# Patient Record
Sex: FEMALE | Race: BLACK OR AFRICAN AMERICAN | Employment: OTHER | URBAN - METROPOLITAN AREA
[De-identification: names, ages, dates, MRNs, and addresses within clinical notes are randomized per-mention and may not be internally consistent; named-entity substitution may affect disease eponyms.]

---

## 2017-02-02 ENCOUNTER — OFFICE VISIT (OUTPATIENT)
Dept: INTERNAL MEDICINE CLINIC | Age: 67
End: 2017-02-02

## 2017-02-02 VITALS
RESPIRATION RATE: 20 BRPM | HEART RATE: 77 BPM | BODY MASS INDEX: 34.66 KG/M2 | OXYGEN SATURATION: 99 % | WEIGHT: 208 LBS | SYSTOLIC BLOOD PRESSURE: 142 MMHG | DIASTOLIC BLOOD PRESSURE: 70 MMHG | HEIGHT: 65 IN | TEMPERATURE: 98.1 F

## 2017-02-02 DIAGNOSIS — Z12.39 SCREENING FOR BREAST CANCER: ICD-10-CM

## 2017-02-02 DIAGNOSIS — I10 ESSENTIAL HYPERTENSION: Primary | ICD-10-CM

## 2017-02-02 DIAGNOSIS — I10 ESSENTIAL HYPERTENSION WITH GOAL BLOOD PRESSURE LESS THAN 140/90: ICD-10-CM

## 2017-02-02 DIAGNOSIS — Z12.11 SCREENING FOR COLON CANCER: ICD-10-CM

## 2017-02-02 DIAGNOSIS — Z00.00 MEDICARE ANNUAL WELLNESS VISIT, SUBSEQUENT: ICD-10-CM

## 2017-02-02 LAB
CHOLEST SERPL-MCNC: 239 MG/DL
HDLC SERPL-MCNC: 57 MG/DL
LDL CHOLESTEROL POC: 168
NON-HDL GOAL (POC): 183
TCHOL/HDL RATIO (POC): 4.2
TRIGL SERPL-MCNC: 74 MG/DL

## 2017-02-02 RX ORDER — LOSARTAN POTASSIUM 50 MG/1
50 TABLET ORAL DAILY
Qty: 30 TAB | Refills: 12 | Status: SHIPPED | OUTPATIENT
Start: 2017-02-02 | End: 2018-04-19 | Stop reason: SDUPTHER

## 2017-02-02 NOTE — PATIENT INSTRUCTIONS
[employer] - [employer]pping Cart Software Activation    Thank you for requesting access to PharmAbcine. Please follow the instructions below to securely access and download your online medical record. PharmAbcine allows you to send messages to your doctor, view your test results, renew your prescriptions, schedule appointments, and more. How Do I Sign Up? 1. In your internet browser, go to [email or web address]  2. Click on the First Time User? Click Here link in the Sign In box. You will be redirect to the New Member Sign Up page. 3. Enter your PharmAbcine Access Code exactly as it appears below. You will not need to use this code after youve completed the sign-up process. If you do not sign up before the expiration date, you must request a new code. PharmAbcine Access Code: Activation code not generated  Current PharmAbcine Status: Patient Declined (This is the date your PharmAbcine access code will )    4. Enter the last four digits of your Social Security Number (xxxx) and Date of Birth (mm/dd/yyyy) as indicated and click Submit. You will be taken to the next sign-up page. 5. Create a PharmAbcine ID. This will be your PharmAbcine login ID and cannot be changed, so think of one that is secure and easy to remember. 6. Create a PharmAbcine password. You can change your password at any time. 7. Enter your Password Reset Question and Answer. This can be used at a later time if you forget your password. 8. Enter your e-mail address. You will receive e-mail notification when new information is available in [address] 9. Click Sign Up. You can now view and download portions of your medical record. 10. Click the Download Summary menu link to download a portable copy of your medical information. Additional Information    If you have questions, please visit the Frequently Asked Questions section of the PharmAbcine website at [email or web address] Remember, PharmAbcine is NOT to be used for urgent needs. For medical emergencies, dial 911.

## 2017-02-02 NOTE — MR AVS SNAPSHOT
Visit Information Date & Time Provider Department Dept. Phone Encounter #  
 2/2/2017 10:00 AM aKtie Irvin MD [facility] 7 - 7[address] [facility] [phone] [id number] Follow-up Instructions Return in about 3 months (around 5/2/2017), or if symptoms worsen or fail to improve. Upcoming Health Maintenance Date Due  
 GLAUCOMA SCREENING Q2Y 2/18/2015 Pneumococcal 65+ Low/Medium Risk (1 of 2 - PCV13) 2/18/2015 BREAST CANCER SCRN MAMMOGRAM 10/1/2016 FOBT Q 1 YEAR AGE 50-75 12/1/2016 DTaP/Tdap/Td series (1 - Tdap) 4/20/2017* MEDICARE YEARLY EXAM 2/3/2018 *Topic was postponed. The date shown is not the original due date. Allergies as of 2/2/2017  Review Complete On: 2/2/2017 By: Katie Irvin MD  
  
 Severity Noted Reaction Type Reactions Codeine  10/27/2011    Itching, Swelling Current Immunizations  Never Reviewed No immunizations on file. Not reviewed this visit You Were Diagnosed With   
  
 Codes Comments Essential hypertension    -  Primary ICD-10-CM: I10 
ICD-9-CM: 401.9 Screening for colon cancer     ICD-10-CM: Z12.11 ICD-9-CM: V76.51 Screening for breast cancer     ICD-10-CM: Z12.39 
ICD-9-CM: V76.10 Essential hypertension with goal blood pressure less than 140/90     ICD-10-CM: I10 
ICD-9-CM: 401.9 Vitals BP Pulse Temp Resp Height(growth percentile) Weight(growth percentile) 142/70 77 98.1 °F (36.7 °C) (Oral) 20 5' 5\" (1.651 m) 208 lb (94.3 kg) SpO2 BMI OB Status Smoking Status 99% 34.61 kg/m2 Hysterectomy Never Smoker BMI and BSA Data Body Mass Index Body Surface Area  
 34.61 kg/m 2 2.08 m 2 Preferred Pharmacy Pharmacy Name Phone [facility] [address] [facility] [phone] Your Updated Medication List  
  
   
This list is accurate as of: 2/2/17 11:22 AM.  Always use your most recent med list.  
  
  
  
  
 acetaminophen 650 mg CR tablet Commonly known as:  TYLENOL ARTHRITIS Take 1 Tab by mouth every six (6) hours as needed for Pain. amLODIPine 5 mg tablet Commonly known as:  Erazo Del Take 1 Tab by mouth daily. atorvastatin 80 mg tablet Commonly known as:  LIPITOR Take 1 Tab by mouth daily. azithromycin 250 mg tablet Commonly known as:  Evelene Smiling 2 tabs today and then 1 tab daily for 4 days  
  
 cetirizine 5 mg tablet Commonly known as:  ZYRTEC Take 1 Tab by mouth daily. fluticasone 50 mcg/actuation nasal spray Commonly known as:  Domnick Means 2 Sprays by Both Nostrils route daily. hydroCHLOROthiazide 12.5 mg tablet Commonly known as:  HYDRODIURIL Take 1 Tab by mouth daily. ipratropium 0.06 % nasal spray Commonly known as:  ATROVENT  
2 Sprays by Both Nostrils route four (4) times daily. losartan 50 mg tablet Commonly known as:  COZAAR Take 1 Tab by mouth daily. predniSONE 10 mg tablet Commonly known as:  DELTASONE  
6 tabs today and reduce by 1 tab daily Prescriptions Sent to Pharmacy Refills  
 losartan (COZAAR) 50 mg tablet 12 Sig: Take 1 Tab by mouth daily. Class: Normal  
 Pharmacy: [address] [place] [address] 36, [address]10 [facility] Ph #: [phone] Route: Oral  
  
We Performed the Following AMB POC LIPID PROFILE [19280 CPT(R)] CBC W/O DIFF [21252 CPT(R)] METABOLIC PANEL, COMPREHENSIVE [53842 CPT(R)] OCCULT BLOOD, IMMUNOASSAY (FIT) [id number] CPT(R)] Follow-up Instructions Return in about 3 months (around 5/2/2017), or if symptoms worsen or fail to improve. To-Do List   
 02/02/2017 Imaging:  SIGRID MAMMO BI SCREENING INCL CAD Patient Instructions [employer] Activation Thank you for requesting access to Adjudica. Please follow the instructions below to securely access and download your online medical record.  Adjudica allows you to send messages to your doctor, view your test results, renew your prescriptions, schedule appointments, and more. How Do I Sign Up? 1. In your internet browser, go to [email or web address] 
2. Click on the First Time User? Click Here link in the Sign In box. You will be redirect to the New Member Sign Up page. 3. Enter your StreamOcean Access Code exactly as it appears below. You will not need to use this code after youve completed the sign-up process. If you do not sign up before the expiration date, you must request a new code. Dwehot Access Code: Activation code not generated Current StreamOcean Status: Patient Declined (This is the date your Dwehot access code will ) 4. Enter the last four digits of your Social Security Number (xxxx) and Date of Birth (mm/dd/yyyy) as indicated and click Submit. You will be taken to the next sign-up page. 5. Create a StreamOcean ID. This will be your StreamOcean login ID and cannot be changed, so think of one that is secure and easy to remember. 6. Create a StreamOcean password. You can change your password at any time. 7. Enter your Password Reset Question and Answer. This can be used at a later time if you forget your password. 8. Enter your e-mail address. You will receive e-mail notification when new information is available in [address] 9. Click Sign Up. You can now view and download portions of your medical record. 10. Click the Download Summary menu link to download a portable copy of your medical information. Additional Information If you have questions, please visit the Frequently Asked Questions section of the StreamOcean website at [email or web address] Remember, StreamOcean is NOT to be used for urgent needs. For medical emergencies, dial 911. Please provide this summary of care documentation to your next provider. Your primary care clinician is listed as Emmanuelle Barnes.  If you have any questions after today's visit, please call [phone].

## 2017-02-03 LAB
ALBUMIN SERPL-MCNC: 4.4 G/DL (ref 3.6–4.8)
ALBUMIN/GLOB SERPL: 1.5 {RATIO} (ref 1.1–2.5)
ALP SERPL-CCNC: 108 IU/L (ref 39–117)
ALT SERPL-CCNC: 14 IU/L (ref 0–32)
AST SERPL-CCNC: 24 IU/L (ref 0–40)
BILIRUB SERPL-MCNC: 0.3 MG/DL (ref 0–1.2)
BUN SERPL-MCNC: 14 MG/DL (ref 8–27)
BUN/CREAT SERPL: 13 (ref 11–26)
CALCIUM SERPL-MCNC: 9.2 MG/DL (ref 8.7–10.3)
CHLORIDE SERPL-SCNC: 100 MMOL/L (ref 96–106)
CO2 SERPL-SCNC: 27 MMOL/L (ref 18–29)
CREAT SERPL-MCNC: 1.06 MG/DL (ref 0.57–1)
ERYTHROCYTE [DISTWIDTH] IN BLOOD BY AUTOMATED COUNT: 16 % (ref 12.3–15.4)
GLOBULIN SER CALC-MCNC: 3 G/DL (ref 1.5–4.5)
GLUCOSE SERPL-MCNC: 96 MG/DL (ref 65–99)
HCT VFR BLD AUTO: 42.1 % (ref 34–46.6)
HGB BLD-MCNC: 14.4 G/DL (ref 11.1–15.9)
INTERPRETATION: NORMAL
MCH RBC QN AUTO: 28.5 PG (ref 26.6–33)
MCHC RBC AUTO-ENTMCNC: 34.2 G/DL (ref 31.5–35.7)
MCV RBC AUTO: 83 FL (ref 79–97)
PLATELET # BLD AUTO: 252 X10E3/UL (ref 150–379)
POTASSIUM SERPL-SCNC: 3.8 MMOL/L (ref 3.5–5.2)
PROT SERPL-MCNC: 7.4 G/DL (ref 6–8.5)
RBC # BLD AUTO: 5.06 X10E6/UL (ref 3.77–5.28)
SODIUM SERPL-SCNC: 143 MMOL/L (ref 134–144)
WBC # BLD AUTO: 6.5 X10E3/UL (ref 3.4–10.8)

## 2017-07-06 DIAGNOSIS — I10 ESSENTIAL HYPERTENSION WITH GOAL BLOOD PRESSURE LESS THAN 140/90: ICD-10-CM

## 2017-07-06 DIAGNOSIS — E78.00 HYPERCHOLESTEREMIA: ICD-10-CM

## 2017-07-06 RX ORDER — ATORVASTATIN CALCIUM 80 MG/1
TABLET, FILM COATED ORAL
Qty: 90 TAB | Refills: 3 | Status: SHIPPED | OUTPATIENT
Start: 2017-07-06 | End: 2018-07-24 | Stop reason: SDUPTHER

## 2017-07-06 RX ORDER — AMLODIPINE BESYLATE 5 MG/1
TABLET ORAL
Qty: 90 TAB | Refills: 4 | Status: SHIPPED | OUTPATIENT
Start: 2017-07-06 | End: 2018-04-19 | Stop reason: SDUPTHER

## 2017-07-06 RX ORDER — HYDROCHLOROTHIAZIDE 12.5 MG/1
TABLET ORAL
Qty: 90 TAB | Refills: 4 | Status: SHIPPED | OUTPATIENT
Start: 2017-07-06 | End: 2018-04-19 | Stop reason: SDUPTHER

## 2018-04-19 ENCOUNTER — OFFICE VISIT (OUTPATIENT)
Dept: INTERNAL MEDICINE CLINIC | Age: 68
End: 2018-04-19

## 2018-04-19 VITALS
TEMPERATURE: 98.2 F | WEIGHT: 202 LBS | SYSTOLIC BLOOD PRESSURE: 120 MMHG | DIASTOLIC BLOOD PRESSURE: 70 MMHG | HEART RATE: 72 BPM | OXYGEN SATURATION: 98 % | HEIGHT: 65 IN | BODY MASS INDEX: 33.66 KG/M2 | RESPIRATION RATE: 16 BRPM

## 2018-04-19 DIAGNOSIS — Z12.39 BREAST CANCER SCREENING: ICD-10-CM

## 2018-04-19 DIAGNOSIS — E78.00 HYPERCHOLESTEREMIA: ICD-10-CM

## 2018-04-19 DIAGNOSIS — I47.1 SVT (SUPRAVENTRICULAR TACHYCARDIA) (HCC): Primary | ICD-10-CM

## 2018-04-19 DIAGNOSIS — E87.6 HYPOKALEMIA: ICD-10-CM

## 2018-04-19 DIAGNOSIS — Z12.11 SCREENING FOR COLON CANCER: ICD-10-CM

## 2018-04-19 DIAGNOSIS — I10 ESSENTIAL HYPERTENSION WITH GOAL BLOOD PRESSURE LESS THAN 140/90: ICD-10-CM

## 2018-04-19 RX ORDER — METOPROLOL TARTRATE 25 MG/1
25 TABLET, FILM COATED ORAL DAILY
Qty: 90 TAB | Refills: 4 | Status: SHIPPED | OUTPATIENT
Start: 2018-04-19 | End: 2018-05-25 | Stop reason: SDUPTHER

## 2018-04-19 RX ORDER — POTASSIUM CHLORIDE 1500 MG/1
TABLET, EXTENDED RELEASE ORAL
COMMUNITY
Start: 2018-04-17 | End: 2018-04-19 | Stop reason: SDUPTHER

## 2018-04-19 RX ORDER — POTASSIUM CHLORIDE 1500 MG/1
20 TABLET, EXTENDED RELEASE ORAL 2 TIMES DAILY
Qty: 180 TAB | Refills: 4 | Status: SHIPPED | OUTPATIENT
Start: 2018-04-19 | End: 2018-05-25 | Stop reason: SDUPTHER

## 2018-04-19 RX ORDER — LOSARTAN POTASSIUM 50 MG/1
50 TABLET ORAL DAILY
Qty: 90 TAB | Refills: 4 | Status: SHIPPED | OUTPATIENT
Start: 2018-04-19 | End: 2018-05-25 | Stop reason: SDUPTHER

## 2018-04-19 RX ORDER — AMLODIPINE BESYLATE 5 MG/1
TABLET ORAL
Qty: 90 TAB | Refills: 4 | Status: SHIPPED | OUTPATIENT
Start: 2018-04-19 | End: 2018-08-20 | Stop reason: SDUPTHER

## 2018-04-19 RX ORDER — METOPROLOL TARTRATE 25 MG/1
TABLET, FILM COATED ORAL
COMMUNITY
Start: 2018-04-17 | End: 2018-04-19 | Stop reason: SDUPTHER

## 2018-04-19 RX ORDER — HYDROCHLOROTHIAZIDE 12.5 MG/1
TABLET ORAL
Qty: 90 TAB | Refills: 4 | Status: SHIPPED | OUTPATIENT
Start: 2018-04-19 | End: 2018-05-25 | Stop reason: SDUPTHER

## 2018-04-19 NOTE — MR AVS SNAPSHOT
[address] [place] [address],[address] [place] [id number] 
[phone] Patient: Olya Epperson MRN: [id number] IVT:8/[id number] Visit Information Date & Time Provider Department Dept. Phone Encounter #  
 4/19/2018 10:00 AM Sheryl Altamirano MD [address] [phone] [id number] Follow-up Instructions Return in about 4 weeks (around 5/17/2018), or if symptoms worsen or fail to improve. Upcoming Health Maintenance Date Due DTaP/Tdap/Td series (1 - Tdap) 2/18/1971 GLAUCOMA SCREENING Q2Y 2/18/2015 Pneumococcal 65+ Low/Medium Risk (1 of 2 - PCV13) 2/18/2015 BREAST CANCER SCRN MAMMOGRAM 10/1/2016 FOBT Q 1 YEAR AGE 50-75 12/1/2016 Influenza Age 5 to Adult 8/1/2017 MEDICARE YEARLY EXAM 4/20/2019 Allergies as of 4/19/2018  Review Complete On: 4/19/2018 By: Sheryl Altamirano MD  
  
 Severity Noted Reaction Type Reactions Codeine  10/27/2011    Itching, Swelling Current Immunizations  Never Reviewed No immunizations on file. Not reviewed this visit You Were Diagnosed With   
  
 Codes Comments [facility]    -  Primary ICD-10-CM: I47.1 ICD-9-CM: 427.89 Essential hypertension with goal blood pressure less than 140/90     ICD-10-CM: I10 
ICD-9-CM: 401.9 Hypercholesteremia     ICD-10-CM: E78.00 ICD-9-CM: 272.0 Hypokalemia     ICD-10-CM: E87.6 ICD-9-CM: 276.8 Screening for colon cancer     ICD-10-CM: Z12.11 ICD-9-CM: V76.51 Breast cancer screening     ICD-10-CM: Z12.31 
ICD-9-CM: V76.10 Vitals BP Pulse Temp Resp Height(growth percentile) Weight(growth percentile) 120/70 (BP 1 Location: Right arm, BP Patient Position: Sitting) 72 98.2 °F (36.8 °C) (Oral) 16 5' 5\" (1.651 m) 202 lb (91.6 kg) SpO2 BMI OB Status Smoking Status 98% 33.61 kg/m2 Hysterectomy Never Smoker Vitals History BMI and BSA Data Body Mass Index Body Surface Area  
 33.61 kg/m 2 2.05 m 2 Preferred Pharmacy Pharmacy Name Phone [address] [facility] 36, [address] [phone] Your Updated Medication List  
  
   
This list is accurate as of 4/19/18 11:35 AM.  Always use your most recent med list. amLODIPine 5 mg tablet Commonly known as:  Kel Rings TAKE 1 TABLET EVERY DAY  
  
 atorvastatin 80 mg tablet Commonly known as:  LIPITOR  
TAKE 1 TABLET EVERY DAY  
  
 fluticasone 50 mcg/actuation nasal spray Commonly known as:  Marcine Infield 2 Sprays by Both Nostrils route daily. hydroCHLOROthiazide 12.5 mg tablet Commonly known as:  HYDRODIURIL  
TAKE 1 TABLET EVERY DAY  
  
 ipratropium 42 mcg (0.06 %) nasal spray Commonly known as:  ATROVENT  
2 Sprays by Both Nostrils route four (4) times daily. KLOR-CON M20 20 mEq tablet Generic drug:  potassium chloride Take 1 Tab by mouth two (2) times a day. losartan 50 mg tablet Commonly known as:  COZAAR Take 1 Tab by mouth daily. metoprolol tartrate 25 mg tablet Commonly known as:  LOPRESSOR Take 1 Tab by mouth daily. Prescriptions Sent to Pharmacy Refills  
 metoprolol tartrate (LOPRESSOR) 25 mg tablet 4 Sig: Take 1 Tab by mouth daily. Class: Normal  
 Pharmacy: [address] [facility] 36, [address] Ph #: [phone] Route: Oral  
 KLOR-CON M20 20 mEq tablet 4 Sig: Take 1 Tab by mouth two (2) times a day. Class: Normal  
 Pharmacy: [address] [facility] 36, [address] Ph #: [phone] Route: Oral  
 amLODIPine (NORVASC) 5 mg tablet 4 Sig: TAKE 1 TABLET EVERY DAY Class: Normal  
 Pharmacy: [address] [facility] Ph #: [phone]  
 hydroCHLOROthiazide (HYDRODIURIL) 12.5 mg tablet 4 Sig: TAKE 1 TABLET EVERY DAY  Class: Normal  
 Pharmacy: [address] [place] [address] Ph #: [phone]  
 losartan (COZAAR) 50 mg tablet 4 Sig: Take 1 Tab by mouth daily. Class: Normal  
 Pharmacy: [address] [place], [place] [address] 36, [address] [place] Ph #: [phone] Route: Oral  
  
We Performed the Following METABOLIC PANEL, BASIC [67251 CPT(R)] OCCULT BLOOD, IMMUNOASSAY (FIT) [id number] CPT(R)] Follow-up Instructions Return in about 4 weeks (around 2018), or if symptoms worsen or fail to improve. To-Do List   
 2018 Imaging:  SIGRID MAMMO BI SCREENING INCL CAD Patient Instructions MyChart Activation Thank you for requesting access to Parents Journey. Please follow the instructions below to securely access and download your online medical record. Parents Journey allows you to send messages to your doctor, view your test results, renew your prescriptions, schedule appointments, and more. How Do I Sign Up? 1. In your internet browser, go to [email or web address] 
2. Click on the First Time User? Click Here link in the Sign In box. You will be redirect to the New Member Sign Up page. 3. Enter your Parents Journey Access Code exactly as it appears below. You will not need to use this code after youve completed the sign-up process. If you do not sign up before the expiration date, you must request a new code. Parents Journey Access Code: [email or web address] Expires: 2018 11:32 AM (This is the date your Parents Journey access code will ) 4. Enter the last four digits of your Social Security Number (xxxx) and Date of Birth (mm/dd/yyyy) as indicated and click Submit. You will be taken to the next sign-up page. 5. Create a Parents Journey ID. This will be your Parents Journey login ID and cannot be changed, so think of one that is secure and easy to remember. 6. Create a Parents Journey password. You can change your password at any time. 7. Enter your Password Reset Question and Answer.  This can be used at a later time if you forget your password. 8. Enter your e-mail address. You will receive e-mail notification when new information is available in [address] 9. Click Sign Up. You can now view and download portions of your medical record. 10. Click the Download Summary menu link to download a portable copy of your medical information. Additional Information If you have questions, please visit the Frequently Asked Questions section of the MegaHoot website at [email or web address] Remember, MegaHoot is NOT to be used for urgent needs. For medical emergencies, dial 911. Introducing [facility]! Ramandeep Mueller introduces MegaHoot patient portal. Now you can access parts of your medical record, email your doctor's office, and request medication refills online. 1. In your internet browser, go to [email or web address] 2. Click on the First Time User? Click Here link in the Sign In box. You will see the New Member Sign Up page. 3. Enter your MegaHoot Access Code exactly as it appears below. You will not need to use this code after youve completed the sign-up process. If you do not sign up before the expiration date, you must request a new code. · MegaHoot Access Code: [email or web address] Expires: 7/18/2018 11:32 AM 
 
4. Enter the last four digits of your Social Security Number (xxxx) and Date of Birth (mm/dd/yyyy) as indicated and click Submit. You will be taken to the next sign-up page. 5. Create a Correlort ID. This will be your MegaHoot login ID and cannot be changed, so think of one that is secure and easy to remember. 6. Create a MegaHoot password. You can change your password at any time. 7. Enter your Password Reset Question and Answer. This can be used at a later time if you forget your password. 8. Enter your e-mail address. You will receive e-mail notification when new information is available in [address] 9. Click Sign Up. You can now view and download portions of your medical record. 10. Click the Download Summary menu link to download a portable copy of your medical information. If you have questions, please visit the Frequently Asked Questions section of the SixthEye website. Remember, SixthEye is NOT to be used for urgent needs. For medical emergencies, dial 911. Now available from your iPhone and Android! Please provide this summary of care documentation to your next provider. Your primary care clinician is listed as Worthy Nash. If you have any questions after today's visit, please call [phone].

## 2018-04-19 NOTE — PROGRESS NOTES
1. Have you been to the ER, urgent care clinic since your last visit? Hospitalized since your last visit? YES. 4/17/18 ADALBERTO    2. Have you seen or consulted any other health care providers outside of the [address] [place] [address] since your last visit? Include any pap smears or colon screening.  YES, 4/17/17, ADALBERTO

## 2018-04-19 NOTE — PATIENT INSTRUCTIONS
OuterBay Technologies Activation    Thank you for requesting access to OuterBay Technologies. Please follow the instructions below to securely access and download your online medical record. OuterBay Technologies allows you to send messages to your doctor, view your test results, renew your prescriptions, schedule appointments, and more. How Do I Sign Up? 1. In your internet browser, go to [email or web address]  2. Click on the First Time User? Click Here link in the Sign In box. You will be redirect to the New Member Sign Up page. 3. Enter your OuterBay Technologies Access Code exactly as it appears below. You will not need to use this code after youve completed the sign-up process. If you do not sign up before the expiration date, you must request a new code. OuterBay Technologies Access Code: [email or web address]  Expires: 2018 11:32 AM (This is the date your OuterBay Technologies access code will )    4. Enter the last four digits of your Social Security Number (xxxx) and Date of Birth (mm/dd/yyyy) as indicated and click Submit. You will be taken to the next sign-up page. 5. Create a OuterBay Technologies ID. This will be your OuterBay Technologies login ID and cannot be changed, so think of one that is secure and easy to remember. 6. Create a OuterBay Technologies password. You can change your password at any time. 7. Enter your Password Reset Question and Answer. This can be used at a later time if you forget your password. 8. Enter your e-mail address. You will receive e-mail notification when new information is available in [address] 9. Click Sign Up. You can now view and download portions of your medical record. 10. Click the Download Summary menu link to download a portable copy of your medical information. Additional Information    If you have questions, please visit the Frequently Asked Questions section of the OuterBay Technologies website at [email or web address] Remember, OuterBay Technologies is NOT to be used for urgent needs. For medical emergencies, dial 911.

## 2018-04-19 NOTE — PROGRESS NOTES
Angelic Camargo is a 76 y.o. female and presents with Hospital Follow Up and Irregular Heart Beat  . Subjective:  Arrhythmia  Patient presents for evaluation of palpitations. Onset was 2 days ago, with improving course since that time. Patient also complains of palpitations. The patient denies abdominal pain, abnormal labs sob, chest pain, cough, dizziness, leg swelling and shortness of breath. The patient has a new history of SVT. The patient denies a past history of A fib, A flutter, AICD, CABG, CAD and CHF   She was seen at [facility] er treated and released. She was noted to have a low potassium and treated. Hypertension Review:  The patient has essential hypertension  Diet and Lifestyle: generally follows a  low sodium diet, exercises sporadically  Home BP Monitoring: is not measured at home. Pertinent ROS: taking medications as instructed, no medication side effects noted, no TIA's, no chest pain on exertion, no dyspnea on exertion, no swelling of ankles. Dyslipidemia Review:  Patient presents for evaluation of lipids. Compliance with treatment thus far has been excellent. A repeat fasting lipid profile was done. The patient does not use medications that may worsen dyslipidemias (corticosteroids, progestins, anabolic steroids, diuretics, beta-blockers, amiodarone, cyclosporine, olanzapine). The patient exercises some      Angelic Camargo is a 76 y.o. female and presents for annual Medicare Wellness Visit. Problem List: Reviewed with patient and discussed risk factors.     Patient Active Problem List   Diagnosis Code    Hypertension I10    Screening cholesterol level Z13.220    Screening for thyroid disorder Z13.29       Current medical providers:  Patient Care Team:  Rosa Campuzano MD as PCP - General (Internal Medicine)  Norma Davis LPN as Nurse Navigator    PSH: Reviewed with patient  Past Surgical History:   Procedure Laterality Date    HX GYN      hystterectomy        SH: Reviewed with patient  Social History   Substance Use Topics    Smoking status: Never Smoker    Smokeless tobacco: Never Used    Alcohol use Yes      Comment: occasional       FH: Reviewed with patient  History reviewed. No pertinent family history. Medications/Allergies: Reviewed with patient  Current Outpatient Prescriptions on File Prior to Visit   Medication Sig Dispense Refill    atorvastatin (LIPITOR) 80 mg tablet TAKE 1 TABLET EVERY DAY 90 Tab 3    fluticasone (FLONASE) 50 mcg/actuation nasal spray 2 Sprays by Both Nostrils route daily. 1 Bottle 12    ipratropium (ATROVENT) 0.06 % nasal spray 2 Sprays by Both Nostrils route four (4) times daily. 15 mL 3     No current facility-administered medications on file prior to visit. Allergies   Allergen Reactions    Codeine Itching and Swelling       Objective:  Visit Vitals    /70 (BP 1 Location: Right arm, BP Patient Position: Sitting)    Pulse 72    Temp 98.2 °F (36.8 °C) (Oral)    Resp 16    Ht 5' 5\" (1.651 m)    Wt 202 lb (91.6 kg)    SpO2 98%    BMI 33.61 kg/m2    Body mass index is 33.61 kg/(m^2). Assessment of cognitive impairment: Alert and oriented x 3    Depression Screen:   PHQ over the last two weeks 4/19/2018   PHQ Not Done -   Little interest or pleasure in doing things Not at all   Feeling down, depressed or hopeless Not at all   Total Score PHQ 2 0     Depression Review:  Patient is seen for screen of depression,denies anhedonia, weight gain, insomnia, hypersomnia, psychomotor agitation, psychomotor retardation, fatigue, feelings of worthlessness/guilt, difficulty concentrating, hopelessness, impaired memory and recurrent thoughts of death Treatment includes no medication   She denies recurrent thoughts of death and suicidal thoughts without plan. Fall Risk Assessment:    Fall Risk Assessment, last 12 mths 4/19/2018   Able to walk? -   Fall in past 12 months? Yes   Fall with injury?  No   Number of falls in past 12 months 1   Fall Risk Score 1       Functional Ability:   Does the patient exhibit a steady gait? yes   How long did it take the patient to get up and walk from a sitting position? seconds   Is the patient self reliant?  (ie can do own laundry, meals, household chores)  yes     Does the patient handle his/her own medications? yes     Does the patient handle his/her own money? yes     Is the patients home safe (ie good lighting, handrails on stairs and bath, etc.)? yes     Did you notice or did patient express any hearing difficulties? no     Did you notice or did patient express any vision difficulties?   no     Were distance and reading eye charts used? no       Advance Care Planning:   Patient was offered the opportunity to discuss advance care planning:  yes     Does patient have an Advance Directive:  no   If no, did you provide information on Caring Connections? yes       Plan:      Orders Placed This Encounter    SIGRID MAMMO BI SCREENING INCL CAD    METABOLIC PANEL, BASIC    OCCULT BLOOD, IMMUNOASSAY (FIT)    DISCONTD: metoprolol tartrate (LOPRESSOR) 25 mg tablet    DISCONTD: KLOR-CON M20 20 mEq tablet    metoprolol tartrate (LOPRESSOR) 25 mg tablet    KLOR-CON M20 20 mEq tablet    amLODIPine (NORVASC) 5 mg tablet    hydroCHLOROthiazide (HYDRODIURIL) 12.5 mg tablet    losartan (COZAAR) 50 mg tablet       Health Maintenance   Topic Date Due    DTaP/Tdap/Td series (1 - Tdap) 02/18/1971    GLAUCOMA SCREENING Q2Y  02/18/2015    Pneumococcal 65+ Low/Medium Risk (1 of 2 - PCV13) 02/18/2015    BREAST CANCER SCRN MAMMOGRAM  10/01/2016    FOBT Q 1 YEAR AGE 50-75  12/01/2016    Influenza Age 9 to Adult  08/01/2017    MEDICARE YEARLY EXAM  04/20/2019    Hepatitis C Screening  Completed    Bone Densitometry (Dexa) Screening  Addressed    ZOSTER VACCINE AGE 60>  Addressed       *Patient verbalized understanding and agreement with the plan.   A copy of the After Visit Summary with personalized health plan was given to the patient today. Review of Systems  Constitutional: negative for fevers, chills, anorexia and weight loss  Eyes:   negative for visual disturbance and irritation  ENT:   negative for tinnitus,sore throat,nasal congestion,ear pains. hoarseness  Respiratory:  negative for cough, hemoptysis, dyspnea,wheezing  CV:   negative for chest pain, palpitations, lower extremity edema  GI:   negative for nausea, vomiting, diarrhea, abdominal pain,melena  Endo:               negative for polyuria,polydipsia,polyphagia,heat intolerance  Genitourinary: negative for frequency, dysuria and hematuria  Integument:  negative for rash and pruritus  Hematologic:  negative for easy bruising and gum/nose bleeding  Musculoskel: negative for myalgias, arthralgias, back pain, muscle weakness, joint pain  Neurological:  negative for headaches, dizziness, vertigo, memory problems and gait   Behavl/Psych: negative for feelings of anxiety, depression, mood changes    Past Medical History:   Diagnosis Date    History of mammogram 2010    normal    Hypertension     Pap smear for cervical cancer screening 2011    normal     Past Surgical History:   Procedure Laterality Date    HX GYN      hystterectomy     Social History     Social History    Marital status:      Spouse name: N/A    Number of children: N/A    Years of education: N/A     Social History Main Topics    Smoking status: Never Smoker    Smokeless tobacco: Never Used    Alcohol use Yes      Comment: occasional    Drug use: No    Sexual activity: Not Asked     Other Topics Concern    None     Social History Narrative     History reviewed. No pertinent family history. Current Outpatient Prescriptions   Medication Sig Dispense Refill    metoprolol tartrate (LOPRESSOR) 25 mg tablet Take 1 Tab by mouth daily. 90 Tab 4    KLOR-CON M20 20 mEq tablet Take 1 Tab by mouth two (2) times a day.  180 Tab 4    amLODIPine (NORVASC) 5 mg tablet TAKE 1 TABLET EVERY DAY 90 Tab 4    hydroCHLOROthiazide (HYDRODIURIL) 12.5 mg tablet TAKE 1 TABLET EVERY DAY 90 Tab 4    losartan (COZAAR) 50 mg tablet Take 1 Tab by mouth daily. 90 Tab 4    atorvastatin (LIPITOR) 80 mg tablet TAKE 1 TABLET EVERY DAY 90 Tab 3    fluticasone (FLONASE) 50 mcg/actuation nasal spray 2 Sprays by Both Nostrils route daily. 1 Bottle 12    ipratropium (ATROVENT) 0.06 % nasal spray 2 Sprays by Both Nostrils route four (4) times daily. 15 mL 3     Allergies   Allergen Reactions    Codeine Itching and Swelling       Objective:  Visit Vitals    /70 (BP 1 Location: Right arm, BP Patient Position: Sitting)    Pulse 72    Temp 98.2 °F (36.8 °C) (Oral)    Resp 16    Ht 5' 5\" (1.651 m)    Wt 202 lb (91.6 kg)    SpO2 98%    BMI 33.61 kg/m2     Physical Exam:   General appearance - alert, well appearing, and in no distress  Mental status - alert, oriented to person, place, and time  EYE-SUSSY, EOMI, corneas normal, no foreign bodies  ENT-ENT exam normal, no neck nodes or sinus tenderness  Nose - normal and patent, no erythema, discharge or polyps  Mouth - mucous membranes moist, pharynx normal without lesions  Neck - supple, no significant adenopathy   Chest - clear to auscultation, no wheezes, rales or rhonchi, symmetric air entry   Heart - normal rate, regular rhythm, normal S1, S2, no murmurs, rubs, clicks or gallops   Abdomen - soft, nontender, nondistended, no masses or organomegaly  Lymph- no adenopathy palpable  Ext-peripheral pulses normal, no pedal edema, no clubbing or cyanosis  Skin-Warm and dry.  no hyperpigmentation, vitiligo, or suspicious lesions  Neuro -alert, oriented, normal speech, no focal findings or movement disorder noted  Neck-normal C-spine, no tenderness, full ROM without pain  Feet-no nail deformities or callus formation with good pulses noted      Results for orders placed or performed in visit on 47/80/84   METABOLIC PANEL, COMPREHENSIVE   Result Value Ref Range    Glucose 96 65 - 99 mg/dL    BUN 14 8 - 27 mg/dL    Creatinine 1.06 (H) 0.57 - 1.00 mg/dL    GFR est non-AA 55 (L) >59 mL/min/1.73    GFR est AA 63 >59 mL/min/1.73    BUN/Creatinine ratio 13 11 - 26    Sodium 143 134 - 144 mmol/L    Potassium 3.8 3.5 - 5.2 mmol/L    Chloride 100 96 - 106 mmol/L    CO2 27 18 - 29 mmol/L    Calcium 9.2 8.7 - 10.3 mg/dL    Protein, total 7.4 6.0 - 8.5 g/dL    Albumin 4.4 3.6 - 4.8 g/dL    GLOBULIN, TOTAL 3.0 1.5 - 4.5 g/dL    A-G Ratio 1.5 1.1 - 2.5    Bilirubin, total 0.3 0.0 - 1.2 mg/dL    Alk. phosphatase 108 39 - 117 IU/L    AST (SGOT) 24 0 - 40 IU/L    ALT (SGPT) 14 0 - 32 IU/L   CBC W/O DIFF   Result Value Ref Range    WBC 6.5 3.4 - 10.8 x10E3/uL    RBC 5.06 3.77 - 5.28 x10E6/uL    HGB 14.4 11.1 - 15.9 g/dL    HCT 42.1 34.0 - 46.6 %    MCV 83 79 - 97 fL    MCH 28.5 26.6 - 33.0 pg    MCHC 34.2 31.5 - 35.7 g/dL    RDW 16.0 (H) 12.3 - 15.4 %    PLATELET 545 909 - 193 x10E3/uL   CKD REPORT   Result Value Ref Range    Interpretation Note    AMB POC LIPID PROFILE   Result Value Ref Range    Cholesterol (POC) 239     Triglycerides (POC) 74     HDL Cholesterol (POC) 57     LDL Cholesterol (POC) 168     Non-HDL Goal (POC) 183     TChol/HDL Ratio (POC) 4.2        Assessment/Plan:    ICD-10-CM ICD-9-CM    1. SVT (supraventricular tachycardia) (HCC) I47.1 427.89 metoprolol tartrate (LOPRESSOR) 25 mg tablet   2. Essential hypertension with goal blood pressure less than 140/90 I10 401.9 metoprolol tartrate (LOPRESSOR) 25 mg tablet      amLODIPine (NORVASC) 5 mg tablet      hydroCHLOROthiazide (HYDRODIURIL) 12.5 mg tablet      losartan (COZAAR) 50 mg tablet      METABOLIC PANEL, BASIC   3. Hypercholesteremia E78.00 272.0    4. Hypokalemia E87.6 276.8 KLOR-CON M20 20 mEq tablet   5. Screening for colon cancer Z12.11 V76.51 OCCULT BLOOD, IMMUNOASSAY (FIT)   6.  Breast cancer screening Z12.31 V76.10 [facility] MAMMO BI SCREENING INCL CAD     Orders Placed This Encounter    [facility] MAMMO BI SCREENING INCL CAD     Standing Status:   Future     Standing Expiration Date:   10/19/2018     Order Specific Question:   Reason for Exam     Answer:   breast cancer screening    METABOLIC PANEL, BASIC    OCCULT BLOOD, IMMUNOASSAY (FIT)    DISCONTD: metoprolol tartrate (LOPRESSOR) 25 mg tablet    DISCONTD: KLOR-CON M20 20 mEq tablet    metoprolol tartrate (LOPRESSOR) 25 mg tablet     Sig: Take 1 Tab by mouth daily. Dispense:  90 Tab     Refill:  4    KLOR-CON M20 20 mEq tablet     Sig: Take 1 Tab by mouth two (2) times a day. Dispense:  180 Tab     Refill:  4    amLODIPine (NORVASC) 5 mg tablet     Sig: TAKE 1 TABLET EVERY DAY     Dispense:  90 Tab     Refill:  4    hydroCHLOROthiazide (HYDRODIURIL) 12.5 mg tablet     Sig: TAKE 1 TABLET EVERY DAY     Dispense:  90 Tab     Refill:  4    losartan (COZAAR) 50 mg tablet     Sig: Take 1 Tab by mouth daily. Dispense:  90 Tab     Refill:  4     lose weight, follow low fat diet, follow low salt diet  Patient Instructions   Sosedi Activation    Thank you for requesting access to Sosedi. Please follow the instructions below to securely access and download your online medical record. Sosedi allows you to send messages to your doctor, view your test results, renew your prescriptions, schedule appointments, and more. How Do I Sign Up? 1. In your internet browser, go to [email or web address]  2. Click on the First Time User? Click Here link in the Sign In box. You will be redirect to the New Member Sign Up page. 3. Enter your Sosedi Access Code exactly as it appears below. You will not need to use this code after youve completed the sign-up process. If you do not sign up before the expiration date, you must request a new code. Sosedi Access Code: [email or web address]  Expires: 2018 11:32 AM (This is the date your Sosedi access code will )    4.  Enter the last four digits of your Social Security Number (xxxx) and Date of Birth (mm/dd/yyyy) as indicated and click Submit. You will be taken to the next sign-up page. 5. Create a Insight Ecosystemst ID. This will be your Grid2020 login ID and cannot be changed, so think of one that is secure and easy to remember. 6. Create a Grid2020 password. You can change your password at any time. 7. Enter your Password Reset Question and Answer. This can be used at a later time if you forget your password. 8. Enter your e-mail address. You will receive e-mail notification when new information is available in [address] 9. Click Sign Up. You can now view and download portions of your medical record. 10. Click the Download Summary menu link to download a portable copy of your medical information. Additional Information    If you have questions, please visit the Frequently Asked Questions section of the Grid2020 website at [email or web address] Remember, Grid2020 is NOT to be used for urgent needs. For medical emergencies, dial 911. Follow-up Disposition:  Return in about 4 weeks (around 5/17/2018), or if symptoms worsen or fail to improve. I have reviewed with the patient details of the assessment and plan and all questions were answered. Relevent patient education was performed    An After Visit Summary was printed and given to the patient.

## 2018-04-19 NOTE — LETTER
NOTIFICATION RETURN TO WORK / SCHOOL 
 
4/19/2018 11:35 AM 
 
Ms. Korey Maher [address] [facility] [address] To Whom It May Concern: 
 
Korey Maher is currently under the care of [address] [place] [address]. She will return to work/school on: 4/19/2018 If there are questions or concerns please have the patient contact our office. Sincerely, Kimberly Goode MD

## 2018-04-20 LAB
BUN SERPL-MCNC: 17 MG/DL (ref 8–27)
BUN/CREAT SERPL: 16 (ref 12–28)
CALCIUM SERPL-MCNC: 9.4 MG/DL (ref 8.7–10.3)
CHLORIDE SERPL-SCNC: 103 MMOL/L (ref 96–106)
CO2 SERPL-SCNC: 24 MMOL/L (ref 18–29)
CREAT SERPL-MCNC: 1.09 MG/DL (ref 0.57–1)
GFR SERPLBLD CREATININE-BSD FMLA CKD-EPI: 52 ML/MIN/1.73
GFR SERPLBLD CREATININE-BSD FMLA CKD-EPI: 60 ML/MIN/1.73
GLUCOSE SERPL-MCNC: 101 MG/DL (ref 65–99)
INTERPRETATION: NORMAL
POTASSIUM SERPL-SCNC: 4.5 MMOL/L (ref 3.5–5.2)
SODIUM SERPL-SCNC: 142 MMOL/L (ref 134–144)

## 2018-05-17 ENCOUNTER — OFFICE VISIT (OUTPATIENT)
Dept: INTERNAL MEDICINE CLINIC | Age: 68
End: 2018-05-17

## 2018-05-17 VITALS
HEART RATE: 73 BPM | OXYGEN SATURATION: 97 % | TEMPERATURE: 98.3 F | DIASTOLIC BLOOD PRESSURE: 64 MMHG | BODY MASS INDEX: 33.32 KG/M2 | HEIGHT: 65 IN | WEIGHT: 200 LBS | SYSTOLIC BLOOD PRESSURE: 100 MMHG | RESPIRATION RATE: 16 BRPM

## 2018-05-17 DIAGNOSIS — I47.1 SVT (SUPRAVENTRICULAR TACHYCARDIA) (HCC): Primary | ICD-10-CM

## 2018-05-17 DIAGNOSIS — E87.6 HYPOKALEMIA: ICD-10-CM

## 2018-05-17 DIAGNOSIS — I10 ESSENTIAL HYPERTENSION WITH GOAL BLOOD PRESSURE LESS THAN 140/90: ICD-10-CM

## 2018-05-17 DIAGNOSIS — E78.00 HYPERCHOLESTEREMIA: ICD-10-CM

## 2018-05-17 NOTE — PATIENT INSTRUCTIONS
Kadoink Activation    Thank you for requesting access to Kadoink. Please follow the instructions below to securely access and download your online medical record. Kadoink allows you to send messages to your doctor, view your test results, renew your prescriptions, schedule appointments, and more. How Do I Sign Up? 1. In your internet browser, go to [email or web address]  2. Click on the First Time User? Click Here link in the Sign In box. You will be redirect to the New Member Sign Up page. 3. Enter your Kadoink Access Code exactly as it appears below. You will not need to use this code after youve completed the sign-up process. If you do not sign up before the expiration date, you must request a new code. Kadoink Access Code: [email or web address]  Expires: 2018 11:32 AM (This is the date your Kadoink access code will )    4. Enter the last four digits of your Social Security Number (xxxx) and Date of Birth (mm/dd/yyyy) as indicated and click Submit. You will be taken to the next sign-up page. 5. Create a Kadoink ID. This will be your Kadoink login ID and cannot be changed, so think of one that is secure and easy to remember. 6. Create a Kadoink password. You can change your password at any time. 7. Enter your Password Reset Question and Answer. This can be used at a later time if you forget your password. 8. Enter your e-mail address. You will receive e-mail notification when new information is available in [address] 9. Click Sign Up. You can now view and download portions of your medical record. 10. Click the Download Summary menu link to download a portable copy of your medical information. Additional Information    If you have questions, please visit the Frequently Asked Questions section of the Kadoink website at [email or web address] Remember, Kadoink is NOT to be used for urgent needs. For medical emergencies, dial 911.

## 2018-05-17 NOTE — MR AVS SNAPSHOT
[address] [place] [address],[address] [place] [id number] 
[phone] Patient: Kareem Postal MRN: [id number] Visit Information Date & Time Provider Department Dept. Phone Encounter #  
 5/17/2018  9:30 AM MD Sage Chapa [address] [place] [address] [phone] [id number] Follow-up Instructions Return in about 3 months (around 8/17/2018), or if symptoms worsen or fail to improve. Upcoming Health Maintenance Date Due DTaP/Tdap/Td series (1 - Tdap) 2/18/1971 GLAUCOMA SCREENING Q2Y 2/18/2015 Pneumococcal 65+ Low/Medium Risk (1 of 2 - PCV13) 2/18/2015 BREAST CANCER SCRN MAMMOGRAM 10/1/2016 FOBT Q 1 YEAR AGE 50-75 12/1/2016 Influenza Age 5 to Adult 8/1/2018 MEDICARE YEARLY EXAM 4/20/2019 Allergies as of 5/17/2018  Review Complete On: 5/17/2018 By: Katia Hirsch LPN Severity Noted Reaction Type Reactions Codeine  10/27/2011    Itching, Swelling Current Immunizations  Never Reviewed No immunizations on file. Not reviewed this visit You Were Diagnosed With   
  
 Codes Comments SVT (supraventricular tachycardia) [facility]    -  Primary ICD-10-CM: I47.1 ICD-9-CM: 427.89 Essential hypertension with goal blood pressure less than 140/90     ICD-10-CM: I10 
ICD-9-CM: 401.9 Hypercholesteremia     ICD-10-CM: E78.00 ICD-9-CM: 272.0 Hypokalemia     ICD-10-CM: E87.6 ICD-9-CM: 276.8 Vitals BP Pulse Temp Resp Height(growth percentile) Weight(growth percentile) 100/64 73 98.3 °F (36.8 °C) (Oral) 16 5' 5\" (1.651 m) 200 lb (90.7 kg) SpO2 BMI OB Status Smoking Status 97% 33.28 kg/m2 Hysterectomy Never Smoker BMI and BSA Data Body Mass Index Body Surface Area  
 33.28 kg/m 2 2.04 m 2 Preferred Pharmacy Pharmacy Name Phone [facility]  [place] [id number] [address] [phone] Your Updated Medication List  
  
   
This list is accurate as of 5/17/18 10:42 AM.  Always use your most recent med list. amLODIPine 5 mg tablet Commonly known as:  Harlon Doyne TAKE 1 TABLET EVERY DAY  
  
 atorvastatin 80 mg tablet Commonly known as:  LIPITOR  
TAKE 1 TABLET EVERY DAY  
  
 fluticasone 50 mcg/actuation nasal spray Commonly known as:  Isabella Rollins 2 Sprays by Both Nostrils route daily. hydroCHLOROthiazide 12.5 mg tablet Commonly known as:  HYDRODIURIL  
TAKE 1 TABLET EVERY DAY  
  
 ipratropium 42 mcg (0.06 %) nasal spray Commonly known as:  ATROVENT  
2 Sprays by Both Nostrils route four (4) times daily. KLOR-CON M20 20 mEq tablet Generic drug:  potassium chloride Take 1 Tab by mouth two (2) times a day. losartan 50 mg tablet Commonly known as:  COZAAR Take 1 Tab by mouth daily. metoprolol tartrate 25 mg tablet Commonly known as:  LOPRESSOR Take 1 Tab by mouth daily. Follow-up Instructions Return in about 3 months (around 8/17/2018), or if symptoms worsen or fail to improve. Patient Instructions InfoBionic Activation Thank you for requesting access to InfoBionic. Please follow the instructions below to securely access and download your online medical record. InfoBionic allows you to send messages to your doctor, view your test results, renew your prescriptions, schedule appointments, and more. How Do I Sign Up? 1. In your internet browser, go to [email or web address] 
2. Click on the First Time User? Click Here link in the Sign In box. You will be redirect to the New Member Sign Up page. 3. Enter your InfoBionic Access Code exactly as it appears below. You will not need to use this code after youve completed the sign-up process. If you do not sign up before the expiration date, you must request a new code.  
 
InfoBionic Access Code: [email or web address] 
 Expires: 2018 11:32 AM (This is the date your WeStudy.In access code will ) 4. Enter the last four digits of your Social Security Number (xxxx) and Date of Birth (mm/dd/yyyy) as indicated and click Submit. You will be taken to the next sign-up page. 5. Create a Promoltat ID. This will be your WeStudy.In login ID and cannot be changed, so think of one that is secure and easy to remember. 6. Create a WeStudy.In password. You can change your password at any time. 7. Enter your Password Reset Question and Answer. This can be used at a later time if you forget your password. 8. Enter your e-mail address. You will receive e-mail notification when new information is available in [address] 9. Click Sign Up. You can now view and download portions of your medical record. 10. Click the Download Summary menu link to download a portable copy of your medical information. Additional Information If you have questions, please visit the Frequently Asked Questions section of the WeStudy.In website at [email or web address] Remember, WeStudy.In is NOT to be used for urgent needs. For medical emergencies, dial 911. Introducing [facility]! [facility] introduces WeStudy.In patient portal. Now you can access parts of your medical record, email your doctor's office, and request medication refills online. 1. In your internet browser, go to [email or web address] 2. Click on the First Time User? Click Here link in the Sign In box. You will see the New Member Sign Up page. 3. Enter your WeStudy.In Access Code exactly as it appears below. You will not need to use this code after youve completed the sign-up process. If you do not sign up before the expiration date, you must request a new code. · WeStudy.In Access Code: [email or web address] Expires: 2018 11:32 AM 
 
4.  Enter the last four digits of your Social Security Number (xxxx) and Date of Birth (mm/dd/yyyy) as indicated and click Submit. You will be taken to the next sign-up page. 5. Create a Chatty ID. This will be your Chatty login ID and cannot be changed, so think of one that is secure and easy to remember. 6. Create a Chatty password. You can change your password at any time. 7. Enter your Password Reset Question and Answer. This can be used at a later time if you forget your password. 8. Enter your e-mail address. You will receive e-mail notification when new information is available in [address] 9. Click Sign Up. You can now view and download portions of your medical record. 10. Click the Download Summary menu link to download a portable copy of your medical information. If you have questions, please visit the Frequently Asked Questions section of the Chatty website. Remember, Chatty is NOT to be used for urgent needs. For medical emergencies, dial 911. Now available from your iPhone and Android! Please provide this summary of care documentation to your next provider. Your primary care clinician is listed as Antelmo Sterling. If you have any questions after today's visit, please call [phone].

## 2018-05-17 NOTE — PROGRESS NOTES
Danielle Núñez is a 76 y.o. female and presents with Irregular Heart Beat; Hypertension; and Cholesterol Problem  . Subjective:  Arrhythmia  Patient presents for evaluation of palpitations. Onset was month ago, with improving course since that time. Patient also complains of no palpitations. The patient denies abdominal pain, abnormal labs sob, chest pain, cough, dizziness, leg swelling and shortness of breath. The patient has a new history of SVT. The patient denies a past history of A fib, A flutter, AICD, CABG, CAD and CHF   She was seen at [facility] er treated and released. She was noted to have a low potassium and treated. She has been on a beta blocker with some relief    Hypertension Review:  The patient has essential hypertension  Diet and Lifestyle: generally follows a  low sodium diet, exercises sporadically  Home BP Monitoring: is not measured at home. Pertinent ROS: taking medications as instructed, no medication side effects noted, no TIA's, no chest pain on exertion, no dyspnea on exertion, no swelling of ankles. Dyslipidemia Review:  Patient presents for evaluation of lipids. Compliance with treatment thus far has been excellent. A repeat fasting lipid profile was done. The patient does not use medications that may worsen dyslipidemias (corticosteroids, progestins, anabolic steroids, diuretics, beta-blockers, amiodarone, cyclosporine, olanzapine). The patient exercises some            Review of Systems  Constitutional: negative for fevers, chills, anorexia and weight loss  Eyes:   negative for visual disturbance and irritation  ENT:   negative for tinnitus,sore throat,nasal congestion,ear pains. hoarseness  Respiratory:  negative for cough, hemoptysis, dyspnea,wheezing  CV:   negative for chest pain, palpitations, lower extremity edema  GI:   negative for nausea, vomiting, diarrhea, abdominal pain,melena  Endo:               negative for polyuria,polydipsia,polyphagia,heat intolerance  Genitourinary: negative for frequency, dysuria and hematuria  Integument:  negative for rash and pruritus  Hematologic:  negative for easy bruising and gum/nose bleeding  Musculoskel: negative for myalgias, arthralgias, back pain, muscle weakness, joint pain  Neurological:  negative for headaches, dizziness, vertigo, memory problems and gait   Behavl/Psych: negative for feelings of anxiety, depression, mood changes    Past Medical History:   Diagnosis Date    History of mammogram 2010    normal    Hypertension     Pap smear for cervical cancer screening 2011    normal     Past Surgical History:   Procedure Laterality Date    HX GYN      hystterectomy     Social History     Social History    Marital status:      Spouse name: N/A    Number of children: N/A    Years of education: N/A     Social History Main Topics    Smoking status: Never Smoker    Smokeless tobacco: Never Used    Alcohol use Yes      Comment: occasional    Drug use: No    Sexual activity: Not Asked     Other Topics Concern    None     Social History Narrative     No family history on file. Current Outpatient Prescriptions   Medication Sig Dispense Refill    metoprolol tartrate (LOPRESSOR) 25 mg tablet Take 1 Tab by mouth daily. 90 Tab 4    KLOR-CON M20 20 mEq tablet Take 1 Tab by mouth two (2) times a day. 180 Tab 4    amLODIPine (NORVASC) 5 mg tablet TAKE 1 TABLET EVERY DAY 90 Tab 4    hydroCHLOROthiazide (HYDRODIURIL) 12.5 mg tablet TAKE 1 TABLET EVERY DAY 90 Tab 4    losartan (COZAAR) 50 mg tablet Take 1 Tab by mouth daily. 90 Tab 4    atorvastatin (LIPITOR) 80 mg tablet TAKE 1 TABLET EVERY DAY 90 Tab 3    fluticasone (FLONASE) 50 mcg/actuation nasal spray 2 Sprays by Both Nostrils route daily. 1 Bottle 12    ipratropium (ATROVENT) 0.06 % nasal spray 2 Sprays by Both Nostrils route four (4) times daily.  15 mL 3     Allergies   Allergen Reactions    Codeine Itching and Swelling       Objective:  Visit Vitals    /64    Pulse 73    Temp 98.3 °F (36.8 °C) (Oral)    Resp 16    Ht 5' 5\" (1.651 m)    Wt 200 lb (90.7 kg)    SpO2 97%    BMI 33.28 kg/m2     Physical Exam:   General appearance - alert, well appearing, and in no distress  Mental status - alert, oriented to person, place, and time  EYE-SUSSY, EOMI, corneas normal, no foreign bodies  ENT-ENT exam normal, no neck nodes or sinus tenderness  Nose - normal and patent, no erythema, discharge or polyps  Mouth - mucous membranes moist, pharynx normal without lesions  Neck - supple, no significant adenopathy   Chest - clear to auscultation, no wheezes, rales or rhonchi, symmetric air entry   Heart - normal rate, regular rhythm, normal S1, S2, no murmurs, rubs, clicks or gallops   Abdomen - soft, nontender, nondistended, no masses or organomegaly  Lymph- no adenopathy palpable  Ext-peripheral pulses normal, no pedal edema, no clubbing or cyanosis  Skin-Warm and dry. no hyperpigmentation, vitiligo, or suspicious lesions  Neuro -alert, oriented, normal speech, no focal findings or movement disorder noted  Neck-normal C-spine, no tenderness, full ROM without pain  Feet-no nail deformities or callus formation with good pulses noted      Results for orders placed or performed in visit on 21/27/81   METABOLIC PANEL, BASIC   Result Value Ref Range    Glucose 101 (H) 65 - 99 mg/dL    BUN 17 8 - 27 mg/dL    Creatinine 1.09 (H) 0.57 - 1.00 mg/dL    GFR est non-AA 52 (L) >59 mL/min/1.73    GFR est AA 60 >59 mL/min/1.73    BUN/Creatinine ratio 16 12 - 28    Sodium 142 134 - 144 mmol/L    Potassium 4.5 3.5 - 5.2 mmol/L    Chloride 103 96 - 106 mmol/L    CO2 24 18 - 29 mmol/L    Calcium 9.4 8.7 - 10.3 mg/dL   CKD REPORT   Result Value Ref Range    Interpretation Note        Assessment/Plan:    ICD-10-CM ICD-9-CM    1. SVT (supraventricular tachycardia) (HCC) I47.1 427.89    2. Essential hypertension with goal blood pressure less than 140/90 I10 401.9    3. Hypercholesteremia E78.00 272.0    4. Hypokalemia E87.6 276.8      No orders of the defined types were placed in this encounter. lose weight, follow low fat diet, follow low salt diet  Patient Instructions   MyChart Activation    Thank you for requesting access to [employer]. Please follow the instructions below to securely access and download your online medical record. [employer] allows you to send messages to your doctor, view your test results, renew your prescriptions, schedule appointments, and more. How Do I Sign Up? 1. In your internet browser, go to [email or web address]  2. Click on the First Time User? Click Here link in the Sign In box. You will be redirect to the New Member Sign Up page. 3. Enter your [employer] Access Code exactly as it appears below. You will not need to use this code after youve completed the sign-up process. If you do not sign up before the expiration date, you must request a new code. [employer] Access Code: [email or web address]  Expires: 2018 11:32 AM (This is the date your [employer] access code will )    4. Enter the last four digits of your Social Security Number (xxxx) and Date of Birth (mm/dd/yyyy) as indicated and click Submit. You will be taken to the next sign-up page. 5. Create a Prom[employer] ID. This will be your [employer] login ID and cannot be changed, so think of one that is secure and easy to remember. 6. Create a [employer] password. You can change your password at any time. 7. Enter your Password Reset Question and Answer. This can be used at a later time if you forget your password. 8. Enter your e-mail address. You will receive e-mail notification when new information is available in [address] 9. Click Sign Up. You can now view and download portions of your medical record. 10. Click the Download Summary menu link to download a portable copy of your medical information.     Additional Information    If you have questions, please visit the Frequently Asked Questions section of the MyChart website at [email or web address] Remember, [employer] is NOT to be used for urgent needs. For medical emergencies, dial 911. Follow-up Disposition:  Return in about 3 months (around 8/17/2018), or if symptoms worsen or fail to improve. I have reviewed with the patient details of the assessment and plan and all questions were answered. Relevent patient education was performed    An After Visit Summary was printed and given to the patient.

## 2018-05-18 LAB — HEMOCCULT STL QL IA: POSITIVE

## 2018-05-25 DIAGNOSIS — I47.1 SVT (SUPRAVENTRICULAR TACHYCARDIA) (HCC): ICD-10-CM

## 2018-05-25 DIAGNOSIS — I10 ESSENTIAL HYPERTENSION WITH GOAL BLOOD PRESSURE LESS THAN 140/90: ICD-10-CM

## 2018-05-25 DIAGNOSIS — E87.6 HYPOKALEMIA: ICD-10-CM

## 2018-05-25 RX ORDER — HYDROCHLOROTHIAZIDE 12.5 MG/1
TABLET ORAL
Qty: 90 TAB | Refills: 4 | Status: SHIPPED | OUTPATIENT
Start: 2018-05-25 | End: 2019-04-18 | Stop reason: SDUPTHER

## 2018-05-25 RX ORDER — LOSARTAN POTASSIUM 50 MG/1
50 TABLET ORAL DAILY
Qty: 90 TAB | Refills: 4 | Status: SHIPPED | OUTPATIENT
Start: 2018-05-25 | End: 2019-04-18 | Stop reason: SDUPTHER

## 2018-05-25 RX ORDER — POTASSIUM CHLORIDE 1500 MG/1
20 TABLET, EXTENDED RELEASE ORAL 2 TIMES DAILY
Qty: 180 TAB | Refills: 4 | Status: SHIPPED | OUTPATIENT
Start: 2018-05-25 | End: 2019-04-18 | Stop reason: SDUPTHER

## 2018-05-25 RX ORDER — METOPROLOL TARTRATE 25 MG/1
25 TABLET, FILM COATED ORAL DAILY
Qty: 90 TAB | Refills: 4 | Status: SHIPPED | OUTPATIENT
Start: 2018-05-25 | End: 2018-08-01 | Stop reason: SDUPTHER

## 2018-07-24 DIAGNOSIS — E78.00 HYPERCHOLESTEREMIA: ICD-10-CM

## 2018-07-24 RX ORDER — ATORVASTATIN CALCIUM 80 MG/1
TABLET, FILM COATED ORAL
Qty: 90 TAB | Refills: 3 | Status: ON HOLD | COMMUNITY
Start: 2018-07-24 | End: 2020-09-05

## 2018-08-01 DIAGNOSIS — I47.1 SVT (SUPRAVENTRICULAR TACHYCARDIA) (HCC): ICD-10-CM

## 2018-08-01 DIAGNOSIS — I10 ESSENTIAL HYPERTENSION WITH GOAL BLOOD PRESSURE LESS THAN 140/90: ICD-10-CM

## 2018-08-02 RX ORDER — METOPROLOL TARTRATE 25 MG/1
25 TABLET, FILM COATED ORAL DAILY
Qty: 90 TAB | Refills: 4 | Status: SHIPPED | OUTPATIENT
Start: 2018-08-02 | End: 2019-08-06 | Stop reason: SDUPTHER

## 2018-08-15 ENCOUNTER — DOCUMENTATION ONLY (OUTPATIENT)
Dept: INTERNAL MEDICINE CLINIC | Age: 68
End: 2018-08-15

## 2018-08-16 DIAGNOSIS — I47.1 SVT (SUPRAVENTRICULAR TACHYCARDIA) (HCC): ICD-10-CM

## 2018-08-16 DIAGNOSIS — I10 ESSENTIAL HYPERTENSION WITH GOAL BLOOD PRESSURE LESS THAN 140/90: ICD-10-CM

## 2018-08-16 RX ORDER — METOPROLOL TARTRATE 25 MG/1
25 TABLET, FILM COATED ORAL 2 TIMES DAILY
Qty: 180 TAB | Refills: 3 | Status: SHIPPED | OUTPATIENT
Start: 2018-08-16 | End: 2019-08-12 | Stop reason: SDUPTHER

## 2018-08-20 DIAGNOSIS — I10 ESSENTIAL HYPERTENSION WITH GOAL BLOOD PRESSURE LESS THAN 140/90: ICD-10-CM

## 2018-08-20 RX ORDER — AMLODIPINE BESYLATE 5 MG/1
TABLET ORAL
Qty: 90 TAB | Refills: 4 | Status: SHIPPED | OUTPATIENT
Start: 2018-08-20 | End: 2019-04-18

## 2019-04-18 ENCOUNTER — OFFICE VISIT (OUTPATIENT)
Dept: INTERNAL MEDICINE CLINIC | Age: 69
End: 2019-04-18

## 2019-04-18 VITALS
SYSTOLIC BLOOD PRESSURE: 120 MMHG | TEMPERATURE: 97.9 F | RESPIRATION RATE: 20 BRPM | HEIGHT: 65 IN | HEART RATE: 70 BPM | WEIGHT: 206 LBS | BODY MASS INDEX: 34.32 KG/M2 | OXYGEN SATURATION: 100 % | DIASTOLIC BLOOD PRESSURE: 70 MMHG

## 2019-04-18 DIAGNOSIS — E87.6 HYPOKALEMIA: ICD-10-CM

## 2019-04-18 DIAGNOSIS — B35.1 ONYCHOMYCOSIS DUE TO DERMATOPHYTE: Primary | ICD-10-CM

## 2019-04-18 DIAGNOSIS — E78.00 HYPERCHOLESTEREMIA: ICD-10-CM

## 2019-04-18 DIAGNOSIS — I10 ESSENTIAL HYPERTENSION WITH GOAL BLOOD PRESSURE LESS THAN 140/90: ICD-10-CM

## 2019-04-18 LAB
CHOLEST SERPL-MCNC: 229 MG/DL
HDLC SERPL-MCNC: 56 MG/DL
LDL CHOLESTEROL POC: 162 MG/DL
NON-HDL GOAL (POC): 173
TCHOL/HDL RATIO (POC): 4.1
TRIGL SERPL-MCNC: 55 MG/DL

## 2019-04-18 RX ORDER — POTASSIUM CHLORIDE 1500 MG/1
20 TABLET, EXTENDED RELEASE ORAL 2 TIMES DAILY
Qty: 180 TAB | Refills: 4 | Status: SHIPPED | OUTPATIENT
Start: 2019-04-18 | End: 2020-09-11

## 2019-04-18 RX ORDER — HYDROCHLOROTHIAZIDE 12.5 MG/1
TABLET ORAL
Qty: 90 TAB | Refills: 4 | Status: SHIPPED | OUTPATIENT
Start: 2019-04-18 | End: 2020-06-18

## 2019-04-18 RX ORDER — AMLODIPINE BESYLATE 2.5 MG/1
2.5 TABLET ORAL DAILY
Qty: 30 TAB | Refills: 0
Start: 2019-04-18 | End: 2019-07-11 | Stop reason: SDUPTHER

## 2019-04-18 RX ORDER — LOSARTAN POTASSIUM 50 MG/1
50 TABLET ORAL DAILY
Qty: 90 TAB | Refills: 4 | Status: ON HOLD | OUTPATIENT
Start: 2019-04-18 | End: 2020-09-05

## 2019-04-18 NOTE — PROGRESS NOTES
Rena Wang is a 71 y.o. female and presents with Ankle swelling and Nail Problem Nitesh Torres Subjective: 
 
Arrhythmia Patient presents for evaluation of palpitations. Onset was month ago, with improving course since that time. Patient also complains of no palpitations. The patient denies abdominal pain, abnormal labs sob, chest pain, cough, dizziness, leg swelling and shortness of breath. The patient has a new history of SVT. The patient denies a past history of A fib, A flutter, AICD, CABG, CAD and CHF She was seen at [facility] er treated and released. She was noted to have a low potassium and treated. She has been on a beta blocker with some relief Hypertension Review: 
The patient has essential hypertension Diet and Lifestyle: generally follows a  low sodium diet, exercises sporadically Home BP Monitoring: is not measured at home. Pertinent ROS: taking medications as instructed, no medication side effects noted, no TIA's, no chest pain on exertion, no dyspnea on exertion, no swelling of ankles. Dyslipidemia Review: 
Patient presents for evaluation of lipids. Compliance with treatment thus far has been excellent. A repeat fasting lipid profile was done. The patient does not use medications that may worsen dyslipidemias (corticosteroids, progestins, anabolic steroids, diuretics, beta-blockers, amiodarone, cyclosporine, olanzapine). The patient exercises some Review of Systems Constitutional: negative for fevers, chills, anorexia and weight loss Eyes:   negative for visual disturbance and irritation ENT:   negative for tinnitus,sore throat,nasal congestion,ear pains. hoarseness Respiratory:  negative for cough, hemoptysis, dyspnea,wheezing CV:   negative for chest pain, palpitations, lower extremity edema GI:   negative for nausea, vomiting, diarrhea, abdominal pain,melena Endo:               negative for polyuria,polydipsia,polyphagia,heat intolerance Genitourinary: negative for frequency, dysuria and hematuria Integument:  negative for rash and pruritus Hematologic:  negative for easy bruising and gum/nose bleeding Musculoskel: negative for myalgias, arthralgias, back pain, muscle weakness, joint pain Neurological:  negative for headaches, dizziness, vertigo, memory problems and gait Behavl/Psych: negative for feelings of anxiety, depression, mood changes Past Medical History:  
Diagnosis Date  History of mammogram 2010  
 normal  
 Hypertension  Pap smear for cervical cancer screening 2011  
 normal  
 
Past Surgical History:  
Procedure Laterality Date  HX GYN    
 hystterectomy Social History Socioeconomic History  Marital status:  Spouse name: Not on file  Number of children: Not on file  Years of education: Not on file  Highest education level: Not on file Tobacco Use  Smoking status: Never Smoker  Smokeless tobacco: Never Used Substance and Sexual Activity  Alcohol use: Yes Comment: occasional  
 Drug use: No  
 
History reviewed. No pertinent family history. Current Outpatient Medications Medication Sig Dispense Refill  amLODIPine (NORVASC) 5 mg tablet TAKE 1 TABLET EVERY DAY 90 Tab 4  
 metoprolol tartrate (LOPRESSOR) 25 mg tablet Take 1 Tab by mouth two (2) times a day. 180 Tab 3  
 metoprolol tartrate (LOPRESSOR) 25 mg tablet Take 1 Tab by mouth daily. Take 1 tab twice a day. 90 Tab 4  
 atorvastatin (LIPITOR) 80 mg tablet TAKE 1 TABLET EVERY DAY 90 Tab 3  
 KLOR-CON M20 20 mEq tablet Take 1 Tab by mouth two (2) times a day. 180 Tab 4  
 losartan (COZAAR) 50 mg tablet Take 1 Tab by mouth daily. 90 Tab 4  
 hydroCHLOROthiazide (HYDRODIURIL) 12.5 mg tablet TAKE 1 TABLET EVERY DAY 90 Tab 4  
 fluticasone (FLONASE) 50 mcg/actuation nasal spray 2 Sprays by Both Nostrils route daily. 1 Bottle 12  ipratropium (ATROVENT) 0.06 % nasal spray 2 Sprays by Both Nostrils route four (4) times daily. 15 mL 3 Allergies Allergen Reactions  Codeine Itching and Swelling Objective: 
Visit Vitals /70 (BP 1 Location: Right arm, BP Patient Position: Sitting) Pulse 70 Temp 97.9 °F (36.6 °C) (Oral) Resp 20 Ht 5' 5\" (1.651 m) Wt 206 lb (93.4 kg) SpO2 100% BMI 34.28 kg/m² Physical Exam:  
General appearance - alert, well appearing, and in no distress Mental status - alert, oriented to person, place, and time EYE-SUSSY, EOMI, corneas normal, no foreign bodies ENT-ENT exam normal, no neck nodes or sinus tenderness Nose - normal and patent, no erythema, discharge or polyps Mouth - mucous membranes moist, pharynx normal without lesions Neck - supple, no significant adenopathy Chest - clear to auscultation, no wheezes, rales or rhonchi, symmetric air entry Heart - normal rate, regular rhythm, normal S1, S2, no murmurs, rubs, clicks or gallops Abdomen - soft, nontender, nondistended, no masses or organomegaly Lymph- no adenopathy palpable Ext-peripheral pulses normal, no pedal edema, no clubbing or cyanosis Skin-Warm and dry. no hyperpigmentation, vitiligo, or suspicious lesions Neuro -alert, oriented, normal speech, no focal findings or movement disorder noted Neck-normal C-spine, no tenderness, full ROM without pain Feet-no nail deformities or callus formation with good pulses noted Results for orders placed or performed in visit on 04/19/18 METABOLIC PANEL, BASIC Result Value Ref Range Glucose 101 (H) 65 - 99 mg/dL BUN 17 8 - 27 mg/dL Creatinine 1.09 (H) 0.57 - 1.00 mg/dL GFR est non-AA 52 (L) >59 mL/min/1.73 GFR est AA 60 >59 mL/min/1.73  
 BUN/Creatinine ratio 16 12 - 28 Sodium 142 134 - 144 mmol/L Potassium 4.5 3.5 - 5.2 mmol/L Chloride 103 96 - 106 mmol/L  
 CO2 24 18 - 29 mmol/L Calcium 9.4 8.7 - 10.3 mg/dL OCCULT BLOOD, IMMUNOASSAY (FIT) Result Value Ref Range Occult blood fecal, by IA Positive (A) Negative CKD REPORT Result Value Ref Range Interpretation Note Assessment/Plan: ICD-10-CM ICD-9-CM 1. Hypokalemia E87.6 276.8 KLOR-CON M20 20 mEq tablet 2. Essential hypertension with goal blood pressure less than 140/90 I10 401.9 losartan (COZAAR) 50 mg tablet  
   hydroCHLOROthiazide (HYDRODIURIL) 12.5 mg tablet No orders of the defined types were placed in this encounter. lose weight, follow low fat diet, follow low salt diet There are no Patient Instructions on file for this visit. I have reviewed with the patient details of the assessment and plan and all questions were answered. Relevent patient education was performed An After Visit Summary was printed and given to the patient.

## 2019-04-18 NOTE — PROGRESS NOTES
1. Have you been to the ER, urgent care clinic since your last visit? Hospitalized since your last visit?no 2. Have you seen or consulted any other health care providers outside of the [address] [place] [address] since your last visit? Include any pap smears or colon screening. No 
 
3 most recent PHQ Screens 4/18/2019 PHQ Not Done - Little interest or pleasure in doing things Not at all Feeling down, depressed, irritable, or hopeless Not at all Total Score PHQ 2 0 Chief Complaint Patient presents with  Ankle swelling  Nail Problem

## 2019-04-18 NOTE — ACP (ADVANCE CARE PLANNING)
Advanced care planning was discussed and additional information was provided.    ====Sahil Miller Invitation====    Patient was invited to [facility] on this date and given the information folder for review. Recommended appointment with Sahil Miller facilitator for ACP conversation regarding advance directives. [] Yes  [x] No  Referral sent to [facility] Choices team member or Coordinator for follow-up    [] Yes  [x] No  Patient scheduled an appointment.        Site of Referral:[facility]

## 2019-05-03 LAB
ALBUMIN SERPL-MCNC: 4.3 G/DL (ref 3.6–4.8)
ALBUMIN/GLOB SERPL: 1.5 {RATIO} (ref 1.2–2.2)
ALP SERPL-CCNC: 85 IU/L (ref 39–117)
ALT SERPL-CCNC: 12 IU/L (ref 0–32)
AST SERPL-CCNC: 15 IU/L (ref 0–40)
BILIRUB SERPL-MCNC: 0.4 MG/DL (ref 0–1.2)
BUN SERPL-MCNC: 16 MG/DL (ref 8–27)
BUN/CREAT SERPL: 14 (ref 12–28)
CALCIUM SERPL-MCNC: 9.5 MG/DL (ref 8.7–10.3)
CHLORIDE SERPL-SCNC: 104 MMOL/L (ref 96–106)
CO2 SERPL-SCNC: 22 MMOL/L (ref 20–29)
CREAT SERPL-MCNC: 1.17 MG/DL (ref 0.57–1)
ERYTHROCYTE [DISTWIDTH] IN BLOOD BY AUTOMATED COUNT: 15.5 % (ref 12.3–15.4)
GLOBULIN SER CALC-MCNC: 2.8 G/DL (ref 1.5–4.5)
GLUCOSE SERPL-MCNC: 102 MG/DL (ref 65–99)
HCT VFR BLD AUTO: 42.9 % (ref 34–46.6)
HGB BLD-MCNC: 14.2 G/DL (ref 11.1–15.9)
INTERPRETATION: NORMAL
MCH RBC QN AUTO: 28.7 PG (ref 26.6–33)
MCHC RBC AUTO-ENTMCNC: 33.1 G/DL (ref 31.5–35.7)
MCV RBC AUTO: 87 FL (ref 79–97)
PLATELET # BLD AUTO: 257 X10E3/UL (ref 150–379)
POTASSIUM SERPL-SCNC: 4.3 MMOL/L (ref 3.5–5.2)
PROT SERPL-MCNC: 7.1 G/DL (ref 6–8.5)
RBC # BLD AUTO: 4.94 X10E6/UL (ref 3.77–5.28)
SODIUM SERPL-SCNC: 141 MMOL/L (ref 134–144)
WBC # BLD AUTO: 5.8 X10E3/UL (ref 3.4–10.8)

## 2019-07-11 ENCOUNTER — OFFICE VISIT (OUTPATIENT)
Dept: INTERNAL MEDICINE CLINIC | Age: 69
End: 2019-07-11

## 2019-07-11 VITALS
RESPIRATION RATE: 20 BRPM | OXYGEN SATURATION: 100 % | WEIGHT: 200 LBS | BODY MASS INDEX: 33.32 KG/M2 | DIASTOLIC BLOOD PRESSURE: 70 MMHG | SYSTOLIC BLOOD PRESSURE: 110 MMHG | HEIGHT: 65 IN | HEART RATE: 70 BPM | TEMPERATURE: 98.6 F

## 2019-07-11 DIAGNOSIS — I10 ESSENTIAL HYPERTENSION WITH GOAL BLOOD PRESSURE LESS THAN 140/90: ICD-10-CM

## 2019-07-11 DIAGNOSIS — E78.00 HYPERCHOLESTEREMIA: Primary | ICD-10-CM

## 2019-07-11 DIAGNOSIS — H25.012 CORTICAL AGE-RELATED CATARACT OF LEFT EYE: ICD-10-CM

## 2019-07-11 RX ORDER — AMLODIPINE BESYLATE 2.5 MG/1
2.5 TABLET ORAL DAILY
Qty: 90 TAB | Refills: 3 | Status: SHIPPED | OUTPATIENT
Start: 2019-07-11 | End: 2020-06-18

## 2019-07-11 NOTE — PATIENT INSTRUCTIONS
[employer] Activation Thank you for requesting access to [employer]. Please follow the instructions below to securely access and download your online medical record. [employer] allows you to send messages to your doctor, view your test results, renew your prescriptions, schedule appointments, and more. How Do I Sign Up? 1. In your internet browser, go to [email or web address] 
2. Click on the First Time User? Click Here link in the Sign In box. You will be redirect to the New Member Sign Up page. 3. Enter your [employer] Access Code exactly as it appears below. You will not need to use this code after youve completed the sign-up process. If you do not sign up before the expiration date, you must request a new code. [employer] Access Code: [email or web address] Expires: 2019 11:53 AM (This is the date your [employer] access code will ) 4. Enter the last four digits of your Social Security Number (xxxx) and Date of Birth (mm/dd/yyyy) as indicated and click Submit. You will be taken to the next sign-up page. 5. Create a [employer] ID. This will be your [employer] login ID and cannot be changed, so think of one that is secure and easy to remember. 6. Create a [employer] password. You can change your password at any time. 7. Enter your Password Reset Question and Answer. This can be used at a later time if you forget your password. 8. Enter your e-mail address. You will receive e-mail notification when new information is available in [address] 9. Click Sign Up. You can now view and download portions of your medical record. 10. Click the Download Summary menu link to download a portable copy of your medical information. Additional Information If you have questions, please visit the Frequently Asked Questions section of the [employer] website at [email or web address] Remember, [employer] is NOT to be used for urgent needs. For medical emergencies, dial 911.

## 2019-07-11 NOTE — PROGRESS NOTES
Sara Osorio is a 71 y.o. female and presents with Referral Request and Hypertension  . Subjective:  She has a history of cataracts and needs an evaluation    Hypertension Review:  The patient has essential hypertension  Diet and Lifestyle: generally follows a  low sodium diet, exercises sporadically  Home BP Monitoring: is not measured at home. Pertinent ROS: taking medications as instructed, no medication side effects noted, no TIA's, no chest pain on exertion, no dyspnea on exertion, no swelling of ankles. Dyslipidemia Review:  Patient presents for evaluation of lipids. Compliance with treatment thus far has been excellent. A repeat fasting lipid profile was done. The patient does not use medications that may worsen dyslipidemias (corticosteroids, progestins, anabolic steroids, diuretics, beta-blockers, amiodarone, cyclosporine, olanzapine). The patient exercises some        Review of Systems  Constitutional: negative for fevers, chills, anorexia and weight loss  Eyes:   negative for visual disturbance and irritation  ENT:   negative for tinnitus,sore throat,nasal congestion,ear pains. hoarseness  Respiratory:  negative for cough, hemoptysis, dyspnea,wheezing  CV:   negative for chest pain, palpitations, lower extremity edema  GI:   negative for nausea, vomiting, diarrhea, abdominal pain,melena  Endo:               negative for polyuria,polydipsia,polyphagia,heat intolerance  Genitourinary: negative for frequency, dysuria and hematuria  Integument:  negative for rash and pruritus  Hematologic:  negative for easy bruising and gum/nose bleeding  Musculoskel: negative for myalgias, arthralgias, back pain, muscle weakness, joint pain  Neurological:  negative for headaches, dizziness, vertigo, memory problems and gait   Behavl/Psych: negative for feelings of anxiety, depression, mood changes    Past Medical History:   Diagnosis Date    History of mammogram 2010    normal    Hypertension     Pap smear for cervical cancer screening 2011    normal     Past Surgical History:   Procedure Laterality Date    HX GYN      hystterectomy     Social History     Socioeconomic History    Marital status:      Spouse name: Not on file    Number of children: Not on file    Years of education: Not on file    Highest education level: Not on file   Tobacco Use    Smoking status: Never Smoker    Smokeless tobacco: Never Used   Substance and Sexual Activity    Alcohol use: Yes     Comment: occasional    Drug use: No     No family history on file. Current Outpatient Medications   Medication Sig Dispense Refill    amLODIPine (NORVASC) 2.5 mg tablet Take 1 Tab by mouth daily. 90 Tab 3    KLOR-CON M20 20 mEq tablet Take 1 Tab by mouth two (2) times a day. 180 Tab 4    losartan (COZAAR) 50 mg tablet Take 1 Tab by mouth daily. 90 Tab 4    hydroCHLOROthiazide (HYDRODIURIL) 12.5 mg tablet TAKE 1 TABLET EVERY DAY 90 Tab 4    metoprolol tartrate (LOPRESSOR) 25 mg tablet Take 1 Tab by mouth two (2) times a day. 180 Tab 3    metoprolol tartrate (LOPRESSOR) 25 mg tablet Take 1 Tab by mouth daily. Take 1 tab twice a day. 90 Tab 4    atorvastatin (LIPITOR) 80 mg tablet TAKE 1 TABLET EVERY DAY 90 Tab 3    fluticasone (FLONASE) 50 mcg/actuation nasal spray 2 Sprays by Both Nostrils route daily. 1 Bottle 12    ipratropium (ATROVENT) 0.06 % nasal spray 2 Sprays by Both Nostrils route four (4) times daily.  15 mL 3     Allergies   Allergen Reactions    Codeine Itching and Swelling       Objective:  Visit Vitals  /70 (BP 1 Location: Right arm, BP Patient Position: Sitting)   Pulse 70   Temp 98.6 °F (37 °C) (Oral)   Resp 20   Ht 5' 5\" (1.651 m)   Wt 200 lb (90.7 kg)   SpO2 100%   BMI 33.28 kg/m²     Physical Exam:   General appearance - alert, well appearing, and in no distress  Mental status - alert, oriented to person, place, and time  EYE-SUSSY, EOMI, corneas normal, no foreign bodies  ENT-ENT exam normal, no neck nodes or sinus tenderness  Nose - normal and patent, no erythema, discharge or polyps  Mouth - mucous membranes moist, pharynx normal without lesions  Neck - supple, no significant adenopathy   Chest - clear to auscultation, no wheezes, rales or rhonchi, symmetric air entry   Heart - normal rate, regular rhythm, normal S1, S2, no murmurs, rubs, clicks or gallops   Abdomen - soft, nontender, nondistended, no masses or organomegaly  Lymph- no adenopathy palpable  Ext-peripheral pulses normal, no pedal edema, no clubbing or cyanosis  Skin-Warm and dry. no hyperpigmentation, vitiligo, or suspicious lesions  Neuro -alert, oriented, normal speech, no focal findings or movement disorder noted  Neck-normal C-spine, no tenderness, full ROM without pain  Feet-no nail deformities or callus formation with good pulses noted      Results for orders placed or performed in visit on 04/18/19   CBC W/O DIFF   Result Value Ref Range    WBC 5.8 3.4 - 10.8 x10E3/uL    RBC 4.94 3.77 - 5.28 x10E6/uL    HGB 14.2 11.1 - 15.9 g/dL    HCT 42.9 34.0 - 46.6 %    MCV 87 79 - 97 fL    MCH 28.7 26.6 - 33.0 pg    MCHC 33.1 31.5 - 35.7 g/dL    RDW 15.5 (H) 12.3 - 15.4 %    PLATELET 723 757 - 023 U33M3/NY   METABOLIC PANEL, COMPREHENSIVE   Result Value Ref Range    Glucose 102 (H) 65 - 99 mg/dL    BUN 16 8 - 27 mg/dL    Creatinine 1.17 (H) 0.57 - 1.00 mg/dL    GFR est non-AA 48 (L) >59 mL/min/1.73    GFR est AA 55 (L) >59 mL/min/1.73    BUN/Creatinine ratio 14 12 - 28    Sodium 141 134 - 144 mmol/L    Potassium 4.3 3.5 - 5.2 mmol/L    Chloride 104 96 - 106 mmol/L    CO2 22 20 - 29 mmol/L    Calcium 9.5 8.7 - 10.3 mg/dL    Protein, total 7.1 6.0 - 8.5 g/dL    Albumin 4.3 3.6 - 4.8 g/dL    GLOBULIN, TOTAL 2.8 1.5 - 4.5 g/dL    A-G Ratio 1.5 1.2 - 2.2    Bilirubin, total 0.4 0.0 - 1.2 mg/dL    Alk.  phosphatase 85 39 - 117 IU/L    AST (SGOT) 15 0 - 40 IU/L    ALT (SGPT) 12 0 - 32 IU/L   CKD REPORT   Result Value Ref Range    Interpretation Note    AMB POC LIPID PROFILE   Result Value Ref Range    Cholesterol (POC) 229     Triglycerides (POC) 55     HDL Cholesterol (POC) 56     LDL Cholesterol (POC) 162 MG/DL    Non-HDL Goal (POC) 173     TChol/HDL Ratio (POC) 4.1        Assessment/Plan:    ICD-10-CM ICD-9-CM    1. Hypercholesteremia E78.00 272.0    2. Essential hypertension with goal blood pressure less than 140/90 I10 401.9 amLODIPine (NORVASC) 2.5 mg tablet   3. 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 Dispense:  90 Tab     Refill:  3     lose weight, follow low fat diet, follow low salt diet  Patient Instructions   [employer] Activation    Thank you for requesting access to [employer]. Please follow the instructions below to securely access and download your online medical record. [employer] allows you to send messages to your doctor, view your test results, renew your prescriptions, schedule appointments, and more. How Do I Sign Up? 1. In your internet browser, go to [email or web address]  2. Click on the First Time User? Click Here link in the Sign In box. You will be redirect to the New Member Sign Up page. 3. Enter your [employer] Access Code exactly as it appears below. You will not need to use this code after youve completed the sign-up process. If you do not sign up before the expiration date, you must request a new code. [employer] Access Code: [email or web address]  Expires: 2019 11:53 AM (This is the date your [employer] access code will )    4.  Enter the last four digits of your Social Security Number (xxxx) and Date of Birth (mm/dd/yyyy) as indicated and click Submit. You will be taken to the next sign-up page. 5. Create a Kamidat ID. This will be your "[employer] login ID and cannot be changed, so think of one that is secure and easy to remember. 6. Create a "[employer] password. You can change your password at any time. 7. Enter your Password Reset Question and Answer. This can be used at a later time if you forget your password. 8. Enter your e-mail address. You will receive e-mail notification when new information is available in [address]. 9. Click Sign Up. You can now view and download portions of your medical record. 10. Click the Download Summary menu link to download a portable copy of your medical information. Additional Information    If you have questions, please visit the Frequently Asked Questions section of the "[employer] website at [email or web address] Remember, "[employer] is NOT to be used for urgent needs. For medical emergencies, dial 911. Follow-up and Dispositions    · Return in about 3 months (around 10/11/2019). I have reviewed with the patient details of the assessment and plan and all questions were answered. Relevent patient education was performed    An After Visit Summary was printed and given to the patient.

## 2019-07-11 NOTE — PROGRESS NOTES
Chief Complaint   Patient presents with    Referral Request    Hypertension     3 most recent PHQ Screens 7/11/2019   PHQ Not Done -   Little interest or pleasure in doing things Not at all   Feeling down, depressed, irritable, or hopeless Not at all   Total Score PHQ 2 0     1. Have you been to the ER, urgent care clinic since your last visit? Hospitalized since your last visit?no    2. Have you seen or consulted any other health care providers outside of the [address] [place] [address] since your last visit? Include any pap smears or colon screening.  No

## 2019-08-06 DIAGNOSIS — I47.1 SVT (SUPRAVENTRICULAR TACHYCARDIA) (HCC): ICD-10-CM

## 2019-08-06 DIAGNOSIS — I10 ESSENTIAL HYPERTENSION WITH GOAL BLOOD PRESSURE LESS THAN 140/90: ICD-10-CM

## 2019-08-06 RX ORDER — METOPROLOL TARTRATE 25 MG/1
TABLET, FILM COATED ORAL
Qty: 180 TAB | Refills: 3 | Status: ON HOLD | OUTPATIENT
Start: 2019-08-06 | End: 2020-09-05

## 2019-08-06 RX ORDER — METOPROLOL TARTRATE 25 MG/1
25 TABLET, FILM COATED ORAL DAILY
Qty: 90 TAB | Refills: 4 | Status: SHIPPED | OUTPATIENT
Start: 2019-08-06 | End: 2019-08-06 | Stop reason: SDUPTHER

## 2019-08-13 RX ORDER — METOPROLOL TARTRATE 25 MG/1
25 TABLET, FILM COATED ORAL 2 TIMES DAILY
Qty: 180 TAB | Refills: 3 | Status: SHIPPED | OUTPATIENT
Start: 2019-08-13 | End: 2020-07-13

## 2019-11-19 ENCOUNTER — TELEPHONE (OUTPATIENT)
Dept: INTERNAL MEDICINE CLINIC | Age: 69
End: 2019-11-19

## 2019-11-19 NOTE — TELEPHONE ENCOUNTER
This writer spoke to patient, she will come by and  repeat stool kit, patient states no' blood in stool, no abdominal pain.'' she will come in soon\".

## 2020-06-17 DIAGNOSIS — I10 ESSENTIAL HYPERTENSION WITH GOAL BLOOD PRESSURE LESS THAN 140/90: ICD-10-CM

## 2020-06-18 RX ORDER — HYDROCHLOROTHIAZIDE 12.5 MG/1
TABLET ORAL
Qty: 90 TAB | Refills: 4 | Status: SHIPPED | OUTPATIENT
Start: 2020-06-18 | End: 2020-12-02

## 2020-06-18 RX ORDER — AMLODIPINE BESYLATE 2.5 MG/1
TABLET ORAL
Qty: 90 TAB | Refills: 3 | Status: SHIPPED | OUTPATIENT
Start: 2020-06-18 | End: 2020-09-11

## 2020-07-13 RX ORDER — METOPROLOL TARTRATE 25 MG/1
TABLET, FILM COATED ORAL
Qty: 180 TAB | Refills: 3 | Status: SHIPPED | OUTPATIENT
Start: 2020-07-13 | End: 2020-12-02

## 2020-09-04 ENCOUNTER — HOSPITAL ENCOUNTER (OUTPATIENT)
Age: 70
Setting detail: OBSERVATION
Discharge: NURSING, REHAB OR LONG-TERM CARE | End: 2020-09-11
Attending: EMERGENCY MEDICINE | Admitting: HOSPITALIST
Payer: MEDICARE

## 2020-09-04 DIAGNOSIS — R77.8 ELEVATED TROPONIN: Primary | ICD-10-CM

## 2020-09-04 DIAGNOSIS — R53.83 FATIGUE, UNSPECIFIED TYPE: ICD-10-CM
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
## 2020-09-04 LAB
ALBUMIN SERPL-MCNC: 3.8 G/DL (ref 3.5–5)
ALBUMIN/GLOB SERPL: 0.9 {RATIO} (ref 1.1–2.2)
ALP SERPL-CCNC: 80 U/L (ref 45–117)
ALT SERPL-CCNC: 21 U/L (ref 12–78)
ANION GAP SERPL CALC-SCNC: 8 MMOL/L (ref 5–15)
AST SERPL-CCNC: 50 U/L (ref 15–37)
BASOPHILS # BLD: 0 K/UL (ref 0–0.1)
BASOPHILS NFR BLD: 1 % (ref 0–1)
BILIRUB SERPL-MCNC: 0.6 MG/DL (ref 0.2–1)
BUN SERPL-MCNC: 23 MG/DL (ref 6–20)
BUN/CREAT SERPL: 15 (ref 12–20)
CALCIUM SERPL-MCNC: 10 MG/DL (ref 8.5–10.1)
CHLORIDE SERPL-SCNC: 104 MMOL/L (ref 97–108)
CO2 SERPL-SCNC: 26 MMOL/L (ref 21–32)
COMMENT, HOLDF: NORMAL
CREAT SERPL-MCNC: 1.56 MG/DL (ref 0.55–1.02)
DIFFERENTIAL METHOD BLD: ABNORMAL
EOSINOPHIL # BLD: 0 K/UL (ref 0–0.4)
EOSINOPHIL NFR BLD: 0 % (ref 0–7)
ERYTHROCYTE [DISTWIDTH] IN BLOOD BY AUTOMATED COUNT: 14.4 % (ref 11.5–14.5)
GLOBULIN SER CALC-MCNC: 4.4 G/DL (ref 2–4)
GLUCOSE SERPL-MCNC: 101 MG/DL (ref 65–100)
HCT VFR BLD AUTO: 42.9 % (ref 35–47)
HGB BLD-MCNC: 13.9 G/DL (ref 11.5–16)
IMM GRANULOCYTES # BLD AUTO: 0 K/UL (ref 0–0.04)
IMM GRANULOCYTES NFR BLD AUTO: 0 % (ref 0–0.5)
LYMPHOCYTES # BLD: 1.1 K/UL (ref 0.8–3.5)
LYMPHOCYTES NFR BLD: 15 % (ref 12–49)
MCH RBC QN AUTO: 27.9 PG (ref 26–34)
MCHC RBC AUTO-ENTMCNC: 32.4 G/DL (ref 30–36.5)
MCV RBC AUTO: 86.1 FL (ref 80–99)
MONOCYTES # BLD: 0.4 K/UL (ref 0–1)
MONOCYTES NFR BLD: 6 % (ref 5–13)
NEUTS SEG # BLD: 5.7 K/UL (ref 1.8–8)
NEUTS SEG NFR BLD: 78 % (ref 32–75)
NRBC # BLD: 0 K/UL (ref 0–0.01)
NRBC BLD-RTO: 0 PER 100 WBC
PLATELET # BLD AUTO: 216 K/UL (ref 150–400)
PMV BLD AUTO: 10.4 FL (ref 8.9–12.9)
POTASSIUM SERPL-SCNC: 4.1 MMOL/L (ref 3.5–5.1)
PROT SERPL-MCNC: 8.2 G/DL (ref 6.4–8.2)
RBC # BLD AUTO: 4.98 M/UL (ref 3.8–5.2)
SAMPLES BEING HELD,HOLD: NORMAL
SODIUM SERPL-SCNC: 138 MMOL/L (ref 136–145)
TROPONIN I SERPL-MCNC: 0.18 NG/ML
WBC # BLD AUTO: 7.3 K/UL (ref 3.6–11)

## 2020-09-04 PROCEDURE — 85025 COMPLETE CBC W/AUTO DIFF WBC: CPT

## 2020-09-04 PROCEDURE — 84484 ASSAY OF TROPONIN QUANT: CPT

## 2020-09-04 PROCEDURE — 80053 COMPREHEN METABOLIC PANEL: CPT

## 2020-09-04 PROCEDURE — 99283 EMERGENCY DEPT VISIT LOW MDM: CPT

## 2020-09-04 PROCEDURE — 36415 COLL VENOUS BLD VENIPUNCTURE: CPT

## 2020-09-04 PROCEDURE — 93005 ELECTROCARDIOGRAM TRACING: CPT

## 2020-09-05 ENCOUNTER — APPOINTMENT (OUTPATIENT)
Dept: CT IMAGING | Age: 70
End: 2020-09-05
Attending: FAMILY MEDICINE
Payer: MEDICARE

## 2020-09-05 PROBLEM — R77.8 ELEVATED TROPONIN: Status: ACTIVE | Noted: 2020-09-05
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
## 2020-09-05 PROBLEM — R53.83 FATIGUE: Status: ACTIVE | Noted: 2020-09-05

## 2020-09-05 LAB
ALBUMIN SERPL-MCNC: 3.5 G/DL (ref 3.5–5)
ALBUMIN/GLOB SERPL: 0.9 {RATIO} (ref 1.1–2.2)
ALP SERPL-CCNC: 78 U/L (ref 45–117)
ALT SERPL-CCNC: 20 U/L (ref 12–78)
ANION GAP SERPL CALC-SCNC: 11 MMOL/L (ref 5–15)
APPEARANCE UR: ABNORMAL
APTT IMM NP PPP: NORMAL SEC
APTT PPP: 28.5 SEC (ref 22.1–32)
AST SERPL-CCNC: 45 U/L (ref 15–37)
ATRIAL RATE: 81 BPM
BACTERIA URNS QL MICRO: NEGATIVE /HPF
BASOPHILS # BLD: 0 K/UL (ref 0–0.1)
BASOPHILS NFR BLD: 1 % (ref 0–1)
BILIRUB SERPL-MCNC: 0.6 MG/DL (ref 0.2–1)
BILIRUB UR QL: NEGATIVE
BUN SERPL-MCNC: 21 MG/DL (ref 6–20)
BUN/CREAT SERPL: 17 (ref 12–20)
CALCIUM SERPL-MCNC: 9.5 MG/DL (ref 8.5–10.1)
CALCULATED P AXIS, ECG09: 46 DEGREES
CALCULATED R AXIS, ECG10: -34 DEGREES
CALCULATED T AXIS, ECG11: 4 DEGREES
CHLORIDE SERPL-SCNC: 103 MMOL/L (ref 97–108)
CO2 SERPL-SCNC: 25 MMOL/L (ref 21–32)
COLOR UR: ABNORMAL
COMMENT, HOLDF: NORMAL
CREAT SERPL-MCNC: 1.24 MG/DL (ref 0.55–1.02)
DIAGNOSIS, 93000: NORMAL
DIFFERENTIAL METHOD BLD: NORMAL
EOSINOPHIL # BLD: 0.1 K/UL (ref 0–0.4)
EOSINOPHIL NFR BLD: 2 % (ref 0–7)
EPITH CASTS URNS QL MICRO: ABNORMAL /LPF
ERYTHROCYTE [DISTWIDTH] IN BLOOD BY AUTOMATED COUNT: 13.9 % (ref 11.5–14.5)
ERYTHROCYTE [SEDIMENTATION RATE] IN BLOOD: 3 MM/HR (ref 0–30)
EST. AVERAGE GLUCOSE BLD GHB EST-MCNC: 114 MG/DL
GLOBULIN SER CALC-MCNC: 3.9 G/DL (ref 2–4)
GLUCOSE SERPL-MCNC: 90 MG/DL (ref 65–100)
GLUCOSE UR STRIP.AUTO-MCNC: NEGATIVE MG/DL
HBA1C MFR BLD: 5.6 % (ref 4–5.6)
HCT VFR BLD AUTO: 39.8 % (ref 35–47)
HGB BLD-MCNC: 13 G/DL (ref 11.5–16)
HGB UR QL STRIP: ABNORMAL
HYALINE CASTS URNS QL MICRO: ABNORMAL /LPF (ref 0–5)
IMM GRANULOCYTES # BLD AUTO: 0 K/UL (ref 0–0.04)
IMM GRANULOCYTES NFR BLD AUTO: 0 % (ref 0–0.5)
INR PPP: 1.1 (ref 0.9–1.1)
KETONES UR QL STRIP.AUTO: 15 MG/DL
LACTATE SERPL-SCNC: 1.2 MMOL/L (ref 0.4–2)
LEUKOCYTE ESTERASE UR QL STRIP.AUTO: ABNORMAL
LYMPHOCYTES # BLD: 1.6 K/UL (ref 0.8–3.5)
LYMPHOCYTES NFR BLD: 28 % (ref 12–49)
MAGNESIUM SERPL-MCNC: 2.3 MG/DL (ref 1.6–2.4)
MCH RBC QN AUTO: 28 PG (ref 26–34)
MCHC RBC AUTO-ENTMCNC: 32.7 G/DL (ref 30–36.5)
MCV RBC AUTO: 85.6 FL (ref 80–99)
MONOCYTES # BLD: 0.4 K/UL (ref 0–1)
MONOCYTES NFR BLD: 7 % (ref 5–13)
NEUTS SEG # BLD: 3.6 K/UL (ref 1.8–8)
NEUTS SEG NFR BLD: 62 % (ref 32–75)
NITRITE UR QL STRIP.AUTO: NEGATIVE
NRBC # BLD: 0 K/UL (ref 0–0.01)
NRBC BLD-RTO: 0 PER 100 WBC
OTHER,OTHU: ABNORMAL
P-R INTERVAL, ECG05: 152 MS
PH UR STRIP: 5 [PH] (ref 5–8)
PLATELET # BLD AUTO: 186 K/UL (ref 150–400)
PMV BLD AUTO: 10 FL (ref 8.9–12.9)
POTASSIUM SERPL-SCNC: 3.2 MMOL/L (ref 3.5–5.1)
PROT SERPL-MCNC: 7.4 G/DL (ref 6.4–8.2)
PROT UR STRIP-MCNC: 30 MG/DL
PROTHROMBIN TIME: 11.2 SEC (ref 9–11.1)
PTT MIXING STUDY,CMS2: NORMAL
Q-T INTERVAL, ECG07: 426 MS
QRS DURATION, ECG06: 144 MS
QTC CALCULATION (BEZET), ECG08: 494 MS
RBC # BLD AUTO: 4.65 M/UL (ref 3.8–5.2)
RBC #/AREA URNS HPF: ABNORMAL /HPF (ref 0–5)
RBC CASTS URNS QL MICRO: ABNORMAL /LPF
SAMPLES BEING HELD,HOLD: NORMAL
SODIUM SERPL-SCNC: 139 MMOL/L (ref 136–145)
SP GR UR REFRACTOMETRY: 1.03 (ref 1–1.03)
TSH SERPL DL<=0.05 MIU/L-ACNC: 0.74 UIU/ML (ref 0.36–3.74)
UROBILINOGEN UR QL STRIP.AUTO: 1 EU/DL (ref 0.2–1)
VENTRICULAR RATE, ECG03: 81 BPM
WBC # BLD AUTO: 5.8 K/UL (ref 3.6–11)
WBC URNS QL MICRO: ABNORMAL /HPF (ref 0–4)

## 2020-09-05 PROCEDURE — 96372 THER/PROPH/DIAG INJ SC/IM: CPT

## 2020-09-05 PROCEDURE — 80053 COMPREHEN METABOLIC PANEL: CPT

## 2020-09-05 PROCEDURE — 74011250636 HC RX REV CODE- 250/636: Performed by: FAMILY MEDICINE

## 2020-09-05 PROCEDURE — 65270000008 HC RM PRIVATE PEDIATRIC

## 2020-09-05 PROCEDURE — 74011250637 HC RX REV CODE- 250/637: Performed by: EMERGENCY MEDICINE

## 2020-09-05 PROCEDURE — 74011250637 HC RX REV CODE- 250/637: Performed by: INTERNAL MEDICINE

## 2020-09-05 PROCEDURE — 85652 RBC SED RATE AUTOMATED: CPT

## 2020-09-05 PROCEDURE — 85610 PROTHROMBIN TIME: CPT

## 2020-09-05 PROCEDURE — 70450 CT HEAD/BRAIN W/O DYE: CPT

## 2020-09-05 PROCEDURE — 84443 ASSAY THYROID STIM HORMONE: CPT

## 2020-09-05 PROCEDURE — 83605 ASSAY OF LACTIC ACID: CPT

## 2020-09-05 PROCEDURE — 83735 ASSAY OF MAGNESIUM: CPT

## 2020-09-05 PROCEDURE — 81001 URINALYSIS AUTO W/SCOPE: CPT

## 2020-09-05 PROCEDURE — 36415 COLL VENOUS BLD VENIPUNCTURE: CPT

## 2020-09-05 PROCEDURE — 85730 THROMBOPLASTIN TIME PARTIAL: CPT

## 2020-09-05 PROCEDURE — 85025 COMPLETE CBC W/AUTO DIFF WBC: CPT

## 2020-09-05 PROCEDURE — 74011250637 HC RX REV CODE- 250/637: Performed by: SPECIALIST

## 2020-09-05 PROCEDURE — 83036 HEMOGLOBIN GLYCOSYLATED A1C: CPT

## 2020-09-05 RX ORDER — AMLODIPINE BESYLATE 5 MG/1
2.5 TABLET ORAL DAILY
Status: DISCONTINUED | OUTPATIENT
Start: 2020-09-05 | End: 2020-09-05

## 2020-09-05 RX ORDER — GUAIFENESIN 100 MG/5ML
324 LIQUID (ML) ORAL
Status: COMPLETED | OUTPATIENT
Start: 2020-09-05 | End: 2020-09-05

## 2020-09-05 RX ORDER — METOPROLOL TARTRATE 25 MG/1
25 TABLET, FILM COATED ORAL 2 TIMES DAILY
Status: DISCONTINUED | OUTPATIENT
Start: 2020-09-05 | End: 2020-09-11 | Stop reason: HOSPADM

## 2020-09-05 RX ORDER — POTASSIUM CHLORIDE 750 MG/1
10 TABLET, FILM COATED, EXTENDED RELEASE ORAL DAILY
Status: DISCONTINUED | OUTPATIENT
Start: 2020-09-05 | End: 2020-09-11 | Stop reason: HOSPADM

## 2020-09-05 RX ORDER — SODIUM CHLORIDE 0.9 % (FLUSH) 0.9 %
5-40 SYRINGE (ML) INJECTION AS NEEDED
Status: DISCONTINUED | OUTPATIENT
Start: 2020-09-05 | End: 2020-09-11 | Stop reason: HOSPADM

## 2020-09-05 RX ORDER — HEPARIN SODIUM 5000 [USP'U]/ML
5000 INJECTION, SOLUTION INTRAVENOUS; SUBCUTANEOUS EVERY 8 HOURS
Status: DISCONTINUED | OUTPATIENT
Start: 2020-09-05 | End: 2020-09-11 | Stop reason: HOSPADM

## 2020-09-05 RX ORDER — ACETAMINOPHEN 650 MG/1
650 SUPPOSITORY RECTAL
Status: DISCONTINUED | OUTPATIENT
Start: 2020-09-05 | End: 2020-09-11 | Stop reason: HOSPADM

## 2020-09-05 RX ORDER — POLYETHYLENE GLYCOL 3350 17 G/17G
17 POWDER, FOR SOLUTION ORAL DAILY PRN
Status: DISCONTINUED | OUTPATIENT
Start: 2020-09-05 | End: 2020-09-11 | Stop reason: HOSPADM

## 2020-09-05 RX ORDER — ACETAMINOPHEN 325 MG/1
650 TABLET ORAL
Status: DISCONTINUED | OUTPATIENT
Start: 2020-09-05 | End: 2020-09-11 | Stop reason: HOSPADM

## 2020-09-05 RX ORDER — FLUTICASONE PROPIONATE 50 MCG
2 SPRAY, SUSPENSION (ML) NASAL DAILY
Status: DISCONTINUED | OUTPATIENT
Start: 2020-09-05 | End: 2020-09-05

## 2020-09-05 RX ORDER — LOSARTAN POTASSIUM 50 MG/1
50 TABLET ORAL DAILY
Status: DISCONTINUED | OUTPATIENT
Start: 2020-09-05 | End: 2020-09-05

## 2020-09-05 RX ORDER — SODIUM CHLORIDE 0.9 % (FLUSH) 0.9 %
5-40 SYRINGE (ML) INJECTION EVERY 8 HOURS
Status: DISCONTINUED | OUTPATIENT
Start: 2020-09-05 | End: 2020-09-11 | Stop reason: HOSPADM

## 2020-09-05 RX ORDER — ATORVASTATIN CALCIUM 40 MG/1
80 TABLET, FILM COATED ORAL
Status: DISCONTINUED | OUTPATIENT
Start: 2020-09-05 | End: 2020-09-11 | Stop reason: HOSPADM

## 2020-09-05 RX ADMIN — HEPARIN SODIUM 5000 UNITS: 5000 INJECTION INTRAVENOUS; SUBCUTANEOUS at 07:07

## 2020-09-05 RX ADMIN — Medication 10 ML: at 13:21

## 2020-09-05 RX ADMIN — HEPARIN SODIUM 5000 UNITS: 5000 INJECTION INTRAVENOUS; SUBCUTANEOUS at 13:24

## 2020-09-05 RX ADMIN — HEPARIN SODIUM 5000 UNITS: 5000 INJECTION INTRAVENOUS; SUBCUTANEOUS at 22:10

## 2020-09-05 RX ADMIN — ASPIRIN 81 MG CHEWABLE TABLET 324 MG: 81 TABLET CHEWABLE at 01:52

## 2020-09-05 RX ADMIN — METOPROLOL TARTRATE 25 MG: 25 TABLET, FILM COATED ORAL at 17:47

## 2020-09-05 RX ADMIN — POTASSIUM CHLORIDE 10 MEQ: 750 TABLET, FILM COATED, EXTENDED RELEASE ORAL at 18:07

## 2020-09-05 RX ADMIN — Medication 10 ML: at 22:11

## 2020-09-05 RX ADMIN — METOPROLOL TARTRATE 25 MG: 25 TABLET, FILM COATED ORAL at 09:54

## 2020-09-05 RX ADMIN — Medication 10 ML: at 09:55

## 2020-09-05 NOTE — PROGRESS NOTES
1600- Bedside shift change report given to Kenyatta Mcclain RN by Antonio Montoya RN. Report included the following information SBAR, Kardex, ED Summary, Intake/Output, MAR, Recent Results and Cardiac Rhythm NSR.

## 2020-09-05 NOTE — PROGRESS NOTES
Physical Therapy  Order received, chart reviewed, evaluation held. Dr Yoni Mcnair intending to order pelvis xray to r/o acute fracture. No order noted in chart as yet. Of note: pt also awaiting cardiology consult for trending labs. Will follow up as appropriate.     Thank you,  Ana Hart, PT, DPT

## 2020-09-05 NOTE — ED TRIAGE NOTES
Arrives from home, reporting GLF around 1930. Patient reports slipping off the floor, while trying to get out of bed. Denies head trauma, blurry vision, unilateral weakness. Pt reports generalized weakness x2 weeks, and progressively gotten worse.

## 2020-09-05 NOTE — PROGRESS NOTES
0630: TRANSFER - IN REPORT:    Verbal report received from ANDRAE Muse(name) on Mariah Johnson  being received from ED(unit) for routine progression of care      Report consisted of patients Situation, Background, Assessment and   Recommendations(SBAR). Information from the following report(s) SBAR, ED Summary, MAR, Recent Results, Cardiac Rhythm sinus rhythm with frequent PVCs and Alarm Parameters  was reviewed with the receiving nurse. Opportunity for questions and clarification was provided. Assessment completed upon patients arrival to unit and care assumed. Primary Nurse Edie Johnson and ANDRAE Denise performed a dual skin assessment on this patient No impairment noted. Feet were dry and flaky, toe nails hard, curled, and about an inch long. Left middle toenail painlessly fell off during sock removal. Pt states that this issue started with her new blood pressure medications. Verbal shift change report given to Tamera Garland RN (oncoming nurse) by Jordan Day RN (offgoing nurse). Report included the following information SBAR, ED Summary, Recent Results, Cardiac Rhythm sinus rhythm with frequent PVCs and Alarm Parameters .

## 2020-09-05 NOTE — ROUTINE PROCESS
TRANSFER - OUT REPORT: 
 
Verbal report given to Arielle RN(name) on Cy Prieto  being transferred to (unit) for routine progression of care Report consisted of patients Situation, Background, Assessment and  
Recommendations(SBAR). Information from the following report(s) SBAR, ED Summary, Intake/Output, MAR, Recent Results and Cardiac Rhythm NSR was reviewed with the receiving nurse. Lines:  
Peripheral IV 09/04/20 Left;Upper Forearm (Active) Site Assessment Clean, dry, & intact 09/04/20 2255 Phlebitis Assessment 0 09/04/20 2255 Infiltration Assessment 0 09/04/20 2255 Dressing Status Clean, dry, & intact 09/04/20 2255 Dressing Type Tape;Transparent 09/04/20 2255 Hub Color/Line Status Pink;Patent; Flushed 09/04/20 2255 Action Taken Blood drawn 09/04/20 2255 Opportunity for questions and clarification was provided. Patient transported with: 
 Monitor Visit Vitals /66 (BP 1 Location: Right arm, BP Patient Position: At rest;Sitting) Pulse 82 Temp 98 °F (36.7 °C) Resp 16 SpO2 99%

## 2020-09-05 NOTE — ED PROVIDER NOTES
Date of Service:  9/4/2020    Patient:  Krystal Almendarez    Chief Complaint:  Fall       HPI:  Krystal Almendarez is a 79 y.o.  female who presents for evaluation of a fall and feeling fatigued. She states that she slipped out of bed trying to get into it. She did not hit her head. She slipped and fell on her back side. No head back or neck pain. The fall is not of any concern, more so that she has been fatigued and tired and has not strength. No CP/SOB/ABD pain, no n/v/d, no recent illness. She feels as though she is \"not up to speed\" and feels that it takes a long time do do basic tasks because she easily tires. This has been going on for about a year but acutely worse in the last two weeks. No other acute complaints or issues. Past Medical History:   Diagnosis Date    History of mammogram 2010    normal    Hypertension     Pap smear for cervical cancer screening 2011    normal       Past Surgical History:   Procedure Laterality Date    HX GYN      hystterectomy         History reviewed. No pertinent family history.     Social History     Socioeconomic History    Marital status:      Spouse name: Not on file    Number of children: Not on file    Years of education: Not on file    Highest education level: Not on file   Occupational History    Not on file   Social Needs    Financial resource strain: Not on file    Food insecurity     Worry: Not on file     Inability: Not on file    Transportation needs     Medical: Not on file     Non-medical: Not on file   Tobacco Use    Smoking status: Never Smoker    Smokeless tobacco: Never Used   Substance and Sexual Activity    Alcohol use: Yes     Comment: occasional    Drug use: No    Sexual activity: Not on file   Lifestyle    Physical activity     Days per week: Not on file     Minutes per session: Not on file    Stress: Not on file   Relationships    Social connections     Talks on phone: Not on file     Gets together: Not on file Attends Christian service: Not on file     Active member of club or organization: Not on file     Attends meetings of clubs or organizations: Not on file     Relationship status: Not on file    Intimate partner violence     Fear of current or ex partner: Not on file     Emotionally abused: Not on file     Physically abused: Not on file     Forced sexual activity: Not on file   Other Topics Concern    Not on file   Social History Narrative    Not on file         ALLERGIES: Codeine    Review of Systems   Constitutional: Positive for fatigue. Negative for fever. HENT: Negative for hearing loss. Eyes: Negative for visual disturbance. Respiratory: Negative for shortness of breath. Cardiovascular: Negative for chest pain. Gastrointestinal: Negative for abdominal pain. Genitourinary: Negative for flank pain. Musculoskeletal: Negative for back pain. Skin: Negative for rash. Neurological: Negative for dizziness and light-headedness. Psychiatric/Behavioral: Negative for confusion. Vitals:    09/04/20 2129   BP: 135/83   Pulse: 86   Resp: 16   Temp: 98 °F (36.7 °C)   SpO2: 99%            Physical Exam  Constitutional:       General: She is not in acute distress. Appearance: Normal appearance. She is well-developed. HENT:      Head: Normocephalic and atraumatic. Eyes:      General: No scleral icterus. Conjunctiva/sclera: Conjunctivae normal.   Neck:      Musculoskeletal: Neck supple. Vascular: No JVD. Trachea: No tracheal deviation. Cardiovascular:      Rate and Rhythm: Normal rate and regular rhythm. Pulmonary:      Effort: Pulmonary effort is normal. No respiratory distress. Breath sounds: Normal breath sounds. Abdominal:      General: Bowel sounds are normal.      Palpations: Abdomen is soft. Tenderness: There is no abdominal tenderness. There is no right CVA tenderness or left CVA tenderness. Musculoskeletal: Normal range of motion.          General: No tenderness or deformity. Right lower leg: No edema. Left lower leg: No edema. Skin:     General: Skin is warm and dry. Capillary Refill: Capillary refill takes less than 2 seconds. Findings: No rash. Neurological:      Mental Status: She is alert and oriented to person, place, and time. Cranial Nerves: No cranial nerve deficit. Motor: No weakness. Psychiatric:         Mood and Affect: Mood normal.         Behavior: Behavior normal.         Thought Content: Thought content normal.          MDM     VITAL SIGNS:  Patient Vitals for the past 4 hrs:   Temp Pulse Resp BP SpO2   09/04/20 2129 98 °F (36.7 °C) 86 16 135/83 99 %         LABS:  Recent Results (from the past 6 hour(s))   EKG, 12 LEAD, INITIAL    Collection Time: 09/04/20  9:57 PM   Result Value Ref Range    Ventricular Rate 81 BPM    Atrial Rate 81 BPM    P-R Interval 152 ms    QRS Duration 144 ms    Q-T Interval 426 ms    QTC Calculation (Bezet) 494 ms    Calculated P Axis 46 degrees    Calculated R Axis -34 degrees    Calculated T Axis 4 degrees    Diagnosis       Sinus rhythm with frequent premature ventricular complexes  Left axis deviation  Right bundle branch block  No previous ECGs available     SAMPLES BEING HELD    Collection Time: 09/04/20 10:48 PM   Result Value Ref Range    SAMPLES BEING HELD 1red,1blu     COMMENT        Add-on orders for these samples will be processed based on acceptable specimen integrity and analyte stability, which may vary by analyte.    CBC WITH AUTOMATED DIFF    Collection Time: 09/04/20 10:48 PM   Result Value Ref Range    WBC 7.3 3.6 - 11.0 K/uL    RBC 4.98 3.80 - 5.20 M/uL    HGB 13.9 11.5 - 16.0 g/dL    HCT 42.9 35.0 - 47.0 %    MCV 86.1 80.0 - 99.0 FL    MCH 27.9 26.0 - 34.0 PG    MCHC 32.4 30.0 - 36.5 g/dL    RDW 14.4 11.5 - 14.5 %    PLATELET 457 209 - 515 K/uL    MPV 10.4 8.9 - 12.9 FL    NRBC 0.0 0  WBC    ABSOLUTE NRBC 0.00 0.00 - 0.01 K/uL    NEUTROPHILS 78 (H) 32 - 75 % LYMPHOCYTES 15 12 - 49 %    MONOCYTES 6 5 - 13 %    EOSINOPHILS 0 0 - 7 %    BASOPHILS 1 0 - 1 %    IMMATURE GRANULOCYTES 0 0.0 - 0.5 %    ABS. NEUTROPHILS 5.7 1.8 - 8.0 K/UL    ABS. LYMPHOCYTES 1.1 0.8 - 3.5 K/UL    ABS. MONOCYTES 0.4 0.0 - 1.0 K/UL    ABS. EOSINOPHILS 0.0 0.0 - 0.4 K/UL    ABS. BASOPHILS 0.0 0.0 - 0.1 K/UL    ABS. IMM. GRANS. 0.0 0.00 - 0.04 K/UL    DF AUTOMATED     METABOLIC PANEL, COMPREHENSIVE    Collection Time: 09/04/20 10:48 PM   Result Value Ref Range    Sodium 138 136 - 145 mmol/L    Potassium 4.1 3.5 - 5.1 mmol/L    Chloride 104 97 - 108 mmol/L    CO2 26 21 - 32 mmol/L    Anion gap 8 5 - 15 mmol/L    Glucose 101 (H) 65 - 100 mg/dL    BUN 23 (H) 6 - 20 MG/DL    Creatinine 1.56 (H) 0.55 - 1.02 MG/DL    BUN/Creatinine ratio 15 12 - 20      GFR est AA 40 (L) >60 ml/min/1.73m2    GFR est non-AA 33 (L) >60 ml/min/1.73m2    Calcium 10.0 8.5 - 10.1 MG/DL    Bilirubin, total 0.6 0.2 - 1.0 MG/DL    ALT (SGPT) 21 12 - 78 U/L    AST (SGOT) 50 (H) 15 - 37 U/L    Alk. phosphatase 80 45 - 117 U/L    Protein, total 8.2 6.4 - 8.2 g/dL    Albumin 3.8 3.5 - 5.0 g/dL    Globulin 4.4 (H) 2.0 - 4.0 g/dL    A-G Ratio 0.9 (L) 1.1 - 2.2     TROPONIN I    Collection Time: 09/04/20 10:48 PM   Result Value Ref Range    Troponin-I, Qt. 0.18 (H) <0.05 ng/mL        IMAGING:  CT HEAD WO CONT   Final Result   IMPRESSION:    Nonspecific white matter disease. No acute intracranial hemorrhage                  Medications During Visit:  Medications - No data to display      DECISION MAKING:  Gisele Mclaughlin is a 79 y.o. female who comes in as above. Labs and imaging as above. This time patient is found to have elevated troponin. Is provided with aspirin. Is unclear if this is trending up or down but at this time I will keep the patient in the hospital given her fatigue as I believe this is most likely a cause. IMPRESSION:  1. Elevated troponin    2.  Fatigue, unspecified type DISPOSITION:  Admitted        Procedures      Perfect Serve Consult for Admission  12:59 AM    ED Room Number: R36/R36  Patient Name and age:  Anthony Males 79 y.o.  female  Working Diagnosis:   1. Elevated troponin    2. Fatigue, unspecified type        COVID-19 Suspicion:  no  Sepsis present:  no  Reassessment needed: no  Code Status:  Full Code  Readmission: no  Isolation Requirements:  no  Recommended Level of Care:  telemetry  Department:[facility] ED - [phone]  Other:  Fatigue x 2 weeks. Fall tonight (no trauma). Trop 0.18, no CP.

## 2020-09-05 NOTE — H&P
History & Physical    Date of admission: 9/4/2020    Patient name: Scarlett Montalvo  MRN: [id number]  YOB: 1950  Age: 79 y.o. Primary care provider:  Lila Martinez MD     Source of Information: patient, ED and electronic medical records                                 Chief complaint:  Fatigue and weakness    History of present illness  Scarlett Montalvo is a 79 y.o. female with past medical history of hypertension presented to the ED from home s/p ground level fall with chief complaint of generalized fatigue and weakness. Symptom onset reportedly began ~ 2 weeks ago, has been progressively worsening without specific aggravating or alleviating factors. Tonight, while trying to mobilize out of bed, she reportedly slipped on the floor. There was no reports head/ neck trauma or loss of consciousness. There were no reports of new onset slurred speech, facial droop, syncope, loss of consciousness, headache, neck pain, visual disturbance, numbness, paresthesias, focal weakness, chest pain, shortness of breath, palpitations, abdominal pain, nausea, vomiting, diarrhea, melena, dysuria, hematuria, incontinence, calf pain, increased leg swelling/ edema, fever, chills, rash, or known exposure to sick persons or COVID 19 . On arrival in the ED, workup included labs showing elevated troponin = 0.18. BUN = 23. Creatinine = 1.56 (compared to 1.17 on 5/2/2019). AST = 50.   12 lead EKG showed sinus tachycardia, frequent PVCs, LAD, RBBB at 81 bpm.  Patient was given Aspirin 324 mg po x 1 dose. Patient is now seen for admission to the hospitalist service for continued evaluation and treatments.        Past Medical History:   Diagnosis Date    History of mammogram 2010    normal    Hypertension     Pap smear for cervical cancer screening 2011    normal      Past Surgical History:   Procedure Laterality Date    HX GYN hystterectomy     Prior to Admission medications    Medication Sig Start Date End Date Taking? Authorizing Provider   metoprolol tartrate (LOPRESSOR) 25 mg tablet TAKE 1 TABLET TWICE DAILY 7/13/20   Iqra Johnson MD   hydroCHLOROthiazide (HYDRODIURIL) 12.5 mg tablet TAKE 1 TABLET EVERY DAY 6/18/20   Iqra Pulliam., MD   amLODIPine (NORVASC) 2.5 mg tablet TAKE 1 TABLET EVERY DAY 6/18/20   Iqra Johnson MD   metoprolol tartrate (LOPRESSOR) 25 mg tablet TAKE 1 TABLET TWICE DAILY 8/6/19   Iqra Pulliam., MD   KLOR-CON M20 20 mEq tablet Take 1 Tab by mouth two (2) times a day. 4/18/19   Iqra Pulliam., MD   losartan (COZAAR) 50 mg tablet Take 1 Tab by mouth daily. 4/18/19   Iqra Pulliam., MD   atorvastatin (LIPITOR) 80 mg tablet TAKE 1 TABLET EVERY DAY 7/24/18   Iqra Johnson MD   fluticasone [facility]) 50 mcg/actuation nasal spray 2 Sprays by Both Nostrils route daily. 9/13/16   Iqra Johnson MD   ipratropium (ATROVENT) 0.06 % nasal spray 2 Sprays by Both Nostrils route four (4) times daily. 9/13/16   Iqra Johnson MD     Allergies   Allergen Reactions    Codeine Itching and Swelling      FAMILY HISTORY:  Unknown regarding heart disease or strokes     Social history  Patient resides  x  Independently                Ambulates  x  Independently                 Alcohol history   x  none           Smoking history  x  None             Social History     Tobacco Use   Smoking Status Never Smoker   Smokeless Tobacco Never Used       Code status  X  Full code            Code status discussed with the patient/caregivers. Review of systems  Pertinent positives as noted in HPI.   All other systems were reviewed and were negative    Physical Examination   Visit Vitals  /83 (BP 1 Location: Right arm, BP Patient Position: At rest;Sitting)   Pulse 86   Temp 98 °F (36.7 °C)   Resp 16   SpO2 99%          O2 Device: Room air    General:  Obese female in no acute respiratory distress   Head:  Normocephalic, without obvious abnormality, atraumatic   Eyes:  Conjunctivae/corneas clear. PERRL, EOMs intact   E/N/M/T: Nares normal. Septum midline. No nasal drainage or sinus tenderness  Lips, mucosa, and tongue normal   Clear oropharynx   Neck: Normal appearance and movements, symmetrical, trachea midline  No palpable adenopathy  No thyroid enlargement, tenderness or nodules  No carotid bruit   No JVD   Lungs:   Symmetrical chest expansion and respiratory effort  Clear to auscultation bilaterally   Chest wall:  No tenderness or deformity   Heart:  Regular rate and rhythm   Sounds normal; no murmur, click, rub or gallop   Abdomen:   Soft, no tenderness  No rebound, guarding, or rigidity  Non-distended  Bowel sounds normal  No masses or hepatosplenomegaly  No hernias present   Back: No CVA tenderness   Extremities: Extremities normal, atraumatic  No cyanosis   Trace non-pitting edema  No DVT signs   Pulses 2+ radial/ 1+ DP pulses bilateral   Skin: No rashes or ulcers  Warm/ dry   Musculo-      skeletal: Gait not tested     Neuro: GCS 15. Moves all extremities x 4. No slurred speech. No facial droop. Sensation grossly intact. No pronator drift   Psych: Initially patient was sleeping heavily and she was difficult to arouse, requiring shoulder rub. On awakening, patient is oriented x 3 but she appears drowsy. Flat affect.            Data Review      24 Hour Results:  Recent Results (from the past 24 hour(s))   EKG, 12 LEAD, INITIAL    Collection Time: 09/04/20  9:57 PM   Result Value Ref Range    Ventricular Rate 81 BPM    Atrial Rate 81 BPM    P-R Interval 152 ms    QRS Duration 144 ms    Q-T Interval 426 ms    QTC Calculation (Bezet) 494 ms    Calculated P Axis 46 degrees    Calculated R Axis -34 degrees    Calculated T Axis 4 degrees    Diagnosis       Sinus rhythm with frequent premature ventricular complexes  Left axis deviation  Right bundle branch block  No previous ECGs available     SAMPLES BEING HELD    Collection Time: 09/04/20 10:48 PM   Result Value Ref Range    SAMPLES BEING HELD 1red,1blu     COMMENT        Add-on orders for these samples will be processed based on acceptable specimen integrity and analyte stability, which may vary by analyte. CBC WITH AUTOMATED DIFF    Collection Time: 09/04/20 10:48 PM   Result Value Ref Range    WBC 7.3 3.6 - 11.0 K/uL    RBC 4.98 3.80 - 5.20 M/uL    HGB 13.9 11.5 - 16.0 g/dL    HCT 42.9 35.0 - 47.0 %    MCV 86.1 80.0 - 99.0 FL    MCH 27.9 26.0 - 34.0 PG    MCHC 32.4 30.0 - 36.5 g/dL    RDW 14.4 11.5 - 14.5 %    PLATELET 605 833 - 539 K/uL    MPV 10.4 8.9 - 12.9 FL    NRBC 0.0 0  WBC    ABSOLUTE NRBC 0.00 0.00 - 0.01 K/uL    NEUTROPHILS 78 (H) 32 - 75 %    LYMPHOCYTES 15 12 - 49 %    MONOCYTES 6 5 - 13 %    EOSINOPHILS 0 0 - 7 %    BASOPHILS 1 0 - 1 %    IMMATURE GRANULOCYTES 0 0.0 - 0.5 %    ABS. NEUTROPHILS 5.7 1.8 - 8.0 K/UL    ABS. LYMPHOCYTES 1.1 0.8 - 3.5 K/UL    ABS. MONOCYTES 0.4 0.0 - 1.0 K/UL    ABS. EOSINOPHILS 0.0 0.0 - 0.4 K/UL    ABS. BASOPHILS 0.0 0.0 - 0.1 K/UL    ABS. IMM. GRANS. 0.0 0.00 - 0.04 K/UL    DF AUTOMATED     METABOLIC PANEL, COMPREHENSIVE    Collection Time: 09/04/20 10:48 PM   Result Value Ref Range    Sodium 138 136 - 145 mmol/L    Potassium 4.1 3.5 - 5.1 mmol/L    Chloride 104 97 - 108 mmol/L    CO2 26 21 - 32 mmol/L    Anion gap 8 5 - 15 mmol/L    Glucose 101 (H) 65 - 100 mg/dL    BUN 23 (H) 6 - 20 MG/DL    Creatinine 1.56 (H) 0.55 - 1.02 MG/DL    BUN/Creatinine ratio 15 12 - 20      GFR est AA 40 (L) >60 ml/min/1.73m2    GFR est non-AA 33 (L) >60 ml/min/1.73m2    Calcium 10.0 8.5 - 10.1 MG/DL    Bilirubin, total 0.6 0.2 - 1.0 MG/DL    ALT (SGPT) 21 12 - 78 U/L    AST (SGOT) 50 (H) 15 - 37 U/L    Alk.  phosphatase 80 45 - 117 U/L    Protein, total 8.2 6.4 - 8.2 g/dL    Albumin 3.8 3.5 - 5.0 g/dL    Globulin 4.4 (H) 2.0 - 4.0 g/dL    A-G Ratio 0.9 (L) 1.1 - 2.2     TROPONIN I    Collection Time: 09/04/20 10:48 PM   Result Value Ref Range    Troponin-I, Qt. 0.18 (H) <0.05 ng/mL   URINALYSIS W/ RFLX MICROSCOPIC    Collection Time: 09/05/20 12:43 AM   Result Value Ref Range    Color DARK YELLOW      Appearance CLOUDY (A) CLEAR      Specific gravity 1.029 1.003 - 1.030      pH (UA) 5.0 5.0 - 8.0      Protein 30 (A) NEG mg/dL    Glucose Negative NEG mg/dL    Ketone 15 (A) NEG mg/dL    Bilirubin Negative NEG      Blood TRACE (A) NEG      Urobilinogen 1.0 0.2 - 1.0 EU/dL    Nitrites Negative NEG      Leukocyte Esterase MODERATE (A) NEG      WBC 5-10 0 - 4 /hpf    RBC 0-5 0 - 5 /hpf    Epithelial cells FEW FEW /lpf    Bacteria Negative NEG /hpf    Hyaline cast 2-5 0 - 5 /lpf    RBC cast 0-2 (A) NEG /LPF    Other: Renal Epithelial cells Present     SAMPLES BEING HELD    Collection Time: 09/05/20 12:43 AM   Result Value Ref Range    SAMPLES BEING HELD UC HOLD     COMMENT        Add-on orders for these samples will be processed based on acceptable specimen integrity and analyte stability, which may vary by analyte. Recent Labs     09/04/20  2248   WBC 7.3   HGB 13.9   HCT 42.9        Recent Labs     09/04/20  2248      K 4.1      CO2 26   *   BUN 23*   CREA 1.56*   CA 10.0   ALB 3.8   TBILI 0.6   ALT 21       Imaging  CT HEAD WO CONT     INDICATION: weakness/ s/p fall     COMPARISON: None.     CONTRAST: None.     TECHNIQUE: Unenhanced CT of the head was performed using 5 mm images. Brain and  bone windows were generated. Coronal and sagittal reformats. CT dose reduction  was achieved through use of a standardized protocol tailored for this  examination and automatic exposure control for dose modulation.       FINDINGS:  The ventricles and sulci are normal in size, shape and configuration. . There is  mild periventricular white matter hypodensity. There is no intracranial  hemorrhage, extra-axial collection, or mass effect. The basilar cisterns are  open.  No CT evidence of acute infarct.     The bone windows demonstrate no abnormalities. The visualized portions of the  paranasal sinuses and mastoid air cells are clear.     IMPRESSION:   Nonspecific white matter disease. No acute intracranial hemorrhage            Assessment and Plan   1. Elevated troponin       - Admit to telemetry       - repeat serial troponin levels       - given Aspirin 324 mg        - consult cardiologist this a.m.    2.  Fall from ground level       - place on fall precautions and neurovascular checks       - Patient is a definite risk for further falls. Will consult PT for further evaluation. - order pelvis xray to rule out acute fracture    3. Generalized weakness       - plan as noted above    4. Dehydration       - order 0.9% NS IV fluids for hydration. Repeat renal panel in a.m.       - place on strict Is and Os and daily weights to monitor fluid status    5. LFT elevation       - repeat CMP in a.m. 6.  Hypertension       - resume Metoprolol and Amlodipine home dose       - monitor BP closely    7.   VTE prophylaxis        - Heparin 5000 units sq q 8 hours       Signed by: Joaquin Galloway MD    September 5, 2020 at 2:39 AM

## 2020-09-05 NOTE — PROGRESS NOTES
Progress Note          Pt Name  Luca Disla   Date of Birth 1950   Medical Record Number  [id number]      Age  79 y.o. PCP Gavin Castillo MD   Admit date:  9/4/2020    Room Number  008/58  @ [facility]   Date of Service  9/5/2020     Admission Diagnoses:  Elevated troponin      History of present illness (copied from H&P)  \" Luca Disla is a 79 y.o. female with past medical history of hypertension presented to the ED from home s/p ground level fall with chief complaint of generalized fatigue and weakness. Symptom onset reportedly began ~ 2 weeks ago, has been progressively worsening without specific aggravating or alleviating factors. Tonight, while trying to mobilize out of bed, she reportedly slipped on the floor. There was no reports head/ neck trauma or loss of consciousness. There were no reports of new onset slurred speech, facial droop, syncope, loss of consciousness, headache, neck pain, visual disturbance, numbness, paresthesias, focal weakness, chest pain, shortness of breath, palpitations, abdominal pain, nausea, vomiting, diarrhea, melena, dysuria, hematuria, incontinence, calf pain, increased leg swelling/ edema, fever, chills, rash, or known exposure to sick persons or COVID 19 . On arrival in the ED, workup included labs showing elevated troponin = 0.18. BUN = 23. Creatinine = 1.56 (compared to 1.17 on 5/2/2019). AST = 50.   12 lead EKG showed sinus tachycardia, frequent PVCs, LAD, RBBB at 81 bpm.  Patient was given Aspirin 324 mg po x 1 dose.   Patient is now seen for admission to the hospitalist service for continued evaluation and treatments \"         Assessment and plan:     Elevated troponin -without any reported chest pain,   RBBB -?duration   Hx of Afib with no reported workup in the past - pt reports that she was diagnosed with that several years ago and treated with Metoprolol   · Evaluate for ACS   · Serial troponin  · Echo complete   · Cardiologist evaluation   · Continue Beta blocker + ASA + Heparin prophylaxis + PRN NTG     HTN -pt complaints of feeling woozy after her BP meds   · Will try to stagger meds   · Continue ARB+       Body mass index is 32.28 kg/m². -             CODE STATUS   Full   Functional Status  Pt lives by herself in [place]   She is a retired medical assistant and independent with ADLs     Surrogate decision maker:       Prophylaxis   Lovenox   Discharge Plan:   PT, OT, RN , pending PT OT evaluation      Misc   Benefit:  Payor: Jeb Gitelman / Plan: [address] [place] [address] HMO / Product Type: [employer] /    Isolation :  There are currently no Active Isolations   ADT status:  INPATIENT      Query   None noted today    Prognosis   Fair    Social issues  Date  Comment                        Subjective Data     \"I feel tired \"  Review of Systems - History obtained from the patient  Respiratory ROS: no cough, shortness of breath, or wheezing  Cardiovascular ROS: no chest pain or dyspnea on exertion    Objective Data       Comments  Very pleasant elderly lady lying in bed in no distress.       Patient Vitals for the past 24 hrs:   BP   09/05/20 0711 145/78   09/05/20 0638 144/73   09/05/20 0500 142/66   09/05/20 0300 128/75   09/04/20 2129 135/83      Patient Vitals for the past 24 hrs:   Pulse   09/05/20 0711 71   09/05/20 0638 75   09/05/20 0500 82   09/05/20 0300 82   09/04/20 2129 86      Patient Vitals for the past 24 hrs:   Resp   09/05/20 0711 14   09/05/20 0638 13   09/05/20 0500 16   09/05/20 0300 16   09/04/20 2129 16      Patient Vitals for the past 24 hrs:   Temp   09/05/20 0711 97.5 °F (36.4 °C)   09/05/20 0638 97.7 °F (36.5 °C)   09/05/20 0500 98 °F (36.7 °C)   09/05/20 0300 98.2 °F (36.8 °C)   09/04/20 2129 98 °F (36.7 °C)        SpO2 Readings from Last 6 Encounters:   09/05/20 98%   07/11/19 100%   04/18/19 100%   05/17/18 97%   04/19/18 98%   02/02/17 99%          O2 Device: Room air Body mass index is 32.28 kg/m². -  Wt Readings from Last 10 Encounters:   07/11/19 90.7 kg (200 lb)   04/18/19 93.4 kg (206 lb)   05/17/18 90.7 kg (200 lb)   04/19/18 91.6 kg (202 lb)   02/02/17 94.3 kg (208 lb)   09/13/16 94.8 kg (209 lb)   08/16/16 95.3 kg (210 lb)   06/06/16 94.3 kg (208 lb)   03/23/16 96.6 kg (213 lb)   12/01/15 95.7 kg (211 lb)        Physical Exam:             General:  Alert, cooperative,   well noursished,   well developed,   appears stated age    Ears/Eyes:  Hearing intact  Sclera anicteric.    Pupils equal   Mouth/Throat:  Mucous membranes normal pink and moist  Oral pharynx clear    Neck:     Lungs:  Trachea midline  Chest excursion symmetrical   Auscultation B/L Symmetrical with   Vesicular breath sounds     No Crepitations noted   Percussion note resonant on mid Clavicular line; no sign of pneumothorax        CVS:  Regular rate and rhythm   Systolic  murmur,   No click, rub or gallop  S1 normal   S2 normal   Pedal pulses  b/l symmetrical   Abdomen:    Soft, non-tender  Bowel sounds normal     Extremities:    No cyanosis, jaundice  No edema noted   No sign of DVT/cord like lesion on palpation  No sign of acute trauma    Skin:    Skin color, texture, turgor normal. no acute rash or lesions    Lymph nodes:     Musculoskeletal Muscle bulk B/L symmetrical   Neuro Cranial nerves are intact,   motor movement b/l symmetrical,   Sensory evaluation b/l symmetrical    Psych:  Alert and oriented,   normal mood & affect          Medications reviewed     Current Facility-Administered Medications   Medication Dose Route Frequency    sodium chloride (NS) flush 5-40 mL  5-40 mL IntraVENous Q8H    sodium chloride (NS) flush 5-40 mL  5-40 mL IntraVENous PRN    acetaminophen (TYLENOL) tablet 650 mg  650 mg Oral Q6H PRN    Or    acetaminophen (TYLENOL) suppository 650 mg  650 mg Rectal Q6H PRN    polyethylene glycol (MIRALAX) packet 17 g  17 g Oral DAILY PRN    heparin (porcine) injection 5,000 Units  5,000 Units SubCUTAneous Q8H    atorvastatin (LIPITOR) tablet 80 mg  80 mg Oral QHS    fluticasone propionate (FLONASE) 50 mcg/actuation nasal spray 2 Spray  2 Spray Both Nostrils DAILY    losartan (COZAAR) tablet 50 mg  50 mg Oral DAILY    metoprolol tartrate (LOPRESSOR) tablet 25 mg  25 mg Oral BID       Relevant other informations: Other medical conditions listed in [facility] problem list section; all of these and other pertinent data were taken into consideration when treatment plan is developed and customized to this patient's unique overall circumstances and needs. We have reviewed available old medical records within the constraints of this admission process. Data Review:   Recent Days:  All Micro Results     None          Recent Labs     09/05/20  0656 09/04/20  2248   WBC 5.8 7.3   HGB 13.0 13.9   HCT 39.8 42.9    216     Recent Labs     09/05/20  0656 09/04/20  2248    138   K 3.2* 4.1    104   CO2 25 26   GLU 90 101*   BUN 21* 23*   CREA 1.24* 1.56*   CA 9.5 10.0   MG 2.3  --    ALB 3.5 3.8   TBILI 0.6 0.6   ALT 20 21   INR 1.1  --       Lab Results   Component Value Date/Time    TSH 1.180 10/27/2011 03:16 PM            Care Plan discussed with:Patient/Family and Nurse   Other medical conditions are listed in the active hospital problem list section; these and other pertinent data were taken into consideration when the treatment plan was developed and customized to this patient's unique overall circumstances and needs. High complexity decision making was performed for this patient who is at high risk for decompensation with multiple organ involvement. Today total floor/unit time was 35 minutes while caring for this patient and greater than 50% of that time was spent with patient (and/or family) coordinating patients clinical issues; this includes time spent during multidisciplinary rounds.         Ryland Metzger MD MPH FACP    9/5/2020

## 2020-09-05 NOTE — PROGRESS NOTES
Bedside shift change report given to Sheri RN by Tulio Mistry RN. Report included the following information SBAR, Kardex, ED Summary, Intake/Output, MAR, Recent Results and Cardiac Rhythm NSR. Problem: Unstable angina/NSTEMI: Day 2  Goal: Respiratory  Outcome: Progressing Towards Goal  Note: Oxygen saturations within defined normal limits on room air. Will continue to monitor. Problem: Falls - Risk of  Goal: *Absence of Falls  Description: Document Sherri Sagastume Fall Risk and appropriate interventions in the flowsheet.   Outcome: Progressing Towards Goal  Note: Fall Risk Interventions:  Mobility Interventions: Communicate number of staff needed for ambulation/transfer, Patient to call before getting OOB, PT Consult for mobility concerns, PT Consult for assist device competence, Utilize walker, cane, or other assistive device         Medication Interventions: Evaluate medications/consider consulting pharmacy, Patient to call before getting OOB, Teach patient to arise slowly    Elimination Interventions: Call light in reach, Patient to call for help with toileting needs, Toileting schedule/hourly rounds    History of Falls Interventions: Door open when patient unattended, Evaluate medications/consider consulting pharmacy, Room close to nurse's station

## 2020-09-05 NOTE — PROGRESS NOTES
Admitted last night with minimal troponin elevation and syncope, just notified of consult. Will order echo and see in a.m.

## 2020-09-06 ENCOUNTER — APPOINTMENT (OUTPATIENT)
Dept: GENERAL RADIOLOGY | Age: 70
End: 2020-09-06
Attending: HOSPITALIST
Payer: MEDICARE

## 2020-09-06 PROBLEM — E87.6 HYPOKALEMIA: Status: ACTIVE | Noted: 2020-09-06

## 2020-09-06 LAB
ANION GAP SERPL CALC-SCNC: 5 MMOL/L (ref 5–15)
ATRIAL RATE: 70 BPM
BUN SERPL-MCNC: 22 MG/DL (ref 6–20)
BUN/CREAT SERPL: 18 (ref 12–20)
CALCIUM SERPL-MCNC: 8.7 MG/DL (ref 8.5–10.1)
CALCULATED P AXIS, ECG09: 47 DEGREES
CALCULATED R AXIS, ECG10: -41 DEGREES
CALCULATED T AXIS, ECG11: 1 DEGREES
CHLORIDE SERPL-SCNC: 108 MMOL/L (ref 97–108)
CO2 SERPL-SCNC: 28 MMOL/L (ref 21–32)
CREAT SERPL-MCNC: 1.21 MG/DL (ref 0.55–1.02)
DIAGNOSIS, 93000: NORMAL
ERYTHROCYTE [DISTWIDTH] IN BLOOD BY AUTOMATED COUNT: 14.2 % (ref 11.5–14.5)
GLUCOSE SERPL-MCNC: 94 MG/DL (ref 65–100)
HCT VFR BLD AUTO: 34.5 % (ref 35–47)
HGB BLD-MCNC: 11.1 G/DL (ref 11.5–16)
MCH RBC QN AUTO: 27.7 PG (ref 26–34)
MCHC RBC AUTO-ENTMCNC: 32.2 G/DL (ref 30–36.5)
MCV RBC AUTO: 86 FL (ref 80–99)
NRBC # BLD: 0 K/UL (ref 0–0.01)
NRBC BLD-RTO: 0 PER 100 WBC
P-R INTERVAL, ECG05: 178 MS
PLATELET # BLD AUTO: 173 K/UL (ref 150–400)
PMV BLD AUTO: 9.8 FL (ref 8.9–12.9)
POTASSIUM SERPL-SCNC: 3.3 MMOL/L (ref 3.5–5.1)
Q-T INTERVAL, ECG07: 424 MS
QRS DURATION, ECG06: 148 MS
QTC CALCULATION (BEZET), ECG08: 457 MS
RBC # BLD AUTO: 4.01 M/UL (ref 3.8–5.2)
SODIUM SERPL-SCNC: 141 MMOL/L (ref 136–145)
TROPONIN I SERPL-MCNC: 0.17 NG/ML
VENTRICULAR RATE, ECG03: 70 BPM
WBC # BLD AUTO: 4.5 K/UL (ref 3.6–11)

## 2020-09-06 PROCEDURE — 99222 1ST HOSP IP/OBS MODERATE 55: CPT | Performed by: SPECIALIST

## 2020-09-06 PROCEDURE — 85027 COMPLETE CBC AUTOMATED: CPT

## 2020-09-06 PROCEDURE — 80048 BASIC METABOLIC PNL TOTAL CA: CPT

## 2020-09-06 PROCEDURE — 99218 HC RM OBSERVATION: CPT

## 2020-09-06 PROCEDURE — 74011250637 HC RX REV CODE- 250/637: Performed by: FAMILY MEDICINE

## 2020-09-06 PROCEDURE — 36415 COLL VENOUS BLD VENIPUNCTURE: CPT

## 2020-09-06 PROCEDURE — 74011250636 HC RX REV CODE- 250/636: Performed by: FAMILY MEDICINE

## 2020-09-06 PROCEDURE — 93005 ELECTROCARDIOGRAM TRACING: CPT

## 2020-09-06 PROCEDURE — 96372 THER/PROPH/DIAG INJ SC/IM: CPT

## 2020-09-06 PROCEDURE — 84484 ASSAY OF TROPONIN QUANT: CPT

## 2020-09-06 PROCEDURE — 74011250637 HC RX REV CODE- 250/637: Performed by: SPECIALIST

## 2020-09-06 PROCEDURE — 71045 X-RAY EXAM CHEST 1 VIEW: CPT

## 2020-09-06 RX ADMIN — HEPARIN SODIUM 5000 UNITS: 5000 INJECTION INTRAVENOUS; SUBCUTANEOUS at 14:08

## 2020-09-06 RX ADMIN — HEPARIN SODIUM 5000 UNITS: 5000 INJECTION INTRAVENOUS; SUBCUTANEOUS at 06:50

## 2020-09-06 RX ADMIN — POTASSIUM CHLORIDE 10 MEQ: 750 TABLET, FILM COATED, EXTENDED RELEASE ORAL at 09:41

## 2020-09-06 RX ADMIN — Medication 10 ML: at 06:50

## 2020-09-06 RX ADMIN — ATORVASTATIN CALCIUM 80 MG: 20 TABLET, FILM COATED ORAL at 22:14

## 2020-09-06 RX ADMIN — Medication 10 ML: at 22:15

## 2020-09-06 RX ADMIN — HEPARIN SODIUM 5000 UNITS: 5000 INJECTION INTRAVENOUS; SUBCUTANEOUS at 22:14

## 2020-09-06 RX ADMIN — Medication 10 ML: at 14:08

## 2020-09-06 NOTE — PROGRESS NOTES
2012: Per Pt's granddaughter Pt lives alone in 2 story apt temporary, waiting to get into high rise - would appreciate case management/help from . Family lives nearby but not in same building. 2210: Pt states she has not taken Lipitor in at least a year.

## 2020-09-06 NOTE — PROGRESS NOTES
Bedside shift change report given to Celsa ABEBE (oncoming nurse) by Denise Newman RN (offgoing nurse). Report included the following information SBAR, Kardex, ED Summary, Intake/Output, MAR, Recent Results, Med Rec Status and Cardiac Rhythm NSR. Patient Vitals for the past 12 hrs:   Temp Pulse Resp BP SpO2   09/06/20 0445 98.3 °F (36.8 °C) 64 14 129/63 97 %   09/06/20 0019 98.3 °F (36.8 °C) 70 14 104/59 98 %   09/05/20 1903 98 °F (36.7 °C) 67 14 109/46 100 %     Unable to weigh Pt at this time - bed not working properly and Pt unable to stand. Will discuss with dayshift RN. Problem: Falls - Risk of  Goal: *Absence of Falls  Description: Document Rosie Logan Fall Risk and appropriate interventions in the flowsheet.   Outcome: Progressing Towards Goal  Note: Fall Risk Interventions:  Mobility Interventions: Communicate number of staff needed for ambulation/transfer, Patient to call before getting OOB, Utilize walker, cane, or other assistive device, Utilize gait belt for transfers/ambulation         Medication Interventions: Patient to call before getting OOB, Teach patient to arise slowly    Elimination Interventions: Call light in reach, Patient to call for help with toileting needs, Stay With Me (per policy), Toileting schedule/hourly rounds    History of Falls Interventions: Bed/chair exit alarm, Door open when patient unattended, Consult care management for discharge planning, Investigate reason for fall, Room close to nurse's station, Utilize gait belt for transfer/ambulation         Problem: Unstable angina/NSTEMI: Day 2  Goal: Activity/Safety  Outcome: Progressing Towards Goal  Goal: Diagnostic Test/Procedures  Outcome: Progressing Towards Goal  Goal: Nutrition/Diet  Outcome: Progressing Towards Goal  Goal: Medications  Outcome: Progressing Towards Goal  Goal: Psychosocial  Outcome: Progressing Towards Goal  Goal: *Hemodynamically stable  Outcome: Progressing Towards Goal  Goal: *Optimal pain control at patient's stated goal  Outcome: Progressing Towards Goal     Problem: Pressure Injury - Risk of  Goal: *Prevention of pressure injury  Description: Document Bin Scale and appropriate interventions in the flowsheet.   Outcome: Progressing Towards Goal  Note: Pressure Injury Interventions:  Sensory Interventions: Assess changes in LOC, Assess need for specialty bed    Moisture Interventions: Absorbent underpads, Assess need for specialty bed, Check for incontinence Q2 hours and as needed, Minimize layers    Activity Interventions: Assess need for specialty bed, Increase time out of bed, Pressure redistribution bed/mattress(bed type)    Mobility Interventions: Assess need for specialty bed, Float heels, Pressure redistribution bed/mattress (bed type)    Nutrition Interventions: Document food/fluid/supplement intake yes

## 2020-09-06 NOTE — ACP (ADVANCE CARE PLANNING)
visit for Advance Medical Directive (AMD) consult. Pt was in bed,  introduced AMD pt shared she was interested and she would need help. Pt did not want  to leave paperwork at this time. Pt mentioned she has tests tomorrow.  let her know we would attempt to visit. Please contact [phone] [facility] for further support.  follow up as needed.      [address] Sunil Jay, MACE   [phone])

## 2020-09-06 NOTE — PROGRESS NOTES
Spiritual Care Assessment/Progress Note  [facility]      NAME: Froy Mccallum      MRN: [id number]  AGE: 79 y.o.  SEX: female  Synagogue Affiliation: Congregation   Language: English     9/6/2020     Total Time (in minutes): 23     Spiritual Assessment begun in [address] through conversation with:         [x]Patient        [] Family    [] Friend(s)        Reason for Consult: Advance medical directive consult     Spiritual beliefs: (Please include comment if needed)     [] Identifies with a elbert tradition:         [] Supported by a elbert community:            [] Claims no spiritual orientation:           [] Seeking spiritual identity:                [] Adheres to an individual form of spirituality:           [x] Not able to assess:                           Identified resources for coping:      [] Prayer                               [] Music                  [] Guided Imagery     [x] Family/friends                 [] Pet visits     [] Devotional reading                         [] Unknown     [] Other:                                             Interventions offered during this visit: (See comments for more details)    Patient Interventions: Advance medical directive consult, Affirmation of emotions/emotional suffering, Affirmation of elbert, Normalization of emotional/spiritual concerns           Plan of Care:     [x] Support spiritual and/or cultural needs    [x] Support AMD and/or advance care planning process      [] Support grieving process   [] Coordinate Rites and/or Rituals    [] Coordination with community clergy   [] No spiritual needs identified at this time   [] Detailed Plan of Care below (See Comments)  [] Make referral to Music Therapy  [] Make referral to Pet Therapy     [] Make referral to Addiction services  [] Make referral to [facility]  [] Make referral to Spiritual Care Partner  [] No future visits requested        [x] Follow up visits as needed     Comments:  visit for Advance Medical Directive (AMD) consult. Pt was in bed,  introduced AMD pt shared she was interested and she would need help. Pt did not want  to leave paperwork at this time. Pt mentioned she has tests tomorrow.  let her know we would attempt to visit. Please contact [phone] [facility] for further support.  follow up as needed.      [address] Sunil Jay, MACE   [phone]

## 2020-09-06 NOTE — PROGRESS NOTES
Uneventful shift. Bedside and Verbal shift change report given to [facility] (oncoming nurse) by Jing Hinson (offgoing nurse). Report included the following information SBAR, Kardex, Intake/Output, MAR, Accordion and Recent Results. Problem: Falls - Risk of  Goal: *Absence of Falls  Description: Document Bashir Host Fall Risk and appropriate interventions in the flowsheet.   Outcome: Progressing Towards Goal  Note: Fall Risk Interventions:  Mobility Interventions: Communicate number of staff needed for ambulation/transfer, OT consult for ADLs, Patient to call before getting OOB, PT Consult for mobility concerns, Strengthening exercises (ROM-active/passive), Utilize walker, cane, or other assistive device, Utilize gait belt for transfers/ambulation         Medication Interventions: Evaluate medications/consider consulting pharmacy, Patient to call before getting OOB, Teach patient to arise slowly, Utilize gait belt for transfers/ambulation    Elimination Interventions: Call light in reach, Patient to call for help with toileting needs, Stay With Me (per policy), Toilet paper/wipes in reach, Toileting schedule/hourly rounds    History of Falls Interventions: Consult care management for discharge planning, Door open when patient unattended, Evaluate medications/consider consulting pharmacy, Investigate reason for fall, Room close to nurse's station, Utilize gait belt for transfer/ambulation         Problem: Patient Education: Go to Patient Education Activity  Goal: Patient/Family Education  Outcome: Progressing Towards Goal     Problem: Unstable angina/NSTEMI: Day 2  Goal: Activity/Safety  Outcome: Progressing Towards Goal  Goal: Nutrition/Diet  Outcome: Progressing Towards Goal  Goal: Medications  Outcome: Progressing Towards Goal  Goal: Respiratory  Outcome: Progressing Towards Goal  Goal: Psychosocial  Outcome: Progressing Towards Goal  Goal: *Optimal pain control at patient's stated goal  Outcome: Progressing Towards Goal     Problem: Pressure Injury - Risk of  Goal: *Prevention of pressure injury  Description: Document Bin Scale and appropriate interventions in the flowsheet. Outcome: Progressing Towards Goal  Note: Pressure Injury Interventions:  Sensory Interventions: Assess changes in LOC, Assess need for specialty bed, Avoid rigorous massage over bony prominences, Check visual cues for pain, Discuss PT/OT consult with provider, Float heels, Keep linens dry and wrinkle-free, Maintain/enhance activity level, Minimize linen layers, Monitor skin under medical devices, Turn and reposition approx. every two hours (pillows and wedges if needed)    Moisture Interventions: Absorbent underpads, Apply protective barrier, creams and emollients, Assess need for specialty bed, Check for incontinence Q2 hours and as needed, Maintain skin hydration (lotion/cream), Minimize layers, Moisture barrier, Offer toileting Q_hr    Activity Interventions: Assess need for specialty bed, Increase time out of bed, PT/OT evaluation    Mobility Interventions: Assess need for specialty bed, Float heels, HOB 30 degrees or less, PT/OT evaluation, Turn and reposition approx.  every two hours(pillow and wedges)    Nutrition Interventions: Document food/fluid/supplement intake, Discuss nutritional consult with provider, Offer support with meals,snacks and hydration                     Problem: Patient Education: Go to Patient Education Activity  Goal: Patient/Family Education  Outcome: Progressing Towards Goal

## 2020-09-06 NOTE — PROGRESS NOTES
[facility]  Hospitalist Group                                                                                          Hospitalist Progress Note  Bienvenido Sigala MD  Answering service: [phone] from in house phone        Date of Service:  2020  NAME:  Anthony Lemus  :  1950  MRN:  [id number]      Admission Summary:   History of present illness (copied from H&P)  \" Yelitza Monroe a 79 y.o. female with past medical history of hypertension presented to the ED from home s/p ground level fall with chief complaint of generalized fatigue and weakness.  Symptom onset reportedly began ~ 2 weeks ago, has been progressively worsening without specific aggravating or alleviating factors.  Tonight, while trying to mobilize out of bed, she reportedly slipped on the floor.  There was no reports head/ neck trauma or loss of consciousness.  There were no reports of new onset slurred speech, facial droop, syncope, loss of consciousness, headache, neck pain, visual disturbance, numbness, paresthesias, focal weakness, chest pain, shortness of breath, palpitations, abdominal pain, nausea, vomiting, diarrhea, melena, dysuria, hematuria, incontinence, calf pain, increased leg swelling/ edema, fever, chills, rash, or known exposure to sick persons or COVID 19 .  On arrival in the ED, workup included labs showing elevated troponin = 0.18.  BUN = 23.  Creatinine = 1.56 (compared to 1.17 on 2019).  AST = 50.   12 lead EKG showed sinus tachycardia, frequent PVCs, LAD, RBBB at 81 bpm.  Patient was given Aspirin 324 mg po x 1 dose.  Patient is now seen for admission to the hospitalist service for continued evaluation and treatments     Interval history / Subjective:   Patient seen and examined. She denied any dizziness/lightheadedness today.  States that she had some generalized weakness/low energy for few days prior to coming to the hospital. Denied any fever,cough,abdominal pain,chest pain today. Denied any weight loss or loss of appetite. Assessment & Plan:     # Dizziness/generliazed weakness-improved  -? Related to lower BP while on antihypertensives at home. UA wnl  -CXR ordered.   -will get orthostatic vitals  -TSH nl    #Elevated troponin  -echo pending,cardiologist following      #History of SVT:  -she has no h/o atrial fib. Was on metoprolol prior to admission which is now discontinued to lower BP. #HTN:  -BP soft,hold antihypertensives      Pt/OT eval.        Code status: full  DVT prophylaxis:    Care Plan discussed with: patient,nurse  Anticipated Disposition:home  Anticipated Discharge:TBD     Hospital Problems  Date Reviewed: 9/6/2020          Codes Class Noted POA    Hypokalemia ICD-10-CM: E87.6  ICD-9-CM: 276.8  9/6/2020 Unknown        Fatigue ICD-10-CM: R53.83  ICD-9-CM: 780.79  9/5/2020 Unknown        Fall ICD-10-CM: Vashti Flory. Mala Baker  ICD-9-CM: E888.9  9/5/2020 Unknown        Elevated troponin ICD-10-CM: R79.89  ICD-9-CM: 790.6  9/5/2020 Unknown                Review of Systems:   As per HPI      Vital Signs:    Last 24hrs VS reviewed since prior progress note. Most recent are:  Visit Vitals  /59 (BP 1 Location: Right arm, BP Patient Position: At rest)   Pulse 69   Temp 98.1 °F (36.7 °C)   Resp 15   Ht 5' 6\" (1.676 m)   SpO2 98%   BMI 32.28 kg/m²         Intake/Output Summary (Last 24 hours) at 9/6/2020 1543  Last data filed at 9/6/2020 1212  Gross per 24 hour   Intake 680 ml   Output 650 ml   Net 30 ml        Physical Examination:             Constitutional:  No acute distress   ENT:  Oral mucosa moist, oropharynx benign,EOMI    Resp:  CTA bilaterally. No wheezing. No accessory muscle use   CV:  Regular rhythm, normal rate, S1,S2 wnl    GI:  Soft, non distended, non tender, normoactive bowel sounds    Musculoskeletal:  No LE edema    Neurologic:  Moves all extremities. AAOx3     Psych:  Not anxious nor agitated.        Data Review:    Review and/or order of clinical lab test  Review and/or order of tests in the radiology section of CPT  Review and/or order of tests in the medicine section of CPT      Labs:     Recent Labs     09/06/20  0510 09/05/20  0656   WBC 4.5 5.8   HGB 11.1* 13.0   HCT 34.5* 39.8    186     Recent Labs     09/06/20  0510 09/05/20  0656 09/04/20  2248    139 138   K 3.3* 3.2* 4.1    103 104   CO2 28 25 26   BUN 22* 21* 23*   CREA 1.21* 1.24* 1.56*   GLU 94 90 101*   CA 8.7 9.5 10.0   MG  --  2.3  --      Recent Labs     09/05/20  0656 09/04/20  2248   ALT 20 21   AP 78 80   TBILI 0.6 0.6   TP 7.4 8.2   ALB 3.5 3.8   GLOB 3.9 4.4*     Recent Labs     09/05/20  0656   INR 1.1   PTP 11.2*   APTT 28.5      No results for input(s): FE, TIBC, PSAT, FERR in the last 72 hours. No results found for: FOL, RBCF   No results for input(s): PH, PCO2, PO2 in the last 72 hours.   Recent Labs     09/06/20  0510 09/04/20  2248   TROIQ 0.17* 0.18*     Lab Results   Component Value Date/Time    Cholesterol, total 278 (H) 10/01/2014 01:04 PM    HDL Cholesterol 60 10/01/2014 01:04 PM    LDL, calculated 196 (H) 10/01/2014 01:04 PM    Triglyceride 112 10/01/2014 01:04 PM     No results found for: GLUCPOC  Lab Results   Component Value Date/Time    Color DARK YELLOW 09/05/2020 12:43 AM    Appearance CLOUDY (A) 09/05/2020 12:43 AM    Specific gravity 1.029 09/05/2020 12:43 AM    pH (UA) 5.0 09/05/2020 12:43 AM    Protein 30 (A) 09/05/2020 12:43 AM    Glucose Negative 09/05/2020 12:43 AM    Ketone 15 (A) 09/05/2020 12:43 AM    Bilirubin Negative 09/05/2020 12:43 AM    Urobilinogen 1.0 09/05/2020 12:43 AM    Nitrites Negative 09/05/2020 12:43 AM    Leukocyte Esterase MODERATE (A) 09/05/2020 12:43 AM    Epithelial cells FEW 09/05/2020 12:43 AM    Bacteria Negative 09/05/2020 12:43 AM    WBC 5-10 09/05/2020 12:43 AM    RBC 0-5 09/05/2020 12:43 AM         Medications Reviewed:     Current Facility-Administered Medications   Medication Dose Route Frequency    sodium chloride (NS) flush 5-40 mL  5-40 mL IntraVENous Q8H    sodium chloride (NS) flush 5-40 mL  5-40 mL IntraVENous PRN    acetaminophen (TYLENOL) tablet 650 mg  650 mg Oral Q6H PRN    Or    acetaminophen (TYLENOL) suppository 650 mg  650 mg Rectal Q6H PRN    polyethylene glycol (MIRALAX) packet 17 g  17 g Oral DAILY PRN    heparin (porcine) injection 5,000 Units  5,000 Units SubCUTAneous Q8H    atorvastatin (LIPITOR) tablet 80 mg  80 mg Oral QHS    metoprolol tartrate (LOPRESSOR) tablet 25 mg  25 mg Oral BID    potassium chloride SR (KLOR-CON 10) tablet 10 mEq  10 mEq Oral DAILY     ______________________________________________________________________  EXPECTED LENGTH OF STAY: - - -  ACTUAL LENGTH OF STAY:          1                 Dk Pace MD

## 2020-09-06 NOTE — CONSULTS
Consult Note      Assessment:    Patient Active Problem List   Diagnosis Code    Hypertension I10    Fatigue R53.83    Fall W19. XXXA    Elevated troponin R79.89    Hypokalemia E87.6       Recommendations:    Echocardiogram, ordered and pending. Her recent fatigue and collapse may have been due to low potassium and relatively low BP, now off amlodipine and HCTZ, receiving KCl, K still low. Her ventricular ectopy is resolving and she is starting to feel stronger. Troponin elevation is minimal and without variation, she has no chest pain, would only suggest stress test if echo is abnormal.  Jesse Garcia MD  [phone]  Stephen Amos is a 79 y.o. female who presented 9/4/2020 with complaints of dizziness and weakness, collapse from bed to floor. The symptoms began approximately 2 weeks ago. She has no history of cardiac disease including CAD, MI, CHF, atrial fib and arrhythmias/ectopy. Examination by the attending physician suggested the possibility of CAD. At present the patient has slightly improved since initial presentation. Therapy thus far has included medication for heart rate control and potassium replacement. Cardiology has been consulted to assist in the management of this patient.     Current Facility-Administered Medications   Medication Dose Route Frequency    sodium chloride (NS) flush 5-40 mL  5-40 mL IntraVENous Q8H    sodium chloride (NS) flush 5-40 mL  5-40 mL IntraVENous PRN    acetaminophen (TYLENOL) tablet 650 mg  650 mg Oral Q6H PRN    Or    acetaminophen (TYLENOL) suppository 650 mg  650 mg Rectal Q6H PRN    polyethylene glycol (MIRALAX) packet 17 g  17 g Oral DAILY PRN    heparin (porcine) injection 5,000 Units  5,000 Units SubCUTAneous Q8H    atorvastatin (LIPITOR) tablet 80 mg  80 mg Oral QHS    metoprolol tartrate (LOPRESSOR) tablet 25 mg  25 mg Oral BID    potassium chloride SR (KLOR-CON 10) tablet 10 mEq  10 mEq Oral DAILY     Past Medical History: Diagnosis Date    History of mammogram 2010    normal    Hypertension     Pap smear for cervical cancer screening 2011    normal     Patient Active Problem List   Diagnosis Code    Hypertension I10    Fatigue R53.83    Fall W19. Satira  Elevated troponin R79.89    Hypokalemia E87.6     Allergies   Allergen Reactions    Codeine Itching and Swelling     Social History     Tobacco Use    Smoking status: Never Smoker    Smokeless tobacco: Never Used   Substance Use Topics    Alcohol use: Yes     Comment: occasional    Drug use: No     History reviewed. No pertinent family history. Review of Symptoms:  A comprehensive review of systems was negative except for that written in the HPI. Objective:      Visit Vitals  /82 (BP 1 Location: Right arm, BP Patient Position: At rest)   Pulse 67   Temp 97.7 °F (36.5 °C)   Resp 13   Ht 5' 6\" (1.676 m)   SpO2 96%   BMI 32.28 kg/m²      Physical Exam    Visit Vitals  /82 (BP 1 Location: Right arm, BP Patient Position: At rest)   Pulse 67   Temp 97.7 °F (36.5 °C)   Resp 13   Ht 5' 6\" (1.676 m)   SpO2 96%   BMI 32.28 kg/m²     General Appearance:  Well developed, well nourished,alert and oriented x 3,  individual in no acute distress. Ears/Nose/Mouth/Throat:   Hearing grossly normal.         Neck: Supple. Chest:   Lungs clear to auscultation bilaterally. Cardiovascular:  Regular rate and rhythm, S1, S2 normal, no murmur. Abdomen:   Soft, non-tender, bowel sounds are active. Extremities: No edema bilaterally. Skin: Warm and dry.                Cardiographics    Telemetry: normal sinus rhythm  ECG: normal sinus rhythm  Echocardiogram: Not done    Labs:   Recent Results (from the past 24 hour(s))   CBC W/O DIFF    Collection Time: 09/06/20  5:10 AM   Result Value Ref Range    WBC 4.5 3.6 - 11.0 K/uL    RBC 4.01 3.80 - 5.20 M/uL    HGB 11.1 (L) 11.5 - 16.0 g/dL    HCT 34.5 (L) 35.0 - 47.0 %    MCV 86.0 80.0 - 99.0 FL    MCH 27.7 26.0 - 34.0 PG MCHC 32.2 30.0 - 36.5 g/dL    RDW 14.2 11.5 - 14.5 %    PLATELET 425 829 - 373 K/uL    MPV 9.8 8.9 - 12.9 FL    NRBC 0.0 0  WBC    ABSOLUTE NRBC 0.00 0.00 - 3.32 K/uL   METABOLIC PANEL, BASIC    Collection Time: 09/06/20  5:10 AM   Result Value Ref Range    Sodium 141 136 - 145 mmol/L    Potassium 3.3 (L) 3.5 - 5.1 mmol/L    Chloride 108 97 - 108 mmol/L    CO2 28 21 - 32 mmol/L    Anion gap 5 5 - 15 mmol/L    Glucose 94 65 - 100 mg/dL    BUN 22 (H) 6 - 20 MG/DL    Creatinine 1.21 (H) 0.55 - 1.02 MG/DL    BUN/Creatinine ratio 18 12 - 20      GFR est AA 53 (L) >60 ml/min/1.73m2    GFR est non-AA 44 (L) >60 ml/min/1.73m2    Calcium 8.7 8.5 - 10.1 MG/DL   TROPONIN I    Collection Time: 09/06/20  5:10 AM   Result Value Ref Range    Troponin-I, Qt. 0.17 (H) <0.05 ng/mL       Jan Reid MD

## 2020-09-06 NOTE — PROGRESS NOTES
Met with Patient in her room who is politely deferring PT evaluation at this time. She reports that her \"x-ray is tomorrow\", but she has also been up in her room moving around? Patient lives alone in a two level house. Apparently she slid from bed and has been experiencing weakness for the past two weeks. Cardiology saw her this morning. Will follow up for evaluation tomorrow as appropriate. Thanks.     Livia Zaldivar PT, DPT  Geriatric Clinical Specialist

## 2020-09-07 ENCOUNTER — APPOINTMENT (OUTPATIENT)
Dept: GENERAL RADIOLOGY | Age: 70
End: 2020-09-07
Attending: HOSPITALIST
Payer: MEDICARE

## 2020-09-07 ENCOUNTER — APPOINTMENT (OUTPATIENT)
Dept: NON INVASIVE DIAGNOSTICS | Age: 70
End: 2020-09-07
Attending: SPECIALIST
Payer: MEDICARE

## 2020-09-07 LAB
ANION GAP SERPL CALC-SCNC: 4 MMOL/L (ref 5–15)
BUN SERPL-MCNC: 12 MG/DL (ref 6–20)
BUN/CREAT SERPL: 12 (ref 12–20)
CALCIUM SERPL-MCNC: 9 MG/DL (ref 8.5–10.1)
CHLORIDE SERPL-SCNC: 109 MMOL/L (ref 97–108)
CO2 SERPL-SCNC: 29 MMOL/L (ref 21–32)
CREAT SERPL-MCNC: 1.01 MG/DL (ref 0.55–1.02)
ECHO AO ROOT DIAM: 2.89 CM
ECHO AV AREA PEAK VELOCITY: 1.57 CM2
ECHO AV PEAK GRADIENT: 10.68 MMHG
ECHO AV PEAK VELOCITY: 163.39 CM/S
ECHO EST RA PRESSURE: 3 MMHG
ECHO LA MAJOR AXIS: 3.11 CM
ECHO LV INTERNAL DIMENSION DIASTOLIC: 4.12 CM (ref 3.9–5.3)
ECHO LV INTERNAL DIMENSION SYSTOLIC: 2.47 CM
ECHO LV IVSD: 0.96 CM (ref 0.6–0.9)
ECHO LV MASS 2D: 141.7 G (ref 67–162)
ECHO LV POSTERIOR WALL DIASTOLIC: 1.13 CM (ref 0.6–0.9)
ECHO LVOT DIAM: 1.93 CM
ECHO LVOT PEAK GRADIENT: 3.04 MMHG
ECHO LVOT PEAK VELOCITY: 87.17 CM/S
ECHO MV A VELOCITY: 94.05 CM/S
ECHO MV AREA PHT: 3.36 CM2
ECHO MV E DECELERATION TIME (DT): 0.23 S
ECHO MV E VELOCITY: 74.34 CM/S
ECHO MV E/A RATIO: 0.79
ECHO MV PRESSURE HALF TIME (PHT): 0.07 S
ECHO PV PEAK INSTANTANEOUS GRADIENT SYSTOLIC: 4.68 MMHG
ECHO RIGHT VENTRICULAR SYSTOLIC PRESSURE (RVSP): 25.3 MMHG
ECHO RV INTERNAL DIMENSION: 1.84 CM
ECHO RV TAPSE: 2.28 CM (ref 1.5–2)
ECHO TV REGURGITANT MAX VELOCITY: 236.1 CM/S
ECHO TV REGURGITANT PEAK GRADIENT: 22.3 MMHG
GLUCOSE SERPL-MCNC: 92 MG/DL (ref 65–100)
POTASSIUM SERPL-SCNC: 3.8 MMOL/L (ref 3.5–5.1)
SODIUM SERPL-SCNC: 142 MMOL/L (ref 136–145)

## 2020-09-07 PROCEDURE — 74011250636 HC RX REV CODE- 250/636: Performed by: FAMILY MEDICINE

## 2020-09-07 PROCEDURE — 96372 THER/PROPH/DIAG INJ SC/IM: CPT

## 2020-09-07 PROCEDURE — 99226 PR SBSQ OBSERVATION CARE/DAY 35 MINUTES: CPT | Performed by: INTERNAL MEDICINE

## 2020-09-07 PROCEDURE — 73521 X-RAY EXAM HIPS BI 2 VIEWS: CPT

## 2020-09-07 PROCEDURE — 93306 TTE W/DOPPLER COMPLETE: CPT

## 2020-09-07 PROCEDURE — 74011250637 HC RX REV CODE- 250/637: Performed by: FAMILY MEDICINE

## 2020-09-07 PROCEDURE — 97535 SELF CARE MNGMENT TRAINING: CPT

## 2020-09-07 PROCEDURE — 99218 HC RM OBSERVATION: CPT

## 2020-09-07 PROCEDURE — 97165 OT EVAL LOW COMPLEX 30 MIN: CPT

## 2020-09-07 PROCEDURE — 80048 BASIC METABOLIC PNL TOTAL CA: CPT

## 2020-09-07 PROCEDURE — 74011250637 HC RX REV CODE- 250/637: Performed by: SPECIALIST

## 2020-09-07 PROCEDURE — 36415 COLL VENOUS BLD VENIPUNCTURE: CPT

## 2020-09-07 RX ADMIN — HEPARIN SODIUM 5000 UNITS: 5000 INJECTION INTRAVENOUS; SUBCUTANEOUS at 06:42

## 2020-09-07 RX ADMIN — POLYETHYLENE GLYCOL 3350 17 G: 17 POWDER, FOR SOLUTION ORAL at 21:17

## 2020-09-07 RX ADMIN — POTASSIUM CHLORIDE 10 MEQ: 750 TABLET, FILM COATED, EXTENDED RELEASE ORAL at 08:47

## 2020-09-07 RX ADMIN — HEPARIN SODIUM 5000 UNITS: 5000 INJECTION INTRAVENOUS; SUBCUTANEOUS at 21:16

## 2020-09-07 RX ADMIN — HEPARIN SODIUM 5000 UNITS: 5000 INJECTION INTRAVENOUS; SUBCUTANEOUS at 13:11

## 2020-09-07 RX ADMIN — Medication 10 ML: at 21:16

## 2020-09-07 RX ADMIN — Medication 10 ML: at 06:42

## 2020-09-07 RX ADMIN — Medication 10 ML: at 13:11

## 2020-09-07 NOTE — PROGRESS NOTES
visit for routine follow up and Advance Directive (AMD) consult. Patient reclining in bed, resting. Good eye contact, friendly. Says she is feeling a little better today. Provided spiritual presence and listening as she spoke of her present thoughts, feelings, and concerns. Spoek of her health and events leading to this hospitalization. Says things are going well so far. Is awaiting an echocardiogram and says she is a little nervous about that test, but is hopeful that she will be okay. Spoke of her elbert and described an active elbert and trust in God. Says she has already been praying today for her health and recovery and declined prayer during this visit. Patient requested Advance Directive (AMD) consult. Patient wished to complete AMD and a form was started but the patient does not have all of the contact information necessary to complete the form. Says her grandchildren are supposed to be visiting here later today and she would get the information from them at that time. Informed patient of availability of  and asked her to have her nurse contact the  should she wish to complete AMD during this hospitalization and she agreed. Will continue to follow up as needed and upon request as able. Visited by Rev. Deni Ramos MDiv, [facility]   paging service: [phone])

## 2020-09-07 NOTE — PROGRESS NOTES
Bedside and Verbal shift change report given to [facility] (oncoming nurse) by Jordan Mallory (offgoing nurse). Report included the following information SBAR, Kardex, Intake/Output, MAR, Recent Results, Cardiac Rhythm NSR and Alarm Parameters .

## 2020-09-07 NOTE — PROGRESS NOTES
Problem: Falls - Risk of  Goal: *Absence of Falls  Description: Document Gail Bradley Fall Risk and appropriate interventions in the flowsheet. Outcome: Progressing Towards Goal  Note: Fall Risk Interventions:  Mobility Interventions: Communicate number of staff needed for ambulation/transfer, Patient to call before getting OOB, PT Consult for mobility concerns         Medication Interventions: Evaluate medications/consider consulting pharmacy, Patient to call before getting OOB, Teach patient to arise slowly    Elimination Interventions: Call light in reach, Toileting schedule/hourly rounds, Patient to call for help with toileting needs    History of Falls Interventions: Door open when patient unattended, Bed/chair exit alarm         Problem: Patient Education: Go to Patient Education Activity  Goal: Patient/Family Education  Outcome: Progressing Towards Goal     Problem: Pressure Injury - Risk of  Goal: *Prevention of pressure injury  Description: Document Bin Scale and appropriate interventions in the flowsheet. Outcome: Progressing Towards Goal  Note: Pressure Injury Interventions:  Sensory Interventions: Avoid rigorous massage over bony prominences, Discuss PT/OT consult with provider, Maintain/enhance activity level, Keep linens dry and wrinkle-free, Monitor skin under medical devices, Turn and reposition approx. every two hours (pillows and wedges if needed), Pressure redistribution bed/mattress (bed type)    Moisture Interventions: Check for incontinence Q2 hours and as needed, Internal/External urinary devices, Limit adult briefs, Maintain skin hydration (lotion/cream)    Activity Interventions: Increase time out of bed, Pressure redistribution bed/mattress(bed type), PT/OT evaluation    Mobility Interventions: HOB 30 degrees or less, Pressure redistribution bed/mattress (bed type), PT/OT evaluation, Turn and reposition approx.  every two hours(pillow and wedges)    Nutrition Interventions: Document food/fluid/supplement intake, Discuss nutritional consult with provider    Friction and Shear Interventions: Lift sheet, Transferring/repositioning devices

## 2020-09-07 NOTE — PROGRESS NOTES
[facility]  Hospitalist Group                                                                                          Hospitalist Progress Note  Jamey Castañeda MD  Answering service: [phone] OR 36 from in house phone        Date of Service:  2020  NAME:  Jb Colindres  :  1950  MRN:  [id number]      Admission Summary:   History of present illness (copied from H&P)  \" Karina Elias a 79 y.o. female with past medical history of hypertension presented to the ED from home s/p ground level fall with chief complaint of generalized fatigue and weakness.  Symptom onset reportedly began ~ 2 weeks ago, has been progressively worsening without specific aggravating or alleviating factors.  Tonight, while trying to mobilize out of bed, she reportedly slipped on the floor.  There was no reports head/ neck trauma or loss of consciousness.  There were no reports of new onset slurred speech, facial droop, syncope, loss of consciousness, headache, neck pain, visual disturbance, numbness, paresthesias, focal weakness, chest pain, shortness of breath, palpitations, abdominal pain, nausea, vomiting, diarrhea, melena, dysuria, hematuria, incontinence, calf pain, increased leg swelling/ edema, fever, chills, rash, or known exposure to sick persons or COVID 19 .  On arrival in the ED, workup included labs showing elevated troponin = 0.18.  BUN = 23.  Creatinine = 1.56 (compared to 1.17 on 2019).  AST = 50.   12 lead EKG showed sinus tachycardia, frequent PVCs, LAD, RBBB at 81 bpm.  Patient was given Aspirin 324 mg po x 1 dose.  Patient is now seen for admission to the hospitalist service for continued evaluation and treatments     Interval history / Subjective:   Patient seen and examined. States that she is feeling better today,working with OT. Assessment & Plan:     # Dizziness/generliazed weakness-improved  -? Related to lower BP while on antihypertensives at home.  UA wnl  -CXR wnl,TSH nl  -BP stable, no orthostatic hypotension. #Elevated troponin  -echo-normal EF,cardiologist following-plan for stress test tomorrow      #History of SVT:  -she has no h/o atrial fib. -on metoprolol    #HTN:  -BP slightly higher side, on metoprolol now,adjust meds based on further BP      Pt/OT eval.        Code status: full  DVT prophylaxis:hepairn    Care Plan discussed with: patient,nurse  Anticipated Disposition:home  Anticipated Discharge:TBD     Hospital Problems  Date Reviewed: 9/6/2020          Codes Class Noted POA    Hypokalemia ICD-10-CM: E87.6  ICD-9-CM: 276.8  9/6/2020 Unknown        Fatigue ICD-10-CM: R53.83  ICD-9-CM: 780.79  9/5/2020 Unknown        Fall ICD-10-CM: Tristan Cline. Sharron Cranker  ICD-9-CM: E888.9  9/5/2020 Unknown        Elevated troponin ICD-10-CM: R79.89  ICD-9-CM: 790.6  9/5/2020 Unknown                Review of Systems:   As per HPI      Vital Signs:    Last 24hrs VS reviewed since prior progress note. Most recent are:  Visit Vitals  /77   Pulse 63   Temp 98.2 °F (36.8 °C)   Resp 13   Ht 5' 6\" (1.676 m)   SpO2 98%   BMI 32.28 kg/m²         Intake/Output Summary (Last 24 hours) at 9/7/2020 1836  Last data filed at 9/7/2020 0054  Gross per 24 hour   Intake    Output 400 ml   Net -400 ml        Physical Examination:             Constitutional:  No acute distress   ENT:  Oral mucosa moist, oropharynx benign,EOMI    Resp:  CTA bilaterally. No wheezing. No accessory muscle use   CV:  Regular rhythm, normal rate, S1,S2 wnl    GI:  Soft, non distended, non tender, normoactive bowel sounds    Musculoskeletal:  No LE edema    Neurologic:  Moves all extremities. AAOx3     Psych:  Not anxious nor agitated.        Data Review:    Review and/or order of clinical lab test  Review and/or order of tests in the radiology section of CPT  Review and/or order of tests in the medicine section of CPT      Labs:     Recent Labs     09/06/20  0510 09/05/20  0656   WBC 4.5 5.8   HGB 11.1* 13.0 HCT 34.5* 39.8    186     Recent Labs     09/07/20  0849 09/06/20  0510 09/05/20  0656    141 139   K 3.8 3.3* 3.2*   * 108 103   CO2 29 28 25   BUN 12 22* 21*   CREA 1.01 1.21* 1.24*   GLU 92 94 90   CA 9.0 8.7 9.5   MG  --   --  2.3     Recent Labs     09/05/20  0656 09/04/20  2248   ALT 20 21   AP 78 80   TBILI 0.6 0.6   TP 7.4 8.2   ALB 3.5 3.8   GLOB 3.9 4.4*     Recent Labs     09/05/20  0656   INR 1.1   PTP 11.2*   APTT 28.5      No results for input(s): FE, TIBC, PSAT, FERR in the last 72 hours. No results found for: FOL, RBCF   No results for input(s): PH, PCO2, PO2 in the last 72 hours.   Recent Labs     09/06/20  0510 09/04/20  2248   TROIQ 0.17* 0.18*     Lab Results   Component Value Date/Time    Cholesterol, total 278 (H) 10/01/2014 01:04 PM    HDL Cholesterol 60 10/01/2014 01:04 PM    LDL, calculated 196 (H) 10/01/2014 01:04 PM    Triglyceride 112 10/01/2014 01:04 PM     No results found for: GLUCPOC  Lab Results   Component Value Date/Time    Color DARK YELLOW 09/05/2020 12:43 AM    Appearance CLOUDY (A) 09/05/2020 12:43 AM    Specific gravity 1.029 09/05/2020 12:43 AM    pH (UA) 5.0 09/05/2020 12:43 AM    Protein 30 (A) 09/05/2020 12:43 AM    Glucose Negative 09/05/2020 12:43 AM    Ketone 15 (A) 09/05/2020 12:43 AM    Bilirubin Negative 09/05/2020 12:43 AM    Urobilinogen 1.0 09/05/2020 12:43 AM    Nitrites Negative 09/05/2020 12:43 AM    Leukocyte Esterase MODERATE (A) 09/05/2020 12:43 AM    Epithelial cells FEW 09/05/2020 12:43 AM    Bacteria Negative 09/05/2020 12:43 AM    WBC 5-10 09/05/2020 12:43 AM    RBC 0-5 09/05/2020 12:43 AM         Medications Reviewed:     Current Facility-Administered Medications   Medication Dose Route Frequency    sodium chloride (NS) flush 5-40 mL  5-40 mL IntraVENous Q8H    sodium chloride (NS) flush 5-40 mL  5-40 mL IntraVENous PRN    acetaminophen (TYLENOL) tablet 650 mg  650 mg Oral Q6H PRN    Or    acetaminophen (TYLENOL) suppository 650 mg  650 mg Rectal Q6H PRN    polyethylene glycol (MIRALAX) packet 17 g  17 g Oral DAILY PRN    heparin (porcine) injection 5,000 Units  5,000 Units SubCUTAneous Q8H    atorvastatin (LIPITOR) tablet 80 mg  80 mg Oral QHS    metoprolol tartrate (LOPRESSOR) tablet 25 mg  25 mg Oral BID    potassium chloride SR (KLOR-CON 10) tablet 10 mEq  10 mEq Oral DAILY     ______________________________________________________________________  EXPECTED LENGTH OF STAY: - - -  ACTUAL LENGTH OF STAY:          1                 Hannah Bueno MD

## 2020-09-07 NOTE — ACP (ADVANCE CARE PLANNING)
Patient requested Advance Directive (AMD) consult. Patient wished to complete AMD and a form was started but the patient does not have all of the contact information necessary to complete the form. Says her grandchildren are supposed to be visiting here later today and she would get the information from them at that time. Informed patient of availability of  and asked her to have her nurse contact the  should she wish to complete AMD during this hospitalization and she agreed. Will continue to follow up as needed and upon request as able. Visited by Rev. Hayley Khan MDiv, [facility], [facility]   paging service: [phone])

## 2020-09-07 NOTE — PROGRESS NOTES
Cardiology Progress Note      Assessment:    Patient Active Problem List   Diagnosis Code    Hypertension I10    Fatigue R53.83    Fall W19. XXXA    Elevated troponin R79.89    Hypokalemia E87.6       Recommendations:   EKG during current admission shows NSR RBBB, but nothing prior for comparison. Ventricular ectopy is resolving, starting to feel stronger; no trouble when up out of bed ambulating. Denies lightheadedness, chest pain, or palpitations. Troponin elevation was minimal, would suggest stress test over cardiac cath    Dre Smith NP    Addendum from EP/Cardiology attending:   I have seen, examined patient, and discussed with nurse practitioner, registered nurse, reviewed, updated note and agree with the assessment and plan    I have talked to her this am. She is feeling fine, no concerns  She is not short of breaths and did not have syncope per her  Vital signs are stable  Exam shows regular rhythm and no rub  Assessment and Plan:  Echo with normal LVEF   I recommend nuclear stress test tomorrow am after Labor Day    Zain Tinsley is a 79 y.o. female who presented 9/4/2020 with complaints of dizziness and weakness, collapse from bed to floor. The symptoms began approximately 2 weeks ago. She has no history of cardiac disease including CAD, MI, CHF, atrial fib and arrhythmias/ectopy. Examination by the attending physician suggested the possibility of CAD. At present, the patient has  improved since initial presentation. Therapy thus far has included medication for heart rate control and potassium replacement. Cardiology has been consulted to assist in the management of this patient.     Current Facility-Administered Medications   Medication Dose Route Frequency    sodium chloride (NS) flush 5-40 mL  5-40 mL IntraVENous Q8H    sodium chloride (NS) flush 5-40 mL  5-40 mL IntraVENous PRN    acetaminophen (TYLENOL) tablet 650 mg  650 mg Oral Q6H PRN    Or    acetaminophen (TYLENOL) suppository 650 mg  650 mg Rectal Q6H PRN    polyethylene glycol (MIRALAX) packet 17 g  17 g Oral DAILY PRN    heparin (porcine) injection 5,000 Units  5,000 Units SubCUTAneous Q8H    atorvastatin (LIPITOR) tablet 80 mg  80 mg Oral QHS    metoprolol tartrate (LOPRESSOR) tablet 25 mg  25 mg Oral BID    potassium chloride SR (KLOR-CON 10) tablet 10 mEq  10 mEq Oral DAILY     Past Medical History:   Diagnosis Date    History of mammogram 2010    normal    Hypertension     Pap smear for cervical cancer screening 2011    normal     Patient Active Problem List   Diagnosis Code    Hypertension I10    Fatigue R53.83    Fall W19. Jhonatan Hemp Elevated troponin R79.89    Hypokalemia E87.6     Allergies   Allergen Reactions    Codeine Itching and Swelling     Social History     Tobacco Use    Smoking status: Never Smoker    Smokeless tobacco: Never Used   Substance Use Topics    Alcohol use: Yes     Comment: occasional    Drug use: No     History reviewed. No pertinent family history. Review of Symptoms:  A comprehensive review of systems was negative except for that written in the HPI. Objective:      Physical Exam    Visit Vitals  /79 (BP 1 Location: Left arm, BP Patient Position: At rest)   Pulse 63   Temp 98 °F (36.7 °C)   Resp 14   Ht 5' 6\" (1.676 m)   SpO2 99%   BMI 32.28 kg/m²     Physical Exam:   Constitutional: Well-developed and well-nourished. No respiratory distress. Head: Normocephalic and atraumatic. Eyes: Pupils are equal, round  ENT: Hearing grossly normal  Neck: Supple. No JVD present. Cardiovascular: Normal rate, regular rhythm. Exam reveals no gallop and no friction rub. No murmur heard. Pulmonary/Chest: Effort normal and breath sounds normal. No wheezes. Abdominal: Soft, no tenderness. Musculoskeletal: Moves all extremities independently. Vasc/lymphatic: No edema. Neurological: Alert,oriented. Skin: Skin is warm and dry  Psychiatric: Normal mood and affect.  Behavior is normal. Judgment and thought content normal.        Cardiographics    Telemetry: normal sinus rhythm  ECG: NSR with left axis deviation, RBBB (QRSd 148 ms). Labs:   Recent Results (from the past 24 hour(s))   EKG, 12 LEAD, INITIAL    Collection Time: 09/06/20  1:39 PM   Result Value Ref Range    Ventricular Rate 70 BPM    Atrial Rate 70 BPM    P-R Interval 178 ms    QRS Duration 148 ms    Q-T Interval 424 ms    QTC Calculation (Bezet) 457 ms    Calculated P Axis 47 degrees    Calculated R Axis -41 degrees    Calculated T Axis 1 degrees    Diagnosis       Normal sinus rhythm  Left axis deviation  Right bundle branch block  Inferior infarct , age undetermined  Abnormal ECG  When compared with ECG of 04-SEP-2020 21:57,  premature ventricular complexes are no longer present  Inferior infarct is now present  Confirmed by Binh Young MD ([id number]) on 9/6/2020 4:64:03 PM     METABOLIC PANEL, BASIC    Collection Time: 09/07/20  8:49 AM   Result Value Ref Range    Sodium 142 136 - 145 mmol/L    Potassium 3.8 3.5 - 5.1 mmol/L    Chloride 109 (H) 97 - 108 mmol/L    CO2 29 21 - 32 mmol/L    Anion gap 4 (L) 5 - 15 mmol/L    Glucose 92 65 - 100 mg/dL    BUN 12 6 - 20 MG/DL    Creatinine 1.01 0.55 - 1.02 MG/DL    BUN/Creatinine ratio 12 12 - 20      GFR est AA >60 >60 ml/min/1.73m2    GFR est non-AA 54 (L) >60 ml/min/1.73m2    Calcium 9.0 8.5 - 10.1 MG/DL       ISIDORO Dave M.D.   Electrophysiology/Cardiology  [facility]  [address] , [facility] [address]  [address]  [phone]

## 2020-09-07 NOTE — PROGRESS NOTES
Problem: Self Care Deficits Care Plan (Adult)  Goal: *Acute Goals and Plan of Care (Insert Text)  Description:   FUNCTIONAL STATUS PRIOR TO ADMISSION: lives alone; I PTA, apt, no DME    HOME SUPPORT: None, atif    Occupational Therapy Goals  Initiated 9/7/2020  1. Patient will perform grooming in unsupported sitting with supervision/set-up within 7 day(s). 2.  Patient will perform upper body dressing with supervision/set-up within 7 day(s). 3.  Patient will perform lower body dressing with moderate assistance  within 7 day(s). 4.  Patient will perform toilet transfers with moderate assistance  within 7 day(s). 5.  Patient will perform all aspects of toileting with moderate assistance  within 7 day(s). 6.  Patient will participate in upper extremity therapeutic exercise/activities with supervision/set-up for 5 minutes within 7 day(s). 7.  Patient will utilize energy conservation, fall prevention, pacing, pain management techniques during functional activities with verbal, visual, and tactile cues within 7 day(s). 8.  Patient will participate in cog screen in 3 days to assist with discharge planning           Outcome: Progressing Towards Goal   OCCUPATIONAL THERAPY EVALUATION  Patient: Mariah Johnson (89 y.o. female)  Date: 9/7/2020  Primary Diagnosis: Elevated troponin [R79.89]  Fatigue [R53.83]  Fall [W19. XXXA]  Elevated troponin [R79.89]        Precautions:   Fall; decreased sitting balance, decreased sit to stand    ASSESSMENT  Based on the objective data described below, the patient presents with fall near her bed 9-4 with sensation of \"weakness. \" Unable to rise from floor. OT magno completed this date at MD request as she needs orthostatic BP checks for BP med management. Patient not c/o of any pelvic region pain with bed mobility, therefore magno proceeded with caution.  (note 9-5 note from [address] states plan for pelvic x-ray not yet ordered; nsg aware) See flow sheet for full numbers; BP did not drop; actually went up with each position/work level. Nsg aware. No pain reported during any mobility or ADL task. In desperate need of podiatrist; high risk for skin wounds from curling lengthy nails    Current Level of Function Impacting Discharge (ADLs/self-care): S-Min/mod A UE ADLs limited by decreased dynamic sitting balance with loss of balance back and too R; able to correct to midline with vc, then drifts immediately back and to R; difficult to say if true balance deficit vs fatigue/weakness. Bed mobility and supine to sit very difficult, mod/max A and unable to scoot to edge of bed, max A x 2 persons to get feet on floor. Once in standing, with bed raised technique, able to maintain standing with RW, min A x 2 for 2 minutes, poor ability to side step to R. Cognition needs further assessment as presentation is unusual, to which nsg concurs. Max A LE dressing    Functional Outcome Measure: The patient scored Total: 20/100 on the Barthel Index outcome measure which is indicative of 89% impaired ability to care for basic self needs/dependency on others; inferred 100% dependency on others for instrumental ADLs. Other factors to consider for discharge: lives alone     Patient will benefit from skilled therapy intervention to address the above noted impairments. PLAN :  Recommendations and Planned Interventions: self care training, functional mobility training, therapeutic exercise, balance training, therapeutic activities, cognitive retraining, endurance activities, neuromuscular re-education, patient education, home safety training, and family training/education    Frequency/Duration: Patient will be followed by occupational therapy 5 times a week to address goals.     Recommendation for discharge: (in order for the patient to meet his/her long term goals)  Therapy 3 hours per day 5-7 days per week    This discharge recommendation:  A follow-up discussion with the attending provider and/or case management is planned    IF patient discharges home will need the following DME: AE: long handled bathing, AE: long handled dressing, bedside commode, transfer bench, walker: rolling, and wheelchair       SUBJECTIVE:   Patient stated My nails got too tough; I couldn't cut them anymore.     OBJECTIVE DATA SUMMARY:   HISTORY:   Past Medical History:   Diagnosis Date    History of mammogram 2010    normal    Hypertension     Pap smear for cervical cancer screening 2011    normal     Past Surgical History:   Procedure Laterality Date    HX GYN      hystterectomy       Expanded or extensive additional review of patient history:     Home Situation  Home Environment: Apartment  # Steps to Enter: 3  Rails to Enter: Yes  Hand Rails : Right  One/Two Story Residence: Two story  Interior Rails: Right  Living Alone: Yes  Support Systems: Family member(s)(live nearby)  Patient Expects to be Discharged to[de-identified] Apartment  Current DME Used/Available at Home: None  Tub or Shower Type: Tub/Shower combination    Hand dominance: Right    EXAMINATION OF PERFORMANCE DEFICITS:  Cognitive/Behavioral Status:  Neurologic State: Alert  Orientation Level: Oriented X4;Appropriate for age  Cognition: Follows commands;Decreased attention/concentration(recommend MoCA next tx as she lives alone)  Perception: Appears intact  Perseveration: No perseveration noted  Safety/Judgement: Fall prevention; Awareness of environment    Skin: see nsg notes, dry, scaly, terrible toenails    Edema: + B LE    Hearing:   Auditory  Auditory Impairment: None    Vision/Perceptual:                           Acuity: (WFL)    Corrective Lenses: Reading glasses    Range of Motion:  B UE  AROM: Generally decreased, functional but poorly engaged this range during ADLs  PROM: Generally decreased, functional(L shoulder with \"Old arthritis pain when it rains\")                      Strength:  B UE  Strength: Generally decreased, functional(B UE) but   poorly engaged strength of UE and trunk and LE making ADLs and functional mobility very difficult                Coordination:  Coordination: Generally decreased, functional  Fine Motor Skills-Upper: Left Intact; Right Intact    Gross Motor Skills-Upper: Left Intact; Right Intact    Tone & Sensation:    Tone: Normal  Sensation: Impaired(all toenails with excessive hypertrophy)                      Balance:  Sitting: Impaired  Sitting - Static: Fair (occasional)(loss of balance back and to R)  Sitting - Dynamic: Fair (occasional)  Standing: Impaired; With support  Standing - Static: Constant support; Fair  Standing - Dynamic : Constant support;Poor(difficult trying to side step; no P! reported)    Functional Mobility and Transfers for ADLs:  Bed Mobility:  Rolling: Moderate assistance  Supine to Sit: Moderate assistance;Maximum assistance; Additional time; Adaptive equipment  Sit to Supine: Minimum assistance; Adaptive equipment; Additional time  Scooting: Maximum assistance;Assist x2; Additional time; Adaptive equipment    Transfers:  Sit to Stand: Adaptive equipment; Moderate assistance;Maximum assistance;Assist x2; Additional time(bed had to be raised)  Stand to Sit: Minimum assistance  Bed to Chair: Total assistance(not safe)  Toilet Transfer : Total assistance(not safe)    ADL Assessment:  Feeding: Modified independent    Oral Facial Hygiene/Grooming: Setup;Minimum assistance(limited by impaired sitting balance)    Bathing: Maximum assistance    Upper Body Dressing: Moderate assistance; Additional time(limited by dynamic sitting balance)    Lower Body Dressing: Moderate assistance;Maximum assistance; Additional time; Adaptive equipment(able to remove socks, not able to don, or to stand)    Toileting:  Total assistance                ADL Intervention and task modifications:     Patient instructed and indicated understanding the benefits of maintaining activity tolerance, functional mobility, and independence with self care tasks during acute stay  to ensure safe return home and to baseline. Encouraged patient to increase frequency and duration OOB to chair with lift team PRN, bed in chair position for all meals if not in chair, perform daily ADLs (as approved by RN/MD regarding grooming and bathing. Advised patient purpose and goals of therapy as she was quite anxious regarding what MD ordered therapy and what was point. Able to use call bell. Reports full I PTA, lived alone all of her adult life; Initiated training of fall prevention, energy conservation, home safety; BE FAST education NOT initiated despite decreased balance due to anxiety level of patient. Cognitive Retraining  Safety/Judgement: Fall prevention; Awareness of environment    Functional Measure:  Barthel Index:    Bathin  Bladder: 5  Bowels: 5  Groomin  Dressin  Feedin  Mobility: 0  Stairs: 0  Toilet Use: 0  Transfer (Bed to Chair and Back): 5  Total: 20/100        The Barthel ADL Index: Guidelines  1. The index should be used as a record of what a patient does, not as a record of what a patient could do. 2. The main aim is to establish degree of independence from any help, physical or verbal, however minor and for whatever reason. 3. The need for supervision renders the patient not independent. 4. A patient's performance should be established using the best available evidence. Asking the patient, friends/relatives and nurses are the usual sources, but direct observation and common sense are also important. However direct testing is not needed. 5. Usually the patient's performance over the preceding 24-48 hours is important, but occasionally longer periods will be relevant. 6. Middle categories imply that the patient supplies over 50 per cent of the effort. 7. Use of aids to be independent is allowed. Danna Chance., Barthel, D.W. ([id number]). Functional evaluation: the Barthel Index. [facility] (14)2.   NIKOLAY Krause, Sona Oquendo., Haresh Reid., Norman Montauge. (1999). Measuring the change indisability after inpatient rehabilitation; comparison of the responsiveness of the Barthel Index and Functional [place] Measure. Journal of Neurology, Neurosurgery, and Psychiatry, 66(4), 541-773. INDY Caballero, ALONZO Dunne, & Terence Montana M.A. (2004.) Assessment of post-stroke quality of life in cost-effectiveness studies: The usefulness of the Barthel Index and the EuroQoL-5D. Quality of Life Research, 15, 345-90         Occupational Therapy Evaluation Charge Determination   History Examination Decision-Making   LOW Complexity : Brief history review  MEDIUM Complexity : 3-5 performance deficits relating to physical, cognitive , or psychosocial skils that result in activity limitations and / or participation restrictions MEDIUM Complexity : Patient may present with comorbidities that affect occupational performnce. Miniml to moderate modification of tasks or assistance (eg, physical or verbal ) with assesment(s) is necessary to enable patient to complete evaluation       Based on the above components, the patient evaluation is determined to be of the following complexity level: LOW   Pain Rating:  No pain reported    Activity Tolerance:   Poor, SpO2 stable on RA, and requires frequent rest breaks  Please refer to the flowsheet for vital signs taken during this treatment. After treatment patient left in no apparent distress:    Supine in bed, Call bell within reach, and Side rails x 3    COMMUNICATION/EDUCATION:   The patients plan of care was discussed with: Physical therapist, Registered nurse, and Physician. Home safety education was provided and the patient/caregiver indicated understanding., Patient/family have participated as able in goal setting and plan of care. , and Patient/family agree to work toward stated goals and plan of care. This patients plan of care is appropriate for delegation to [facility] [place].     Thank you for this referral.  Nalani Kayser OTR/L  Time Calculation: 58 mins

## 2020-09-07 NOTE — PROGRESS NOTES
Chart checked,  And per physician note, plan of care includes the ordering of a pelvis xray to rule out acute fracture (pt s/p a fall). Unable to locate the order or results of a pelvic x ray. PT will follow and see for eval as appropriate.  Thank you, Marleny Cagle, PT

## 2020-09-08 ENCOUNTER — APPOINTMENT (OUTPATIENT)
Dept: NON INVASIVE DIAGNOSTICS | Age: 70
End: 2020-09-08
Attending: INTERNAL MEDICINE
Payer: MEDICARE

## 2020-09-08 ENCOUNTER — APPOINTMENT (OUTPATIENT)
Dept: NUCLEAR MEDICINE | Age: 70
End: 2020-09-08
Attending: INTERNAL MEDICINE
Payer: MEDICARE

## 2020-09-08 LAB
ALBUMIN SERPL-MCNC: 3.2 G/DL (ref 3.5–5)
ANION GAP SERPL CALC-SCNC: 7 MMOL/L (ref 5–15)
BASOPHILS # BLD: 0 K/UL (ref 0–0.1)
BASOPHILS NFR BLD: 1 % (ref 0–1)
BUN SERPL-MCNC: 9 MG/DL (ref 6–20)
BUN/CREAT SERPL: 10 (ref 12–20)
CALCIUM SERPL-MCNC: 9.1 MG/DL (ref 8.5–10.1)
CHLORIDE SERPL-SCNC: 106 MMOL/L (ref 97–108)
CO2 SERPL-SCNC: 28 MMOL/L (ref 21–32)
CREAT SERPL-MCNC: 0.9 MG/DL (ref 0.55–1.02)
DIFFERENTIAL METHOD BLD: NORMAL
EOSINOPHIL # BLD: 0.3 K/UL (ref 0–0.4)
EOSINOPHIL NFR BLD: 5 % (ref 0–7)
ERYTHROCYTE [DISTWIDTH] IN BLOOD BY AUTOMATED COUNT: 14.4 % (ref 11.5–14.5)
GLUCOSE SERPL-MCNC: 84 MG/DL (ref 65–100)
HCT VFR BLD AUTO: 41.1 % (ref 35–47)
HGB BLD-MCNC: 13.3 G/DL (ref 11.5–16)
IMM GRANULOCYTES # BLD AUTO: 0 K/UL (ref 0–0.04)
IMM GRANULOCYTES NFR BLD AUTO: 0 % (ref 0–0.5)
LYMPHOCYTES # BLD: 1.5 K/UL (ref 0.8–3.5)
LYMPHOCYTES NFR BLD: 32 % (ref 12–49)
MCH RBC QN AUTO: 27.8 PG (ref 26–34)
MCHC RBC AUTO-ENTMCNC: 32.4 G/DL (ref 30–36.5)
MCV RBC AUTO: 85.8 FL (ref 80–99)
MONOCYTES # BLD: 0.3 K/UL (ref 0–1)
MONOCYTES NFR BLD: 7 % (ref 5–13)
NEUTS SEG # BLD: 2.6 K/UL (ref 1.8–8)
NEUTS SEG NFR BLD: 55 % (ref 32–75)
NRBC # BLD: 0 K/UL (ref 0–0.01)
NRBC BLD-RTO: 0 PER 100 WBC
PHOSPHATE SERPL-MCNC: 2.8 MG/DL (ref 2.6–4.7)
PLATELET # BLD AUTO: 195 K/UL (ref 150–400)
PMV BLD AUTO: 10.2 FL (ref 8.9–12.9)
POTASSIUM SERPL-SCNC: 3.7 MMOL/L (ref 3.5–5.1)
RBC # BLD AUTO: 4.79 M/UL (ref 3.8–5.2)
SODIUM SERPL-SCNC: 141 MMOL/L (ref 136–145)
WBC # BLD AUTO: 4.7 K/UL (ref 3.6–11)

## 2020-09-08 PROCEDURE — 78452 HT MUSCLE IMAGE SPECT MULT: CPT

## 2020-09-08 PROCEDURE — 74011250637 HC RX REV CODE- 250/637: Performed by: HOSPITALIST

## 2020-09-08 PROCEDURE — 97535 SELF CARE MNGMENT TRAINING: CPT

## 2020-09-08 PROCEDURE — 74011250637 HC RX REV CODE- 250/637: Performed by: SPECIALIST

## 2020-09-08 PROCEDURE — 74011250637 HC RX REV CODE- 250/637: Performed by: INTERNAL MEDICINE

## 2020-09-08 PROCEDURE — 97530 THERAPEUTIC ACTIVITIES: CPT

## 2020-09-08 PROCEDURE — 36415 COLL VENOUS BLD VENIPUNCTURE: CPT

## 2020-09-08 PROCEDURE — A9500 TC99M SESTAMIBI: HCPCS

## 2020-09-08 PROCEDURE — 74011250636 HC RX REV CODE- 250/636: Performed by: FAMILY MEDICINE

## 2020-09-08 PROCEDURE — 99218 HC RM OBSERVATION: CPT

## 2020-09-08 PROCEDURE — 74011250636 HC RX REV CODE- 250/636: Performed by: SPECIALIST

## 2020-09-08 PROCEDURE — 97161 PT EVAL LOW COMPLEX 20 MIN: CPT

## 2020-09-08 PROCEDURE — 85025 COMPLETE CBC W/AUTO DIFF WBC: CPT

## 2020-09-08 PROCEDURE — 74011250637 HC RX REV CODE- 250/637: Performed by: FAMILY MEDICINE

## 2020-09-08 PROCEDURE — 96372 THER/PROPH/DIAG INJ SC/IM: CPT

## 2020-09-08 PROCEDURE — 80069 RENAL FUNCTION PANEL: CPT

## 2020-09-08 PROCEDURE — 99232 SBSQ HOSP IP/OBS MODERATE 35: CPT | Performed by: SPECIALIST

## 2020-09-08 RX ORDER — AMLODIPINE BESYLATE 5 MG/1
5 TABLET ORAL DAILY
Status: DISCONTINUED | OUTPATIENT
Start: 2020-09-08 | End: 2020-09-11 | Stop reason: HOSPADM

## 2020-09-08 RX ORDER — SODIUM CHLORIDE 0.9 % (FLUSH) 0.9 %
20 SYRINGE (ML) INJECTION
Status: COMPLETED | OUTPATIENT
Start: 2020-09-08 | End: 2020-09-08

## 2020-09-08 RX ADMIN — POLYETHYLENE GLYCOL 3350 17 G: 17 POWDER, FOR SOLUTION ORAL at 20:19

## 2020-09-08 RX ADMIN — HEPARIN SODIUM 5000 UNITS: 5000 INJECTION INTRAVENOUS; SUBCUTANEOUS at 22:13

## 2020-09-08 RX ADMIN — HEPARIN SODIUM 5000 UNITS: 5000 INJECTION INTRAVENOUS; SUBCUTANEOUS at 16:43

## 2020-09-08 RX ADMIN — Medication 10 ML: at 05:53

## 2020-09-08 RX ADMIN — METOPROLOL TARTRATE 25 MG: 25 TABLET, FILM COATED ORAL at 16:42

## 2020-09-08 RX ADMIN — Medication 10 ML: at 22:13

## 2020-09-08 RX ADMIN — Medication 10 ML: at 16:47

## 2020-09-08 RX ADMIN — AMLODIPINE BESYLATE 5 MG: 5 TABLET ORAL at 22:12

## 2020-09-08 RX ADMIN — Medication 20 ML: at 14:28

## 2020-09-08 RX ADMIN — REGADENOSON 0.4 MG: 0.08 INJECTION, SOLUTION INTRAVENOUS at 14:28

## 2020-09-08 RX ADMIN — HEPARIN SODIUM 5000 UNITS: 5000 INJECTION INTRAVENOUS; SUBCUTANEOUS at 05:53

## 2020-09-08 RX ADMIN — POTASSIUM CHLORIDE 10 MEQ: 750 TABLET, FILM COATED, EXTENDED RELEASE ORAL at 08:47

## 2020-09-08 NOTE — PROGRESS NOTES
[facility] Adult  Hospitalist Group                                                                                          Hospitalist Progress Note  Bobo Painting MD  Answering service: [phone] from in house phone        Date of Service:  2020  NAME:  Krystal Almendarez  :  1950  MRN:  [id number]      Admission Summary:   History of present illness (copied from H&P)  \" Verl Italian a 79 y.o. female with past medical history of hypertension presented to the ED from home s/p ground level fall with chief complaint of generalized fatigue and weakness.  Symptom onset reportedly began ~ 2 weeks ago, has been progressively worsening without specific aggravating or alleviating factors.  Tonight, while trying to mobilize out of bed, she reportedly slipped on the floor.  There was no reports head/ neck trauma or loss of consciousness.  There were no reports of new onset slurred speech, facial droop, syncope, loss of consciousness, headache, neck pain, visual disturbance, numbness, paresthesias, focal weakness, chest pain, shortness of breath, palpitations, abdominal pain, nausea, vomiting, diarrhea, melena, dysuria, hematuria, incontinence, calf pain, increased leg swelling/ edema, fever, chills, rash, or known exposure to sick persons or COVID 19 .  On arrival in the ED, workup included labs showing elevated troponin = 0.18.  BUN = 23.  Creatinine = 1.56 (compared to 1.17 on 2019).  AST = 50.   12 lead EKG showed sinus tachycardia, frequent PVCs, LAD, RBBB at 81 bpm.  Patient was given Aspirin 324 mg po x 1 dose.  Patient is now seen for admission to the hospitalist service for continued evaluation and treatments     Interval history / Subjective:   Patient seen and examined. Denied any chest pain,nausea//vomiting and SOB  States that she wants to go to rehab and build her strength back     Assessment & Plan:     # Dizziness/generliazed weakness-resolved  -?  Related to lower BP while on antihypertensives at home. UA wnl  -CXR wnl,TSH nl  -BP stable, no orthostatic hypotension. #Elevated troponin  -echo-normal EF,cardiologist following-  -Normal stress test      #History of SVT:  -she has no h/o atrial fib. -on metoprolol    #HTN:  -on metoprolol now,add low dose amlodipine. Morbid obesity:  Body mass index is 32.28 kg/m². Pt/OT eval -reviewed note,she needs rehab at discharge        Code status: full  DVT prophylaxis:hepairn    Care Plan discussed with: patient,nurse  Anticipated Disposition:home  Anticipated Discharge:TBD     Hospital Problems  Date Reviewed: 9/6/2020          Codes Class Noted POA    Hypokalemia ICD-10-CM: E87.6  ICD-9-CM: 276.8  9/6/2020 Unknown        Fatigue ICD-10-CM: R53.83  ICD-9-CM: 780.79  9/5/2020 Unknown        Fall ICD-10-CM: Marda Galarza. Sara Spire  ICD-9-CM: E888.9  9/5/2020 Unknown        Elevated troponin ICD-10-CM: R79.89  ICD-9-CM: 790.6  9/5/2020 Unknown                Review of Systems:   As per HPI      Vital Signs:    Last 24hrs VS reviewed since prior progress note. Most recent are:  Visit Vitals  /87   Pulse 69   Temp 97.9 °F (36.6 °C)   Resp 12   Ht 5' 6\" (1.676 m)   Wt 90.7 kg (200 lb)   SpO2 97%   BMI 32.28 kg/m²         Intake/Output Summary (Last 24 hours) at 9/8/2020 1954  Last data filed at 9/8/2020 1642  Gross per 24 hour   Intake 240 ml   Output 1350 ml   Net -1110 ml        Physical Examination:             Constitutional:  No acute distress   ENT:  Oral mucosa moist, oropharynx benign,EOMI    Resp:  CTA bilaterally. No wheezing. No accessory muscle use   CV:  Regular rhythm, normal rate, S1,S2 wnl    GI:  Soft, non distended, non tender, normoactive bowel sounds    Musculoskeletal:  No LE edema    Neurologic:  Moves all extremities. AAOx3     Psych:  Not anxious nor agitated.        Data Review:    Review and/or order of clinical lab test  Review and/or order of tests in the radiology section of CPT  Review and/or order of tests in the medicine section of [facility]      Labs:     Recent Labs     09/08/20  1743 09/06/20  0510   WBC 4.7 4.5   HGB 13.3 11.1*   HCT 41.1 34.5*    173     Recent Labs     09/08/20  1743 09/07/20  0849 09/06/20  0510    142 141   K 3.7 3.8 3.3*    109* 108   CO2 28 29 28   BUN 9 12 22*   CREA 0.90 1.01 1.21*   GLU 84 92 94   CA 9.1 9.0 8.7   PHOS 2.8  --   --      Recent Labs     09/08/20  1743   ALB 3.2*     No results for input(s): INR, PTP, APTT, INREXT, INREXT in the last 72 hours. No results for input(s): FE, TIBC, PSAT, FERR in the last 72 hours. No results found for: FOL, RBCF   No results for input(s): PH, PCO2, PO2 in the last 72 hours.   Recent Labs     09/06/20  0510   TROIQ 0.17*     Lab Results   Component Value Date/Time    Cholesterol, total 278 (H) 10/01/2014 01:04 PM    HDL Cholesterol 60 10/01/2014 01:04 PM    LDL, calculated 196 (H) 10/01/2014 01:04 PM    Triglyceride 112 10/01/2014 01:04 PM     No results found for: GLUCPOC  Lab Results   Component Value Date/Time    Color DARK YELLOW 09/05/2020 12:43 AM    Appearance CLOUDY (A) 09/05/2020 12:43 AM    Specific gravity 1.029 09/05/2020 12:43 AM    pH (UA) 5.0 09/05/2020 12:43 AM    Protein 30 (A) 09/05/2020 12:43 AM    Glucose Negative 09/05/2020 12:43 AM    Ketone 15 (A) 09/05/2020 12:43 AM    Bilirubin Negative 09/05/2020 12:43 AM    Urobilinogen 1.0 09/05/2020 12:43 AM    Nitrites Negative 09/05/2020 12:43 AM    Leukocyte Esterase MODERATE (A) 09/05/2020 12:43 AM    Epithelial cells FEW 09/05/2020 12:43 AM    Bacteria Negative 09/05/2020 12:43 AM    WBC 5-10 09/05/2020 12:43 AM    RBC 0-5 09/05/2020 12:43 AM         Medications Reviewed:     Current Facility-Administered Medications   Medication Dose Route Frequency    amLODIPine (NORVASC) tablet 5 mg  5 mg Oral DAILY    sodium chloride (NS) flush 5-40 mL  5-40 mL IntraVENous Q8H    sodium chloride (NS) flush 5-40 mL  5-40 mL IntraVENous PRN    acetaminophen (TYLENOL) tablet 650 mg  650 mg Oral Q6H PRN    Or    acetaminophen (TYLENOL) suppository 650 mg  650 mg Rectal Q6H PRN    polyethylene glycol (MIRALAX) packet 17 g  17 g Oral DAILY PRN    heparin (porcine) injection 5,000 Units  5,000 Units SubCUTAneous Q8H    atorvastatin (LIPITOR) tablet 80 mg  80 mg Oral QHS    metoprolol tartrate (LOPRESSOR) tablet 25 mg  25 mg Oral BID    potassium chloride SR (KLOR-CON 10) tablet 10 mEq  10 mEq Oral DAILY     ______________________________________________________________________  EXPECTED LENGTH OF STAY: - - -  ACTUAL LENGTH OF STAY:          1                 Laura Thompson MD

## 2020-09-08 NOTE — PROGRESS NOTES
ANNE-MARIE:  1. Disposition plan is to D/C to IPR  2. Transportation assistance by [facility]  3. Insurance Auth through [facility] [employer]    Reason for Admission:  \"I could not stand and sat on the floor for I don't know how long. \" Complaints of Dizziness and generalized weakness. RUR Score:  OBS status                PCP: First and Last name:  Eileen Bryant phone # [phone]   Are you a current patient: Yes/No: Yes   Approximate date of last visit: 6 months ago   Can you participate in a virtual visit if needed: No    Do you (patient/family) have any concerns for transition/discharge? Patient states unable to to go home due to not being safe at this time                 Plan for utilizing home health: No, Needs Rehab       Current Advanced Directive/Advance Care Plan:  Patient states she had a visit from the [place] and is working on completing an AMD            Transition of Care Plan:  Referral sent via [employer] to [facility]. CM spoke with patient and explained position and role. Patient lives alone in an apartment. She denies use of assistive devices and is independent with ADLs and IADLs. CM confirmed her insurance is [employer]. She has prescription coverage through [facility] [employer] and utilizes mail order for long term meds. She uses [facility] for short term medications. She is currently working to try and get into a senior living community prior to this event. Her grandchildren are her support, Sae Bill and Mal Conley. CM spoke with attending and attending is in agreement for IPR. CM offered freedom of choice of IPR. Patient selected [facility] and [facility]. Referrals sent via [employer]. CM awaiting response.      Care Management Interventions  PCP Verified by CM: Yes(Dr Eileen Bryant)  Palliative Care Criteria Met (RRAT>21 & CHF Dx)?: No  Mode of Transport at Discharge: BLS  Transition of Care Consult (CM Consult): Discharge Planning  Discharge Durable Medical Equipment: No  Physical Therapy Consult: Yes  Occupational Therapy Consult: Yes  Current Support Network: Own Home  Confirm Follow Up Transport: Family  The Plan for Transition of Care is Related to the Following Treatment Goals : Rehabilitation to return home   The Patient and/or Patient Representative was Provided with a Choice of Provider and Agrees with the Discharge Plan?: Yes  Name of the Patient Representative Who was Provided with a Choice of Provider and Agrees with the Discharge Plan: Gurjit Ocampo  Freedom of Choice List was Provided with Basic Dialogue that Supports the Patient's Individualized Plan of Care/Goals, Treatment Preferences and Shares the Quality Data Associated with the Providers?: Yes  Discharge Location  Discharge Placement: Rehab hospital/unit acute    JEFFREY SchulteN  [address]  Coordinator of Care Management  [phone]

## 2020-09-08 NOTE — PROGRESS NOTES
Problem: Falls - Risk of  Goal: *Absence of Falls  Description: Document Sebastian Faith Fall Risk and appropriate interventions in the flowsheet.   Outcome: Progressing Towards Goal  Note: Fall Risk Interventions:  Mobility Interventions: Patient to call before getting OOB, PT Consult for mobility concerns, Communicate number of staff needed for ambulation/transfer, Utilize walker, cane, or other assistive device         Medication Interventions: Patient to call before getting OOB, Teach patient to arise slowly, Bed/chair exit alarm    Elimination Interventions: Call light in reach, Patient to call for help with toileting needs, Toileting schedule/hourly rounds    History of Falls Interventions: Room close to nurse's station, Door open when patient unattended         Problem: Patient Education: Go to Patient Education Activity  Goal: Patient/Family Education  Outcome: Progressing Towards Goal     Problem: Unstable angina/NSTEMI: Day 2  Goal: Off Pathway (Use only if patient is Off Pathway)  Outcome: Progressing Towards Goal  Goal: Activity/Safety  Outcome: Progressing Towards Goal  Goal: Consults, if ordered  Outcome: Progressing Towards Goal  Goal: Diagnostic Test/Procedures  Outcome: Progressing Towards Goal  Goal: Nutrition/Diet  Outcome: Progressing Towards Goal  Goal: Discharge Planning  Outcome: Progressing Towards Goal  Goal: Medications  Outcome: Progressing Towards Goal  Goal: Respiratory  Outcome: Progressing Towards Goal  Goal: Treatments/Interventions/Procedures  Outcome: Progressing Towards Goal  Goal: Psychosocial  Outcome: Progressing Towards Goal  Goal: *Hemodynamically stable  Outcome: Progressing Towards Goal  Goal: *Optimal pain control at patient's stated goal  Outcome: Progressing Towards Goal  Goal: *Lungs clear or at baseline  Outcome: Progressing Towards Goal     Problem: Pressure Injury - Risk of  Goal: *Prevention of pressure injury  Description: Document Bin Scale and appropriate interventions in the flowsheet.   Outcome: Progressing Towards Goal  Note: Pressure Injury Interventions:  Sensory Interventions: Keep linens dry and wrinkle-free, Maintain/enhance activity level, Minimize linen layers, Pressure redistribution bed/mattress (bed type)    Moisture Interventions: Absorbent underpads, Minimize layers    Activity Interventions: Increase time out of bed, Pressure redistribution bed/mattress(bed type)    Mobility Interventions: Pressure redistribution bed/mattress (bed type), Float heels    Nutrition Interventions: Document food/fluid/supplement intake    Friction and Shear Interventions: Minimize layers, Lift sheet                Problem: Patient Education: Go to Patient Education Activity  Goal: Patient/Family Education  Outcome: Progressing Towards Goal     Problem: Patient Education: Go to Patient Education Activity  Goal: Patient/Family Education  Outcome: Progressing Towards Goal     Problem: Patient Education: Go to Patient Education Activity  Goal: Patient/Family Education  Outcome: Progressing Towards Goal

## 2020-09-08 NOTE — PROGRESS NOTES
Bedside shift change report given to Mo, RN (oncoming nurse) by Yesi Granados RN (offgoing nurse). Report included the following information SBAR, Kardex, ED Summary, Procedure Summary, Intake/Output, MAR, Accordion, Recent Results, Med Rec Status, Cardiac Rhythm NSR, Alarm Parameters , Pre Procedure Checklist, Procedure Verification and Quality Measures. Problem: Falls - Risk of  Goal: *Absence of Falls  Description: Document [facility] Fall Risk and appropriate interventions in the flowsheet. Outcome: Progressing Towards Goal  Note: Fall Risk Interventions:  Mobility Interventions: Communicate number of staff needed for ambulation/transfer, Assess mobility with egress test, Patient to call before getting OOB, OT consult for ADLs, PT Consult for mobility concerns    Medication Interventions: Teach patient to arise slowly, Patient to call before getting OOB, Evaluate medications/consider consulting pharmacy, Assess postural VS orthostatic hypotension    Elimination Interventions: Call light in reach, Elevated toilet seat, Patient to call for help with toileting needs, Stay With Me (per policy), Toileting schedule/hourly rounds    History of Falls Interventions: Room close to nurse's station, Investigate reason for fall    Problem: Unstable angina/NSTEMI: Day 2  Goal: Medications  Outcome: Progressing Towards Goal  Goal: Respiratory  Outcome: Progressing Towards Goal     Problem: Pressure Injury - Risk of  Goal: *Prevention of pressure injury  Description: Document Bin Scale and appropriate interventions in the flowsheet.   Outcome: Progressing Towards Goal  Note: Pressure Injury Interventions:  Sensory Interventions: Float heels, Discuss PT/OT consult with provider, Keep linens dry and wrinkle-free, Maintain/enhance activity level    Moisture Interventions: Moisture barrier, Assess need for specialty bed, Apply protective barrier, creams and emollients, Absorbent underpads    Activity Interventions: Pressure redistribution bed/mattress(bed type), PT/OT evaluation, Increase time out of bed    Mobility Interventions: PT/OT evaluation, Pressure redistribution bed/mattress (bed type), HOB 30 degrees or less    Nutrition Interventions: Document food/fluid/supplement intake    Friction and Shear Interventions: Lift sheet, Minimize layers

## 2020-09-08 NOTE — PROGRESS NOTES
Problem: Mobility Impaired (Adult and Pediatric)  Goal: *Acute Goals and Plan of Care (Insert Text)  Description: FUNCTIONAL STATUS PRIOR TO ADMISSION: Patient was independent and active without use of DME. When asked, she endorses 2 falls in the last 12 months. HOME SUPPORT PRIOR TO ADMISSION: The patient lived alone with family in town but did not require assist.    Physical Therapy Goals  Initiated 9/8/2020  1. Patient will move from supine to sit and sit to supine , scoot up and down, and roll side to side in bed with independence within 7 day(s). 2.  Patient will transfer from bed to chair and chair to bed with modified independence using the least restrictive device within 7 day(s). 3.  Patient will perform sit to stand with modified independence within 7 day(s). 4.  Patient will ambulate with modified independence for 200 feet with the least restrictive device within 7 day(s). 5.  Patient will ascend/descend 16 stairs with one handrail(s) with modified independence within 7 day(s). Outcome: Progressing Towards Goal   PHYSICAL THERAPY EVALUATION  Patient: Jb Colindres (54 y.o. female)  Date: 9/8/2020  Primary Diagnosis: Elevated troponin [R79.89]  Fatigue [R53.83]  Fall [W19. XXXA]  Elevated troponin [R79.89]        Precautions:   Fall    ASSESSMENT  Based on the objective data described below, the patient presents with generalized weakness, impaired unsupported and supported standing balance, and a decline in her functional mobility from independent and able to negotiate 19 stairs to access her home. She was admitted after a fall at home and work up was negative for any hip/pelvic fracture and has elevated troponin and is to undergo a stress test today. BP is elevated, see below (discussed with her nurse). She is a high fall risk at this time and would greatly benefit from short term rehab.     Current Level of Function Impacting Discharge (mobility/balance): overall min assist    Functional Outcome Measure: The patient scored 0 on the TUG outcome measure (unable to complete) which is indicative of high fall risk. .      For mobilization with nursing, recommend patient to complete as able in order to maintain strength, endurance and independence with nursing: OOB to chair 3x/day with assist X 1 using a walker and ambulating with using a walker, assist X 1..      Other factors to consider for discharge: lives alone, high fall risk, 19 stairs to access her home. Vitals:    09/08/20 0932 09/08/20 0940 09/08/20 0946 09/08/20 0950   BP: 175/85 183/87 (!) 176/92 166/82   BP 1 Location: Right arm Right arm Right arm Right arm   BP Patient Position: Supine; At rest Sitting Standing Sitting;Post activity   Pulse: 69 77 84 79   Resp: 11   11   Temp:       SpO2: on room air 99% 98% 100% 96%                       Patient will benefit from skilled therapy intervention to address the above noted impairments. PLAN :  Recommendations and Planned Interventions: bed mobility training, transfer training, gait training, therapeutic exercises, patient and family training/education, and therapeutic activities      Frequency/Duration: Patient will be followed by physical therapy:  5 times a week to address goals. Recommendation for discharge: (in order for the patient to meet his/her long term goals)  Therapy 3 hours per day 5-7 days per week, if this is not an option, then recommend SNF for rehab. If this is not an option, would require 24/7 hands on assist and HHPT in her home and the 19 stairs to access her home has to be adequately addressed. This discharge recommendation:  Has been made in collaboration with the attending provider and/or case management    IF patient discharges home will need the following DME: to be determined (TBD), has none. SUBJECTIVE:   Patient stated I'm afraid I'm going to fall again. It worried me that I could not get up off the floor.     OBJECTIVE DATA SUMMARY:   Consult received chart reviewed, pt cleared by nursing  HISTORY:    Past Medical History:   Diagnosis Date    History of mammogram 2010    normal    Hypertension     Pap smear for cervical cancer screening 2011    normal     Past Surgical History:   Procedure Laterality Date    HX GYN      hystterectomy       Personal factors and/or comorbidities impacting plan of care: lives alone, high fall risk, 19 stairs to access her home. Home Situation  Home Environment: Apartment  # Steps to Enter: 3  Rails to Enter: Yes  Hand Rails : Right  One/Two Story Residence: Two story  # of Interior Steps: 12  Interior Rails: Right  Living Alone: Yes  Support Systems: Family member(s)  Patient Expects to be Discharged to[de-identified] Apartment  Current DME Used/Available at Home: None  Tub or Shower Type: Tub/Shower combination    EXAMINATION/PRESENTATION/DECISION MAKING:   Critical Behavior:  Neurologic State: Alert, Eyes open spontaneously  Orientation Level: Oriented X4  Cognition: Follows commands, Appropriate safety awareness, Appropriate for age attention/concentration, Appropriate decision making  Safety/Judgement: Fall prevention, Awareness of environment  Hearing: Auditory  Auditory Impairment: None  Skin:  refer to MD and nursing notes  Edema: none noted  Range Of Motion:  AROM: Generally decreased, functional                       Strength:    Strength: Generally decreased, functional                    Tone & Sensation:                  Sensation: Intact pt reports today               Coordination:     Vision:      Functional Mobility:  Bed Mobility:     Supine to Sit: Minimum assistance        Transfers:  Sit to Stand: Minimum assistance  Stand to Sit: Minimum assistance                       Balance:   Sitting: Impaired  Sitting - Static: Good (unsupported)  Sitting - Dynamic: Fair (occasional)  Standing: Impaired; With support  Standing - Static: Constant support; Fair  Standing - Dynamic : Poor  Ambulation/Gait Training:  Distance (ft): 4 Feet (ft)  Assistive Device: Gait belt(pt holding onto arms of therapist for assist)  Ambulation - Level of Assistance: Minimal assistance        Gait Abnormalities: Decreased step clearance        Base of Support: Widened;Center of gravity altered     Speed/Kalie: Slow;Pace decreased (<100 feet/min)  Step Length: Left shortened;Right shortened                     Stairs: Therapeutic Exercises: Ankle pumps    Functional Measure:  Timed up and go:    Timed Get Up And Go Test: 0(unable to complete)       < than 10 seconds=Normal  Greater then 13.5 seconds (in elderly)=Increased fall risk   Jim Taylor [place] M. Predicting the probability for falls in community dwelling older adults using the Timed Up and Go Test. Phys Ther. 2000;80:[id number]. Physical Therapy Evaluation Charge Determination   History Examination Presentation Decision-Making   HIGH Complexity :3+ comorbidities / personal factors will impact the outcome/ POC  MEDIUM Complexity : 3 Standardized tests and measures addressing body structure, function, activity limitation and / or participation in recreation  LOW Complexity : Stable, uncomplicated  LOW Complexity : FOTO score of       Based on the above components, the patient evaluation is determined to be of the following complexity level: LOW     Pain Rating:  None rated    Activity Tolerance:   Good and see assessment above  Please refer to the flowsheet for vital signs taken during this treatment. After treatment patient left in no apparent distress:   Sitting in chair and Call bell within reach    COMMUNICATION/EDUCATION:   The patients plan of care was discussed with: Occupational therapy assistant and Registered nurse. Fall prevention education was provided and the patient/caregiver indicated understanding., Patient/family have participated as able in goal setting and plan of care. , and Patient/family agree to work toward stated goals and plan of care.     Thank you for this referral.  Elinore Clarity   Time Calculation: 38 mins

## 2020-09-08 NOTE — PROGRESS NOTES
Problem: Self Care Deficits Care Plan (Adult)  Goal: *Acute Goals and Plan of Care (Insert Text)  Description:   FUNCTIONAL STATUS PRIOR TO ADMISSION: lives alone; I PTA, apt, no DME    HOME SUPPORT: None, atif    Occupational Therapy Goals  Initiated 9/7/2020  1. Patient will perform grooming in unsupported sitting with supervision/set-up within 7 day(s). 2.  Patient will perform upper body dressing with supervision/set-up within 7 day(s). 3.  Patient will perform lower body dressing with moderate assistance  within 7 day(s). 4.  Patient will perform toilet transfers with moderate assistance  within 7 day(s). 5.  Patient will perform all aspects of toileting with moderate assistance  within 7 day(s). 6.  Patient will participate in upper extremity therapeutic exercise/activities with supervision/set-up for 5 minutes within 7 day(s). 7.  Patient will utilize energy conservation, fall prevention, pacing, pain management techniques during functional activities with verbal, visual, and tactile cues within 7 day(s). 8.  Patient will participate in cog screen in 3 days to assist with discharge planning           Outcome: Progressing Towards Goal   OCCUPATIONAL THERAPY TREATMENT  Patient: Froy Mccallum (61 y.o. female)  Date: 9/8/2020  Diagnosis: Elevated troponin [R79.89]  Fatigue [R53.83]  Fall [W19. XXXA]  Elevated troponin [R79.89]   <principal problem not specified>       Precautions: Fall  Chart, occupational therapy assessment, plan of care, and goals were reviewed. ASSESSMENT  Patient continues with skilled OT services and is progressing towards goals. Participation impacted by impaired reach to LEs, impaired strength of bilateral LEs and right UE by report, impaired standing balance and endurance. Patient instructed in technique for sit to/from stand and demonstrates follow through. Right shoulder limited in active external rotation with left shoulder active mobility greater then right. Patient with severely overgrown toenails, presenting as a risk for skin breakdown. States \"I'm so embarrassed, but I didn't know who to go to for it\" re toenails. Patient is able to tolerate 3 hours of therapy a day. Current Level of Function Impacting Discharge (ADLs): Set up for self feeding, grooming and UE self care, min to max assist for LE self care, toilet transfers with RW, gait belt and min assist of 1 to [facility]    Other factors to consider for discharge: Lives alone         PLAN :  Patient continues to benefit from skilled intervention to address the above impairments. Continue treatment per established plan of care. to address goals. Recommend with staff: Emerita Screws in chair for meals and self care with RW and assist of 1 for functional transfers, Set up for self feeding, grooming and UE self care, min to max assist for LE self care, toilet transfers with RW, gait belt and min assist of 1 to [facility]    Recommend next OT session: LE self care, toileting on [facility]    Recommendation for discharge: (in order for the patient to meet his/her long term goals)  Therapy 3 hours per day 5-7 days per week, if unable, rehab at [facility]. This discharge recommendation:  Has been made in collaboration with the attending provider and/or case management    IF patient discharges home will need the following DME: walker: rolling, BSC       SUBJECTIVE:   Patient stated I'm so embarrassed.  re toenails    OBJECTIVE DATA SUMMARY:   Cognitive/Behavioral Status:  Neurologic State: Alert  Orientation Level: Oriented X4  Cognition: Appropriate decision making; Appropriate for age attention/concentration; Follows commands  Perception: Appears intact  Perseveration: No perseveration noted  Safety/Judgement: Awareness of environment    Functional Mobility and Transfers for ADLs:  Bed Mobility:  Supine to Sit: Minimum assistance    Transfers:  Sit to Stand: Minimum assistance;Assist x1;Adaptive equipment; Additional time          Balance:  Sitting: Impaired  Sitting - Static: Good (unsupported)  Sitting - Dynamic: Good (unsupported)  Standing: Impaired; With support  Standing - Static: Fair;Constant support  Standing - Dynamic : Fair;Constant support    ADL Intervention:       Grooming  Grooming Assistance: Set-up  Position Performed: Seated in chair    Upper Body Bathing  Bathing Assistance: Set-up(in simulation)  Position Performed: Seated in chair    Lower Body Bathing  Perineal  : Maximum assistance(in simulation)  Position Performed: Standing  Adaptive Equipment: Walker  Lower Body : Minimum assistance(in simulation)  Position Performed: Seated in chair         Lower Body Dressing Assistance  Pants With Elastic Waist: Moderate assistance(in simulation)  Socks: Minimum assistance  Leg Crossed Method Used: No  Position Performed: Seated in chair;Standing  Cues: Verbal cues provided;Physical assistance  Adaptive Equipment Used: Walker         Cognitive Retraining  Safety/Judgement: Awareness of environment      Activity Tolerance:   Fair  Please refer to the flowsheet for vital signs taken during this treatment. After treatment patient left in no apparent distress:   Sitting in chair, Heels elevated for pressure relief, and Call bell within reach    COMMUNICATION/COLLABORATION:   The patients plan of care was discussed with: Physical therapist and Registered nurse.      RAFAEL Jones  Time Calculation: 29 mins

## 2020-09-08 NOTE — PROGRESS NOTES
Cardiology Progress Note            Admit Date: 2020  Admit Diagnosis: Elevated troponin [R79.89]  Fatigue [R53.83]  Fall [W19. XXXA]  Elevated troponin [R79.89]  Date: 2020     Time: 11:05 AM    HPI: Olman Cerrato is a 79 y.o. female with PMH of HTN, HLD who presented 2020 with complaints of dizziness and weakness, collapse from bed to floor. She denies any syncope or LOC. States she felt weak, slipped and fell. Now realizes she has been feeling weak for few weeks she believes. When she arrived her creatinine was elevated and  Troponin mildly elevated but flat. Echo with NL EF. Nuclear stress test pending. Assessment and Plan     1. Troponin elevation, mild, flat.   -No chest pain.   -Troponin 0.18-. 017   -EKG NSR with RBBB (no prior EKGS)   -Echocardiogram with NL EF, NWMA, mild AI   -Nuclear stress test today. 2. HTN:  bp elevated   -Continue home Lopressor.   -Hold home HCTZ. Amlodipine on hold. 3. Generalized Weakness   -Uncertain etiology    -Echo with NL EF   -Nuclear stress test today    4. Hx of HLD   -check lipids   -On Lipitor 80 mg QHS PTA per records. 5. DANIELLE: resolved   -Creatinine on admission 1.56, now 1.01  Patient seen on the day of progress note and examined  and agree with Advance Practice Provider (ARVIND, NP,PA)  assessment and plans. She remains asymptomatic cardiac wise  Not a true syncopal episode   patient reported weakness mostly  Nuclear stress test pending            Cardiac testin20   ECHO ADULT COMPLETE 2020    Narrative · LV: Estimated LVEF is 55 - 60%. Visually measured ejection fraction. Normal cavity size and systolic function (ejection fraction normal). Mild   concentric hypertrophy. Mild (grade 1) left ventricular diastolic   dysfunction. · AV: Probably trileaflet aortic valve. Mild aortic valve regurgitation is   present.         Signed by: Geraldine Keita China Min MD            Subjective:   Luca Disla denies chest pain,SOB. Dizziness. Reports she has been on amlodipine for years and BB for many months also.  Denies any PMH of CAD, CHF,   PMH  Past Medical History:   Diagnosis Date    History of mammogram 2010    normal    Hypertension     Pap smear for cervical cancer screening 2011    normal      Social Hx  Social History     Socioeconomic History    Marital status:      Spouse name: Not on file    Number of children: Not on file    Years of education: Not on file    Highest education level: Not on file   Occupational History    Not on file   Social Needs    Financial resource strain: Not on file    Food insecurity     Worry: Not on file     Inability: Not on file    Transportation needs     Medical: Not on file     Non-medical: Not on file   Tobacco Use    Smoking status: Never Smoker    Smokeless tobacco: Never Used   Substance and Sexual Activity    Alcohol use: Yes     Comment: occasional    Drug use: No    Sexual activity: Not on file   Lifestyle    Physical activity     Days per week: Not on file     Minutes per session: Not on file    Stress: Not on file   Relationships    Social connections     Talks on phone: Not on file     Gets together: Not on file     Attends Jainism service: Not on file     Active member of club or organization: Not on file     Attends meetings of clubs or organizations: Not on file     Relationship status: Not on file    Intimate partner violence     Fear of current or ex partner: Not on file     Emotionally abused: Not on file     Physically abused: Not on file     Forced sexual activity: Not on file   Other Topics Concern    Not on file   Social History Narrative    Not on file     Objective:      Physical Exam:                Visit Vitals  /82 (BP 1 Location: Right arm, BP Patient Position: Sitting;Post activity)   Pulse 79   Temp 98.1 °F (36.7 °C)   Resp 11   Ht 5' 6\" (1.676 m)   SpO2 96%   BMI 32.28 kg/m²          General Appearance:   Well developed, well nourished,alert and oriented x 3, and   individual in no acute distress. Sitting up in chair. Ears/Nose/Mouth/Throat:    Hearing grossly normal.         Neck:  Supple. Chest:    Lungs clear to auscultation bilaterally. Cardiovascular:   Regular rate and rhythm, S1, S2 normal, no murmur. Abdomen:    Soft, non-tender, bowel sounds are active. Extremities:  Trace ankle edema bilaterally. Skin:  Warm and dry. Telemetry: NSR.            Data Review:    Labs:    Recent Results (from the past 24 hour(s))   ECHO ADULT COMPLETE    Collection Time: 09/07/20 11:36 AM   Result Value Ref Range    IVSd 0.96 (A) 0.6 - 0.9 cm    LVIDd 4.12 3.9 - 5.3 cm    LVIDs 2.47 cm    LVOT d 1.93 cm    LVPWd 1.13 (A) 0.6 - 0.9 cm    LVOT Peak Gradient 3.04 mmHg    LVOT Peak Velocity 87.17 cm/s    RVIDd 1.84 cm    RVSP 25.30 mmHg    Left Atrium Major Axis 3.11 cm    Est. RA Pressure 3.00 mmHg    Aortic Valve Area by Continuity of Peak Velocity 1.57 cm2    AoV PG 10.68 mmHg    Aortic Valve Systolic Peak Velocity 108.74 cm/s    MV A Umberto 94.05 cm/s    Mitral Valve E Wave Deceleration Time 0.23 s    MV E Umberto 74.34 cm/s    Mitral Valve Pressure Half-time 0.07 s    MVA (PHT) 3.36 cm2    Pulmonic Valve Systolic Peak Instantaneous Gradient 4.68 mmHg    Tapse 2.28 (A) 1.5 - 2.0 cm    Triscuspid Valve Regurgitation Peak Gradient 22.30 mmHg    TR Max Velocity 236.10 cm/s    Ao Root D 2.89 cm    MV E/A 0.79     LV Mass .7 67 - 162 g          Radiology:        Current Facility-Administered Medications   Medication Dose Route Frequency    regadenoson (LEXISCAN) injection 0.4 mg  0.4 mg IntraVENous RAD ONCE    saline peripheral flush soln 20 mL  20 mL InterCATHeter RAD ONCE    sodium chloride (NS) flush 5-40 mL  5-40 mL IntraVENous Q8H    sodium chloride (NS) flush 5-40 mL  5-40 mL IntraVENous PRN    acetaminophen (TYLENOL) tablet 650 mg  650 mg Oral Q6H PRN Or    acetaminophen (TYLENOL) suppository 650 mg  650 mg Rectal Q6H PRN    polyethylene glycol (MIRALAX) packet 17 g  17 g Oral DAILY PRN    heparin (porcine) injection 5,000 Units  5,000 Units SubCUTAneous Q8H    atorvastatin (LIPITOR) tablet 80 mg  80 mg Oral QHS    metoprolol tartrate (LOPRESSOR) tablet 25 mg  25 mg Oral BID    potassium chloride SR (KLOR-CON 10) tablet 10 mEq  10 mEq Oral DAILY          Ellen Fall NP     [facility] [address] [place] [address], [address] [place] [address] [address]   Vern Marx   [phone]

## 2020-09-09 LAB
STRESS BASELINE HR: 58 BPM
STRESS ESTIMATED WORKLOAD: 1 METS
STRESS EXERCISE DUR MIN: NORMAL
STRESS PEAK DIAS BP: 90 MMHG
STRESS PEAK SYS BP: 206 MMHG
STRESS PERCENT HR ACHIEVED: 64 %
STRESS POST PEAK HR: 96 BPM
STRESS RATE PRESSURE PRODUCT: NORMAL BPM*MMHG
STRESS TARGET HR: 150 BPM

## 2020-09-09 PROCEDURE — 96372 THER/PROPH/DIAG INJ SC/IM: CPT

## 2020-09-09 PROCEDURE — 74011250637 HC RX REV CODE- 250/637: Performed by: INTERNAL MEDICINE

## 2020-09-09 PROCEDURE — 74011250636 HC RX REV CODE- 250/636: Performed by: FAMILY MEDICINE

## 2020-09-09 PROCEDURE — 87635 SARS-COV-2 COVID-19 AMP PRB: CPT

## 2020-09-09 PROCEDURE — 74011250637 HC RX REV CODE- 250/637: Performed by: FAMILY MEDICINE

## 2020-09-09 PROCEDURE — 74011250637 HC RX REV CODE- 250/637: Performed by: SPECIALIST

## 2020-09-09 PROCEDURE — 99218 HC RM OBSERVATION: CPT

## 2020-09-09 PROCEDURE — 99232 SBSQ HOSP IP/OBS MODERATE 35: CPT | Performed by: SPECIALIST

## 2020-09-09 PROCEDURE — 74011250637 HC RX REV CODE- 250/637: Performed by: HOSPITALIST

## 2020-09-09 PROCEDURE — 97535 SELF CARE MNGMENT TRAINING: CPT

## 2020-09-09 RX ADMIN — HEPARIN SODIUM 5000 UNITS: 5000 INJECTION INTRAVENOUS; SUBCUTANEOUS at 06:59

## 2020-09-09 RX ADMIN — Medication 10 ML: at 07:00

## 2020-09-09 RX ADMIN — HEPARIN SODIUM 5000 UNITS: 5000 INJECTION INTRAVENOUS; SUBCUTANEOUS at 16:26

## 2020-09-09 RX ADMIN — ACETAMINOPHEN 650 MG: 325 TABLET ORAL at 18:39

## 2020-09-09 RX ADMIN — Medication 10 ML: at 22:29

## 2020-09-09 RX ADMIN — POTASSIUM CHLORIDE 10 MEQ: 750 TABLET, FILM COATED, EXTENDED RELEASE ORAL at 08:53

## 2020-09-09 RX ADMIN — METOPROLOL TARTRATE 25 MG: 25 TABLET, FILM COATED ORAL at 18:40

## 2020-09-09 RX ADMIN — POLYETHYLENE GLYCOL 3350 17 G: 17 POWDER, FOR SOLUTION ORAL at 18:39

## 2020-09-09 RX ADMIN — AMLODIPINE BESYLATE 5 MG: 5 TABLET ORAL at 08:53

## 2020-09-09 NOTE — PROGRESS NOTES
[facility] Adult  Hospitalist Group                                                                                          Hospitalist Progress Note  Maria R Contreras MD  Answering service: [phone] from in house phone        Date of Service:  2020  NAME:  Luis Pierre  :  1950  MRN:  [id number]      Admission Summary:   History of present illness (copied from H&P)  \" [facility] a 79 y.o. female with past medical history of hypertension presented to the ED from home s/p ground level fall with chief complaint of generalized fatigue and weakness.  Symptom onset reportedly began ~ 2 weeks ago, has been progressively worsening without specific aggravating or alleviating factors.  Tonight, while trying to mobilize out of bed, she reportedly slipped on the floor.  There was no reports head/ neck trauma or loss of consciousness.  There were no reports of new onset slurred speech, facial droop, syncope, loss of consciousness, headache, neck pain, visual disturbance, numbness, paresthesias, focal weakness, chest pain, shortness of breath, palpitations, abdominal pain, nausea, vomiting, diarrhea, melena, dysuria, hematuria, incontinence, calf pain, increased leg swelling/ edema, fever, chills, rash, or known exposure to sick persons or COVID 19 .  On arrival in the ED, workup included labs showing elevated troponin = 0.18.  BUN = 23.  Creatinine = 1.56 (compared to 1.17 on 2019).  AST = 50.   12 lead EKG showed sinus tachycardia, frequent PVCs, LAD, RBBB at 81 bpm.  Patient was given Aspirin 324 mg po x 1 dose.  Patient is now seen for admission to the hospitalist service for continued evaluation and treatments     Interval history / Subjective:   Patient seen and examined. Ms Wolf Michaud is up by the bedside ,working with OT. She does not have any complaints. Specifically denied sob,chest pain.      Assessment & Plan:     # Dizziness/generliazed weakness -resolved  -could possibly be related to lower BP while on antihypertensives at home,dehydration  -UA wnl  -CXR wnl,TSH nl  -BP stable, no orthostatic hypotension. Dehydration,bump in creatinine likely due to low Bp  -dehydration and creatinine  improved,    #Elevated troponin  -echo-normal EF,cardiologist following-  -Normal stress test      #History of SVT:  -she has no h/o atrial fib. -on metoprolol    #HTN:  -on metoprolol now,add low dose amlodipine. Morbid obesity:  Body mass index is 32.28 kg/m². Dispo: pending insurance auth for IPR        Code status: full  DVT prophylaxis:hepairn    Care Plan discussed with: [facility] Problems  Date Reviewed: 9/6/2020          Codes Class Noted POA    Hypokalemia ICD-10-CM: E87.6  ICD-9-CM: 276.8  9/6/2020 Unknown        Fatigue ICD-10-CM: R53.83  ICD-9-CM: 780.79  9/5/2020 Unknown        Fall ICD-10-CM: [address] Berna Hammans  ICD-9-CM: E888.9  9/5/2020 Unknown        Elevated troponin ICD-10-CM: R79.89  ICD-9-CM: 790.6  9/5/2020 Unknown                Review of Systems:   As per HPI      Vital Signs:    Last 24hrs VS reviewed since prior progress note. Most recent are:  Visit Vitals  /80 (BP 1 Location: Right arm, BP Patient Position: At rest)   Pulse 61   Temp 97.7 °F (36.5 °C)   Resp 13   Ht 5' 6\" (1.676 m)   Wt 90.7 kg (200 lb)   SpO2 100%   BMI 32.28 kg/m²         Intake/Output Summary (Last 24 hours) at 9/9/2020 1001  Last data filed at 9/8/2020 1642  Gross per 24 hour   Intake    Output 600 ml   Net -600 ml        Physical Examination:             Constitutional:  No acute distress   ENT:  Oral mucosa moist, oropharynx benign,EOMI    Resp:  CTA bilaterally. No wheezing. No accessory muscle use   CV:  Regular rhythm, normal rate, S1,S2 wnl    GI:  Soft, non distended, non tender, normoactive bowel sounds    Musculoskeletal:  No LE edema    Neurologic:  Moves all extremities. AAOx3     Psych:  Not anxious nor agitated.        Data Review:    Review and/or order of clinical lab test  Review and/or order of tests in the radiology section of CPT  Review and/or order of tests in the medicine section of CPT      Labs:     Recent Labs     09/08/20  1743   WBC 4.7   HGB 13.3   HCT 41.1        Recent Labs     09/08/20  1743 09/07/20  0849    142   K 3.7 3.8    109*   CO2 28 29   BUN 9 12   CREA 0.90 1.01   GLU 84 92   CA 9.1 9.0   PHOS 2.8  --      Recent Labs     09/08/20  1743   ALB 3.2*     No results for input(s): INR, PTP, APTT, INREXT, INREXT in the last 72 hours. No results for input(s): FE, TIBC, PSAT, FERR in the last 72 hours. No results found for: FOL, RBCF   No results for input(s): PH, PCO2, PO2 in the last 72 hours. No results for input(s): CPK, CKNDX, TROIQ in the last 72 hours.     No lab exists for component: CPKMB  Lab Results   Component Value Date/Time    Cholesterol, total 278 (H) 10/01/2014 01:04 PM    HDL Cholesterol 60 10/01/2014 01:04 PM    LDL, calculated 196 (H) 10/01/2014 01:04 PM    Triglyceride 112 10/01/2014 01:04 PM     No results found for: GLUCPOC  Lab Results   Component Value Date/Time    Color DARK YELLOW 09/05/2020 12:43 AM    Appearance CLOUDY (A) 09/05/2020 12:43 AM    Specific gravity 1.029 09/05/2020 12:43 AM    pH (UA) 5.0 09/05/2020 12:43 AM    Protein 30 (A) 09/05/2020 12:43 AM    Glucose Negative 09/05/2020 12:43 AM    Ketone 15 (A) 09/05/2020 12:43 AM    Bilirubin Negative 09/05/2020 12:43 AM    Urobilinogen 1.0 09/05/2020 12:43 AM    Nitrites Negative 09/05/2020 12:43 AM    Leukocyte Esterase MODERATE (A) 09/05/2020 12:43 AM    Epithelial cells FEW 09/05/2020 12:43 AM    Bacteria Negative 09/05/2020 12:43 AM    WBC 5-10 09/05/2020 12:43 AM    RBC 0-5 09/05/2020 12:43 AM         Medications Reviewed:     Current Facility-Administered Medications   Medication Dose Route Frequency    amLODIPine (NORVASC) tablet 5 mg  5 mg Oral DAILY    sodium chloride (NS) flush 5-40 mL  5-40 mL IntraVENous Q8H    sodium chloride (NS) flush 5-40 mL  5-40 mL IntraVENous PRN    acetaminophen (TYLENOL) tablet 650 mg  650 mg Oral Q6H PRN    Or    acetaminophen (TYLENOL) suppository 650 mg  650 mg Rectal Q6H PRN    polyethylene glycol (MIRALAX) packet 17 g  17 g Oral DAILY PRN    heparin (porcine) injection 5,000 Units  5,000 Units SubCUTAneous Q8H    atorvastatin (LIPITOR) tablet 80 mg  80 mg Oral QHS    metoprolol tartrate (LOPRESSOR) tablet 25 mg  25 mg Oral BID    potassium chloride SR (KLOR-CON 10) tablet 10 mEq  10 mEq Oral DAILY     ______________________________________________________________________  EXPECTED LENGTH OF STAY: - - -  ACTUAL LENGTH OF STAY:          1                 Jennifer Tam MD

## 2020-09-09 NOTE — PROGRESS NOTES
Bedside shift change report given to Shawna Hinds (oncoming nurse) by Zainab Tidwell (offgoing nurse).  Report included the following information SBAR, Kardex, MAR and Cardiac Rhythm NSR

## 2020-09-09 NOTE — PROGRESS NOTES
1555 - Allscripts Alert: YES response from [address] re: Referral [id number] for patient in [facility] 6W [id number]: Yes, willing to accept patient Starting auth. We will need a current covid test.Thanks Izzy    1545 -  William Thomason'kaitlyn Patel for order for covid test. Update to 6W Nurse - [facility] RN. CM will continue to follow and assist with ANNE-MARIE needs as they arise. Available on [email or web address]. [address]  MSN, [facility], RN, [facility] - [phone].    4997 - Allscripts Alert: Referral Received response from [facility] and [address] [place] [address] re: Referral [id number] for patient in [address] [place] [address] 6W [id number]: Referral Received Looking at medicals, thanks! Izzy    1500 -   Orders entered to arrange for [facility]. Patients chart reviewed and history noted. CM spoke with patient to introduce role and offer freedom of choice. Demographic information verified as correct. Patient chose [address] - [phone] - Referral created via [facility] and spoke with - [facility]. Ms. Trevor Gunter will review Allscripts and work on insurance [facility]. Update to 6W [facility] RN. CM will continue to follow. [address] MSN, 1400 Kaya Thao, RN, [address] [place] [address] Avenue - [phone].

## 2020-09-09 NOTE — PROGRESS NOTES
ANNE-MARIE:  1. Disposition IPR  2. Insurance AUTH   3. Transportation provided by [facility]    CM received a call from Liaison at [facility], they are currently out of network for patient's insurance. CM continues to wait for decision from [facility]. 1030 CM received message from [facility] that patient has been denied for IPR due to not meeting criteria. CM notified Unit CM.      Rosalind Roberts, BSN RN [address] [place] [address]  Coordinator of Care Management  [phone]

## 2020-09-09 NOTE — PROGRESS NOTES
Problem: Self Care Deficits Care Plan (Adult)  Goal: *Acute Goals and Plan of Care (Insert Text)  Description:   FUNCTIONAL STATUS PRIOR TO ADMISSION: lives alone; I PTA, apt, no DME    HOME SUPPORT: None, neighbors    Occupational Therapy Goals  Initiated 9/7/2020  1. Patient will perform grooming in unsupported sitting with supervision/set-up within 7 day(s). 2.  Patient will perform upper body dressing with supervision/set-up within 7 day(s). 3.  Patient will perform lower body dressing with moderate assistance  within 7 day(s). 4.  Patient will perform toilet transfers with moderate assistance  within 7 day(s). 5.  Patient will perform all aspects of toileting with moderate assistance  within 7 day(s). 6.  Patient will participate in upper extremity therapeutic exercise/activities with supervision/set-up for 5 minutes within 7 day(s). 7.  Patient will utilize energy conservation, fall prevention, pacing, pain management techniques during functional activities with verbal, visual, and tactile cues within 7 day(s). 8.  Patient will participate in cog screen in 3 days to assist with discharge planning           Outcome: Progressing Towards Goal   OCCUPATIONAL THERAPY TREATMENT  Patient: Mariah Johnson (82 y.o. female)  Date: 9/9/2020  Diagnosis: Elevated troponin [R79.89]  Fatigue [R53.83]  Fall [W19. XXXA]  Elevated troponin [R79.89]   <principal problem not specified>       Precautions: Fall  Chart, occupational therapy assessment, plan of care, and goals were reviewed. ASSESSMENT  Patient continues with skilled OT services and is progressing towards goals. Participation impacted by impaired strength and endurance for functional activity, impaired static and dynamic standing balance, impaired standing tolerance, and slow mobility, requiring extra time for all activity. Patient is able to tolerate 3 hours of therapy and is highly motivated to return to her home.     Current Level of Function Impacting Discharge (ADLs): UE self care with set up, LE self care with min to max assist, toileting on BSC versus toilet with RW and min assist and RW for transfer, toileting hygiene and clothing management with CGA to max assist.    Other factors to consider for discharge: Lives alone, impaired balance and high fall risk         PLAN :  Patient continues to benefit from skilled intervention to address the above impairments. Continue treatment per established plan of care. to address goals. Recommend with staff: Ashleigh Vargas in chair for meals and self care; UE self care with set up, LE self care with min to max assist, toileting on BSC versus toilet with RW and min assist and RW for transfer, toileting hygiene and clothing management with CGA to max assist.      Recommend next OT session: toileting and grooming in bathroom    Recommendation for discharge: (in order for the patient to meet his/her long term goals)  Therapy 3 hours per day 5-7 days per week; if not possible, recommend SNF    This discharge recommendation:  Has been made in collaboration with the attending provider and/or case management    IF patient discharges home will need the following DME: walker: rolling       SUBJECTIVE:   Patient stated I'd like to get up.  referring to participation in self care and mobility    OBJECTIVE DATA SUMMARY:   Cognitive/Behavioral Status:  Neurologic State: Alert  Orientation Level: Oriented X4  Cognition: Appropriate decision making; Appropriate for age attention/concentration; Appropriate safety awareness; Follows commands  Perception: Appears intact  Perseveration: No perseveration noted  Safety/Judgement: Awareness of environment;Good awareness of safety precautions    Functional Mobility and Transfers for ADLs:  Bed Mobility:  Supine to Sit: Moderate assistance; Adaptive equipment; Additional time;Assist x1;Bed Modified    Transfers:  Sit to Stand: Minimum assistance;Assist x1;Adaptive equipment; Additional time  Functional Transfers  Toilet Transfer : Minimum assistance;Assist x1;Adaptive equipment; Additional time  Cues: Physical assistance;Verbal cues provided; Tactile cues provided  Adaptive Equipment: Bedside commode;Walker (comment)  Bed to Chair: Minimum assistance;Assist x1;Additional time; Adaptive equipment    Balance:  Sitting: Intact; Without support  Sitting - Static: Good (unsupported)  Sitting - Dynamic: Good (unsupported)  Standing: Impaired; With support  Standing - Static: Fair  Standing - Dynamic : Fair    ADL Intervention:  Feeding  Feeding Assistance: Independent    Grooming  Grooming Assistance: Set-up  Position Performed: Seated in chair    Upper Body Bathing  Bathing Assistance: Set-up  Position Performed: Seated in chair    Lower Body Bathing  Bathing Assistance: Minimum assistance  Perineal  : Contact guard assistance;Minimum assistance  Position Performed: Standing  Cues: Physical assistance pants up;Physical assistance pants down  Adaptive Equipment: Walker  Lower Body : Minimum assistance  Position Performed: Seated in chair  Cues: Physical assistance;Verbal cues provided         Lower Body Dressing Assistance  Protective Undergarmet: Maximum assistance  Socks: Minimum assistance  Leg Crossed Method Used: Yes(partial)  Position Performed: Seated in chair  Cues: Tactile cues provided;Physical assistance;Verbal cues provided  Adaptive Equipment Used: Walker    Toileting  Bladder Hygiene: Contact guard assistance  Clothing Management: Maximum assistance  Cues: Physical assistance for pants down;Physical assistance for pants up;Verbal cues provided  Adaptive Equipment: Walker    Cognitive Retraining  Organizing/Sequencing: Breaking task down  Attention to Task: Single task  Following Commands: Follows one step commands/directions  Safety/Judgement: Awareness of environment;Good awareness of safety precautions  Cues: Verbal cues provided; Tactile cues provided      Activity Tolerance:   Fair  Please refer to the flowsheet for vital signs taken during this treatment. After treatment patient left in no apparent distress:   Sitting in chair and Call bell within reach    COMMUNICATION/COLLABORATION:   The patients plan of care was discussed with: Registered nurse. RAFAEL Figueroa  Time Calculation: 74 mins  Charge for 60 min. Delay by 15 min with MD and other staff.

## 2020-09-09 NOTE — PROGRESS NOTES
Problem: Falls - Risk of  Goal: *Absence of Falls  Description: Document Kelsie Isaacs Fall Risk and appropriate interventions in the flowsheet.   Outcome: Progressing Towards Goal  Note: Fall Risk Interventions:  Mobility Interventions: Communicate number of staff needed for ambulation/transfer, Patient to call before getting OOB, Utilize walker, cane, or other assistive device         Medication Interventions: Evaluate medications/consider consulting pharmacy, Patient to call before getting OOB, Teach patient to arise slowly    Elimination Interventions: Patient to call for help with toileting needs, Call light in reach, Elevated toilet seat    History of Falls Interventions: Door open when patient unattended, Vital signs minimum Q4HRs X 24 hrs (comment for end date), Investigate reason for fall         Problem: Patient Education: Go to Patient Education Activity  Goal: Patient/Family Education  Outcome: Progressing Towards Goal     Problem: Unstable angina/NSTEMI: Day 2  Goal: Off Pathway (Use only if patient is Off Pathway)  Outcome: Progressing Towards Goal  Goal: Activity/Safety  Outcome: Progressing Towards Goal  Goal: Consults, if ordered  Outcome: Progressing Towards Goal  Goal: Diagnostic Test/Procedures  Outcome: Progressing Towards Goal  Goal: Nutrition/Diet  Outcome: Progressing Towards Goal  Goal: Discharge Planning  Outcome: Progressing Towards Goal  Goal: Medications  Outcome: Progressing Towards Goal  Goal: Respiratory  Outcome: Progressing Towards Goal  Goal: Treatments/Interventions/Procedures  Outcome: Progressing Towards Goal  Goal: Psychosocial  Outcome: Progressing Towards Goal  Goal: *Hemodynamically stable  Outcome: Progressing Towards Goal  Goal: *Optimal pain control at patient's stated goal  Outcome: Progressing Towards Goal  Goal: *Lungs clear or at baseline  Outcome: Progressing Towards Goal     Problem: Pressure Injury - Risk of  Goal: *Prevention of pressure injury  Description: Document Bin Scale and appropriate interventions in the flowsheet. Outcome: Progressing Towards Goal  Note: Pressure Injury Interventions:  Sensory Interventions: Avoid rigorous massage over bony prominences, Discuss PT/OT consult with provider, Keep linens dry and wrinkle-free, Maintain/enhance activity level, Turn and reposition approx.  every two hours (pillows and wedges if needed), Pressure redistribution bed/mattress (bed type)    Moisture Interventions: Internal/External urinary devices, Absorbent underpads    Activity Interventions: Increase time out of bed, Pressure redistribution bed/mattress(bed type)    Mobility Interventions: HOB 30 degrees or less, Pressure redistribution bed/mattress (bed type)    Nutrition Interventions: Document food/fluid/supplement intake    Friction and Shear Interventions: Lift sheet, HOB 30 degrees or less                Problem: Patient Education: Go to Patient Education Activity  Goal: Patient/Family Education  Outcome: Progressing Towards Goal     Problem: Patient Education: Go to Patient Education Activity  Goal: Patient/Family Education  Outcome: Progressing Towards Goal     Problem: Patient Education: Go to Patient Education Activity  Goal: Patient/Family Education  Outcome: Progressing Towards Goal

## 2020-09-09 NOTE — PROGRESS NOTES
Problem: Falls - Risk of  Goal: *Absence of Falls  Description: Document Markus Zachary Fall Risk and appropriate interventions in the flowsheet.   Outcome: Progressing Towards Goal  Note: Fall Risk Interventions:  Mobility Interventions: Assess mobility with egress test, Communicate number of staff needed for ambulation/transfer, OT consult for ADLs, Patient to call before getting OOB, PT Consult for mobility concerns, PT Consult for assist device competence, Strengthening exercises (ROM-active/passive), Utilize walker, cane, or other assistive device, Utilize gait belt for transfers/ambulation         Medication Interventions: Assess postural VS orthostatic hypotension, Evaluate medications/consider consulting pharmacy, Patient to call before getting OOB, Teach patient to arise slowly, Utilize gait belt for transfers/ambulation    Elimination Interventions: Call light in reach, Patient to call for help with toileting needs, Stay With Me (per policy), Toilet paper/wipes in reach, Toileting schedule/hourly rounds    History of Falls Interventions: Consult care management for discharge planning, Door open when patient unattended, Evaluate medications/consider consulting pharmacy, Investigate reason for fall, Room close to nurse's station, Utilize gait belt for transfer/ambulation, Assess for delayed presentation/identification of injury for 48 hrs (comment for end date), Vital signs minimum Q4HRs X 24 hrs (comment for end date)         Problem: Patient Education: Go to Patient Education Activity  Goal: Patient/Family Education  Outcome: Progressing Towards Goal     Problem: Unstable angina/NSTEMI: Day 2  Goal: Off Pathway (Use only if patient is Off Pathway)  Outcome: Progressing Towards Goal  Goal: Activity/Safety  Outcome: Progressing Towards Goal  Goal: Consults, if ordered  Outcome: Progressing Towards Goal  Goal: Diagnostic Test/Procedures  Outcome: Progressing Towards Goal  Goal: Nutrition/Diet  Outcome: Progressing Towards Goal  Goal: Discharge Planning  Outcome: Progressing Towards Goal  Goal: Medications  Outcome: Progressing Towards Goal  Goal: Respiratory  Outcome: Progressing Towards Goal  Goal: Treatments/Interventions/Procedures  Outcome: Progressing Towards Goal  Goal: Psychosocial  Outcome: Progressing Towards Goal  Goal: *Hemodynamically stable  Outcome: Progressing Towards Goal  Goal: *Optimal pain control at patient's stated goal  Outcome: Progressing Towards Goal  Goal: *Lungs clear or at baseline  Outcome: Progressing Towards Goal     Problem: Pressure Injury - Risk of  Goal: *Prevention of pressure injury  Description: Document Bin Scale and appropriate interventions in the flowsheet. Outcome: Progressing Towards Goal  Note: Pressure Injury Interventions:  Sensory Interventions: Assess changes in LOC, Assess need for specialty bed, Avoid rigorous massage over bony prominences, Float heels, Keep linens dry and wrinkle-free, Maintain/enhance activity level, Minimize linen layers, Monitor skin under medical devices, Pad between skin to skin, Pressure redistribution bed/mattress (bed type), Sit a 90-degree angle/use footstool if needed, Turn and reposition approx.  every two hours (pillows and wedges if needed), Check visual cues for pain, Discuss PT/OT consult with provider    Moisture Interventions: Absorbent underpads, Apply protective barrier, creams and emollients, Assess need for specialty bed, Check for incontinence Q2 hours and as needed, Internal/External urinary devices, Limit adult briefs, Maintain skin hydration (lotion/cream), Minimize layers, Moisture barrier    Activity Interventions: Assess need for specialty bed, Increase time out of bed, Pressure redistribution bed/mattress(bed type), PT/OT evaluation    Mobility Interventions: Pressure redistribution bed/mattress (bed type), PT/OT evaluation, Float heels    Nutrition Interventions: Document food/fluid/supplement intake    Friction and Shear Interventions: Apply protective barrier, creams and emollients, Feet elevated on foot rest, Foam dressings/transparent film/skin sealants, HOB 30 degrees or less, Lift sheet, Lift team/patient mobility team, Minimize layers, Sit at 90-degree angle                Problem: Patient Education: Go to Patient Education Activity  Goal: Patient/Family Education  Outcome: Progressing Towards Goal     Problem: Patient Education: Go to Patient Education Activity  Goal: Patient/Family Education  Outcome: Progressing Towards Goal     Problem: Patient Education: Go to Patient Education Activity  Goal: Patient/Family Education  Outcome: Progressing Towards Goal

## 2020-09-09 NOTE — PROGRESS NOTES
Problem: Falls - Risk of  Goal: *Absence of Falls  Description: Document Rosie Logan Fall Risk and appropriate interventions in the flowsheet. Outcome: Progressing Towards Goal  Note: Fall Risk Interventions:  Mobility Interventions: Assess mobility with egress test, Communicate number of staff needed for ambulation/transfer, OT consult for ADLs, Patient to call before getting OOB, PT Consult for mobility concerns, PT Consult for assist device competence, Strengthening exercises (ROM-active/passive), Utilize walker, cane, or other assistive device, Utilize gait belt for transfers/ambulation         Medication Interventions: Assess postural VS orthostatic hypotension, Evaluate medications/consider consulting pharmacy, Patient to call before getting OOB, Teach patient to arise slowly, Utilize gait belt for transfers/ambulation    Elimination Interventions: Call light in reach, Patient to call for help with toileting needs, Stay With Me (per policy), Toilet paper/wipes in reach, Toileting schedule/hourly rounds    History of Falls Interventions: Consult care management for discharge planning, Door open when patient unattended, Evaluate medications/consider consulting pharmacy, Investigate reason for fall, Room close to nurse's station, Utilize gait belt for transfer/ambulation, Assess for delayed presentation/identification of injury for 48 hrs (comment for end date), Vital signs minimum Q4HRs X 24 hrs (comment for end date)         Problem: Patient Education: Go to Patient Education Activity  Goal: Patient/Family Education  Outcome: Progressing Towards Goal     Problem: Pressure Injury - Risk of  Goal: *Prevention of pressure injury  Description: Document Bin Scale and appropriate interventions in the flowsheet.   Outcome: Progressing Towards Goal  Note: Pressure Injury Interventions:  Sensory Interventions: Assess changes in LOC, Assess need for specialty bed, Avoid rigorous massage over bony prominences, Float heels, Keep linens dry and wrinkle-free, Maintain/enhance activity level, Minimize linen layers, Monitor skin under medical devices, Pad between skin to skin, Pressure redistribution bed/mattress (bed type), Sit a 90-degree angle/use footstool if needed, Turn and reposition approx.  every two hours (pillows and wedges if needed), Check visual cues for pain, Discuss PT/OT consult with provider    Moisture Interventions: Absorbent underpads, Apply protective barrier, creams and emollients, Assess need for specialty bed, Check for incontinence Q2 hours and as needed, Internal/External urinary devices, Limit adult briefs, Maintain skin hydration (lotion/cream), Minimize layers, Moisture barrier    Activity Interventions: Assess need for specialty bed, Increase time out of bed, Pressure redistribution bed/mattress(bed type), PT/OT evaluation    Mobility Interventions: Pressure redistribution bed/mattress (bed type), PT/OT evaluation, Float heels    Nutrition Interventions: Document food/fluid/supplement intake    Friction and Shear Interventions: Apply protective barrier, creams and emollients, Feet elevated on foot rest, Foam dressings/transparent film/skin sealants, HOB 30 degrees or less, Lift sheet, Lift team/patient mobility team, Minimize layers, Sit at 90-degree angle                Problem: Patient Education: Go to Patient Education Activity  Goal: Patient/Family Education  Outcome: Progressing Towards Goal

## 2020-09-09 NOTE — PROGRESS NOTES
[address] [place] [address]               Division of Cardiology  [phone]                       Progress note              Shawn Denton M.D., F.A.C.C. NAME:  Cy Prieto   :   1950   MRN:   [id number]         ICD-10-CM ICD-9-CM    1. Elevated troponin  R79.89 790.6    2. Fatigue, unspecified type  R53.83 780.79    3. Fall, sequela  W19. XXXS 909.4      E929.3                  HPI  Very pleasant 79years old female with a past medical history markable for hypertension hyperlipidemia who presented with complains of dizziness and weakness. No true syncope reported. No chest pain. No shortness of breath. Assessment/Plan: 1. Elevated troponin: She has had a minimal and flat troponin elevation. In my opinion this did not constitute a non-STEMI. Her EKG showed no acute ischemic changes and a right bundle branch block. Echocardiogram revealed normal ejection fraction and mild AI. Her nuclear stress test failed to reveal any ischemia and again normal ejection fraction. No additional cardiac intervention at this time. I suspect symptoms not related to any acute ischemic event. 2.  Hypertension: On amlodipine and metoprolol. As per primary team.  Blood pressure seems to be reasonable at this time. 3.  Generalized weakness: Still unclear etiology. For likely rehab upon discharge. 4.  DANIELLE: This seems to have resolved and dehydration may have contributed some of her weakness. Considering limiting the use of hydrochlorothiazide. No additional cardiac interventions at this time. I will sign off but will remain available as needed. CARDIAC STUDIES      20   ECHO ADULT COMPLETE 2020    Narrative · LV: Estimated LVEF is 55 - 60%. Visually measured ejection fraction. Normal cavity size and systolic function (ejection fraction normal). Mild   concentric hypertrophy. Mild (grade 1) left ventricular diastolic   dysfunction. · AV: Probably trileaflet aortic valve. Mild aortic valve regurgitation is   present. Signed by: Lynn Clayton MD       09/04/20   NUCLEAR CARDIAC STRESS TEST 09/08/2020 9/8/2020    Narrative · Baseline ECG: Sinus bradycardia, right bundle branch block. Indication: Mild troponin elevation. A Lexiscan myocardial SPECT gated wall motion study was performed   utilizing 10.5 mCi of Tc MYOVIEW for rest and 31.2 mCi for stress. SPECT imaging at stress and rest demonstrates no definite evidence of   ischemia and/or infarction. Left ventricular ejection fraction equals 69%. Left ventricular wall   motion and thickening are within normal limits. Impression :  1. No definite evidence of ischemia and/or infarction. 2. LVEF equals 69%.  Left ventricular wall motion and thickening is normal.     Signed by: Mary Beltrán MD             EKG Results     Procedure 720 Value Units Date/Time    EKG, 12 LEAD, INITIAL [[id number]] Collected:  09/06/20 1339    Order Status:  Completed Updated:  09/06/20 1720     Ventricular Rate 70 BPM      Atrial Rate 70 BPM      P-R Interval 178 ms      QRS Duration 148 ms      Q-T Interval 424 ms      QTC Calculation (Bezet) 457 ms      Calculated P Axis 47 degrees      Calculated R Axis -41 degrees      Calculated T Axis 1 degrees      Diagnosis --     Normal sinus rhythm  Left axis deviation  Right bundle branch block  Inferior infarct , age undetermined  Abnormal ECG  When compared with ECG of 04-SEP-2020 21:57,  premature ventricular complexes are no longer present  Inferior infarct is now present  Confirmed by Dereje Alicea MD ([id number]) on 9/6/2020 5:20:41 PM      EKG 12 LEAD INITIAL [[id number]] Collected:  09/04/20 2157    Order Status:  Completed Updated:  09/05/20 1807     Ventricular Rate 81 BPM      Atrial Rate 81 BPM      P-R Interval 152 ms      QRS Duration 144 ms      Q-T Interval 426 ms      QTC Calculation (Bezet) 494 ms      Calculated P Axis 46 degrees      Calculated R Axis -34 degrees      Calculated T Axis 4 degrees      Diagnosis --     Sinus rhythm with frequent premature ventricular complexes  Left axis deviation  Right bundle branch block  No previous ECGs available  Confirmed by Aryan Rodriguez MD ([id number]) on 9/5/2020 6:07:24 PM              IMAGING      CT Results (most recent):  Results from [place] encounter on 09/04/20   CT HEAD WO CONT    Narrative EXAM: CT HEAD WO CONT    INDICATION: weakness/ s/p fall    COMPARISON: None. CONTRAST: None. TECHNIQUE: Unenhanced CT of the head was performed using 5 mm images. Brain and  bone windows were generated. Coronal and sagittal reformats. CT dose reduction  was achieved through use of a standardized protocol tailored for this  examination and automatic exposure control for dose modulation. FINDINGS:  The ventricles and sulci are normal in size, shape and configuration. . There is  mild periventricular white matter hypodensity. There is no intracranial  hemorrhage, extra-axial collection, or mass effect. The basilar cisterns are  open. No CT evidence of acute infarct. The bone windows demonstrate no abnormalities. The visualized portions of the  paranasal sinuses and mastoid air cells are clear. Impression IMPRESSION:   Nonspecific white matter disease. No acute intracranial hemorrhage           XR Results (most recent):  Results from Hospital Encounter encounter on 09/04/20   XR HIPS BI W OR WO AP PELV    Narrative EXAM: XR HIPS BI W OR WO AP PELV    INDICATION: fall. COMPARISON: None. FINDINGS: 3 views bilateral hips. AP view of the pelvis and frogleg lateral  views of both hips demonstrate no fracture, dislocation or other acute  abnormality. Degenerative changes are present in the lumbar spine. There is mild  bilateral hip osteoarthritis.       Impression IMPRESSION:   No acute abnormality        MRI Results (most recent):  No results found for this or any previous visit. Past Medical History:   Diagnosis Date    History of mammogram 2010    normal    Hypertension     Pap smear for cervical cancer screening 2011    normal           Past Surgical History:   Procedure Laterality Date    HX GYN      hystterectomy               Visit Vitals  /80 (BP 1 Location: Right arm, BP Patient Position: At rest)   Pulse 61   Temp 97.7 °F (36.5 °C)   Resp 13   Ht 5' 6\" (1.676 m)   Wt 200 lb (90.7 kg)   SpO2 100%   BMI 32.28 kg/m²         Wt Readings from Last 3 Encounters:   09/09/20 200 lb (90.7 kg)   07/11/19 200 lb (90.7 kg)   04/18/19 206 lb (93.4 kg)         Review of Systems:   Pertinent items are noted in the History of Present Illness. No intake/output data recorded. 09/07 1901 - 09/09 0700  In: 240 [P.O.:240]  Out: 1350 [Urine:1350]      Telemetry: normal sinus rhythm    Physical Exam:    Visit Vitals  /80 (BP 1 Location: Right arm, BP Patient Position: At rest)   Pulse 61   Temp 97.7 °F (36.5 °C)   Resp 13   Ht 5' 6\" (1.676 m)   Wt 200 lb (90.7 kg)   SpO2 100%   BMI 32.28 kg/m²     General Appearance:  Well developed, well nourished,alert and oriented x 3, and individual in no acute distress. Ears/Nose/Mouth/Throat:   Hearing grossly normal.         Neck: Supple. Chest:   Lungs clear to auscultation bilaterally. Cardiovascular:  Regular rate and rhythm, S1, S2 normal, no murmur. Abdomen:   Soft, non-tender, bowel sounds are active. Extremities: No edema bilaterally. Skin: Warm and dry.                Current Facility-Administered Medications   Medication Dose Route Frequency    amLODIPine (NORVASC) tablet 5 mg  5 mg Oral DAILY    sodium chloride (NS) flush 5-40 mL  5-40 mL IntraVENous Q8H    sodium chloride (NS) flush 5-40 mL  5-40 mL IntraVENous PRN    acetaminophen (TYLENOL) tablet 650 mg  650 mg Oral Q6H PRN    Or    acetaminophen (TYLENOL) suppository 650 mg  650 mg Rectal Q6H PRN    polyethylene glycol (MIRALAX) packet 17 g  17 g Oral DAILY PRN    heparin (porcine) injection 5,000 Units  5,000 Units SubCUTAneous Q8H    atorvastatin (LIPITOR) tablet 80 mg  80 mg Oral QHS    metoprolol tartrate (LOPRESSOR) tablet 25 mg  25 mg Oral BID    potassium chloride SR (KLOR-CON 10) tablet 10 mEq  10 mEq Oral DAILY         All Cardiac Markers in the last 24 hours:  No results found for: CPK, CK, CKMMB, CKMB, RCK3, CKMBT, CKMBPOC, CKNDX, CKND1, TRACEY, TROPT, TROIQ, SERENITY, TROPT, TNIPOC, BNP, BNPP, BNPNT     Lab Results   Component Value Date/Time    Creatinine 0.90 09/08/2020 05:43 PM          Lab Results   Component Value Date/Time    Troponin-I, Qt. 0.17 (H) 09/06/2020 05:10 AM         Lab Results   Component Value Date/Time    WBC 4.7 09/08/2020 05:43 PM    HGB 13.3 09/08/2020 05:43 PM    HCT 41.1 09/08/2020 05:43 PM    PLATELET 677 77/10/7033 05:43 PM    MCV 85.8 09/08/2020 05:43 PM         Lab Results   Component Value Date/Time    Sodium 141 09/08/2020 05:43 PM    Potassium 3.7 09/08/2020 05:43 PM    Chloride 106 09/08/2020 05:43 PM    CO2 28 09/08/2020 05:43 PM    Anion gap 7 09/08/2020 05:43 PM    Glucose 84 09/08/2020 05:43 PM    BUN 9 09/08/2020 05:43 PM    Creatinine 0.90 09/08/2020 05:43 PM    BUN/Creatinine ratio 10 (L) 09/08/2020 05:43 PM    GFR est AA >60 09/08/2020 05:43 PM    GFR est non-AA >60 09/08/2020 05:43 PM    Calcium 9.1 09/08/2020 05:43 PM         No results found for: BNP, BNPP, BNPPPOC, XBNPT, BNPNT      Lab Results   Component Value Date/Time    Cholesterol, total 278 (H) 10/01/2014 01:04 PM    Cholesterol (POC) 229 04/18/2019 04:14 PM    HDL Cholesterol 60 10/01/2014 01:04 PM    HDL Cholesterol (POC) 56 04/18/2019 04:14 PM    LDL Cholesterol (POC) 162 04/18/2019 04:14 PM    LDL, calculated 196 (H) 10/01/2014 01:04 PM    VLDL, calculated 22 10/01/2014 01:04 PM    Triglyceride 112 10/01/2014 01:04 PM    Triglycerides (POC) 55 04/18/2019 04:14 PM         Lab Results Component Value Date/Time    ALT (SGPT) 20 09/05/2020 06:56 AM    Alk.  phosphatase 78 09/05/2020 06:56 AM    Bilirubin, total 0.6 09/05/2020 06:56 AM

## 2020-09-10 LAB
COVID-19, XGCOVT: NOT DETECTED
HEALTH STATUS, XMCV2T: NORMAL
SOURCE, COVRS: NORMAL
SPECIMEN SOURCE, FCOV2M: NORMAL
SPECIMEN TYPE, XMCV1T: NORMAL

## 2020-09-10 PROCEDURE — 74011250637 HC RX REV CODE- 250/637: Performed by: HOSPITALIST

## 2020-09-10 PROCEDURE — 99218 HC RM OBSERVATION: CPT

## 2020-09-10 PROCEDURE — 74011250636 HC RX REV CODE- 250/636: Performed by: FAMILY MEDICINE

## 2020-09-10 PROCEDURE — 74011250637 HC RX REV CODE- 250/637: Performed by: SPECIALIST

## 2020-09-10 PROCEDURE — 97116 GAIT TRAINING THERAPY: CPT

## 2020-09-10 PROCEDURE — 96372 THER/PROPH/DIAG INJ SC/IM: CPT

## 2020-09-10 PROCEDURE — 74011250637 HC RX REV CODE- 250/637: Performed by: INTERNAL MEDICINE

## 2020-09-10 PROCEDURE — 97530 THERAPEUTIC ACTIVITIES: CPT

## 2020-09-10 PROCEDURE — 74011250637 HC RX REV CODE- 250/637: Performed by: FAMILY MEDICINE

## 2020-09-10 RX ADMIN — Medication 10 ML: at 23:39

## 2020-09-10 RX ADMIN — POTASSIUM CHLORIDE 10 MEQ: 750 TABLET, FILM COATED, EXTENDED RELEASE ORAL at 10:03

## 2020-09-10 RX ADMIN — HEPARIN SODIUM 5000 UNITS: 5000 INJECTION INTRAVENOUS; SUBCUTANEOUS at 23:39

## 2020-09-10 RX ADMIN — AMLODIPINE BESYLATE 5 MG: 5 TABLET ORAL at 10:03

## 2020-09-10 RX ADMIN — ACETAMINOPHEN 650 MG: 325 TABLET ORAL at 03:44

## 2020-09-10 RX ADMIN — HEPARIN SODIUM 5000 UNITS: 5000 INJECTION INTRAVENOUS; SUBCUTANEOUS at 10:03

## 2020-09-10 RX ADMIN — ACETAMINOPHEN 650 MG: 325 TABLET ORAL at 22:19

## 2020-09-10 RX ADMIN — HEPARIN SODIUM 5000 UNITS: 5000 INJECTION INTRAVENOUS; SUBCUTANEOUS at 17:04

## 2020-09-10 RX ADMIN — ACETAMINOPHEN 650 MG: 325 TABLET ORAL at 13:54

## 2020-09-10 RX ADMIN — HEPARIN SODIUM 5000 UNITS: 5000 INJECTION INTRAVENOUS; SUBCUTANEOUS at 00:01

## 2020-09-10 RX ADMIN — METOPROLOL TARTRATE 25 MG: 25 TABLET, FILM COATED ORAL at 17:05

## 2020-09-10 RX ADMIN — Medication 10 ML: at 13:54

## 2020-09-10 RX ADMIN — Medication 10 ML: at 07:15

## 2020-09-10 RX ADMIN — METOPROLOL TARTRATE 25 MG: 25 TABLET, FILM COATED ORAL at 10:03

## 2020-09-10 NOTE — PROGRESS NOTES
1250 - Transition of Care  (ANNE-MARIE) Plan:        RUR:  11%     GLOS   X.X     LOS: 1 days    Dx: Fatigue                   Notes:     -- [facility]. Yes, willing to accept patient Starting auth. -- Covid test pending. Disposition:   Skilled Nursing - [address]  Transport:      Family     CM will continue to follow and assist with ANNE-MARIE needs as they arise. Available on [email or web address]. [employer]  MSN, [address] [facility],   RN, [address] - [phone].

## 2020-09-10 NOTE — PROGRESS NOTES
.. TRANSFER - IN REPORT:    Verbal report received from Hanna Morrison, [facility] (name) on Jose David Eng  being received from 6W (unit) for routine progression of care      Report consisted of patients Situation, Background, Assessment and   Recommendations(SBAR). Information from the following report(s) SBAR, Kardex and MAR was reviewed with the receiving nurse. Opportunity for questions and clarification was provided. Assessment completed upon patients arrival to unit and care assumed.      Patient will come in wheelchair

## 2020-09-10 NOTE — PROGRESS NOTES
Problem: Mobility Impaired (Adult and Pediatric)  Goal: *Acute Goals and Plan of Care (Insert Text)  Description: FUNCTIONAL STATUS PRIOR TO ADMISSION: Patient was independent and active without use of DME. When asked, she endorses 2 falls in the last 12 months. HOME SUPPORT PRIOR TO ADMISSION: The patient lived alone with family in town but did not require assist.    Physical Therapy Goals  Initiated 9/8/2020  1. Patient will move from supine to sit and sit to supine , scoot up and down, and roll side to side in bed with independence within 7 day(s). 2.  Patient will transfer from bed to chair and chair to bed with modified independence using the least restrictive device within 7 day(s). 3.  Patient will perform sit to stand with modified independence within 7 day(s). 4.  Patient will ambulate with modified independence for 200 feet with the least restrictive device within 7 day(s). 5.  Patient will ascend/descend 16 stairs with one handrail(s) with modified independence within 7 day(s). Outcome: Progressing Towards Goal   PHYSICAL THERAPY TREATMENT  Patient: Krystal Almendarez (1 y.o. female)  Date: 9/10/2020  Diagnosis: Elevated troponin [R79.89]  Fatigue [R53.83]  Fall [W19. XXXA]  Elevated troponin [R79.89]   <principal problem not specified>       Precautions: Fall  Chart, physical therapy assessment, plan of care and goals were reviewed. ASSESSMENT  Patient continues with skilled PT services and is progressing towards goals. Patient received up in chair and most agreeable to therapy. Continues to be below baseline with impaired standing balance, gait and overall decreased strength. Cues for increasing step length and clearance, worked on standing balance and sit to stand. Feel she would need assistance for safe mobility if returns home at this time and will benefit from rehab.      Current Level of Function Impacting Discharge (mobility/balance): min assist with bed, transfers; impaired gait    Other factors to consider for discharge:          PLAN :  Patient continues to benefit from skilled intervention to address the above impairments. Continue treatment per established plan of care. to address goals. Recommendation for discharge: (in order for the patient to meet his/her long term goals)  Therapy up to 5 days/week in SNF setting    This discharge recommendation:  Has been made in collaboration with the attending provider and/or case management    IF patient discharges home will need the following DME: rolling walker       SUBJECTIVE:   Patient stated Von Hirschfeld do feel better with the walker.     OBJECTIVE DATA SUMMARY:   Critical Behavior:  Neurologic State: Alert, Eyes open spontaneously  Orientation Level: Oriented X4  Cognition: Appropriate decision making, Appropriate for age attention/concentration, Appropriate safety awareness, Follows commands  Safety/Judgement: Awareness of environment, Good awareness of safety precautions  Functional Mobility Training:  Bed Mobility:                    Transfers:  Sit to Stand: Minimum assistance  Stand to Sit: Contact guard assistance                             Balance:  Sitting: Intact  Standing: Impaired; With support  Standing - Static: Fair  Standing - Dynamic : Fair  Ambulation/Gait Training:  Distance (ft): 40 Feet (ft)  Assistive Device: Gait belt;Walker, rolling  Ambulation - Level of Assistance: Contact guard assistance        Gait Abnormalities: Decreased step clearance;Shuffling gait        Base of Support: Center of gravity altered        Step Length: Right shortened;Left shortened      Therapeutic Exercises:   Instructed on seated exercises for LE and UE    After treatment patient left in no apparent distress:   Sitting in chair and Call bell within reach    COMMUNICATION/COLLABORATION:   The patients plan of care was discussed with: Registered nurse.      Bess Quiroga, PT   Time Calculation: 37 mins

## 2020-09-10 NOTE — PROGRESS NOTES
[facility] Adult  Hospitalist Group                                                                                          Hospitalist Progress Note  Terrence Beltrán NP  Answering service: [phone] OR [phone] from in house phone        Date of Service:  9/10/2020  NAME:  Terrie Villalobos  :  1950  MRN:  [id number]      Admission Summary:   History of present illness (copied from H&P)  \" Yolanda Chance is a 79 y.o. female with past medical history of hypertension presented to the ED from home s/p ground level fall with chief complaint of generalized fatigue and weakness.  Symptom onset reportedly began ~ 2 weeks ago, has been progressively worsening without specific aggravating or alleviating factors.  Tonight, while trying to mobilize out of bed, she reportedly slipped on the floor.  There was no reports head/ neck trauma or loss of consciousness.  There were no reports of new onset slurred speech, facial droop, syncope, loss of consciousness, headache, neck pain, visual disturbance, numbness, paresthesias, focal weakness, chest pain, shortness of breath, palpitations, abdominal pain, nausea, vomiting, diarrhea, melena, dysuria, hematuria, incontinence, calf pain, increased leg swelling/ edema, fever, chills, rash, or known exposure to sick persons or COVID 19 .  On arrival in the ED, workup included labs showing elevated troponin = 0.18.  BUN = 23.  Creatinine = 1.56 (compared to 1.17 on 2019).  AST = 50.   12 lead EKG showed sinus tachycardia, frequent PVCs, LAD, RBBB at 81 bpm.  Patient was given Aspirin 324 mg po x 1 dose.  Patient is now seen for admission to the hospitalist service for continued evaluation and treatments     Interval history / Subjective:     Seen and examined patient sitting in bedside chair. States that she is feeling good today. Denies having any complaints. No over night events noted. No acute distress noted.    Denies any dizziness, syncope, shortness of breath, chest pain or tightness, nausea, vomiting, or diarrhea. Assessment & Plan:     Dizziness/generliazed weakness -resolved  -could possibly be related to lower BP while on antihypertensives at home,dehydration  -UA wnl  -CXR wnl,TSH nl  -BP stable, no orthostatic hypotension.     Dehydration,bump in creatinine likely due to low Bp  -dehydration and creatinine  improved,     Elevated troponin  -echo-normal EF  -Normal stress test  - Cardiology signed off         History of SVT:  -she has no h/o atrial fib. -on metoprolol     HTN:  -Stable   - Continue Metoprolol and Amlodipine   - Monitor      Morbid obesity:  Body mass index is 32.28 kg/m². - Weight loss management to be followed up outpatient        Dispo: pending insurance auth for IPR    Code status: Full   DVT prophylaxis: Heparin     Care Plan discussed with: Patient/Family, Nurse and   Anticipated Disposition: [facility]/Rehab  Anticipated Discharge:TBD, awaiting covid results and insurance auth. Hospital Problems  Date Reviewed: 9/6/2020          Codes Class Noted POA    Hypokalemia ICD-10-CM: E87.6  ICD-9-CM: 276.8  9/6/2020 Unknown        Fatigue ICD-10-CM: R53.83  ICD-9-CM: 780.79  9/5/2020 Unknown        Fall ICD-10-CM: W19. Geovani Player  ICD-9-CM: E888.9  9/5/2020 Unknown        Elevated troponin ICD-10-CM: R79.89  ICD-9-CM: 790.6  9/5/2020 Unknown                Review of Systems:   A comprehensive review of systems was negative except for that written in the HPI. Vital Signs:    Last 24hrs VS reviewed since prior progress note.  Most recent are:  Visit Vitals  /64 (BP 1 Location: Right arm, BP Patient Position: Sitting)   Pulse 62   Temp 98 °F (36.7 °C)   Resp 14   Ht 5' 6\" (1.676 m)   Wt 90.7 kg (200 lb)   SpO2 98%   BMI 32.28 kg/m²         Intake/Output Summary (Last 24 hours) at 9/10/2020 1430  Last data filed at 9/10/2020 0800  Gross per 24 hour   Intake    Output 800 ml   Net -800 ml        Physical Examination: Constitutional:  No acute distress, cooperative, pleasant, answers questions appropriately    ENT:  Oral mucosa moist, oropharynx benign. Resp:  CTA bilaterally on room air. Denies cough. No wheezing/rhonchi/rales. No accessory muscle use   CV:  Regular rhythm, normal rate, no murmurs, gallops, rubs    GI:  Soft, non distended, non tender. normoactive bowel sounds, no hepatosplenomegaly     Musculoskeletal:  No edema, warm, 2+ pulses throughout    Neurologic:  Moves all extremities. AAOx3, CN II-XII reviewed, follows commands. Psych:  Good insight, Not anxious nor agitated. Skin:  Good turgor, no rashes or ulcers       Data Review:    Review and/or order of clinical lab test  Review and/or order of tests in the radiology section of CPT  Review and/or order of tests in the medicine section of CPT      Labs:     Recent Labs     09/08/20  1743   WBC 4.7   HGB 13.3   HCT 41.1        Recent Labs     09/08/20  1743      K 3.7      CO2 28   BUN 9   CREA 0.90   GLU 84   CA 9.1   PHOS 2.8     Recent Labs     09/08/20  1743   ALB 3.2*     No results for input(s): INR, PTP, APTT, INREXT in the last 72 hours. No results for input(s): FE, TIBC, PSAT, FERR in the last 72 hours. No results found for: FOL, RBCF   No results for input(s): PH, PCO2, PO2 in the last 72 hours. No results for input(s): CPK, CKNDX, TROIQ in the last 72 hours.     No lab exists for component: CPKMB  Lab Results   Component Value Date/Time    Cholesterol, total 278 (H) 10/01/2014 01:04 PM    HDL Cholesterol 60 10/01/2014 01:04 PM    LDL, calculated 196 (H) 10/01/2014 01:04 PM    Triglyceride 112 10/01/2014 01:04 PM     No results found for: GLUCPOC  Lab Results   Component Value Date/Time    Color DARK YELLOW 09/05/2020 12:43 AM    Appearance CLOUDY (A) 09/05/2020 12:43 AM    Specific gravity 1.029 09/05/2020 12:43 AM    pH (UA) 5.0 09/05/2020 12:43 AM    Protein 30 (A) 09/05/2020 12:43 AM    Glucose Negative 09/05/2020 12:43 AM    Ketone 15 (A) 09/05/2020 12:43 AM    Bilirubin Negative 09/05/2020 12:43 AM    Urobilinogen 1.0 09/05/2020 12:43 AM    Nitrites Negative 09/05/2020 12:43 AM    Leukocyte Esterase MODERATE (A) 09/05/2020 12:43 AM    Epithelial cells FEW 09/05/2020 12:43 AM    Bacteria Negative 09/05/2020 12:43 AM    WBC 5-10 09/05/2020 12:43 AM    RBC 0-5 09/05/2020 12:43 AM         Medications Reviewed:     Current Facility-Administered Medications   Medication Dose Route Frequency    amLODIPine (NORVASC) tablet 5 mg  5 mg Oral DAILY    sodium chloride (NS) flush 5-40 mL  5-40 mL IntraVENous Q8H    sodium chloride (NS) flush 5-40 mL  5-40 mL IntraVENous PRN    acetaminophen (TYLENOL) tablet 650 mg  650 mg Oral Q6H PRN    Or    acetaminophen (TYLENOL) suppository 650 mg  650 mg Rectal Q6H PRN    polyethylene glycol (MIRALAX) packet 17 g  17 g Oral DAILY PRN    heparin (porcine) injection 5,000 Units  5,000 Units SubCUTAneous Q8H    atorvastatin (LIPITOR) tablet 80 mg  80 mg Oral QHS    metoprolol tartrate (LOPRESSOR) tablet 25 mg  25 mg Oral BID    potassium chloride SR (KLOR-CON 10) tablet 10 mEq  10 mEq Oral DAILY     ______________________________________________________________________  EXPECTED LENGTH OF STAY: - - -  ACTUAL LENGTH OF STAY:          1                 Gila Fuel, NP

## 2020-09-10 NOTE — PROGRESS NOTES
Bedside and Verbal shift change report given to Shawna Hinds (oncoming nurse) by Zainab Tidwell (offgoing nurse).  Report included the following information SBAR, Kardex, MAR and Cardiac Rhythm NSR.

## 2020-09-10 NOTE — PROGRESS NOTES
Problem: Falls - Risk of  Goal: *Absence of Falls  Description: Document Shauna Dear Fall Risk and appropriate interventions in the flowsheet.   Outcome: Progressing Towards Goal  Note: Fall Risk Interventions:  Mobility Interventions: Bed/chair exit alarm, Communicate number of staff needed for ambulation/transfer, OT consult for ADLs, Patient to call before getting OOB, PT Consult for mobility concerns, PT Consult for assist device competence, Utilize gait belt for transfers/ambulation         Medication Interventions: Evaluate medications/consider consulting pharmacy, Patient to call before getting OOB, Teach patient to arise slowly    Elimination Interventions: Call light in reach, Patient to call for help with toileting needs, Stay With Me (per policy), Toilet paper/wipes in reach, Toileting schedule/hourly rounds    History of Falls Interventions: Consult care management for discharge planning, Door open when patient unattended, Evaluate medications/consider consulting pharmacy, Investigate reason for fall, Room close to nurse's station

## 2020-09-10 NOTE — PROGRESS NOTES
TRANSFER - OUT REPORT:    Verbal report given to ANDRAE Patricia(name) on Krunal Springer  being transferred to [facility](unit) for routine progression of care       Report consisted of patients Situation, Background, Assessment and   Recommendations(SBAR). Information from the following report(s) SBAR, Kardex, Intake/Output, MAR and Recent Results was reviewed with the receiving nurse. Lines:   Peripheral IV 09/04/20 Left;Upper Forearm (Active)   Site Assessment Clean, dry, & intact 09/10/20 1200   Phlebitis Assessment 0 09/10/20 1200   Infiltration Assessment 0 09/10/20 1200   Dressing Status Clean, dry, & intact 09/10/20 1200   Dressing Type Transparent;Tape 09/10/20 1200   Hub Color/Line Status Pink;Capped 09/10/20 1200   Action Taken Open ports on tubing capped 09/10/20 1200   Alcohol Cap Used Yes 09/10/20 1200        Opportunity for questions and clarification was provided.       Patient transported with:   Registered Nurse

## 2020-09-11 VITALS
RESPIRATION RATE: 18 BRPM | HEIGHT: 66 IN | BODY MASS INDEX: 29.23 KG/M2 | SYSTOLIC BLOOD PRESSURE: 161 MMHG | TEMPERATURE: 97.5 F | WEIGHT: 181.88 LBS | OXYGEN SATURATION: 100 % | HEART RATE: 62 BPM | DIASTOLIC BLOOD PRESSURE: 80 MMHG

## 2020-09-11 PROCEDURE — 74011250637 HC RX REV CODE- 250/637: Performed by: FAMILY MEDICINE

## 2020-09-11 PROCEDURE — 74011250637 HC RX REV CODE- 250/637: Performed by: HOSPITALIST

## 2020-09-11 PROCEDURE — 74011250637 HC RX REV CODE- 250/637: Performed by: SPECIALIST

## 2020-09-11 PROCEDURE — 74011250637 HC RX REV CODE- 250/637: Performed by: INTERNAL MEDICINE

## 2020-09-11 PROCEDURE — 99218 HC RM OBSERVATION: CPT

## 2020-09-11 RX ORDER — POTASSIUM CHLORIDE 750 MG/1
10 TABLET, FILM COATED, EXTENDED RELEASE ORAL DAILY
Qty: 14 TAB | Refills: 0 | Status: SHIPPED
Start: 2020-09-11 | End: 2020-10-26 | Stop reason: SDUPTHER

## 2020-09-11 RX ORDER — AMLODIPINE BESYLATE 5 MG/1
5 TABLET ORAL DAILY
Qty: 14 TAB | Refills: 0 | Status: SHIPPED
Start: 2020-09-11 | End: 2020-09-28 | Stop reason: ALTCHOICE

## 2020-09-11 RX ORDER — ATORVASTATIN CALCIUM 80 MG/1
80 TABLET, FILM COATED ORAL
Qty: 15 TAB | Refills: 0 | Status: SHIPPED
Start: 2020-09-11 | End: 2020-09-28 | Stop reason: ALTCHOICE

## 2020-09-11 RX ADMIN — AMLODIPINE BESYLATE 5 MG: 5 TABLET ORAL at 09:14

## 2020-09-11 RX ADMIN — METOPROLOL TARTRATE 25 MG: 25 TABLET, FILM COATED ORAL at 09:14

## 2020-09-11 RX ADMIN — POTASSIUM CHLORIDE 10 MEQ: 750 TABLET, FILM COATED, EXTENDED RELEASE ORAL at 09:14

## 2020-09-11 RX ADMIN — ACETAMINOPHEN 650 MG: 325 TABLET ORAL at 05:03

## 2020-09-11 NOTE — DISCHARGE INSTRUCTIONS
Discharge SNF/Rehab Instructions/LTAC       PATIENT ID: Myron Toddkeeper  MRN: [id number]   YOB: 1950    DATE OF ADMISSION: 9/4/2020 10:35 PM    DATE OF DISCHARGE: 9/11/2020    PRIMARY CARE PROVIDER: Lissa Quinones MD       ATTENDING PHYSICIAN: Denilson Simeon*  DISCHARGING PROVIDER: Aneta Elizondo NP     To contact this individual call [phone] and ask the  to page. If unavailable ask to be transferred the Adult Hospitalist Department. CONSULTATIONS: IP CONSULT TO CARDIOLOGY    PROCEDURES/SURGERIES: * No surgery found *    [address] COURSE:     History of present illness (copied from H&P)  \" Yolanda Chance is a 70 y.o. female with past medical history of hypertension presented to the ED from home s/p ground level fall with chief complaint of generalized fatigue and weakness.  Symptom onset reportedly began ~ 2 weeks ago, has been progressively worsening without specific aggravating or alleviating factors.  Tonight, while trying to mobilize out of bed, she reportedly slipped on the floor.  There was no reports head/ neck trauma or loss of consciousness.  There were no reports of new onset slurred speech, facial droop, syncope, loss of consciousness, headache, neck pain, visual disturbance, numbness, paresthesias, focal weakness, chest pain, shortness of breath, palpitations, abdominal pain, nausea, vomiting, diarrhea, melena, dysuria, hematuria, incontinence, calf pain, increased leg swelling/ edema, fever, chills, rash, or known exposure to sick persons or COVID 19 .  On arrival in the ED, workup included labs showing elevated troponin = 0.18.  BUN = 23.  Creatinine = 1.56 (compared to 1.17 on 5/2/2019).   AST = 50.   12 lead EKG showed sinus tachycardia, frequent PVCs, LAD, RBBB at 81 bpm.  Patient was given Aspirin 324 mg po x 1 dose.  Patient is now seen for admission to the hospitalist service for continued evaluation and treatments     DISCHARGE DIAGNOSES / PLAN:      Dizziness/generliazed weakness -resolved  -could possibly be related to lower BP while on antihypertensives at home,dehydration  -UA wnl  -CXR wnl,TSH nl  -BP stable, no orthostatic hypotension.     Dehydration,bump in creatinine likely due to low Bp  -dehydration and creatinine  improved,     Elevated troponin  -echo-normal EF  -Normal stress test  - Cardiology signed off         History of SVT:  -she has no h/o atrial fib. -on metoprolol     HTN:  -Stable   - Continue Metoprolol and Amlodipine   - Monitor      Morbid obesity:  Body mass index is 32.28 kg/m². - Weight loss management to be followed up outpatient          PENDING TEST RESULTS:   At the time of discharge the following test results are still pending: None   FOLLOW UP APPOINTMENTS:    Follow-up Information     Follow up With Specialties Details Why Mell Robins MD Internal Medicine Schedule an appointment as soon as possible for a visit  Critical access hospital  [phone]             ADDITIONAL CARE RECOMMENDATIONS:   Take medications as prescribed. Follow up with your primary care. DIET: Cardiac Diet      TUBE FEEDING INSTRUCTIONS: None     OXYGEN / BiPAP SETTINGS: None     ACTIVITY: PT/OT Eval and Treat    WOUND CARE: None     EQUIPMENT needed: Per PT/OT evaluation       DISCHARGE MEDICATIONS:   See Medication Reconciliation Form      NOTIFY YOUR PHYSICIAN FOR ANY OF THE FOLLOWING:   Fever over 101 degrees for 24 hours. Chest pain, shortness of breath, fever, chills, nausea, vomiting, diarrhea, change in mentation, falling, weakness, bleeding. Severe pain or pain not relieved by medications. Or, any other signs or symptoms that you may have questions about.     DISPOSITION:    Home With:   OT  PT  HH  RN      X SNF/Inpatient Rehab/LTAC    Independent/assisted living    Hospice    Other:       PATIENT CONDITION AT DISCHARGE:     Functional status Poor    X Deconditioned     Independent      Cognition   X  Lucid     Forgetful     Dementia      Catheters/lines (plus indication)    Luo     PICC     PEG    X None      Code status    X Full code     DNR      PHYSICAL EXAMINATION AT DISCHARGE:   Refer to Progress Note***      CHRONIC MEDICAL DIAGNOSES:  Problem List as of 9/11/2020 Date Reviewed: 9/6/2020          Codes Class Noted - Resolved    Hypokalemia ICD-10-CM: E87.6  ICD-9-CM: 276.8  9/6/2020 - Present        Fatigue ICD-10-CM: R53.83  ICD-9-CM: 780.79  9/5/2020 - Present        Fall ICD-10-CM: W19. Saulo Rast  ICD-9-CM: E888.9  9/5/2020 - Present        Elevated troponin ICD-10-CM: R79.89  ICD-9-CM: 790.6  9/5/2020 - Present        Hypertension ICD-10-CM: I10  ICD-9-CM: 401.9  10/27/2011 - Present        RESOLVED: Screening cholesterol level ICD-10-CM: Z13.220  ICD-9-CM: V77.91  10/27/2011 - 10/1/2019        RESOLVED: Screening for thyroid disorder ICD-10-CM: Z13.29  ICD-9-CM: V77.0  10/27/2011 - 10/1/2019                  Signed:   Pascual Reid NP  9/11/2020  8:40 AM

## 2020-09-11 NOTE — PROGRESS NOTES
Problem: Falls - Risk of  Goal: *Absence of Falls  Description: Document Norm Goetz Fall Risk and appropriate interventions in the flowsheet. Outcome: Progressing Towards Goal  Note: Fall Risk Interventions:  Mobility Interventions: Patient to call before getting OOB         Medication Interventions: Evaluate medications/consider consulting pharmacy    Elimination Interventions: Call light in reach    History of Falls Interventions: Door open when patient unattended         Problem: Patient Education: Go to Patient Education Activity  Goal: Patient/Family Education  Outcome: Progressing Towards Goal     Problem: Unstable angina/NSTEMI: Day 2  Goal: Off Pathway (Use only if patient is Off Pathway)  Outcome: Progressing Towards Goal  Goal: Activity/Safety  Outcome: Progressing Towards Goal  Goal: Consults, if ordered  Outcome: Progressing Towards Goal  Goal: Diagnostic Test/Procedures  Outcome: Progressing Towards Goal  Goal: Nutrition/Diet  Outcome: Progressing Towards Goal  Goal: Discharge Planning  Outcome: Progressing Towards Goal  Goal: Medications  Outcome: Progressing Towards Goal  Goal: Respiratory  Outcome: Progressing Towards Goal  Goal: Treatments/Interventions/Procedures  Outcome: Progressing Towards Goal  Goal: Psychosocial  Outcome: Progressing Towards Goal  Goal: *Hemodynamically stable  Outcome: Progressing Towards Goal  Goal: *Optimal pain control at patient's stated goal  Outcome: Progressing Towards Goal  Goal: *Lungs clear or at baseline  Outcome: Progressing Towards Goal     Problem: Pressure Injury - Risk of  Goal: *Prevention of pressure injury  Description: Document Bin Scale and appropriate interventions in the flowsheet.   Outcome: Progressing Towards Goal  Note: Pressure Injury Interventions:  Sensory Interventions: Discuss PT/OT consult with provider, Float heels    Moisture Interventions: Maintain skin hydration (lotion/cream)    Activity Interventions: Increase time out of bed, Pressure redistribution bed/mattress(bed type)    Mobility Interventions: HOB 30 degrees or less, Float heels    Nutrition Interventions: Document food/fluid/supplement intake    Friction and Shear Interventions: HOB 30 degrees or less, Lift sheet                Problem: Patient Education: Go to Patient Education Activity  Goal: Patient/Family Education  Outcome: Progressing Towards Goal     Problem: Patient Education: Go to Patient Education Activity  Goal: Patient/Family Education  Outcome: Progressing Towards Goal     Problem: Patient Education: Go to Patient Education Activity  Goal: Patient/Family Education  Outcome: Progressing Towards Goal

## 2020-09-11 NOTE — PROGRESS NOTES
0740 - Transition of Care  (ANNE-MARIE) Plan:        RUR:  11 %     GLOS   X.X     LOS: 6 days    Dx: Fatigue                    Notes:   -- [employer] Alert: YES response from [facility] and [address] re: Referral [id number] for patient in [facility] [id number]: Yes, willing to accept patient Got the auth! Want to send her tomorrow if you have the covid test?  Thanks Izzy    -- [facility] transport setup for 1010. Please be ready by 10:00a. [facility] form on chart. RN-RN report - [phone]    Disposition:   [address], [address] - [phone]   Transport: [facility]     CM will continue to follow and assist with ANNE-MARIE needs as they arise. Available on [employer]. [employer]  MSN, [facility],   RN, [address] [place] [address] - [phone]. Fax Cover Sheet    DATE: September 11, 2020   TO: Axel Plan  [phone]: [phone]   FROM: Denver Crigler MSN, [address] [facility], RN, [address] [place] [address]   PICU Clinical   [address] [place] [address]  [place]  [address].   PHONE: (N) [phone]   (q) [phone]     Re:  Jelly Ritter - Covid Results     MESSAGE:    Please let me know if you need anything else!! Thank you SO much! ! [place] :)

## 2020-09-11 NOTE — PROGRESS NOTES
TRANSFER - OUT REPORT:    Verbal report given to [facility] LPN(name) on Veronika Rouse  being transferred to [facility] for routine progression of care       Report consisted of patients Situation, Background, Assessment and   Recommendations(SBAR). Information from the following report(s) SBAR, MAR, Recent Results and Med Rec Status was reviewed with the receiving nurse. Lines:       Opportunity for questions and clarification was provided.       Patient transported with:   Zannel

## 2020-09-11 NOTE — DISCHARGE SUMMARY
Discharge Summary       PATIENT ID: Renea Castro  MRN: [id number]   YOB: 1950    DATE OF ADMISSION: 9/4/2020 10:35 PM    DATE OF DISCHARGE: 09/11/2020  PRIMARY CARE PROVIDER: Star Olmstead MD     ATTENDING PHYSICIAN: Oswaldo Hernandez MD  DISCHARGING PROVIDER: Sabina Kehr, NP    To contact this individual call [phone] and ask the  to page. If unavailable ask to be transferred the Adult Hospitalist Department. CONSULTATIONS: IP CONSULT TO CARDIOLOGY    PROCEDURES/SURGERIES: * No surgery found *    [address] COURSE:   History of present illness (copied from H&P)  \" Yolanda Chance is a 70 y.o. female with past medical history of hypertension presented to the ED from home s/p ground level fall with chief complaint of generalized fatigue and weakness.  Symptom onset reportedly began ~ 2 weeks ago, has been progressively worsening without specific aggravating or alleviating factors.  Tonight, while trying to mobilize out of bed, she reportedly slipped on the floor.  There was no reports head/ neck trauma or loss of consciousness.  There were no reports of new onset slurred speech, facial droop, syncope, loss of consciousness, headache, neck pain, visual disturbance, numbness, paresthesias, focal weakness, chest pain, shortness of breath, palpitations, abdominal pain, nausea, vomiting, diarrhea, melena, dysuria, hematuria, incontinence, calf pain, increased leg swelling/ edema, fever, chills, rash, or known exposure to sick persons or COVID 19 .  On arrival in the ED, workup included labs showing elevated troponin = 0.18.  BUN = 23.  Creatinine = 1.56 (compared to 1.17 on 5/2/2019).   AST = 50.   12 lead EKG showed sinus tachycardia, frequent PVCs, LAD, RBBB at 81 bpm.  Patient was given Aspirin 324 mg po x 1 dose.  Patient is now seen for admission to the hospitalist service for continued evaluation and treatments        Pelon Pappas / PLAN:      Dizziness/generliazed weakness -resolved  -could possibly be related to lower BP while on antihypertensives at home,dehydration  -UA wnl  -CXR wnl,TSH nl  -BP stable, no orthostatic hypotension.     Dehydration,bump in creatinine likely due to low Bp  -dehydration and creatinine  improved,     Elevated troponin  -echo-normal EF  -Normal stress test  - Cardiology signed off         History of SVT:  -she has no h/o atrial fib. -on metoprolol     HTN:  -Stable   - Continue Metoprolol and Amlodipine   - Monitor      Morbid obesity:  Body mass index is 32.28 kg/m². - Weight loss management to be followed up outpatient           ADDITIONAL CARE RECOMMENDATIONS:   Take medications as prescribed. Follow up with your primary care. PENDING TEST RESULTS:   At the time of discharge the following test results are still pending: None     FOLLOW UP APPOINTMENTS:    Follow-up Information     Follow up With Specialties Details Why Contact Dino Perez MD Internal Medicine Schedule an appointment as soon as possible for a visit  [place]  [phone]               DIET: Cardiac Diet      ACTIVITY: PT/OT Eval and Treat    WOUND CARE: None     EQUIPMENT needed: Per PT/OT evaluation       DISCHARGE MEDICATIONS:  Current Discharge Medication List      START taking these medications    Details   potassium chloride SR (KLOR-CON 10) 10 mEq tablet Take 1 Tab by mouth daily. Qty: 14 Tab, Refills: 0         CONTINUE these medications which have CHANGED    Details   amLODIPine (NORVASC) 5 mg tablet Take 1 Tab by mouth daily. Qty: 14 Tab, Refills: 0      atorvastatin (LIPITOR) 80 mg tablet Take 1 Tab by mouth nightly.   Qty: 15 Tab, Refills: 0         CONTINUE these medications which have NOT CHANGED    Details   aspirin-acetaminophen-caffeine (EXCEDRIN ES) 250-250-65 mg per tablet Take 1 Tab by mouth every eight (8) hours as needed for Headache.      metoprolol tartrate (LOPRESSOR) 25 mg tablet TAKE 1 TABLET TWICE DAILY  Qty: 180 Tab, Refills: 3      hydroCHLOROthiazide (HYDRODIURIL) 12.5 mg tablet TAKE 1 TABLET EVERY DAY  Qty: 90 Tab, Refills: 4    Associated Diagnoses: Essential hypertension with goal blood pressure less than 140/90         STOP taking these medications       KLOR-CON M20 20 mEq tablet Comments:   Reason for Stopping:                 NOTIFY YOUR PHYSICIAN FOR ANY OF THE FOLLOWING:   Fever over 101 degrees for 24 hours. Chest pain, shortness of breath, fever, chills, nausea, vomiting, diarrhea, change in mentation, falling, weakness, bleeding. Severe pain or pain not relieved by medications. Or, any other signs or symptoms that you may have questions about. DISPOSITION:    Home With:   OT  PT  HH  RN      X Long term SNF/Inpatient Rehab    Independent/assisted living    Hospice    Other:       PATIENT CONDITION AT DISCHARGE:     Functional status    Poor    X Deconditioned     Independent      Cognition    X Lucid     Forgetful     Dementia      Catheters/lines (plus indication)    Luo     PICC     PEG    X None      Code status   X  Full code     DNR      PHYSICAL EXAMINATION AT DISCHARGE:  General:          Alert, cooperative, no distress, appears stated age. HEENT:           Atraumatic, anicteric sclerae, pink conjunctivae                          No oral ulcers, mucosa moist, throat clear, dentition fair  Neck:               Supple, symmetrical  Lungs:             Clear to auscultation bilaterally. No Wheezing or Rhonchi. No rales. Chest wall:      No tenderness  No Accessory muscle use. Heart:              Regular  rhythm,  No  murmur   No edema  Abdomen:        Soft, non-tender. Not distended. Bowel sounds normal  Extremities:     No cyanosis. No clubbing,                            Skin turgor normal, Capillary refill normal  Skin:                Not pale. Not Jaundiced  No rashes   Psych:             Not anxious or agitated.   Neurologic: Alert, moves all extremities, answers questions appropriately and responds to commands       CHRONIC MEDICAL DIAGNOSES:  Problem List as of 9/11/2020 Date Reviewed: 9/6/2020          Codes Class Noted - Resolved    Hypokalemia ICD-10-CM: E87.6  ICD-9-CM: 276.8  9/6/2020 - Present        Fatigue ICD-10-CM: R53.83  ICD-9-CM: 780.79  9/5/2020 - Present        Fall ICD-10-CM: W19. Toma Laity  ICD-9-CM: E888.9  9/5/2020 - Present        Elevated troponin ICD-10-CM: R79.89  ICD-9-CM: 790.6  9/5/2020 - Present        Hypertension ICD-10-CM: I10  ICD-9-CM: 401.9  10/27/2011 - Present        RESOLVED: Screening cholesterol level ICD-10-CM: Z13.220  ICD-9-CM: V77.91  10/27/2011 - 10/1/2019        RESOLVED: Screening for thyroid disorder ICD-10-CM: Z13.29  ICD-9-CM: V77.0  10/27/2011 - 10/1/2019              Greater than 45 minutes were spent with the patient on counseling and coordination of care    Signed:   Mitali Vergara NP  9/11/2020  8:43 AM

## 2020-09-14 ENCOUNTER — PATIENT OUTREACH (OUTPATIENT)
Dept: CASE MANAGEMENT | Age: 70
End: 2020-09-14

## 2020-09-14 NOTE — PROGRESS NOTES
Transition of care outreach postponed for 14 days due to patient's discharge to SNF. Per EMR patient discharged to [facility] and [address] [place] [address]. Will continue to monitor patient progress.

## 2020-09-28 ENCOUNTER — OFFICE VISIT (OUTPATIENT)
Dept: INTERNAL MEDICINE CLINIC | Age: 70
End: 2020-09-28
Payer: MEDICARE

## 2020-09-28 ENCOUNTER — PATIENT OUTREACH (OUTPATIENT)
Dept: CASE MANAGEMENT | Age: 70
End: 2020-09-28

## 2020-09-28 VITALS
BODY MASS INDEX: 29.36 KG/M2 | OXYGEN SATURATION: 98 % | HEART RATE: 81 BPM | HEIGHT: 66 IN | SYSTOLIC BLOOD PRESSURE: 96 MMHG | TEMPERATURE: 99.3 F | RESPIRATION RATE: 16 BRPM | DIASTOLIC BLOOD PRESSURE: 72 MMHG

## 2020-09-28 DIAGNOSIS — I10 ESSENTIAL HYPERTENSION WITH GOAL BLOOD PRESSURE LESS THAN 140/90: ICD-10-CM

## 2020-09-28 DIAGNOSIS — E78.00 HYPERCHOLESTEREMIA: ICD-10-CM

## 2020-09-28 DIAGNOSIS — E87.6 HYPOKALEMIA: Primary | ICD-10-CM

## 2020-09-28 PROCEDURE — 1090F PRES/ABSN URINE INCON ASSESS: CPT | Performed by: INTERNAL MEDICINE

## 2020-09-28 PROCEDURE — G8752 SYS BP LESS 140: HCPCS | Performed by: INTERNAL MEDICINE

## 2020-09-28 PROCEDURE — G8510 SCR DEP NEG, NO PLAN REQD: HCPCS | Performed by: INTERNAL MEDICINE

## 2020-09-28 PROCEDURE — G8419 CALC BMI OUT NRM PARAM NOF/U: HCPCS | Performed by: INTERNAL MEDICINE

## 2020-09-28 PROCEDURE — G8400 PT W/DXA NO RESULTS DOC: HCPCS | Performed by: INTERNAL MEDICINE

## 2020-09-28 PROCEDURE — G8754 DIAS BP LESS 90: HCPCS | Performed by: INTERNAL MEDICINE

## 2020-09-28 PROCEDURE — 3017F COLORECTAL CA SCREEN DOC REV: CPT | Performed by: INTERNAL MEDICINE

## 2020-09-28 PROCEDURE — G8427 DOCREV CUR MEDS BY ELIG CLIN: HCPCS | Performed by: INTERNAL MEDICINE

## 2020-09-28 PROCEDURE — 99214 OFFICE O/P EST MOD 30 MIN: CPT | Performed by: INTERNAL MEDICINE

## 2020-09-28 PROCEDURE — 1100F PTFALLS ASSESS-DOCD GE2>/YR: CPT | Performed by: INTERNAL MEDICINE

## 2020-09-28 PROCEDURE — G8536 NO DOC ELDER MAL SCRN: HCPCS | Performed by: INTERNAL MEDICINE

## 2020-09-28 PROCEDURE — 3288F FALL RISK ASSESSMENT DOCD: CPT | Performed by: INTERNAL MEDICINE

## 2020-09-28 RX ORDER — DICLOFENAC SODIUM 75 MG/1
75 TABLET, DELAYED RELEASE ORAL 2 TIMES DAILY
Qty: 60 TAB | Refills: 2 | Status: SHIPPED | OUTPATIENT
Start: 2020-09-28 | End: 2020-12-02

## 2020-09-28 NOTE — PROGRESS NOTES
Kailyn Valle is a 79 y.o. female and presents with Hospital Follow Up; Fatigue; and Leg Pain (right)  . Subjective:    She presents for a hospital follow up evaluation  She had a fall and was found to have dehydration  She has weakness in both legs as well as swelling    Hypertension Review:  The patient has hypertension . Diet and Lifestyle: generally follows a low sodium diet, exercises sporadically  Home BP Monitoring: is not measured at home. Pertinent ROS: taking medications as instructed, no medication side effects noted, no TIA's, no chest pain on exertion, no dyspnea on exertion, no swelling of ankles. Dyslipidemia Review:  Patient presents for evaluation of lipids. Compliance with treatment thus far has been excellent. A repeat fasting lipid profile was done. The patient does not use medications that may worsen dyslipidemias . The patient exercises sporadically. Review of Systems  Constitutional: negative for fevers, chills, anorexia and weight loss  Eyes:   negative for visual disturbance and irritation  ENT:   negative for tinnitus,sore throat,nasal congestion,ear pains. hoarseness  Respiratory:  negative for cough, hemoptysis, dyspnea,wheezing  CV:   negative for chest pain, palpitations, lower extremity edema  GI:   negative for nausea, vomiting, diarrhea, abdominal pain,melena  Endo:               negative for polyuria,polydipsia,polyphagia,heat intolerance  Genitourinary: negative for frequency, dysuria and hematuria  Integument:  negative for rash and pruritus  Hematologic:  negative for easy bruising and gum/nose bleeding  Musculoskel: negative for myalgias, arthralgias, back pain, muscle weakness, joint pain  Neurological:  negative for headaches, dizziness, vertigo, memory problems and gait   Behavl/Psych: negative for feelings of anxiety, depression, mood changes    Past Medical History:   Diagnosis Date    History of mammogram 2010    normal    Hypertension     Pap smear for cervical cancer screening 2011    normal     Past Surgical History:   Procedure Laterality Date    HX GYN      hystterectomy     Social History     Socioeconomic History    Marital status:      Spouse name: Not on file    Number of children: Not on file    Years of education: Not on file    Highest education level: Not on file   Tobacco Use    Smoking status: Never Smoker    Smokeless tobacco: Never Used   Substance and Sexual Activity    Alcohol use: Yes     Comment: occasional    Drug use: No     History reviewed. No pertinent family history. Current Outpatient Medications   Medication Sig Dispense Refill    potassium chloride SR (KLOR-CON 10) 10 mEq tablet Take 1 Tab by mouth daily. 14 Tab 0    aspirin-acetaminophen-caffeine (EXCEDRIN ES) 250-250-65 mg per tablet Take 1 Tab by mouth every eight (8) hours as needed for Headache.  metoprolol tartrate (LOPRESSOR) 25 mg tablet TAKE 1 TABLET TWICE DAILY 180 Tab 3    hydroCHLOROthiazide (HYDRODIURIL) 12.5 mg tablet TAKE 1 TABLET EVERY DAY (Patient taking differently: daily as needed.  TAKE 1 TABLET EVERY DAY) 90 Tab 4     Allergies   Allergen Reactions    Codeine Itching and Swelling       Objective:  Visit Vitals  BP 96/72   Pulse 81   Temp 99.3 °F (37.4 °C) (Oral)   Resp 16   Ht 5' 6\" (1.676 m)   SpO2 98%   BMI 29.36 kg/m²     Physical Exam:   General appearance - alert, well appearing, and in no distress  Mental status - alert, oriented to person, place, and time  EYE-SUSSY, EOMI, corneas normal, no foreign bodies  ENT-ENT exam normal, no neck nodes or sinus tenderness  Nose - normal and patent, no erythema, discharge or polyps  Mouth - mucous membranes moist, pharynx normal without lesions  Neck - supple, no significant adenopathy   Chest - clear to auscultation, no wheezes, rales or rhonchi, symmetric air entry   Heart - normal rate, regular rhythm, normal S1, S2, no murmurs, rubs, clicks or gallops   Abdomen - soft, nontender, nondistended, no masses or organomegaly  Lymph- no adenopathy palpable  Ext-peripheral pulses normal, 4+bilateal edema, no clubbing or cyanosis  Skin-Warm and dry. no hyperpigmentation, vitiligo, or suspicious lesions  Neuro -alert, oriented, normal speech, no focal findings or movement disorder noted  Neck-normal C-spine, no tenderness, full ROM without pain  Feet-no nail deformities or callus formation with good pulses noted      Results for orders placed or performed during the hospital encounter of 09/04/20   SAMPLES BEING HELD   Result Value Ref Range    SAMPLES BEING HELD 1red,1blu     COMMENT        Add-on orders for these samples will be processed based on acceptable specimen integrity and analyte stability, which may vary by analyte. CBC WITH AUTOMATED DIFF   Result Value Ref Range    WBC 7.3 3.6 - 11.0 K/uL    RBC 4.98 3.80 - 5.20 M/uL    HGB 13.9 11.5 - 16.0 g/dL    HCT 42.9 35.0 - 47.0 %    MCV 86.1 80.0 - 99.0 FL    MCH 27.9 26.0 - 34.0 PG    MCHC 32.4 30.0 - 36.5 g/dL    RDW 14.4 11.5 - 14.5 %    PLATELET 244 004 - 296 K/uL    MPV 10.4 8.9 - 12.9 FL    NRBC 0.0 0  WBC    ABSOLUTE NRBC 0.00 0.00 - 0.01 K/uL    NEUTROPHILS 78 (H) 32 - 75 %    LYMPHOCYTES 15 12 - 49 %    MONOCYTES 6 5 - 13 %    EOSINOPHILS 0 0 - 7 %    BASOPHILS 1 0 - 1 %    IMMATURE GRANULOCYTES 0 0.0 - 0.5 %    ABS. NEUTROPHILS 5.7 1.8 - 8.0 K/UL    ABS. LYMPHOCYTES 1.1 0.8 - 3.5 K/UL    ABS. MONOCYTES 0.4 0.0 - 1.0 K/UL    ABS. EOSINOPHILS 0.0 0.0 - 0.4 K/UL    ABS. BASOPHILS 0.0 0.0 - 0.1 K/UL    ABS. IMM.  GRANS. 0.0 0.00 - 0.04 K/UL    DF AUTOMATED     METABOLIC PANEL, COMPREHENSIVE   Result Value Ref Range    Sodium 138 136 - 145 mmol/L    Potassium 4.1 3.5 - 5.1 mmol/L    Chloride 104 97 - 108 mmol/L    CO2 26 21 - 32 mmol/L    Anion gap 8 5 - 15 mmol/L    Glucose 101 (H) 65 - 100 mg/dL    BUN 23 (H) 6 - 20 MG/DL    Creatinine 1.56 (H) 0.55 - 1.02 MG/DL    BUN/Creatinine ratio 15 12 - 20      GFR est AA 40 (L) >60 ml/min/1.73m2    GFR est non-AA 33 (L) >60 ml/min/1.73m2    Calcium 10.0 8.5 - 10.1 MG/DL    Bilirubin, total 0.6 0.2 - 1.0 MG/DL    ALT (SGPT) 21 12 - 78 U/L    AST (SGOT) 50 (H) 15 - 37 U/L    Alk. phosphatase 80 45 - 117 U/L    Protein, total 8.2 6.4 - 8.2 g/dL    Albumin 3.8 3.5 - 5.0 g/dL    Globulin 4.4 (H) 2.0 - 4.0 g/dL    A-G Ratio 0.9 (L) 1.1 - 2.2     TROPONIN I   Result Value Ref Range    Troponin-I, Qt. 0.18 (H) <0.05 ng/mL   URINALYSIS W/ RFLX MICROSCOPIC   Result Value Ref Range    Color DARK YELLOW      Appearance CLOUDY (A) CLEAR      Specific gravity 1.029 1.003 - 1.030      pH (UA) 5.0 5.0 - 8.0      Protein 30 (A) NEG mg/dL    Glucose Negative NEG mg/dL    Ketone 15 (A) NEG mg/dL    Bilirubin Negative NEG      Blood TRACE (A) NEG      Urobilinogen 1.0 0.2 - 1.0 EU/dL    Nitrites Negative NEG      Leukocyte Esterase MODERATE (A) NEG      WBC 5-10 0 - 4 /hpf    RBC 0-5 0 - 5 /hpf    Epithelial cells FEW FEW /lpf    Bacteria Negative NEG /hpf    Hyaline cast 2-5 0 - 5 /lpf    RBC cast 0-2 (A) NEG /LPF    Other: Renal Epithelial cells Present     SAMPLES BEING HELD   Result Value Ref Range    SAMPLES BEING HELD UC HOLD     COMMENT        Add-on orders for these samples will be processed based on acceptable specimen integrity and analyte stability, which may vary by analyte. METABOLIC PANEL, COMPREHENSIVE   Result Value Ref Range    Sodium 139 136 - 145 mmol/L    Potassium 3.2 (L) 3.5 - 5.1 mmol/L    Chloride 103 97 - 108 mmol/L    CO2 25 21 - 32 mmol/L    Anion gap 11 5 - 15 mmol/L    Glucose 90 65 - 100 mg/dL    BUN 21 (H) 6 - 20 MG/DL    Creatinine 1.24 (H) 0.55 - 1.02 MG/DL    BUN/Creatinine ratio 17 12 - 20      GFR est AA 52 (L) >60 ml/min/1.73m2    GFR est non-AA 43 (L) >60 ml/min/1.73m2    Calcium 9.5 8.5 - 10.1 MG/DL    Bilirubin, total 0.6 0.2 - 1.0 MG/DL    ALT (SGPT) 20 12 - 78 U/L    AST (SGOT) 45 (H) 15 - 37 U/L    Alk.  phosphatase 78 45 - 117 U/L    Protein, total 7.4 6.4 - 8.2 g/dL Albumin 3.5 3.5 - 5.0 g/dL    Globulin 3.9 2.0 - 4.0 g/dL    A-G Ratio 0.9 (L) 1.1 - 2.2     LACTIC ACID   Result Value Ref Range    Lactic acid 1.2 0.4 - 2.0 MMOL/L   MAGNESIUM   Result Value Ref Range    Magnesium 2.3 1.6 - 2.4 mg/dL   CBC WITH AUTOMATED DIFF   Result Value Ref Range    WBC 5.8 3.6 - 11.0 K/uL    RBC 4.65 3.80 - 5.20 M/uL    HGB 13.0 11.5 - 16.0 g/dL    HCT 39.8 35.0 - 47.0 %    MCV 85.6 80.0 - 99.0 FL    MCH 28.0 26.0 - 34.0 PG    MCHC 32.7 30.0 - 36.5 g/dL    RDW 13.9 11.5 - 14.5 %    PLATELET 608 933 - 922 K/uL    MPV 10.0 8.9 - 12.9 FL    NRBC 0.0 0  WBC    ABSOLUTE NRBC 0.00 0.00 - 0.01 K/uL    NEUTROPHILS 62 32 - 75 %    LYMPHOCYTES 28 12 - 49 %    MONOCYTES 7 5 - 13 %    EOSINOPHILS 2 0 - 7 %    BASOPHILS 1 0 - 1 %    IMMATURE GRANULOCYTES 0 0.0 - 0.5 %    ABS. NEUTROPHILS 3.6 1.8 - 8.0 K/UL    ABS. LYMPHOCYTES 1.6 0.8 - 3.5 K/UL    ABS. MONOCYTES 0.4 0.0 - 1.0 K/UL    ABS. EOSINOPHILS 0.1 0.0 - 0.4 K/UL    ABS. BASOPHILS 0.0 0.0 - 0.1 K/UL    ABS. IMM. GRANS. 0.0 0.00 - 0.04 K/UL    DF AUTOMATED     PROTHROMBIN TIME + INR   Result Value Ref Range    INR 1.1 0.9 - 1.1      Prothrombin time 11.2 (H) 9.0 - 11.1 sec   MIXING STUDY, APTT   Result Value Ref Range    aPTT mixing study          aPTT 28.5 22.1 - 32.0 sec    aPTT 1:1 mix patient  sec     Mixing study not performed as it is uninterpretable when PTT is normal or <= 5 seconds prolonged.    HEMOGLOBIN A1C WITH EAG   Result Value Ref Range    Hemoglobin A1c 5.6 4.0 - 5.6 %    Est. average glucose 114 mg/dL   SED RATE (ESR)   Result Value Ref Range    Sed rate, automated 3 0 - 30 mm/hr   TSH 3RD GENERATION   Result Value Ref Range    TSH 0.74 0.36 - 3.74 uIU/mL   CBC W/O DIFF   Result Value Ref Range    WBC 4.5 3.6 - 11.0 K/uL    RBC 4.01 3.80 - 5.20 M/uL    HGB 11.1 (L) 11.5 - 16.0 g/dL    HCT 34.5 (L) 35.0 - 47.0 %    MCV 86.0 80.0 - 99.0 FL    MCH 27.7 26.0 - 34.0 PG    MCHC 32.2 30.0 - 36.5 g/dL    RDW 14.2 11.5 - 14.5 % PLATELET 981 667 - 933 K/uL    MPV 9.8 8.9 - 12.9 FL    NRBC 0.0 0  WBC    ABSOLUTE NRBC 0.00 0.00 - 8.89 K/uL   METABOLIC PANEL, BASIC   Result Value Ref Range    Sodium 141 136 - 145 mmol/L    Potassium 3.3 (L) 3.5 - 5.1 mmol/L    Chloride 108 97 - 108 mmol/L    CO2 28 21 - 32 mmol/L    Anion gap 5 5 - 15 mmol/L    Glucose 94 65 - 100 mg/dL    BUN 22 (H) 6 - 20 MG/DL    Creatinine 1.21 (H) 0.55 - 1.02 MG/DL    BUN/Creatinine ratio 18 12 - 20      GFR est AA 53 (L) >60 ml/min/1.73m2    GFR est non-AA 44 (L) >60 ml/min/1.73m2    Calcium 8.7 8.5 - 10.1 MG/DL   TROPONIN I   Result Value Ref Range    Troponin-I, Qt. 0.17 (H) <9.39 ng/mL   METABOLIC PANEL, BASIC   Result Value Ref Range    Sodium 142 136 - 145 mmol/L    Potassium 3.8 3.5 - 5.1 mmol/L    Chloride 109 (H) 97 - 108 mmol/L    CO2 29 21 - 32 mmol/L    Anion gap 4 (L) 5 - 15 mmol/L    Glucose 92 65 - 100 mg/dL    BUN 12 6 - 20 MG/DL    Creatinine 1.01 0.55 - 1.02 MG/DL    BUN/Creatinine ratio 12 12 - 20      GFR est AA >60 >60 ml/min/1.73m2    GFR est non-AA 54 (L) >60 ml/min/1.73m2    Calcium 9.0 8.5 - 10.1 MG/DL   CBC WITH AUTOMATED DIFF   Result Value Ref Range    WBC 4.7 3.6 - 11.0 K/uL    RBC 4.79 3.80 - 5.20 M/uL    HGB 13.3 11.5 - 16.0 g/dL    HCT 41.1 35.0 - 47.0 %    MCV 85.8 80.0 - 99.0 FL    MCH 27.8 26.0 - 34.0 PG    MCHC 32.4 30.0 - 36.5 g/dL    RDW 14.4 11.5 - 14.5 %    PLATELET 189 813 - 236 K/uL    MPV 10.2 8.9 - 12.9 FL    NRBC 0.0 0  WBC    ABSOLUTE NRBC 0.00 0.00 - 0.01 K/uL    NEUTROPHILS 55 32 - 75 %    LYMPHOCYTES 32 12 - 49 %    MONOCYTES 7 5 - 13 %    EOSINOPHILS 5 0 - 7 %    BASOPHILS 1 0 - 1 %    IMMATURE GRANULOCYTES 0 0.0 - 0.5 %    ABS. NEUTROPHILS 2.6 1.8 - 8.0 K/UL    ABS. LYMPHOCYTES 1.5 0.8 - 3.5 K/UL    ABS. MONOCYTES 0.3 0.0 - 1.0 K/UL    ABS. EOSINOPHILS 0.3 0.0 - 0.4 K/UL    ABS. BASOPHILS 0.0 0.0 - 0.1 K/UL    ABS. IMM.  GRANS. 0.0 0.00 - 0.04 K/UL    DF AUTOMATED     RENAL FUNCTION PANEL   Result Value Ref Range    Sodium 141 136 - 145 mmol/L    Potassium 3.7 3.5 - 5.1 mmol/L    Chloride 106 97 - 108 mmol/L    CO2 28 21 - 32 mmol/L    Anion gap 7 5 - 15 mmol/L    Glucose 84 65 - 100 mg/dL    BUN 9 6 - 20 MG/DL    Creatinine 0.90 0.55 - 1.02 MG/DL    BUN/Creatinine ratio 10 (L) 12 - 20      GFR est AA >60 >60 ml/min/1.73m2    GFR est non-AA >60 >60 ml/min/1.73m2    Calcium 9.1 8.5 - 10.1 MG/DL    Phosphorus 2.8 2.6 - 4.7 MG/DL    Albumin 3.2 (L) 3.5 - 5.0 g/dL   SARS-COV-2   Result Value Ref Range    Specimen source Nasopharyngeal      Specimen source Nasopharyngeal      Specimen type NP Swab      Health status Symptomatic Testing      COVID-19 Not Detected Not Detected     EKG, 12 LEAD, INITIAL   Result Value Ref Range    Ventricular Rate 81 BPM    Atrial Rate 81 BPM    P-R Interval 152 ms    QRS Duration 144 ms    Q-T Interval 426 ms    QTC Calculation (Bezet) 494 ms    Calculated P Axis 46 degrees    Calculated R Axis -34 degrees    Calculated T Axis 4 degrees    Diagnosis       Sinus rhythm with frequent premature ventricular complexes  Left axis deviation  Right bundle branch block  No previous ECGs available  Confirmed by Aryan Rodriguez MD ([id number]) on 9/5/2020 6:07:24 PM     EKG, 12 LEAD, INITIAL   Result Value Ref Range    Ventricular Rate 70 BPM    Atrial Rate 70 BPM    P-R Interval 178 ms    QRS Duration 148 ms    Q-T Interval 424 ms    QTC Calculation (Bezet) 457 ms    Calculated P Axis 47 degrees    Calculated R Axis -41 degrees    Calculated T Axis 1 degrees    Diagnosis       Normal sinus rhythm  Left axis deviation  Right bundle branch block  Inferior infarct , age undetermined  Abnormal ECG  When compared with ECG of 04-SEP-2020 21:57,  premature ventricular complexes are no longer present  Inferior infarct is now present  Confirmed by Aryan Rodriguez MD [id number]) on 9/6/2020 5:20:41 PM     NUCLEAR CARDIAC STRESS TEST   Result Value Ref Range    Target  bpm    Exercise duration time 00:03:00     Stress Base Systolic  mmHg    Stress Base Diastolic BP 90 mmHg    Post peak HR 96 BPM    Baseline HR 58 BPM    Estimated workload 1.0 METS    Percent HR 64 %    Stress Rate Pressure Product 19,776 BPM*mmHg   ECHO ADULT COMPLETE   Result Value Ref Range    IVSd 0.96 (A) 0.6 - 0.9 cm    LVIDd 4.12 3.9 - 5.3 cm    LVIDs 2.47 cm    LVOT d 1.93 cm    LVPWd 1.13 (A) 0.6 - 0.9 cm    LVOT Peak Gradient 3.04 mmHg    LVOT Peak Velocity 87.17 cm/s    RVIDd 1.84 cm    RVSP 25.30 mmHg    Left Atrium Major Axis 3.11 cm    Est. RA Pressure 3.00 mmHg    Aortic Valve Area by Continuity of Peak Velocity 1.57 cm2    AoV PG 10.68 mmHg    Aortic Valve Systolic Peak Velocity 239.15 cm/s    MV A Umberto 94.05 cm/s    Mitral Valve E Wave Deceleration Time 0.23 s    MV E Umberto 74.34 cm/s    Mitral Valve Pressure Half-time 0.07 s    MVA (PHT) 3.36 cm2    Pulmonic Valve Systolic Peak Instantaneous Gradient 4.68 mmHg    Tapse 2.28 (A) 1.5 - 2.0 cm    Triscuspid Valve Regurgitation Peak Gradient 22.30 mmHg    TR Max Velocity 236.10 cm/s    Ao Root D 2.89 cm    MV E/A 0.79     LV Mass .7 67 - 162 g       Assessment/Plan:    ICD-10-CM ICD-9-CM    1. Hypokalemia  E87.6 276.8    2. Essential hypertension with goal blood pressure less than 140/90  I10 401.9    3. Hypercholesteremia  E78.00 272.0      No orders of the defined types were placed in this encounter. lose weight, increase physical activity, have labs drawn prior to ROV, call if any problems,Take 81mg aspirin daily  Patient Instructions   [employer] Activation    Thank you for requesting access to [employer]. Please follow the instructions below to securely access and download your online medical record. [employer] allows you to send messages to your doctor, view your test results, renew your prescriptions, schedule appointments, and more. How Do I Sign Up? 1. In your internet browser, go to [email or web address]  2. Click on the First Time User? Click Here link in the Sign In box.  You will be redirect to the New Member Sign Up page. 3. Enter your [employer] Access Code exactly as it appears below. You will not need to use this code after youve completed the sign-up process. If you do not sign up before the expiration date, you must request a new code. [employer] Access Code: [email or web address]  Expires: 2020 11:57 AM (This is the date your [employer] access code will )    4. Enter the last four digits of your Social Security Number (xxxx) and Date of Birth (mm/dd/yyyy) as indicated and click Submit. You will be taken to the next sign-up page. 5. Create a [employer] ID. This will be your [employer] login ID and cannot be changed, so think of one that is secure and easy to remember. 6. Create a [employer] password. You can change your password at any time. 7. Enter your Password Reset Question and Answer. This can be used at a later time if you forget your password. 8. Enter your e-mail address. You will receive e-mail notification when new information is available in [address] 9. Click Sign Up. You can now view and download portions of your medical record. 10. Click the Download Summary menu link to download a portable copy of your medical information. Additional Information    If you have questions, please visit the Frequently Asked Questions section of the [employer] website at [email or web address] Remember, [employer] is NOT to be used for urgent needs. For medical emergencies, dial 911. I have reviewed with the patient details of the assessment and plan and all questions were answered. Relevent patient education was performed. The most recent lab findings were reviewed with the patient. An After Visit Summary was printed and given to the patient.

## 2020-09-28 NOTE — PROGRESS NOTES
1. Have you been to the ER, urgent care clinic since your last visit? Hospitalized since your last visit?no    2. Have you seen or consulted any other health care providers outside of the [address] [place] [address] since your last visit? Include any pap smears or colon screening.  No    3 most recent PHQ Screens 7/11/2019   PHQ Not Done -   Little interest or pleasure in doing things Not at all   Feeling down, depressed, irritable, or hopeless Not at all   Total Score PHQ 2 0

## 2020-09-28 NOTE — PROGRESS NOTES
Outgoing call placed to [facility] and [address] [place] [address] regarding update of patient's status. Spoke with staff member introduced self and reason for the call. Informed by staff member patient was discharged from the facility 2020. Outgoing calls placed to patient and E.C. Sybil Cooleyyoshi no answer message left with this writer's contact information. 2020  Received call from Brianrgey Yasiryoshi patient's E.C., an alternative contact number given [phone]    [address] Discharge Follow-Up      Date/Time:  2020 1:04 PM    Patient was admitted to [facility] on 2020 and discharged to [facility] and [address] [place] [address] on 2020. The patients discharge diagnosis was Elevated Troponin. Patient was discharge on 2020 from [facility] . The discharge summary from the post acute facilty was not available at the time of outreach. Patient was contacted within 2 business days of discharge from the post acute facility. LPN Care Coordinator contacted the patient by telephone to perform post hospital discharge follow up. Verified name and  with patient as identifiers. Provided introduction to self, and explanation of the LPN Care Coordinator role. Patient received post acute facility discharge instructions. LPN Care Coordinator reviewed general  discharge instructions and red flags with patient who verbalized understanding. Parent given an opportunity to ask questions and does not have any further questions or concerns at this time. The patient agrees to contact the PCP office for questions related to their healthcare. LPN Care Coordinator provided contact information for future reference.     Advance Care Planning:   Does patient have an Advance Directive:  not on file     [address] orders at discharge: [address]: patient did not remember name  Date of initial visit: TBD    Durable Medical Equipment ordered at discharge: none  [address]: N/A  Durable Medical Equipment received: N/A    Medication(s):   The post acute facility medication discharge list was not available for this call. Medication review was not  performed with patient, who verbalizes understanding of administration of home medications. Patient reports she attended hospital follow up appointment with Dr. Shayne Victor (PCP) 9/28/2020 medications were reviewed during office visit There were no barriers to obtaining medications identified at this time.     [facility] follow up appointment(s):   Future Appointments   Date Time Provider Susy Rand   10/5/2020  1:45 PM Gavin Castillo MD [facility] AMB      Non-[facility] follow up appointment(s): n/a  [facility]:  information provided as a resource

## 2020-09-28 NOTE — PATIENT INSTRUCTIONS
[employer] Activation Thank you for requesting access to [employer]. Please follow the instructions below to securely access and download your online medical record. [employer] allows you to send messages to your doctor, view your test results, renew your prescriptions, schedule appointments, and more. How Do I Sign Up? 1. In your internet browser, go to [email or web address] [email or web address] 
2. Click on the First Time User? Click Here link in the Sign In box. You will be redirect to the New Member Sign Up page. 3. Enter your [employer] Access Code exactly as it appears below. You will not need to use this code after youve completed the sign-up process. If you do not sign up before the expiration date, you must request a new code. [employer] Access Code: [email or web address] Expires: 2020 11:57 AM (This is the date your [employer] access code will ) 4. Enter the last four digits of your Social Security Number (xxxx) and Date of Birth (mm/dd/yyyy) as indicated and click Submit. You will be taken to the next sign-up page. 5. Create a [employer] ID. This will be your [employer] login ID and cannot be changed, so think of one that is secure and easy to remember. 6. Create a [employer] password. You can change your password at any time. 7. Enter your Password Reset Question and Answer. This can be used at a later time if you forget your password. 8. Enter your e-mail address. You will receive e-mail notification when new information is available in [address] 9. Click Sign Up. You can now view and download portions of your medical record. 10. Click the Download Summary menu link to download a portable copy of your medical information. Additional Information If you have questions, please visit the Frequently Asked Questions section of the [employer] website at [email or web address] Remember, [employer]Care is NOT to be used for urgent needs. For medical emergencies, dial 911.

## 2020-10-05 ENCOUNTER — OFFICE VISIT (OUTPATIENT)
Dept: INTERNAL MEDICINE CLINIC | Age: 70
End: 2020-10-05
Payer: MEDICARE

## 2020-10-05 VITALS
HEIGHT: 66 IN | WEIGHT: 185 LBS | BODY MASS INDEX: 29.73 KG/M2 | SYSTOLIC BLOOD PRESSURE: 130 MMHG | TEMPERATURE: 98.8 F | OXYGEN SATURATION: 96 % | HEART RATE: 87 BPM | RESPIRATION RATE: 16 BRPM | DIASTOLIC BLOOD PRESSURE: 88 MMHG

## 2020-10-05 DIAGNOSIS — M17.11 PRIMARY OSTEOARTHRITIS OF RIGHT KNEE: ICD-10-CM

## 2020-10-05 DIAGNOSIS — M16.0 PRIMARY OSTEOARTHRITIS OF BOTH HIPS: Primary | ICD-10-CM

## 2020-10-05 DIAGNOSIS — E78.00 HYPERCHOLESTEREMIA: ICD-10-CM

## 2020-10-05 DIAGNOSIS — I10 ESSENTIAL HYPERTENSION WITH GOAL BLOOD PRESSURE LESS THAN 140/90: ICD-10-CM

## 2020-10-05 PROCEDURE — G8752 SYS BP LESS 140: HCPCS | Performed by: INTERNAL MEDICINE

## 2020-10-05 PROCEDURE — G8432 DEP SCR NOT DOC, RNG: HCPCS | Performed by: INTERNAL MEDICINE

## 2020-10-05 PROCEDURE — G8536 NO DOC ELDER MAL SCRN: HCPCS | Performed by: INTERNAL MEDICINE

## 2020-10-05 PROCEDURE — 1101F PT FALLS ASSESS-DOCD LE1/YR: CPT | Performed by: INTERNAL MEDICINE

## 2020-10-05 PROCEDURE — 3017F COLORECTAL CA SCREEN DOC REV: CPT | Performed by: INTERNAL MEDICINE

## 2020-10-05 PROCEDURE — 1090F PRES/ABSN URINE INCON ASSESS: CPT | Performed by: INTERNAL MEDICINE

## 2020-10-05 PROCEDURE — G8427 DOCREV CUR MEDS BY ELIG CLIN: HCPCS | Performed by: INTERNAL MEDICINE

## 2020-10-05 PROCEDURE — G8754 DIAS BP LESS 90: HCPCS | Performed by: INTERNAL MEDICINE

## 2020-10-05 PROCEDURE — 99214 OFFICE O/P EST MOD 30 MIN: CPT | Performed by: INTERNAL MEDICINE

## 2020-10-05 PROCEDURE — G8419 CALC BMI OUT NRM PARAM NOF/U: HCPCS | Performed by: INTERNAL MEDICINE

## 2020-10-05 PROCEDURE — G8400 PT W/DXA NO RESULTS DOC: HCPCS | Performed by: INTERNAL MEDICINE

## 2020-10-05 NOTE — PROGRESS NOTES
1. Have you been to the ER, urgent care clinic since your last visit? Hospitalized since your last visit?no    2. Have you seen or consulted any other health care providers outside of the [address] [place] [address] since your last visit? Include any pap smears or colon screening. No    3 most recent PHQ Screens 9/28/2020   PHQ Not Done Patient Decline   Little interest or pleasure in doing things Not at all   Feeling down, depressed, irritable, or hopeless Not at all   Total Score PHQ 2 0     Chief Complaint   Patient presents with    Labs     hypokalelemia    Results    Leg Pain     right     Per Dr. Gretchen Martínez,  verbal order given for needed amb poc labs.

## 2020-10-05 NOTE — PATIENT INSTRUCTIONS
[facility] Activation Thank you for requesting access to [facility]. Please follow the instructions below to securely access and download your online medical record. [facility] allows you to send messages to your doctor, view your test results, renew your prescriptions, schedule appointments, and more. How Do I Sign Up? 1. In your internet browser, go to [email or web address] 
2. Click on the First Time User? Click Here link in the Sign In box. You will be redirect to the New Member Sign Up page. 3. Enter your [facility] Access Code exactly as it appears below. You will not need to use this code after youve completed the sign-up process. If you do not sign up before the expiration date, you must request a new code. [facility] Access Code: [email or web address] Expires: 2020  1:52 PM (This is the date your [facility] access code will ) 4. Enter the last four digits of your Social Security Number (xxxx) and Date of Birth (mm/dd/yyyy) as indicated and click Submit. You will be taken to the next sign-up page. 5. Create a [facility] ID. This will be your [facility] login ID and cannot be changed, so think of one that is secure and easy to remember. 6. Create a [facility] password. You can change your password at any time. 7. Enter your Password Reset Question and Answer. This can be used at a later time if you forget your password. 8. Enter your e-mail address. You will receive e-mail notification when new information is available in [address] 9. Click Sign Up. You can now view and download portions of your medical record. 10. Click the Download Summary menu link to download a portable copy of your medical information. Additional Information If you have questions, please visit the Frequently Asked Questions section of the [facility] website at [email or web address] Remember, [facility] is NOT to be used for urgent needs. For medical emergencies, dial 911.

## 2020-10-05 NOTE — PROGRESS NOTES
Stpehanie Al is a 79 y.o. female and presents with Labs (hypokalelemia); Results; and Leg Pain (right)  . Subjective:    She still has weakness in both legs as well.she states the swelling has improved. Hypertension Review:  The patient has hypertension . Diet and Lifestyle: generally follows a low sodium diet, exercises sporadically  Home BP Monitoring: is not measured at home. Pertinent ROS: taking medications as instructed, no medication side effects noted, no TIA's, no chest pain on exertion, no dyspnea on exertion, no swelling of ankles. Dyslipidemia Review:  Patient presents for evaluation of lipids. Compliance with treatment thus far has been excellent. A repeat fasting lipid profile was done. The patient does not use medications that may worsen dyslipidemias . The patient exercises sporadically. Review of Systems  Constitutional: negative for fevers, chills, anorexia and weight loss  Eyes:   negative for visual disturbance and irritation  ENT:   negative for tinnitus,sore throat,nasal congestion,ear pains. hoarseness  Respiratory:  negative for cough, hemoptysis, dyspnea,wheezing  CV:   negative for chest pain, palpitations, lower extremity edema  GI:   negative for nausea, vomiting, diarrhea, abdominal pain,melena  Endo:               negative for polyuria,polydipsia,polyphagia,heat intolerance  Genitourinary: negative for frequency, dysuria and hematuria  Integument:  negative for rash and pruritus  Hematologic:  negative for easy bruising and gum/nose bleeding  Musculoskel: myalgias, arthralgia, joint pain  Neurological:  negative for headaches, dizziness, vertigo, memory problems and gait   Behavl/Psych: negative for feelings of anxiety, depression, mood changes    Past Medical History:   Diagnosis Date    History of mammogram 2010    normal    Hypertension     Pap smear for cervical cancer screening 2011    normal     Past Surgical History:   Procedure Laterality Date    HX GYN      hystterectomy     Social History     Socioeconomic History    Marital status:      Spouse name: Not on file    Number of children: Not on file    Years of education: Not on file    Highest education level: Not on file   Tobacco Use    Smoking status: Never Smoker    Smokeless tobacco: Never Used   Substance and Sexual Activity    Alcohol use: Yes     Comment: occasional    Drug use: No     History reviewed. No pertinent family history. Current Outpatient Medications   Medication Sig Dispense Refill    diclofenac EC (VOLTAREN) 75 mg EC tablet Take 1 Tab by mouth two (2) times a day. 60 Tab 2    potassium chloride SR (KLOR-CON 10) 10 mEq tablet Take 1 Tab by mouth daily. 14 Tab 0    aspirin-acetaminophen-caffeine (EXCEDRIN ES) 250-250-65 mg per tablet Take 1 Tab by mouth every eight (8) hours as needed for Headache.  metoprolol tartrate (LOPRESSOR) 25 mg tablet TAKE 1 TABLET TWICE DAILY 180 Tab 3    hydroCHLOROthiazide (HYDRODIURIL) 12.5 mg tablet TAKE 1 TABLET EVERY DAY (Patient taking differently: daily as needed.  TAKE 1 TABLET EVERY DAY) 90 Tab 4     Allergies   Allergen Reactions    Codeine Itching and Swelling       Objective:  Visit Vitals  /88   Pulse 87   Temp 98.8 °F (37.1 °C) (Oral)   Resp 16   Ht 5' 6\" (1.676 m)   Wt 185 lb (83.9 kg)   SpO2 96%   BMI 29.86 kg/m²     Physical Exam:   General appearance - alert, well appearing, and in no distress  Mental status - alert, oriented to person, place, and time  EYE-SUSSY, EOMI, corneas normal, no foreign bodies  ENT-ENT exam normal, no neck nodes or sinus tenderness  Nose - normal and patent, no erythema, discharge or polyps  Mouth - mucous membranes moist, pharynx normal without lesions  Neck - supple, no significant adenopathy   Chest - clear to auscultation, no wheezes, rales or rhonchi, symmetric air entry   Heart - normal rate, regular rhythm, normal S1, S2, no murmurs, rubs, clicks or gallops   Abdomen - soft, nontender, nondistended, no masses or organomegaly  Lymph- no adenopathy palpable  Ext-peripheral pulses normal, 2+bilateal edema, no clubbing or cyanosis  Skin-Warm and dry. no hyperpigmentation, vitiligo, or suspicious lesions  Neuro -alert, oriented, normal speech, no focal findings or movement disorder noted  Neck-normal C-spine, no tenderness, full ROM without pain  Feet-no nail deformities or callus formation with good pulses noted      Results for orders placed or performed during the hospital encounter of 09/04/20   SAMPLES BEING HELD   Result Value Ref Range    SAMPLES BEING HELD 1red,1blu     COMMENT        Add-on orders for these samples will be processed based on acceptable specimen integrity and analyte stability, which may vary by analyte. CBC WITH AUTOMATED DIFF   Result Value Ref Range    WBC 7.3 3.6 - 11.0 K/uL    RBC 4.98 3.80 - 5.20 M/uL    HGB 13.9 11.5 - 16.0 g/dL    HCT 42.9 35.0 - 47.0 %    MCV 86.1 80.0 - 99.0 FL    MCH 27.9 26.0 - 34.0 PG    MCHC 32.4 30.0 - 36.5 g/dL    RDW 14.4 11.5 - 14.5 %    PLATELET 317 367 - 775 K/uL    MPV 10.4 8.9 - 12.9 FL    NRBC 0.0 0  WBC    ABSOLUTE NRBC 0.00 0.00 - 0.01 K/uL    NEUTROPHILS 78 (H) 32 - 75 %    LYMPHOCYTES 15 12 - 49 %    MONOCYTES 6 5 - 13 %    EOSINOPHILS 0 0 - 7 %    BASOPHILS 1 0 - 1 %    IMMATURE GRANULOCYTES 0 0.0 - 0.5 %    ABS. NEUTROPHILS 5.7 1.8 - 8.0 K/UL    ABS. LYMPHOCYTES 1.1 0.8 - 3.5 K/UL    ABS. MONOCYTES 0.4 0.0 - 1.0 K/UL    ABS. EOSINOPHILS 0.0 0.0 - 0.4 K/UL    ABS. BASOPHILS 0.0 0.0 - 0.1 K/UL    ABS. IMM.  GRANS. 0.0 0.00 - 0.04 K/UL    DF AUTOMATED     METABOLIC PANEL, COMPREHENSIVE   Result Value Ref Range    Sodium 138 136 - 145 mmol/L    Potassium 4.1 3.5 - 5.1 mmol/L    Chloride 104 97 - 108 mmol/L    CO2 26 21 - 32 mmol/L    Anion gap 8 5 - 15 mmol/L    Glucose 101 (H) 65 - 100 mg/dL    BUN 23 (H) 6 - 20 MG/DL    Creatinine 1.56 (H) 0.55 - 1.02 MG/DL    BUN/Creatinine ratio 15 12 - 20      GFR est AA 40 (L) >60 ml/min/1.73m2    GFR est non-AA 33 (L) >60 ml/min/1.73m2    Calcium 10.0 8.5 - 10.1 MG/DL    Bilirubin, total 0.6 0.2 - 1.0 MG/DL    ALT (SGPT) 21 12 - 78 U/L    AST (SGOT) 50 (H) 15 - 37 U/L    Alk. phosphatase 80 45 - 117 U/L    Protein, total 8.2 6.4 - 8.2 g/dL    Albumin 3.8 3.5 - 5.0 g/dL    Globulin 4.4 (H) 2.0 - 4.0 g/dL    A-G Ratio 0.9 (L) 1.1 - 2.2     TROPONIN I   Result Value Ref Range    Troponin-I, Qt. 0.18 (H) <0.05 ng/mL   URINALYSIS W/ RFLX MICROSCOPIC   Result Value Ref Range    Color DARK YELLOW      Appearance CLOUDY (A) CLEAR      Specific gravity 1.029 1.003 - 1.030      pH (UA) 5.0 5.0 - 8.0      Protein 30 (A) NEG mg/dL    Glucose Negative NEG mg/dL    Ketone 15 (A) NEG mg/dL    Bilirubin Negative NEG      Blood TRACE (A) NEG      Urobilinogen 1.0 0.2 - 1.0 EU/dL    Nitrites Negative NEG      Leukocyte Esterase MODERATE (A) NEG      WBC 5-10 0 - 4 /hpf    RBC 0-5 0 - 5 /hpf    Epithelial cells FEW FEW /lpf    Bacteria Negative NEG /hpf    Hyaline cast 2-5 0 - 5 /lpf    RBC cast 0-2 (A) NEG /LPF    Other: Renal Epithelial cells Present     SAMPLES BEING HELD   Result Value Ref Range    SAMPLES BEING HELD UC HOLD     COMMENT        Add-on orders for these samples will be processed based on acceptable specimen integrity and analyte stability, which may vary by analyte. METABOLIC PANEL, COMPREHENSIVE   Result Value Ref Range    Sodium 139 136 - 145 mmol/L    Potassium 3.2 (L) 3.5 - 5.1 mmol/L    Chloride 103 97 - 108 mmol/L    CO2 25 21 - 32 mmol/L    Anion gap 11 5 - 15 mmol/L    Glucose 90 65 - 100 mg/dL    BUN 21 (H) 6 - 20 MG/DL    Creatinine 1.24 (H) 0.55 - 1.02 MG/DL    BUN/Creatinine ratio 17 12 - 20      GFR est AA 52 (L) >60 ml/min/1.73m2    GFR est non-AA 43 (L) >60 ml/min/1.73m2    Calcium 9.5 8.5 - 10.1 MG/DL    Bilirubin, total 0.6 0.2 - 1.0 MG/DL    ALT (SGPT) 20 12 - 78 U/L    AST (SGOT) 45 (H) 15 - 37 U/L    Alk.  phosphatase 78 45 - 117 U/L    Protein, total 7.4 6.4 - 8.2 g/dL    Albumin 3.5 3.5 - 5.0 g/dL    Globulin 3.9 2.0 - 4.0 g/dL    A-G Ratio 0.9 (L) 1.1 - 2.2     LACTIC ACID   Result Value Ref Range    Lactic acid 1.2 0.4 - 2.0 MMOL/L   MAGNESIUM   Result Value Ref Range    Magnesium 2.3 1.6 - 2.4 mg/dL   CBC WITH AUTOMATED DIFF   Result Value Ref Range    WBC 5.8 3.6 - 11.0 K/uL    RBC 4.65 3.80 - 5.20 M/uL    HGB 13.0 11.5 - 16.0 g/dL    HCT 39.8 35.0 - 47.0 %    MCV 85.6 80.0 - 99.0 FL    MCH 28.0 26.0 - 34.0 PG    MCHC 32.7 30.0 - 36.5 g/dL    RDW 13.9 11.5 - 14.5 %    PLATELET 191 457 - 890 K/uL    MPV 10.0 8.9 - 12.9 FL    NRBC 0.0 0  WBC    ABSOLUTE NRBC 0.00 0.00 - 0.01 K/uL    NEUTROPHILS 62 32 - 75 %    LYMPHOCYTES 28 12 - 49 %    MONOCYTES 7 5 - 13 %    EOSINOPHILS 2 0 - 7 %    BASOPHILS 1 0 - 1 %    IMMATURE GRANULOCYTES 0 0.0 - 0.5 %    ABS. NEUTROPHILS 3.6 1.8 - 8.0 K/UL    ABS. LYMPHOCYTES 1.6 0.8 - 3.5 K/UL    ABS. MONOCYTES 0.4 0.0 - 1.0 K/UL    ABS. EOSINOPHILS 0.1 0.0 - 0.4 K/UL    ABS. BASOPHILS 0.0 0.0 - 0.1 K/UL    ABS. IMM. GRANS. 0.0 0.00 - 0.04 K/UL    DF AUTOMATED     PROTHROMBIN TIME + INR   Result Value Ref Range    INR 1.1 0.9 - 1.1      Prothrombin time 11.2 (H) 9.0 - 11.1 sec   MIXING STUDY, APTT   Result Value Ref Range    aPTT mixing study          aPTT 28.5 22.1 - 32.0 sec    aPTT 1:1 mix patient  sec     Mixing study not performed as it is uninterpretable when PTT is normal or <= 5 seconds prolonged.    HEMOGLOBIN A1C WITH EAG   Result Value Ref Range    Hemoglobin A1c 5.6 4.0 - 5.6 %    Est. average glucose 114 mg/dL   SED RATE (ESR)   Result Value Ref Range    Sed rate, automated 3 0 - 30 mm/hr   TSH 3RD GENERATION   Result Value Ref Range    TSH 0.74 0.36 - 3.74 uIU/mL   CBC W/O DIFF   Result Value Ref Range    WBC 4.5 3.6 - 11.0 K/uL    RBC 4.01 3.80 - 5.20 M/uL    HGB 11.1 (L) 11.5 - 16.0 g/dL    HCT 34.5 (L) 35.0 - 47.0 %    MCV 86.0 80.0 - 99.0 FL    MCH 27.7 26.0 - 34.0 PG    MCHC 32.2 30.0 - 36.5 g/dL    RDW 14.2 11.5 - 14.5 % PLATELET 979 854 - 382 K/uL    MPV 9.8 8.9 - 12.9 FL    NRBC 0.0 0  WBC    ABSOLUTE NRBC 0.00 0.00 - 7.50 K/uL   METABOLIC PANEL, BASIC   Result Value Ref Range    Sodium 141 136 - 145 mmol/L    Potassium 3.3 (L) 3.5 - 5.1 mmol/L    Chloride 108 97 - 108 mmol/L    CO2 28 21 - 32 mmol/L    Anion gap 5 5 - 15 mmol/L    Glucose 94 65 - 100 mg/dL    BUN 22 (H) 6 - 20 MG/DL    Creatinine 1.21 (H) 0.55 - 1.02 MG/DL    BUN/Creatinine ratio 18 12 - 20      GFR est AA 53 (L) >60 ml/min/1.73m2    GFR est non-AA 44 (L) >60 ml/min/1.73m2    Calcium 8.7 8.5 - 10.1 MG/DL   TROPONIN I   Result Value Ref Range    Troponin-I, Qt. 0.17 (H) <2.64 ng/mL   METABOLIC PANEL, BASIC   Result Value Ref Range    Sodium 142 136 - 145 mmol/L    Potassium 3.8 3.5 - 5.1 mmol/L    Chloride 109 (H) 97 - 108 mmol/L    CO2 29 21 - 32 mmol/L    Anion gap 4 (L) 5 - 15 mmol/L    Glucose 92 65 - 100 mg/dL    BUN 12 6 - 20 MG/DL    Creatinine 1.01 0.55 - 1.02 MG/DL    BUN/Creatinine ratio 12 12 - 20      GFR est AA >60 >60 ml/min/1.73m2    GFR est non-AA 54 (L) >60 ml/min/1.73m2    Calcium 9.0 8.5 - 10.1 MG/DL   CBC WITH AUTOMATED DIFF   Result Value Ref Range    WBC 4.7 3.6 - 11.0 K/uL    RBC 4.79 3.80 - 5.20 M/uL    HGB 13.3 11.5 - 16.0 g/dL    HCT 41.1 35.0 - 47.0 %    MCV 85.8 80.0 - 99.0 FL    MCH 27.8 26.0 - 34.0 PG    MCHC 32.4 30.0 - 36.5 g/dL    RDW 14.4 11.5 - 14.5 %    PLATELET 024 103 - 919 K/uL    MPV 10.2 8.9 - 12.9 FL    NRBC 0.0 0  WBC    ABSOLUTE NRBC 0.00 0.00 - 0.01 K/uL    NEUTROPHILS 55 32 - 75 %    LYMPHOCYTES 32 12 - 49 %    MONOCYTES 7 5 - 13 %    EOSINOPHILS 5 0 - 7 %    BASOPHILS 1 0 - 1 %    IMMATURE GRANULOCYTES 0 0.0 - 0.5 %    ABS. NEUTROPHILS 2.6 1.8 - 8.0 K/UL    ABS. LYMPHOCYTES 1.5 0.8 - 3.5 K/UL    ABS. MONOCYTES 0.3 0.0 - 1.0 K/UL    ABS. EOSINOPHILS 0.3 0.0 - 0.4 K/UL    ABS. BASOPHILS 0.0 0.0 - 0.1 K/UL    ABS. IMM.  GRANS. 0.0 0.00 - 0.04 K/UL    DF AUTOMATED     RENAL FUNCTION PANEL   Result Value Ref Range    Sodium 141 136 - 145 mmol/L    Potassium 3.7 3.5 - 5.1 mmol/L    Chloride 106 97 - 108 mmol/L    CO2 28 21 - 32 mmol/L    Anion gap 7 5 - 15 mmol/L    Glucose 84 65 - 100 mg/dL    BUN 9 6 - 20 MG/DL    Creatinine 0.90 0.55 - 1.02 MG/DL    BUN/Creatinine ratio 10 (L) 12 - 20      GFR est AA >60 >60 ml/min/1.73m2    GFR est non-AA >60 >60 ml/min/1.73m2    Calcium 9.1 8.5 - 10.1 MG/DL    Phosphorus 2.8 2.6 - 4.7 MG/DL    Albumin 3.2 (L) 3.5 - 5.0 g/dL   SARS-COV-2   Result Value Ref Range    Specimen source Nasopharyngeal      Specimen source Nasopharyngeal      Specimen type NP Swab      Health status Symptomatic Testing      COVID-19 Not Detected Not Detected     EKG, 12 LEAD, INITIAL   Result Value Ref Range    Ventricular Rate 81 BPM    Atrial Rate 81 BPM    P-R Interval 152 ms    QRS Duration 144 ms    Q-T Interval 426 ms    QTC Calculation (Bezet) 494 ms    Calculated P Axis 46 degrees    Calculated R Axis -34 degrees    Calculated T Axis 4 degrees    Diagnosis       Sinus rhythm with frequent premature ventricular complexes  Left axis deviation  Right bundle branch block  No previous ECGs available  Confirmed by Alexis Wu MD [id number]) on 9/5/2020 6:07:24 PM     EKG, 12 LEAD, INITIAL   Result Value Ref Range    Ventricular Rate 70 BPM    Atrial Rate 70 BPM    P-R Interval 178 ms    QRS Duration 148 ms    Q-T Interval 424 ms    QTC Calculation (Bezet) 457 ms    Calculated P Axis 47 degrees    Calculated R Axis -41 degrees    Calculated T Axis 1 degrees    Diagnosis       Normal sinus rhythm  Left axis deviation  Right bundle branch block  Inferior infarct , age undetermined  Abnormal ECG  When compared with ECG of 04-SEP-2020 21:57,  premature ventricular complexes are no longer present  Inferior infarct is now present  Confirmed by Alexis Wu MD [id number]) on 9/6/2020 5:20:41 PM     NUCLEAR CARDIAC STRESS TEST   Result Value Ref Range    Target  bpm    Exercise duration time 00:03:00 Stress Base Systolic  mmHg    Stress Base Diastolic BP 90 mmHg    Post peak HR 96 BPM    Baseline HR 58 BPM    Estimated workload 1.0 METS    Percent HR 64 %    Stress Rate Pressure Product 19,776 BPM*mmHg   ECHO ADULT COMPLETE   Result Value Ref Range    IVSd 0.96 (A) 0.6 - 0.9 cm    LVIDd 4.12 3.9 - 5.3 cm    LVIDs 2.47 cm    LVOT d 1.93 cm    LVPWd 1.13 (A) 0.6 - 0.9 cm    LVOT Peak Gradient 3.04 mmHg    LVOT Peak Velocity 87.17 cm/s    RVIDd 1.84 cm    RVSP 25.30 mmHg    Left Atrium Major Axis 3.11 cm    Est. RA Pressure 3.00 mmHg    Aortic Valve Area by Continuity of Peak Velocity 1.57 cm2    AoV PG 10.68 mmHg    Aortic Valve Systolic Peak Velocity 202.69 cm/s    MV A Umberto 94.05 cm/s    Mitral Valve E Wave Deceleration Time 0.23 s    MV E Umberto 74.34 cm/s    Mitral Valve Pressure Half-time 0.07 s    MVA (PHT) 3.36 cm2    Pulmonic Valve Systolic Peak Instantaneous Gradient 4.68 mmHg    Tapse 2.28 (A) 1.5 - 2.0 cm    Triscuspid Valve Regurgitation Peak Gradient 22.30 mmHg    TR Max Velocity 236.10 cm/s    Ao Root D 2.89 cm    MV E/A 0.79     LV Mass .7 67 - 162 g       Assessment/Plan:    ICD-10-CM ICD-9-CM    1. Primary osteoarthritis of both hips  M16.0 715.15    2. Essential hypertension with goal blood pressure less than 140/90  I10 401.9    3. Hypercholesteremia  E78.00 272.0    4. Primary osteoarthritis of right knee  M17.11 715.16      No orders of the defined types were placed in this encounter. lose weight, increase physical activity, have labs drawn prior to ROV, call if any problems,Take 81mg aspirin daily  There are no Patient Instructions on file for this visit. I have reviewed with the patient details of the assessment and plan and all questions were answered. Relevent patient education was performed. The most recent lab findings were reviewed with the patient. An After Visit Summary was printed and given to the patient.

## 2020-10-13 ENCOUNTER — PATIENT OUTREACH (OUTPATIENT)
Dept: CASE MANAGEMENT | Age: 70
End: 2020-10-13

## 2020-10-13 NOTE — PROGRESS NOTES
Patient has graduated from the Transitions of Care Coordination  program on 10/13/2020. Patient/family has the ability to self-manage at this time Care management goals have been completed. Patient was not referred to the [facility] team for further management. Patient has Care Transition Nurse's contact information for any further questions, concerns, or needs. Patients upcoming visits:  No future appointments.

## 2020-10-21 ENCOUNTER — TELEPHONE (OUTPATIENT)
Dept: INTERNAL MEDICINE CLINIC | Age: 70
End: 2020-10-21

## 2020-10-27 ENCOUNTER — TELEPHONE (OUTPATIENT)
Dept: INTERNAL MEDICINE CLINIC | Age: 70
End: 2020-10-27

## 2020-11-24 ENCOUNTER — APPOINTMENT (OUTPATIENT)
Dept: CT IMAGING | Age: 70
DRG: 982 | End: 2020-11-24
Attending: EMERGENCY MEDICINE
Payer: MEDICARE

## 2020-11-24 ENCOUNTER — APPOINTMENT (OUTPATIENT)
Dept: GENERAL RADIOLOGY | Age: 70
DRG: 982 | End: 2020-11-24
Attending: EMERGENCY MEDICINE
Payer: MEDICARE

## 2020-11-24 ENCOUNTER — HOSPITAL ENCOUNTER (INPATIENT)
Age: 70
LOS: 8 days | Discharge: NURSING, REHAB OR LONG-TERM CARE | DRG: 982 | End: 2020-12-02
Attending: EMERGENCY MEDICINE | Admitting: INTERNAL MEDICINE
Payer: MEDICARE

## 2020-11-24 DIAGNOSIS — R74.8 CARDIAC ENZYMES ELEVATED: ICD-10-CM

## 2020-11-24 DIAGNOSIS — E87.6 HYPOKALEMIA: ICD-10-CM

## 2020-11-24 DIAGNOSIS — R77.8 ELEVATED TROPONIN: ICD-10-CM

## 2020-11-24 DIAGNOSIS — R53.1 WEAKNESS: Primary | ICD-10-CM

## 2020-11-24 DIAGNOSIS — R29.6 MULTIPLE FALLS: ICD-10-CM

## 2020-11-24 DIAGNOSIS — I47.1 ATRIAL TACHYCARDIA (HCC): ICD-10-CM

## 2020-11-24 DIAGNOSIS — I47.1 SVT (SUPRAVENTRICULAR TACHYCARDIA) (HCC): ICD-10-CM

## 2020-11-24 DIAGNOSIS — I48.0 PAROXYSMAL ATRIAL FIBRILLATION (HCC): ICD-10-CM

## 2020-11-24 DIAGNOSIS — I10 ESSENTIAL HYPERTENSION: ICD-10-CM

## 2020-11-24 PROBLEM — E86.9 VOLUME DEPLETION: Status: ACTIVE | Noted: 2020-11-24

## 2020-11-24 PROBLEM — M62.82 RHABDOMYOLYSIS: Status: ACTIVE | Noted: 2020-11-24

## 2020-11-24 PROBLEM — N17.9 AKI (ACUTE KIDNEY INJURY) (HCC): Status: ACTIVE | Noted: 2020-11-24

## 2020-11-24 LAB
ALBUMIN SERPL-MCNC: 3.5 G/DL (ref 3.5–5)
ALBUMIN/GLOB SERPL: 0.9 {RATIO} (ref 1.1–2.2)
ALP SERPL-CCNC: 86 U/L (ref 45–117)
ALT SERPL-CCNC: 17 U/L (ref 12–78)
ANION GAP SERPL CALC-SCNC: 8 MMOL/L (ref 5–15)
APPEARANCE UR: CLEAR
AST SERPL-CCNC: 26 U/L (ref 15–37)
BACTERIA URNS QL MICRO: ABNORMAL /HPF
BASOPHILS # BLD: 0 K/UL (ref 0–0.1)
BASOPHILS NFR BLD: 0 % (ref 0–1)
BILIRUB SERPL-MCNC: 0.6 MG/DL (ref 0.2–1)
BILIRUB UR QL: NEGATIVE
BNP SERPL-MCNC: 257 PG/ML
BUN SERPL-MCNC: 11 MG/DL (ref 6–20)
BUN/CREAT SERPL: 8 (ref 12–20)
CALCIUM SERPL-MCNC: 8.8 MG/DL (ref 8.5–10.1)
CHLORIDE SERPL-SCNC: 103 MMOL/L (ref 97–108)
CK MB CFR SERPL CALC: 1.7 % (ref 0–2.5)
CK MB CFR SERPL CALC: 1.9 % (ref 0–2.5)
CK MB SERPL-MCNC: 14.1 NG/ML (ref 5–25)
CK MB SERPL-MCNC: 9.6 NG/ML (ref 5–25)
CK SERPL-CCNC: 495 U/L (ref 26–192)
CK SERPL-CCNC: 833 U/L (ref 26–192)
CO2 SERPL-SCNC: 26 MMOL/L (ref 21–32)
COLOR UR: ABNORMAL
CREAT SERPL-MCNC: 1.3 MG/DL (ref 0.55–1.02)
DIFFERENTIAL METHOD BLD: ABNORMAL
EOSINOPHIL # BLD: 0 K/UL (ref 0–0.4)
EOSINOPHIL NFR BLD: 0 % (ref 0–7)
EPITH CASTS URNS QL MICRO: ABNORMAL /LPF
ERYTHROCYTE [DISTWIDTH] IN BLOOD BY AUTOMATED COUNT: 15.3 % (ref 11.5–14.5)
GLOBULIN SER CALC-MCNC: 4.1 G/DL (ref 2–4)
GLUCOSE SERPL-MCNC: 119 MG/DL (ref 65–100)
GLUCOSE UR STRIP.AUTO-MCNC: NEGATIVE MG/DL
HCT VFR BLD AUTO: 39.2 % (ref 35–47)
HGB BLD-MCNC: 12.7 G/DL (ref 11.5–16)
HGB UR QL STRIP: ABNORMAL
IMM GRANULOCYTES # BLD AUTO: 0 K/UL (ref 0–0.04)
IMM GRANULOCYTES NFR BLD AUTO: 0 % (ref 0–0.5)
KETONES UR QL STRIP.AUTO: ABNORMAL MG/DL
LEUKOCYTE ESTERASE UR QL STRIP.AUTO: NEGATIVE
LYMPHOCYTES # BLD: 0.8 K/UL (ref 0.8–3.5)
LYMPHOCYTES NFR BLD: 11 % (ref 12–49)
MCH RBC QN AUTO: 27.7 PG (ref 26–34)
MCHC RBC AUTO-ENTMCNC: 32.4 G/DL (ref 30–36.5)
MCV RBC AUTO: 85.6 FL (ref 80–99)
MONOCYTES # BLD: 0.5 K/UL (ref 0–1)
MONOCYTES NFR BLD: 7 % (ref 5–13)
NEUTS SEG # BLD: 5.8 K/UL (ref 1.8–8)
NEUTS SEG NFR BLD: 82 % (ref 32–75)
NITRITE UR QL STRIP.AUTO: NEGATIVE
NRBC # BLD: 0 K/UL (ref 0–0.01)
NRBC BLD-RTO: 0 PER 100 WBC
PH UR STRIP: 5 [PH] (ref 5–8)
PLATELET # BLD AUTO: 235 K/UL (ref 150–400)
PMV BLD AUTO: 10.1 FL (ref 8.9–12.9)
POTASSIUM SERPL-SCNC: 3.1 MMOL/L (ref 3.5–5.1)
PROT SERPL-MCNC: 7.6 G/DL (ref 6.4–8.2)
PROT UR STRIP-MCNC: ABNORMAL MG/DL
RBC # BLD AUTO: 4.58 M/UL (ref 3.8–5.2)
RBC #/AREA URNS HPF: ABNORMAL /HPF (ref 0–5)
RBC MORPH BLD: ABNORMAL
SODIUM SERPL-SCNC: 137 MMOL/L (ref 136–145)
SP GR UR REFRACTOMETRY: 1.02 (ref 1–1.03)
TROPONIN I SERPL-MCNC: 0.13 NG/ML
TROPONIN I SERPL-MCNC: 0.14 NG/ML
UA: UC IF INDICATED,UAUC: ABNORMAL
UROBILINOGEN UR QL STRIP.AUTO: 0.2 EU/DL (ref 0.2–1)
WBC # BLD AUTO: 7.1 K/UL (ref 3.6–11)
WBC URNS QL MICRO: ABNORMAL /HPF (ref 0–4)

## 2020-11-24 PROCEDURE — 84484 ASSAY OF TROPONIN QUANT: CPT

## 2020-11-24 PROCEDURE — 93005 ELECTROCARDIOGRAM TRACING: CPT

## 2020-11-24 PROCEDURE — 73562 X-RAY EXAM OF KNEE 3: CPT

## 2020-11-24 PROCEDURE — 97162 PT EVAL MOD COMPLEX 30 MIN: CPT

## 2020-11-24 PROCEDURE — 74011250637 HC RX REV CODE- 250/637: Performed by: EMERGENCY MEDICINE

## 2020-11-24 PROCEDURE — 77030040393 HC DRSG OPTIFOAM GENT MDII -B

## 2020-11-24 PROCEDURE — 70450 CT HEAD/BRAIN W/O DYE: CPT

## 2020-11-24 PROCEDURE — 85025 COMPLETE CBC W/AUTO DIFF WBC: CPT

## 2020-11-24 PROCEDURE — 82550 ASSAY OF CK (CPK): CPT

## 2020-11-24 PROCEDURE — 83880 ASSAY OF NATRIURETIC PEPTIDE: CPT

## 2020-11-24 PROCEDURE — 71045 X-RAY EXAM CHEST 1 VIEW: CPT

## 2020-11-24 PROCEDURE — 97530 THERAPEUTIC ACTIVITIES: CPT | Performed by: OCCUPATIONAL THERAPIST

## 2020-11-24 PROCEDURE — 36415 COLL VENOUS BLD VENIPUNCTURE: CPT

## 2020-11-24 PROCEDURE — 65660000000 HC RM CCU STEPDOWN

## 2020-11-24 PROCEDURE — 80053 COMPREHEN METABOLIC PANEL: CPT

## 2020-11-24 PROCEDURE — 73560 X-RAY EXAM OF KNEE 1 OR 2: CPT

## 2020-11-24 PROCEDURE — 97530 THERAPEUTIC ACTIVITIES: CPT

## 2020-11-24 PROCEDURE — 99284 EMERGENCY DEPT VISIT MOD MDM: CPT

## 2020-11-24 PROCEDURE — 99218 HC RM OBSERVATION: CPT

## 2020-11-24 PROCEDURE — 81001 URINALYSIS AUTO W/SCOPE: CPT

## 2020-11-24 PROCEDURE — 97166 OT EVAL MOD COMPLEX 45 MIN: CPT | Performed by: OCCUPATIONAL THERAPIST

## 2020-11-24 PROCEDURE — 72125 CT NECK SPINE W/O DYE: CPT

## 2020-11-24 PROCEDURE — 74011250636 HC RX REV CODE- 250/636: Performed by: INTERNAL MEDICINE

## 2020-11-24 PROCEDURE — 74011250637 HC RX REV CODE- 250/637: Performed by: INTERNAL MEDICINE

## 2020-11-24 RX ORDER — ONDANSETRON 2 MG/ML
4 INJECTION INTRAMUSCULAR; INTRAVENOUS
Status: DISCONTINUED | OUTPATIENT
Start: 2020-11-24 | End: 2020-11-24 | Stop reason: SDUPTHER

## 2020-11-24 RX ORDER — ENOXAPARIN SODIUM 100 MG/ML
40 INJECTION SUBCUTANEOUS DAILY
Status: DISCONTINUED | OUTPATIENT
Start: 2020-11-24 | End: 2020-11-30

## 2020-11-24 RX ORDER — ACETAMINOPHEN 650 MG/1
650 SUPPOSITORY RECTAL
Status: DISCONTINUED | OUTPATIENT
Start: 2020-11-24 | End: 2020-12-02 | Stop reason: HOSPADM

## 2020-11-24 RX ORDER — POTASSIUM CHLORIDE AND SODIUM CHLORIDE 450; 150 MG/100ML; MG/100ML
INJECTION, SOLUTION INTRAVENOUS CONTINUOUS
Status: DISCONTINUED | OUTPATIENT
Start: 2020-11-24 | End: 2020-11-25

## 2020-11-24 RX ORDER — POTASSIUM CHLORIDE 750 MG/1
40 TABLET, FILM COATED, EXTENDED RELEASE ORAL
Status: COMPLETED | OUTPATIENT
Start: 2020-11-24 | End: 2020-11-24

## 2020-11-24 RX ORDER — ONDANSETRON 2 MG/ML
4 INJECTION INTRAMUSCULAR; INTRAVENOUS
Status: DISCONTINUED | OUTPATIENT
Start: 2020-11-24 | End: 2020-12-02 | Stop reason: HOSPADM

## 2020-11-24 RX ORDER — METOPROLOL TARTRATE 25 MG/1
12.5 TABLET, FILM COATED ORAL 2 TIMES DAILY
Status: DISCONTINUED | OUTPATIENT
Start: 2020-11-24 | End: 2020-11-26

## 2020-11-24 RX ORDER — ACETAMINOPHEN 325 MG/1
650 TABLET ORAL
Status: DISCONTINUED | OUTPATIENT
Start: 2020-11-24 | End: 2020-11-24 | Stop reason: SDUPTHER

## 2020-11-24 RX ORDER — AMOXICILLIN 250 MG
1 CAPSULE ORAL 2 TIMES DAILY
Status: DISCONTINUED | OUTPATIENT
Start: 2020-11-24 | End: 2020-12-02 | Stop reason: HOSPADM

## 2020-11-24 RX ORDER — PROMETHAZINE HYDROCHLORIDE 25 MG/1
12.5 TABLET ORAL
Status: DISCONTINUED | OUTPATIENT
Start: 2020-11-24 | End: 2020-12-02 | Stop reason: HOSPADM

## 2020-11-24 RX ORDER — POLYETHYLENE GLYCOL 3350 17 G/17G
17 POWDER, FOR SOLUTION ORAL DAILY PRN
Status: DISCONTINUED | OUTPATIENT
Start: 2020-11-24 | End: 2020-12-02 | Stop reason: HOSPADM

## 2020-11-24 RX ORDER — ACETAMINOPHEN 325 MG/1
650 TABLET ORAL
Status: DISCONTINUED | OUTPATIENT
Start: 2020-11-24 | End: 2020-12-02 | Stop reason: HOSPADM

## 2020-11-24 RX ADMIN — ACETAMINOPHEN 650 MG: 325 TABLET ORAL at 17:19

## 2020-11-24 RX ADMIN — ENOXAPARIN SODIUM 40 MG: 40 INJECTION SUBCUTANEOUS at 10:33

## 2020-11-24 RX ADMIN — POTASSIUM CHLORIDE 40 MEQ: 750 TABLET, FILM COATED, EXTENDED RELEASE ORAL at 08:09

## 2020-11-24 RX ADMIN — METOPROLOL TARTRATE 12.5 MG: 25 TABLET, FILM COATED ORAL at 17:19

## 2020-11-24 RX ADMIN — METOPROLOL TARTRATE 12.5 MG: 25 TABLET, FILM COATED ORAL at 10:34

## 2020-11-24 RX ADMIN — POTASSIUM CHLORIDE AND SODIUM CHLORIDE: 450; 150 INJECTION, SOLUTION INTRAVENOUS at 10:25

## 2020-11-24 RX ADMIN — DOCUSATE SODIUM 50MG AND SENNOSIDES 8.6MG 1 TABLET: 8.6; 5 TABLET, FILM COATED ORAL at 21:54

## 2020-11-24 NOTE — Clinical Note
TRANSFER - OUT REPORT:     Verbal report given to: Percy Leon. Report consisted of patient's Situation, Background, Assessment and   Recommendations(SBAR). Opportunity for questions and clarification was provided. Patient transported with a Registered Nurse, Monitor and Oxygen. Oxygen used for patient = nasal cannula, @ 2 - 6 Liters.     Patient transported to: PACU.   transported with CRNA and EP Lab staff

## 2020-11-24 NOTE — Clinical Note
Sheath #4: Sheath: inserted. Sheath inserted/placed in the left femoral  vein. Hemostasis achieved.  11fr

## 2020-11-24 NOTE — Clinical Note
Transseptal Cath Performed check box under hemodynamic and ICE and Fluoro, Mobile 98cm via a guiding sheath.
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
## 2020-11-24 NOTE — Clinical Note
Sheath #1: Sheath: inserted. Sheath inserted/placed in the right femoral  vein. Hemostasis achieved.  8fr
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
## 2020-11-24 NOTE — PROGRESS NOTES
Problem: Self Care Deficits Care Plan (Adult)  Goal: *Acute Goals and Plan of Care (Insert Text)  Description: FUNCTIONAL STATUS PRIOR TO ADMISSION: Patient was in [facility] in September, then went to SNF for rehab. At discharge, she went to a duplex with 2 roommates (that she did not know). Patient has frequent falls. She indicates that her grandson lives nearby. HOME SUPPORT: The patient lived with roommates but did not require assist. Her grandson lives in the area. Occupational Therapy Goals  Initiated 11/24/2020  1. Patient will perform grooming standing at the sink with minimal assistance within 7 day(s). 2.  Patient will perform upper body dressing seated EOB with contact guard assist within 7 day(s). 3.  Patient will perform lower body dressing with moderate assistance  within 7 day(s). 4.  Patient will perform toilet transfers with minimal assistance within 7 day(s). 5.  Patient will perform all aspects of toileting with minimal assistance within 7 day(s). 6.  Patient will participate in upper extremity therapeutic exercise/activities with Min A and cues for 10 minutes within 7 day(s). Outcome: Progressing Towards Goal    OCCUPATIONAL THERAPY EVALUATION  Patient: Myrtle Leon (63 y.o. female)  Date: 11/24/2020  Primary Diagnosis: Weakness generalized [R53.1]  Rhabdomyolysis [M62.82]  DANIELLE (acute kidney injury) ([facility], [place] [address] [N17.9]  Volume depletion [E86.9]        Precautions:  Fall(Simultaneous filing. User may not have seen previous data.)    ASSESSMENT  Based on the objective data described below, the patient presents with deficits in self-care, primarily due to decreased activity tolerance, pain, general weakness, impaired functional mobility, decreased balance, and decreased safety. She is functioning below her baseline level and requiring a fair amount of assistance for all tasks. Patient will benefit from skilled OT treatment to maximize independence and safety in ADL.  Recommend returning to rehab. Current Level of Function Impacting Discharge (ADLs/self-care): Max A to S    Functional Outcome Measure: The patient scored 35/100 on the Barthel Index which is indicative of significant functional deficits. Patient will benefit from skilled therapy intervention to address the above noted impairments. PLAN :  Recommendations and Planned Interventions: self care training, functional mobility training, therapeutic exercise, balance training, therapeutic activities, endurance activities, neuromuscular re-education, patient education, home safety training, and family training/education    Frequency/Duration: Patient will be followed by occupational therapy 4 times a week to address goals. Recommendation for discharge: (in order for the patient to meet his/her long term goals)  Therapy up to 5 days/week in SNF setting    This discharge recommendation:  A follow-up discussion with the attending provider and/or case management is planned    IF patient discharges home will need the following DME: Defer to rehab       SUBJECTIVE:   Patient stated Can you see if I scraped my shoulder? It's really sore.     OBJECTIVE DATA SUMMARY:   HISTORY:   Past Medical History:   Diagnosis Date    History of mammogram 2010    normal    Hypertension     Pap smear for cervical cancer screening 2011    normal     Past Surgical History:   Procedure Laterality Date    HX GYN      hystterectomy       Expanded or extensive additional review of patient history:     Home Situation  Home Environment: Private residence(duplex)  # Steps to Enter: 3  Rails to Enter: Yes  One/Two Story Residence: Two story  Living Alone: No  Support Systems: Friends \ neighbors, Family member(s)  Current DME Used/Available at Home: Cane, straight    Hand dominance: Right    EXAMINATION OF PERFORMANCE DEFICITS:  Cognitive/Behavioral Status:  Neurologic State: Alert  Orientation Level: Oriented X4  Cognition: Follows commands  Perception: Appears intact  Perseveration: No perseveration noted       Range of Motion:  AROM: Generally decreased, functional  PROM: Generally decreased, functional                      Strength:  Strength: Generally decreased, functional                Coordination:  Coordination: Within functional limits            Tone & Sensation:  Tone: Normal  Sensation: Intact                      Balance:  Sitting: Intact  Standing: Impaired  Standing - Static: Fair;Constant support  Standing - Dynamic : Fair;Constant support    Functional Mobility and Transfers for ADLs:  Bed Mobility:  Rolling: Moderate assistance  Supine to Sit: Moderate assistance  Sit to Supine: Moderate assistance  Scooting: Moderate assistance    Transfers:  Sit to Stand: Moderate assistance  Stand to Sit: Moderate assistance  Toilet Transfer : Maximum assistance(inferred; recommend BSC)  Assistive Device : Walker, rolling    ADL Assessment:  Feeding: Modified independent    Oral Facial Hygiene/Grooming: Supervision(seated)    Bathing: Maximum assistance    Upper Body Dressing: Moderate assistance(due to B shoulder limitations)    Lower Body Dressing: Maximum assistance    Toileting: Maximum assistance                Functional Measure:  Barthel Index:    Bathin  Bladder: 5  Bowels: 10  Groomin  Dressin  Feeding: 10  Mobility: 0  Stairs: 0  Toilet Use: 0  Transfer (Bed to Chair and Back): 10  Total: 35/100        The Barthel ADL Index: Guidelines  1. The index should be used as a record of what a patient does, not as a record of what a patient could do. 2. The main aim is to establish degree of independence from any help, physical or verbal, however minor and for whatever reason. 3. The need for supervision renders the patient not independent. 4. A patient's performance should be established using the best available evidence.  Asking the patient, friends/relatives and nurses are the usual sources, but direct observation and common sense are also important. However direct testing is not needed. 5. Usually the patient's performance over the preceding 24-48 hours is important, but occasionally longer periods will be relevant. 6. Middle categories imply that the patient supplies over 50 per cent of the effort. 7. Use of aids to be independent is allowed. Brianna Baker., Barthel, D.W. (0016). Functional evaluation: the Barthel Index. [facility] (14)2. Eliud Baker giovana NIKOLAY Salinas, Sabino Maier., Elizabeth Ramos., [place], [address] (1999). Measuring the change indisability after inpatient rehabilitation; comparison of the responsiveness of the Barthel Index and Functional [place] Measure. Journal of Neurology, Neurosurgery, and Psychiatry, 66(4), 549-583. Akila Egan, DYLON.J.LAVONNE, ALONZO Dunne, & Shannan Allen M.A. (2004.) Assessment of post-stroke quality of life in cost-effectiveness studies: The usefulness of the Barthel Index and the EuroQoL-5D. Quality of Life Research, 15, 302-99        Occupational Therapy Evaluation Charge Determination   History Examination Decision-Making   MEDIUM Complexity : Expanded review of history including physical, cognitive and psychosocial  history  HIGH Complexity : 5 or more performance deficits relating to physical, cognitive , or psychosocial skils that result in activity limitations and / or participation restrictions MEDIUM Complexity : Patient may present with comorbidities that affect occupational performnce.  Miniml to moderate modification of tasks or assistance (eg, physical or verbal ) with assesment(s) is necessary to enable patient to complete evaluation       Based on the above components, the patient evaluation is determined to be of the following complexity level: MEDIUM  Pain Rating:  L knee and B shoulders    Activity Tolerance:   Fair    After treatment patient left in no apparent distress:    Supine in bed, Call bell within reach, and Side rails x 3    COMMUNICATION/EDUCATION:   The patients plan of care was discussed with: Physical therapist and Registered nurse. Patient/family agree to work toward stated goals and plan of care. This patients plan of care is appropriate for delegation to [facility].     Thank you for this referral.  Serenity Elias, OTR/L  Time Calculation: 26 mins

## 2020-11-24 NOTE — ED NOTES
Bedside shift change report given to [address] [place] [address]  (oncoming nurse) by Carlee Peabody RN  (offgoing nurse). Report included the following information SBAR, Kardex and ED Summary.

## 2020-11-24 NOTE — Clinical Note
TRANSFER - OUT REPORT:     Verbal report given to: Hattie Carbone RN. Report consisted of patient's Situation, Background, Assessment and   Recommendations(SBAR). Opportunity for questions and clarification was provided. Patient transported to: [facility].

## 2020-11-24 NOTE — ED NOTES
Pt states she fell last night around midnight to 1 am. She states she has been having intermittent weakness since September with falls. Pt has skin tear to left knee and walks with a cane. Placed pt on purewick to obtain urine sample and preformed bedside shift change report with [facility].

## 2020-11-24 NOTE — Clinical Note
Sheath #2: Sheath: inserted. Sheath inserted/placed in the right femoral  vein. Hemostasis achieved.  8fr

## 2020-11-24 NOTE — ED PROVIDER NOTES
EMERGENCY DEPARTMENT HISTORY AND PHYSICAL EXAM      Date: 11/24/2020  Patient Name: Mohan Trent    History of Presenting Illness     Chief Complaint   Patient presents with   [address] [facility] [address] Fall     pt reports that she fell getting out of the bed this morning trying to use the restroom - was on the ground for 2 hours prior to EMS arrival; increasing number of falls over the last several days    Fatigue     pt reports generalized weakness that has been increasing over the last several days    Dizziness     x several days; hx of A fib       History Provided By: Patient    HPI: Mohan Trent, 79 y.o. female with PMHx significant for hypertension, atrial fibrillation, hypokalemia presents to the ED with chief complaint of dizziness and weakness along with palpitations and a ground-level fall this evening. Patient was admitted in September for weakness and ground-level falls. She was discharged to a rehab facility where she stayed for several weeks until she was discharged to her home. For the past few days, she reports increasing weakness and dizziness and she states she feels similar to when she was admitted back in September at this time. She has had several falls over the last few days. She lives at home alone, and although she does have some help during the day, she is completely alone at nighttime. Tonight she tried to go to the bathroom and due to her weakness fell to the floor landing on her knees. She did not hit her head. She denies any nausea, vomiting, diarrhea, abdominal pain, cough, fever, decreased appetite, black or bloody stools, dysuria, hematuria. She does report urinary frequency. She states that she was on the ground for about 2 hours before calling EMS. She was trying to wait till her help came in the morning, but decided she could not wait any longer. PCP: Cinthia Teague., MD    No current facility-administered medications on file prior to encounter.       Current Outpatient Medications on File Prior to Encounter   Medication Sig Dispense Refill    potassium chloride SR (KLOR-CON 10) 10 mEq tablet Take 1 Tab by mouth daily. 30 Tab 12    diclofenac EC (VOLTAREN) 75 mg EC tablet Take 1 Tab by mouth two (2) times a day. 60 Tab 2    aspirin-acetaminophen-caffeine (EXCEDRIN ES) 250-250-65 mg per tablet Take 1 Tab by mouth every eight (8) hours as needed for Headache.  metoprolol tartrate (LOPRESSOR) 25 mg tablet TAKE 1 TABLET TWICE DAILY 180 Tab 3    hydroCHLOROthiazide (HYDRODIURIL) 12.5 mg tablet TAKE 1 TABLET EVERY DAY (Patient taking differently: daily as needed. TAKE 1 TABLET EVERY DAY) 90 Tab 4       Past History     Past Medical History:  Past Medical History:   Diagnosis Date    History of mammogram 2010    normal    Hypertension     Pap smear for cervical cancer screening 2011    normal       Past Surgical History:  Past Surgical History:   Procedure Laterality Date    HX GYN      hystterectomy       Family History:  No family history on file. Social History:  Social History     Tobacco Use    Smoking status: Never Smoker    Smokeless tobacco: Never Used   Substance Use Topics    Alcohol use: Yes     Comment: occasional    Drug use: No       Allergies: Allergies   Allergen Reactions    Codeine Itching and Swelling         Review of Systems   Review of Systems   Constitutional: Positive for fatigue. Negative for chills and fever. HENT: Negative for congestion, ear pain, rhinorrhea and sore throat. Eyes: Negative. Respiratory: Negative for cough, chest tightness, shortness of breath and wheezing. Cardiovascular: Positive for palpitations and leg swelling. Negative for chest pain. Gastrointestinal: Negative for abdominal pain, diarrhea, nausea and vomiting. Genitourinary: Positive for frequency. Negative for decreased urine volume, dysuria and hematuria. Musculoskeletal: Negative for back pain and myalgias. Skin: Negative for rash and wound. Neurological: Positive for dizziness. Negative for syncope, numbness and headaches. Psychiatric/Behavioral: Negative for confusion. The patient is not nervous/anxious. All other systems reviewed and are negative.         Physical Exam    General appearance - well nourished, well appearing, and in no distress  Eyes - pupils equal and reactive, extraocular eye movements intact  ENT - mucous membranes moist, pharynx normal without lesions  Neck - supple, no significant adenopathy; non-tender to palpation  Chest - clear to auscultation, no wheezes, rales or rhonchi; non-tender to palpation  Heart - normal rate and regular rhythm, S1 and S2 normal, no murmurs noted  Abdomen - soft, nontender, nondistended, no masses or organomegaly  Musculoskeletal - no joint tenderness, deformity or swelling; normal ROM  Extremities - peripheral pulses normal, 3 + bilat lower extremity edema  Skin - normal coloration and turgor, no rashes  Neurological - alert, oriented x3, normal speech, no focal findings or movement disorder noted    Diagnostic Study Results     Labs -     Recent Results (from the past 12 hour(s))   EKG, 12 LEAD, INITIAL    Collection Time: 11/24/20  4:01 AM   Result Value Ref Range    Ventricular Rate 99 BPM    Atrial Rate 99 BPM    P-R Interval 188 ms    QRS Duration 118 ms    Q-T Interval 366 ms    QTC Calculation (Bezet) 469 ms    Calculated P Axis 91 degrees    Calculated R Axis -58 degrees    Calculated T Axis -17 degrees    Diagnosis       Sinus rhythm with occasional premature ventricular complexes  Left axis deviation  Incomplete right bundle branch block  Inferior infarct (cited on or before 06-SEP-2020)  Anterolateral infarct , age undetermined  When compared with ECG of 06-SEP-2020 13:39,  premature ventricular complexes are now present  Incomplete right bundle branch block has replaced Right bundle branch block  Anterior infarct is now present  Anterolateral infarct is now present     CBC WITH AUTOMATED DIFF    Collection Time: 11/24/20  4:04 AM   Result Value Ref Range    WBC 7.1 3.6 - 11.0 K/uL    RBC 4.58 3.80 - 5.20 M/uL    HGB 12.7 11.5 - 16.0 g/dL    HCT 39.2 35.0 - 47.0 %    MCV 85.6 80.0 - 99.0 FL    MCH 27.7 26.0 - 34.0 PG    MCHC 32.4 30.0 - 36.5 g/dL    RDW 15.3 (H) 11.5 - 14.5 %    PLATELET 549 897 - 209 K/uL    MPV 10.1 8.9 - 12.9 FL    NRBC 0.0 0  WBC    ABSOLUTE NRBC 0.00 0.00 - 0.01 K/uL    NEUTROPHILS 82 (H) 32 - 75 %    LYMPHOCYTES 11 (L) 12 - 49 %    MONOCYTES 7 5 - 13 %    EOSINOPHILS 0 0 - 7 %    BASOPHILS 0 0 - 1 %    IMMATURE GRANULOCYTES 0 0.0 - 0.5 %    ABS. NEUTROPHILS 5.8 1.8 - 8.0 K/UL    ABS. LYMPHOCYTES 0.8 0.8 - 3.5 K/UL    ABS. MONOCYTES 0.5 0.0 - 1.0 K/UL    ABS. EOSINOPHILS 0.0 0.0 - 0.4 K/UL    ABS. BASOPHILS 0.0 0.0 - 0.1 K/UL    ABS. IMM. GRANS. 0.0 0.00 - 0.04 K/UL    DF AUTOMATED      RBC COMMENTS NORMOCYTIC, NORMOCHROMIC     METABOLIC PANEL, COMPREHENSIVE    Collection Time: 11/24/20  4:04 AM   Result Value Ref Range    Sodium 137 136 - 145 mmol/L    Potassium 3.1 (L) 3.5 - 5.1 mmol/L    Chloride 103 97 - 108 mmol/L    CO2 26 21 - 32 mmol/L    Anion gap 8 5 - 15 mmol/L    Glucose 119 (H) 65 - 100 mg/dL    BUN 11 6 - 20 MG/DL    Creatinine 1.30 (H) 0.55 - 1.02 MG/DL    BUN/Creatinine ratio 8 (L) 12 - 20      GFR est AA 49 (L) >60 ml/min/1.73m2    GFR est non-AA 40 (L) >60 ml/min/1.73m2    Calcium 8.8 8.5 - 10.1 MG/DL    Bilirubin, total 0.6 0.2 - 1.0 MG/DL    ALT (SGPT) 17 12 - 78 U/L    AST (SGOT) 26 15 - 37 U/L    Alk.  phosphatase 86 45 - 117 U/L    Protein, total 7.6 6.4 - 8.2 g/dL    Albumin 3.5 3.5 - 5.0 g/dL    Globulin 4.1 (H) 2.0 - 4.0 g/dL    A-G Ratio 0.9 (L) 1.1 - 2.2     CK W/ CKMB & INDEX    Collection Time: 11/24/20  4:04 AM   Result Value Ref Range    CK - MB 9.6 (H) <3.6 NG/ML    CK-MB Index 1.9 0.0 - 2.5       (H) 26 - 192 U/L   TROPONIN I    Collection Time: 11/24/20  4:04 AM   Result Value Ref Range    Troponin-I, Qt. 0.14 (H) <0.05 ng/mL   NT-PRO BNP    Collection Time: 11/24/20  4:04 AM   Result Value Ref Range    NT pro- (H) <125 PG/ML       Radiologic Studies -   XR CHEST PORT   Final Result   IMPRESSION: No acute cardiopulmonary process      CT HEAD WO CONT   Final Result   IMPRESSION:    No acute intracranial abnormality. Moderate chronic small vessel ischemic   disease. CT SPINE CERV WO CONT   Final Result   IMPRESSION:   No evidence of fracture or subluxation. XR KNEE RT 3 V    (Results Pending)   XR KNEE LT 3 V    (Results Pending)     CT Results  (Last 48 hours)               11/24/20 0517  CT HEAD WO CONT Final result    Impression:  IMPRESSION:    No acute intracranial abnormality. Moderate chronic small vessel ischemic   disease. Narrative:  EXAM: CT HEAD WO CONT       INDICATION: fall       COMPARISON: 9/5/2020. CONTRAST: None. TECHNIQUE: Unenhanced CT of the head was performed using 5 mm images. Brain and   bone windows were generated. Coronal and sagittal reformats. CT dose reduction   was achieved through use of a standardized protocol tailored for this   examination and automatic exposure control for dose modulation. FINDINGS:   The ventricles and sulci are normal in size, shape and configuration. . Moderate   subcortical and deep white matter hypodensities. . There is no intracranial   hemorrhage, extra-axial collection, or mass effect. The basilar cisterns are   open. No CT evidence of acute infarct. The bone windows demonstrate no abnormalities. The visualized portions of the   paranasal sinuses and mastoid air cells are clear. 11/24/20 0517  CT SPINE CERV WO CONT Final result    Impression:  IMPRESSION:   No evidence of fracture or subluxation. Narrative:  EXAM:  CT CERVICAL SPINE WITHOUT CONTRAST       INDICATION: fall. COMPARISON: None. CONTRAST:  None.        TECHNIQUE: Multislice helical CT of the cervical spine was performed without   intravenous contrast administration. Sagittal and coronal reformats were   generated. CT dose reduction was achieved through use of a standardized   protocol tailored for this examination and automatic exposure control for dose   modulation. FINDINGS:       The alignment is within normal limits. There is no fracture or compression   deformity. The odontoid process is intact. The craniocervical junction is within   normal limits. 3 mm pulmonary nodule right upper lobe series 304, image 3-4. Enlarged right   thyroid lobe. C2-C3: There is no spinal canal or neural foraminal stenosis. C3-C4: There is no spinal canal or neural foraminal stenosis. C4-C5: There is no spinal canal or neural foraminal stenosis. C5-C6: There is no spinal canal or neural foraminal stenosis. C6-C7: There is no spinal canal or neural foraminal stenosis. C7-T1: There is no spinal canal or neural foraminal stenosis. CXR Results  (Last 48 hours)               11/24/20 0637  XR CHEST PORT Final result    Impression:  IMPRESSION: No acute cardiopulmonary process       Narrative:  EXAM: XR CHEST PORT       INDICATION: recurrent falls, dizzy, weak       COMPARISON: 9/6/2020       FINDINGS: A portable AP radiograph of the chest was obtained at 628 hours. The   patient is on a cardiac monitor. The lungs are clear. The cardiac and   mediastinal contours and pulmonary vascularity are normal.  The bones and soft   tissues are grossly within normal limits. Medical Decision Making   I am the first provider for this patient. I reviewed the vital signs, available nursing notes, past medical history, past surgical history, family history and social history. Vital Signs-Reviewed the patient's vital signs.   Patient Vitals for the past 12 hrs:   Temp Pulse Resp BP SpO2   11/24/20 0354 98.4 °F (36.9 °C) (!) 104 16 (!) 143/85 100 %       EKG at 4:02 AM on November 24, 2020 interpreted by me: Sinus rhythm, 100 bpm, occasional PVCs, left axis deviation, incomplete right bundle branch block, normal NM, QRS intervals, prolonged QTc interval, nonspecific ST changes    Records Reviewed: Nursing Notes and Old Medical Records    Provider Notes (Medical Decision Making):   Differential diagnosis: Arrhythmia, electrolyte abnormality, acute coronary syndrome, UTI, generalized weakness  We will check CBC, CMP, CPK, troponin, UA, chest x-ray, knee films, and head CT. ED Course:   Initial assessment performed. The patients presenting problems have been discussed, and they are in agreement with the care plan formulated and outlined with them. I have encouraged them to ask questions as they arise throughout their visit. Progress Notes:  ED Course as of Nov 24 0727 Tue Nov 24, 2020 2134 Patient with worsening weakness and multiple recent falls. Potassium is 3.1. Troponin is mildly elevated, but this is chronic. Patient states that she feels as bad as she did when she was originally admitted in September. She lives alone and although has some help during the day, she is not safe to be at home by herself at night given multiple recent falls. Will admit to hospitalist.    [AO]      ED Course User Index  [AO] Tammy Carey MD       Disposition:  Admit to hospitalist        Diagnosis     Clinical Impression:     1. Weakness  2. Multiple falls  3. Hypokalemia  4.   Cardiac enzymes elevated

## 2020-11-24 NOTE — Clinical Note
Sheath #3: Sheath: inserted. Sheath inserted/placed in the left femoral  vein. Hemostasis achieved.  7fr

## 2020-11-24 NOTE — ED NOTES
Report given to ANDRAE Nelson. They were informed of patient chief complaint, current status, orders completed (to include IV access/medications/radiology testing), outstanding orders that still need to be completed, and the treatment plan. Ensured no questions or concerns regarding the patient prior to departure.

## 2020-11-25 LAB
25(OH)D3 SERPL-MCNC: 28.7 NG/ML (ref 30–100)
ALBUMIN SERPL-MCNC: 2.8 G/DL (ref 3.5–5)
ALBUMIN/GLOB SERPL: 0.9 {RATIO} (ref 1.1–2.2)
ALP SERPL-CCNC: 67 U/L (ref 45–117)
ALT SERPL-CCNC: 20 U/L (ref 12–78)
ANION GAP SERPL CALC-SCNC: 7 MMOL/L (ref 5–15)
AST SERPL-CCNC: 46 U/L (ref 15–37)
ATRIAL RATE: 100 BPM
BILIRUB SERPL-MCNC: 0.5 MG/DL (ref 0.2–1)
BUN SERPL-MCNC: 13 MG/DL (ref 6–20)
BUN/CREAT SERPL: 13 (ref 12–20)
CALCIUM SERPL-MCNC: 8.7 MG/DL (ref 8.5–10.1)
CALCULATED P AXIS, ECG09: 46 DEGREES
CALCULATED R AXIS, ECG10: -54 DEGREES
CALCULATED T AXIS, ECG11: -6 DEGREES
CHLORIDE SERPL-SCNC: 108 MMOL/L (ref 97–108)
CK SERPL-CCNC: 1151 U/L (ref 26–192)
CO2 SERPL-SCNC: 25 MMOL/L (ref 21–32)
CREAT SERPL-MCNC: 0.99 MG/DL (ref 0.55–1.02)
DIAGNOSIS, 93000: NORMAL
GLOBULIN SER CALC-MCNC: 3.2 G/DL (ref 2–4)
GLUCOSE SERPL-MCNC: 78 MG/DL (ref 65–100)
MAGNESIUM SERPL-MCNC: 1.9 MG/DL (ref 1.6–2.4)
P-R INTERVAL, ECG05: 162 MS
POTASSIUM SERPL-SCNC: 3.5 MMOL/L (ref 3.5–5.1)
PROT SERPL-MCNC: 6 G/DL (ref 6.4–8.2)
Q-T INTERVAL, ECG07: 382 MS
QRS DURATION, ECG06: 110 MS
QTC CALCULATION (BEZET), ECG08: 492 MS
SODIUM SERPL-SCNC: 140 MMOL/L (ref 136–145)
TROPONIN I SERPL-MCNC: 0.13 NG/ML
VENTRICULAR RATE, ECG03: 100 BPM
VIT B12 SERPL-MCNC: 154 PG/ML (ref 193–986)

## 2020-11-25 PROCEDURE — 82306 VITAMIN D 25 HYDROXY: CPT

## 2020-11-25 PROCEDURE — 74011250636 HC RX REV CODE- 250/636: Performed by: INTERNAL MEDICINE

## 2020-11-25 PROCEDURE — 82550 ASSAY OF CK (CPK): CPT

## 2020-11-25 PROCEDURE — 83735 ASSAY OF MAGNESIUM: CPT

## 2020-11-25 PROCEDURE — 84484 ASSAY OF TROPONIN QUANT: CPT

## 2020-11-25 PROCEDURE — 36415 COLL VENOUS BLD VENIPUNCTURE: CPT

## 2020-11-25 PROCEDURE — 80053 COMPREHEN METABOLIC PANEL: CPT

## 2020-11-25 PROCEDURE — 2709999900 HC NON-CHARGEABLE SUPPLY

## 2020-11-25 PROCEDURE — 65660000000 HC RM CCU STEPDOWN

## 2020-11-25 PROCEDURE — 74011250637 HC RX REV CODE- 250/637: Performed by: INTERNAL MEDICINE

## 2020-11-25 PROCEDURE — 82607 VITAMIN B-12: CPT

## 2020-11-25 RX ORDER — FUROSEMIDE 20 MG/1
20 TABLET ORAL ONCE
Status: COMPLETED | OUTPATIENT
Start: 2020-11-25 | End: 2020-11-25

## 2020-11-25 RX ORDER — SODIUM CHLORIDE 9 MG/ML
75 INJECTION, SOLUTION INTRAVENOUS CONTINUOUS
Status: DISCONTINUED | OUTPATIENT
Start: 2020-11-25 | End: 2020-11-28

## 2020-11-25 RX ORDER — POTASSIUM CHLORIDE 750 MG/1
20 TABLET, FILM COATED, EXTENDED RELEASE ORAL
Status: COMPLETED | OUTPATIENT
Start: 2020-11-25 | End: 2020-11-25

## 2020-11-25 RX ADMIN — METOPROLOL TARTRATE 12.5 MG: 25 TABLET, FILM COATED ORAL at 08:17

## 2020-11-25 RX ADMIN — METOPROLOL TARTRATE 12.5 MG: 25 TABLET, FILM COATED ORAL at 17:21

## 2020-11-25 RX ADMIN — POTASSIUM CHLORIDE 20 MEQ: 750 TABLET, FILM COATED, EXTENDED RELEASE ORAL at 11:23

## 2020-11-25 RX ADMIN — DOCUSATE SODIUM 50MG AND SENNOSIDES 8.6MG 1 TABLET: 8.6; 5 TABLET, FILM COATED ORAL at 08:16

## 2020-11-25 RX ADMIN — ENOXAPARIN SODIUM 40 MG: 40 INJECTION SUBCUTANEOUS at 08:16

## 2020-11-25 RX ADMIN — DOCUSATE SODIUM 50MG AND SENNOSIDES 8.6MG 1 TABLET: 8.6; 5 TABLET, FILM COATED ORAL at 21:50

## 2020-11-25 RX ADMIN — SODIUM CHLORIDE 100 ML/HR: 900 INJECTION, SOLUTION INTRAVENOUS at 21:55

## 2020-11-25 RX ADMIN — ACETAMINOPHEN 650 MG: 325 TABLET ORAL at 08:16

## 2020-11-25 RX ADMIN — SODIUM CHLORIDE 100 ML/HR: 900 INJECTION, SOLUTION INTRAVENOUS at 11:24

## 2020-11-25 RX ADMIN — FUROSEMIDE 20 MG: 20 TABLET ORAL at 11:23

## 2020-11-25 RX ADMIN — POTASSIUM CHLORIDE AND SODIUM CHLORIDE: 450; 150 INJECTION, SOLUTION INTRAVENOUS at 03:05

## 2020-11-25 NOTE — PROGRESS NOTES
Hospitalist Progress Note    NAME: Remberto Jewlel   :  1950   MRN:  [id number]     Interim Hospital Summary: 79 y.o. female whom presented on 2020 with      Assessment / Plan:    Weakness, s/p falls  Right shoulder contusion   Rhabdomyolysis   DANIELLE from Volume depletion  Hypokalemia   -CT Head and C spine NEG  -xrays knees NEG  -follow CK, trending up so continue IVFs. Troponin bump due to rhabdo  -continue cardiac monitoring but doubt fall is related to underlying arrhythmia   -denies feeling depressed and recent TSH wnl  -check VIt D and B12 level - test was ordered yesterday but was not drawn or sent  -PT OT eval and treat. Patient wants to go home with [facility] PT and not return to SNF     HTN  -recent Echo EF 63% with diastolic dysfunction and mild AI. No history of HF  -continue half dose of her BB     -hold HCTZ for now. Consider ACE / HCTZ or lasix combination if BP trends up. She has some leg edema so having a diuretic on board is helpful, just need to monitor her K closely    25.0 - 29.9 Overweight / Body mass index is 28.35 kg/m². Code status: Full  Prophylaxis: Lovenox  Recommended Disposition:  PT, OT, RN       Subjective:     Chief Complaint / Reason for Physician Visit  Follow up of weakness, rhabdomyolysis, DANIELLE, HTN  Chart reviewed in detail. Discussed with RN events overnight. Review of Systems:  Symptom Y/N Comments  Symptom Y/N Comments   Fever/Chills    Chest Pain     Poor Appetite    Edema     Cough    Abdominal Pain     Sputum    Joint Pain     SOB/LINN    Pruritis/Rash     Nausea/vomit    Tolerating PT/OT     Diarrhea    Tolerating Diet     Constipation    Other       Could NOT obtain due to:      PO intake: No data found. Objective:     VITALS:   Last 24hrs VS reviewed since prior progress note.  Most recent are:  Patient Vitals for the past 24 hrs:   Temp Pulse Resp BP SpO2   20 0735 98 °F (36.7 °C) 75 16 (!) 144/67 99 %   11/25/20 0356 98.7 °F (37.1 °C) 72 16 (!) 140/69 99 %   11/24/20 2330 98.2 °F (36.8 °C) 70 16 129/70 98 %   11/24/20 1955 98.4 °F (36.9 °C) 76 16 (!) 99/55 100 %   11/24/20 1505 98.7 °F (37.1 °C) 73 16 (!) 144/85 100 %   11/24/20 1358 98.3 °F (36.8 °C) 72 16 (!) 145/67 100 %   11/24/20 1336  63 14  99 %   11/24/20 1326  (!) 59 13  100 %   11/24/20 1321  61 13  98 %   11/24/20 1316  63 14  99 %   11/24/20 1306  63 14  99 %   11/24/20 1256  69 15  100 %   11/24/20 1246  62 13  100 %   11/24/20 1236  66 13  99 %   11/24/20 1226  63 13  99 %   11/24/20 1216  64 15  100 %   11/24/20 1206  68 13  99 %   11/24/20 1156  82 17  99 %   11/24/20 1146  82 15  100 %   11/24/20 1136  85 19  100 %   11/24/20 1126  85 16  98 %   11/24/20 1116  82 16  99 %   11/24/20 1106  83 16  100 %   11/24/20 1056  84 17  100 %   11/24/20 1046  77 13  100 %   11/24/20 1036  79 14  99 %   11/24/20 1032  69 13  100 %   11/24/20 1028     98 %   11/24/20 1027     99 %   11/24/20 1026     98 %   11/24/20 1025     100 %   11/24/20 1024     100 %   11/24/20 1023     98 %   11/24/20 1022     100 %   11/24/20 1021     98 %   11/24/20 1020     100 %   11/24/20 1019     99 %   11/24/20 1018     99 %   11/24/20 1017     99 %   11/24/20 1016     99 %   11/24/20 1015     99 %   11/24/20 1014     99 %   11/24/20 1013     98 %   11/24/20 1012     100 %   11/24/20 1011     98 %   11/24/20 1010     99 %   11/24/20 1009     100 %   11/24/20 1008     98 %   11/24/20 1007     99 %   11/24/20 1006     99 %   11/24/20 1005     99 %   11/24/20 1004     98 %   11/24/20 1003     99 %   11/24/20 1002     100 %   11/24/20 1001     98 %   11/24/20 1000     99 %   11/24/20 0959     99 %   11/24/20 0958     100 %   11/24/20 0957     100 %   11/24/20 0956     100 %   11/24/20 0955     100 %   11/24/20 0954     99 %   11/24/20 0953     100 %   11/24/20 0952     100 %   11/24/20 0951     100 %   11/24/20 0950     98 %   11/24/20 0949     100 %   11/24/20 0948     100 %   11/24/20 0947     99 %   11/24/20 0946     100 %   11/24/20 0945     100 %   11/24/20 0944     100 %   11/24/20 0943     99 %   11/24/20 0942     100 %   11/24/20 0941     99 %   11/24/20 0940     100 %   11/24/20 0939     100 %   11/24/20 0938     100 %   11/24/20 0937     99 %   11/24/20 0936     99 %   11/24/20 0935     99 %   11/24/20 0934     100 %   11/24/20 0933     99 %   11/24/20 0932     100 %   11/24/20 0931     99 %   11/24/20 0930     100 %   11/24/20 0929     100 %   11/24/20 0928     100 %   11/24/20 0927     100 %   11/24/20 0926     99 %   11/24/20 0925     99 %   11/24/20 0924     100 %   11/24/20 0923     99 %   11/24/20 0922     100 %   11/24/20 0921     98 %   11/24/20 0920     100 %   11/24/20 0919     99 %   11/24/20 0918     100 %   11/24/20 0917     99 %   11/24/20 0916     100 %   11/24/20 0915     100 %   11/24/20 0914     100 %   11/24/20 0913     100 %   11/24/20 0912     100 %   11/24/20 0911     100 %   11/24/20 0910     100 %   11/24/20 0909     100 %   11/24/20 0908     100 %   11/24/20 0907     100 %   11/24/20 0906     100 %   11/24/20 0905     100 %   11/24/20 0904     100 %   11/24/20 0903     100 %   11/24/20 0902     100 %       Intake/Output Summary (Last 24 hours) at 11/25/2020 0901  Last data filed at 11/25/2020 9453  Gross per 24 hour   Intake    Output 1100 ml   Net -1100 ml        I had a face to face encounter, and independently examined this patient on 11/25/2020, as outlined below:  PHYSICAL EXAM:  General: WD, WN. Alert, cooperative, no acute distress    EENT:  EOMI.  Anicteric sclerae. MMM  Resp:  CTA bilaterally, no wheezing or rales. No accessory muscle use  CV:  Regular  rhythm,  + leg edema  GI:  Soft, Non distended, Non tender. +Bowel sounds  Neurologic:  Alert and oriented X 3, normal speech,   Psych:   Good insight. Not anxious nor agitated  Skin:  No rashes. No jaundice    Reviewed most current lab test results and cultures  YES  Reviewed most current radiology test results   YES  Review and summation of old records today    NO  Reviewed patient's current orders and MAR    YES  PMH/SH reviewed - no change compared to H&P  ________________________________________________________________________  Care Plan discussed with:    Comments   Patient x    Family  x    RN     Care Manager     Consultant                        Multidiciplinary team rounds were held today with , nursing, pharmacist and clinical coordinator. Patient's plan of care was discussed; medications were reviewed and discharge planning was addressed. ________________________________________________________________________  Total NON critical care TIME:  25   Minutes    Total CRITICAL CARE TIME Spent:   Minutes non procedure based      Comments   >50% of visit spent in counseling and coordination of care x     This includes time during multidisciplinary rounds if indicated above   ________________________________________________________________________  Dennise Ortega MD     Procedures: see electronic medical records for all procedures/Xrays and details which were not copied into this note but were reviewed prior to creation of Plan. LABS:  I reviewed today's most current labs and imaging studies.   Pertinent labs include:  Recent Labs     11/24/20  0404   WBC 7.1   HGB 12.7   HCT 39.2        Recent Labs     11/25/20  0311 11/24/20  0404    137   K 3.5 3.1*    103   CO2 25 26   GLU 78 119*   BUN 13 11   CREA 0.99 1.30*   CA 8.7 8.8   MG 1.9  --    ALB 2.8* 3.5   TBILI 0.5 0.6 ALT 20 17

## 2020-11-25 NOTE — ACP (ADVANCE CARE PLANNING)
Provided support to this pt in [address] [place] [address]. Offered assistance to pt as a result of AMD consult. Pt wasn't interested in completing AMD at this time. Provided pt with copy of AMD.   CH engaged pt in life review and offered listening presence. Pt spoke of holiday plans which will need to be adjusted due to this admission. Pt seems to get strength from family support. Pt spoke specifically of her daughter and two grandchildren. Advised pt of chaplains' availability to offer assistance with completing AMD when ready. [address] [place] [address] assured pt of prayer and concluded by affirming ongoing availability of support. Florinda Esquivel MDiv.  Staff   Request  Support/Spiritual Care Services via [facility]

## 2020-11-25 NOTE — ACP (ADVANCE CARE PLANNING)
Responded to request by patient to assist with advance medical directive. Explained document to patient. Assisted patient in completing the document. Made copy for patients chart and placed it with her physical records. Returned original document and copies to the patient. Douglas Yun MDiv.  Staff   Request  Support/Spiritual Care Services via [facility]

## 2020-11-25 NOTE — PROGRESS NOTES
Responded to request by patient to assist with advance medical directive. Explained document to patient. Assisted patient in completing the document. Made copy for patients chart and placed it with her physical records. Returned original document and copies to the patient. Julio Johnson MDiv.  Staff   Request  Support/Spiritual Care Services via [address] [place] [address]

## 2020-11-25 NOTE — PROGRESS NOTES
End of Shift Note     Bedside shift change report given to [facility]  (oncoming nurse) by Anival  (offgoing nurse). Report included the following information SBAR, Kardex, ED Summary, MAR, Recent Results and Cardiac Rhythm NSR     Shift worked:  8109-1329   Shift summary and any significant changes:      none          Concerns for physician to address:  none   Zone phone for oncoming shift:   [phone]      Patient Information  Myrtle Leon  79 y.o.  11/24/2020  5:31 AM by Jose Castro MD. Myrtle Leon was admitted from Home     Problem List       Patient Active Problem List     Diagnosis Date Noted    Weakness generalized 11/24/2020    Rhabdomyolysis 11/24/2020    Volume depletion 11/24/2020    DANIELLE (acute kidney injury) ([facility] [address] 11/24/2020    Hypokalemia 09/06/2020    Fatigue 09/05/2020    Fall 09/05/2020    Elevated troponin 09/05/2020    Hypertension 10/27/2011           Past Medical History:   Diagnosis Date    History of mammogram 2010     normal    Hypertension      Pap smear for cervical cancer screening 2011     normal         Core Measures:  CVA: No No  CHF:No No  PNA:No No     Activity:  Activity Level: Bed Rest  Number times ambulated in hallways past shift: 0  Number of times OOB to chair past shift: 0     Cardiac:   Cardiac Monitoring: Yes         Access:   Current line(s): PIV      Genitourinary:   Urinary status: incontinent     Respiratory:   O2 Device: Room air  Chronic home O2 use?: NO  Incentive spirometer at bedside: N/A     GI:  Last Bowel Movement Date: 11/23/20  Current diet:  DIET CARDIAC Regular  DIET ONE TIME MESSAGE  Passing flatus: YES  Tolerating current diet: YES     Pain Management:   Patient states pain is manageable on current regimen: YES     Skin:  Bin Score: 17  Interventions: increase time out of bed, foam dressing, PT/OT consult, limit briefs, internal/external urinary devices and nutritional support     Patient Safety:  Fall Score:  Total Score: 3  Interventions: bed/chair alarm, assistive device (walker, cane, etc), gripper socks, pt to call before getting OOB and stay with me (per policy)  High Fall Risk:  Yes     DVT prophylaxis:  DVT prophylaxis Med- Yes  DVT prophylaxis SCD or ROBERTO- No      Wounds: (If Applicable)  Wounds- Yes  Location skin tear to right shoulder and left knee     Active Consults:  IP CONSULT TO HOSPITALIST     Length of Stay:  Expected LOS: 3d 4h  Actual LOS: 0  Discharge Plan: Yuly Sharp

## 2020-11-25 NOTE — PROGRESS NOTES
Problem: Pressure Injury - Risk of  Goal: *Prevention of pressure injury  Description: Document Bin Scale and appropriate interventions in the flowsheet. Outcome: Progressing Towards Goal  Note: Pressure Injury Interventions:       Moisture Interventions: Absorbent underpads, Apply protective barrier, creams and emollients, Check for incontinence Q2 hours and as needed    Activity Interventions: PT/OT evaluation    Mobility Interventions: Assess need for specialty bed         Friction and Shear Interventions: Apply protective barrier, creams and emollients                Problem: Falls - Risk of  Goal: *Absence of Falls  Description: Document Pam Fall Risk and appropriate interventions in the flowsheet.   Outcome: Progressing Towards Goal  Note: Fall Risk Interventions:  Mobility Interventions: Bed/chair exit alarm, Patient to call before getting OOB         Medication Interventions: Bed/chair exit alarm, Patient to call before getting OOB    Elimination Interventions: Bed/chair exit alarm, Call light in reach    History of Falls Interventions: Bed/chair exit alarm, Door open when patient unattended         Problem: Patient Education: Go to Patient Education Activity  Goal: Patient/Family Education  Outcome: Progressing Towards Goal

## 2020-11-25 NOTE — PROGRESS NOTES
Spiritual Care Assessment/Progress Note  [facility]      NAME: Georgi Cerda      MRN: [id number]  AGE: 79 y.o.  SEX: female  Hindu Affiliation: Islam   Language: English     11/25/2020     Total Time (in minutes): 11     Spiritual Assessment begun in [facility] through conversation with:         [x]Patient        [] Family    [] Friend(s)        Reason for Consult: Advance medical directive consult     Spiritual beliefs: (Please include comment if needed)     [x] Identifies with a elbert tradition:         [] Supported by a elbert community:            [] Claims no spiritual orientation:           [] Seeking spiritual identity:                [] Adheres to an individual form of spirituality:           [] Not able to assess:                           Identified resources for coping:      [] Prayer                               [] Music                  [] Guided Imagery     [x] Family/friends                 [] Pet visits     [] Devotional reading                         [] Unknown     [] Other:                                              Interventions offered during this visit: (See comments for more details)    Patient Interventions: Advance medical directive consult, Catharsis/review of pertinent events in supportive environment, Initial/Spiritual assessment, patient floor, Life review/legacy           Plan of Care:     [] Support spiritual and/or cultural needs    [x] Support AMD and/or advance care planning process      [] Support grieving process   [] Coordinate Rites and/or Rituals    [] Coordination with community clergy   [] No spiritual needs identified at this time   [] Detailed Plan of Care below (See Comments)  [] Make referral to Music Therapy  [] Make referral to Pet Therapy     [] Make referral to Addiction services  [] Make referral to [facility]  [] Make referral to Spiritual Care Partner  [] No future visits requested        [x] Follow up visits as needed     Comments: Provided support to this pt in [address] [place] [address]. Offered assistance to pt as a result of AMD consult. Pt wasn't interested in completing AMD at this time. Provided pt with copy of AMD.   CH engaged pt in life review and offered listening presence. Pt spoke of holiday plans which will need to be adjusted due to this admission. Pt seems to get strength from family support. Pt spoke specifically of her daughter and two grandchildren. Advised pt of chaplains' availability to offer assistance with completing AMD when ready. [address] [place] [address] assured pt of prayer and concluded by affirming ongoing availability of support. Joyce Winslow MDiv.  Staff   Request  Support/Spiritual Care Services via [facility]

## 2020-11-25 NOTE — PROGRESS NOTES
Comprehensive Nutrition Assessment    Type and Reason for Visit: Initial, Positive nutrition screen    Nutrition Recommendations/Plan:   · Continue Cardiac diet. Enc po/fluids. · Continue Ensure Enlive shake po once daily. · Please document % meals and supplements consumed in flowsheet I/O's under intake. Nutrition Assessment:     11/25: Chart reviewed; med noted for weakness, s/p fall. Hx of HTN, DANIELLE. Pt reports appetite is good and enjoys the food. However, pt requested metal utensils as experiencing some difficulty utilizing the plastic utensils. Pt would also like 2 bottles of water on each meal tray. Pt reports some recent weight loss due to current illness. Enjoys the ensure shakes but would like to try the chocolate flavor. RD coordinated preferences with kitchen to optimize nutritional intake. No data found. Last Weight Metric  Weight Loss Metrics 11/24/2020 10/5/2020 9/28/2020 9/11/2020 7/11/2019 4/18/2019 5/17/2018   Today's Wt 181 lb 185 lb - 181 lb 14.1 oz 200 lb 206 lb 200 lb   BMI 28.35 kg/m2 29.86 kg/m2 29.36 kg/m2 29.36 kg/m2 33.28 kg/m2 34.28 kg/m2 33.28 kg/m2       Estimated Daily Nutrient Needs:  Energy (kcal): 1785 ( x 1. 3AF); Weight Used for Energy Requirements: Current  Protein (g): 82 (1.2 g/kg bw); Weight Used for Protein Requirements: Current  Fluid (ml/day): 1800 ml/day; Method Used for Fluid Requirements: 1 ml/kcal    Nutrition Related Findings:  Meds: lovenox, pericolace; BM: 11/23; Labs: mostly WNL      Wounds:    None       Current Nutrition Therapies:  DIET CARDIAC Regular  DIET ONE TIME MESSAGE  DIET NUTRITIONAL SUPPLEMENTS Breakfast; Ensure Enlive  DIET ONE TIME MESSAGE    Anthropometric Measures:  · Height:  5' 7\" (170.2 cm)  · Current Body Wt:  82.1 kg (181 lb)   · Ideal Body Wt:  135 lbs:  134.1 %   · BMI Category: Overweight (BMI 25.0-29. 9)       Nutrition Diagnosis:   No nutrition diagnosis at this time     Nutrition Interventions:   Food and/or Nutrient Delivery: Continue current diet, Continue oral nutrition supplement  Nutrition Education and Counseling: No recommendations at this time  Coordination of Nutrition Care: No recommendation at this time    Goals:  Continue PO intake >50% of meals + 240 ml ONS next 5-7 days       Nutrition Monitoring and Evaluation:   Behavioral-Environmental Outcomes: None identified  Food/Nutrient Intake Outcomes: Food and nutrient intake, Supplement intake  Physical Signs/Symptoms Outcomes: Biochemical data, Weight    Discharge Planning:    Continue oral nutrition supplement, Continue current diet     Electronically signed by Mahad Jeong RD on 11/25/2020 at 9:53 AM

## 2020-11-25 NOTE — PROGRESS NOTES
Reason for Admission:   Generalized Weakness    RUR Score:   7%                  Plan for utilizing home health:      SNF Placement     PCP: First and Last name:  Radha Cantrell   Name of Practice:    Are you a current patient: Yes/No: yes   Approximate date of last visit: Oct 2020   Can you participate in a virtual visit with your PCP: Yes, with family assistance                     Current Advanced Directive/Advance Care Plan: April Saucedo (Family)                         Transition of Care Plan:                      CM completed pt's assessment with Peralta Rochelle (family), via telephone. Pt is known to live alone in a boarding house. Pt is known to be active with PCP: seen Oct 2020 and uses [facility]. Pt is known to be independent with ADLs, and she does not drive. Pt's family will assist with transport at the time of d/c. Pt uses DME at home: walker and cane. Renea Cummings reported no HHC, but reported SNF in the past.    Renea Cummings is listed as medical decision maker when discussing ACP. CM reviewed recommendations: snf placement, with Renea Cummings. Pt reported that pt has been to [facility] rehab in the past.  Renea Cummings reported that she would like a referral to sent to [facility] (acceptance pending). CM informed Renea Cummings of [facility] document (verbal consent), placed in chart. Pt will require [employer] auth once accepted by snf. Pt requires COVID test at the time of d/c. Pt will require 2nd  Medicare Letter and medical transport at the time of d/c. CM will continue to follow. Care Management Interventions  PCP Verified by CM: Yes  Mode of Transport at Discharge:  Other (see comment)  Transition of Care Consult (CM Consult): Discharge Planning  Discharge Durable Medical Equipment: No  Physical Therapy Consult: Yes  Occupational Therapy Consult: Yes  Speech Therapy Consult: No  Current Support Network: Lives Alone, Own Home(renting a room )  Confirm Follow Up Transport: Family  Discharge Location  Discharge Placement: [address] [place] [address] [address], [place], [facility]

## 2020-11-25 NOTE — ROUTINE PROCESS
End of Shift Note Bedside shift change report given to Anival (oncoming nurse) by Violette Rogers (offgoing nurse). Report included the following information SBAR, Kardex, ED Summary, MAR, Recent Results and Cardiac Rhythm NSR Shift worked:  [id number] Shift summary and any significant changes:  
  admit to [facility]STU Concerns for physician to address:  none Zone phone for oncoming shift:   [phone] Patient Information Birgit Dotson 79 y.o. 
11/24/2020  5:31 AM by Miguel Nguyen MD. Birgit Dotson was admitted from Home 
 
Problem List 
Patient Active Problem List  
 Diagnosis Date Noted  Weakness generalized 11/24/2020  Rhabdomyolysis 11/24/2020  Volume depletion 11/24/2020  DANIELLE (acute kidney injury) ([facility] [address] 11/24/2020  Hypokalemia 09/06/2020  Fatigue 09/05/2020  Fall 09/05/2020  Elevated troponin 09/05/2020  Hypertension 10/27/2011 Past Medical History:  
Diagnosis Date  History of mammogram 2010  
 normal  
 Hypertension  Pap smear for cervical cancer screening 2011  
 normal  
 
 
Core Measures: CVA: No No 
CHF:No No 
PNA:No No 
 
Activity: 
Activity Level: Bed Rest 
Number times ambulated in hallways past shift: 0 Number of times OOB to chair past shift: 0 Cardiac:  
Cardiac Monitoring: Yes Access:  
Current line(s): PIV Genitourinary:  
Urinary status: incontinent Respiratory:  
O2 Device: Room air Chronic home O2 use?: NO Incentive spirometer at bedside: N/A 
  
 
GI: 
Last Bowel Movement Date: 11/23/20 Current diet:  DIET CARDIAC Regular DIET ONE TIME MESSAGE Passing flatus: YES Tolerating current diet: YES Pain Management:  
Patient states pain is manageable on current regimen: YES Skin: 
Bin Score: 17 Interventions: increase time out of bed, foam dressing, PT/OT consult, limit briefs, internal/external urinary devices and nutritional support Patient Safety: 
Fall Score: Total Score: 3 Interventions: bed/chair alarm, assistive device (walker, cane, etc), gripper socks, pt to call before getting OOB and stay with me (per policy) High Fall Risk: Yes DVT prophylaxis: DVT prophylaxis Med- Yes DVT prophylaxis SCD or ROBERTO- No  
 
Wounds: (If Applicable) Wounds- Yes Location skin tear to right shoulder and left knee Active Consults: 
IP CONSULT TO HOSPITALIST Length of Stay: 
Expected LOS: 3d 4h Actual LOS: 0 Discharge Plan: No  
 
 
Jose Alberto Clements

## 2020-11-26 LAB
ANION GAP SERPL CALC-SCNC: 4 MMOL/L (ref 5–15)
BUN SERPL-MCNC: 11 MG/DL (ref 6–20)
BUN/CREAT SERPL: 12 (ref 12–20)
CALCIUM SERPL-MCNC: 8.3 MG/DL (ref 8.5–10.1)
CHLORIDE SERPL-SCNC: 110 MMOL/L (ref 97–108)
CK SERPL-CCNC: 861 U/L (ref 26–192)
CO2 SERPL-SCNC: 27 MMOL/L (ref 21–32)
CREAT SERPL-MCNC: 0.89 MG/DL (ref 0.55–1.02)
GLUCOSE SERPL-MCNC: 83 MG/DL (ref 65–100)
MAGNESIUM SERPL-MCNC: 2 MG/DL (ref 1.6–2.4)
POTASSIUM SERPL-SCNC: 3.5 MMOL/L (ref 3.5–5.1)
SODIUM SERPL-SCNC: 141 MMOL/L (ref 136–145)

## 2020-11-26 PROCEDURE — 65660000000 HC RM CCU STEPDOWN

## 2020-11-26 PROCEDURE — 77030038269 HC DRN EXT URIN PURWCK BARD -A

## 2020-11-26 PROCEDURE — 36415 COLL VENOUS BLD VENIPUNCTURE: CPT

## 2020-11-26 PROCEDURE — 80048 BASIC METABOLIC PNL TOTAL CA: CPT

## 2020-11-26 PROCEDURE — 74011250637 HC RX REV CODE- 250/637: Performed by: INTERNAL MEDICINE

## 2020-11-26 PROCEDURE — 83735 ASSAY OF MAGNESIUM: CPT

## 2020-11-26 PROCEDURE — 74011250636 HC RX REV CODE- 250/636: Performed by: INTERNAL MEDICINE

## 2020-11-26 PROCEDURE — 82550 ASSAY OF CK (CPK): CPT

## 2020-11-26 PROCEDURE — 99223 1ST HOSP IP/OBS HIGH 75: CPT | Performed by: INTERNAL MEDICINE

## 2020-11-26 RX ORDER — POTASSIUM CHLORIDE 750 MG/1
40 TABLET, FILM COATED, EXTENDED RELEASE ORAL
Status: COMPLETED | OUTPATIENT
Start: 2020-11-26 | End: 2020-11-26

## 2020-11-26 RX ORDER — FUROSEMIDE 20 MG/1
20 TABLET ORAL ONCE
Status: COMPLETED | OUTPATIENT
Start: 2020-11-26 | End: 2020-11-26

## 2020-11-26 RX ORDER — METOPROLOL TARTRATE 25 MG/1
25 TABLET, FILM COATED ORAL 2 TIMES DAILY
Status: DISCONTINUED | OUTPATIENT
Start: 2020-11-26 | End: 2020-11-27

## 2020-11-26 RX ORDER — METOPROLOL TARTRATE 25 MG/1
12.5 TABLET, FILM COATED ORAL ONCE
Status: COMPLETED | OUTPATIENT
Start: 2020-11-26 | End: 2020-11-26

## 2020-11-26 RX ORDER — MELATONIN
1000 DAILY
Status: DISCONTINUED | OUTPATIENT
Start: 2020-11-26 | End: 2020-12-02 | Stop reason: HOSPADM

## 2020-11-26 RX ORDER — LANOLIN ALCOHOL/MO/W.PET/CERES
1000 CREAM (GRAM) TOPICAL DAILY
Status: DISCONTINUED | OUTPATIENT
Start: 2020-11-26 | End: 2020-12-02 | Stop reason: HOSPADM

## 2020-11-26 RX ADMIN — POTASSIUM CHLORIDE 40 MEQ: 750 TABLET, FILM COATED, EXTENDED RELEASE ORAL at 10:53

## 2020-11-26 RX ADMIN — CYANOCOBALAMIN TAB 500 MCG 1000 MCG: 500 TAB at 14:24

## 2020-11-26 RX ADMIN — FUROSEMIDE 20 MG: 20 TABLET ORAL at 14:24

## 2020-11-26 RX ADMIN — CHOLECALCIFEROL TAB 25 MCG (1000 UNIT) 1 TABLET: 25 TAB at 14:24

## 2020-11-26 RX ADMIN — DOCUSATE SODIUM 50MG AND SENNOSIDES 8.6MG 1 TABLET: 8.6; 5 TABLET, FILM COATED ORAL at 10:14

## 2020-11-26 RX ADMIN — ENOXAPARIN SODIUM 40 MG: 40 INJECTION SUBCUTANEOUS at 10:14

## 2020-11-26 RX ADMIN — METOPROLOL TARTRATE 12.5 MG: 25 TABLET, FILM COATED ORAL at 10:54

## 2020-11-26 RX ADMIN — METOPROLOL TARTRATE 12.5 MG: 25 TABLET, FILM COATED ORAL at 10:14

## 2020-11-26 RX ADMIN — METOPROLOL TARTRATE 25 MG: 25 TABLET, FILM COATED ORAL at 18:39

## 2020-11-26 RX ADMIN — SODIUM CHLORIDE 75 ML/HR: 900 INJECTION, SOLUTION INTRAVENOUS at 18:37

## 2020-11-26 NOTE — PROGRESS NOTES
ANNE-MARIE:    SNF Placement   [employer] Auth   COVID TEST REQUIRED      CM informed that pt received insurance auth, by [employer], to snf: [facility]. Pt auth referral number: [id number]. Pt will require COVID TEST, at the time of d/c. CM will enter FYI, to notify physician to order COVID test.  Pt will require 2nd IM Medicare Letter and medical transport at the time of d/c. CM will continue to follow.     JALIL Hoffman, [address] [place] [address]

## 2020-11-26 NOTE — CONSULTS
Subjective:      Date of  Admission: 11/24/2020  5:31 AM     Admission type:Emergency    Nikki Tate is a 79 y.o. female admitted with weakness, falls at home. On monitor noted to have WCT and possible NSVT and SVT, prompting cardiology consult. Was at [facility] couple of months ago with similar symptoms and at that time had normal stress test and normal LVEF. She denies chest pain, chest pressure/discomfort, dyspnea, syncope, orthopnea, paroxysmal nocturnal dyspnea, exertional chest pressure/discomfort, claudication, lower extremity edema, tachypnea. C/o palpitations. Patient Active Problem List    Diagnosis Date Noted    Weakness generalized 11/24/2020    Rhabdomyolysis 11/24/2020    Volume depletion 11/24/2020    DANIELLE (acute kidney injury) ([facility] [address] 11/24/2020    Hypokalemia 09/06/2020    Fatigue 09/05/2020    Fall 09/05/2020    Elevated troponin 09/05/2020    Hypertension 10/27/2011      Luci Plummer MD  Past Medical History:   Diagnosis Date    History of mammogram 2010    normal    Hypertension     Pap smear for cervical cancer screening 2011    normal      Past Surgical History:   Procedure Laterality Date    HX GYN      hystterectomy     Allergies   Allergen Reactions    Codeine Itching and Swelling      No family history on file.    Current Facility-Administered Medications   Medication Dose Route Frequency    metoprolol tartrate (LOPRESSOR) tablet 25 mg  25 mg Oral BID    cyanocobalamin (VITAMIN B12) tablet 1,000 mcg  1,000 mcg Oral DAILY    cholecalciferol (VITAMIN D3) (1000 Units /25 mcg) tablet 1 Tab  1,000 Units Oral DAILY    furosemide (LASIX) tablet 20 mg  20 mg Oral ONCE    0.9% sodium chloride infusion  75 mL/hr IntraVENous CONTINUOUS    acetaminophen (TYLENOL) tablet 650 mg  650 mg Oral Q6H PRN    Or    acetaminophen (TYLENOL) suppository 650 mg  650 mg Rectal Q6H PRN    polyethylene glycol (MIRALAX) packet 17 g  17 g Oral DAILY PRN    promethazine (PHENERGAN) tablet 12.5 mg  12.5 mg Oral Q6H PRN    Or    ondansetron (ZOFRAN) injection 4 mg  4 mg IntraVENous Q6H PRN    enoxaparin (LOVENOX) injection 40 mg  40 mg SubCUTAneous DAILY    senna-docusate (PERICOLACE) 8.6-50 mg per tablet 1 Tab  1 Tab Oral BID         Review of Symptoms:  Constitutional: negative  Eyes: negative  Ears, nose, mouth, throat, and face: negative  Respiratory: No exertional dyspnea, orthopnea, PND, cough, hemoptysis, URI. Cardiovascular: No CP, sweating, syncope, presyncope, lower extremity swelling. Gastrointestinal: No nausea, vomiting, diarrhea, constipation, abdominal pain, hematemesis, melena, hematochezia  Genitourinary:No urinary complaints. Musculoskeletal:negative  Neurological: negative  Behvioral/Psych: negative  Endocrine: negative     Subjective:      Visit Vitals  BP (!) 146/76   Pulse 64   Temp 97.4 °F (36.3 °C)   Resp 16   Ht 5' 7\" (1.702 m)   Wt 181 lb (82.1 kg)   SpO2 98%   BMI 28.35 kg/m²       Physical Exam  Resting comfortably. No resp distress  Abdomen: soft, non-tender.  Bowel sounds normal. No masses,  no organomegaly  Extremities: extremities normal, atraumatic, no cyanosis or edema  Heart: regular rate and rhythm, S1, S2 normal, no murmur, click, rub or gallop  Lungs: clear to auscultation bilaterally  Neck: supple, no JVD  Neurologic: Grossly normal  Pulses: 2+ and symmetric    Cardiographics    Telemetry: SR, SVT, NSVT    ECG: SR, RBBB, LAD, PVCs    Labs:   Recent Results (from the past 24 hour(s))   METABOLIC PANEL, BASIC    Collection Time: 11/26/20  3:25 AM   Result Value Ref Range    Sodium 141 136 - 145 mmol/L    Potassium 3.5 3.5 - 5.1 mmol/L    Chloride 110 (H) 97 - 108 mmol/L    CO2 27 21 - 32 mmol/L    Anion gap 4 (L) 5 - 15 mmol/L    Glucose 83 65 - 100 mg/dL    BUN 11 6 - 20 MG/DL    Creatinine 0.89 0.55 - 1.02 MG/DL    BUN/Creatinine ratio 12 12 - 20      GFR est AA >60 >60 ml/min/1.73m2    GFR est non-AA >60 >60 ml/min/1.73m2    Calcium 8.3 (L) 8.5 - 10.1 MG/DL   CK    Collection Time: 11/26/20  3:25 AM   Result Value Ref Range     (H) 26 - 192 U/L   MAGNESIUM    Collection Time: 11/26/20  3:25 AM   Result Value Ref Range    Magnesium 2.0 1.6 - 2.4 mg/dL        Assessment:     Assessment:       Active Problems:    Weakness generalized (11/24/2020)      Rhabdomyolysis (11/24/2020)      Volume depletion (11/24/2020)      DANIELLE (acute kidney injury) ([facility] [address] (11/24/2020)         Plan:     Elevated troponin, NSVT, SVT: will proceed with cardiac cath in AM. EP evaluation afterwards. On BB. Normal LVEF on recent Echo. I discussed the risks/benefits/alternatives of the procedure with the patient. Risks include (but are not limited to) bleeding, infection, cva/mi/tamponade/death. The patient understands and agrees to proceed.

## 2020-11-26 NOTE — PROGRESS NOTES
Hospitalist Progress Note    NAME: Diana Martin   :  1950   MRN:  [id number]     Interim Hospital Summary: 79 y.o. female whom presented on 2020 with      Assessment / Plan:    Weakness, s/p falls  Right shoulder contusion   Rhabdomyolysis   DANIELLE from Volume depletion  Hypokalemia   -CT Head and C spine NEG  -xrays knees NEG  -follow CK, trending down so will taper IVFs. Troponin bump due to rhabdo  -continue cardiac monitoring. NSVT noted on telemetry. This potentially could have lead to her falling. Will ask cardiology input  -denies feeling depressed and recent TSH wnl  -checked VIt D and B12 levels and both are below normal range  -PT OT eval and treat. SNF vs  PT     Vit B12 deficiency  Vit D insufficiency  -start oral supplementation. Follow up with PCP to recheck levels in a month    HTN  NSVT on Tele  -recent Echo EF 46% with diastolic dysfunction and mild AI. No history of HF  -increase metoprolol back to her PTA dose of 25mg BID  -hold HCTZ for now. Replete K to >4. Mg >2  -as this potentially could have lead to her falling. Will ask cardiology input  -leg edema better from that lasix dose yesterday. Will give another dose. 25.0 - 29.9 Overweight / Body mass index is 28.35 kg/m². Code status: Full  Prophylaxis: Lovenox  Recommended Disposition:  PT, OT, RN       Subjective:     Chief Complaint / Reason for Physician Visit  Follow up of weakness, rhabdomyolysis, DANIELLE, HTN  Chart reviewed in detail. Discussed with RN events overnight. Review of Systems:  Symptom Y/N Comments  Symptom Y/N Comments   Fever/Chills    Chest Pain     Poor Appetite    Edema     Cough    Abdominal Pain     Sputum    Joint Pain     SOB/LINN    Pruritis/Rash     Nausea/vomit    Tolerating PT/OT     Diarrhea    Tolerating Diet     Constipation    Other       Could NOT obtain due to:      PO intake: No data found.   Objective:     VITALS: Last 24hrs VS reviewed since prior progress note. Most recent are:  Patient Vitals for the past 24 hrs:   Temp Pulse Resp BP SpO2   11/26/20 1013  77  (!) 146/69    11/26/20 0815 97.7 °F (36.5 °C) 84 20 (!) 189/126 99 %   11/26/20 0358 98.5 °F (36.9 °C) 71 16 (!) 142/73 98 %   11/25/20 2328 98.2 °F (36.8 °C) 75 17 127/65 99 %   11/25/20 2001 98.2 °F (36.8 °C) 70 17 137/77 97 %   11/25/20 1648 98.8 °F (37.1 °C) 72 17 122/69 99 %   11/25/20 1140 98.2 °F (36.8 °C) 73 16 125/78 99 %       Intake/Output Summary (Last 24 hours) at 11/26/2020 1040  Last data filed at 11/26/2020 0815  Gross per 24 hour   Intake    Output 1150 ml   Net -1150 ml        I had a face to face encounter, and independently examined this patient on 11/26/2020, as outlined below:  PHYSICAL EXAM:  General: WD, WN. Alert, cooperative, no acute distress    EENT:  EOMI. Anicteric sclerae. MMM  Resp:  CTA bilaterally, no wheezing or rales. No accessory muscle use  CV:  Regular  rhythm,  + leg edema  GI:  Soft, Non distended, Non tender. +Bowel sounds  Neurologic:  Alert and oriented X 3, normal speech,   Psych:   Good insight. Not anxious nor agitated  Skin:  No rashes. No jaundice    Reviewed most current lab test results and cultures  YES  Reviewed most current radiology test results   YES  Review and summation of old records today    NO  Reviewed patient's current orders and MAR    YES  PMH/SH reviewed - no change compared to H&P  ________________________________________________________________________  Care Plan discussed with:    Comments   Patient x    Family  x    RN     Care Manager     Consultant                        Multidiciplinary team rounds were held today with , nursing, pharmacist and clinical coordinator. Patient's plan of care was discussed; medications were reviewed and discharge planning was addressed.      ________________________________________________________________________  Total NON critical care TIME: 25   Minutes    Total CRITICAL CARE TIME Spent:   Minutes non procedure based      Comments   >50% of visit spent in counseling and coordination of care x     This includes time during multidisciplinary rounds if indicated above   ________________________________________________________________________  Forrest Mcdonald MD     Procedures: see electronic medical records for all procedures/Xrays and details which were not copied into this note but were reviewed prior to creation of Plan. LABS:  I reviewed today's most current labs and imaging studies.   Pertinent labs include:  Recent Labs     11/24/20  0404   WBC 7.1   HGB 12.7   HCT 39.2        Recent Labs     11/26/20  0325 11/25/20  0311 11/24/20  0404    140 137   K 3.5 3.5 3.1*   * 108 103   CO2 27 25 26   GLU 83 78 119*   BUN 11 13 11   CREA 0.89 0.99 1.30*   CA 8.3* 8.7 8.8   MG 2.0 1.9  --    ALB  --  2.8* 3.5   TBILI  --  0.5 0.6   ALT  --  20 17

## 2020-11-26 NOTE — PROGRESS NOTES
Pt had multiple episodes of vtach. 6, 7, 14, 16 beats. VS obtained. Pt with no complaints. States she did feel some palpitations/flutter. Pt resting in bed on the phone. Pt now NSR in the 70's. MD Zhao paged. MD aware. Mag levels ok. MD to increase next dose of metoprolol. Will continue to monitor.

## 2020-11-26 NOTE — ROUTINE PROCESS
End of Shift Note Bedside shift change report given to Sylwia Brenner (oncoming nurse) by Brianna Hyatt (offgoing nurse). Report included the following information SBAR, Kardex, ED Summary, MAR, Recent Results and Cardiac Rhythm NSR Shift worked:  [id number] Shift summary and any significant changes:  
  admit to NSTU Concerns for physician to address:  none Zone phone for oncoming shift:   [phone] Patient Information Rebecca Thompson 79 y.o. 
11/24/2020  5:31 AM by Anali Bright MD. Rebecca Thompson was admitted from Home 
 
Problem List 
Patient Active Problem List  
 Diagnosis Date Noted  Weakness generalized 11/24/2020  Rhabdomyolysis 11/24/2020  Volume depletion 11/24/2020  DANIELLE (acute kidney injury) ([facility] [address] 11/24/2020  Hypokalemia 09/06/2020  Fatigue 09/05/2020  Fall 09/05/2020  Elevated troponin 09/05/2020  Hypertension 10/27/2011 Past Medical History:  
Diagnosis Date  History of mammogram 2010  
 normal  
 Hypertension  Pap smear for cervical cancer screening 2011  
 normal  
 
 
Core Measures: CVA: No No 
CHF:No No 
PNA:No No 
 
Activity: 
Activity Level: Bed Rest 
Number times ambulated in hallways past shift: 0 Number of times OOB to chair past shift: 0 Cardiac:  
Cardiac Monitoring: Yes     
Cardiac Rhythm: Normal sinus rhythm Access:  
Current line(s): PIV Genitourinary:  
Urinary status: incontinent Respiratory:  
O2 Device: Room air Chronic home O2 use?: NO Incentive spirometer at bedside: N/A 
  
 
GI: 
Last Bowel Movement Date: 11/23/20 Current diet:  DIET CARDIAC Regular DIET ONE TIME MESSAGE 
DIET NUTRITIONAL SUPPLEMENTS Breakfast; Ensure [employer] DIET ONE TIME MESSAGE Passing flatus: YES Tolerating current diet: YES Pain Management:  
Patient states pain is manageable on current regimen: YES Skin: 
Bin Score: 17 Interventions: increase time out of bed, foam dressing, PT/OT consult, limit briefs, internal/external urinary devices and nutritional support Patient Safety: 
Fall Score: Total Score: 3 Interventions: bed/chair alarm, assistive device (walker, cane, etc), gripper socks, pt to call before getting OOB and stay with me (per policy) High Fall Risk: Yes DVT prophylaxis: DVT prophylaxis Med- Yes DVT prophylaxis SCD or ROBERTO- No  
 
Wounds: (If Applicable) Wounds- Yes Location skin tear to right shoulder and left knee Active Consults: 
IP CONSULT TO HOSPITALIST Length of Stay: 
Expected LOS: 3d 4h Actual LOS: 1 Discharge Plan: No  
 
 
Jose Alberto Clements

## 2020-11-27 LAB
ANION GAP SERPL CALC-SCNC: 3 MMOL/L (ref 5–15)
BUN SERPL-MCNC: 10 MG/DL (ref 6–20)
BUN/CREAT SERPL: 10 (ref 12–20)
CALCIUM SERPL-MCNC: 8.3 MG/DL (ref 8.5–10.1)
CHLORIDE SERPL-SCNC: 110 MMOL/L (ref 97–108)
CK SERPL-CCNC: 617 U/L (ref 26–192)
CO2 SERPL-SCNC: 28 MMOL/L (ref 21–32)
CREAT SERPL-MCNC: 0.96 MG/DL (ref 0.55–1.02)
GLUCOSE SERPL-MCNC: 102 MG/DL (ref 65–100)
MAGNESIUM SERPL-MCNC: 1.9 MG/DL (ref 1.6–2.4)
POTASSIUM SERPL-SCNC: 3.5 MMOL/L (ref 3.5–5.1)
SODIUM SERPL-SCNC: 141 MMOL/L (ref 136–145)

## 2020-11-27 PROCEDURE — 36415 COLL VENOUS BLD VENIPUNCTURE: CPT

## 2020-11-27 PROCEDURE — 77030015766: Performed by: INTERNAL MEDICINE

## 2020-11-27 PROCEDURE — 77030038269 HC DRN EXT URIN PURWCK BARD -A

## 2020-11-27 PROCEDURE — 74011250636 HC RX REV CODE- 250/636: Performed by: INTERNAL MEDICINE

## 2020-11-27 PROCEDURE — 83735 ASSAY OF MAGNESIUM: CPT

## 2020-11-27 PROCEDURE — 77030008543 HC TBNG MON PRSS MRTM -A: Performed by: INTERNAL MEDICINE

## 2020-11-27 PROCEDURE — 77030010221 HC SPLNT WR POS TELE -B: Performed by: INTERNAL MEDICINE

## 2020-11-27 PROCEDURE — 77030019698 HC SYR ANGI MDLON MRTM -A: Performed by: INTERNAL MEDICINE

## 2020-11-27 PROCEDURE — 65660000000 HC RM CCU STEPDOWN

## 2020-11-27 PROCEDURE — C1769 GUIDE WIRE: HCPCS | Performed by: INTERNAL MEDICINE

## 2020-11-27 PROCEDURE — C1894 INTRO/SHEATH, NON-LASER: HCPCS | Performed by: INTERNAL MEDICINE

## 2020-11-27 PROCEDURE — 2709999900 HC NON-CHARGEABLE SUPPLY

## 2020-11-27 PROCEDURE — 74011000250 HC RX REV CODE- 250: Performed by: INTERNAL MEDICINE

## 2020-11-27 PROCEDURE — 4A023N7 MEASUREMENT OF CARDIAC SAMPLING AND PRESSURE, LEFT HEART, PERCUTANEOUS APPROACH: ICD-10-PCS | Performed by: INTERNAL MEDICINE

## 2020-11-27 PROCEDURE — 74011250637 HC RX REV CODE- 250/637: Performed by: INTERNAL MEDICINE

## 2020-11-27 PROCEDURE — B2151ZZ FLUOROSCOPY OF LEFT HEART USING LOW OSMOLAR CONTRAST: ICD-10-PCS | Performed by: INTERNAL MEDICINE

## 2020-11-27 PROCEDURE — 97116 GAIT TRAINING THERAPY: CPT

## 2020-11-27 PROCEDURE — 97530 THERAPEUTIC ACTIVITIES: CPT

## 2020-11-27 PROCEDURE — 80048 BASIC METABOLIC PNL TOTAL CA: CPT

## 2020-11-27 PROCEDURE — 77030004549 HC CATH ANGI DX PRF MRTM -A: Performed by: INTERNAL MEDICINE

## 2020-11-27 PROCEDURE — 82550 ASSAY OF CK (CPK): CPT

## 2020-11-27 PROCEDURE — B2111ZZ FLUOROSCOPY OF MULTIPLE CORONARY ARTERIES USING LOW OSMOLAR CONTRAST: ICD-10-PCS | Performed by: INTERNAL MEDICINE

## 2020-11-27 PROCEDURE — 77030019569 HC BND COMPR RAD TERU -B: Performed by: INTERNAL MEDICINE

## 2020-11-27 PROCEDURE — 74011000636 HC RX REV CODE- 636: Performed by: INTERNAL MEDICINE

## 2020-11-27 PROCEDURE — 93458 L HRT ARTERY/VENTRICLE ANGIO: CPT | Performed by: INTERNAL MEDICINE

## 2020-11-27 PROCEDURE — 87635 SARS-COV-2 COVID-19 AMP PRB: CPT

## 2020-11-27 PROCEDURE — 97535 SELF CARE MNGMENT TRAINING: CPT

## 2020-11-27 PROCEDURE — 99152 MOD SED SAME PHYS/QHP 5/>YRS: CPT | Performed by: INTERNAL MEDICINE

## 2020-11-27 RX ORDER — METOPROLOL TARTRATE 50 MG/1
50 TABLET ORAL 2 TIMES DAILY
Status: DISCONTINUED | OUTPATIENT
Start: 2020-11-27 | End: 2020-11-29

## 2020-11-27 RX ORDER — GUAIFENESIN 100 MG/5ML
LIQUID (ML) ORAL AS NEEDED
Status: DISCONTINUED | OUTPATIENT
Start: 2020-11-27 | End: 2020-11-27 | Stop reason: HOSPADM

## 2020-11-27 RX ORDER — HEPARIN SODIUM 200 [USP'U]/100ML
INJECTION, SOLUTION INTRAVENOUS
Status: COMPLETED | OUTPATIENT
Start: 2020-11-27 | End: 2020-11-27

## 2020-11-27 RX ORDER — VERAPAMIL HYDROCHLORIDE 2.5 MG/ML
INJECTION, SOLUTION INTRAVENOUS AS NEEDED
Status: DISCONTINUED | OUTPATIENT
Start: 2020-11-27 | End: 2020-11-27 | Stop reason: HOSPADM

## 2020-11-27 RX ORDER — LIDOCAINE HYDROCHLORIDE 10 MG/ML
INJECTION, SOLUTION EPIDURAL; INFILTRATION; INTRACAUDAL; PERINEURAL AS NEEDED
Status: DISCONTINUED | OUTPATIENT
Start: 2020-11-27 | End: 2020-11-27 | Stop reason: HOSPADM

## 2020-11-27 RX ORDER — SODIUM CHLORIDE 9 MG/ML
100 INJECTION, SOLUTION INTRAVENOUS CONTINUOUS
Status: DISCONTINUED | OUTPATIENT
Start: 2020-11-27 | End: 2020-11-28

## 2020-11-27 RX ORDER — FENTANYL CITRATE 50 UG/ML
INJECTION, SOLUTION INTRAMUSCULAR; INTRAVENOUS AS NEEDED
Status: DISCONTINUED | OUTPATIENT
Start: 2020-11-27 | End: 2020-11-27 | Stop reason: HOSPADM

## 2020-11-27 RX ORDER — MIDAZOLAM HYDROCHLORIDE 1 MG/ML
INJECTION, SOLUTION INTRAMUSCULAR; INTRAVENOUS AS NEEDED
Status: DISCONTINUED | OUTPATIENT
Start: 2020-11-27 | End: 2020-11-27 | Stop reason: HOSPADM

## 2020-11-27 RX ORDER — HEPARIN SODIUM 1000 [USP'U]/ML
INJECTION, SOLUTION INTRAVENOUS; SUBCUTANEOUS AS NEEDED
Status: DISCONTINUED | OUTPATIENT
Start: 2020-11-27 | End: 2020-11-27 | Stop reason: HOSPADM

## 2020-11-27 RX ADMIN — CHOLECALCIFEROL TAB 25 MCG (1000 UNIT) 1 TABLET: 25 TAB at 09:05

## 2020-11-27 RX ADMIN — METOPROLOL TARTRATE 25 MG: 25 TABLET, FILM COATED ORAL at 09:05

## 2020-11-27 RX ADMIN — SODIUM CHLORIDE 100 ML/HR: 900 INJECTION, SOLUTION INTRAVENOUS at 14:14

## 2020-11-27 RX ADMIN — CYANOCOBALAMIN TAB 500 MCG 1000 MCG: 500 TAB at 09:05

## 2020-11-27 RX ADMIN — METOPROLOL TARTRATE 50 MG: 50 TABLET, FILM COATED ORAL at 17:41

## 2020-11-27 NOTE — PROGRESS NOTES
ANNE-MARIE Plan  -2nd IM letter  -COVID Test Ordered  -BLS transport at discharge    Medicare pt has received, reviewed, and signed 2nd IM letter informing them of their right to appeal the discharge. Signed copy has been placed on pt bedside chart.     JEFFREY BeaversN, RN, [facility]

## 2020-11-27 NOTE — PROGRESS NOTES
Problem: Mobility Impaired (Adult and Pediatric)  Goal: *Acute Goals and Plan of Care (Insert Text)  Description: FUNCTIONAL STATUS PRIOR TO ADMISSION: Patient was modified independent using a single point cane for functional mobility. HOME SUPPORT PRIOR TO ADMISSION: The patient lived alone with grandson to provide assistance. Physical Therapy Goals  Initiated 11/24/2020  1. Patient will move from supine to sit and sit to supine  in bed with minimal assistance/contact guard assist within 7 day(s). 2.  Patient will transfer from bed to chair and chair to bed with minimal assistance/contact guard assist using the least restrictive device within 7 day(s). 3.  Patient will perform sit to stand with minimal assistance/contact guard assist within 7 day(s). 4.  Patient will ambulate with minimal assistance/contact guard assist for 20 feet with the least restrictive device within 7 day(s). Outcome: Not Progressing Towards Goal  PHYSICAL THERAPY TREATMENT  Patient: Segundol Schooling (1 y.o. female)  Date: 11/27/2020  Diagnosis: Weakness generalized [R53.1]  Rhabdomyolysis [M62.82]  DANIELLE (acute kidney injury) ([facility] [address] [N17.9]  Volume depletion [E86.9]   Elevated troponin  Procedure(s) (LRB):  LEFT HEART CATH / CORONARY ANGIOGRAPHY (N/A)  Left Ventriculography (N/A) Day of Surgery  Precautions: Fall(Simultaneous filing. User may not have seen previous data.)  Chart, physical therapy assessment, plan of care and goals were reviewed. ASSESSMENT  Patient continues with skilled PT services and is not progressing towards goals. Remains limited by poor balance in sitting and standing; decreased activity tolerance and impaired mobility skills. She will need rehab prior to returning home to regain baseline modified independence with functional mobility and ADLs.     Current Level of Function Impacting Discharge (mobility/balance): mod a supine to sit; min a x 2 sit to stand; min/mod a x 2 bed to chair with rw; mod/min a x 2 ambulation with rw 10 feet. Other factors to consider for discharge: lives alone; h/o falls; continued high risk for falls         PLAN :  Patient continues to benefit from skilled intervention to address the above impairments. Continue treatment per established plan of care. to address goals. Recommendation for discharge: (in order for the patient to meet his/her long term goals)  Therapy up to 5 days/week in SNF setting    This discharge recommendation:  Has been made in collaboration with the attending provider and/or case management    IF patient discharges home will need the following DME: bedside commode and wheelchair     SUBJECTIVE:   Patient stated I'd rather eat my dinner in bed than the chair.     OBJECTIVE DATA SUMMARY:   Critical Behavior:  Neurologic State: Alert  Orientation Level: Oriented X4  Cognition: Appropriate decision making, Appropriate for age attention/concentration, Appropriate safety awareness, Decreased command following  Safety/Judgement: Decreased insight into deficits, Awareness of environment, Fall prevention(decreased sitting and standing balance)  Functional Mobility Training:  Bed Mobility:  Rolling: Moderate assistance; Additional time;Bed Modified(elevated HOB)  Supine to Sit: Moderate assistance; Additional time;Bed Modified(elevated HOB)     Scooting: Moderate assistance; Additional time        Transfers:  Sit to Stand: Minimum assistance;Assist x2;Adaptive equipment(RW)  Stand to Sit: Minimum assistance;Assist x2; Additional time; Adaptive equipment(RW)        Bed to Chair: Minimum assistance; Moderate assistance;Assist x2;Adaptive equipment(RW)                    Balance:  Sitting: Impaired  Sitting - Static: Good (unsupported)  Sitting - Dynamic: Fair (occasional)  Standing: Impaired; With support  Standing - Static: Good  Standing - Dynamic : Fair  Ambulation/Gait Training:  Distance (ft): 10 Feet (ft)  Assistive Device: Gait belt;Walker, rolling  Ambulation - Level of Assistance: Moderate assistance;Minimal assistance;Assist x2     Gait Description (WDL): Exceptions to WDL  Gait Abnormalities: Decreased step clearance; Path deviations; Step to gait;Trunk sway increased(slight L trunk lean)  Right Side Weight Bearing: Full  Left Side Weight Bearing: Full  Base of Support: Narrowed; Center of gravity altered;Shift to left  Stance: Left increased  Speed/Kalie: Slow;Shuffled  Step Length: Left shortened;Right shortened  Swing Pattern: Left asymmetrical     Interventions: Verbal cues; Tactile cues; Safety awareness training;Manual cues(to correct posture and L lean)           Activity Tolerance:   Fair and requires rest breaks    After treatment patient left in no apparent distress:   Sitting in chair, Call bell within reach, and Bed / chair alarm activated    COMMUNICATION/COLLABORATION:   The patients plan of care was discussed with: Occupational therapist and Registered nurse.      Hector Kwan, PT   Time Calculation: 31 mins

## 2020-11-27 NOTE — PROGRESS NOTES
Problem: Patient Education: Go to Patient Education Activity  Goal: Patient/Family Education  Outcome: Progressing Towards Goal     Problem: Patient Education: Go to Patient Education Activity  Goal: Patient/Family Education  Outcome: Progressing Towards Goal     Problem: Pressure Injury - Risk of  Goal: *Prevention of pressure injury  Description: Document Bin Scale and appropriate interventions in the flowsheet. Outcome: Progressing Towards Goal  Note: Pressure Injury Interventions:       Moisture Interventions: Absorbent underpads    Activity Interventions: Increase time out of bed    Mobility Interventions: Pressure redistribution bed/mattress (bed type)    Nutrition Interventions: Offer support with meals,snacks and hydration    Friction and Shear Interventions: Apply protective barrier, creams and emollients                Problem: Patient Education: Go to Patient Education Activity  Goal: Patient/Family Education  Outcome: Progressing Towards Goal     Problem: Falls - Risk of  Goal: *Absence of Falls  Description: Document Pam Fall Risk and appropriate interventions in the flowsheet.   Outcome: Progressing Towards Goal  Note: Fall Risk Interventions:  Mobility Interventions: Bed/chair exit alarm, Communicate number of staff needed for ambulation/transfer, PT Consult for mobility concerns         Medication Interventions: Evaluate medications/consider consulting pharmacy    Elimination Interventions: Patient to call for help with toileting needs, Toileting schedule/hourly rounds    History of Falls Interventions: Bed/chair exit alarm, Door open when patient unattended, Investigate reason for fall         Problem: Patient Education: Go to Patient Education Activity  Goal: Patient/Family Education  Outcome: Progressing Towards Goal

## 2020-11-27 NOTE — PROGRESS NOTES
ANNE-MARIE:    SNF Placement  Insurance Auth  COVID test  2nd  Medicare Letter  Medical Transport        CM informed that pt will be able to transition to snf: [employer] Pt received insurance auth provided by [facility]. CM spoke with with Bryant Silva ([phone]), via telephone and it was reported that [facility] is able to accept over the weekend. CM will contact snf, one COVID test is complete. Pt currently transition to [facility] unit for cardiac cath. CM informed [facility] CM of the following.     Misty Lynn, MSW, [address] [place] [address]

## 2020-11-27 NOTE — PROGRESS NOTES
Problem: Self Care Deficits Care Plan (Adult)  Goal: *Acute Goals and Plan of Care (Insert Text)  Description: FUNCTIONAL STATUS PRIOR TO ADMISSION: Patient was in [facility] in September, then went to SNF for rehab. At discharge, she went to a duplex with 2 roommates (that she did not know). Patient has frequent falls. She indicates that her grandson lives nearby. HOME SUPPORT: The patient lived with roommates but did not require assist. Her grandson lives in the area. Occupational Therapy Goals  Initiated 11/24/2020  1. Patient will perform grooming standing at the sink with minimal assistance within 7 day(s). 2.  Patient will perform upper body dressing seated EOB with contact guard assist within 7 day(s). 3.  Patient will perform lower body dressing with moderate assistance  within 7 day(s). 4.  Patient will perform toilet transfers with minimal assistance within 7 day(s). 5.  Patient will perform all aspects of toileting with minimal assistance within 7 day(s). 6.  Patient will participate in upper extremity therapeutic exercise/activities with Min A and cues for 10 minutes within 7 day(s). Outcome: Progressing Towards Goal   OCCUPATIONAL THERAPY TREATMENT  Patient: Diana Martin (35 y.o. female)  Date: 11/27/2020  Diagnosis: Weakness generalized [R53.1]  Rhabdomyolysis [M62.82]  DANIELLE (acute kidney injury) ([facility] [address] [N17.9]  Volume depletion [E86.9]   Elevated troponin  Procedure(s) (LRB):  LEFT HEART CATH / CORONARY ANGIOGRAPHY (N/A)  Left Ventriculography (N/A) Day of Surgery  Precautions: Fall(Simultaneous filing. User may not have seen previous data.)  Chart, occupational therapy assessment, plan of care, and goals were reviewed. ASSESSMENT  Patient continues with skilled OT services and is progressing towards goals.  Patient continues to present with decreased activity tolerance and functional mobility secondary to decreased endurance, impaired sitting and standing balance (L sided lean), and general weakness. Patient was cleared by nursing to participate in therapy and was received semisupine in bed. Patient required mod assist with additional time to complete bed mobility with elevated HOB. Once sitting EOB, patient required min assist x2 for STS using RW. Patient participated in functional mobility using RW to sit in bedside chair. While walking, L side lean noted requiring assist to maintain upright posture. Verbal cues were provided for device management and safety awareness. Once sitting in chair, patient required set-up/supervision to wash face. Patient educated on importance of being OOB throughout the day to increase strength and functional independence. Patient verbalized understanding and agreed to sit in chair for an hour. Patient would continue to benefit from skilled OT services during acute hospital stay with discharge to SNF rehab. Current Level of Function Impacting Discharge (ADLs): set-up/supervision to total assist for ADLs. Other factors to consider for discharge: Patient lives alone, fall risk. PLAN :  Patient continues to benefit from skilled intervention to address the above impairments. Continue treatment per established plan of care. to address goals. Recommendation for discharge: (in order for the patient to meet his/her long term goals)  Therapy up to 5 days/week in SNF setting    This discharge recommendation:  Has not yet been discussed the attending provider and/or case management    IF patient discharges home will need the following DME: TBD at [facility] rehab. SUBJECTIVE:   Patient stated I like to eat my meals in the bed. I run the world from the bed.     OBJECTIVE DATA SUMMARY:   Cognitive/Behavioral Status:  Neurologic State: Alert  Orientation Level: Oriented X4    Functional Mobility and Transfers for ADLs:  Bed Mobility:  Rolling: Moderate assistance; Additional time;Bed Modified(elevated HOB)  Supine to Sit: Moderate assistance; Additional time;Bed Modified(elevated HOB)  Scooting: Moderate assistance; Additional time    Transfers:  Sit to Stand: Minimum assistance;Assist x2;Adaptive equipment(RW)  Bed to Chair: Minimum assistance; Moderate assistance;Assist x2;Adaptive equipment(RW)    Balance:  Sitting: Impaired  Sitting - Static: Good (unsupported)  Sitting - Dynamic: Fair (occasional)  Standing: Impaired; With support  Standing - Static: Good  Standing - Dynamic : Fair    ADL Intervention:  Grooming  Position Performed: Seated in chair  Washing Face: Set-up; Supervision    Lower Body Dressing Assistance  Socks: Total assistance (dependent)  Leg Crossed Method Used: No  Position Performed: Supine  Cues: Don;Physical assistance; Tactile cues provided    Pain:  Patient did not c/o pain during session. Activity Tolerance:   Fair and requires rest breaks    After treatment patient left in no apparent distress:   Sitting in chair, Call bell within reach, and Bed / chair alarm activated    COMMUNICATION/COLLABORATION:   The patients plan of care was discussed with: Physical therapist and Registered nurse.      RAYO Holly/L  Time Calculation: 29 mins

## 2020-11-27 NOTE — PROGRESS NOTES
11/27/2020 10:02 AM    Admit Date: 11/24/2020    Admit Diagnosis: Weakness generalized [R53.1]; Rhabdomyolysis [M62.82]; DANIELLE (acute kidney injury) ([facility] [address] [N17.9]; Volume depletion [E86.9]    Subjective:     Ángel Zambrano denies chest pain, chest pressure/discomfort, dyspnea, near-syncope, syncope, orthopnea, paroxysmal nocturnal dyspnea, exertional chest pressure/discomfort.     Visit Vitals  BP (!) 161/81 (BP 1 Location: Left arm, BP Patient Position: At rest)   Pulse 70   Temp 98.7 °F (37.1 °C)   Resp 18   Ht 5' 7\" (1.702 m)   Wt 181 lb (82.1 kg)   SpO2 98%   BMI 28.35 kg/m²     Current Facility-Administered Medications   Medication Dose Route Frequency    metoprolol tartrate (LOPRESSOR) tablet 50 mg  50 mg Oral BID    aspirin chewable tablet    PRN    heparinized saline 2 units/mL infusion    CONTINUOUS    heparinized saline 2 units/mL infusion    CONTINUOUS    heparinized saline 2 units/mL infusion    CONTINUOUS    fentaNYL citrate (PF) injection    PRN    midazolam (VERSED) injection    PRN    lidocaine (PF) (XYLOCAINE) 10 mg/mL (1 %) injection    PRN    heparin (porcine) 1,000 unit/mL injection    PRN    nitroglycerin 0.1 mg/mL in D5W injection    PRN    verapamiL (ISOPTIN) 2.5 mg/mL injection    PRN    iopamidoL (ISOVUE-370) 76 % injection    PRN    cyanocobalamin (VITAMIN B12) tablet 1,000 mcg  1,000 mcg Oral DAILY    cholecalciferol (VITAMIN D3) (1000 Units /25 mcg) tablet 1 Tab  1,000 Units Oral DAILY    0.9% sodium chloride infusion  75 mL/hr IntraVENous CONTINUOUS    acetaminophen (TYLENOL) tablet 650 mg  650 mg Oral Q6H PRN    Or    acetaminophen (TYLENOL) suppository 650 mg  650 mg Rectal Q6H PRN    polyethylene glycol (MIRALAX) packet 17 g  17 g Oral DAILY PRN    promethazine (PHENERGAN) tablet 12.5 mg  12.5 mg Oral Q6H PRN    Or    ondansetron (ZOFRAN) injection 4 mg  4 mg IntraVENous Q6H PRN    enoxaparin (LOVENOX) injection 40 mg  40 mg SubCUTAneous DAILY    senna-docusate (PERICOLACE) 8.6-50 mg per tablet 1 Tab  1 Tab Oral BID         Objective:      Visit Vitals  BP (!) 161/81 (BP 1 Location: Left arm, BP Patient Position: At rest)   Pulse 70   Temp 98.7 °F (37.1 °C)   Resp 18   Ht 5' 7\" (1.702 m)   Wt 181 lb (82.1 kg)   SpO2 98%   BMI 28.35 kg/m²       Physical Exam:  Resting comfortably. No resp distress. Extremities: extremities normal, atraumatic, no cyanosis or edema  Heart: regular rate and rhythm, S1, S2 normal, no murmur, click, rub or gallop  Lungs: clear to auscultation bilaterally  Neurologic: Grossly normal    Data Review:   Labs:    Recent Results (from the past 24 hour(s))   CK    Collection Time: 11/27/20  3:29 AM   Result Value Ref Range     (H) 26 - 729 U/L   METABOLIC PANEL, BASIC    Collection Time: 11/27/20  3:29 AM   Result Value Ref Range    Sodium 141 136 - 145 mmol/L    Potassium 3.5 3.5 - 5.1 mmol/L    Chloride 110 (H) 97 - 108 mmol/L    CO2 28 21 - 32 mmol/L    Anion gap 3 (L) 5 - 15 mmol/L    Glucose 102 (H) 65 - 100 mg/dL    BUN 10 6 - 20 MG/DL    Creatinine 0.96 0.55 - 1.02 MG/DL    BUN/Creatinine ratio 10 (L) 12 - 20      GFR est AA >60 >60 ml/min/1.73m2    GFR est non-AA 57 (L) >60 ml/min/1.73m2    Calcium 8.3 (L) 8.5 - 10.1 MG/DL   MAGNESIUM    Collection Time: 11/27/20  3:29 AM   Result Value Ref Range    Magnesium 1.9 1.6 - 2.4 mg/dL       Telemetry: SR, SVT      Assessment:     Principal Problem:    Elevated troponin (9/5/2020)    Active Problems:    Weakness generalized (11/24/2020)      Rhabdomyolysis (11/24/2020)      Volume depletion (11/24/2020)      DANIELLE (acute kidney injury) ([facility] [address] (11/24/2020)        Plan:     Elevated troponin, mostly SVT with RBBB: no significant CAD noted on cath today. Will ask Dr. Steven Grant to see her tomorrow. Normal LVEF on recent Echo. BP slightly elevated. Increase BB dose.

## 2020-11-28 PROBLEM — I47.1 SVT (SUPRAVENTRICULAR TACHYCARDIA) (HCC): Status: ACTIVE | Noted: 2020-11-28

## 2020-11-28 LAB
CK SERPL-CCNC: 378 U/L (ref 26–192)
HEALTH STATUS, XMCV2T: NORMAL
SARS-COV-2, COV2: NOT DETECTED
SOURCE, COVRS: NORMAL
SPECIMEN SOURCE, FCOV2M: NORMAL
SPECIMEN TYPE, XMCV1T: NORMAL

## 2020-11-28 PROCEDURE — 99223 1ST HOSP IP/OBS HIGH 75: CPT | Performed by: INTERNAL MEDICINE

## 2020-11-28 PROCEDURE — 65660000000 HC RM CCU STEPDOWN

## 2020-11-28 PROCEDURE — 2709999900 HC NON-CHARGEABLE SUPPLY

## 2020-11-28 PROCEDURE — 74011250637 HC RX REV CODE- 250/637: Performed by: INTERNAL MEDICINE

## 2020-11-28 PROCEDURE — 36415 COLL VENOUS BLD VENIPUNCTURE: CPT

## 2020-11-28 PROCEDURE — 74011250636 HC RX REV CODE- 250/636: Performed by: INTERNAL MEDICINE

## 2020-11-28 PROCEDURE — 74011250637 HC RX REV CODE- 250/637: Performed by: HOSPITALIST

## 2020-11-28 PROCEDURE — 82550 ASSAY OF CK (CPK): CPT

## 2020-11-28 RX ORDER — POTASSIUM CHLORIDE 750 MG/1
20 TABLET, FILM COATED, EXTENDED RELEASE ORAL
Status: COMPLETED | OUTPATIENT
Start: 2020-11-28 | End: 2020-11-28

## 2020-11-28 RX ADMIN — POTASSIUM CHLORIDE 20 MEQ: 750 TABLET, FILM COATED, EXTENDED RELEASE ORAL at 11:59

## 2020-11-28 RX ADMIN — CHOLECALCIFEROL TAB 25 MCG (1000 UNIT) 1 TABLET: 25 TAB at 09:18

## 2020-11-28 RX ADMIN — CYANOCOBALAMIN TAB 500 MCG 1000 MCG: 500 TAB at 09:18

## 2020-11-28 RX ADMIN — METOPROLOL TARTRATE 50 MG: 50 TABLET, FILM COATED ORAL at 17:19

## 2020-11-28 RX ADMIN — ENOXAPARIN SODIUM 40 MG: 40 INJECTION SUBCUTANEOUS at 09:18

## 2020-11-28 RX ADMIN — METOPROLOL TARTRATE 50 MG: 50 TABLET, FILM COATED ORAL at 09:18

## 2020-11-28 RX ADMIN — ACETAMINOPHEN 650 MG: 325 TABLET ORAL at 12:06

## 2020-11-28 NOTE — PROGRESS NOTES
End of Shift Note    Bedside shift change report given to Regina Oconnor (oncoming nurse) by Babita Hooker RN (offgoing nurse). Report included the following information SBAR and Cardiac Rhythm NSR, PAT, BBB    Shift worked:  days     Shift summary and any significant changes:     plan for EPS w/ ablation Monday     Concerns for physician to address:  none     Zone phone for oncoming shift:   NA       Activity:  Activity Level: Up with Assistance  Number times ambulated in hallways past shift: 0  Number of times OOB to chair past shift: 1    Cardiac:   Cardiac Monitoring: Yes      Cardiac Rhythm: Paroxysmal Atrial Tachycardia, Sinus bradycardia    Access:   Current line(s): PIV     Genitourinary:   Urinary status: external catheter    Respiratory:   O2 Device: Room air  Chronic home O2 use?: NO  Incentive spirometer at bedside: NO     GI:  Last Bowel Movement Date: 11/28/20  Current diet:  DIET ONE TIME MESSAGE  DIET NUTRITIONAL SUPPLEMENTS Breakfast; Ensure Enlive  DIET ONE TIME MESSAGE  DIET ONE TIME MESSAGE  DIET CARDIAC Regular  DIET ONE TIME MESSAGE  DIET NPO  Passing flatus: YES  Tolerating current diet: YES  % Diet Eaten: 75 %    Pain Management:   Patient states pain is manageable on current regimen: YES    Skin:  Bin Score: 19  Interventions: increase time out of bed, PT/OT consult, limit briefs and internal/external urinary devices    Patient Safety:  Fall Score:  Total Score: 3  Interventions: assistive device (walker, cane, etc), gripper socks and pt to call before getting OOB  High Fall Risk: Yes    Length of Stay:  Expected LOS: 3d 4h  Actual LOS: [address], RN

## 2020-11-28 NOTE — PROGRESS NOTES
1930 - Beside report from [facility]. NSR w frequent pacs and small frequent runs of PAT. No complaints. R radial cath site benign. Pt reluctant to move and says she 'cannot move much post fall due to soreness'. IVF infusing post cath. End of Shift Note    Bedside shift change report given to RN (oncoming nurse) by Niranjan Anna RN (offgoing nurse). Report included the following information SBAR, Kardex, Intake/Output, MAR, Recent Results and Cardiac Rhythm NSR w frequent PAC's     Shift worked:  7P     Shift summary and any significant changes:    NEEDS TO MOBILIZE   Concerns for physician to address: [address] phone for oncoming shift:         Activity:  Activity Level: Up with Assistance  Number times ambulated in hallways past shift: 0  Number of times OOB to chair past shift: 0    Cardiac:   Cardiac Monitoring: Yes      Cardiac Rhythm: Normal sinus rhythm, Premature atrial contraction, Ectopy    Access:   Current line(s): PIV     Genitourinary:   Urinary status: incontinent and external catheter    Respiratory:   O2 Device: Room air  Chronic home O2 use?: NO  Incentive spirometer at bedside: NO     GI:  Last Bowel Movement Date: 11/26/20  Current diet:  DIET ONE TIME MESSAGE  DIET NUTRITIONAL SUPPLEMENTS Breakfast; Ensure Enlive  DIET ONE TIME MESSAGE  DIET ONE TIME MESSAGE  DIET CARDIAC Regular  DIET ONE TIME MESSAGE  Passing flatus: YES  Tolerating current diet: YES  % Diet Eaten: 75 %    Pain Management:   Patient states pain is manageable on current regimen: YES    Skin:  Bin Score: 18  Interventions: float heels, increase time out of bed, PT/OT consult, limit briefs, internal/external urinary devices and nutritional support     Patient Safety:  Fall Score:  Total Score: 3  Interventions: bed/chair alarm, assistive device (walker, cane, etc), gripper socks, pt to call before getting OOB, stay with me (per policy) and gait belt  High Fall Risk: Yes    Length of Stay:  Expected LOS: 3d 4h  Actual LOS: [address],Willie LINDQUIST, RN

## 2020-11-28 NOTE — CONSULTS
Subjective:                  [address], [place], [address] [facility]  [phone]    Date of  Admission: 11/24/2020  5:31 AM     Admission type:Emergency    Jeff Royal is a 79 y.o. female admitted for Weakness generalized [R53.1]; Rhabdomyolysis [M62.82]; DANIELLE (acute kidney injury) ([facility] [address] [N17.9]; Volume depletion [E86.9]. She presented to the hospital with numerous falls. She lives alone and was hesitant to be at home by herself. She was noted to have an elevated trop and had cardiac cath that was wnl. I am asked to see her regarding her bouts of PAT. She feels palpitations/sob with these episodes. Patient Active Problem List    Diagnosis Date Noted    SVT (supraventricular tachycardia) ([facility] [address] 11/28/2020    Weakness generalized 11/24/2020    Rhabdomyolysis 11/24/2020    Volume depletion 11/24/2020    DANIELLE (acute kidney injury) ([facility] [address] 11/24/2020    Hypokalemia 09/06/2020    Fatigue 09/05/2020    Fall 09/05/2020    Elevated troponin 09/05/2020    Hypertension 10/27/2011      Colonel Em MD  Past Medical History:   Diagnosis Date    History of mammogram 2010    normal    Hypertension     Pap smear for cervical cancer screening 2011    normal      Past Surgical History:   Procedure Laterality Date    HX GYN      hystterectomy     Allergies   Allergen Reactions    Codeine Itching and Swelling     Social History     Tobacco Use    Smoking status: Never Smoker    Smokeless tobacco: Never Used   Substance Use Topics    Alcohol use: Yes     Comment: occasional    Drug use: No     No family history on file.    Current Facility-Administered Medications   Medication Dose Route Frequency    metoprolol tartrate (LOPRESSOR) tablet 50 mg  50 mg Oral BID    cyanocobalamin (VITAMIN B12) tablet 1,000 mcg  1,000 mcg Oral DAILY    cholecalciferol (VITAMIN D3) (1000 Units /25 mcg) tablet 1 Tab  1,000 Units Oral DAILY    0.9% sodium chloride infusion  75 mL/hr IntraVENous CONTINUOUS    acetaminophen (TYLENOL) tablet 650 mg  650 mg Oral Q6H PRN    Or    acetaminophen (TYLENOL) suppository 650 mg  650 mg Rectal Q6H PRN    polyethylene glycol (MIRALAX) packet 17 g  17 g Oral DAILY PRN    promethazine (PHENERGAN) tablet 12.5 mg  12.5 mg Oral Q6H PRN    Or    ondansetron (ZOFRAN) injection 4 mg  4 mg IntraVENous Q6H PRN    enoxaparin (LOVENOX) injection 40 mg  40 mg SubCUTAneous DAILY    senna-docusate (PERICOLACE) 8.6-50 mg per tablet 1 Tab  1 Tab Oral BID         Review of Symptoms:  Constitutional: negative  Eyes: negative  Ears, nose, mouth, throat, and face: negative  Respiratory: negative  Cardiovascular: palpitations  Gastrointestinal: negative  Genitourinary: negative  Musculoskeletal: negative  Neurological: negative  Behvioral/Psych: negative  Endocrine: negative     Subjective:      Visit Vitals  BP (!) 141/83 (BP 1 Location: Left arm, BP Patient Position: At rest)   Pulse 70   Temp 98 °F (36.7 °C)   Resp 16   Ht 5' 7\" (1.702 m)   Wt 181 lb (82.1 kg)   SpO2 99%   BMI 28.35 kg/m²       Physical Exam  Abdomen: soft, non-tender. Extremities: extremities normal  Heart: regular rate and rhythm  Lungs: clear to auscultation bilaterally  Pulses: 2+ and symmetric    Cardiographics    Telemetry: PAT    ECG: nsr, rbbb    Echo - nl lvef      Labs:  No results for input(s): WBC, HGB, HCT, PLT, HGBEXT, HCTEXT, PLTEXT in the last 72 hours.   Recent Labs     11/27/20  0329 11/26/20  0325    141   K 3.5 3.5   * 110*   CO2 28 27   * 83   BUN 10 11   CREA 0.96 0.89   CA 8.3* 8.3*   MG 1.9 2.0       Recent Labs     11/28/20  0511 11/27/20  0329 11/26/20  0325   * 617* 861*         Intake/Output Summary (Last 24 hours) at 11/28/2020 0836  Last data filed at 11/28/2020 0556  Gross per 24 hour   Intake 1220.01 ml   Output 1200 ml   Net 20.01 ml         Assessment:     Assessment:       Principal Problem:    Elevated troponin (9/5/2020)    Active Problems:    Weakness generalized (11/24/2020)      Rhabdomyolysis (11/24/2020)      Volume depletion (11/24/2020)      DANIELLE (acute kidney injury) ([facility] [address] (11/24/2020)      SVT (supraventricular tachycardia) ([facility] [address] (11/28/2020)         Plan:     Cristy Dunn is a pleasant lady with symptomatic PAT/SVT. She is a candidate for an eps/PAT ablation. I discussed the risks/benefits/alternatives of the procedure with the patient. Risks include (but are not limited to) bleeding, heart block, infection, cva/mi/tamponade/esophageal perforation/pv stenosis/death. The patient understands that there is a 8-1% major complication rate and agrees to proceed on Monday. Thank you for this interesting consultation.       Myriam Cuba MD, [facility]    11/28/2020

## 2020-11-28 NOTE — PROGRESS NOTES
Hospitalist Progress Note    NAME: Arnol Avalos   :  1950   MRN:  [id number]     Interim Hospital Summary: 79 y.o. female whom presented on 2020 with      Assessment / Plan:    HTN  NSVT   PAT/SVT  - developed PAT/SVT overnight   BB incr to 50 bid last night   keep K/Mg high side   Holding hctz ( BP 120s)   S/p lasix x 2 for leg edema  -cardiology help appreciated:  card cath : no significant CAD    : symptomatic PAT/SVT. candidate for an eps/PAT ablation, will be scheduled on Monday   - Echo : EF 72% with diastolic dysfunction and mild AI. Weakness, s/p falls  Right shoulder contusion   Rhabdomyolysis , resolved   DANIELLE from Volume depletion, poa resolved   Hypokalemia , resolved   - CK improved, stop IVF, has good po intake   -CT Head and C spine NEG  xrays knees NEG   recent TSH wnl  checked VIt D and B12 levels and both are below normal range  -PT OT: SNF      Vit B12 deficiency 154   Vit D insufficiency 28.7   -started oral supplementation. Follow up with PCP to recheck levels in a month    : offered to the pt to update her family, she stated she will be updating them by herself     Code status: Full  Prophylaxis: Lovenox  Recommended Disposition: SNF accepted. eps on Monday. May need another covid test        Subjective:     Chief Complaint / Reason for Physician Visit  Follow up of weakness, rhabdomyolysis, DANIELLE, HTN  Chart reviewed in detail. Discussed with RN events overnight.          Review of Systems:  Symptom Y/N Comments  Symptom Y/N Comments   Fever/Chills    Chest Pain n    Poor Appetite    Edema     Cough n   Abdominal Pain     Sputum    Joint Pain     SOB/LINN n   Pruritis/Rash     Nausea/vomit    Tolerating PT/OT     Diarrhea    Tolerating Diet     Constipation    Other       Could NOT obtain due to:      PO intake:   Patient Vitals for the past 72 hrs:   % Diet Eaten   20 1900 75 %   20 0730 0 % Objective:     VITALS:   Last 24hrs VS reviewed since prior progress note. Most recent are:  Patient Vitals for the past 24 hrs:   Temp Pulse Resp BP SpO2   11/28/20 0747 98 °F (36.7 °C) 70 16 (!) 141/83 99 %   11/28/20 0454 97.8 °F (36.6 °C) 60 16 (!) 145/82 100 %   11/27/20 2349 98.3 °F (36.8 °C) 79 16 (!) 143/71 96 %   11/27/20 2000  64 16 (!) 154/82 98 %   11/27/20 1900 98 °F (36.7 °C) 66 16 137/78 97 %   11/27/20 1800  65 14 (!) 161/75    11/27/20 1700  64 15 (!) 146/59    11/27/20 1600  71 17 136/72 99 %   11/27/20 1553  67 13 117/66 99 %   11/27/20 1548  69 13 130/72 100 %   11/27/20 1537  70 18 (!) 150/87 100 %   11/27/20 1528  63 15 (!) 144/72 100 %   11/27/20 1500  65 17 134/67 99 %   11/27/20 1445  68 17 131/71 100 %   11/27/20 1430  66 16 132/67 99 %   11/27/20 1417 97.7 °F (36.5 °C)       11/27/20 1415  64 10 124/69 98 %   11/27/20 1400  67 17 120/60 99 %   11/27/20 1345  65 16 124/63 98 %   11/27/20 1330  66 16 118/63 100 %   11/27/20 1315  64 19 (!) 128/57 99 %   11/27/20 1300  67 16 125/71 99 %   11/27/20 1245  69 18 130/68 100 %   11/27/20 1230  67 11 130/68 99 %   11/27/20 1215  65 11 123/74 99 %   11/27/20 1200  80 17 126/63 100 %   11/27/20 1145  67 16 128/61 100 %   11/27/20 1130  63 16 (!) 124/59 99 %   11/27/20 1115  61 16 106/70 98 %       Intake/Output Summary (Last 24 hours) at 11/28/2020 1107  Last data filed at 11/28/2020 0556  Gross per 24 hour   Intake 1220.01 ml   Output 1200 ml   Net 20.01 ml        I had a face to face encounter, and independently examined this patient on 11/28/2020, as outlined below:  PHYSICAL EXAM:  General: WD, WN. Alert, cooperative, no acute distress    EENT:  EOMI. Anicteric sclerae. MMM  Resp:  CTA bilaterally, no wheezing or rales. No accessory muscle use  CV:  Regular  rhythm,  + leg edema  GI:  Soft, Non distended, Non tender. +Bowel sounds  Neurologic:  Alert and oriented X 3, normal speech,   Psych:   Good insight.  Not anxious nor agitated  Skin:  No rashes. No jaundice    Reviewed most current lab test results and cultures  YES  Reviewed most current radiology test results   YES  Review and summation of old records today    NO  Reviewed patient's current orders and MAR    YES  PMH/SH reviewed - no change compared to H&P  ________________________________________________________________________  Care Plan discussed with:    Comments   Patient x    Family  x    RN     Care Manager x    Consultant  x Cardiology                      Multidiciplinary team rounds were held today with , nursing, pharmacist and clinical coordinator. Patient's plan of care was discussed; medications were reviewed and discharge planning was addressed. ________________________________________________________________________  Total NON critical care TIME:  25   Minutes    Total CRITICAL CARE TIME Spent:   Minutes non procedure based      Comments   >50% of visit spent in counseling and coordination of care x     This includes time during multidisciplinary rounds if indicated above   ________________________________________________________________________  Jazz Malloy MD     Procedures: see electronic medical records for all procedures/Xrays and details which were not copied into this note but were reviewed prior to creation of Plan. LABS:  I reviewed today's most current labs and imaging studies. Pertinent labs include:  No results for input(s): WBC, HGB, HCT, PLT, HGBEXT, HCTEXT, PLTEXT, HGBEXT, HCTEXT, PLTEXT in the last 72 hours.   Recent Labs     11/27/20  0329 11/26/20  0325    141   K 3.5 3.5   * 110*   CO2 28 27   * 83   BUN 10 11   CREA 0.96 0.89   CA 8.3* 8.3*   MG 1.9 2.0

## 2020-11-28 NOTE — PROGRESS NOTES
Hospitalist Progress Note    NAME: Annalee Kincaid   :  1950   MRN:  [id number]     Interim Hospital Summary: 79 y.o. female whom presented on 2020 with      Assessment / Plan:    Weakness, s/p falls  Right shoulder contusion   Rhabdomyolysis   DANIELLE from Volume depletion  Hypokalemia   -CT Head and C spine NEG  -xrays knees NEG  -follow CK, trending down so will taper IVFs. Troponin bump due to rhabdo  -continue cardiac monitoring. NSVT noted on telemetry. This potentially could have lead to her falling. Will ask cardiology input  -denies feeling depressed and recent TSH wnl  -checked VIt D and B12 levels and both are below normal range  -PT OT eval and treat. SNF vs  PT     Vit B12 deficiency  Vit D insufficiency  -started oral supplementation. Follow up with PCP to recheck levels in a month    HTN  NSVT on Tele  -recent Echo EF 26% with diastolic dysfunction and mild AI. No history of HF  -increased metoprolol back to her PTA dose of 25mg BID  -hold HCTZ for now. Replete K to >4. Mg >2  -as this potentially could have lead to her falling.    -leg edema better from  Lasix x 2   -cardiology help appreciated: card cath today       25.0 - 29.9 Overweight / Body mass index is 28.35 kg/m². Code status: Full  Prophylaxis: Lovenox  Recommended Disposition: SNF accepted pending medical clearance/ covid test        Subjective:     Chief Complaint / Reason for Physician Visit  Follow up of weakness, rhabdomyolysis, DANIELLE, HTN  Chart reviewed in detail. Discussed with RN events overnight.    S/p card cath today  Denies pain   Feeling better       Review of Systems:  Symptom Y/N Comments  Symptom Y/N Comments   Fever/Chills    Chest Pain n    Poor Appetite    Edema     Cough n   Abdominal Pain     Sputum    Joint Pain     SOB/LINN n   Pruritis/Rash     Nausea/vomit    Tolerating PT/OT     Diarrhea    Tolerating Diet     Constipation    Other Could NOT obtain due to:      PO intake:   Patient Vitals for the past 72 hrs:   % Diet Eaten   11/27/20 1900 75 %   11/27/20 0730 0 %     Objective:     VITALS:   Last 24hrs VS reviewed since prior progress note. Most recent are:  Patient Vitals for the past 24 hrs:   Temp Pulse Resp BP SpO2   11/28/20 0747 98 °F (36.7 °C) 70 16 (!) 141/83 99 %   11/28/20 0454 97.8 °F (36.6 °C) 60 16 (!) 145/82 100 %   11/27/20 2349 98.3 °F (36.8 °C) 79 16 (!) 143/71 96 %   11/27/20 2000  64 16 (!) 154/82 98 %   11/27/20 1900 98 °F (36.7 °C) 66 16 137/78 97 %   11/27/20 1800  65 14 (!) 161/75    11/27/20 1700  64 15 (!) 146/59    11/27/20 1600  71 17 136/72 99 %   11/27/20 1553  67 13 117/66 99 %   11/27/20 1548  69 13 130/72 100 %   11/27/20 1537  70 18 (!) 150/87 100 %   11/27/20 1528  63 15 (!) 144/72 100 %   11/27/20 1500  65 17 134/67 99 %   11/27/20 1445  68 17 131/71 100 %   11/27/20 1430  66 16 132/67 99 %   11/27/20 1417 97.7 °F (36.5 °C)       11/27/20 1415  64 10 124/69 98 %   11/27/20 1400  67 17 120/60 99 %   11/27/20 1345  65 16 124/63 98 %   11/27/20 1330  66 16 118/63 100 %   11/27/20 1315  64 19 (!) 128/57 99 %   11/27/20 1300  67 16 125/71 99 %   11/27/20 1245  69 18 130/68 100 %   11/27/20 1230  67 11 130/68 99 %   11/27/20 1215  65 11 123/74 99 %   11/27/20 1200  80 17 126/63 100 %   11/27/20 1145  67 16 128/61 100 %   11/27/20 1130  63 16 (!) 124/59 99 %   11/27/20 1115  61 16 106/70 98 %   11/27/20 1100  74 12 133/76 100 %   11/27/20 1052  (!) 115 17 132/84 98 %   11/27/20 1030  62 11 127/68 99 %   11/27/20 1015 97.9 °F (36.6 °C) 64 15 124/82 98 %       Intake/Output Summary (Last 24 hours) at 11/28/2020 0759  Last data filed at 11/28/2020 0556  Gross per 24 hour   Intake 1220.01 ml   Output 1200 ml   Net 20.01 ml        I had a face to face encounter, and independently examined this patient on 11/28/2020, as outlined below:  PHYSICAL EXAM:  General: WD, WN.  Alert, cooperative, no acute distress    EENT:  EOMI. Anicteric sclerae. MMM  Resp:  CTA bilaterally, no wheezing or rales. No accessory muscle use  CV:  Regular  rhythm,  + leg edema  GI:  Soft, Non distended, Non tender. +Bowel sounds  Neurologic:  Alert and oriented X 3, normal speech,   Psych:   Good insight. Not anxious nor agitated  Skin:  No rashes. No jaundice    Reviewed most current lab test results and cultures  YES  Reviewed most current radiology test results   YES  Review and summation of old records today    NO  Reviewed patient's current orders and MAR    YES  PMH/SH reviewed - no change compared to H&P  ________________________________________________________________________  Care Plan discussed with:    Comments   Patient x    Family  x    RN     Care Manager x    Consultant  x Cardiology                      Multidiciplinary team rounds were held today with , nursing, pharmacist and clinical coordinator. Patient's plan of care was discussed; medications were reviewed and discharge planning was addressed. ________________________________________________________________________  Total NON critical care TIME:  25   Minutes    Total CRITICAL CARE TIME Spent:   Minutes non procedure based      Comments   >50% of visit spent in counseling and coordination of care x     This includes time during multidisciplinary rounds if indicated above   ________________________________________________________________________  Romario Monte MD     Procedures: see electronic medical records for all procedures/Xrays and details which were not copied into this note but were reviewed prior to creation of Plan. LABS:  I reviewed today's most current labs and imaging studies. Pertinent labs include:  No results for input(s): WBC, HGB, HCT, PLT, HGBEXT, HCTEXT, PLTEXT, HGBEXT, HCTEXT, PLTEXT in the last 72 hours.   Recent Labs     11/27/20 0329 11/26/20  0325    141   K 3.5 3.5   * 110* CO2 28 27   * 83   BUN 10 11   CREA 0.96 0.89   CA 8.3* 8.3*   MG 1.9 2.0

## 2020-11-29 LAB
ANION GAP SERPL CALC-SCNC: 4 MMOL/L (ref 5–15)
BUN SERPL-MCNC: 17 MG/DL (ref 6–20)
BUN/CREAT SERPL: 19 (ref 12–20)
CALCIUM SERPL-MCNC: 9 MG/DL (ref 8.5–10.1)
CHLORIDE SERPL-SCNC: 106 MMOL/L (ref 97–108)
CK SERPL-CCNC: 257 U/L (ref 26–192)
CO2 SERPL-SCNC: 29 MMOL/L (ref 21–32)
CREAT SERPL-MCNC: 0.89 MG/DL (ref 0.55–1.02)
GLUCOSE SERPL-MCNC: 103 MG/DL (ref 65–100)
MAGNESIUM SERPL-MCNC: 2.1 MG/DL (ref 1.6–2.4)
PHOSPHATE SERPL-MCNC: 3.6 MG/DL (ref 2.6–4.7)
POTASSIUM SERPL-SCNC: 3.6 MMOL/L (ref 3.5–5.1)
SODIUM SERPL-SCNC: 139 MMOL/L (ref 136–145)

## 2020-11-29 PROCEDURE — 74011250637 HC RX REV CODE- 250/637: Performed by: INTERNAL MEDICINE

## 2020-11-29 PROCEDURE — 99232 SBSQ HOSP IP/OBS MODERATE 35: CPT | Performed by: INTERNAL MEDICINE

## 2020-11-29 PROCEDURE — 77030038269 HC DRN EXT URIN PURWCK BARD -A

## 2020-11-29 PROCEDURE — 80048 BASIC METABOLIC PNL TOTAL CA: CPT

## 2020-11-29 PROCEDURE — 2709999900 HC NON-CHARGEABLE SUPPLY

## 2020-11-29 PROCEDURE — 82550 ASSAY OF CK (CPK): CPT

## 2020-11-29 PROCEDURE — 74011250636 HC RX REV CODE- 250/636: Performed by: INTERNAL MEDICINE

## 2020-11-29 PROCEDURE — 36415 COLL VENOUS BLD VENIPUNCTURE: CPT

## 2020-11-29 PROCEDURE — 65660000000 HC RM CCU STEPDOWN

## 2020-11-29 PROCEDURE — 84100 ASSAY OF PHOSPHORUS: CPT

## 2020-11-29 PROCEDURE — 83735 ASSAY OF MAGNESIUM: CPT

## 2020-11-29 RX ADMIN — ENOXAPARIN SODIUM 40 MG: 40 INJECTION SUBCUTANEOUS at 08:32

## 2020-11-29 RX ADMIN — METOPROLOL TARTRATE 50 MG: 50 TABLET, FILM COATED ORAL at 08:32

## 2020-11-29 RX ADMIN — CHOLECALCIFEROL TAB 25 MCG (1000 UNIT) 1 TABLET: 25 TAB at 08:32

## 2020-11-29 RX ADMIN — ACETAMINOPHEN 650 MG: 325 TABLET ORAL at 01:50

## 2020-11-29 RX ADMIN — CYANOCOBALAMIN TAB 500 MCG 1000 MCG: 500 TAB at 08:32

## 2020-11-29 NOTE — PROGRESS NOTES
Cardiology Progress Note              [address] [facility] [address] [facility]  [phone]    11/29/2020 8:43 AM    Admit Date: 11/24/2020    Admit Diagnosis: Weakness generalized [R53.1]; Rhabdomyolysis [M62.82]; DANIELLE (acute kidney injury) ([address] [N17.9]; Volume depletion [E86.9]    Subjective:     Inell Schooling   denies chest pain. Visit Vitals  BP (!) 158/79 (BP 1 Location: Left arm, BP Patient Position: At rest)   Pulse 73   Temp 98.1 °F (36.7 °C)   Resp 16   Ht 5' 7\" (1.702 m)   Wt 183 lb 1.6 oz (83.1 kg)   SpO2 100%   BMI 28.68 kg/m²     Current Facility-Administered Medications   Medication Dose Route Frequency    cyanocobalamin (VITAMIN B12) tablet 1,000 mcg  1,000 mcg Oral DAILY    cholecalciferol (VITAMIN D3) (1000 Units /25 mcg) tablet 1 Tab  1,000 Units Oral DAILY    acetaminophen (TYLENOL) tablet 650 mg  650 mg Oral Q6H PRN    Or    acetaminophen (TYLENOL) suppository 650 mg  650 mg Rectal Q6H PRN    polyethylene glycol (MIRALAX) packet 17 g  17 g Oral DAILY PRN    promethazine (PHENERGAN) tablet 12.5 mg  12.5 mg Oral Q6H PRN    Or    ondansetron (ZOFRAN) injection 4 mg  4 mg IntraVENous Q6H PRN    enoxaparin (LOVENOX) injection 40 mg  40 mg SubCUTAneous DAILY    senna-docusate (PERICOLACE) 8.6-50 mg per tablet 1 Tab  1 Tab Oral BID         Objective:      Visit Vitals  BP (!) 158/79 (BP 1 Location: Left arm, BP Patient Position: At rest)   Pulse 73   Temp 98.1 °F (36.7 °C)   Resp 16   Ht 5' 7\" (1.702 m)   Wt 183 lb 1.6 oz (83.1 kg)   SpO2 100%   BMI 28.68 kg/m²       Physical Exam:  Abdomen: soft, non-tender  Extremities: extremities normal  Heart: regular rate and rhythm  Lungs: clear to auscultation bilaterally  Pulses: 2+ and symmetric    Data Review:   Labs:    No results for input(s): WBC, HGB, HCT, PLT, HGBEXT, HCTEXT, PLTEXT in the last 72 hours.   Recent Labs     11/29/20 0224 11/27/20  0329    141   K 3.6 3.5    110*   CO2 29 28   * 102*   BUN 17 10 CREA 0.89 0.96   CA 9.0 8.3*   MG 2.1 1.9   PHOS 3.6  --        Recent Labs     11/29/20  0224 11/28/20  0511 11/27/20  0329   * 378* 617*         Intake/Output Summary (Last 24 hours) at 11/29/2020 0843  Last data filed at 11/29/2020 0334  Gross per 24 hour   Intake 300 ml   Output 300 ml   Net 0 ml        Telemetry: svt    Assessment:     Principal Problem:    Elevated troponin (9/5/2020)    Active Problems:    Weakness generalized (11/24/2020)      Rhabdomyolysis (11/24/2020)      Volume depletion (11/24/2020)      DANIELLE (acute kidney injury) ([facility] [address] (11/24/2020)      SVT (supraventricular tachycardia) ([facility] [address] (11/28/2020)        Plan:     Georgi Cerda is having bouts of SVT. Will hold bb tonite and tmrw. Npo p mn for eps/svt abl in the am. Reviewed procedure with patient and she is in agreement to proceed.     Augustine Saeed MD, [facility]    11/29/2020

## 2020-11-29 NOTE — PROGRESS NOTES
Hospitalist Progress Note    NAME: Jeff Royal   :  1950   MRN:  [id number]     Interim Hospital Summary: 79 y.o. female whom presented on 2020 with      Assessment / Plan:    HTN  NSVT   PAT/SVT  - bursts of  PAT/SVT overnight   BB incr to 50 bid , now on hold for eps   keep K/Mg high side   Holding hctz ( BP 120s)   S/p lasix x 2 for leg edema  -cardiology help appreciated:  card cath : no significant CAD    : symptomatic PAT/SVT. candidate for an eps/PAT ablation, will be scheduled on Monday   - Echo : EF 27% with diastolic dysfunction and mild AI. Weakness, s/p falls  Right shoulder contusion   Rhabdomyolysis , resolved   DANIELLE from Volume depletion, poa resolved   Hypokalemia , resolved   - CK improved, stop IVF, has good po intake   -CT Head and C spine NEG  xrays knees NEG   recent TSH wnl  checked VIt D and B12 levels and both are below normal range  -PT OT: SNF      Vit B12 deficiency 154   Vit D insufficiency 28.7   -started oral supplementation. Follow up with PCP to recheck levels in a month    : offered to the pt to update her family, she stated she will be updating them by herself     Code status: Full  Prophylaxis: Lovenox  Recommended Disposition: SNF accepted. eps on Monday. May need another covid test depending on the facility policy        Subjective:     Chief Complaint / Reason for Physician Visit  Follow up of weakness, rhabdomyolysis, DANIELLE, HTN  Chart reviewed in detail. Discussed with RN events overnight.    Burst of SVTs   Denies CP, dizziness       Review of Systems:  Symptom Y/N Comments  Symptom Y/N Comments   Fever/Chills    Chest Pain n    Poor Appetite    Edema     Cough n   Abdominal Pain     Sputum    Joint Pain     SOB/LINN n   Pruritis/Rash     Nausea/vomit    Tolerating PT/OT     Diarrhea    Tolerating Diet     Constipation    Other       Could NOT obtain due to:      PO intake: Patient Vitals for the past 72 hrs:   % Diet Eaten   11/27/20 1900 75 %   11/27/20 0730 0 %     Objective:     VITALS:   Last 24hrs VS reviewed since prior progress note. Most recent are:  Patient Vitals for the past 24 hrs:   Temp Pulse Resp BP SpO2   11/29/20 1155 98.3 °F (36.8 °C) 64 16 133/70 98 %   11/29/20 0756 98.1 °F (36.7 °C) 73 16 (!) 158/79 100 %   11/29/20 0230 98.1 °F (36.7 °C) 63 16 (!) 140/76 98 %   11/28/20 2240 97.7 °F (36.5 °C) 64 16 (!) 142/82 98 %   11/28/20 1939 98.2 °F (36.8 °C) (!) 56 16 138/65 96 %   11/28/20 1719  60      11/28/20 1608 97.5 °F (36.4 °C) (!) 59 16 (!) 157/86 98 %       Intake/Output Summary (Last 24 hours) at 11/29/2020 1329  Last data filed at 11/29/2020 0334  Gross per 24 hour   Intake 300 ml   Output 300 ml   Net 0 ml        I had a face to face encounter, and independently examined this patient on 11/29/2020, as outlined below:  PHYSICAL EXAM:  General: WD, WN. Alert, cooperative, no acute distress    EENT:  EOMI. Anicteric sclerae. MMM  Resp:  CTA bilaterally, no wheezing or rales. No accessory muscle use  CV:  Regular  rhythm,  + leg edema  GI:  Soft, Non distended, Non tender. +Bowel sounds  Neurologic:  Alert and oriented X 3, normal speech,   Psych:   Good insight. Not anxious nor agitated  Skin:  No rashes. No jaundice    Reviewed most current lab test results and cultures  YES  Reviewed most current radiology test results   YES  Review and summation of old records today    NO  Reviewed patient's current orders and MAR    YES  PMH/SH reviewed - no change compared to H&P  ________________________________________________________________________  Care Plan discussed with:    Comments   Patient x    Family      RN x    Care Manager     Consultant                        Multidiciplinary team rounds were held today with , nursing, pharmacist and clinical coordinator.   Patient's plan of care was discussed; medications were reviewed and discharge planning was addressed. ________________________________________________________________________  Total NON critical care TIME:  25   Minutes    Total CRITICAL CARE TIME Spent:   Minutes non procedure based      Comments   >50% of visit spent in counseling and coordination of care x     This includes time during multidisciplinary rounds if indicated above   ________________________________________________________________________  Laura Winslow MD     Procedures: see electronic medical records for all procedures/Xrays and details which were not copied into this note but were reviewed prior to creation of Plan. LABS:  I reviewed today's most current labs and imaging studies. Pertinent labs include:  No results for input(s): WBC, HGB, HCT, PLT, HGBEXT, HCTEXT, PLTEXT, HGBEXT, HCTEXT, PLTEXT in the last 72 hours.   Recent Labs     11/29/20 0224 11/27/20  0329    141   K 3.6 3.5    110*   CO2 29 28   * 102*   BUN 17 10   CREA 0.89 0.96   CA 9.0 8.3*   MG 2.1 1.9   PHOS 3.6  --

## 2020-11-29 NOTE — PROGRESS NOTES
Problem: Patient Education: Go to Patient Education Activity  Goal: Patient/Family Education  Outcome: Progressing Towards Goal     Problem: Patient Education: Go to Patient Education Activity  Goal: Patient/Family Education  Outcome: Progressing Towards Goal     Problem: Pressure Injury - Risk of  Goal: *Prevention of pressure injury  Description: Document Bin Scale and appropriate interventions in the flowsheet. Outcome: Progressing Towards Goal  Note: Pressure Injury Interventions:       Moisture Interventions: Internal/External urinary devices, Absorbent underpads, Apply protective barrier, creams and emollients, Maintain skin hydration (lotion/cream), Minimize layers, Moisture barrier, Offer toileting Q_hr    Activity Interventions: Assess need for specialty bed, Increase time out of bed, Pressure redistribution bed/mattress(bed type), PT/OT evaluation    Mobility Interventions: HOB 30 degrees or less, PT/OT evaluation, Turn and reposition approx. every two hours(pillow and wedges), Assess need for specialty bed    Nutrition Interventions: Document food/fluid/supplement intake    Friction and Shear Interventions: Apply protective barrier, creams and emollients, Foam dressings/transparent film/skin sealants, Lift sheet, Lift team/patient mobility team, Minimize layers, Transferring/repositioning devices                Problem: Patient Education: Go to Patient Education Activity  Goal: Patient/Family Education  Outcome: Progressing Towards Goal     Problem: Falls - Risk of  Goal: *Absence of Falls  Description: Document Veatrice Marker Fall Risk and appropriate interventions in the flowsheet.   Outcome: Progressing Towards Goal  Note: Fall Risk Interventions:  Mobility Interventions: Assess mobility with egress test, Bed/chair exit alarm, Communicate number of staff needed for ambulation/transfer, OT consult for ADLs, Patient to call before getting OOB, PT Consult for mobility concerns, PT Consult for assist device competence, Strengthening exercises (ROM-active/passive), Utilize walker, cane, or other assistive device         Medication Interventions: Assess postural VS orthostatic hypotension, Bed/chair exit alarm, Evaluate medications/consider consulting pharmacy, Patient to call before getting OOB, Teach patient to arise slowly    Elimination Interventions: Bed/chair exit alarm, Call light in reach, Elevated toilet seat, Patient to call for help with toileting needs, Stay With Me (per policy), Toileting schedule/hourly rounds    History of Falls Interventions: Bed/chair exit alarm, Consult care management for discharge planning, Door open when patient unattended, Investigate reason for fall, Room close to nurse's station, Utilize gait belt for transfer/ambulation, Assess for delayed presentation/identification of injury for 48 hrs (comment for end date)         Problem: Patient Education: Go to Patient Education Activity  Goal: Patient/Family Education  Outcome: Progressing Towards Goal     Problem: Patient Education: Go to Patient Education Activity  Goal: Patient/Family Education  Outcome: Progressing Towards Goal     Problem: Cath Lab Procedures: Pre-Procedure  Goal: Off Pathway (Use only if patient is Off Pathway)  Outcome: Progressing Towards Goal  Goal: Activity/Safety  Outcome: Progressing Towards Goal  Goal: Consults, if ordered  Outcome: Progressing Towards Goal  Goal: Diagnostic Test/Procedures  Outcome: Progressing Towards Goal  Goal: Nutrition/Diet  Outcome: Progressing Towards Goal  Goal: Discharge Planning  Outcome: Progressing Towards Goal  Goal: Medications  Outcome: Progressing Towards Goal  Goal: Respiratory  Outcome: Progressing Towards Goal  Goal: Treatments/Interventions/Procedures  Outcome: Progressing Towards Goal  Goal: Psychosocial  Outcome: Progressing Towards Goal  Goal: *Verbalize description of procedure  Outcome: Progressing Towards Goal  Goal: *Consent signed  Outcome: Progressing Towards Goal     Problem: Cath Lab Procedures: Post-Cath Day of Procedure (Initiate SCIP Measures for Post-Op Care)  Goal: Off Pathway (Use only if patient is Off Pathway)  Outcome: Progressing Towards Goal  Goal: Activity/Safety  Outcome: Progressing Towards Goal  Goal: Consults, if ordered  Outcome: Progressing Towards Goal  Goal: Diagnostic Test/Procedures  Outcome: Progressing Towards Goal  Goal: Nutrition/Diet  Outcome: Progressing Towards Goal  Goal: Discharge Planning  Outcome: Progressing Towards Goal  Goal: Medications  Outcome: Progressing Towards Goal  Goal: Respiratory  Outcome: Progressing Towards Goal  Goal: Treatments/Interventions/Procedures  Outcome: Progressing Towards Goal  Goal: Psychosocial  Outcome: Progressing Towards Goal  Goal: *Procedure site is without bleeding and signs of infection six hours post sheath removal  Outcome: Progressing Towards Goal  Goal: *Hemodynamically stable  Outcome: Progressing Towards Goal  Goal: *Optimal pain control at patient's stated goal  Outcome: Progressing Towards Goal     Problem: Cath Lab Procedures: Post-Cath Day 1  Goal: Off Pathway (Use only if patient is Off Pathway)  Outcome: Progressing Towards Goal  Goal: Activity/Safety  Outcome: Progressing Towards Goal  Goal: Diagnostic Test/Procedures  Outcome: Progressing Towards Goal  Goal: Nutrition/Diet  Outcome: Progressing Towards Goal  Goal: Discharge Planning  Outcome: Progressing Towards Goal  Goal: Medications  Outcome: Progressing Towards Goal  Goal: Respiratory  Outcome: Progressing Towards Goal  Goal: Treatments/Interventions/Procedures  Outcome: Progressing Towards Goal  Goal: Psychosocial  Outcome: Progressing Towards Goal     Problem: Cath Lab Procedures: Discharge Outcomes  Goal: *Stable cardiac rhythm  Outcome: Progressing Towards Goal  Goal: *Hemodynamically stable  Outcome: Progressing Towards Goal  Goal: *Optimal pain control at patient's stated goal  Outcome: Progressing Towards Goal  Goal: *Pulses palpable, skin color within defined limits, skin temperature warm  Outcome: Progressing Towards Goal  Goal: *Lungs clear or at baseline  Outcome: Progressing Towards Goal  Goal: *Demonstrates ability to perform prescribed activity without shortness of breath or discomfort  Outcome: Progressing Towards Goal  Goal: *Verbalizes home exercise program, activity guidelines, cardiac precautions  Outcome: Progressing Towards Goal  Goal: *Verbalizes understanding and describes prescribed diet  Outcome: Progressing Towards Goal  Goal: *Verbalizes understanding and describes medication purposes and frequencies  Outcome: Progressing Towards Goal  Goal: *Identifies cardiac risk factors  Outcome: Progressing Towards Goal  Goal: *No signs and symptoms of infection or wound complications  Outcome: Progressing Towards Goal  Goal: *Anxiety reduced or absent  Outcome: Progressing Towards Goal  Goal: *Verbalizes and demonstrates incision care  Outcome: Progressing Towards Goal  Goal: *Understands and describes signs and symptoms to report to providers(Stroke Metric)  Outcome: Progressing Towards Goal  Goal: *Describes follow-up/return visits to physicians  Outcome: Progressing Towards Goal  Goal: *Describes available resources and support systems  Outcome: Progressing Towards Goal  Goal: *Influenza immunization  Outcome: Progressing Towards Goal  Goal: *Pneumococcal immunization  Outcome: Progressing Towards Goal     Problem: Pain  Goal: *Control of Pain  Outcome: Progressing Towards Goal  Goal: *PALLIATIVE CARE:  Alleviation of Pain  Outcome: Progressing Towards Goal     Problem: Patient Education: Go to Patient Education Activity  Goal: Patient/Family Education  Outcome: Progressing Towards Goal     Problem: Cath Lab Procedures: Discharge Outcomes  Goal: *Stable cardiac rhythm  Outcome: Progressing Towards Goal  Goal: *Hemodynamically stable  Outcome: Progressing Towards Goal  Goal: *Optimal pain control at patient's stated goal  Outcome: Progressing Towards Goal  Goal: *Pulses palpable, skin color within defined limits, skin temperature warm  Outcome: Progressing Towards Goal  Goal: *Lungs clear or at baseline  Outcome: Progressing Towards Goal  Goal: *Demonstrates ability to perform prescribed activity without shortness of breath or discomfort  Outcome: Progressing Towards Goal  Goal: *Verbalizes home exercise program, activity guidelines, cardiac precautions  Outcome: Progressing Towards Goal  Goal: *Verbalizes understanding and describes prescribed diet  Outcome: Progressing Towards Goal  Goal: *Verbalizes understanding and describes medication purposes and frequencies  Outcome: Progressing Towards Goal  Goal: *Identifies cardiac risk factors  Outcome: Progressing Towards Goal  Goal: *No signs and symptoms of infection or wound complications  Outcome: Progressing Towards Goal  Goal: *Anxiety reduced or absent  Outcome: Progressing Towards Goal  Goal: *Verbalizes and demonstrates incision care  Outcome: Progressing Towards Goal  Goal: *Understands and describes signs and symptoms to report to providers(Stroke Metric)  Outcome: Progressing Towards Goal  Goal: *Describes follow-up/return visits to physicians  Outcome: Progressing Towards Goal  Goal: *Describes available resources and support systems  Outcome: Progressing Towards Goal  Goal: *Influenza immunization  Outcome: Progressing Towards Goal  Goal: *Pneumococcal immunization  Outcome: Progressing Towards Goal     Problem: Arrhythmia Pathway (Adult)  Goal: *Absence of arrhythmia  Outcome: Progressing Towards Goal     Problem: Patient Education: Go to Patient Education Activity  Goal: Patient/Family Education  Outcome: Progressing Towards Goal

## 2020-11-29 NOTE — PROGRESS NOTES
Bedside shift change report given to Seema Correia RN (oncoming nurse) by Louetta Klinefelter, RN (offgoing nurse). Report included the following information SBAR, Kardex, Procedure Summary, Intake/Output, MAR, Accordion, Recent Results, Med Rec Status, Cardiac Rhythm NSR, Sinus Jeyson Jose, PAC, P-Atrial Tach, BBB, Alarm Parameters , Pre Procedure Checklist, Procedure Verification, Quality Measures and Dual Neuro Assessment.

## 2020-11-29 NOTE — PROGRESS NOTES
Bedside shift change report given to Yuko Pino RN (oncoming nurse) by Maria Sharma RN (offgoing nurse). Report included the following information SBAR, Kardex, Procedure Summary, Intake/Output, MAR, Accordion, Recent Results, Med Rec Status, Cardiac Rhythm NSR, Sinus [place], PAC, P-Atrial Tach, BBB, Alarm Parameters , Pre Procedure Checklist, Procedure Verification, Quality Measures and Dual Neuro Assessment.

## 2020-11-30 ENCOUNTER — ANESTHESIA (OUTPATIENT)
Dept: CARDIAC CATH/INVASIVE PROCEDURES | Age: 70
DRG: 982 | End: 2020-11-30
Payer: MEDICARE

## 2020-11-30 ENCOUNTER — ANESTHESIA EVENT (OUTPATIENT)
Dept: CARDIAC CATH/INVASIVE PROCEDURES | Age: 70
DRG: 982 | End: 2020-11-30
Payer: MEDICARE

## 2020-11-30 PROBLEM — I48.0 PAROXYSMAL ATRIAL FIBRILLATION (HCC): Chronic | Status: ACTIVE | Noted: 2020-11-30

## 2020-11-30 LAB
ACT BLD: 131 SECS (ref 79–138)
ACT BLD: 263 SECS (ref 79–138)
ACT BLD: 318 SECS (ref 79–138)
CK SERPL-CCNC: 175 U/L (ref 26–192)
ERYTHROCYTE [DISTWIDTH] IN BLOOD BY AUTOMATED COUNT: 15.7 % (ref 11.5–14.5)
HCT VFR BLD AUTO: 35.6 % (ref 35–47)
HGB BLD-MCNC: 11.5 G/DL (ref 11.5–16)
MCH RBC QN AUTO: 28.1 PG (ref 26–34)
MCHC RBC AUTO-ENTMCNC: 32.3 G/DL (ref 30–36.5)
MCV RBC AUTO: 87 FL (ref 80–99)
NRBC # BLD: 0 K/UL (ref 0–0.01)
NRBC BLD-RTO: 0 PER 100 WBC
PLATELET # BLD AUTO: 220 K/UL (ref 150–400)
PMV BLD AUTO: 9.9 FL (ref 8.9–12.9)
RBC # BLD AUTO: 4.09 M/UL (ref 3.8–5.2)
WBC # BLD AUTO: 5.6 K/UL (ref 3.6–11)

## 2020-11-30 PROCEDURE — 77030018733 HC ELECTRD KT ENSITE STJU -G: Performed by: INTERNAL MEDICINE

## 2020-11-30 PROCEDURE — 77030021678 HC GLIDESCP STAT DISP VERT -B: Performed by: NURSE ANESTHETIST, CERTIFIED REGISTERED

## 2020-11-30 PROCEDURE — 74011250636 HC RX REV CODE- 250/636: Performed by: INTERNAL MEDICINE

## 2020-11-30 PROCEDURE — 93656 COMPRE EP EVAL ABLTJ ATR FIB: CPT | Performed by: INTERNAL MEDICINE

## 2020-11-30 PROCEDURE — 77030030261 HC CBL UMB ELEC DISP MEDT -C: Performed by: INTERNAL MEDICINE

## 2020-11-30 PROCEDURE — C1894 INTRO/SHEATH, NON-LASER: HCPCS | Performed by: INTERNAL MEDICINE

## 2020-11-30 PROCEDURE — 02K83ZZ MAP CONDUCTION MECHANISM, PERCUTANEOUS APPROACH: ICD-10-PCS | Performed by: INTERNAL MEDICINE

## 2020-11-30 PROCEDURE — C1759 CATH, INTRA ECHOCARDIOGRAPHY: HCPCS | Performed by: INTERNAL MEDICINE

## 2020-11-30 PROCEDURE — 02583ZZ DESTRUCTION OF CONDUCTION MECHANISM, PERCUTANEOUS APPROACH: ICD-10-PCS | Performed by: INTERNAL MEDICINE

## 2020-11-30 PROCEDURE — 77030013797 HC KT TRNSDUC PRSSR EDWD -A: Performed by: INTERNAL MEDICINE

## 2020-11-30 PROCEDURE — 93613 INTRACARDIAC EPHYS 3D MAPG: CPT

## 2020-11-30 PROCEDURE — 74011250636 HC RX REV CODE- 250/636: Performed by: NURSE ANESTHETIST, CERTIFIED REGISTERED

## 2020-11-30 PROCEDURE — 74011250637 HC RX REV CODE- 250/637: Performed by: INTERNAL MEDICINE

## 2020-11-30 PROCEDURE — 4A0234Z MEASUREMENT OF CARDIAC ELECTRICAL ACTIVITY, PERCUTANEOUS APPROACH: ICD-10-PCS | Performed by: INTERNAL MEDICINE

## 2020-11-30 PROCEDURE — C1730 CATH, EP, 19 OR FEW ELECT: HCPCS | Performed by: INTERNAL MEDICINE

## 2020-11-30 PROCEDURE — 77030018729 HC ELECTRD DEFIB PAD CARD -B: Performed by: INTERNAL MEDICINE

## 2020-11-30 PROCEDURE — 77030040754 HC DRSG QCLOT ZMED -D: Performed by: INTERNAL MEDICINE

## 2020-11-30 PROCEDURE — 77030030278 HC COAX UMB DISP MEDT -C: Performed by: INTERNAL MEDICINE

## 2020-11-30 PROCEDURE — 93623 PRGRMD STIMJ&PACG IV RX NFS: CPT | Performed by: INTERNAL MEDICINE

## 2020-11-30 PROCEDURE — 93613 INTRACARDIAC EPHYS 3D MAPG: CPT | Performed by: INTERNAL MEDICINE

## 2020-11-30 PROCEDURE — 77030020506 HC NDL TRNSPTL NRG BAYL -F: Performed by: INTERNAL MEDICINE

## 2020-11-30 PROCEDURE — 77030008683 HC TU ET CUF COVD -A: Performed by: NURSE ANESTHETIST, CERTIFIED REGISTERED

## 2020-11-30 PROCEDURE — 85027 COMPLETE CBC AUTOMATED: CPT

## 2020-11-30 PROCEDURE — 77030016894 HC CBL EP DX CATH3 STJU -B: Performed by: INTERNAL MEDICINE

## 2020-11-30 PROCEDURE — C1733 CATH, EP, OTHR THAN COOL-TIP: HCPCS | Performed by: INTERNAL MEDICINE

## 2020-11-30 PROCEDURE — 77030004964 HC CBL EP ABLAT BSC -C: Performed by: INTERNAL MEDICINE

## 2020-11-30 PROCEDURE — 77030015398 HC CBL EP EXT STJU -C: Performed by: INTERNAL MEDICINE

## 2020-11-30 PROCEDURE — 85347 COAGULATION TIME ACTIVATED: CPT

## 2020-11-30 PROCEDURE — 36415 COLL VENOUS BLD VENIPUNCTURE: CPT

## 2020-11-30 PROCEDURE — 74011000250 HC RX REV CODE- 250: Performed by: INTERNAL MEDICINE

## 2020-11-30 PROCEDURE — 77030029065 HC DRSG HEMO QCLOT ZMED -B: Performed by: INTERNAL MEDICINE

## 2020-11-30 PROCEDURE — 77030010880 HC CBL EP SUPRME STJU -C: Performed by: INTERNAL MEDICINE

## 2020-11-30 PROCEDURE — 82550 ASSAY OF CK (CPK): CPT

## 2020-11-30 PROCEDURE — 76210000006 HC OR PH I REC 0.5 TO 1 HR

## 2020-11-30 PROCEDURE — 93662 INTRACARDIAC ECG (ICE): CPT | Performed by: INTERNAL MEDICINE

## 2020-11-30 PROCEDURE — C1893 INTRO/SHEATH, FIXED,NON-PEEL: HCPCS | Performed by: INTERNAL MEDICINE

## 2020-11-30 PROCEDURE — 77030038269 HC DRN EXT URIN PURWCK BARD -A

## 2020-11-30 PROCEDURE — 65660000000 HC RM CCU STEPDOWN

## 2020-11-30 PROCEDURE — 77030005513 HC CATH URETH FOL11 MDII -B: Performed by: INTERNAL MEDICINE

## 2020-11-30 PROCEDURE — 77030008543 HC TBNG MON PRSS MRTM -A: Performed by: INTERNAL MEDICINE

## 2020-11-30 PROCEDURE — C1769 GUIDE WIRE: HCPCS | Performed by: INTERNAL MEDICINE

## 2020-11-30 PROCEDURE — 93655 ICAR CATH ABLTJ DSCRT ARRHYT: CPT | Performed by: INTERNAL MEDICINE

## 2020-11-30 PROCEDURE — 77030029359 HC PRB ESOPH TEMP CATH ANTM -F: Performed by: INTERNAL MEDICINE

## 2020-11-30 PROCEDURE — 76060000034 HC ANESTHESIA 1.5 TO 2 HR: Performed by: INTERNAL MEDICINE

## 2020-11-30 PROCEDURE — 77030029163 HC CBL EP DX ACHV DISP MEDT -C: Performed by: INTERNAL MEDICINE

## 2020-11-30 RX ORDER — ONDANSETRON 2 MG/ML
INJECTION INTRAMUSCULAR; INTRAVENOUS AS NEEDED
Status: DISCONTINUED | OUTPATIENT
Start: 2020-11-30 | End: 2020-11-30 | Stop reason: HOSPADM

## 2020-11-30 RX ORDER — PROPOFOL 10 MG/ML
INJECTION, EMULSION INTRAVENOUS AS NEEDED
Status: DISCONTINUED | OUTPATIENT
Start: 2020-11-30 | End: 2020-11-30 | Stop reason: HOSPADM

## 2020-11-30 RX ORDER — SODIUM CHLORIDE 9 MG/ML
INJECTION, SOLUTION INTRAVENOUS
Status: DISCONTINUED | OUTPATIENT
Start: 2020-11-30 | End: 2020-11-30 | Stop reason: HOSPADM

## 2020-11-30 RX ORDER — SODIUM CHLORIDE 0.9 % (FLUSH) 0.9 %
5-40 SYRINGE (ML) INJECTION EVERY 8 HOURS
Status: DISCONTINUED | OUTPATIENT
Start: 2020-11-30 | End: 2020-12-02 | Stop reason: HOSPADM

## 2020-11-30 RX ORDER — HEPARIN SODIUM 200 [USP'U]/100ML
INJECTION, SOLUTION INTRAVENOUS
Status: COMPLETED | OUTPATIENT
Start: 2020-11-30 | End: 2020-11-30

## 2020-11-30 RX ORDER — HEPARIN SODIUM 1000 [USP'U]/ML
INJECTION, SOLUTION INTRAVENOUS; SUBCUTANEOUS AS NEEDED
Status: DISCONTINUED | OUTPATIENT
Start: 2020-11-30 | End: 2020-11-30 | Stop reason: HOSPADM

## 2020-11-30 RX ORDER — AMIODARONE HYDROCHLORIDE 200 MG/1
200 TABLET ORAL DAILY
Status: DISCONTINUED | OUTPATIENT
Start: 2020-11-30 | End: 2020-12-02 | Stop reason: HOSPADM

## 2020-11-30 RX ORDER — PHENYLEPHRINE HCL IN 0.9% NACL 0.4MG/10ML
SYRINGE (ML) INTRAVENOUS AS NEEDED
Status: DISCONTINUED | OUTPATIENT
Start: 2020-11-30 | End: 2020-11-30 | Stop reason: HOSPADM

## 2020-11-30 RX ORDER — PROTAMINE SULFATE 10 MG/ML
INJECTION, SOLUTION INTRAVENOUS AS NEEDED
Status: DISCONTINUED | OUTPATIENT
Start: 2020-11-30 | End: 2020-11-30 | Stop reason: HOSPADM

## 2020-11-30 RX ORDER — SUCCINYLCHOLINE CHLORIDE 20 MG/ML
INJECTION INTRAMUSCULAR; INTRAVENOUS AS NEEDED
Status: DISCONTINUED | OUTPATIENT
Start: 2020-11-30 | End: 2020-11-30 | Stop reason: HOSPADM

## 2020-11-30 RX ORDER — FENTANYL CITRATE 50 UG/ML
INJECTION, SOLUTION INTRAMUSCULAR; INTRAVENOUS AS NEEDED
Status: DISCONTINUED | OUTPATIENT
Start: 2020-11-30 | End: 2020-11-30 | Stop reason: HOSPADM

## 2020-11-30 RX ORDER — DEXAMETHASONE SODIUM PHOSPHATE 4 MG/ML
INJECTION, SOLUTION INTRA-ARTICULAR; INTRALESIONAL; INTRAMUSCULAR; INTRAVENOUS; SOFT TISSUE AS NEEDED
Status: DISCONTINUED | OUTPATIENT
Start: 2020-11-30 | End: 2020-11-30 | Stop reason: HOSPADM

## 2020-11-30 RX ORDER — SODIUM CHLORIDE 0.9 % (FLUSH) 0.9 %
5-40 SYRINGE (ML) INJECTION AS NEEDED
Status: DISCONTINUED | OUTPATIENT
Start: 2020-11-30 | End: 2020-12-02 | Stop reason: HOSPADM

## 2020-11-30 RX ADMIN — SODIUM CHLORIDE: 9 INJECTION, SOLUTION INTRAVENOUS at 11:12

## 2020-11-30 RX ADMIN — Medication 160 MCG: at 11:39

## 2020-11-30 RX ADMIN — FENTANYL CITRATE 50 MCG: 50 INJECTION, SOLUTION INTRAMUSCULAR; INTRAVENOUS at 11:16

## 2020-11-30 RX ADMIN — APIXABAN 5 MG: 5 TABLET, FILM COATED ORAL at 18:19

## 2020-11-30 RX ADMIN — FENTANYL CITRATE 50 MCG: 50 INJECTION, SOLUTION INTRAMUSCULAR; INTRAVENOUS at 11:51

## 2020-11-30 RX ADMIN — Medication 80 MCG: at 11:44

## 2020-11-30 RX ADMIN — DEXAMETHASONE SODIUM PHOSPHATE 4 MG: 4 INJECTION, SOLUTION INTRAMUSCULAR; INTRAVENOUS at 11:21

## 2020-11-30 RX ADMIN — DOCUSATE SODIUM 50MG AND SENNOSIDES 8.6MG 1 TABLET: 8.6; 5 TABLET, FILM COATED ORAL at 21:39

## 2020-11-30 RX ADMIN — PHENYLEPHRINE HYDROCHLORIDE 40 MCG/MIN: 10 INJECTION INTRAVENOUS at 11:48

## 2020-11-30 RX ADMIN — ONDANSETRON HYDROCHLORIDE 4 MG: 2 INJECTION, SOLUTION INTRAMUSCULAR; INTRAVENOUS at 12:38

## 2020-11-30 RX ADMIN — PROPOFOL 50 MG: 10 INJECTION, EMULSION INTRAVENOUS at 12:42

## 2020-11-30 RX ADMIN — HEPARIN SODIUM 10000 UNITS: 1000 INJECTION, SOLUTION INTRAVENOUS; SUBCUTANEOUS at 11:38

## 2020-11-30 RX ADMIN — AMIODARONE HYDROCHLORIDE 200 MG: 200 TABLET ORAL at 14:50

## 2020-11-30 RX ADMIN — HEPARIN SODIUM 4000 UNITS: 1000 INJECTION, SOLUTION INTRAVENOUS; SUBCUTANEOUS at 11:54

## 2020-11-30 RX ADMIN — Medication 160 MCG: at 11:38

## 2020-11-30 RX ADMIN — PROPOFOL 120 MG: 10 INJECTION, EMULSION INTRAVENOUS at 11:18

## 2020-11-30 RX ADMIN — SUCCINYLCHOLINE CHLORIDE 120 MG: 20 INJECTION, SOLUTION INTRAMUSCULAR; INTRAVENOUS at 11:18

## 2020-11-30 RX ADMIN — CYANOCOBALAMIN TAB 500 MCG 1000 MCG: 500 TAB at 14:47

## 2020-11-30 RX ADMIN — Medication 10 ML: at 18:19

## 2020-11-30 RX ADMIN — CHOLECALCIFEROL TAB 25 MCG (1000 UNIT) 1 TABLET: 25 TAB at 14:47

## 2020-11-30 RX ADMIN — PROTAMINE SULFATE 100 MG: 10 INJECTION, SOLUTION INTRAVENOUS at 12:26

## 2020-11-30 RX ADMIN — Medication 10 ML: at 21:40

## 2020-11-30 NOTE — PERIOP NOTES
TRANSFER - OUT REPORT:    Verbal report given to Thuy RN(name) on Nilda Werner  being transferred to [facility]2155(unit) for routine post - op       Report consisted of patients Situation, Background, Assessment and   Recommendations(SBAR). Information from the following report(s) SBAR, OR Summary, Intake/Output, MAR and Cardiac Rhythm NSR w/ BBB was reviewed with the receiving nurse. Opportunity for questions and clarification was provided.       Patient transported with:   Monitor  Registered Nurse

## 2020-11-30 NOTE — PROGRESS NOTES
Cardiology Progress Note              [address], [address] [facility]  [phone]    11/30/2020 12:41 PM    Admit Date: 11/24/2020    Admit Diagnosis: Weakness generalized [R53.1]; Rhabdomyolysis [M62.82]; DANIELLE (acute kidney injury) ([facility] [address] [N17.9]; Volume depletion [E86.9]    Subjective:     Brianna Paiz   denies chest pain.     Visit Vitals  BP (!) 147/76   Pulse 65   Temp 98.3 °F (36.8 °C)   Resp 17   Ht 5' 7\" (1.702 m)   Wt 182 lb 1.6 oz (82.6 kg)   SpO2 100%   BMI 28.52 kg/m²     Current Facility-Administered Medications   Medication Dose Route Frequency    heparinized saline 2 units/mL infusion    CONTINUOUS    isoproterenol (ISUPREL) 500 mcg in 0.9% sodium chloride 250 mL infusion    CONTINUOUS    cyanocobalamin (VITAMIN B12) tablet 1,000 mcg  1,000 mcg Oral DAILY    cholecalciferol (VITAMIN D3) (1000 Units /25 mcg) tablet 1 Tab  1,000 Units Oral DAILY    acetaminophen (TYLENOL) tablet 650 mg  650 mg Oral Q6H PRN    Or    acetaminophen (TYLENOL) suppository 650 mg  650 mg Rectal Q6H PRN    polyethylene glycol (MIRALAX) packet 17 g  17 g Oral DAILY PRN    promethazine (PHENERGAN) tablet 12.5 mg  12.5 mg Oral Q6H PRN    Or    ondansetron (ZOFRAN) injection 4 mg  4 mg IntraVENous Q6H PRN    enoxaparin (LOVENOX) injection 40 mg  40 mg SubCUTAneous DAILY    senna-docusate (PERICOLACE) 8.6-50 mg per tablet 1 Tab  1 Tab Oral BID     Facility-Administered Medications Ordered in Other Encounters   Medication Dose Route Frequency    0.9% sodium chloride infusion   IntraVENous CONTINUOUS    propofoL (DIPRIVAN) 10 mg/mL injection   IntraVENous PRN    succinylcholine (ANECTINE) injection   IntraVENous PRN    fentaNYL citrate (PF) injection   IntraVENous PRN    dexamethasone (DECADRON) 4 mg/mL injection   IntraVENous PRN    PHENYLephrine (NEOSYNEPHRINE) in NS syringe   IntraVENous PRN    heparin (porcine) 1,000 unit/mL injection   IntraVENous PRN    PHENYLephrine (NOLAN-SYNEPHRINE) 10 mg in 0.9% sodium chloride 250 mL infusion   IntraVENous CONTINUOUS    protamine injection    PRN    ondansetron (ZOFRAN) injection   IntraVENous PRN         Objective:      Visit Vitals  BP (!) 147/76   Pulse 65   Temp 98.3 °F (36.8 °C)   Resp 17   Ht 5' 7\" (1.702 m)   Wt 182 lb 1.6 oz (82.6 kg)   SpO2 100%   BMI 28.52 kg/m²       Physical Exam:  Abdomen: soft, non-tender  Extremities: extremities normal  Heart: regular rate and rhythm  Lungs: clear to auscultation bilaterally  Pulses: 2+ and symmetric    Data Review:   Labs:    Recent Labs     11/30/20 0241   WBC 5.6   HGB 11.5   HCT 35.6        Recent Labs     11/29/20 0224      K 3.6      CO2 29   *   BUN 17   CREA 0.89   CA 9.0   MG 2.1   PHOS 3.6       Recent Labs     11/30/20  0241 11/29/20  0224 11/28/20  0511    257* 378*         Intake/Output Summary (Last 24 hours) at 11/30/2020 1241  Last data filed at 11/30/2020 9929  Gross per 24 hour   Intake 200 ml   Output 2000 ml   Net -1800 ml        Telemetry: nsr, rbbb    Assessment:     Principal Problem:    Elevated troponin (9/5/2020)    Active Problems:    Weakness generalized (11/24/2020)      Rhabdomyolysis (11/24/2020)      Volume depletion (11/24/2020)      DANIELLE (acute kidney injury) ([facility] [address] (11/24/2020)      SVT (supraventricular tachycardia) ([facility] [address] (11/28/2020)        Plan:     Nikki Tate is sp PVI/AT abl. In sinus. Will resume bb tonight. Observe overnight.  Should be ok for dc tmrw am.     Janes Anne MD, [facility]    11/30/2020

## 2020-11-30 NOTE — PROGRESS NOTES
Bedside shift change report given to Chucho Alcala RN (oncoming nurse) by Jeannine Rivera RN (offgoing nurse). Report included the following information SBAR, Kardex, Procedure Summary, Intake/Output, MAR, Accordion, Recent Results, Med Rec Status, Cardiac Rhythm NSR, BBB, PAC, PAT, Alarm Parameters , Pre Procedure Checklist, Procedure Verification, Quality Measures and Dual Neuro Assessment.

## 2020-11-30 NOTE — PROGRESS NOTES
Occupational Therapy note:    Chart reviewed and spoke with nursing. Per RN, patient about to have ablation procedure and asked therapy at this time. Will defer and continue to follow when appropriate.     Kong Sousa OTR/L

## 2020-11-30 NOTE — PROGRESS NOTES
Chart reviewed and discussed with nursing. Pt about to go to ablation. Will hold at this time and continue to follow. Last recommendations were for SNF.

## 2020-11-30 NOTE — ANESTHESIA PREPROCEDURE EVALUATION
Relevant Problems   No relevant active problems       Anesthetic History   No history of anesthetic complications            Review of Systems / Medical History  Patient summary reviewed, nursing notes reviewed and pertinent labs reviewed    Pulmonary  Within defined limits                 Neuro/Psych   Within defined limits           Cardiovascular    Hypertension        Dysrhythmias : atrial fibrillation and SVT      Exercise tolerance: >4 METS     GI/Hepatic/Renal  Within defined limits              Endo/Other  Within defined limits           Other Findings              Physical Exam    Airway  Mallampati: III  TM Distance: 4 - 6 cm  Neck ROM: normal range of motion   Mouth opening: Normal     Cardiovascular    Rhythm: regular  Rate: normal         Dental    Dentition: Upper dentition intact and Lower dentition intact     Pulmonary  Breath sounds clear to auscultation               Abdominal  GI exam deferred       Other Findings            Anesthetic Plan    ASA: 3  Anesthesia type: general          Induction: Intravenous  Anesthetic plan and risks discussed with: Patient

## 2020-11-30 NOTE — PROGRESS NOTES
End of Shift Note    Bedside shift change report given to Corby Jacinto (oncoming nurse) by Alexus Odonnell RN (offgoing nurse). Report included the following information SBAR    Shift worked:  days     Shift summary and any significant changes:     no change     Concerns for physician to address:  none     Zone phone for oncoming shift:   NA       Activity:  Activity Level: Up with Assistance  Number times ambulated in hallways past shift: 0  Number of times OOB to chair past shift: 1    Cardiac:   Cardiac Monitoring: Yes      Cardiac Rhythm: Sinus arrhythmia    Access:   Current line(s): PIV     Genitourinary:   Urinary status: external catheter    Respiratory:   O2 Device: Room air  Chronic home O2 use?: NO  Incentive spirometer at bedside: NO     GI:  Last Bowel Movement Date: 11/28/20  Current diet:  DIET ONE TIME MESSAGE  DIET NUTRITIONAL SUPPLEMENTS Breakfast; Ensure Enlive  DIET ONE TIME MESSAGE  DIET ONE TIME MESSAGE  DIET CARDIAC Regular  DIET ONE TIME MESSAGE  DIET NPO  Passing flatus: YES  Tolerating current diet: YES  % Diet Eaten: 75 %    Pain Management:   Patient states pain is manageable on current regimen: YES    Skin:  Bin Score: 18  Interventions: internal/external urinary devices    Patient Safety:  Fall Score:  Total Score: 3  Interventions: assistive device (walker, cane, etc), gripper socks and pt to call before getting OOB  High Fall Risk: Yes    Length of Stay:  Expected LOS: 3d 4h  Actual LOS: 901 Mari Thao RN                          +

## 2020-11-30 NOTE — PROGRESS NOTES
End of Shift Note    Bedside shift change report given to Dave Weinstein RN (oncoming nurse) by Disha Kaur (offgoing nurse). Report included the following information SBAR, Kardex, Procedure Summary, Intake/Output, MAR, Accordion, Recent Results, Med Rec Status, Cardiac Rhythm NSR, BBB, PAC, PAT, Alarm Parameters , Pre Procedure Checklist, Procedure Verification, Quality Measures and Dual Neuro Assessment    Shift worked:  Night     Shift summary and any significant changes:     PM CHG iyer, site prep and consent for AM procedure are completed. Pt. Is asymptomatic and stable vital signs. Concerns for physician to address:  None     Zone phone for oncoming shift:   N/A       Activity:  Activity Level: Up with Assistance  Number times ambulated in hallways past shift: 0  Number of times OOB to chair past shift: 0    Cardiac:   Cardiac Monitoring: Yes      Cardiac Rhythm: Normal sinus rhythm, Bundle Branch Block, Premature atrial contraction, Paroxysmal Atrial Tachycardia    Access:   Current line(s): PIV     Genitourinary:   Urinary status: external catheter    Respiratory:   O2 Device: Room air  Chronic home O2 use?: NO  Incentive spirometer at bedside: NO     GI:  Last Bowel Movement Date: 11/28/20  Current diet:  DIET ONE TIME MESSAGE  DIET NUTRITIONAL SUPPLEMENTS Breakfast; Ensure Enlive  DIET ONE TIME MESSAGE  DIET ONE TIME MESSAGE  DIET ONE TIME MESSAGE  DIET NPO  Passing flatus: YES  Tolerating current diet: YES  % Diet Eaten: 75 %    Pain Management:   Patient states pain is manageable on current regimen: YES    Skin:  Bin Score: 18  Interventions: increase time out of bed, PT/OT consult and internal/external urinary devices    Patient Safety:  Fall Score:  Total Score: 3  Interventions: bed/chair alarm, pt to call before getting OOB and stay with me (per policy)  High Fall Risk: Yes    Length of Stay:  Expected LOS: 3d 4h  Actual LOS: 6      Chris Moody

## 2020-11-30 NOTE — PROGRESS NOTES
Problem: Patient Education: Go to Patient Education Activity  Goal: Patient/Family Education  Outcome: Progressing Towards Goal     Problem: Patient Education: Go to Patient Education Activity  Goal: Patient/Family Education  Outcome: Progressing Towards Goal     Problem: Pressure Injury - Risk of  Goal: *Prevention of pressure injury  Description: Document Bin Scale and appropriate interventions in the flowsheet. Outcome: Progressing Towards Goal  Note: Pressure Injury Interventions:       Moisture Interventions: Internal/External urinary devices, Limit adult briefs, Minimize layers, Moisture barrier, Offer toileting Q_hr, Absorbent underpads, Apply protective barrier, creams and emollients, Check for incontinence Q2 hours and as needed    Activity Interventions: Assess need for specialty bed, Increase time out of bed, Pressure redistribution bed/mattress(bed type), PT/OT evaluation    Mobility Interventions: Float heels, Turn and reposition approx. every two hours(pillow and wedges), Pressure redistribution bed/mattress (bed type)    Nutrition Interventions: Document food/fluid/supplement intake, Discuss nutritional consult with provider    Friction and Shear Interventions: Apply protective barrier, creams and emollients, Feet elevated on foot rest, HOB 30 degrees or less, Lift sheet, Transferring/repositioning devices, Minimize layers                Problem: Patient Education: Go to Patient Education Activity  Goal: Patient/Family Education  Outcome: Progressing Towards Goal     Problem: Falls - Risk of  Goal: *Absence of Falls  Description: Document Pam Fall Risk and appropriate interventions in the flowsheet.   Outcome: Progressing Towards Goal  Note: Fall Risk Interventions:  Mobility Interventions: Assess mobility with egress test, Bed/chair exit alarm, Communicate number of staff needed for ambulation/transfer, OT consult for ADLs, Patient to call before getting OOB, PT Consult for assist device competence, PT Consult for mobility concerns, Strengthening exercises (ROM-active/passive), Utilize walker, cane, or other assistive device         Medication Interventions: Assess postural VS orthostatic hypotension, Bed/chair exit alarm, Evaluate medications/consider consulting pharmacy, Patient to call before getting OOB, Teach patient to arise slowly    Elimination Interventions: Bed/chair exit alarm, Call light in reach, Elevated toilet seat, Patient to call for help with toileting needs, Stay With Me (per policy), Toileting schedule/hourly rounds    History of Falls Interventions: Bed/chair exit alarm, Consult care management for discharge planning, Door open when patient unattended, Evaluate medications/consider consulting pharmacy, Investigate reason for fall, Room close to nurse's station, Vital signs minimum Q4HRs X 24 hrs (comment for end date), Assess for delayed presentation/identification of injury for 48 hrs (comment for end date)         Problem: Patient Education: Go to Patient Education Activity  Goal: Patient/Family Education  Outcome: Progressing Towards Goal     Problem: Patient Education: Go to Patient Education Activity  Goal: Patient/Family Education  Outcome: Progressing Towards Goal     Problem: Cath Lab Procedures: Pre-Procedure  Goal: Off Pathway (Use only if patient is Off Pathway)  Outcome: Progressing Towards Goal  Goal: Activity/Safety  Outcome: Progressing Towards Goal  Goal: Consults, if ordered  Outcome: Progressing Towards Goal  Goal: Diagnostic Test/Procedures  Outcome: Progressing Towards Goal  Goal: Nutrition/Diet  Outcome: Progressing Towards Goal  Goal: Discharge Planning  Outcome: Progressing Towards Goal  Goal: Medications  Outcome: Progressing Towards Goal  Goal: Respiratory  Outcome: Progressing Towards Goal  Goal: Treatments/Interventions/Procedures  Outcome: Progressing Towards Goal  Goal: Psychosocial  Outcome: Progressing Towards Goal  Goal: *Verbalize description of procedure  Outcome: Progressing Towards Goal  Goal: *Consent signed  Outcome: Progressing Towards Goal     Problem: Cath Lab Procedures: Post-Cath Day of Procedure (Initiate SCIP Measures for Post-Op Care)  Goal: Off Pathway (Use only if patient is Off Pathway)  Outcome: Progressing Towards Goal  Goal: Activity/Safety  Outcome: Progressing Towards Goal  Goal: Consults, if ordered  Outcome: Progressing Towards Goal  Goal: Diagnostic Test/Procedures  Outcome: Progressing Towards Goal  Goal: Nutrition/Diet  Outcome: Progressing Towards Goal  Goal: Discharge Planning  Outcome: Progressing Towards Goal  Goal: Medications  Outcome: Progressing Towards Goal  Goal: Respiratory  Outcome: Progressing Towards Goal  Goal: Treatments/Interventions/Procedures  Outcome: Progressing Towards Goal  Goal: Psychosocial  Outcome: Progressing Towards Goal  Goal: *Procedure site is without bleeding and signs of infection six hours post sheath removal  Outcome: Progressing Towards Goal  Goal: *Hemodynamically stable  Outcome: Progressing Towards Goal  Goal: *Optimal pain control at patient's stated goal  Outcome: Progressing Towards Goal     Problem: Cath Lab Procedures: Post-Cath Day 1  Goal: Off Pathway (Use only if patient is Off Pathway)  Outcome: Progressing Towards Goal  Goal: Activity/Safety  Outcome: Progressing Towards Goal  Goal: Diagnostic Test/Procedures  Outcome: Progressing Towards Goal  Goal: Nutrition/Diet  Outcome: Progressing Towards Goal  Goal: Discharge Planning  Outcome: Progressing Towards Goal  Goal: Medications  Outcome: Progressing Towards Goal  Goal: Respiratory  Outcome: Progressing Towards Goal  Goal: Treatments/Interventions/Procedures  Outcome: Progressing Towards Goal  Goal: Psychosocial  Outcome: Progressing Towards Goal     Problem: Cath Lab Procedures: Discharge Outcomes  Goal: *Stable cardiac rhythm  Outcome: Progressing Towards Goal  Goal: *Hemodynamically stable  Outcome: Progressing Towards Goal  Goal: *Optimal pain control at patient's stated goal  Outcome: Progressing Towards Goal  Goal: *Pulses palpable, skin color within defined limits, skin temperature warm  Outcome: Progressing Towards Goal  Goal: *Lungs clear or at baseline  Outcome: Progressing Towards Goal  Goal: *Demonstrates ability to perform prescribed activity without shortness of breath or discomfort  Outcome: Progressing Towards Goal  Goal: *Verbalizes home exercise program, activity guidelines, cardiac precautions  Outcome: Progressing Towards Goal  Goal: *Verbalizes understanding and describes prescribed diet  Outcome: Progressing Towards Goal  Goal: *Verbalizes understanding and describes medication purposes and frequencies  Outcome: Progressing Towards Goal  Goal: *Identifies cardiac risk factors  Outcome: Progressing Towards Goal  Goal: *No signs and symptoms of infection or wound complications  Outcome: Progressing Towards Goal  Goal: *Anxiety reduced or absent  Outcome: Progressing Towards Goal  Goal: *Verbalizes and demonstrates incision care  Outcome: Progressing Towards Goal  Goal: *Understands and describes signs and symptoms to report to providers(Stroke Metric)  Outcome: Progressing Towards Goal  Goal: *Describes follow-up/return visits to physicians  Outcome: Progressing Towards Goal  Goal: *Describes available resources and support systems  Outcome: Progressing Towards Goal  Goal: *Influenza immunization  Outcome: Progressing Towards Goal  Goal: *Pneumococcal immunization  Outcome: Progressing Towards Goal     Problem: Cath Lab Procedures: Discharge Outcomes  Goal: *Stable cardiac rhythm  Outcome: Progressing Towards Goal  Goal: *Hemodynamically stable  Outcome: Progressing Towards Goal  Goal: *Optimal pain control at patient's stated goal  Outcome: Progressing Towards Goal  Goal: *Pulses palpable, skin color within defined limits, skin temperature warm  Outcome: Progressing Towards Goal  Goal: *Lungs clear or at baseline  Outcome: Progressing Towards Goal  Goal: *Demonstrates ability to perform prescribed activity without shortness of breath or discomfort  Outcome: Progressing Towards Goal  Goal: *Verbalizes home exercise program, activity guidelines, cardiac precautions  Outcome: Progressing Towards Goal  Goal: *Verbalizes understanding and describes prescribed diet  Outcome: Progressing Towards Goal  Goal: *Verbalizes understanding and describes medication purposes and frequencies  Outcome: Progressing Towards Goal  Goal: *Identifies cardiac risk factors  Outcome: Progressing Towards Goal  Goal: *No signs and symptoms of infection or wound complications  Outcome: Progressing Towards Goal  Goal: *Anxiety reduced or absent  Outcome: Progressing Towards Goal  Goal: *Verbalizes and demonstrates incision care  Outcome: Progressing Towards Goal  Goal: *Understands and describes signs and symptoms to report to providers(Stroke Metric)  Outcome: Progressing Towards Goal  Goal: *Describes follow-up/return visits to physicians  Outcome: Progressing Towards Goal  Goal: *Describes available resources and support systems  Outcome: Progressing Towards Goal  Goal: *Influenza immunization  Outcome: Progressing Towards Goal  Goal: *Pneumococcal immunization  Outcome: Progressing Towards Goal     Problem: Patient Education: Go to Patient Education Activity  Goal: Patient/Family Education  Outcome: Progressing Towards Goal     Problem: Cath Lab Procedures: Pre-Procedure  Goal: Off Pathway (Use only if patient is Off Pathway)  Outcome: Progressing Towards Goal  Goal: Activity/Safety  Outcome: Progressing Towards Goal  Goal: Consults, if ordered  Outcome: Progressing Towards Goal  Goal: Diagnostic Test/Procedures  Outcome: Progressing Towards Goal  Goal: Nutrition/Diet  Outcome: Progressing Towards Goal  Goal: Discharge Planning  Outcome: Progressing Towards Goal  Goal: Medications  Outcome: Progressing Towards Goal  Goal: Respiratory  Outcome: Progressing Towards Goal  Goal: Treatments/Interventions/Procedures  Outcome: Progressing Towards Goal  Goal: Psychosocial  Outcome: Progressing Towards Goal  Goal: *Verbalize description of procedure  Outcome: Progressing Towards Goal  Goal: *Consent signed  Outcome: Progressing Towards Goal

## 2020-11-30 NOTE — PROGRESS NOTES
Pt will need new PT/OT notes for the auth to SNF. KARTIK has sent message to therapy director requesting this pt be seen first thing in the morning so a new auth can be obtained in the event pt is discharged tomorrow. KARTIK contacted [facility] and spoke with admissions who stated she will not need another Covid as they will do a rapid when she arrives to the facility. CM will continue to follow and assist with additional discharge needs.     Nohemy Tello, JAL[facility]  Ext [phone]

## 2020-11-30 NOTE — PROGRESS NOTES
Hospitalist Progress Note    NAME: Stacy Ceron   :  1950   MRN:  [id number]     Interim Hospital Summary: 79 y.o. female whom presented on 2020 with      Assessment / Plan:    HTN  NSVT   PAT/SVT  - bursts of  PAT/SVT , asymptomatic   BB incr to 50 bid , now on hold for eps   keep K/Mg high side   Holding hctz ( BP 120s)   S/p lasix x 2 for leg edema  -cardiology help appreciated:  card cath : no significant CAD    : symptomatic PAT/SVT. candidate for an eps/PAT ablation, will be scheduled on Monday   - Echo : EF 52% with diastolic dysfunction and mild AI. Weakness, s/p falls  Right shoulder contusion   Rhabdomyolysis , resolved   DANIELLE from Volume depletion, poa resolved   Hypokalemia , resolved   - CK improved, stop IVF, has good po intake   -CT Head and C spine NEG  xrays knees NEG   recent TSH wnl  checked VIt D and B12 levels and both are below normal range  -PT OT: SNF      Vit B12 deficiency 154   Vit D insufficiency 28.7   -started oral supplementation. Follow up with PCP to recheck levels in a month    : offered to the pt to update her family, she stated she will be updating them by herself     Code status: Full  Prophylaxis: Lovenox  Recommended Disposition: SNF accepted. eps today. May need another covid test depending on the facility policy        Subjective:     Chief Complaint / Reason for Physician Visit  Follow up of weakness, rhabdomyolysis, DANIELLE, HTN  Chart reviewed in detail. Discussed with RN events overnight.    Asymptomatic   In a good spirit       Review of Systems:  Symptom Y/N Comments  Symptom Y/N Comments   Fever/Chills    Chest Pain n    Poor Appetite    Edema     Cough n   Abdominal Pain     Sputum    Joint Pain     SOB/LINN n   Pruritis/Rash     Nausea/vomit    Tolerating PT/OT     Diarrhea    Tolerating Diet     Constipation    Other       Could NOT obtain due to:      PO intake:   Patient Vitals for the past 72 hrs:   % Diet Eaten   11/27/20 1900 75 %     Objective:     VITALS:   Last 24hrs VS reviewed since prior progress note. Most recent are:  Patient Vitals for the past 24 hrs:   Temp Pulse Resp BP SpO2   11/30/20 0725 98.3 °F (36.8 °C) 65 17 (!) 147/76 100 %   11/30/20 0229 98.2 °F (36.8 °C) 61 16 (!) 140/70 97 %   11/29/20 2321 98.2 °F (36.8 °C) 65 16 131/70 100 %   11/29/20 2053 97.7 °F (36.5 °C) 72 18 (!) 144/70 99 %   11/29/20 1532 98.3 °F (36.8 °C) 69 16 128/69 98 %       Intake/Output Summary (Last 24 hours) at 11/30/2020 1219  Last data filed at 11/30/2020 9977  Gross per 24 hour   Intake 200 ml   Output 2000 ml   Net -1800 ml        I had a face to face encounter, and independently examined this patient on 11/30/2020, as outlined below:  PHYSICAL EXAM:  General: WD, WN. Alert, cooperative, no acute distress    EENT:  EOMI. Anicteric sclerae. MMM  Resp:  CTA bilaterally, no wheezing or rales. No accessory muscle use  CV:  Regular  rhythm,  + leg edema  GI:  Soft, Non distended, Non tender. +Bowel sounds  Neurologic:  Alert and oriented X 3, normal speech,   Psych:   Good insight. Not anxious nor agitated  Skin:  No rashes. No jaundice    Reviewed most current lab test results and cultures  YES  Reviewed most current radiology test results   YES  Review and summation of old records today    NO  Reviewed patient's current orders and MAR    YES  PMH/SH reviewed - no change compared to H&P  ________________________________________________________________________  Care Plan discussed with:    Comments   Patient x    Family      RN x    Care Manager x    Consultant                        Multidiciplinary team rounds were held today with , nursing, pharmacist and clinical coordinator. Patient's plan of care was discussed; medications were reviewed and discharge planning was addressed.      ________________________________________________________________________  Total NON critical care TIME:  25   Minutes    Total CRITICAL CARE TIME Spent:   Minutes non procedure based      Comments   >50% of visit spent in counseling and coordination of care x     This includes time during multidisciplinary rounds if indicated above   ________________________________________________________________________  Romario Monte MD     Procedures: see electronic medical records for all procedures/Xrays and details which were not copied into this note but were reviewed prior to creation of Plan. LABS:  I reviewed today's most current labs and imaging studies.   Pertinent labs include:  Recent Labs     11/30/20  0241   WBC 5.6   HGB 11.5   HCT 35.6        Recent Labs     11/29/20 0224      K 3.6      CO2 29   *   BUN 17   CREA 0.89   CA 9.0   MG 2.1   PHOS 3.6

## 2020-11-30 NOTE — PERIOP NOTES
Handoff Report from Operating Room to PACU    Report received from Agustin Vines and Elissa Yao CRNA regarding Annalee Kincaid. Surgeon(s):  Anesthesia, Case  And Procedure(s) (LRB):  SPECIAL PROCEDURE OUTSIDE OF OR (N/A)  confirmed   with allergies and dressings discussed. Anesthesia type, drugs, patient history, complications, estimated blood loss, vital signs, intake and output, and last pain medication, lines, reversal medications and temperature were reviewed.

## 2020-11-30 NOTE — ANESTHESIA POSTPROCEDURE EVALUATION
Post-Anesthesia Evaluation and Assessment    Patient: Rabia Agudelo MRN: [id number]  SSN: [id number]    YOB: 1950  Age: 79 y.o. Sex: female      I have evaluated the patient and they are stable and ready for discharge from the PACU. Cardiovascular Function/Vital Signs  Visit Vitals  BP (!) 141/77   Pulse 75   Temp 36.8 °C (98.2 °F)   Resp 12   Ht 5' 7\" (1.702 m)   Wt 82.6 kg (182 lb 1.6 oz)   SpO2 100%   BMI 28.52 kg/m²       Patient is status post General anesthesia for Procedure(s):  ABLATION A-FIB  W COMPLETE EP STUDY  Ep 3d Mapping  Intracardiac Echocardiogram  Ablation Svt/Vt Add On  Drug Stimulation. Nausea/Vomiting: None    Postoperative hydration reviewed and adequate. Pain:  Pain Scale 1: Numeric (0 - 10) (11/30/20 1301)  Pain Intensity 1: 0 (11/30/20 1301)   Managed    Neurological Status:   Neuro (WDL): Exceptions to WDL (11/30/20 1301)  Neuro  Neurologic State: Drowsy; Eyes open to voice (11/30/20 1301)  Orientation Level: Oriented to person;Oriented to situation (11/30/20 1301)  Cognition: Appropriate for age attention/concentration; Follows commands (11/30/20 1301)  LUE Motor Response: Purposeful (11/30/20 1301)  LLE Motor Response: Purposeful (11/30/20 1301)  RUE Motor Response: Purposeful (11/30/20 1301)  RLE Motor Response: Purposeful (11/30/20 1301)   At baseline    Mental Status, Level of Consciousness: Alert and  oriented to person, place, and time    Pulmonary Status:   O2 Device: Nasal cannula (11/30/20 1310)   Adequate oxygenation and airway patent    Complications related to anesthesia: None    Post-anesthesia assessment completed. No concerns    Signed By: Eveline Ross MD     November 30, 2020              Procedure(s):  ABLATION A-FIB  W COMPLETE EP STUDY  Ep 3d Mapping  Intracardiac Echocardiogram  Ablation Svt/Vt Add On  Drug Stimulation.     general    <BSHSIANPOST>    INITIAL Post-op Vital signs:   Vitals Value Taken Time   BP     Temp     Pulse 81 11/30/2020  1:59 PM   Resp 13 11/30/2020  1:59 PM   SpO2 96 % 11/30/2020  1:59 PM   Vitals shown include unvalidated device data.

## 2020-11-30 NOTE — PROGRESS NOTES
Offgoing report given to Lennox Abo, RN    Uneventful shift. Pt had ablation with no complications. 9 am: Cards ISIDORO Warner states ok to hold am meds. 615 pm: Luo removed as bedrest is over.

## 2020-12-01 LAB
ANION GAP SERPL CALC-SCNC: 5 MMOL/L (ref 5–15)
ATRIAL RATE: 71 BPM
BUN SERPL-MCNC: 20 MG/DL (ref 6–20)
BUN/CREAT SERPL: 21 (ref 12–20)
CALCIUM SERPL-MCNC: 8.7 MG/DL (ref 8.5–10.1)
CALCULATED P AXIS, ECG09: 70 DEGREES
CALCULATED R AXIS, ECG10: -30 DEGREES
CALCULATED T AXIS, ECG11: 4 DEGREES
CHLORIDE SERPL-SCNC: 108 MMOL/L (ref 97–108)
CO2 SERPL-SCNC: 26 MMOL/L (ref 21–32)
CREAT SERPL-MCNC: 0.94 MG/DL (ref 0.55–1.02)
DIAGNOSIS, 93000: NORMAL
GLUCOSE SERPL-MCNC: 96 MG/DL (ref 65–100)
P-R INTERVAL, ECG05: 192 MS
POTASSIUM SERPL-SCNC: 4 MMOL/L (ref 3.5–5.1)
Q-T INTERVAL, ECG07: 444 MS
QRS DURATION, ECG06: 140 MS
QTC CALCULATION (BEZET), ECG08: 482 MS
SODIUM SERPL-SCNC: 139 MMOL/L (ref 136–145)
VENTRICULAR RATE, ECG03: 71 BPM

## 2020-12-01 PROCEDURE — 74011250637 HC RX REV CODE- 250/637: Performed by: INTERNAL MEDICINE

## 2020-12-01 PROCEDURE — 99232 SBSQ HOSP IP/OBS MODERATE 35: CPT | Performed by: INTERNAL MEDICINE

## 2020-12-01 PROCEDURE — 65660000000 HC RM CCU STEPDOWN

## 2020-12-01 PROCEDURE — 97116 GAIT TRAINING THERAPY: CPT

## 2020-12-01 PROCEDURE — 97530 THERAPEUTIC ACTIVITIES: CPT

## 2020-12-01 PROCEDURE — 74011250637 HC RX REV CODE- 250/637: Performed by: NURSE PRACTITIONER

## 2020-12-01 PROCEDURE — 97535 SELF CARE MNGMENT TRAINING: CPT

## 2020-12-01 PROCEDURE — 36415 COLL VENOUS BLD VENIPUNCTURE: CPT

## 2020-12-01 PROCEDURE — 80048 BASIC METABOLIC PNL TOTAL CA: CPT

## 2020-12-01 PROCEDURE — 93005 ELECTROCARDIOGRAM TRACING: CPT

## 2020-12-01 RX ADMIN — DOCUSATE SODIUM 50MG AND SENNOSIDES 8.6MG 1 TABLET: 8.6; 5 TABLET, FILM COATED ORAL at 10:29

## 2020-12-01 RX ADMIN — CHOLECALCIFEROL TAB 25 MCG (1000 UNIT) 1 TABLET: 25 TAB at 09:33

## 2020-12-01 RX ADMIN — Medication 10 ML: at 21:26

## 2020-12-01 RX ADMIN — Medication 1 LOZENGE: at 23:33

## 2020-12-01 RX ADMIN — APIXABAN 5 MG: 5 TABLET, FILM COATED ORAL at 09:32

## 2020-12-01 RX ADMIN — Medication 10 ML: at 03:49

## 2020-12-01 RX ADMIN — CYANOCOBALAMIN TAB 500 MCG 1000 MCG: 500 TAB at 09:32

## 2020-12-01 RX ADMIN — AMIODARONE HYDROCHLORIDE 200 MG: 200 TABLET ORAL at 09:32

## 2020-12-01 RX ADMIN — APIXABAN 5 MG: 5 TABLET, FILM COATED ORAL at 17:30

## 2020-12-01 RX ADMIN — Medication 10 ML: at 10:31

## 2020-12-01 NOTE — PROGRESS NOTES
Problem: Self Care Deficits Care Plan (Adult)  Goal: *Acute Goals and Plan of Care (Insert Text)  Description: FUNCTIONAL STATUS PRIOR TO ADMISSION: Patient was in [facility] in September, then went to SNF for rehab. At discharge, she went to a duplex with 2 roommates (that she did not know). Patient has frequent falls. She indicates that her grandson lives nearby. HOME SUPPORT: The patient lived with roommates but did not require assist. Her grandson lives in the area. Occupational Therapy Goals    Reviewed 12/1 and updated as below. 1.  Patient will perform grooming standing at the sink with sBA for 4 grooming tasks within 7 day(s). 2.  Patient will perform upper body dressing seated EOB with contact guard assist within 7 day(s). 3.  Patient will perform lower body dressing with moderate assistance  within 7 day(s). 4.  Patient will perform toilet transfers with close SBA within 7 day(s). 5.  Patient will perform all aspects of toileting with minimal assistance within 7 day(s). 6.  Patient will participate in upper extremity therapeutic exercise/activities with Min A and cues for 10 minutes within 7 day(s). Initiated 11/24/2020  1. Patient will perform grooming standing at the sink with minimal assistance within 7 day(s). MET and upgrade --see above  2. Patient will perform upper body dressing seated EOB with contact guard assist within 7 day(s). --continue  3. Patient will perform lower body dressing with moderate assistance  within 7 day(s). --continue  4. Patient will perform toilet transfers with minimal assistance within 7 day(s). Met and upgrade as above  5. Patient will perform all aspects of toileting with minimal assistance within 7 day(s). continue  6. Patient will participate in upper extremity therapeutic exercise/activities with Min A and cues for 10 minutes within 7 day(s).    continue       Outcome: Progressing Towards Goal    OCCUPATIONAL THERAPY TREATMENT/WEEKLY RE-ASSESSMENT  Patient: Jade Ames (10 y.o. female)  Date: 12/1/2020  Diagnosis: Weakness generalized [R53.1]  Rhabdomyolysis [M62.82]  DANIELLE (acute kidney injury) ([facility] [address] [N17.9]  Volume depletion [E86.9]   Elevated troponin  Procedure(s) (LRB):  SPECIAL PROCEDURE OUTSIDE OF OR (N/A) 1 Day Post-Op  Precautions: Fall(Simultaneous filing. User may not have seen previous data.)  Chart, occupational therapy assessment, plan of care, and goals were reviewed. ASSESSMENT  Patient continues with skilled OT services and is progressing towards goals. This patient met 2 STG this week which were updated as stated above. She requries MAXIMUM increased time for all tasks during session and responded well to cues to increased anterior ws with sit to stand tos it and increased step length with amb in room to/from toilet, standing at sink today for first time for handwashing task, requiring min cues for RW positioning and safety during session. Patient continues to demonstrate decreased ADL, I-ADL, safety, strength/mobility, functional transfers/mobility, with general weakness. She is D for LE dressing, mod A to min A for sit to stand varying with surface with posterior lean and wb thru calves with sit to stand on bed promoting posterior LOB. She will benefit from SNF for rehab when stable to return to [facility]. Current Level of Function Impacting Discharge (ADLs): D to min A for ADL    Other factors to consider for discharge: was I to mod I with new SPC purchased pTA         PLAN :  Goals have been updated based on progression since last assessment. Patient continues to benefit from skilled intervention to address the above impairments. Continue to follow patient 4 times a week to address goals.     Recommend with staff: up to chair for meals, SPT from bed/chair to [facility] for toileting unless staff has a lot of time for transfers to toilet    Recommend next OT session: per POC    Recommendation for discharge: (in order for the patient to meet his/her long term goals)  Therapy up to 5 days/week in SNF setting    This discharge recommendation:  Has been made in collaboration with the attending provider and/or case management    IF patient discharges home will need the following DME: defer to SNF       SUBJECTIVE:   Patient stated I need you to help me with my legs (during bed mobility) .     OBJECTIVE DATA SUMMARY:   Cognitive/Behavioral Status:                      Functional Mobility and Transfers for ADLs:  Bed Mobility:  Rolling: Minimum assistance  Supine to Sit: Minimum assistance  Scooting: Stand-by assistance;Assist x1;Additional time(cues for hand placement and ant ws wtih task)    Transfers:  Sit to Stand: Moderate assistance;Minimum assistance; Additional time;Assist x1  Functional Transfers  Bathroom Mobility: Minimum assistance;Contact guard assistance  Bed to Chair: Contact guard assistance    Balance:  Sitting: Intact  Standing: Impaired; With support  Standing - Static: Fair  Standing - Dynamic : Fair    ADL Intervention:       Grooming  Position Performed: Standing  Washing Hands: Contact guard assistance; Compensatory technique training(safety cues to stay close to sink-increase upright posture)                   Lower Body Dressing Assistance  Socks: Total assistance (dependent)    Toileting  Toileting Assistance: Maximum assistance  Bladder Hygiene: Stand-by assistance(SEATED on toilet)  Bowel Hygiene: Contact guard assistance(seated on toilet)  Clothing Management: Total assistance (dependent)  Cues: Visual cues provided;Verbal cues provided; Tactile cues provided;Physical assistance for pants up;Physical assistance for pants down  Adaptive Equipment: Grab bars; Walker         Therapeutic Exercises:       Pain:  C/o R groin incisional discomfort from ablation 11/30 and LE stiffness    Activity Tolerance:   Fair, requires rest breaks, and max increased time for tasks    After treatment patient left in no apparent distress: Sitting in chair, Call bell within reach, and Bed / chair alarm activated    COMMUNICATION/COLLABORATION:   The patients plan of care was discussed with: Physical therapist and Registered nurse.      Ed Bowdon, OTR/L  Time Calculation: 38 mins

## 2020-12-01 NOTE — PROGRESS NOTES
07:00 - Bedside report rec'd from ANDRAE Royal.    19:00 - Patient requested [facility] for bedtime. EPC applied to excoriation. Uneventful shift awaiting bed placement for SNF today.   Anticipates DC at 9:30 am.  Bedside report given to Sybil Blanchard

## 2020-12-01 NOTE — PROGRESS NOTES
Hospitalist Progress Note    NAME: Bri Dwyer   :  1950   MRN:  [id number]   I reviewed pertinent labs/imaging and discussed plan of care with Dr. Denver Cody who is in agreement. Assessment / Plan:  SVT  PAF  HTN  Elevated troponin   EPS 20:  1. PVI of all 4 PVs  2. PAT with AT focus mapped to the floor of the CS with successful RFA of the AT focus  3. No further inducible atrial arrhythmias with rapid atrial pacing  - Cardiology input appreciated, cleared for d/c.  - Patient had cardiac cath 20 with no significant CAD. - Echo 20 with normal LV function.   - Patient had one short burst SVT which was asymptomatic and self limiting.   - Continue amiodarone 200 mg po daily. - Continue apixaban 5 mg po bid. Generalized weakness s/p falls  Rhabdomyolysis, resolved    DANIELLE, resolved   Hypokalemia, resolved   Right shoulder contusion  Knee contusion    CT head 20:  No acute intracranial abnormality. Moderate chronic small vessel ischemic disease. CT cervical spine 20:  No evidence of fracture or subluxation. Right knee x-ray 20:  Mild degenerative changes. No acute abnormality. Left Knee x-ray 20:  Mild degenerative changes. No acute abnormality.  - Continue PT/OT. - SNF for rehab at discharge. Vit B12 deficiency   Vitamin D deficiency  - Continue cyanocobalamin 1000 mcg po daily. - Continue cholecalciferol 1000 units po daily. 25.0 - 29.9 Overweight / Body mass index is 28.52 kg/m². Code status: Full  Prophylaxis: Full anticoagulation with apixaban  Recommended Disposition: SNF/LTC     Subjective:     Chief Complaint / Reason for Physician Visit  No complaints. Denies chest pressure/pain, SOB, and palpitations. Plan of care and pertinent events reviewed with bedside nurse.      Review of Systems:  Symptom Y/N Comments  Symptom Y/N Comments   Fever/Chills N   Chest Pain N    Poor Appetite N   Edema Y    Cough N   Abdominal Pain N Sputum N   Joint Pain N    SOB/LINN N   Pruritis/Rash N    Nausea/vomit N   Tolerating PT/OT Y    Diarrhea N   Tolerating Diet Y    Constipation N   Other       Could NOT obtain due to:      Objective:     VITALS:   Last 24hrs VS reviewed since prior progress note. Most recent are:  Patient Vitals for the past 24 hrs:   Temp Pulse Resp BP SpO2   12/01/20 1119 97.5 °F (36.4 °C) 85 20 (!) 104/52 96 %   12/01/20 0943  94  (!) 143/66    12/01/20 0715 97.5 °F (36.4 °C) 76 18 (!) 143/72 97 %   12/01/20 0341 97.8 °F (36.6 °C) 78 16 (!) 131/59 100 %   11/30/20 2300 98.4 °F (36.9 °C) 74  110/64 98 %   11/30/20 2100  80  (!) 117/57 94 %   11/30/20 2030  81  (!) 92/53 96 %   11/30/20 2000 97.9 °F (36.6 °C) 82 16 (!) 94/56 97 %   11/30/20 1930  86  (!) 99/54 96 %   11/30/20 1900  96  114/66 100 %   11/30/20 1830  94  108/67 98 %   11/30/20 1800  88  (!) 104/56 96 %   11/30/20 1730  95  103/65 98 %   11/30/20 1700  96  119/66 98 %   11/30/20 1630  92  (!) 111/55 97 %   11/30/20 1600  (!) 103  124/86 100 %   11/30/20 1530  91  (!) 125/58 99 %   11/30/20 1500  81  (!) 134/92 99 %   11/30/20 1430  77  (!) 139/52 95 %   11/30/20 1415 97.7 °F (36.5 °C) 78 15 (!) 134/94 95 %   11/30/20 1400 98.5 °F (36.9 °C) 78 11 (!) 142/77 96 %   11/30/20 1345  75 12 (!) 141/77 100 %   11/30/20 1330  76 14 134/82 100 %   11/30/20 1320  73 13 (!) 145/81 100 %   11/30/20 1315  76 12 (!) 142/84 100 %   11/30/20 1310  77 12 138/85 99 %   11/30/20 1305  78 13 135/76 97 %   11/30/20 1301 98.2 °F (36.8 °C) 80 14 135/79 99 %   11/30/20 1257 98.2 °F (36.8 °C) 79 14 135/79 99 %       Intake/Output Summary (Last 24 hours) at 12/1/2020 1210  Last data filed at 12/1/2020 4432  Gross per 24 hour   Intake 1260 ml   Output 695 ml   Net 565 ml        I had a face to face encounter and independently examined this patient on 12/1/2020, as outlined below:  PHYSICAL EXAM:  General:  A/A/O X 3. NAD. HEENT:  Normocephalic. Sclera anicteric. EOMI. Mucous membranes moist.    Chest:  Resps even/unlabored with symmetrical CWE. Air entry full. Lungs CTA. No use of accessory muscles. CV:  RRR. Normal S1/S2. No M/C/R appreciated. No JVD. Trace pretibial edema magalys. GI:  Abdomen soft/NT/ND. No organomegaly. No hernia. ABT X 4.    :  Voiding. Neurologic:  Nonfocal.  CN II-XII grossly intact. Face symmetrical.  Speech normal.     Psych:  Cooperative. No anxiety or agitation. No suicidal/homicidal ideation. Skin:  No rashes or jaundice. Reviewed most current lab test results and cultures  YES  Reviewed most current radiology test results   YES  Review and summation of old records today    NO  Reviewed patient's current orders and MAR    YES  PMH/SH reviewed - no change compared to H&P  ________________________________________________________________________  Care Plan discussed with:    Comments   Patient [address]     Consultant                        Multidiciplinary team rounds were held today with , nursing, pharmacist and clinical coordinator. Patient's plan of care was discussed; medications were reviewed and discharge planning was addressed. ________________________________________________________________________  Otilia Tabares NP     Procedures: see electronic medical records for all procedures/Xrays and details which were not copied into this note but were reviewed prior to creation of Plan. LABS:  I reviewed today's most current labs and imaging studies.   Pertinent labs include:  Recent Labs     11/30/20  0241   WBC 5.6   HGB 11.5   HCT 35.6        Recent Labs     12/01/20  0353 11/29/20  0224    139   K 4.0 3.6    106   CO2 26 29   GLU 96 103*   BUN 20 17   CREA 0.94 0.89   CA 8.7 9.0   MG  --  2.1   PHOS  --  3.6       Signed: Otilia Tabares NP Nutrition, metabolism, and development symptoms

## 2020-12-01 NOTE — PROGRESS NOTES
ANNE-MARIE: Discharge to [facility] for SNF level rehab   2nd IM Medicare letter to be provided to patient prior to discharge   Freedom of choice needed for patient's choice to go to [facility] for rehab   [facility] stretcher to provide BLS transport to rehab due to patient's significant fall risk       Information obtained from [employer] that patient has been authorized for admission to [facility]. Reference Johnson Carlson is [id number] approved from 12/1/2020-12/3/2020 with next review on 12/3/2020. This information was provided to [place] in admissions at [facility]. Spoke with Mala bocanegra about patient's plan to discharge tomorrow via [facility] at 9:30am to their facility. Pt's has room number assigned, room 128 in the [facility]. Nursing to call report to Meseret Cooley at [phone] and will need to ask for [facility]. CM spoke with patient about acceptance and authorization to go to [facility]. She states she will call her grandson and granddaughter to inform them of this plan. Pt agreeable to discharging tomorrow at 9:30am.      Care Management Interventions  PCP Verified by CM: Yes  Mode of Transport at Discharge: Other (see comment)  Transition of Care Consult (CM Consult): Discharge Planning  Discharge Durable Medical Equipment: No  Physical Therapy Consult: Yes  Occupational Therapy Consult: Yes  Speech Therapy Consult: No  Current Support Network: Lives Alone, Own Home(renting a room )  Confirm Follow Up Transport: Family  Discharge Location  Discharge Placement: Skilled nursing facility    Hong Byrne, [address] - [address] [place]  [employer] ACP Facilitator  Zone Phone: [phone]

## 2020-12-01 NOTE — PROGRESS NOTES
Problem: Mobility Impaired (Adult and Pediatric)  Goal: *Acute Goals and Plan of Care (Insert Text)  Description: FUNCTIONAL STATUS PRIOR TO ADMISSION: Patient was modified independent using a single point cane for functional mobility. HOME SUPPORT PRIOR TO ADMISSION: The patient lived alone with grandson to provide assistance. Physical Therapy Goals  Revised 12/1/2020  1. Patient will move from supine to sit and sit to supine , scoot up and down, and roll side to side in bed with supervision/set-up within 7 day(s). 2.  Patient will transfer from bed to chair and chair to bed with supervision/set-up using the least restrictive device within 7 day(s). 3.  Patient will perform sit to stand with supervision/set-up within 7 day(s). 4.  Patient will ambulate with supervision/set-up for 50 feet with the least restrictive device within 7 day(s). Initiated 11/24/2020  1. Patient will move from supine to sit and sit to supine  in bed with minimal assistance/contact guard assist within 7 day(s). 2.  Patient will transfer from bed to chair and chair to bed with minimal assistance/contact guard assist using the least restrictive device within 7 day(s). 3.  Patient will perform sit to stand with minimal assistance/contact guard assist within 7 day(s). 4.  Patient will ambulate with minimal assistance/contact guard assist for 20 feet with the least restrictive device within 7 day(s). Outcome: Progressing Towards Goal   PHYSICAL THERAPY TREATMENT: WEEKLY REASSESSMENT  Patient: Tamra Jaeger (26 y.o. female)  Date: 12/1/2020  Primary Diagnosis: Weakness generalized [R53.1]  Rhabdomyolysis [M62.82]  DANIELLE (acute kidney injury) ([facility] [address] [N17.9]  Volume depletion [E86.9]  Procedure(s) (LRB):  SPECIAL PROCEDURE OUTSIDE OF OR (N/A) 1 Day Post-Op   Precautions:   Fall(Simultaneous filing.  User may not have seen previous data.)      ASSESSMENT  Patient continues with skilled PT services and is making slow progress towards goals, remaining limited by generalized weakness, decreased endurance/activity tolerance, impaired standing balance, and soreness in groin site. Pt initially required modAx1 in order to assume standing position from EOB however later able to perform sit>>stand transfer w/ CG/Augustine from commode. Gait slow and shuffled with pt exhibiting nonfunctional gait speed in addition to minimal step height bilaterally. Increased trunk flexion noted with max verbal cueing required to encourage upright posture. Overall, pt tolerated therapy session well however remains well below her baseline mod I functional level and most appropriate for additional rehab at discharge. Patient's progression toward goals since last assessment: Pt is making slow, steady progress. Goals reviewed and adjusted appropriately. Current Level of Function Impacting Discharge (mobility/balance): CGAx1 with use of RW during ambulation, min-modAx1 for sit<>stand transfer    Functional Outcome Measure: The patient scored 45/100 on the Barthel Index outcome measure which is indicative of moderate impairment in ADLs and functional mobility. Other factors to consider for discharge: lives alone, falls risk         PLAN :  Goals have been updated based on progression since last assessment. Patient continues to benefit from skilled intervention to address the above impairments. Recommendations and Planned Interventions: bed mobility training, transfer training, gait training, therapeutic exercises, patient and family training/education, and therapeutic activities      Frequency/Duration: Patient will be followed by physical therapy:  4 times a week to address goals.     Recommendation for discharge: (in order for the patient to meet his/her long term goals)  Therapy up to 5 days/week in SNF setting    This discharge recommendation:  Has been made in collaboration with the attending provider and/or case management    IF patient discharges home will need the following DME: to be determined (TBD)         SUBJECTIVE:   Patient stated I told the nurse I wanted to get up at 11.    OBJECTIVE DATA SUMMARY:   HISTORY:    Past Medical History:   Diagnosis Date    History of mammogram 2010    normal    Hypertension     Pap smear for cervical cancer screening 2011    normal     Past Surgical History:   Procedure Laterality Date    HX GYN      hystterectomy    INTRACARD ECHO, THER/DX INTERVENT N/A 11/30/2020    Intracardiac Echocardiogram performed by Jared Thurman MD at [facility] CARDIAC CATH LAB    [place] EPHYS EVL TRNSPTL TX ATRIAL FIB ISOLAT PULM VEIN N/A 11/30/2020    ABLATION A-FIB  W COMPLETE EP STUDY performed by Jared Thurman MD at [facility] [address] ADD ON N/A 11/30/2020    Ablation Svt/Vt Add On performed by Jared Thurman MD at [facility] CARDIAC CATH LAB    MI INTRACARDIAC ELECTROPHYSIOLOGIC 3D MAPPING N/A 11/30/2020    Ep 3d Mapping performed by Jared Thurman MD at [facility] CARDIAC CATH LAB    STIM/PACING HEART POST IV DRUG INFU N/A 11/30/2020    Drug Stimulation performed by Jared Thurman MD at [facility] CARDIAC CATH LAB       Personal factors and/or comorbidities impacting plan of care:     Home Situation  Home Environment: Private residence([facility])  # Steps to Enter: 3  Rails to Enter: Yes  One/Two Story Residence: Two story  Living Alone: Yes  Support Systems: Friends \ neighbors, Family member(s)  Patient Expects to be Discharged to[de-identified] Private residence  Current DME Used/Available at Home: [employer], straight    EXAMINATION/PRESENTATION/DECISION MAKING:   Critical Behavior:  Neurologic State: Alert  Orientation Level: Oriented X4  Cognition: Appropriate for age attention/concentration, Follows commands  Safety/Judgement: Decreased insight into deficits, Awareness of environment, Fall prevention(decreased sitting and standing balance)  Hearing:   Auditory  Auditory Impairment: Hearing aid(s)  Skin:  intact  Edema: none noted  Range Of Motion:                          Strength: Tone & Sensation:                                  Coordination:     Vision:      Functional Mobility:  Bed Mobility:  Rolling: Minimum assistance  Supine to Sit: Minimum assistance     Scooting: Stand-by assistance  Transfers:  Sit to Stand: Minimum assistance; Moderate assistance(ModA initially from EOB, Augustine from commode )  Stand to Sit: Contact guard assistance        Bed to Chair: Contact guard assistance              Balance:   Sitting: Intact  Standing: Impaired; With support(RW)  Standing - Static: Good;Constant support  Standing - Dynamic : Fair;Constant support  Ambulation/Gait Training:  Distance (ft): 20 Feet (ft)(10ft x2 w seated rest on commode )  Assistive Device: Walker, rolling;Gait belt  Ambulation - Level of Assistance: Contact guard assistance        Gait Abnormalities: Decreased step clearance;Shuffling gait(trunk flexion)        Base of Support: Narrowed     Speed/Kalie: Slow  Step Length: Left shortened;Right shortened                     Functional Measure:  Barthel Index:    Bathin  Bladder: 5  Bowels: 10  Groomin  Dressin  Feeding: 10  Mobility: 0  Stairs: 0  Toilet Use: 5  Transfer (Bed to Chair and Back): 10  Total: 45/100       The Barthel ADL Index: Guidelines  1. The index should be used as a record of what a patient does, not as a record of what a patient could do. 2. The main aim is to establish degree of independence from any help, physical or verbal, however minor and for whatever reason. 3. The need for supervision renders the patient not independent. 4. A patient's performance should be established using the best available evidence. Asking the patient, friends/relatives and nurses are the usual sources, but direct observation and common sense are also important. However direct testing is not needed.   5. Usually the patient's performance over the preceding 24-48 hours is important, but occasionally longer periods will be relevant. 6. Middle categories imply that the patient supplies over 50 per cent of the effort. 7. Use of aids to be independent is allowed. Alisson Steele., Barthel, D.W. (9179). Functional evaluation: the Barthel Index. [facility] (14)2. Isaiah Acosta giovana NIKOLAY Salinas, Shreya Cross., Tarun Kenny., Ree, [address] [place] [address] Ave (1999). Measuring the change indisability after inpatient rehabilitation; comparison of the responsiveness of the Barthel Index and Functional [place] Measure. Journal of Neurology, Neurosurgery, and Psychiatry, 66(4), 857-318. Alyce Snowden, N.J.A, ALONZO Dunne, & Constantino Lundberg MVENKAT. (2004.) Assessment of post-stroke quality of life in cost-effectiveness studies: The usefulness of the Barthel Index and the EuroQoL-5D. Quality of Life Research, 13, 427-43          Pain Rating:  Denied complaints of pain    Activity Tolerance:   VSS however pt fatigues quickly    After treatment patient left in no apparent distress:   Sitting in chair and Call bell within reach    COMMUNICATION/EDUCATION:   The patients plan of care was discussed with: Occupational therapist and Registered nurse. Fall prevention education was provided and the patient/caregiver indicated understanding., Patient/family have participated as able in goal setting and plan of care. , and Patient/family agree to work toward stated goals and plan of care.     Thank you for this referral.  Srikanth Willard, PT, DPT   Time Calculation: 36 mins

## 2020-12-01 NOTE — PROGRESS NOTES
Cardiology Progress Note              [address] [address] [address] [facility]  [phone]    12/1/2020 9:03 AM    Admit Date: 11/24/2020    Admit Diagnosis: Weakness generalized [R53.1]; Rhabdomyolysis [M62.82]; DANIELLE (acute kidney injury) ([address] [N17.9]; Volume depletion [E86.9]    Subjective:     Brianna Paiz   denies palpitations.     Visit Vitals  BP (!) 131/59 (BP 1 Location: Left arm, BP Patient Position: At rest)   Pulse 78   Temp 97.8 °F (36.6 °C)   Resp 16   Ht 5' 7\" (1.702 m)   Wt 182 lb 1.6 oz (82.6 kg)   SpO2 100%   BMI 28.52 kg/m²     Current Facility-Administered Medications   Medication Dose Route Frequency    sodium chloride (NS) flush 5-40 mL  5-40 mL IntraVENous Q8H    sodium chloride (NS) flush 5-40 mL  5-40 mL IntraVENous PRN    amiodarone (CORDARONE) tablet 200 mg  200 mg Oral DAILY    apixaban (ELIQUIS) tablet 5 mg  5 mg Oral BID    cyanocobalamin (VITAMIN B12) tablet 1,000 mcg  1,000 mcg Oral DAILY    cholecalciferol (VITAMIN D3) (1000 Units /25 mcg) tablet 1 Tab  1,000 Units Oral DAILY    acetaminophen (TYLENOL) tablet 650 mg  650 mg Oral Q6H PRN    Or    acetaminophen (TYLENOL) suppository 650 mg  650 mg Rectal Q6H PRN    polyethylene glycol (MIRALAX) packet 17 g  17 g Oral DAILY PRN    promethazine (PHENERGAN) tablet 12.5 mg  12.5 mg Oral Q6H PRN    Or    ondansetron (ZOFRAN) injection 4 mg  4 mg IntraVENous Q6H PRN    senna-docusate (PERICOLACE) 8.6-50 mg per tablet 1 Tab  1 Tab Oral BID         Objective:      Visit Vitals  BP (!) 131/59 (BP 1 Location: Left arm, BP Patient Position: At rest)   Pulse 78   Temp 97.8 °F (36.6 °C)   Resp 16   Ht 5' 7\" (1.702 m)   Wt 182 lb 1.6 oz (82.6 kg)   SpO2 100%   BMI 28.52 kg/m²       Physical Exam:  Abdomen: soft, non-tender  Extremities: extremities normal  Heart: regular rate and rhythm  Lungs: clear to auscultation bilaterally  Pulses: 2+ and symmetric    Data Review:   Labs:    Recent Labs     11/30/20  0241   WBC 5.6   HGB 11.5   HCT 35.6        Recent Labs     12/01/20  0353 11/29/20  0224    139   K 4.0 3.6    106   CO2 26 29   GLU 96 103*   BUN 20 17   CREA 0.94 0.89   CA 8.7 9.0   MG  --  2.1   PHOS  --  3.6       Recent Labs     11/30/20  0241 11/29/20  0224    257*         Intake/Output Summary (Last 24 hours) at 12/1/2020 9758  Last data filed at 12/1/2020 5604  Gross per 24 hour   Intake 1260 ml   Output 695 ml   Net 565 ml        Telemetry: nsr, atrial ectopy noted    Assessment:     Principal Problem:    Elevated troponin (9/5/2020)    Active Problems:    Weakness generalized (11/24/2020)      Rhabdomyolysis (11/24/2020)      Volume depletion (11/24/2020)      DANIELLE (acute kidney injury) ([facility] [address] (11/24/2020)      SVT (supraventricular tachycardia) (HCC) (11/28/2020)      Paroxysmal atrial fibrillation (HCC) (11/30/2020)        Plan:     Adriana Byrne is sp AF/AT abl. She is asymptomatic. She did have some atrial ectopy yesterday evening but was asymptomatic and it was less than 10 beats. Cont oac and amio. [address] Starr Guerrero for [employer]. F/u in 1-2 week as outpt.  Please call with ?s    Lynne Pryor MD, [facility]    12/1/2020

## 2020-12-01 NOTE — PROGRESS NOTES
Problem: Pressure Injury - Risk of  Goal: *Prevention of pressure injury  Description: Document Bin Scale and appropriate interventions in the flowsheet. Outcome: Progressing Towards Goal  Note: Pressure Injury Interventions:       Moisture Interventions: Absorbent underpads, Internal/External urinary devices, Limit adult briefs, Moisture barrier    Activity Interventions: Assess need for specialty bed, Increase time out of bed    Mobility Interventions: Assess need for specialty bed, HOB 30 degrees or less, Turn and reposition approx.  every two hours(pillow and wedges)    Nutrition Interventions: Document food/fluid/supplement intake, Offer support with meals,snacks and hydration    Friction and Shear Interventions: Apply protective barrier, creams and emollients, HOB 30 degrees or less, Minimize layers                Problem: Patient Education: Go to Patient Education Activity  Goal: Patient/Family Education  Outcome: Progressing Towards Goal     Problem: Cath Lab Procedures: Discharge Outcomes  Goal: *Stable cardiac rhythm  Outcome: Progressing Towards Goal  Goal: *Hemodynamically stable  Outcome: Progressing Towards Goal  Goal: *Optimal pain control at patient's stated goal  Outcome: Progressing Towards Goal  Goal: *Pulses palpable, skin color within defined limits, skin temperature warm  Outcome: Progressing Towards Goal  Goal: *No signs and symptoms of infection or wound complications  Outcome: Progressing Towards Goal  Goal: *Understands and describes signs and symptoms to report to providers(Stroke Metric)  Outcome: Progressing Towards Goal     Problem: Arrhythmia Pathway (Adult)  Goal: *Absence of arrhythmia  Outcome: Progressing Towards Goal

## 2020-12-01 NOTE — PROGRESS NOTES
End of Shift Note    Bedside shift change report given to Rajani (oncoming nurse) by Neil Huddleston (offgoing nurse). Report included the following information SBAR, Kardex, Procedure Summary, MAR and Cardiac Rhythm NSR/BBB    Shift worked:  7p-7a     Shift summary and any significant changes:     vitals stable. Bilateral groin sites CDI. Pt wishes to remain in bed until lunch. Concerns for physician to address:  none     Zone phone for oncoming shift:   [phone]       Activity:  Activity Level: Up with Assistance  Number times ambulated in hallways past shift: 0  Number of times OOB to chair past shift: 0    Cardiac:   Cardiac Monitoring: Yes      Cardiac Rhythm: Normal sinus rhythm, Bundle Branch Block    Access:   Current line(s): PIV     Genitourinary:   Urinary status: voiding, incontinent and external catheter    Respiratory:   O2 Device: Room air  Chronic home O2 use?: N/A  Incentive spirometer at bedside: N/A     GI:  Last Bowel Movement Date: 11/28/20  Current diet:  DIET ONE TIME MESSAGE  DIET NUTRITIONAL SUPPLEMENTS Breakfast; Ensure Enlive  DIET ONE TIME MESSAGE  DIET ONE TIME MESSAGE  DIET ONE TIME MESSAGE  DIET CARDIAC Regular  DIET ONE TIME MESSAGE  Passing flatus: YES  Tolerating current diet: YES  % Diet Eaten: 100 %    Pain Management:   Patient states pain is manageable on current regimen: YES    Skin:  Bin Score: 17  Interventions: turn team, float heels, increase time out of bed, PT/OT consult and internal/external urinary devices    Patient Safety:  Fall Score:  Total Score: 4  Interventions: assistive device (walker, cane, etc), gripper socks and pt to call before getting OOB  High Fall Risk: Yes    Length of Stay:  Expected LOS: 3d 4h  Actual LOS: [address] [address]

## 2020-12-02 VITALS
TEMPERATURE: 98.1 F | HEART RATE: 87 BPM | RESPIRATION RATE: 18 BRPM | SYSTOLIC BLOOD PRESSURE: 119 MMHG | DIASTOLIC BLOOD PRESSURE: 65 MMHG | OXYGEN SATURATION: 98 % | BODY MASS INDEX: 28.58 KG/M2 | HEIGHT: 67 IN | WEIGHT: 182.1 LBS

## 2020-12-02 PROCEDURE — 74011250637 HC RX REV CODE- 250/637: Performed by: INTERNAL MEDICINE

## 2020-12-02 PROCEDURE — 2709999900 HC NON-CHARGEABLE SUPPLY

## 2020-12-02 RX ORDER — AMIODARONE HYDROCHLORIDE 200 MG/1
200 TABLET ORAL DAILY
Qty: 30 TAB | Refills: 0 | Status: SHIPPED | OUTPATIENT
Start: 2020-12-02 | End: 2020-12-17 | Stop reason: SDUPTHER

## 2020-12-02 RX ORDER — LANOLIN ALCOHOL/MO/W.PET/CERES
1000 CREAM (GRAM) TOPICAL DAILY
Qty: 30 TAB | Refills: 0 | Status: SHIPPED | OUTPATIENT
Start: 2020-12-02

## 2020-12-02 RX ORDER — MELATONIN
1000 DAILY
Qty: 30 TAB | Refills: 0 | Status: SHIPPED | OUTPATIENT
Start: 2020-12-02

## 2020-12-02 RX ADMIN — CYANOCOBALAMIN TAB 500 MCG 1000 MCG: 500 TAB at 08:39

## 2020-12-02 RX ADMIN — ACETAMINOPHEN 650 MG: 325 TABLET ORAL at 08:55

## 2020-12-02 RX ADMIN — CHOLECALCIFEROL TAB 25 MCG (1000 UNIT) 1 TABLET: 25 TAB at 08:40

## 2020-12-02 RX ADMIN — APIXABAN 5 MG: 5 TABLET, FILM COATED ORAL at 08:40

## 2020-12-02 RX ADMIN — AMIODARONE HYDROCHLORIDE 200 MG: 200 TABLET ORAL at 08:40

## 2020-12-02 RX ADMIN — DOCUSATE SODIUM 50MG AND SENNOSIDES 8.6MG 1 TABLET: 8.6; 5 TABLET, FILM COATED ORAL at 08:40

## 2020-12-02 NOTE — PROGRESS NOTES
Hospital to [address] [place] [address]                                                                        79 y.o.   female    [facility]   Room: [facility]5/[facility] 2 INTRVNTNL CARDIO  Unit Phone# :  [phone]      [address]  [facility] [address] [facility]  Nilesh Blanchard [address]  Dept: [phone]  Loc: [phone]                    SITUATION     Admitted:  11/24/2020         Attending Provider:  Rosa Eugene MD       Consultations:  IP CONSULT TO HOSPITALIST  IP CONSULT TO CARDIOLOGY    PCP:  Carmen Tan MD   [phone]    Treatment Team: Attending Provider: Rosa Eugene MD; Consulting Provider: Ashly Sierra MD; Primary Nurse: Raymundo Eric, RN; Utilization Review: Niya Kovacs RN; Care Manager: Kevon Hernandez;  Consulting Provider: Abe Holbrook MD; Utilization Review: Elona Curling, RN; Nurse Practitioner: Melvi Dumont NP; Primary Nurse: Becki Morley RN    Admitting Dx:  Weakness generalized [R53.1]  Rhabdomyolysis [M62.82]  DANIELLE (acute kidney injury) ([facility] [address] [N17.9]  Volume depletion [E86.9]       Principal Problem: Paroxysmal atrial fibrillation ([address]    2 Days Post-Op of   Procedure(s):  SPECIAL PROCEDURE OUTSIDE OF OR   BY: Anesthesia, Case             ON: 11/30/2020                  Code Status: Full Code                Advance Directives:   Advance Care Planning 11/25/2020   Patient's Healthcare Decision Maker is: Named in scanned ACP document   Confirm Advance Directive -   Patient Would Like to Complete Advance Directive -    (Send w/patient)   No Doesnt Have       Isolation:  There are currently no Active Isolations       MDRO: No current active infections    Pain Medications given:  Tylenol    Last dose: 12/2/2020 at  08:55    Special Equipment needed: yes  Type of equipment: walker, cane    (Not currently on dialysis)  (Not currently on dialysis)  (Not currently on dialysis) BACKGROUND     Allergies: Allergies   Allergen Reactions    Codeine Itching and Swelling       Past Medical History:   Diagnosis Date    History of mammogram 2010    normal    Hypertension     Pap smear for cervical cancer screening 2011    normal       Past Surgical History:   Procedure Laterality Date    HX GYN      hystterectomy    INTRACARD ECHO, THER/DX INTERVENT N/A 11/30/2020    Intracardiac Echocardiogram performed by Liam Quach MD at \[facility]"" CARDIAC CATH LAB    [place] EPHYS EVL TRNSPTL TX ATRIAL FIB ISOLAT PULM VEIN N/A 11/30/2020    ABLATION A-FIB  W COMPLETE EP STUDY performed by Liam Quach MD at \[facility]\"" CARDIAC CATH LAB    [place] ICAR CATHETER ABLATION ARRHYTHMIA ADD ON N/A 11/30/2020    Ablation Svt/Vt Add On performed by Liam Quach MD at \[facility]\"" CARDIAC CATH LAB    [place] INTRACARDIAC ELECTROPHYSIOLOGIC 3D MAPPING N/A 11/30/2020    Ep 3d Mapping performed by Liam Quach MD at \[facility]\"" CARDIAC CATH LAB    STIM/PACING HEART POST IV DRUG INFU N/A 11/30/2020    Drug Stimulation performed by Liam Quach MD at \[facility]\"" CARDIAC CATH LAB       Medications Prior to Admission   Medication Sig    potassium chloride SR (KLOR-CON 10) 10 mEq tablet Take 1 Tab by mouth daily.  aspirin-acetaminophen-caffeine (EXCEDRIN ES) 250-250-65 mg per tablet Take 1 Tab by mouth every eight (8) hours as needed for Headache.  metoprolol tartrate (LOPRESSOR) 25 mg tablet TAKE 1 TABLET TWICE DAILY    hydroCHLOROthiazide (HYDRODIURIL) 12.5 mg tablet TAKE 1 TABLET EVERY DAY (Patient taking differently: daily as needed. TAKE 1 TABLET EVERY DAY)    diclofenac EC (VOLTAREN) 75 mg EC tablet Take 1 Tab by mouth two (2) times a day. Hard scripts included in transfer packet no    Vaccinations: There is no immunization history on file for this patient.     Readmission Risks:    Known Risks: fall risk        The Charlson CoMorbitiy Index tool is an evidenced based tool that has more automatic generated information. The tool looks at many different items such as the age of the patient, how many times they were admitted in the last calendar year, current length of stay in the hospital and their diagnosis. All of these items are pulled automatically from information documented in the chart from various places and will generate a score that predicts whether a patient is at low (less than 13), medium (13-20) or high (21 or greater) risk of being readmitted.         ASSESSMENT                Temp: 98.1 °F (36.7 °C) (12/02/20 0929) Pulse (Heart Rate): 87 (12/02/20 0929)     Resp Rate: 18 (12/02/20 0929)           BP: 119/65 (12/02/20 0929)     O2 Sat (%): 98 % (12/02/20 0929)     Weight: 82.6 kg (182 lb 1.6 oz)    Height: 5' 7\" (170.2 cm) (11/30/20 0229)       If above not within 1 hour of discharge:    BP:_____  P:____  R:____ T:_____ O2 Sat: ___%  O2: ______    Active Orders   Diet    DIET CARDIAC Regular         Orientation: oriented to time, place, person and situation     Active Behaviors: None                                   Active Lines/Drains:  (Peg Tube / Luo / CL or S/L?): no    Urinary Status: Voiding     Last BM: Last Bowel Movement Date: 12/01/20     Skin Integrity: Abrasion, Excoriation, Incision (comment)             Mobility: Slightly limited   Weight Bearing Status: WBAT (Weight Bearing as Tolerated)      Gait Training  Assistive Device: Walker, rolling, Gait belt  Ambulation - Level of Assistance: Contact guard assistance  Distance (ft): 20 Feet (ft)(10ft x2 w seated rest on commode )  Interventions: Verbal cues, Tactile cues, Safety awareness training, Manual cues(to correct posture and L lean)         Lab Results   Component Value Date/Time    Glucose 96 12/01/2020 03:53 AM    Hemoglobin A1c 5.6 09/05/2020 06:56 AM    INR 1.1 09/05/2020 06:56 AM    HGB 11.5 11/30/2020 02:41 AM    HGB 12.7 11/24/2020 04:04 AM        RECOMMENDATION     See After Visit Summary (AVS) for:  · Discharge instructions  · After [address]   · Special equipment needed (entered pre-discharge by Care Management)  · Medication Reconciliation    · Follow up Appointment(s)         Report given/sent by:  Zachary Jeter RN                    Verbal report given to: Adrian Hsieh RN           Estimated discharge time:  12/2/2020 at 09:30

## 2020-12-02 NOTE — PROGRESS NOTES
End of Shift Note    Bedside shift change report given to Rajani (oncoming nurse) by Yazmin Tony (offgoing nurse). Report included the following information SBAR, MAR, Recent Results and Cardiac Rhythm NSR/BBB    Shift worked:  7p-7a     Shift summary and any significant changes:     Vitals stable. No significant changes. Concerns for physician to address:  none     Zone phone for oncoming shift:   [phone]       Activity:  Activity Level: Up with Assistance  Number times ambulated in hallways past shift: 0  Number of times OOB to chair past shift: 0    Cardiac:   Cardiac Monitoring: Yes      Cardiac Rhythm: Normal sinus rhythm, Bundle Branch Block    Access:   Current line(s): PIV     Genitourinary:   Urinary status: external catheter    Respiratory:   O2 Device: Room air  Chronic home O2 use?: N/A  Incentive spirometer at bedside: N/A     GI:  Last Bowel Movement Date: 12/01/20  Current diet:  DIET ONE TIME MESSAGE  DIET NUTRITIONAL SUPPLEMENTS Breakfast; Ensure Enlive  DIET ONE TIME MESSAGE  DIET ONE TIME MESSAGE  DIET ONE TIME MESSAGE  DIET CARDIAC Regular  DIET ONE TIME MESSAGE  Passing flatus: YES  Tolerating current diet: YES  % Diet Eaten: 100 %    Pain Management:   Patient states pain is manageable on current regimen: YES    Skin:  Bin Score: 19  Interventions: turn team, increase time out of bed, PT/OT consult and internal/external urinary devices    Patient Safety:  Fall Score:  Total Score: 4  Interventions: gripper socks and pt to call before getting OOB  High Fall Risk: Yes    Length of Stay:  Expected LOS: 4d 14h  Actual LOS: [facility] 83

## 2020-12-02 NOTE — DISCHARGE INSTRUCTIONS
[address]  [phone]        2020 [facility] DISCHARGE INSTRUCTIONS    Patient ID:  Kat Ramirez  [id number]  79 y.o.  1950    Admit Date: 11/24/2020    Discharge Date: 11/30/2020     Admitting Physician: Lex Miller MD     Discharge Physician: Lex Miller MD    Admission Diagnoses:   Weakness generalized [R53.1]  Rhabdomyolysis [M62.82]  DANIELLE (acute kidney injury) ([address] [N17.9]  Volume depletion [E86.9]    Discharge Diagnoses:   Principal Problem:    Elevated troponin (9/5/2020)    Active Problems:    Weakness generalized (11/24/2020)      Rhabdomyolysis (11/24/2020)      Volume depletion (11/24/2020)      DANIELLE (acute kidney injury) ([address] (11/24/2020)      SVT (supraventricular tachycardia) ([address] (11/28/2020)      Paroxysmal atrial fibrillation ([address] (11/30/2020)        Discharge Condition: Good    Cardiology Procedures this Admission:  Atrial fibrillation. Disposition: home    Reference discharge instructions provided by nursing for diet and activity. Follow-up with Dr Serg Venegas or his Nurse Practitioners in 1-2 weeks. Call [phone] to make an appointment. Signed:  Lauri Krabbe, ANP  11/30/2020  1:15 PM    S/P ATRIAL FIBRILLATION ABLATION DISCHARGE INSTRUCTIONS    It is normal to feel tired the first couple days. Take it easy and follow the physicians instructions. CHECK THE CATHETER INSERTION SITE DAILY:  You may shower 24 hours after the procedure, remove the bandage during showering. Wash with soap and water and pat dry. Gentle cleaning of the site with soap and water is sufficient, cover with a dry clean dressing or bandage. Do not apply creams or powders to the area. Do not sit in a bathtub or pool of water for 7 days or until wound has completely healed. Temporary bruising and discomfort is normal and may last a few weeks. You may have a  formation of a small lump at the site which may last up to 6 weeks.     CALL THE PHYSICIAN:  If the site becomes red, swollen or feels warm to the touch  If there is bleeding or drainage or if there is unusual pain at the groin or down the leg. If there is any bleeding, lie down, apply pressure or have someone apply pressure with a clean cloth until the bleeding stops. If the bleeding continues, call 911 to be transported to the hospital.  DO NOT DRIVE YOURSELF, [facility] Activity:      For the first 24-48 hours or as instructed by the physician:  No lifting, pushing or pulling over 5 pounds and no straining the insertion site. Do not life grocery bags or the garbage can, do not run the vacuum  or  for 7 days. Start with short walks as in the hospital and gradually increase as tolerated each day. It is recommended to walk 30 minutes 5-7 days per week. Follow your physicians instructions on activity. Avoid walking outside in extremes of heat or cold. Walk inside when it is cold and windy or hot and humid. Things to keep in mind:  No driving for at least 5 days or as designated by your physician. Limit the number of times you go up and down the stairs  Take rests and pace yourself with activity. Be careful and do not strain with bowel movements. Medications: Take all medications as prescribed  Call your physician if you have any questions  Keep an updated list of your medications with you at all times and give a list to your physician and pharmacist    Signs and Symptoms:  Be cautious of symptoms of angina or recurrent symptoms such as chest discomfort, unusual shortness of breath or fatigue, palpitations. After Care: Follow up with your physician as instructed. Follow a heart healthy diet with proper portion control, daily stress management, daily exercise, blood pressure and cholesterol control , and smoking cessation.           HOSPITALIST DISCHARGE INSTRUCTIONS    NAME: Diana Martin   :  1950 MRN:  [id number]     Date/Time:  12/2/2020 7:08 AM    ADMIT DATE: 11/24/2020   DISCHARGE DATE: 12/2/2020     Attending Physician: Zachary Byrd NP    DISCHARGE DIAGNOSIS:  Paroxysmal atrial fibrillation (irregular heartbeat)  Hypertension (elevated blood pressure)  Generalized weakness with falls  Rhabdomyolysis (elevation in enzyme caused by muscle breakdown which resolved prior to hospital discharge)  Acute kidney injury (acute abnormal kidney function which resolved prior to hospital discharge)  Hypokalemia (low blood potassium which resolved prior to hospital discharge and (  Right shoulder contusion  Knee contusion    Vit B12 deficiency (low vitamin B12 level)  Vitamin D deficiency (low vitamin D level)      Medications: Per above medication reconciliation. Pain Management: per above medications    Recommended diet: Cardiac Diet    Recommended activity: PT/OT Eval and Treat    Wound care: None    Indwelling devices:  None    Supplemental Oxygen: None    Required Lab work: Per SNF provider    Glucose management:  Accucheck ACHS with sliding scale per SNF protocol    Code status: Full     Take all discharge paperwork with you to all of your follow up doctor appointments. Take your medications exactly as outlined in your discharge paperwork. Do not take any other medications unless specifically prescribed after your hospital stay. If your symptoms return/worsen seek medical attention immediately. Outside physician follow up: Follow-up Information     Follow up With Specialties Details Why Contact Info    [address] [facility] [address] [place] [address] [phone]               Skilled nursing facility/ SNF MD responsible for above on discharge. Information obtained by :  I understand that if any problems occur once I am at home I am to contact my physician.     I understand and acknowledge receipt of the instructions indicated above.                                                                                                                                            Physician's or R.N.'s Signature                                                                  Date/Time                                                                                                                                              Patient or Representative

## 2020-12-02 NOTE — DISCHARGE SUMMARY
Hospitalist Discharge Summary     Patient ID:  Rebecca Thompson  [id number]  79 y.o.  1950 11/24/2020    PCP on record: Myriam Tavera MD    Admit date: 11/24/2020  Discharge date and time: 12/2/2020    DISCHARGE DIAGNOSIS:  SVT  PAF  HTN  Elevated troponin   Generalized weakness s/p falls  Rhabdomyolysis, resolved    DANIELLE, resolved   Hypokalemia, resolved   Right shoulder contusion  Knee contusion    Vit B12 deficiency   Vitamin D deficiency    CONSULTATIONS:  IP CONSULT TO HOSPITALIST  IP CONSULT TO CARDIOLOGY    Excerpted HPI from H&P of Anali Bright MD:  Yosvany Delcid is a 79 y.o.  female with a past history of HTN, Afib who presents via EMS to the ER after a fall and complaining of dizziness and weakness. She had a similar admission at [facility] back in September for dizziness and weakness and that was felt due to low BP and dehydration. She was not orthostatic then. Echo and stress test were unremarkable. She was discharged to SNF where she underwent rehab for several weeks and was just recently discharged to her home. For the past few days, she reports increasing weakness and dizziness and she states she feels similar to when she was admitted back. .  She has had several falls over the last few days.    Tonight she tried to go to the bathroom and due to her weakness fell to the floor landing on her knees but denies hitting her head or losing consciousness. She waited for about 2 hours before calling for EMS. ER eval as above. We were asked to admit for work up and evaluation of the above problems. \"    ______________________________________________________________________  DISCHARGE SUMMARY/HOSPITAL COURSE:  for full details see H&P, daily progress notes, labs, consult notes. Gifty Dominguez is a 80-year-old female with PMH significant for HTN, A. Fib, and frequent falls who presented to the ED via EMS after a fall.   In the ED the patient stated that prior to the fall she felt dizzy and weak. Of note, she did have a prior episode similar to this back in September. At that time, she was found to be hypotensive 2/2 dehydration. An echocardiogram was completed during that hospitalization which was unremarkable. She was discharged to SNF where she underwent rehab for several weeks and was recently discharged. Over the preceding days PTA the patient reports multiple falls associated with loss of consciousness. In the ED a CT of the head was completed which did not reveal any acute findings (details noted below). Additionally, CT of the cervical spine was unremarkable. The patient was found to have slightly elevated serum troponin in the ED as well as elevated CK. A cardiology consult was requested and the patient was admitted for further evaluation and treatment. The patient had rhabdomyolysis associated with DANIELLE. She was hydrated and over subsequent days her CK trended down and her DANIELLE resolved. Patient was seen by cardiology and on 11/27/2020 she underwent cardiac catheterization which did not reveal significant occlusive CAD. An EP consult was requested 1/30/2020 the patient underwent EPS. She was placed on amiodarone and [address] [place] [address] Road. On 12/01/2020 patient was cleared by EPS for discharge with outpatient follow-up. Was also felt that the patient was medically stable for discharge however, we are waiting insurance approval for discharge. On 12/02/2020 the patient was cleared for discharge to SNF for rehab. All discharge instructions and discharge medications were reviewed with the patient who verbalized understanding. At the time of this dictation the patient's vital signs were as follows:  T 98.1, /65, HR 87, RR 18, SpO2 98% on room air. Cardiac cath 11/27/20:  Left Main    The vessel is angiographically normal.    Left Anterior Descending    The vessel exhibits minimal luminal irregularities.     First Diagonal Branch    The vessel is angiographically normal.    Second Diagonal Branch    The vessel is angiographically normal.    Left Circumflex    There is mild disease. First Obtuse Marginal Branch    The vessel is angiographically normal.    Second Obtuse Marginal Branch    There is mild disease. Right Coronary Artery    The vessel is angiographically normal.      EPS 11/30/20:  1. PVI of all 4 PVs  2. PAT with AT focus mapped to the floor of the CS with successful RFA of the AT focus  3. No further inducible atrial arrhythmias with rapid atrial pacing      CT head 11/24/20:  No acute intracranial abnormality. Moderate chronic small vessel ischemic disease. CT cervical spine 11/24/20:  No evidence of fracture or subluxation. Right knee x-ray 11/24/20:  Mild degenerative changes. No acute abnormality. Left Knee x-ray 11/24/20:  Mild degenerative changes. No acute abnormality. _______________________________________________________________________  Patient seen and examined by me on discharge day. Pertinent Findings:  General:  A/A/O X 3. NAD. HEENT:  Normocephalic. Sclera anicteric. Mucous membranes moist.    Chest:  Resps even/unlabored with symmetrical CWE. Air entry full. Lungs CTA. No use of accessory muscles. CV:  RRR. Normal S1/S2. No M/C/R appreciated. No JVD. No peripheral edema. GI:  Abdomen soft/NT/ND. No organomegaly. No hernia. ABT X 4.    :  Voiding. Neurologic:  Nonfocal.  CN II-XII grossly intact. Face symmetrical.  Speech normal.     Psych:  Cooperative. No anxiety or agitation. Skin:  No rashes or jaundice. _______________________________________________________________________  DISCHARGE MEDICATIONS:   Discharge Medication List as of 12/2/2020  9:24 AM      START taking these medications    Details   amiodarone (CORDARONE) 200 mg tablet Take 1 Tab by mouth daily. , Print, Disp-30 Tab,R-0      apixaban (ELIQUIS) 5 mg tablet Take 1 Tab by mouth two (2) times a day., Print, Disp-60 Tab,R-0 cholecalciferol (VITAMIN D3) (1000 Units /25 mcg) tablet Take 1 Tab by mouth daily. , Print, Disp-30 Tab,R-0      cyanocobalamin 1,000 mcg tablet Take 1 Tab by mouth daily. , Print, Disp-30 Tab,R-0         STOP taking these medications       potassium chloride SR (KLOR-CON 10) 10 mEq tablet Comments:   Reason for Stopping:         diclofenac EC (VOLTAREN) 75 mg EC tablet Comments:   Reason for Stopping:         aspirin-acetaminophen-caffeine (EXCEDRIN ES) 250-250-65 mg per tablet Comments:   Reason for Stopping:         metoprolol tartrate (LOPRESSOR) 25 mg tablet Comments:   Reason for Stopping:         hydroCHLOROthiazide (HYDRODIURIL) 12.5 mg tablet Comments:   Reason for Stopping:                 Patient Follow Up Instructions: Activity: PT/OT Eval and Treat  Diet: Cardiac Diet  Wound Care: None needed    Follow-up as noted below  Follow-up tests/labs per SNF provider    Follow-up Information     Follow up With Specialties Details Why Contact [facility]    [address] [place] [address] [place]    Laila Thompson MD Cardiology, [address] [facility] Vascular Surgery, Internal Medicine On 12/15/2020 10:00 ADOLFO Walsh -  appointment for hospital discharge follow up Annalee Macedo [address]       Jenny Jacobsen MD Internal Medicine In 1 week Call to schedule appointment withing 1 week of discharge from [facility]  [phone]          ________________________________________________________________    Risk of deterioration: Low    Condition at Discharge:  Stable  __________________________________________________________________    Disposition  SNF/LTC    ____________________________________________________________________    Code Status: Full Code  ___________________________________________________________________      Total time in minutes spent coordinating this discharge (includes going over instructions, follow-up, prescriptions, and preparing report for sign off to her PCP) :  >30 minutes    Signed:  Lesa Erazo NP

## 2020-12-02 NOTE — PROGRESS NOTES
ANNE-MARIE: Discharge to SNF at [facility] (call report to [phone])   Freedom of Choice signed and placed on bedside chart   2nd IM Medicare letter given, explained, copy signed and placed on bedside chart   [facility] transport arranged for 9:30am, PCS and accompanying documents on bedside chart   Follow up appointment with Dr. Montana Medel for Cardiology was scheduled for 12/15/81553 at 10:00am    No other CM needs identified. Pt is ready to discharge from a CM standpoint. Transition of Care Plan to SNF/Rehab    SNF/Rehab Transition:  Patient has been accepted to [facility] and meets criteria for admission. Patient will be transported by [facility] and expected to leave at 9:30am.    Communication to Patient/Family:  Met with patient (she states that she wants to call her grandchildren to inform) and they are agreeable to the transition plan. Communication to SNF/Rehab:  Bedside RN,Rajani , has been notified to update the transition plan to the facility and call report (phone number [phone]). Discharge information has been updated on the AVS.     Discharge instructions to be fax'd via [facility]. Nursing Please include all hard scripts for controlled substances, med rec and dc summary, and AVS in packet.      Reviewed and confirmed with facility, Maciel Diane in admissions, can manage the patient care needs for the following:     Laura Angry with (X) only those applicable:    Medication:  [x]  Medications will be available at the facility  []  IV Antibiotics  [x]  Controlled Substance - hard copy to be sent with patient   []  Weekly Labs   Documents:  [x] Hard RX  [x] MAR  [x] Kardex  [x] AVS  [x]Transfer Summary  [x]Discharge   Equipment:  []  CPAP/BiPAP  []  Wound Vacuum  []  Luo or Urinary Device  []  PICC/Central Line  []  Nebulizer  []  Ventilator   Treatment:  []Isolation (for MRSA, VRE, etc.)  []Surgical Drain Management  []Tracheostomy Care  []Dressing Changes  []Dialysis with transportation and chair time. []PEG Care  []Oxygen  []Daily Weights for Heart Failure   Dietary:  [x]Any diet limitations- cardiac diet  []Tube Feedings   []Total Parenteral Management (TPN)   Eligible for Medicaid Long Term Services and Supports  Yes:  [] Eligible for medical assistance or will become eligible within 180 days and UAI completed. [] Provider/Patient and/or support system has requested screening. [] UAI copy provided to patient or responsible party. [] UAI unavailable at discharge will send once processed to SNF provider. [] UAI unavailable at discharged mailed to patient  No:   [] Private pay and is not financially eligible for Medicaid within the next 180 days. [] Reside out-of-state. [] A residents of a state owned/operated facility that is licensed  by [address] [employer] or [facility]  [] Enrollment in [facility] hospice services  []  Medical [address]  [] Patient /Family declines to have screening completed or provide financial information for screening     Financial Resources:  Medicaid    [] Initiated and application pending   [] Full coverage     Advanced Care Plan:  []Surrogate Decision Maker of Care  []POA  [x]Communicated Code Status  FULL    Other    Patient has Cardiac follow up appointment with Dr. Sebastian Gonsales on December 15, [address] at 10:00am.  Will need to arrange transport to this appointment if still at [facility].

## 2020-12-02 NOTE — PROGRESS NOTES
Problem: Patient Education: Go to Patient Education Activity  Goal: Patient/Family Education  Outcome: Progressing Towards Goal     Problem: Pressure Injury - Risk of  Goal: *Prevention of pressure injury  Description: Document Bin Scale and appropriate interventions in the flowsheet. Outcome: Progressing Towards Goal  Note: Pressure Injury Interventions:       Moisture Interventions: Absorbent underpads, Apply protective barrier, creams and emollients, Check for incontinence Q2 hours and as needed, Internal/External urinary devices, Maintain skin hydration (lotion/cream), Minimize layers    Activity Interventions: Increase time out of bed, Pressure redistribution bed/mattress(bed type), PT/OT evaluation    Mobility Interventions: Assess need for specialty bed, HOB 30 degrees or less, Pressure redistribution bed/mattress (bed type), PT/OT evaluation, Turn and reposition approx. every two hours(pillow and wedges)    Nutrition Interventions: Document food/fluid/supplement intake, Offer support with meals,snacks and hydration    Friction and Shear Interventions: Apply protective barrier, creams and emollients, HOB 30 degrees or less, Lift team/patient mobility team, Minimize layers                Problem: Falls - Risk of  Goal: *Absence of Falls  Description: Document Pam Fall Risk and appropriate interventions in the flowsheet.   Outcome: Progressing Towards Goal  Note: Fall Risk Interventions:  Mobility Interventions: Bed/chair exit alarm, Communicate number of staff needed for ambulation/transfer, OT consult for ADLs, Patient to call before getting OOB, PT Consult for mobility concerns, Utilize walker, cane, or other assistive device         Medication Interventions: Bed/chair exit alarm, Patient to call before getting OOB, Teach patient to arise slowly    Elimination Interventions: Call light in reach, Bed/chair exit alarm, Patient to call for help with toileting needs, Toileting schedule/hourly rounds    History of Falls Interventions: Bed/chair exit alarm, Investigate reason for fall, Room close to nurse's station         Problem: Cath Lab Procedures: Discharge Outcomes  Goal: *Stable cardiac rhythm  Outcome: Progressing Towards Goal  Goal: *Hemodynamically stable  Outcome: Progressing Towards Goal  Goal: *Optimal pain control at patient's stated goal  Outcome: Progressing Towards Goal  Goal: *Pulses palpable, skin color within defined limits, skin temperature warm  Outcome: Progressing Towards Goal  Goal: *Anxiety reduced or absent  Outcome: Progressing Towards Goal

## 2020-12-03 ENCOUNTER — PATIENT OUTREACH (OUTPATIENT)
Dept: CASE MANAGEMENT | Age: 70
End: 2020-12-03

## 2020-12-18 RX ORDER — AMIODARONE HYDROCHLORIDE 200 MG/1
200 TABLET ORAL DAILY
Qty: 30 TAB | Refills: 0 | Status: SHIPPED | OUTPATIENT
Start: 2020-12-18 | End: 2021-02-17

## 2020-12-19 ENCOUNTER — PATIENT OUTREACH (OUTPATIENT)
Dept: CASE MANAGEMENT | Age: 70
End: 2020-12-19

## 2020-12-19 NOTE — PROGRESS NOTES
[address] [place] [address] Dr Discharge Follow-Up      Date/Time:  2020 10:59 AM    Patient was admitted to [facility] on 20 and discharged to [address] [place] [address] on 20. The patients discharge diagnosis was Paroxysmal atrial fibrillation . Patient was discharge on 20 from [address] [place] [address]. The discharge summary from the post acute facilty was not available at the time of outreach. Patient was contacted within 4 business days of discharge from the post acute facility. N Care Coordinator contacted the patient by telephone to perform post hospital discharge follow up. Verified name and  with patient as identifiers. Provided introduction to self, and explanation of the LPN Care Coordinator role. Patient received post acute facility discharge instructions. LPN Care Coordinator reviewed discharge instructions and red flags with patient who verbalized understanding. Patient given an opportunity to ask questions and does not have any further questions or concerns at this time. The patient agrees to contact the PCP office for questions related to their healthcare. LPN Care Coordinator provided contact information for future reference. Advance Care Planning:   Does patient have an Advance Directive:  reviewed and current     Home Health orders at discharge: PT, OT, [address]: Ramez at home  Date of initial visit: 12/15/20    [facility] ordered at discharge: none  [facility]: [address] received: NA    Medication(s):   The post acute facility medication discharge list was not available for this call. [facility] follow up appointment(s): No future appointments.    Non-[facility] follow up appointment(s): NA  [facility]:  information provided as a resource

## 2021-01-04 ENCOUNTER — PATIENT OUTREACH (OUTPATIENT)
Dept: CASE MANAGEMENT | Age: 71
End: 2021-01-04

## 2021-01-04 NOTE — PROGRESS NOTES
Patient has graduated from the Transitions of Care Coordination  program on 1/4/21. Patient/family has the ability to self-manage at this time Care management goals have been completed. [facility] further [facility]. Goals Addressed    None         Patient has Care Transition Nurse's contact information for any further questions, concerns, or needs. Patients upcoming visits:  No future appointments.

## 2021-02-15 ENCOUNTER — TELEPHONE (OUTPATIENT)
Dept: INTERNAL MEDICINE CLINIC | Age: 71
End: 2021-02-15

## 2021-02-17 ENCOUNTER — OFFICE VISIT (OUTPATIENT)
Dept: INTERNAL MEDICINE CLINIC | Age: 71
End: 2021-02-17
Payer: MEDICARE

## 2021-02-17 VITALS
OXYGEN SATURATION: 98 % | BODY MASS INDEX: 29.51 KG/M2 | DIASTOLIC BLOOD PRESSURE: 94 MMHG | RESPIRATION RATE: 16 BRPM | TEMPERATURE: 98.5 F | SYSTOLIC BLOOD PRESSURE: 140 MMHG | HEART RATE: 80 BPM | HEIGHT: 67 IN | WEIGHT: 188 LBS

## 2021-02-17 DIAGNOSIS — Z12.31 BREAST CANCER SCREENING BY MAMMOGRAM: ICD-10-CM

## 2021-02-17 DIAGNOSIS — Z12.11 SCREENING FOR COLON CANCER: ICD-10-CM

## 2021-02-17 DIAGNOSIS — I10 ESSENTIAL HYPERTENSION: Primary | ICD-10-CM

## 2021-02-17 DIAGNOSIS — I48.0 PAROXYSMAL ATRIAL FIBRILLATION (HCC): ICD-10-CM

## 2021-02-17 PROCEDURE — G8536 NO DOC ELDER MAL SCRN: HCPCS | Performed by: INTERNAL MEDICINE

## 2021-02-17 PROCEDURE — 1090F PRES/ABSN URINE INCON ASSESS: CPT | Performed by: INTERNAL MEDICINE

## 2021-02-17 PROCEDURE — G8427 DOCREV CUR MEDS BY ELIG CLIN: HCPCS | Performed by: INTERNAL MEDICINE

## 2021-02-17 PROCEDURE — G8419 CALC BMI OUT NRM PARAM NOF/U: HCPCS | Performed by: INTERNAL MEDICINE

## 2021-02-17 PROCEDURE — G8400 PT W/DXA NO RESULTS DOC: HCPCS | Performed by: INTERNAL MEDICINE

## 2021-02-17 PROCEDURE — G8432 DEP SCR NOT DOC, RNG: HCPCS | Performed by: INTERNAL MEDICINE

## 2021-02-17 PROCEDURE — G9899 SCRN MAM PERF RSLTS DOC: HCPCS | Performed by: INTERNAL MEDICINE

## 2021-02-17 PROCEDURE — 1101F PT FALLS ASSESS-DOCD LE1/YR: CPT | Performed by: INTERNAL MEDICINE

## 2021-02-17 PROCEDURE — 3017F COLORECTAL CA SCREEN DOC REV: CPT | Performed by: INTERNAL MEDICINE

## 2021-02-17 PROCEDURE — 99214 OFFICE O/P EST MOD 30 MIN: CPT | Performed by: INTERNAL MEDICINE

## 2021-02-17 PROCEDURE — G8755 DIAS BP > OR = 90: HCPCS | Performed by: INTERNAL MEDICINE

## 2021-02-17 PROCEDURE — G8753 SYS BP > OR = 140: HCPCS | Performed by: INTERNAL MEDICINE

## 2021-02-17 RX ORDER — HYDROCHLOROTHIAZIDE 25 MG/1
25 TABLET ORAL DAILY
Qty: 30 TAB | Refills: 12 | Status: SHIPPED | OUTPATIENT
Start: 2021-02-17 | End: 2022-05-20 | Stop reason: SDUPTHER

## 2021-02-17 RX ORDER — APIXABAN 5 MG/1
TABLET, FILM COATED ORAL
Qty: 60 TAB | Refills: 0 | Status: SHIPPED | OUTPATIENT
Start: 2021-02-17 | End: 2021-07-16 | Stop reason: SDUPTHER

## 2021-02-17 RX ORDER — AMIODARONE HYDROCHLORIDE 200 MG/1
TABLET ORAL
Qty: 30 TAB | Refills: 0 | Status: SHIPPED | OUTPATIENT
Start: 2021-02-17 | End: 2021-03-16 | Stop reason: SDUPTHER

## 2021-02-17 RX ORDER — AMLODIPINE BESYLATE 5 MG/1
5 TABLET ORAL DAILY
Qty: 30 TAB | Refills: 12 | Status: SHIPPED | OUTPATIENT
Start: 2021-02-17 | End: 2022-05-20 | Stop reason: SDUPTHER

## 2021-02-17 NOTE — PROGRESS NOTES
1. Have you been to the ER, urgent care clinic since your last visit? Hospitalized since your last visit?no    2. Have you seen or consulted any other health care providers outside of the [address] [place] [address] since your last visit? Include any pap smears or colon screening.  No    Chief Complaint   Patient presents with    Hypertension    Ankle swelling

## 2021-02-17 NOTE — PROGRESS NOTES
Kaye Flores is a 79 y.o. female and presents with Hypertension and Ankle swelling  . Subjective:  Hypertension Review:  The patient has essential hypertension  Diet and Lifestyle: generally follows a  low sodium diet, exercises sporadically  Home BP Monitoring: is not measured at home. Pertinent ROS: taking medications as instructed, no medication side effects noted, no TIA's, no chest pain on exertion, no dyspnea on exertion, no swelling of ankles. Health maintenance suggests the needs for a mammogram    Health maintenance suggests the needs for a test for occult blood    She has a history of a.fib  States she had an ablation  She has been stable      Kaye Flores is a 79 y.o. female and presents for annual Medicare Wellness Visit. Problem List: Reviewed with patient and discussed risk factors. Patient Active Problem List   Diagnosis Code    Hypertension I10    Fatigue R53.83    Fall W19. XXXA    Elevated troponin R77.8    Hypokalemia E87.6    Weakness generalized R53.1    Rhabdomyolysis M62.82    Volume depletion E86.9    DANIELLE (acute kidney injury) (HCC) N17.9    SVT (supraventricular tachycardia) (HCC) I47.1    Paroxysmal atrial fibrillation (HCC) I48.0       Current medical providers:  Patient Care Team:  Hubert Cobb MD as PCP - General (Internal Medicine)  Hubert Cobb MD as PCP - [address] [place] [address] [facility]    PSH: Reviewed with patient  Past Surgical History:   Procedure Laterality Date    HX GYN      hystterectomy    INTRACARD ECHO, THER/DX INTERVENT N/A 11/30/2020    Intracardiac Echocardiogram performed by Erik Soler MD at [facility] CARDIAC CATH LAB    CO EPHYS EVL TRNSPTL TX ATRIAL FIB ISOLAT PULM VEIN N/A 11/30/2020    ABLATION A-FIB  W COMPLETE EP STUDY performed by Erik Soler MD at [address] ADD ON N/A 11/30/2020    Ablation Svt/Vt Add On performed by Erik Soler MD at [address] CATH LAB    [place] INTRACARDIAC ELECTROPHYSIOLOGIC 3D MAPPING N/A 11/30/2020    Ep 3d Mapping performed by Dashawn Arnold MD at [facility] CARDIAC CATH LAB    STIM/PACING HEART POST IV DRUG INFU N/A 11/30/2020    Drug Stimulation performed by Dashawn Arnold MD at [facility] CARDIAC CATH LAB        SH: Reviewed with patient  Social History     Tobacco Use    Smoking status: Never Smoker    Smokeless tobacco: Never Used   Substance Use Topics    Alcohol use: Yes     Comment: occasional    Drug use: No       FH: Reviewed with patient  History reviewed. No pertinent family history. Medications/Allergies: Reviewed with patient  Current Outpatient Medications on File Prior to Visit   Medication Sig Dispense Refill    apixaban (ELIQUIS) 5 mg tablet Take 1 Tab by mouth two (2) times a day. 60 Tab 0    amiodarone (CORDARONE) 200 mg tablet Take 1 Tab by mouth daily. 30 Tab 0    cholecalciferol (VITAMIN D3) (1000 Units /25 mcg) tablet Take 1 Tab by mouth daily. 30 Tab 0    cyanocobalamin 1,000 mcg tablet Take 1 Tab by mouth daily. 30 Tab 0     No current facility-administered medications on file prior to visit. Allergies   Allergen Reactions    Codeine Itching and Swelling       Objective:  Visit Vitals  BP (!) 140/94   Pulse 80   Temp 98.5 °F (36.9 °C) (Oral)   Resp 16   Ht 5' 7\" (1.702 m)   Wt 188 lb (85.3 kg)   SpO2 98%   BMI 29.44 kg/m²    Body mass index is 29.44 kg/m².     Assessment of cognitive impairment: Alert and oriented x 3    Depression Screen:   3 most recent PHQ Screens 9/28/2020   PHQ Not Done Patient Decline   Little interest or pleasure in doing things Not at all   Feeling down, depressed, irritable, or hopeless Not at all   Total Score PHQ 2 0     Depression Review:  Patient is seen for screen of depression,denies anhedonia, weight gain, insomnia, hypersomnia, psychomotor agitation, psychomotor retardation, fatigue, feelings of worthlessness/guilt, difficulty concentrating, hopelessness, impaired memory and recurrent thoughts of death Treatment includes no medication   She denies recurrent thoughts of death and suicidal thoughts without plan. Fall Risk Assessment:    Fall Risk Assessment, last 12 mths 10/5/2020   Able to walk? Yes   Fall in past 12 months? No   Number of falls in past 12 months -   Fall with injury? -       Functional Ability:   Does the patient exhibit a steady gait? yes   How long did it take the patient to get up and walk from a sitting position? seconds   Is the patient self reliant?  (ie can do own laundry, meals, household chores)  yes     Does the patient handle his/her own medications? yes     Does the patient handle his/her own money? yes     Is the patients home safe (ie good lighting, handrails on stairs and bath, etc.)? yes     Did you notice or did patient express any hearing difficulties? no     Did you notice or did patient express any vision difficulties?   no     Were distance and reading eye charts used? no       Advance Care Planning:   Patient was offered the opportunity to discuss advance care planning:  yes     Does patient have an Advance Directive:  no   If no, did you provide information on Caring Connections?   yes       Plan:      Orders Placed This Encounter    SIGRID MAMMO BI SCREENING INCL CAD    OCCULT BLOOD IMMUNOASSAY,DIAGNOSTIC    amLODIPine (NORVASC) 5 mg tablet    hydroCHLOROthiazide (HYDRODIURIL) 25 mg tablet       Health Maintenance   Topic Date Due    COVID-19 Vaccine (1 of 2) 02/18/1966    DTaP/Tdap/Td series (1 - Tdap) 02/18/1971    Shingrix Vaccine Age 50> (1 of 2) 02/18/2000    Breast Cancer Screen Mammogram  02/18/2000    GLAUCOMA SCREENING Q2Y  02/18/2015    Pneumococcal 65+ years (1 of 1 - PPSV23) 02/18/2015    Colorectal Cancer Screening Combo  05/16/2019    Flu Vaccine (1) 09/01/2020    Medicare Yearly Exam  02/18/2022    Lipid Screen  04/18/2024    Hepatitis C Screening  Completed    Bone Densitometry (Dexa) Screening Addressed       *Patient verbalized understanding and agreement with the plan. A copy of the After Visit Summary with personalized health plan was given to the patient today. Review of Systems  Constitutional: negative for fevers, chills, anorexia and weight loss  Eyes:   negative for visual disturbance and irritation  ENT:   negative for tinnitus,sore throat,nasal congestion,ear pains. hoarseness  Respiratory:  negative for cough, hemoptysis, dyspnea,wheezing  CV:   negative for chest pain, palpitations, lower extremity edema  GI:   negative for nausea, vomiting, diarrhea, abdominal pain,melena  Endo:               negative for polyuria,polydipsia,polyphagia,heat intolerance  Genitourinary: negative for frequency, dysuria and hematuria  Integument:  negative for rash and pruritus  Hematologic:  negative for easy bruising and gum/nose bleeding  Musculoskel: negative for myalgias, arthralgias, back pain, muscle weakness, joint pain  Neurological:  negative for headaches, dizziness, vertigo, memory problems and gait   Behavl/Psych: negative for feelings of anxiety, depression, mood changes    Past Medical History:   Diagnosis Date    History of mammogram 2010    normal    Hypertension     Pap smear for cervical cancer screening 2011    normal     Past Surgical History:   Procedure Laterality Date    HX GYN      hystterectomy    INTRACARD ECHO, THER/DX INTERVENT N/A 11/30/2020    Intracardiac Echocardiogram performed by Alannah Farfan MD at [facility] CARDIAC CATH LAB    [place] EPHYS EVL TRNSPTL TX ATRIAL FIB ISOLAT PULM VEIN N/A 11/30/2020    ABLATION A-FIB  W COMPLETE EP STUDY performed by Alannah Farfan MD at [facility] CARDIAC CATH LAB    [place] ICAR CATHETER ABLATION ARRHYTHMIA ADD ON N/A 11/30/2020    Ablation Svt/Vt Add On performed by Alannah Farfan MD at [facility] CARDIAC CATH LAB    [place] INTRACARDIAC ELECTROPHYSIOLOGIC 3D MAPPING N/A 11/30/2020    Ep 3d Mapping performed by Alannah Farfan MD at [address] [place] [address] 28 CATH LAB    STIM/PACING HEART POST IV DRUG INFU N/A 11/30/2020    Drug Stimulation performed by Tavon Castillo MD at [facility] CARDIAC CATH LAB     Social History     Socioeconomic History    Marital status:      Spouse name: Not on file    Number of children: Not on file    Years of education: Not on file    Highest education level: Not on file   Tobacco Use    Smoking status: Never Smoker    Smokeless tobacco: Never Used   Substance and Sexual Activity    Alcohol use: Yes     Comment: occasional    Drug use: No     History reviewed. No pertinent family history. Current Outpatient Medications   Medication Sig Dispense Refill    amLODIPine (NORVASC) 5 mg tablet Take 1 Tab by mouth daily. 30 Tab 12    hydroCHLOROthiazide (HYDRODIURIL) 25 mg tablet Take 1 Tab by mouth daily. 30 Tab 12    apixaban (ELIQUIS) 5 mg tablet Take 1 Tab by mouth two (2) times a day. 60 Tab 0    amiodarone (CORDARONE) 200 mg tablet Take 1 Tab by mouth daily. 30 Tab 0    cholecalciferol (VITAMIN D3) (1000 Units /25 mcg) tablet Take 1 Tab by mouth daily. 30 Tab 0    cyanocobalamin 1,000 mcg tablet Take 1 Tab by mouth daily.  30 Tab 0     Allergies   Allergen Reactions    Codeine Itching and Swelling       Objective:  Visit Vitals  BP (!) 140/94   Pulse 80   Temp 98.5 °F (36.9 °C) (Oral)   Resp 16   Ht 5' 7\" (1.702 m)   Wt 188 lb (85.3 kg)   SpO2 98%   BMI 29.44 kg/m²     Physical Exam:   General appearance - alert, well appearing, and in no distress  Mental status - alert, oriented to person, place, and time  EYE-SUSSY, EOMI, corneas normal, no foreign bodies  ENT-ENT exam normal, no neck nodes or sinus tenderness  Nose - normal and patent, no erythema, discharge or polyps  Mouth - mucous membranes moist, pharynx normal without lesions  Neck - supple, no significant adenopathy   Chest - clear to auscultation, no wheezes, rales or rhonchi, symmetric air entry   Heart - normal rate, regular rhythm, normal S1, S2, no murmurs, rubs, clicks or gallops   Abdomen - soft, nontender, nondistended, no masses or organomegaly  Lymph- no adenopathy palpable  Ext-peripheral pulses normal, no pedal edema, no clubbing or cyanosis  Skin-Warm and dry. no hyperpigmentation, vitiligo, or suspicious lesions  Neuro -alert, oriented, normal speech, no focal findings or movement disorder noted  Neck-normal C-spine, no tenderness, full ROM without pain  Feet-no nail deformities or callus formation with good pulses noted      Results for orders placed or performed during the hospital encounter of 11/24/20   CBC WITH AUTOMATED DIFF   Result Value Ref Range    WBC 7.1 3.6 - 11.0 K/uL    RBC 4.58 3.80 - 5.20 M/uL    HGB 12.7 11.5 - 16.0 g/dL    HCT 39.2 35.0 - 47.0 %    MCV 85.6 80.0 - 99.0 FL    MCH 27.7 26.0 - 34.0 PG    MCHC 32.4 30.0 - 36.5 g/dL    RDW 15.3 (H) 11.5 - 14.5 %    PLATELET 400 008 - 317 K/uL    MPV 10.1 8.9 - 12.9 FL    NRBC 0.0 0  WBC    ABSOLUTE NRBC 0.00 0.00 - 0.01 K/uL    NEUTROPHILS 82 (H) 32 - 75 %    LYMPHOCYTES 11 (L) 12 - 49 %    MONOCYTES 7 5 - 13 %    EOSINOPHILS 0 0 - 7 %    BASOPHILS 0 0 - 1 %    IMMATURE GRANULOCYTES 0 0.0 - 0.5 %    ABS. NEUTROPHILS 5.8 1.8 - 8.0 K/UL    ABS. LYMPHOCYTES 0.8 0.8 - 3.5 K/UL    ABS. MONOCYTES 0.5 0.0 - 1.0 K/UL    ABS. EOSINOPHILS 0.0 0.0 - 0.4 K/UL    ABS. BASOPHILS 0.0 0.0 - 0.1 K/UL    ABS. IMM.  GRANS. 0.0 0.00 - 0.04 K/UL    DF AUTOMATED      RBC COMMENTS NORMOCYTIC, NORMOCHROMIC     METABOLIC PANEL, COMPREHENSIVE   Result Value Ref Range    Sodium 137 136 - 145 mmol/L    Potassium 3.1 (L) 3.5 - 5.1 mmol/L    Chloride 103 97 - 108 mmol/L    CO2 26 21 - 32 mmol/L    Anion gap 8 5 - 15 mmol/L    Glucose 119 (H) 65 - 100 mg/dL    BUN 11 6 - 20 MG/DL    Creatinine 1.30 (H) 0.55 - 1.02 MG/DL    BUN/Creatinine ratio 8 (L) 12 - 20      GFR est AA 49 (L) >60 ml/min/1.73m2    GFR est non-AA 40 (L) >60 ml/min/1.73m2    Calcium 8.8 8.5 - 10.1 MG/DL    Bilirubin, total 0.6 0.2 - 1.0 MG/DL    ALT (SGPT) 17 12 - 78 U/L    AST (SGOT) 26 15 - 37 U/L    Alk. phosphatase 86 45 - 117 U/L    Protein, total 7.6 6.4 - 8.2 g/dL    Albumin 3.5 3.5 - 5.0 g/dL    Globulin 4.1 (H) 2.0 - 4.0 g/dL    A-G Ratio 0.9 (L) 1.1 - 2.2     CK W/ CKMB & INDEX   Result Value Ref Range    CK - MB 9.6 (H) <3.6 NG/ML    CK-MB Index 1.9 0.0 - 2.5       (H) 26 - 192 U/L   TROPONIN I   Result Value Ref Range    Troponin-I, Qt. 0.14 (H) <0.05 ng/mL   URINALYSIS W/ REFLEX CULTURE    Specimen: Urine   Result Value Ref Range    Color YELLOW/STRAW      Appearance CLEAR CLEAR      Specific gravity 1.018 1.003 - 1.030      pH (UA) 5.0 5.0 - 8.0      Protein TRACE (A) NEG mg/dL    Glucose Negative NEG mg/dL    Ketone TRACE (A) NEG mg/dL    Bilirubin Negative NEG      Blood TRACE (A) NEG      Urobilinogen 0.2 0.2 - 1.0 EU/dL    Nitrites Negative NEG      Leukocyte Esterase Negative NEG      WBC 5-10 0 - 4 /hpf    RBC 0-5 0 - 5 /hpf    Epithelial cells FEW FEW /lpf    Bacteria 1+ (A) NEG /hpf    UA:UC IF INDICATED CULTURE NOT INDICATED BY UA RESULT CNI     TROPONIN I   Result Value Ref Range    Troponin-I, Qt. 0.13 (H) <0.05 ng/mL   CK W/ CKMB & INDEX   Result Value Ref Range    CK - MB 14.1 (H) <3.6 NG/ML    CK-MB Index 1.7 0.0 - 2.5       (H) 26 - 192 U/L   NT-PRO BNP   Result Value Ref Range    NT pro- (H) <539 PG/ML   METABOLIC PANEL, COMPREHENSIVE   Result Value Ref Range    Sodium 140 136 - 145 mmol/L    Potassium 3.5 3.5 - 5.1 mmol/L    Chloride 108 97 - 108 mmol/L    CO2 25 21 - 32 mmol/L    Anion gap 7 5 - 15 mmol/L    Glucose 78 65 - 100 mg/dL    BUN 13 6 - 20 MG/DL    Creatinine 0.99 0.55 - 1.02 MG/DL    BUN/Creatinine ratio 13 12 - 20      GFR est AA >60 >60 ml/min/1.73m2    GFR est non-AA 55 (L) >60 ml/min/1.73m2    Calcium 8.7 8.5 - 10.1 MG/DL    Bilirubin, total 0.5 0.2 - 1.0 MG/DL    ALT (SGPT) 20 12 - 78 U/L    AST (SGOT) 46 (H) 15 - 37 U/L    Alk.  phosphatase 67 45 - 117 U/L    Protein, total 6.0 (L) 6.4 - 8.2 g/dL    Albumin 2.8 (L) 3.5 - 5.0 g/dL    Globulin 3.2 2.0 - 4.0 g/dL    A-G Ratio 0.9 (L) 1.1 - 2.2     MAGNESIUM   Result Value Ref Range    Magnesium 1.9 1.6 - 2.4 mg/dL   CK   Result Value Ref Range    CK 1,151 (H) 26 - 192 U/L   TROPONIN I   Result Value Ref Range    Troponin-I, Qt. 0.13 (H) <0.05 ng/mL   VITAMIN D, 25 HYDROXY   Result Value Ref Range    Vitamin D 25-Hydroxy 28.7 (L) 30 - 100 ng/mL   VITAMIN B12   Result Value Ref Range    Vitamin B12 154 (L) 193 - 650 pg/mL   METABOLIC PANEL, BASIC   Result Value Ref Range    Sodium 141 136 - 145 mmol/L    Potassium 3.5 3.5 - 5.1 mmol/L    Chloride 110 (H) 97 - 108 mmol/L    CO2 27 21 - 32 mmol/L    Anion gap 4 (L) 5 - 15 mmol/L    Glucose 83 65 - 100 mg/dL    BUN 11 6 - 20 MG/DL    Creatinine 0.89 0.55 - 1.02 MG/DL    BUN/Creatinine ratio 12 12 - 20      GFR est AA >60 >60 ml/min/1.73m2    GFR est non-AA >60 >60 ml/min/1.73m2    Calcium 8.3 (L) 8.5 - 10.1 MG/DL   CK   Result Value Ref Range     (H) 26 - 192 U/L   MAGNESIUM   Result Value Ref Range    Magnesium 2.0 1.6 - 2.4 mg/dL   CK   Result Value Ref Range     (H) 26 - 913 U/L   METABOLIC PANEL, BASIC   Result Value Ref Range    Sodium 141 136 - 145 mmol/L    Potassium 3.5 3.5 - 5.1 mmol/L    Chloride 110 (H) 97 - 108 mmol/L    CO2 28 21 - 32 mmol/L    Anion gap 3 (L) 5 - 15 mmol/L    Glucose 102 (H) 65 - 100 mg/dL    BUN 10 6 - 20 MG/DL    Creatinine 0.96 0.55 - 1.02 MG/DL    BUN/Creatinine ratio 10 (L) 12 - 20      GFR est AA >60 >60 ml/min/1.73m2    GFR est non-AA 57 (L) >60 ml/min/1.73m2    Calcium 8.3 (L) 8.5 - 10.1 MG/DL   MAGNESIUM   Result Value Ref Range    Magnesium 1.9 1.6 - 2.4 mg/dL   SARS-COV-2   Result Value Ref Range    Specimen source Nasopharyngeal      SARS-CoV-2 Not detected NOTD      Specimen source Nasopharyngeal      Specimen type NP Swab      Health status Symptomatic Testing     CK   Result Value Ref Range     (H) 26 - 683 U/L   METABOLIC PANEL, BASIC   Result Value Ref Range    Sodium 139 136 - 145 mmol/L    Potassium 3.6 3.5 - 5.1 mmol/L    Chloride 106 97 - 108 mmol/L    CO2 29 21 - 32 mmol/L    Anion gap 4 (L) 5 - 15 mmol/L    Glucose 103 (H) 65 - 100 mg/dL    BUN 17 6 - 20 MG/DL    Creatinine 0.89 0.55 - 1.02 MG/DL    BUN/Creatinine ratio 19 12 - 20      GFR est AA >60 >60 ml/min/1.73m2    GFR est non-AA >60 >60 ml/min/1.73m2    Calcium 9.0 8.5 - 10.1 MG/DL   MAGNESIUM   Result Value Ref Range    Magnesium 2.1 1.6 - 2.4 mg/dL   PHOSPHORUS   Result Value Ref Range    Phosphorus 3.6 2.6 - 4.7 MG/DL   CK   Result Value Ref Range     (H) 26 - 192 U/L   CK   Result Value Ref Range     26 - 192 U/L   CBC W/O DIFF   Result Value Ref Range    WBC 5.6 3.6 - 11.0 K/uL    RBC 4.09 3.80 - 5.20 M/uL    HGB 11.5 11.5 - 16.0 g/dL    HCT 35.6 35.0 - 47.0 %    MCV 87.0 80.0 - 99.0 FL    MCH 28.1 26.0 - 34.0 PG    MCHC 32.3 30.0 - 36.5 g/dL    RDW 15.7 (H) 11.5 - 14.5 %    PLATELET 023 197 - 248 K/uL    MPV 9.9 8.9 - 12.9 FL    NRBC 0.0 0  WBC    ABSOLUTE NRBC 0.00 0.00 - 2.49 K/uL   METABOLIC PANEL, BASIC   Result Value Ref Range    Sodium 139 136 - 145 mmol/L    Potassium 4.0 3.5 - 5.1 mmol/L    Chloride 108 97 - 108 mmol/L    CO2 26 21 - 32 mmol/L    Anion gap 5 5 - 15 mmol/L    Glucose 96 65 - 100 mg/dL    BUN 20 6 - 20 MG/DL    Creatinine 0.94 0.55 - 1.02 MG/DL    BUN/Creatinine ratio 21 (H) 12 - 20      GFR est AA >60 >60 ml/min/1.73m2    GFR est non-AA 59 (L) >60 ml/min/1.73m2    Calcium 8.7 8.5 - 10.1 MG/DL   POC ACTIVATED CLOTTING TIME   Result Value Ref Range    Activated Clotting Time (POC) 263 (H) 79 - 138 SECS   POC ACTIVATED CLOTTING TIME   Result Value Ref Range    Activated Clotting Time (POC) 318 (H) 79 - 138 SECS   POC ACTIVATED CLOTTING TIME   Result Value Ref Range    Activated Clotting Time (POC) 131 79 - 138 SECS   EKG, 12 LEAD, INITIAL   Result Value Ref Range    Ventricular Rate 100 BPM    Atrial Rate 100 BPM    P-R Interval 162 ms    QRS Duration 110 ms    Q-T Interval 382 ms    QTC Calculation (Bezet) 492 ms    Calculated P Axis 46 degrees    Calculated R Axis -54 degrees    Calculated T Axis -6 degrees    Diagnosis       Sinus rhythm with occasional premature ventricular complexes  Left axis deviation  Incomplete right bundle branch block  Cannot rule out Inferior infarct , age undetermined  Cannot rule out Anterolateral infarct , age undetermined    Confirmed by Renetta Herrera ([id number]) on 11/25/2020 7:06:52 AM     EKG, 12 LEAD, INITIAL   Result Value Ref Range    Ventricular Rate 71 BPM    Atrial Rate 71 BPM    P-R Interval 192 ms    QRS Duration 140 ms    Q-T Interval 444 ms    QTC Calculation (Bezet) 482 ms    Calculated P Axis 70 degrees    Calculated R Axis -30 degrees    Calculated T Axis 4 degrees    Diagnosis       Normal sinus rhythm  Left axis deviation  Right bundle branch block  Inferior infarct (cited on or before 06-SEP-2020)  Abnormal ECG     Confirmed by Vidhi Hernandez M.D. ([id number]) on 12/1/2020 8:54:51 AM         Assessment/Plan:    ICD-10-CM ICD-9-CM    1. Essential hypertension  I10 401.9    2. Breast cancer screening by mammogram  Z12.31 V76.12 [facility] MAMMO BI SCREENING INCL CAD   3. Screening for colon cancer  Z12.11 V76.51 OCCULT BLOOD IMMUNOASSAY,DIAGNOSTIC   4. Paroxysmal atrial fibrillation (HCC)  I48.0 427.31      Orders Placed This Encounter    [facility] MAMMO BI SCREENING INCL CAD     Standing Status:   Future     Standing Expiration Date:   4/17/2021     Order Specific Question:   Reason for Exam     Answer:   breast cancer screening    OCCULT BLOOD IMMUNOASSAY,DIAGNOSTIC     Standing Status:   Future     Standing Expiration Date:   2/17/2022    amLODIPine (NORVASC) 5 mg tablet     Sig: Take 1 Tab by mouth daily. Dispense:  30 Tab     Refill:  12    hydroCHLOROthiazide (HYDRODIURIL) 25 mg tablet     Sig: Take 1 Tab by mouth daily.      Dispense:  30 Tab     Refill:  12     lose weight, increase physical activity, follow low fat diet, follow low salt diet, call if any problems  There are no Patient Instructions on file for this visit. I have reviewed with the patient details of the assessment and plan and all questions were answered. Relevent patient education was performed    An After Visit Summary was printed and given to the patient.

## 2021-02-17 NOTE — PATIENT INSTRUCTIONS
[facility] Activation Thank you for requesting access to [facility]. Please follow the instructions below to securely access and download your online medical record. [facility] allows you to send messages to your doctor, view your test results, renew your prescriptions, schedule appointments, and more. How Do I Sign Up? 1. In your internet browser, go to [email or web address] 
2. Click on the First Time User? Click Here link in the Sign In box. You will be redirect to the New Member Sign Up page. 3. Enter your [facility] Access Code exactly as it appears below. You will not need to use this code after youve completed the sign-up process. If you do not sign up before the expiration date, you must request a new code. [facility] Access Code: [email or web address] Expires: 4/3/2021 12:10 PM (This is the date your [facility] access code will ) 4. Enter the last four digits of your Social Security Number (xxxx) and Date of Birth (mm/dd/yyyy) as indicated and click Submit. You will be taken to the next sign-up page. 5. Create a [facility] ID. This will be your [facility] login ID and cannot be changed, so think of one that is secure and easy to remember. 6. Create a [facility] password. You can change your password at any time. 7. Enter your Password Reset Question and Answer. This can be used at a later time if you forget your password. 8. Enter your e-mail address. You will receive e-mail notification when new information is available in [address] 9. Click Sign Up. You can now view and download portions of your medical record. 10. Click the Download Summary menu link to download a portable copy of your medical information. Additional Information If you have questions, please visit the Frequently Asked Questions section of the [employer] website at [email or web address] Remember, [employer] is NOT to be used for urgent needs. For medical emergencies, dial 911.

## 2021-03-16 RX ORDER — AMIODARONE HYDROCHLORIDE 200 MG/1
200 TABLET ORAL DAILY
Qty: 90 TAB | Refills: 0 | Status: SHIPPED | OUTPATIENT
Start: 2021-03-16 | End: 2021-03-18

## 2021-03-16 NOTE — TELEPHONE ENCOUNTER
Duplicate request - Norvasc 5 mg #30 with 12 refills was sent to Nicole on 02/17/2021. Last visit 02/17/2021 MD Christiane Hinojosa   Next appointment Pt canceled 03/17/2021 - Nothing rescheduled   Previous refill encounter(s)   02/17/2021 Pacerone #30     Requested Prescriptions     Pending Prescriptions Disp Refills    amiodarone (Pacerone) 200 mg tablet 90 Tab 0     Sig: Take 1 Tab by mouth daily.

## 2021-03-16 NOTE — TELEPHONE ENCOUNTER
----- Message from Jose Armando Em sent at 3/15/2021  6:29 PM EDT -----  Regarding: FW: Dr. Benton Organ    ----- Message -----  From: Adolfo Tobin  Sent: 3/15/2021   4:45 PM EDT  To: [facility] Front Office Pool  Subject: Dr. Josef Marroquin (if not patient): Pt      Relationship of caller (if not patient): Pt      Best contact number(s): [phone]       Name of medication and dosage if known: amlodipine 5 mg; \"pacerone\" 200 mg      Is patient out of this medication (yes/no): no      Pharmacy name: [facility] listed in chart? (yes/no): yes  Pharmacy phone number: Phone:  [phone]  Fax:  [phone]       Details to clarify the request: pt has 4 pills left      [employer]

## 2021-03-18 RX ORDER — AMIODARONE HYDROCHLORIDE 200 MG/1
TABLET ORAL
Qty: 30 TAB | Refills: 0 | Status: SHIPPED | OUTPATIENT
Start: 2021-03-18 | End: 2021-06-18 | Stop reason: SDUPTHER

## 2021-05-21 NOTE — PROGRESS NOTES
Patient is not available and mail box is full unable to leave message. This was the 2nd attempted to reach patient for virtual chart review.

## 2021-05-22 ENCOUNTER — VIRTUAL VISIT (OUTPATIENT)
Dept: INTERNAL MEDICINE CLINIC | Age: 71
End: 2021-05-22
Payer: MEDICARE

## 2021-05-22 DIAGNOSIS — J30.1 SEASONAL ALLERGIC RHINITIS DUE TO POLLEN: Primary | ICD-10-CM

## 2021-05-22 PROCEDURE — G8400 PT W/DXA NO RESULTS DOC: HCPCS | Performed by: INTERNAL MEDICINE

## 2021-05-22 PROCEDURE — 99213 OFFICE O/P EST LOW 20 MIN: CPT | Performed by: INTERNAL MEDICINE

## 2021-05-22 PROCEDURE — G8432 DEP SCR NOT DOC, RNG: HCPCS | Performed by: INTERNAL MEDICINE

## 2021-05-22 PROCEDURE — 1101F PT FALLS ASSESS-DOCD LE1/YR: CPT | Performed by: INTERNAL MEDICINE

## 2021-05-22 PROCEDURE — 1090F PRES/ABSN URINE INCON ASSESS: CPT | Performed by: INTERNAL MEDICINE

## 2021-05-22 PROCEDURE — G8756 NO BP MEASURE DOC: HCPCS | Performed by: INTERNAL MEDICINE

## 2021-05-22 PROCEDURE — G9899 SCRN MAM PERF RSLTS DOC: HCPCS | Performed by: INTERNAL MEDICINE

## 2021-05-22 PROCEDURE — G8427 DOCREV CUR MEDS BY ELIG CLIN: HCPCS | Performed by: INTERNAL MEDICINE

## 2021-05-22 PROCEDURE — 3017F COLORECTAL CA SCREEN DOC REV: CPT | Performed by: INTERNAL MEDICINE

## 2021-05-22 RX ORDER — PREDNISONE 10 MG/1
TABLET ORAL
Qty: 21 TABLET | Refills: 0 | Status: SHIPPED | OUTPATIENT
Start: 2021-05-22 | End: 2021-07-16

## 2021-05-22 RX ORDER — MONTELUKAST SODIUM 10 MG/1
10 TABLET ORAL DAILY
Qty: 30 TABLET | Refills: 12 | Status: SHIPPED | OUTPATIENT
Start: 2021-05-22 | End: 2021-07-16

## 2021-05-22 NOTE — PROGRESS NOTES
Ashley Pak is a 70 y.o. female being evaluated by a Virtual Visit (video visit) encounter to address concerns as mentioned above. A caregiver was present when appropriate. Due to this being a TeleHealth encounter (During NTTBV-78 public health emergency), evaluation of the following organ systems was limited: Vitals/Constitutional/EENT/Resp/CV/GI//MS/Neuro/Skin/Heme-Lymph-Imm. Pursuant to the emergency declaration under the [address] [place] [address] and the [employer] Act, this Virtual Visit was conducted with patient's (and/or legal guardian's) consent, to reduce the risk of exposure to COVID-19 and provide necessary medical care. Services were provided through a video synchronous discussion virtually to substitute for in-person encounter. --Johnnie Gordon MD on 5/22/2021 at 9:24 AM    An electronic signature was used to authenticate this note. Ashley Pak is a 70 y.o. female and presents with No chief complaint on file. .  Subjective: Allergic Rhinitis  Patient presents for evaluation of allergic symptoms. Symptoms include nasal congestion, rhinorrhea, sneezing, eye itching, watery eyes. Precipitants haved included possible pollen.       Review of Systems  Constitutional: negative for fevers, chills, anorexia and weight loss  Eyes:   negative for visual disturbance and irritation  ENT:   ,sore throat,nasal congestion  Respiratory:  negative for cough, hemoptysis, dyspnea,wheezing  CV:   negative for chest pain, palpitations, lower extremity edema  GI:   negative for nausea, vomiting, diarrhea, abdominal pain,melena  Endo:               negative for polyuria,polydipsia,polyphagia,heat intolerance  Genitourinary: negative for frequency, dysuria and hematuria  Integument:  negative for rash and pruritus  Hematologic:  negative for easy bruising and gum/nose bleeding  Musculoskel: negative for myalgias, arthralgias, back pain, muscle weakness, joint pain  Neurological:  negative for headaches, dizziness, vertigo, memory problems and gait   Behavl/Psych: negative for feelings of anxiety, depression, mood changes    Past Medical History:   Diagnosis Date    History of mammogram 2010    normal    Hypertension     Pap smear for cervical cancer screening 2011    normal     Past Surgical History:   Procedure Laterality Date    HX GYN      hystterectomy    INTRACARD ECHO, THER/DX INTERVENT N/A 11/30/2020    Intracardiac Echocardiogram performed by Eduardo Torres MD at [facility] CARDIAC CATH LAB    AK EPHYS EVL TRNSPTL TX ATRIAL FIB ISOLAT PULM VEIN N/A 11/30/2020    ABLATION A-FIB  W COMPLETE EP STUDY performed by Eduardo Torres MD at [facility] CARDIAC CATH LAB    AK ICAR CATHETER ABLATION ARRHYTHMIA ADD ON N/A 11/30/2020    Ablation Svt/Vt Add On performed by Eduardo Torres MD at [facility] CARDIAC CATH LAB    AK INTRACARDIAC ELECTROPHYSIOLOGIC 3D MAPPING N/A 11/30/2020    Ep 3d Mapping performed by Eduardo Torres MD at [facility] CARDIAC CATH LAB    STIM/PACING HEART POST IV DRUG INFU N/A 11/30/2020    Drug Stimulation performed by Eduardo Torres MD at [facility] CARDIAC CATH LAB     Social History     Socioeconomic History    Marital status:      Spouse name: Not on file    Number of children: Not on file    Years of education: Not on file    Highest education level: Not on file   Tobacco Use    Smoking status: Never Smoker    Smokeless tobacco: Never Used   Substance and Sexual Activity    Alcohol use: Yes     Comment: occasional    Drug use: No     Social Determinants of Health     Financial Resource Strain:     Difficulty of Paying Living Expenses:    Food Insecurity:     Worried About Running Out of Food in the Last Year:     Ran Out of Food in the Last Year:    Transportation Needs:     Lack of Transportation (Medical):      Lack of Transportation (Non-Medical):    Physical Activity:     Days of Exercise per Week:     Minutes of Exercise per Session:    Stress:     Feeling of Stress :    Social Connections:     Frequency of Communication with Friends and Family:     Frequency of Social Gatherings with Friends and Family:     Attends Rastafari Services:     Active Member of Clubs or Organizations:     Attends Club or Organization Meetings:     Marital Status:      History reviewed. No pertinent family history. Current Outpatient Medications   Medication Sig Dispense Refill    predniSONE (DELTASONE) 10 mg tablet 6 tabs today and reduce by 1 tab daily 21 Tablet 0    montelukast (SINGULAIR) 10 mg tablet Take 1 Tablet by mouth daily. 30 Tablet 12    Pacerone 200 mg tablet Take 1 tablet by mouth once daily 30 Tab 0    amLODIPine (NORVASC) 5 mg tablet Take 1 Tab by mouth daily. 30 Tab 12    hydroCHLOROthiazide (HYDRODIURIL) 25 mg tablet Take 1 Tab by mouth daily. 30 Tab 12    Eliquis 5 mg tablet Take 1 tablet by mouth twice daily 60 Tab 0    cholecalciferol (VITAMIN D3) (1000 Units /25 mcg) tablet Take 1 Tab by mouth daily. 30 Tab 0    cyanocobalamin 1,000 mcg tablet Take 1 Tab by mouth daily. 30 Tab 0     Allergies   Allergen Reactions    Codeine Itching and Swelling       Objective: There were no vitals taken for this visit. Physical Exam:   General appearance - alert, well appearing, and in no distress  Mental status - alert, oriented to person, place, and time  EYE-SUSSY, EOMI, corneas normal, no foreign bodies  ENT-ENT exam normal, no neck nodes or sinus tenderness  Nose - Turbinates boggy and mucoid with erythema and edema noted  Mouth - mucous membranes moist, pharynx normal without lesions  Skin-Warm and dry.  no hyperpigmentation, vitiligo, or suspicious lesions  Neuro -alert, oriented, normal speech, no focal findings or movement disorder noted  Neck-normal C-spine, no tenderness, full ROM without pain        Results for orders placed or performed during the hospital encounter of 11/24/20   CBC WITH AUTOMATED DIFF   Result Value Ref Range    WBC 7.1 3.6 - 11.0 K/uL    RBC 4.58 3.80 - 5.20 M/uL    HGB 12.7 11.5 - 16.0 g/dL    HCT 39.2 35.0 - 47.0 %    MCV 85.6 80.0 - 99.0 FL    MCH 27.7 26.0 - 34.0 PG    MCHC 32.4 30.0 - 36.5 g/dL    RDW 15.3 (H) 11.5 - 14.5 %    PLATELET 165 897 - 540 K/uL    MPV 10.1 8.9 - 12.9 FL    NRBC 0.0 0  WBC    ABSOLUTE NRBC 0.00 0.00 - 0.01 K/uL    NEUTROPHILS 82 (H) 32 - 75 %    LYMPHOCYTES 11 (L) 12 - 49 %    MONOCYTES 7 5 - 13 %    EOSINOPHILS 0 0 - 7 %    BASOPHILS 0 0 - 1 %    IMMATURE GRANULOCYTES 0 0.0 - 0.5 %    ABS. NEUTROPHILS 5.8 1.8 - 8.0 K/UL    ABS. LYMPHOCYTES 0.8 0.8 - 3.5 K/UL    ABS. MONOCYTES 0.5 0.0 - 1.0 K/UL    ABS. EOSINOPHILS 0.0 0.0 - 0.4 K/UL    ABS. BASOPHILS 0.0 0.0 - 0.1 K/UL    ABS. IMM. GRANS. 0.0 0.00 - 0.04 K/UL    DF AUTOMATED      RBC COMMENTS NORMOCYTIC, NORMOCHROMIC     METABOLIC PANEL, COMPREHENSIVE   Result Value Ref Range    Sodium 137 136 - 145 mmol/L    Potassium 3.1 (L) 3.5 - 5.1 mmol/L    Chloride 103 97 - 108 mmol/L    CO2 26 21 - 32 mmol/L    Anion gap 8 5 - 15 mmol/L    Glucose 119 (H) 65 - 100 mg/dL    BUN 11 6 - 20 MG/DL    Creatinine 1.30 (H) 0.55 - 1.02 MG/DL    BUN/Creatinine ratio 8 (L) 12 - 20      GFR est AA 49 (L) >60 ml/min/1.73m2    GFR est non-AA 40 (L) >60 ml/min/1.73m2    Calcium 8.8 8.5 - 10.1 MG/DL    Bilirubin, total 0.6 0.2 - 1.0 MG/DL    ALT (SGPT) 17 12 - 78 U/L    AST (SGOT) 26 15 - 37 U/L    Alk.  phosphatase 86 45 - 117 U/L    Protein, total 7.6 6.4 - 8.2 g/dL    Albumin 3.5 3.5 - 5.0 g/dL    Globulin 4.1 (H) 2.0 - 4.0 g/dL    A-G Ratio 0.9 (L) 1.1 - 2.2     CK W/ CKMB & INDEX   Result Value Ref Range    CK - MB 9.6 (H) <3.6 NG/ML    CK-MB Index 1.9 0.0 - 2.5       (H) 26 - 192 U/L   TROPONIN I   Result Value Ref Range    Troponin-I, Qt. 0.14 (H) <0.05 ng/mL   URINALYSIS W/ REFLEX CULTURE    Specimen: Urine   Result Value Ref Range Color YELLOW/STRAW      Appearance CLEAR CLEAR      Specific gravity 1.018 1.003 - 1.030      pH (UA) 5.0 5.0 - 8.0      Protein TRACE (A) NEG mg/dL    Glucose Negative NEG mg/dL    Ketone TRACE (A) NEG mg/dL    Bilirubin Negative NEG      Blood TRACE (A) NEG      Urobilinogen 0.2 0.2 - 1.0 EU/dL    Nitrites Negative NEG      Leukocyte Esterase Negative NEG      WBC 5-10 0 - 4 /hpf    RBC 0-5 0 - 5 /hpf    Epithelial cells FEW FEW /lpf    Bacteria 1+ (A) NEG /hpf    UA:UC IF INDICATED CULTURE NOT INDICATED BY UA RESULT CNI     TROPONIN I   Result Value Ref Range    Troponin-I, Qt. 0.13 (H) <0.05 ng/mL   CK W/ CKMB & INDEX   Result Value Ref Range    CK - MB 14.1 (H) <3.6 NG/ML    CK-MB Index 1.7 0.0 - 2.5       (H) 26 - 192 U/L   NT-PRO BNP   Result Value Ref Range    NT pro- (H) <251 PG/ML   METABOLIC PANEL, COMPREHENSIVE   Result Value Ref Range    Sodium 140 136 - 145 mmol/L    Potassium 3.5 3.5 - 5.1 mmol/L    Chloride 108 97 - 108 mmol/L    CO2 25 21 - 32 mmol/L    Anion gap 7 5 - 15 mmol/L    Glucose 78 65 - 100 mg/dL    BUN 13 6 - 20 MG/DL    Creatinine 0.99 0.55 - 1.02 MG/DL    BUN/Creatinine ratio 13 12 - 20      GFR est AA >60 >60 ml/min/1.73m2    GFR est non-AA 55 (L) >60 ml/min/1.73m2    Calcium 8.7 8.5 - 10.1 MG/DL    Bilirubin, total 0.5 0.2 - 1.0 MG/DL    ALT (SGPT) 20 12 - 78 U/L    AST (SGOT) 46 (H) 15 - 37 U/L    Alk.  phosphatase 67 45 - 117 U/L    Protein, total 6.0 (L) 6.4 - 8.2 g/dL    Albumin 2.8 (L) 3.5 - 5.0 g/dL    Globulin 3.2 2.0 - 4.0 g/dL    A-G Ratio 0.9 (L) 1.1 - 2.2     MAGNESIUM   Result Value Ref Range    Magnesium 1.9 1.6 - 2.4 mg/dL   CK   Result Value Ref Range    CK 1,151 (H) 26 - 192 U/L   TROPONIN I   Result Value Ref Range    Troponin-I, Qt. 0.13 (H) <0.05 ng/mL   VITAMIN D, 25 HYDROXY   Result Value Ref Range    Vitamin D 25-Hydroxy 28.7 (L) 30 - 100 ng/mL   VITAMIN B12   Result Value Ref Range    Vitamin B12 154 (L) 193 - 400 pg/mL   METABOLIC PANEL, BASIC Result Value Ref Range    Sodium 141 136 - 145 mmol/L    Potassium 3.5 3.5 - 5.1 mmol/L    Chloride 110 (H) 97 - 108 mmol/L    CO2 27 21 - 32 mmol/L    Anion gap 4 (L) 5 - 15 mmol/L    Glucose 83 65 - 100 mg/dL    BUN 11 6 - 20 MG/DL    Creatinine 0.89 0.55 - 1.02 MG/DL    BUN/Creatinine ratio 12 12 - 20      GFR est AA >60 >60 ml/min/1.73m2    GFR est non-AA >60 >60 ml/min/1.73m2    Calcium 8.3 (L) 8.5 - 10.1 MG/DL   CK   Result Value Ref Range     (H) 26 - 192 U/L   MAGNESIUM   Result Value Ref Range    Magnesium 2.0 1.6 - 2.4 mg/dL   CK   Result Value Ref Range     (H) 26 - 623 U/L   METABOLIC PANEL, BASIC   Result Value Ref Range    Sodium 141 136 - 145 mmol/L    Potassium 3.5 3.5 - 5.1 mmol/L    Chloride 110 (H) 97 - 108 mmol/L    CO2 28 21 - 32 mmol/L    Anion gap 3 (L) 5 - 15 mmol/L    Glucose 102 (H) 65 - 100 mg/dL    BUN 10 6 - 20 MG/DL    Creatinine 0.96 0.55 - 1.02 MG/DL    BUN/Creatinine ratio 10 (L) 12 - 20      GFR est AA >60 >60 ml/min/1.73m2    GFR est non-AA 57 (L) >60 ml/min/1.73m2    Calcium 8.3 (L) 8.5 - 10.1 MG/DL   MAGNESIUM   Result Value Ref Range    Magnesium 1.9 1.6 - 2.4 mg/dL   SARS-COV-2   Result Value Ref Range    Specimen source Nasopharyngeal      SARS-CoV-2 Not detected NOTD      Specimen source Nasopharyngeal      Specimen type NP Swab      Health status Symptomatic Testing     CK   Result Value Ref Range     (H) 26 - 434 U/L   METABOLIC PANEL, BASIC   Result Value Ref Range    Sodium 139 136 - 145 mmol/L    Potassium 3.6 3.5 - 5.1 mmol/L    Chloride 106 97 - 108 mmol/L    CO2 29 21 - 32 mmol/L    Anion gap 4 (L) 5 - 15 mmol/L    Glucose 103 (H) 65 - 100 mg/dL    BUN 17 6 - 20 MG/DL    Creatinine 0.89 0.55 - 1.02 MG/DL    BUN/Creatinine ratio 19 12 - 20      GFR est AA >60 >60 ml/min/1.73m2    GFR est non-AA >60 >60 ml/min/1.73m2    Calcium 9.0 8.5 - 10.1 MG/DL   MAGNESIUM   Result Value Ref Range    Magnesium 2.1 1.6 - 2.4 mg/dL   PHOSPHORUS   Result Value Ref Range    Phosphorus 3.6 2.6 - 4.7 MG/DL   CK   Result Value Ref Range     (H) 26 - 192 U/L   CK   Result Value Ref Range     26 - 192 U/L   CBC W/O DIFF   Result Value Ref Range    WBC 5.6 3.6 - 11.0 K/uL    RBC 4.09 3.80 - 5.20 M/uL    HGB 11.5 11.5 - 16.0 g/dL    HCT 35.6 35.0 - 47.0 %    MCV 87.0 80.0 - 99.0 FL    MCH 28.1 26.0 - 34.0 PG    MCHC 32.3 30.0 - 36.5 g/dL    RDW 15.7 (H) 11.5 - 14.5 %    PLATELET 377 348 - 265 K/uL    MPV 9.9 8.9 - 12.9 FL    NRBC 0.0 0  WBC    ABSOLUTE NRBC 0.00 0.00 - 4.32 K/uL   METABOLIC PANEL, BASIC   Result Value Ref Range    Sodium 139 136 - 145 mmol/L    Potassium 4.0 3.5 - 5.1 mmol/L    Chloride 108 97 - 108 mmol/L    CO2 26 21 - 32 mmol/L    Anion gap 5 5 - 15 mmol/L    Glucose 96 65 - 100 mg/dL    BUN 20 6 - 20 MG/DL    Creatinine 0.94 0.55 - 1.02 MG/DL    BUN/Creatinine ratio 21 (H) 12 - 20      GFR est AA >60 >60 ml/min/1.73m2    GFR est non-AA 59 (L) >60 ml/min/1.73m2    Calcium 8.7 8.5 - 10.1 MG/DL   POC ACTIVATED CLOTTING TIME   Result Value Ref Range    Activated Clotting Time (POC) 263 (H) 79 - 138 SECS   POC ACTIVATED CLOTTING TIME   Result Value Ref Range    Activated Clotting Time (POC) 318 (H) 79 - 138 SECS   POC ACTIVATED CLOTTING TIME   Result Value Ref Range    Activated Clotting Time (POC) 131 79 - 138 SECS   EKG, 12 LEAD, INITIAL   Result Value Ref Range    Ventricular Rate 100 BPM    Atrial Rate 100 BPM    P-R Interval 162 ms    QRS Duration 110 ms    Q-T Interval 382 ms    QTC Calculation (Bezet) 492 ms    Calculated P Axis 46 degrees    Calculated R Axis -54 degrees    Calculated T Axis -6 degrees    Diagnosis       Sinus rhythm with occasional premature ventricular complexes  Left axis deviation  Incomplete right bundle branch block  Cannot rule out Inferior infarct , age undetermined  Cannot rule out Anterolateral infarct , age undetermined    Confirmed by Olga Esparza ([id number]) on 11/25/2020 7:06:52 AM     EKG, 12 LEAD, INITIAL Result Value Ref Range    Ventricular Rate 71 BPM    Atrial Rate 71 BPM    P-R Interval 192 ms    QRS Duration 140 ms    Q-T Interval 444 ms    QTC Calculation (Bezet) 482 ms    Calculated P Axis 70 degrees    Calculated R Axis -30 degrees    Calculated T Axis 4 degrees    Diagnosis       Normal sinus rhythm  Left axis deviation  Right bundle branch block  Inferior infarct (cited on or before 06-SEP-2020)  Abnormal ECG     Confirmed by Elvira Lee M.D. ([id number]) on 2020 8:54:51 AM         Assessment/Plan:    ICD-10-CM ICD-9-CM    1. Seasonal allergic rhinitis due to pollen  J30.1 477.0      Orders Placed This Encounter    predniSONE (DELTASONE) 10 mg tablet     Si tabs today and reduce by 1 tab daily     Dispense:  21 Tablet     Refill:  0    montelukast (SINGULAIR) 10 mg tablet     Sig: Take 1 Tablet by mouth daily. Dispense:  30 Tablet     Refill:  12     continue present plan,Take 81mg aspirin daily  There are no Patient Instructions on file for this visit. Follow-up and Dispositions    · Return in about 3 months (around 2021), or if symptoms worsen or fail to improve. I have reviewed with the patient details of the assessment and plan and all questions were answered. Relevent patient education was performed. The most recent lab findings were reviewed with the patient. An After Visit Summary was printed and given to the patient.

## 2021-06-17 ENCOUNTER — TELEPHONE (OUTPATIENT)
Dept: CARDIOLOGY CLINIC | Age: 71
End: 2021-06-17

## 2021-06-17 NOTE — TELEPHONE ENCOUNTER
Verified patient with 2 identifiers   Advised patient that her pcp has filled this med in the past  Patient states pcp told her she had to get through cardiologist  Has appt scheduled with [facility] on 6/29/21  Has never seen you in office  You did a cath on her on 11/27/20.  She is insisting that pcp is refusing to fill  Please advise

## 2021-06-17 NOTE — TELEPHONE ENCOUNTER
Patient has appt on 6/29 with [facility]. She needs pacerone 200mg sent to [address] [place] [address].     Thanks,  Oscar Devi

## 2021-06-18 RX ORDER — AMIODARONE HYDROCHLORIDE 200 MG/1
200 TABLET ORAL DAILY
Qty: 30 TABLET | Refills: 0 | Status: SHIPPED | OUTPATIENT
Start: 2021-06-18 | End: 2021-07-16 | Stop reason: SDUPTHER

## 2021-06-18 NOTE — TELEPHONE ENCOUNTER
Verified patient with 2 identifiers   Advised a one month has been sent to [facility] on [address] she does need to keep her appt on June 29,2021. Further refills will not be authorized if she does not keep the appt  Patient verified understanding and states she will be here.

## 2021-06-18 NOTE — TELEPHONE ENCOUNTER
MD Aimee Vale, ISIDORO; Joanie ROGEL 6 minutes ago (8:41 AM)   [facility]  Can you address this. Only give 1 month supply. If she does not show up for appt with you then she is not going to see me moving forward and can find another physician.

## 2021-06-29 NOTE — PROGRESS NOTES
[address], [facility]  [phone]     Subjective:      Terrie Hernández is a 70 y.o. female is here for follow up. [place] [address] [place] 11/2020 for DANIELLE/rhabdo; mild elev trop / SVT / PAF. She underwent cardiac catheterization 11/27/2020  which did not reveal significant occlusive CAD. An EP consult was requested 1/30/2020 the patient underwent EPS. She was placed on amiodarone and [facility]. She was seen by [facility] 6/18/2021 for leg swelling, hypokalemia. Was given Lasix and K to replete. Repeat K normalized at 3.6. She is now on K supplement. Will f/u with her pcp. Today,  Reports resolution of leg swelling. Denies cp or sob, rare episodes of mild palpitation but immediately goes away. She c/o some mild fatigue and lack of appetite. The patient denies chest pain/ shortness of breath, orthopnea, PND,  syncope, or presyncope. [facility] 11/2020  Left Main   The vessel is angiographically normal.   Left Anterior Descending   The vessel exhibits minimal luminal irregularities. First Diagonal Branch   The vessel is angiographically normal.   Second Diagonal Branch   The vessel is angiographically normal.   Left Circumflex   There is mild disease. First Obtuse Marginal Branch   The vessel is angiographically normal.   Second Obtuse Marginal Branch   There is mild disease. Right Coronary Artery   The vessel is angiographically normal.     ECHO 9/2020  · LV: Estimated LVEF is 55 - 60%. Visually measured ejection fraction. Normal cavity size and systolic function (ejection fraction normal). Mild concentric hypertrophy. Mild (grade 1) left ventricular diastolic dysfunction. · AV: Probably trileaflet aortic valve. Mild aortic valve regurgitation is present.     Patient Active Problem List    Diagnosis Date Noted    Paroxysmal atrial fibrillation ([address] 11/30/2020    SVT (supraventricular tachycardia) ([address] 11/28/2020    Weakness generalized 11/24/2020  Rhabdomyolysis 11/24/2020    Volume depletion 11/24/2020    DANIELLE (acute kidney injury) ([facility] 11/24/2020    Hypokalemia 09/06/2020    Fatigue 09/05/2020    Fall 09/05/2020    Elevated troponin 09/05/2020    Hypertension 10/27/2011      Marsha Martinez MD  Past Medical History:   Diagnosis Date    History of mammogram 2010    normal    Hypertension     Pap smear for cervical cancer screening 2011    normal      Past Surgical History:   Procedure Laterality Date    HX GYN      hystterectomy    INTRACARD ECHO, THER/DX INTERVENT N/A 11/30/2020    Intracardiac Echocardiogram performed by Merlyn Suárez MD at [facility] CARDIAC CATH LAB    LA EPHYS EVL TRNSPTL TX ATRIAL FIB ISOLAT PULM VEIN N/A 11/30/2020    ABLATION A-FIB  W COMPLETE EP STUDY performed by Merlyn Suárez MD at [facility] CARDIAC CATH LAB    LA ICAR CATHETER ABLATION ARRHYTHMIA ADD ON N/A 11/30/2020    Ablation Svt/Vt Add On performed by Merlyn Suárez MD at [facility] CARDIAC CATH LAB    LA INTRACARDIAC ELECTROPHYSIOLOGIC 3D MAPPING N/A 11/30/2020    Ep 3d Mapping performed by Merlyn Suárez MD at [facility] CARDIAC CATH LAB    STIM/PACING HEART POST IV DRUG INFU N/A 11/30/2020    Drug Stimulation performed by Merlyn Suárez MD at [facility] CARDIAC CATH LAB     Allergies   Allergen Reactions    Codeine Itching and Swelling      History reviewed. No pertinent family history.    Social History     Socioeconomic History    Marital status:      Spouse name: Not on file    Number of children: Not on file    Years of education: Not on file    Highest education level: Not on file   Occupational History    Not on file   Tobacco Use    Smoking status: Never Smoker    Smokeless tobacco: Never Used   Substance and Sexual Activity    Alcohol use: Yes     Comment: occasional    Drug use: No    Sexual activity: Not on file   Other Topics Concern    Not on file   Social History Narrative    Not on file     Social Determinants of Health Financial Resource Strain:     Difficulty of Paying Living Expenses:    Food Insecurity:     Worried About Running Out of Food in the Last Year:     [address] in the Last Year:    Transportation Needs:     Lack of Transportation (Medical):  Lack of Transportation (Non-Medical):    Physical Activity:     Days of Exercise per Week:     Minutes of Exercise per Session:    Stress:     Feeling of Stress :    Social Connections:     Frequency of Communication with Friends and Family:     Frequency of Social Gatherings with Friends and Family:     Attends Anabaptist Services:     Active Member of Clubs or Organizations:     Attends Club or Organization Meetings:     Marital Status:    Intimate Partner Violence:     Fear of Current or Ex-Partner:     Emotionally Abused:     Physically Abused:     Sexually Abused:       Current Outpatient Medications   Medication Sig    potassium 99 mg tablet Take 99 mg by mouth daily.  ZINC PO Take  by mouth daily.  apixaban (Eliquis) 5 mg tablet Take 1 tablet by mouth twice daily    amiodarone (Pacerone) 200 mg tablet Take 1 Tablet by mouth daily.  metoprolol succinate (TOPROL-XL) 25 mg XL tablet Take 0.5 Tablets by mouth daily. Take 1 tab at night    amLODIPine (NORVASC) 5 mg tablet Take 1 Tab by mouth daily.  hydroCHLOROthiazide (HYDRODIURIL) 25 mg tablet Take 1 Tab by mouth daily.  cholecalciferol (VITAMIN D3) (1000 Units /25 mcg) tablet Take 1 Tab by mouth daily.  cyanocobalamin 1,000 mcg tablet Take 1 Tab by mouth daily. No current facility-administered medications for this visit. Review of Symptoms:  11 systems reviewed, negative other than as stated in the HPI    Physical ExamPhysical Exam:    Vitals:    07/16/21 0933   BP: (!) 160/70   Pulse: 76   Resp: 16   SpO2: 98%   Weight: 184 lb 6.4 oz (83.6 kg)   Height: 5' 6\" (1.676 m)     Body mass index is 29.76 kg/m².   General PE  Gen:  NAD; uses a cane  Mental Status - Alert. General Appearance - Not in acute distress. HEENT:  PERRL, no carotid bruits or JVD  Chest and Lung Exam   Inspection: Accessory muscles - No use of accessory muscles in breathing. Auscultation:   Breath sounds: - Normal.   Cardiovascular   Inspection: Jugular vein - Bilateral - Inspection Normal.   Palpation/Percussion:   Apical Impulse: - Normal.   Auscultation: Rhythm - Regular. Heart Sounds - S1 WNL and S2 WNL. No S3 or S4. Murmurs & Other Heart Sounds: Auscultation of the heart reveals - No Murmurs. Peripheral Vascular   Upper Extremity: Inspection - Bilateral - No Cyanotic nailbeds or Digital clubbing. Lower Extremity:   Palpation: Edema - Bilateral - dependent ankle swelling  Abdomen:   Soft, non-tender, bowel sounds are active. Neuro: A&O times 3, CN and motor grossly WNL    Labs:   Lab Results   Component Value Date/Time    Cholesterol, total 278 (H) 10/01/2014 01:04 PM    Cholesterol, total 261 (H) 10/27/2011 03:16 PM    HDL Cholesterol 60 10/01/2014 01:04 PM    HDL Cholesterol 55 10/27/2011 03:16 PM    LDL, calculated 196 (H) 10/01/2014 01:04 PM    LDL, calculated 167 (H) 10/27/2011 03:16 PM    Triglyceride 112 10/01/2014 01:04 PM    Triglyceride 194 (H) 10/27/2011 03:16 PM     Lab Results   Component Value Date/Time     11/30/2020 02:41 AM     Lab Results   Component Value Date/Time    Sodium 139 12/01/2020 03:53 AM    Potassium 4.0 12/01/2020 03:53 AM    Chloride 108 12/01/2020 03:53 AM    CO2 26 12/01/2020 03:53 AM    Anion gap 5 12/01/2020 03:53 AM    Glucose 96 12/01/2020 03:53 AM    BUN 20 12/01/2020 03:53 AM    Creatinine 0.94 12/01/2020 03:53 AM    BUN/Creatinine ratio 21 (H) 12/01/2020 03:53 AM    GFR est AA >60 12/01/2020 03:53 AM    GFR est non-AA 59 (L) 12/01/2020 03:53 AM    Calcium 8.7 12/01/2020 03:53 AM    Bilirubin, total 0.5 11/25/2020 03:11 AM    Alk.  phosphatase 67 11/25/2020 03:11 AM    Protein, total 6.0 (L) 11/25/2020 03:11 AM    Albumin 2.8 (L) 11/25/2020 03:11 AM    Globulin 3.2 11/25/2020 03:11 AM    A-G Ratio 0.9 (L) 11/25/2020 03:11 AM    ALT (SGPT) 20 11/25/2020 03:11 AM       EKG:  SR RBBB       Assessment:     Assessment:      1. Essential hypertension    2. Paroxysmal atrial fibrillation (HCC)    3. Mixed hyperlipidemia    4. Weakness generalized    5. SVT (supraventricular tachycardia) ([address]        Orders Placed This Encounter    TSH 3RD GENERATION     Standing Status:   Future     Standing Expiration Date:   6/38/6259    METABOLIC PANEL, COMPREHENSIVE     Standing Status:   Future     Standing Expiration Date:   7/16/2022    CBC W/O DIFF     Standing Status:   Future     Standing Expiration Date:   7/16/2022    AMB POC EKG ROUTINE W/ 12 LEADS, INTER & REP     Order Specific Question:   Reason for Exam:     Answer:   routine    potassium 99 mg tablet     Sig: Take 99 mg by mouth daily.  ZINC PO     Sig: Take  by mouth daily.  DISCONTD: traMADoL (ULTRAM) 50 mg tablet     Sig: tramadol 50 mg tablet   TAKE 1 TABLET BY MOUTH EVERY 6 HOURS AS NEEDED FOR PAIN    apixaban (Eliquis) 5 mg tablet     Sig: Take 1 tablet by mouth twice daily     Dispense:  180 Tablet     Refill:  3    amiodarone (Pacerone) 200 mg tablet     Sig: Take 1 Tablet by mouth daily. Dispense:  90 Tablet     Refill:  1    metoprolol succinate (TOPROL-XL) 25 mg XL tablet     Sig: Take 0.5 Tablets by mouth daily. Take 1 tab at night     Dispense:  45 Tablet     Refill:  1        Plan:     1. Paroxysmal atrial fibrillation s/p ablation 11/2020  Normal EF, mild AR per echo in 9/2020  Continue Eliquis 5 mg BID. denies  melena, hematuria, or obvious signs of bleeding. No falls. Checking cbc. Continue Amiodarone 200 mg daily. Checking thyroid   Restart low dose BB, prefers once a day dosing  Needs to see EP     2. NSVT / SVT s/p ablation 11/2020   Hx Elevated troponin, mostly SVT with RBBB  No significant CAD [facility] on [facility] 11/27/2021.  Normal LVEF on echo 9/2020  Restart low dose BB    3.   Aortic valve insufficiency, etiology of cardiac valve disease unspecified  Normal EF, mild AR per echo in 9/2020  Stable. 4. HTN  Reordering BB, she ran out. BP should improve. Continue BB, Hctz, amlodipine    5. HLD  4/19  defers lipid panel as she does not want statin    6. Hypokalemia  [facility] checking labs now      Continue current care and f/u in 6 months.     Rosemary Palacios, NP

## 2021-07-16 ENCOUNTER — OFFICE VISIT (OUTPATIENT)
Dept: CARDIOLOGY CLINIC | Age: 71
End: 2021-07-16
Payer: MEDICARE

## 2021-07-16 VITALS
HEART RATE: 76 BPM | RESPIRATION RATE: 16 BRPM | BODY MASS INDEX: 29.63 KG/M2 | DIASTOLIC BLOOD PRESSURE: 70 MMHG | OXYGEN SATURATION: 98 % | HEIGHT: 66 IN | SYSTOLIC BLOOD PRESSURE: 160 MMHG | WEIGHT: 184.4 LBS

## 2021-07-16 DIAGNOSIS — I48.0 PAROXYSMAL ATRIAL FIBRILLATION (HCC): ICD-10-CM

## 2021-07-16 DIAGNOSIS — R53.1 WEAKNESS GENERALIZED: ICD-10-CM

## 2021-07-16 DIAGNOSIS — I47.1 SVT (SUPRAVENTRICULAR TACHYCARDIA) (HCC): ICD-10-CM

## 2021-07-16 DIAGNOSIS — E78.2 MIXED HYPERLIPIDEMIA: ICD-10-CM

## 2021-07-16 DIAGNOSIS — I10 ESSENTIAL HYPERTENSION: Primary | ICD-10-CM

## 2021-07-16 PROCEDURE — 3017F COLORECTAL CA SCREEN DOC REV: CPT | Performed by: NURSE PRACTITIONER

## 2021-07-16 PROCEDURE — G8432 DEP SCR NOT DOC, RNG: HCPCS | Performed by: NURSE PRACTITIONER

## 2021-07-16 PROCEDURE — 93000 ELECTROCARDIOGRAM COMPLETE: CPT | Performed by: NURSE PRACTITIONER

## 2021-07-16 PROCEDURE — G8536 NO DOC ELDER MAL SCRN: HCPCS | Performed by: NURSE PRACTITIONER

## 2021-07-16 PROCEDURE — 1101F PT FALLS ASSESS-DOCD LE1/YR: CPT | Performed by: NURSE PRACTITIONER

## 2021-07-16 PROCEDURE — G9899 SCRN MAM PERF RSLTS DOC: HCPCS | Performed by: NURSE PRACTITIONER

## 2021-07-16 PROCEDURE — G8400 PT W/DXA NO RESULTS DOC: HCPCS | Performed by: NURSE PRACTITIONER

## 2021-07-16 PROCEDURE — G8427 DOCREV CUR MEDS BY ELIG CLIN: HCPCS | Performed by: NURSE PRACTITIONER

## 2021-07-16 PROCEDURE — G8419 CALC BMI OUT NRM PARAM NOF/U: HCPCS | Performed by: NURSE PRACTITIONER

## 2021-07-16 PROCEDURE — 99214 OFFICE O/P EST MOD 30 MIN: CPT | Performed by: NURSE PRACTITIONER

## 2021-07-16 PROCEDURE — G8754 DIAS BP LESS 90: HCPCS | Performed by: NURSE PRACTITIONER

## 2021-07-16 PROCEDURE — G8753 SYS BP > OR = 140: HCPCS | Performed by: NURSE PRACTITIONER

## 2021-07-16 PROCEDURE — 1090F PRES/ABSN URINE INCON ASSESS: CPT | Performed by: NURSE PRACTITIONER

## 2021-07-16 RX ORDER — TRAMADOL HYDROCHLORIDE 50 MG/1
TABLET ORAL
COMMUNITY
End: 2021-07-16

## 2021-07-16 RX ORDER — METOPROLOL SUCCINATE 25 MG/1
12.5 TABLET, EXTENDED RELEASE ORAL DAILY
Qty: 45 TABLET | Refills: 1 | Status: SHIPPED | OUTPATIENT
Start: 2021-07-16 | End: 2022-01-28

## 2021-07-16 RX ORDER — AMIODARONE HYDROCHLORIDE 200 MG/1
200 TABLET ORAL DAILY
Qty: 90 TABLET | Refills: 1 | Status: SHIPPED | OUTPATIENT
Start: 2021-07-16 | End: 2022-05-20 | Stop reason: SDUPTHER

## 2021-07-16 NOTE — PROGRESS NOTES
Chief Complaint   Patient presents with    Follow-up    Irregular Heart Beat    Hypertension     1. Have you been to the ER, urgent care clinic since your last visit? No  Hospitalized since your last visit? Yes 11/20    2. Have you seen or consulted any other health care providers outside of the [address] [place] [address] since your last visit? No Include any pap smears or colon screening.  NO    Requesting 90 day refill with [facility] C/O coughing and fatigue

## 2021-09-01 ENCOUNTER — APPOINTMENT (OUTPATIENT)
Dept: CT IMAGING | Age: 71
End: 2021-09-01
Attending: EMERGENCY MEDICINE
Payer: MEDICARE

## 2021-09-01 ENCOUNTER — HOSPITAL ENCOUNTER (EMERGENCY)
Age: 71
Discharge: HOME | End: 2021-09-02
Attending: EMERGENCY MEDICINE | Admitting: EMERGENCY MEDICINE
Payer: MEDICARE

## 2021-09-01 DIAGNOSIS — R42 VERTIGO: ICD-10-CM

## 2021-09-01 DIAGNOSIS — I10 HYPERTENSION, UNSPECIFIED TYPE: Primary | ICD-10-CM

## 2021-09-01 LAB
ALBUMIN SERPL-MCNC: 3.7 G/DL (ref 3.5–5)
ALBUMIN/GLOB SERPL: 0.9 {RATIO} (ref 1.1–2.2)
ALP SERPL-CCNC: 78 U/L (ref 45–117)
ALT SERPL-CCNC: 15 U/L (ref 12–78)
ANION GAP SERPL CALC-SCNC: 4 MMOL/L (ref 5–15)
AST SERPL-CCNC: 31 U/L (ref 15–37)
BASOPHILS # BLD: 0 K/UL (ref 0–0.1)
BASOPHILS NFR BLD: 1 % (ref 0–1)
BILIRUB SERPL-MCNC: 0.5 MG/DL (ref 0.2–1)
BUN SERPL-MCNC: 12 MG/DL (ref 6–20)
BUN/CREAT SERPL: 11 (ref 12–20)
CALCIUM SERPL-MCNC: 9 MG/DL (ref 8.5–10.1)
CHLORIDE SERPL-SCNC: 108 MMOL/L (ref 97–108)
CO2 SERPL-SCNC: 26 MMOL/L (ref 21–32)
COMMENT, HOLDF: NORMAL
CREAT SERPL-MCNC: 1.13 MG/DL (ref 0.55–1.02)
DIFFERENTIAL METHOD BLD: ABNORMAL
EOSINOPHIL # BLD: 0 K/UL (ref 0–0.4)
EOSINOPHIL NFR BLD: 1 % (ref 0–7)
ERYTHROCYTE [DISTWIDTH] IN BLOOD BY AUTOMATED COUNT: 15.1 % (ref 11.5–14.5)
GLOBULIN SER CALC-MCNC: 4.3 G/DL (ref 2–4)
GLUCOSE SERPL-MCNC: 81 MG/DL (ref 65–100)
HCT VFR BLD AUTO: 40.5 % (ref 35–47)
HGB BLD-MCNC: 13.6 G/DL (ref 11.5–16)
IMM GRANULOCYTES # BLD AUTO: 0 K/UL (ref 0–0.04)
IMM GRANULOCYTES NFR BLD AUTO: 0 % (ref 0–0.5)
LYMPHOCYTES # BLD: 0.8 K/UL (ref 0.8–3.5)
LYMPHOCYTES NFR BLD: 20 % (ref 12–49)
MCH RBC QN AUTO: 29.4 PG (ref 26–34)
MCHC RBC AUTO-ENTMCNC: 33.6 G/DL (ref 30–36.5)
MCV RBC AUTO: 87.5 FL (ref 80–99)
MONOCYTES # BLD: 0.2 K/UL (ref 0–1)
MONOCYTES NFR BLD: 6 % (ref 5–13)
NEUTS SEG # BLD: 2.9 K/UL (ref 1.8–8)
NEUTS SEG NFR BLD: 72 % (ref 32–75)
NRBC # BLD: 0 K/UL (ref 0–0.01)
NRBC BLD-RTO: 0 PER 100 WBC
PLATELET # BLD AUTO: 159 K/UL (ref 150–400)
PMV BLD AUTO: 10.9 FL (ref 8.9–12.9)
POTASSIUM SERPL-SCNC: 4.3 MMOL/L (ref 3.5–5.1)
PROT SERPL-MCNC: 8 G/DL (ref 6.4–8.2)
RBC # BLD AUTO: 4.63 M/UL (ref 3.8–5.2)
RBC MORPH BLD: ABNORMAL
SAMPLES BEING HELD,HOLD: NORMAL
SODIUM SERPL-SCNC: 138 MMOL/L (ref 136–145)
TROPONIN I SERPL-MCNC: 0.05 NG/ML
TROPONIN I SERPL-MCNC: <0.05 NG/ML
WBC # BLD AUTO: 3.9 K/UL (ref 3.6–11)

## 2021-09-01 PROCEDURE — 93005 ELECTROCARDIOGRAM TRACING: CPT

## 2021-09-01 PROCEDURE — 70450 CT HEAD/BRAIN W/O DYE: CPT

## 2021-09-01 PROCEDURE — 84484 ASSAY OF TROPONIN QUANT: CPT

## 2021-09-01 PROCEDURE — 80053 COMPREHEN METABOLIC PANEL: CPT

## 2021-09-01 PROCEDURE — 85025 COMPLETE CBC W/AUTO DIFF WBC: CPT

## 2021-09-01 PROCEDURE — 99285 EMERGENCY DEPT VISIT HI MDM: CPT

## 2021-09-01 PROCEDURE — 36415 COLL VENOUS BLD VENIPUNCTURE: CPT

## 2021-09-01 NOTE — ED NOTES
Triage Note: Patient is coming in via EMS for dizziness. Patient stopped taking blood pressure 2 weeks ago. Generalized weakness and dizziness. Denies chest pain or shortness of breath.

## 2021-09-02 VITALS
DIASTOLIC BLOOD PRESSURE: 61 MMHG | RESPIRATION RATE: 17 BRPM | SYSTOLIC BLOOD PRESSURE: 158 MMHG | OXYGEN SATURATION: 99 % | TEMPERATURE: 98 F | HEART RATE: 73 BPM

## 2021-09-02 LAB
ATRIAL RATE: 69 BPM
CALCULATED P AXIS, ECG09: 73 DEGREES
CALCULATED R AXIS, ECG10: -51 DEGREES
CALCULATED T AXIS, ECG11: 24 DEGREES
DIAGNOSIS, 93000: NORMAL
P-R INTERVAL, ECG05: 164 MS
Q-T INTERVAL, ECG07: 460 MS
QRS DURATION, ECG06: 154 MS
QTC CALCULATION (BEZET), ECG08: 492 MS
VENTRICULAR RATE, ECG03: 69 BPM

## 2021-09-02 PROCEDURE — 74011250637 HC RX REV CODE- 250/637: Performed by: EMERGENCY MEDICINE

## 2021-09-02 RX ORDER — MECLIZINE HYDROCHLORIDE 25 MG/1
25 TABLET ORAL
Qty: 10 TABLET | Refills: 0 | Status: SHIPPED | OUTPATIENT
Start: 2021-09-02 | End: 2021-09-12

## 2021-09-02 RX ORDER — AMLODIPINE BESYLATE 5 MG/1
5 TABLET ORAL
Status: COMPLETED | OUTPATIENT
Start: 2021-09-02 | End: 2021-09-02

## 2021-09-02 RX ORDER — AMLODIPINE BESYLATE 5 MG/1
5 TABLET ORAL DAILY
Status: DISCONTINUED | OUTPATIENT
Start: 2021-09-02 | End: 2021-09-02

## 2021-09-02 RX ADMIN — AMLODIPINE BESYLATE 5 MG: 5 TABLET ORAL at 00:56

## 2021-09-02 NOTE — ED NOTES
Bedside shift change report given to 2 [address] [place] [address] (oncoming nurse) by Angie Canas (offgoing nurse). Report included the following information SBAR, Kardex and MAR.

## 2021-09-02 NOTE — ED PROVIDER NOTES
HPI     70-year-old female with a history of hypertension presents the emergency department complaining of hypertension. Patient states around 3 PM she developed \"vertigo\". She was able to get herself to a chair and did not fall. She states she checked her blood pressure at that time and it was 224 over either 104 114. She did not have a headache, change in vision, change in speech, or any focal weakness or numbness at that time. She has had vertigo in the past.  She denied a headache. She states her granddaughter came over and checked her blood sugar 45 minutes later and it was still high. She states she has not had a blood pressure medicine for a week because it was making her nauseous. She states her symptoms lasted about 1 hour and resolved. She states currently she feels normal.  She has not yet been vaccinated against Covid, has not had any fever chest pain shortness of breath palpitations. Past Medical History:   Diagnosis Date    History of mammogram 2010    normal    Hypertension     Pap smear for cervical cancer screening 2011    normal       Past Surgical History:   Procedure Laterality Date    HX GYN      hystterectomy    INTRACARD ECHO, THER/DX INTERVENT N/A 11/30/2020    Intracardiac Echocardiogram performed by Daniel Marks MD at [facility] CARDIAC CATH LAB    IL EPHYS EVL TRNSPTL TX ATRIAL FIB ISOLAT PULM VEIN N/A 11/30/2020    ABLATION A-FIB  W COMPLETE EP STUDY performed by Daniel Marks MD at [facility] CARDIAC CATH LAB    IL ICAR CATHETER ABLATION ARRHYTHMIA ADD ON N/A 11/30/2020    Ablation Svt/Vt Add On performed by Daniel Marks MD at [facility] CARDIAC CATH LAB    IL INTRACARDIAC ELECTROPHYSIOLOGIC 3D MAPPING N/A 11/30/2020    Ep 3d Mapping performed by Daniel Marks MD at [facility] CARDIAC CATH LAB    STIM/PACING HEART POST IV DRUG INFU N/A 11/30/2020    Drug Stimulation performed by Daniel Marks MD at [facility] LAB         No family history on file.     Social History     Socioeconomic History    Marital status:      Spouse name: Not on file    Number of children: Not on file    Years of education: Not on file    Highest education level: Not on file   Occupational History    Not on file   Tobacco Use    Smoking status: Never Smoker    Smokeless tobacco: Never Used   Substance and Sexual Activity    Alcohol use: Yes     Comment: occasional    Drug use: No    Sexual activity: Not on file   Other Topics Concern    Not on file   Social History Narrative    Not on file     Social Determinants of Health     Financial Resource Strain:     Difficulty of Paying Living Expenses:    Food Insecurity:     Worried About Running Out of Food in the Last Year:     [address] in the Last Year:    Transportation Needs:     Lack of Transportation (Medical):  Lack of Transportation (Non-Medical):    Physical Activity:     Days of Exercise per Week:     Minutes of Exercise per Session:    Stress:     Feeling of Stress :    Social Connections:     Frequency of Communication with Friends and Family:     Frequency of Social Gatherings with Friends and Family:     Attends Oriental orthodox Services:     Active Member of Clubs or Organizations:     Attends Club or Organization Meetings:     Marital Status:    Intimate Partner Violence:     Fear of Current or Ex-Partner:     Emotionally Abused:     Physically Abused:     Sexually Abused: ALLERGIES: Codeine    Review of Systems   Constitutional: Negative for fever. HENT: Negative for congestion. Eyes: Negative for visual disturbance. Respiratory: Negative for cough and shortness of breath. Gastrointestinal: Negative for abdominal pain, nausea and vomiting. Endocrine: Negative for polyuria. Genitourinary: Negative for dysuria. Musculoskeletal: Positive for gait problem. Neurological: Positive for dizziness. Negative for speech difficulty, weakness, numbness and headaches. Psychiatric/Behavioral: Negative for dysphoric mood. Vitals:    09/01/21 1800 09/01/21 2001   BP: (!) 184/64 (!) 180/83   Pulse: 68 71   Resp: 20 16   Temp: 97.9 °F (36.6 °C) 98 °F (36.7 °C)   SpO2: 98% 99%            Physical Exam  Constitutional:       General: She is not in acute distress. Appearance: She is well-developed. HENT:      Head: Normocephalic and atraumatic. Mouth/Throat:      Pharynx: No oropharyngeal exudate. Eyes:      General: No scleral icterus. Right eye: No discharge. Left eye: No discharge. Pupils: Pupils are equal, round, and reactive to light. Neck:      Vascular: No JVD. Cardiovascular:      Rate and Rhythm: Normal rate and regular rhythm. Heart sounds: Normal heart sounds. No murmur heard. Pulmonary:      Effort: Pulmonary effort is normal. No respiratory distress. Breath sounds: Normal breath sounds. No stridor. No wheezing or rales. Chest:      Chest wall: No tenderness. Abdominal:      General: Bowel sounds are normal. There is no distension. Palpations: Abdomen is soft. There is no mass. Tenderness: There is no abdominal tenderness. There is no guarding or rebound. Musculoskeletal:         General: Normal range of motion. Cervical back: Normal range of motion and neck supple. Skin:     General: Skin is warm and dry. Capillary Refill: Capillary refill takes less than 2 seconds. Findings: No rash. Neurological:      Mental Status: She is oriented to person, place, and time. Psychiatric:         Behavior: Behavior normal.         Thought Content: Thought content normal.         Judgment: Judgment normal.          MDM       Procedures      ED EKG interpretation:  Rhythm: normal sinus rhythm; and regular . Rate (approx.): 69; Axis: left axis deviation; P wave: normal; QRS interval: prolonged; ST/T wave: non-specific changes;   No change from prior EKG on file This EKG was interpreted by Damaris Garner Davina Ca MD,ED Provider. Work-up in the emergency department is reassuring. Her blood pressure is not markedly elevated in the ED. She reports vertigo with a prior history and no other associated neurologic signs or symptoms to suggest a TIA or stroke. She has been off her blood pressure medication for a few days which is likely the reason for her hypertension earlier today. She has agreed to restart her medication and will follow closely with her primary care doctor. Return precautions were provided.

## 2021-09-02 NOTE — DISCHARGE INSTRUCTIONS
Work-up in the emergency department tonight was reassuring including CAT scan of your head chest x-ray and blood work. Your blood pressure is elevated higher than we like it but is not in a dangerous range now. You need to be taking her blood pressure medicine regularly. Please call your primary care doctor in the morning to arrange close follow-up. If you tolerate the amlodipine due to nausea, and taking it at nighttime does not help, we will need to start you on something else. I have prescribed meclizine for the vertigo. You can take every 8 hours if needed for dizziness.   If you develop recurrent episodes of vertigo especially if you have any other neurologic symptoms with it such as double or loss in vision, slurred speech, focal weakness or numbness on 1 side of your body compared to the other, etc. return to the emergency department

## 2021-09-02 NOTE — ED NOTES
I have reviewed discharge instructions with the patient. The patient verbalized understanding. Wheeled out of the department and assisted into car in no acute distress.

## 2021-11-03 ENCOUNTER — OFFICE VISIT (OUTPATIENT)
Dept: INTERNAL MEDICINE CLINIC | Age: 71
End: 2021-11-03
Payer: MEDICARE

## 2021-11-03 VITALS
OXYGEN SATURATION: 97 % | TEMPERATURE: 97.3 F | HEIGHT: 66 IN | BODY MASS INDEX: 28.61 KG/M2 | RESPIRATION RATE: 16 BRPM | SYSTOLIC BLOOD PRESSURE: 138 MMHG | WEIGHT: 178 LBS | HEART RATE: 88 BPM | DIASTOLIC BLOOD PRESSURE: 88 MMHG

## 2021-11-03 DIAGNOSIS — R42 VERTIGO: ICD-10-CM

## 2021-11-03 DIAGNOSIS — I10 ESSENTIAL HYPERTENSION: Primary | ICD-10-CM

## 2021-11-03 DIAGNOSIS — L02.416 CELLULITIS AND ABSCESS OF LEFT LOWER EXTREMITY: ICD-10-CM

## 2021-11-03 DIAGNOSIS — L03.116 CELLULITIS AND ABSCESS OF LEFT LOWER EXTREMITY: ICD-10-CM

## 2021-11-03 DIAGNOSIS — E78.00 HYPERCHOLESTEREMIA: ICD-10-CM

## 2021-11-03 DIAGNOSIS — I89.0 LYMPHEDEMA: ICD-10-CM

## 2021-11-03 LAB
CHOLEST SERPL-MCNC: 204 MG/DL
HDLC SERPL-MCNC: 70 MG/DL
LDL CHOLESTEROL POC: 124 MG/DL
NON-HDL CHOLESTEROL, 011976: 134
TCHOL/HDL RATIO (POC): 2.9
TRIGL SERPL-MCNC: 50 MG/DL

## 2021-11-03 PROCEDURE — 3017F COLORECTAL CA SCREEN DOC REV: CPT | Performed by: INTERNAL MEDICINE

## 2021-11-03 PROCEDURE — 1090F PRES/ABSN URINE INCON ASSESS: CPT | Performed by: INTERNAL MEDICINE

## 2021-11-03 PROCEDURE — G8752 SYS BP LESS 140: HCPCS | Performed by: INTERNAL MEDICINE

## 2021-11-03 PROCEDURE — G8427 DOCREV CUR MEDS BY ELIG CLIN: HCPCS | Performed by: INTERNAL MEDICINE

## 2021-11-03 PROCEDURE — 99214 OFFICE O/P EST MOD 30 MIN: CPT | Performed by: INTERNAL MEDICINE

## 2021-11-03 PROCEDURE — G8754 DIAS BP LESS 90: HCPCS | Performed by: INTERNAL MEDICINE

## 2021-11-03 PROCEDURE — G8510 SCR DEP NEG, NO PLAN REQD: HCPCS | Performed by: INTERNAL MEDICINE

## 2021-11-03 PROCEDURE — G8400 PT W/DXA NO RESULTS DOC: HCPCS | Performed by: INTERNAL MEDICINE

## 2021-11-03 PROCEDURE — G8536 NO DOC ELDER MAL SCRN: HCPCS | Performed by: INTERNAL MEDICINE

## 2021-11-03 PROCEDURE — G8419 CALC BMI OUT NRM PARAM NOF/U: HCPCS | Performed by: INTERNAL MEDICINE

## 2021-11-03 PROCEDURE — 80061 LIPID PANEL: CPT | Performed by: INTERNAL MEDICINE

## 2021-11-03 PROCEDURE — G9899 SCRN MAM PERF RSLTS DOC: HCPCS | Performed by: INTERNAL MEDICINE

## 2021-11-03 PROCEDURE — 1101F PT FALLS ASSESS-DOCD LE1/YR: CPT | Performed by: INTERNAL MEDICINE

## 2021-11-03 RX ORDER — MECLIZINE HYDROCHLORIDE 25 MG/1
25 TABLET ORAL
Qty: 60 TABLET | Refills: 3 | Status: SHIPPED | OUTPATIENT
Start: 2021-11-03 | End: 2021-11-13

## 2021-11-03 NOTE — PROGRESS NOTES
1. Have you been to the ER, urgent care clinic since your last visit? Hospitalized since your last visit?no    2. Have you seen or consulted any other health care providers outside of the [address] [place] [address] since your last visit? Include any pap smears or colon screening. No    Chief Complaint   Patient presents with    Hypertension     Per Dr. Matty Murillo.,  verbal order given for needed amb poc labs.   3 most recent PHQ Screens 11/3/2021   PHQ Not Done -   Little interest or pleasure in doing things Not at all   Feeling down, depressed, irritable, or hopeless Not at all   Total Score PHQ 2 0

## 2021-11-03 NOTE — PATIENT INSTRUCTIONS
[employer] Activation    Thank you for requesting access to [employer]. Please follow the instructions below to securely access and download your online medical record. [employer] allows you to send messages to your doctor, view your test results, renew your prescriptions, schedule appointments, and more. How Do I Sign Up? 1. In your internet browser, go to [email or web address]  2. Click on the First Time User? Click Here link in the Sign In box. You will be redirect to the New Member Sign Up page. 3. Enter your [employer] Access Code exactly as it appears below. You will not need to use this code after youve completed the sign-up process. If you do not sign up before the expiration date, you must request a new code. [employer] Access Code: [email or web address]  Expires: 2021 11:52 AM (This is the date your [employer] access code will )    4. Enter the last four digits of your Social Security Number (xxxx) and Date of Birth (mm/dd/yyyy) as indicated and click Submit. You will be taken to the next sign-up page. 5. Create a [employer] ID. This will be your [employer] login ID and cannot be changed, so think of one that is secure and easy to remember. 6. Create a [employer] password. You can change your password at any time. 7. Enter your Password Reset Question and Answer. This can be used at a later time if you forget your password. 8. Enter your e-mail address. You will receive e-mail notification when new information is available in [address] 9. Click Sign Up. You can now view and download portions of your medical record. 10. Click the Download Summary menu link to download a portable copy of your medical information. Additional Information    If you have questions, please visit the Frequently Asked Questions section of the [employer] website at [email or web address] Remember, [employer] is NOT to be used for urgent needs. For medical emergencies, dial 911.

## 2021-11-03 NOTE — PROGRESS NOTES
Юлия No is a 70 y.o. female and presents with Hypertension  . Subjective:  Hypertension Review:  The patient has essential hypertension  Diet and Lifestyle: generally follows a  low sodium diet, exercises sporadically  Home BP Monitoring: is not measured at home. Pertinent ROS: taking medications as instructed, no medication side effects noted, no TIA's, no chest pain on exertion, no dyspnea on exertion, no swelling of ankles. He has a history of vertigo with relief with Meclizine. She has history lymphedema and cellulitis  She has been on  Meclizine with relief. Review of Systems  Constitutional: negative for fevers, chills, anorexia and weight loss  Eyes:   negative for visual disturbance and irritation  ENT:   negative for tinnitus,sore throat,nasal congestion,ear pains. hoarseness  Respiratory:  negative for cough, hemoptysis, dyspnea,wheezing  CV:   negative for chest pain, palpitations, lower extremity edema  GI:   negative for nausea, vomiting, diarrhea, abdominal pain,melena  Endo:               negative for polyuria,polydipsia,polyphagia,heat intolerance  Genitourinary: negative for frequency, dysuria and hematuria  Integument:  negative for rash and pruritus  Hematologic:  negative for easy bruising and gum/nose bleeding  Musculoskel: negative for myalgias, arthralgias, back pain, muscle weakness, joint pain  Neurological:   vertigo  Behavl/Psych: negative for feelings of anxiety, depression, mood changes    Past Medical History:   Diagnosis Date    History of mammogram 2010    normal    Hypertension     Pap smear for cervical cancer screening 2011    normal     Past Surgical History:   Procedure Laterality Date    HX GYN      hystterectomy    INTRACARD ECHO, THER/DX INTERVENT N/A 11/30/2020    Intracardiac Echocardiogram performed by Charmayne Leeds, MD at [facility] CARDIAC CATH LAB    [place] EPHYS EVL TRNSPTL TX ATRIAL FIB ISOLAT PULM VEIN N/A 11/30/2020    ABLATION A-FIB  W COMPLETE EP STUDY performed by Easton Pan MD at [facility] CARDIAC CATH LAB    NV ICAR CATHETER ABLATION ARRHYTHMIA ADD ON N/A 11/30/2020    Ablation Svt/Vt Add On performed by Easton Pan MD at [facility] CARDIAC CATH LAB    NV INTRACARDIAC ELECTROPHYSIOLOGIC 3D MAPPING N/A 11/30/2020    Ep 3d Mapping performed by Easton Pan MD at [facility] CARDIAC CATH LAB    STIM/PACING HEART POST IV DRUG INFU N/A 11/30/2020    Drug Stimulation performed by Easton Pan MD at [facility] CARDIAC CATH LAB     Social History     Socioeconomic History    Marital status:    Tobacco Use    Smoking status: Never Smoker    Smokeless tobacco: Never Used   Substance and Sexual Activity    Alcohol use: Yes     Comment: occasional    Drug use: No     History reviewed. No pertinent family history. Current Outpatient Medications   Medication Sig Dispense Refill    apixaban (Eliquis) 5 mg tablet Take 1 tablet by mouth twice daily 180 Tablet 3    amiodarone (Pacerone) 200 mg tablet Take 1 Tablet by mouth daily. 90 Tablet 1    amLODIPine (NORVASC) 5 mg tablet Take 1 Tab by mouth daily. 30 Tab 12    potassium 99 mg tablet Take 99 mg by mouth daily. (Patient not taking: Reported on 9/1/2021)      ZINC PO Take  by mouth daily. (Patient not taking: Reported on 9/1/2021)      metoprolol succinate (TOPROL-XL) 25 mg XL tablet Take 0.5 Tablets by mouth daily. Take 1 tab at night (Patient not taking: Reported on 9/1/2021) 45 Tablet 1    hydroCHLOROthiazide (HYDRODIURIL) 25 mg tablet Take 1 Tab by mouth daily. (Patient not taking: Reported on 9/1/2021) 30 Tab 12    cholecalciferol (VITAMIN D3) (1000 Units /25 mcg) tablet Take 1 Tab by mouth daily. (Patient not taking: Reported on 9/1/2021) 30 Tab 0    cyanocobalamin 1,000 mcg tablet Take 1 Tab by mouth daily.  (Patient not taking: Reported on 9/1/2021) 30 Tab 0     Allergies   Allergen Reactions    Codeine Itching and Swelling       Objective:  Visit Vitals  /88 Pulse 88   Temp 97.3 °F (36.3 °C) (Oral)   Resp 16   Ht 5' 6\" (1.676 m)   Wt 178 lb (80.7 kg)   SpO2 97%   BMI 28.73 kg/m²     Physical Exam:   General appearance - alert, well appearing, and in no distress  Mental status - alert, oriented to person, place, and time  EYE-SUSSY, EOMI, corneas normal, no foreign bodies  ENT-ENT exam normal, no neck nodes or sinus tenderness  Nose - normal and patent, no erythema, discharge or polyps  Mouth - mucous membranes moist, pharynx normal without lesions  Neck - supple, no significant adenopathy   Chest - clear to auscultation, no wheezes, rales or rhonchi, symmetric air entry   Heart - normal rate, regular rhythm, normal S1, S2, no murmurs, rubs, clicks or gallops   Abdomen - soft, nontender, nondistended, no masses or organomegaly  Lymph- no adenopathy palpable  Ext-peripheral pulses normal, no pedal edema, no clubbing or cyanosis  Skin-Warm and dry. no hyperpigmentation, vitiligo, or suspicious lesions  Neuro -alert, oriented, normal speech, no focal findings or movement disorder noted  Neck-normal C-spine, no tenderness, full ROM without pain  Feet-no nail deformities or callus formation with good pulses noted  Bilateral lower leg erythema and edema. Results for orders placed or performed during the hospital encounter of 09/01/21   CBC WITH AUTOMATED DIFF   Result Value Ref Range    WBC 3.9 3.6 - 11.0 K/uL    RBC 4.63 3.80 - 5.20 M/uL    HGB 13.6 11.5 - 16.0 g/dL    HCT 40.5 35.0 - 47.0 %    MCV 87.5 80.0 - 99.0 FL    MCH 29.4 26.0 - 34.0 PG    MCHC 33.6 30.0 - 36.5 g/dL    RDW 15.1 (H) 11.5 - 14.5 %    PLATELET 197 810 - 138 K/uL    MPV 10.9 8.9 - 12.9 FL    NRBC 0.0 0  WBC    ABSOLUTE NRBC 0.00 0.00 - 0.01 K/uL    NEUTROPHILS 72 32 - 75 %    LYMPHOCYTES 20 12 - 49 %    MONOCYTES 6 5 - 13 %    EOSINOPHILS 1 0 - 7 %    BASOPHILS 1 0 - 1 %    IMMATURE GRANULOCYTES 0 0.0 - 0.5 %    ABS. NEUTROPHILS 2.9 1.8 - 8.0 K/UL    ABS. LYMPHOCYTES 0.8 0.8 - 3.5 K/UL    ABS. MONOCYTES 0.2 0.0 - 1.0 K/UL    ABS. EOSINOPHILS 0.0 0.0 - 0.4 K/UL    ABS. BASOPHILS 0.0 0.0 - 0.1 K/UL    ABS. IMM. GRANS. 0.0 0.00 - 0.04 K/UL    DF SMEAR SCANNED      RBC COMMENTS NORMOCYTIC, NORMOCHROMIC     METABOLIC PANEL, COMPREHENSIVE   Result Value Ref Range    Sodium 138 136 - 145 mmol/L    Potassium 4.3 3.5 - 5.1 mmol/L    Chloride 108 97 - 108 mmol/L    CO2 26 21 - 32 mmol/L    Anion gap 4 (L) 5 - 15 mmol/L    Glucose 81 65 - 100 mg/dL    BUN 12 6 - 20 MG/DL    Creatinine 1.13 (H) 0.55 - 1.02 MG/DL    BUN/Creatinine ratio 11 (L) 12 - 20      GFR est AA 58 (L) >60 ml/min/1.73m2    GFR est non-AA 47 (L) >60 ml/min/1.73m2    Calcium 9.0 8.5 - 10.1 MG/DL    Bilirubin, total 0.5 0.2 - 1.0 MG/DL    ALT (SGPT) 15 12 - 78 U/L    AST (SGOT) 31 15 - 37 U/L    Alk. phosphatase 78 45 - 117 U/L    Protein, total 8.0 6.4 - 8.2 g/dL    Albumin 3.7 3.5 - 5.0 g/dL    Globulin 4.3 (H) 2.0 - 4.0 g/dL    A-G Ratio 0.9 (L) 1.1 - 2.2     SAMPLES BEING HELD   Result Value Ref Range    SAMPLES BEING HELD 1RED     COMMENT        Add-on orders for these samples will be processed based on acceptable specimen integrity and analyte stability, which may vary by analyte. TROPONIN I   Result Value Ref Range    Troponin-I, Qt. 0.05 (H) <0.05 ng/mL   TROPONIN I   Result Value Ref Range    Troponin-I, Qt. <0.05 <0.05 ng/mL   EKG, 12 LEAD, INITIAL   Result Value Ref Range    Ventricular Rate 69 BPM    Atrial Rate 69 BPM    P-R Interval 164 ms    QRS Duration 154 ms    Q-T Interval 460 ms    QTC Calculation (Bezet) 492 ms    Calculated P Axis 73 degrees    Calculated R Axis -51 degrees    Calculated T Axis 24 degrees    Diagnosis       Normal sinus rhythm  Left axis deviation  Right bundle branch block    When compared with ECG of 01-SEP-2021 18:27,  No significant change    Confirmed by Nadia Lynn M.D., [facility] ([id number]) on 9/2/2021 12:35:24 AM         Assessment/Plan:    ICD-10-CM ICD-9-CM    1.  Essential hypertension  I10 401.9 AMB POC LIPID PROFILE   2. Hypercholesteremia  E78.00 272.0 AMB POC LIPID PROFILE     Orders Placed This Encounter    AMB POC LIPID PROFILE     lose weight, increase physical activity, follow low fat diet, follow low salt diet, call if any problems,Take 81mg aspirin daily  There are no Patient Instructions on file for this visit. I have reviewed with the patient details of the assessment and plan and all questions were answered. Relevent patient education was performed. The most recent lab findings were reviewed with the patient. An After Visit Summary was printed and given to the patient.

## 2021-11-19 ENCOUNTER — TELEPHONE (OUTPATIENT)
Dept: INTERNAL MEDICINE CLINIC | Age: 71
End: 2021-11-19

## 2021-11-19 NOTE — TELEPHONE ENCOUNTER
ot AWARE PATIENT NEEDS AN APPT FOR OT AND WILL HAVE HER TO SCHEDULE.----- Message from Dakota Rubi sent at 11/19/2021 11:00 AM EST -----  Subject: Message to Provider    QUESTIONS  Information for Provider? Keshav called asking for orders for OT, to help   the patient shower, please call Keshav back with verbal orders   [phone] - BP - 142/80 - 11/19/2021 160/81 - 11/18/2021 141/76 -   11/18/2021 147/70 - 11/18/2021 131/70 - 11/18/2021  ---------------------------------------------------------------------------  --------------  CALL BACK INFO  What is the best way for the office to contact you? OK to leave message on   voicemail  Preferred Call Back Phone Number? [phone]  ---------------------------------------------------------------------------  --------------  SCRIPT ANSWERS  Relationship to Patient? Third Party  Representative Name?  Keshav in home PT

## 2021-11-23 ENCOUNTER — VIRTUAL VISIT (OUTPATIENT)
Dept: INTERNAL MEDICINE CLINIC | Age: 71
End: 2021-11-23
Payer: MEDICARE

## 2021-11-23 DIAGNOSIS — I10 ESSENTIAL HYPERTENSION: Primary | ICD-10-CM

## 2021-11-23 DIAGNOSIS — E78.00 HYPERCHOLESTEREMIA: ICD-10-CM

## 2021-11-23 DIAGNOSIS — I48.0 PAF (PAROXYSMAL ATRIAL FIBRILLATION) (HCC): ICD-10-CM

## 2021-11-23 PROCEDURE — 99442 PR PHYS/QHP TELEPHONE EVALUATION 11-20 MIN: CPT | Performed by: INTERNAL MEDICINE

## 2021-11-23 NOTE — PROGRESS NOTES
Gisela Worthington is a 70 y.o. female  HIPAA verified by two patient identifiers. Health Maintenance Due   Topic    COVID-19 Vaccine (1)    DTaP/Tdap/Td series (1 - Tdap)    Shingrix Vaccine Age 49> (1 of 2)    Breast Cancer Screen Mammogram     Pneumococcal 65+ years (1 of 1 - PPSV23)    Colorectal Cancer Screening Combo      Chief Complaint   Patient presents with    Hypertension     Patient-Reported Vitals 11/23/2021   Patient-Reported Weight 178lb   Patient-Reported Systolic  689   Patient-Reported Diastolic 79       Pain Scale: /10  Pain Location:             1. Have you been to the ER, urgent care clinic since your last visit? Hospitalized since your last visit? No    2. Have you seen or consulted any other health care providers outside of the [address] [place] [address] since your last visit? Include any pap smears or colon screening.  No

## 2021-11-23 NOTE — PROGRESS NOTES
Joshua Malhotra is a 70 y.o. female evaluated via telephone on 11/23/2021. Consent:  She and/or health care decision maker is aware that she may receive a bill for this telephone service, depending on her insurance coverage, and has provided verbal consent to proceed: Yes      Documentation:  I communicated with the patient and/or health care decision maker about hypertensipn. Details of this discussion including any medical advice provided: hypertension      I affirm this is a Patient Initiated Episode with an Established Patient who has not had a related appointment within my department in the past 7 days or scheduled within the next 24 hours. Total Time: minutes: 5-10 minutes      Note: not billable if this call serves to triage the patient into an appointment for the relevant concern      Abel Esqueda MD    Hypertension Review:  The patient has essential hypertension  Diet and Lifestyle: generally follows a  low sodium diet, exercises sporadically  Home BP Monitoring: is not measured at home. Pertinent ROS: taking medications as instructed, no medication side effects noted, no TIA's, no chest pain on exertion, no dyspnea on exertion, no swelling of ankles. Dyslipidemia Review:  Patient presents for evaluation of lipids. Compliance with treatment thus far has been excellent. A repeat fasting lipid profile was not done. The patient does not use medications that may worsen dyslipidemias (corticosteroids, progestins, anabolic steroids, amiodarone, cyclosporine, olanzapine). The patient exercises some    She has reported some dizziness on occasion    She has intermittent a.fib    Review of Systems  Constitutional: negative for fevers, chills, anorexia and weight loss  Eyes:   negative for visual disturbance and irritation  ENT:   negative for tinnitus,sore throat,nasal congestion,ear pains. hoarseness  Respiratory:  negative for cough, hemoptysis, dyspnea,wheezing  CV:   negative for chest pain, palpitations, lower extremity edema  GI:   negative for nausea, vomiting, diarrhea, abdominal pain,melena  Endo:               negative for polyuria,polydipsia,polyphagia,heat intolerance  Genitourinary: negative for frequency, dysuria and hematuria  Integument:  negative for rash and pruritus  Hematologic:  negative for easy bruising and gum/nose bleeding  Musculoskel: negative for myalgias, arthralgias, back pain, muscle weakness, joint pain  Neurological:  negative for headaches, dizziness, vertigo, memory problems and gait   Behavl/Psych: negative for feelings of anxiety, depression, mood changes    Past Medical History:   Diagnosis Date    History of mammogram 2010    normal    Hypertension     Pap smear for cervical cancer screening 2011    normal     Past Surgical History:   Procedure Laterality Date    HX GYN      hystterectomy    INTRACARD ECHO, THER/DX INTERVENT N/A 11/30/2020    Intracardiac Echocardiogram performed by Sol Christine MD at [facility] CARDIAC CATH LAB    GA EPHYS EVL TRNSPTL TX ATRIAL FIB ISOLAT PULM VEIN N/A 11/30/2020    ABLATION A-FIB  W COMPLETE EP STUDY performed by Sol Christine MD at [facility] CARDIAC CATH LAB    GA ICAR CATHETER ABLATION ARRHYTHMIA ADD ON N/A 11/30/2020    Ablation Svt/Vt Add On performed by Sol Christine MD at [facility] CARDIAC CATH LAB    GA INTRACARDIAC ELECTROPHYSIOLOGIC 3D MAPPING N/A 11/30/2020    Ep 3d Mapping performed by Sol Christine MD at [facility] CARDIAC CATH LAB    STIM/PACING HEART POST IV DRUG INFU N/A 11/30/2020    Drug Stimulation performed by Sol Christine MD at [facility] CARDIAC CATH LAB     Social History     Socioeconomic History    Marital status:    Tobacco Use    Smoking status: Never Smoker    Smokeless tobacco: Never Used   Substance and Sexual Activity    Alcohol use: Yes     Comment: occasional    Drug use: No     History reviewed. No pertinent family history.   Current Outpatient Medications   Medication Sig Dispense Refill    potassium 99 mg tablet Take 99 mg by mouth daily.  ZINC PO Take  by mouth daily.  apixaban (Eliquis) 5 mg tablet Take 1 tablet by mouth twice daily 180 Tablet 3    amiodarone (Pacerone) 200 mg tablet Take 1 Tablet by mouth daily. 90 Tablet 1    amLODIPine (NORVASC) 5 mg tablet Take 1 Tab by mouth daily. 30 Tab 12    hydroCHLOROthiazide (HYDRODIURIL) 25 mg tablet Take 1 Tab by mouth daily. 30 Tab 12    cholecalciferol (VITAMIN D3) (1000 Units /25 mcg) tablet Take 1 Tab by mouth daily. 30 Tab 0    cyanocobalamin 1,000 mcg tablet Take 1 Tab by mouth daily. 30 Tab 0    metoprolol succinate (TOPROL-XL) 25 mg XL tablet Take 0.5 Tablets by mouth daily. Take 1 tab at night (Patient not taking: Reported on 9/1/2021) 45 Tablet 1     Allergies   Allergen Reactions    Codeine Itching and Swelling       Objective: There were no vitals taken for this visit. Bp:146/80    Results for orders placed or performed in visit on 11/03/21   AMB POC LIPID PROFILE   Result Value Ref Range    Cholesterol (POC) 204     Triglycerides (POC) 50     HDL Cholesterol (POC) 70     Non-HDL Cholesterol 134     LDL Cholesterol (POC) 124 MG/DL    TChol/HDL Ratio (POC) 2.9        Assessment/Plan:    ICD-10-CM ICD-9-CM    1. Essential hypertension  I10 401.9    2. Hypercholesteremia  E78.00 272.0    3. PAF (paroxysmal atrial fibrillation) ([facility])  I48.0 427.31      No orders of the defined types were placed in this encounter. call if any problems,  Patient Instructions   MiniLuxe Activation    Thank you for requesting access to MiniLuxe. Please follow the instructions below to securely access and download your online medical record. MiniLuxe allows you to send messages to your doctor, view your test results, renew your prescriptions, schedule appointments, and more. How Do I Sign Up? 1. In your internet browser, go to [email or web address]  2. Click on the First Time User? Click Here link in the Sign In box.  You will be redirect to the New Member Sign Up page. 3. Enter your [employer] Access Code exactly as it appears below. You will not need to use this code after youve completed the sign-up process. If you do not sign up before the expiration date, you must request a new code. [employer] Access Code: [email or web address]  Expires: 2021 10:52 AM (This is the date your [employer] access code will )    4. Enter the last four digits of your Social Security Number (xxxx) and Date of Birth (mm/dd/yyyy) as indicated and click Submit. You will be taken to the next sign-up page. 5. Create a Connectemt ID. This will be your [employer] login ID and cannot be changed, so think of one that is secure and easy to remember. 6. Create a [employer] password. You can change your password at any time. 7. Enter your Password Reset Question and Answer. This can be used at a later time if you forget your password. 8. Enter your e-mail address. You will receive e-mail notification when new information is available in [address] 9. Click Sign Up. You can now view and download portions of your medical record. 10. Click the Download Summary menu link to download a portable copy of your medical information. Additional Information    If you have questions, please visit the Frequently Asked Questions section of the [employer] website at [email or web address] Remember, [employer] is NOT to be used for urgent needs. For medical emergencies, dial 911. Follow-up and Dispositions    · Return in about 4 weeks (around 2021). I have reviewed with the patient details of the assessment and plan and all questions were answered. Relevent patient education was performed. The most recent lab findings were reviewed with the patient. An After Visit Summary was printed and given to the patient.

## 2021-11-23 NOTE — PATIENT INSTRUCTIONS
Sunsea Activation    Thank you for requesting access to Sunsea. Please follow the instructions below to securely access and download your online medical record. Sunsea allows you to send messages to your doctor, view your test results, renew your prescriptions, schedule appointments, and more. How Do I Sign Up? 1. In your internet browser, go to [email or web address]  2. Click on the First Time User? Click Here link in the Sign In box. You will be redirect to the New Member Sign Up page. 3. Enter your Sunsea Access Code exactly as it appears below. You will not need to use this code after youve completed the sign-up process. If you do not sign up before the expiration date, you must request a new code. Sunsea Access Code: [email or web address]  Expires: 2021 10:52 AM (This is the date your Sunsea access code will )    4. Enter the last four digits of your Social Security Number (xxxx) and Date of Birth (mm/dd/yyyy) as indicated and click Submit. You will be taken to the next sign-up page. 5. Create a Sunsea ID. This will be your Sunsea login ID and cannot be changed, so think of one that is secure and easy to remember. 6. Create a Sunsea password. You can change your password at any time. 7. Enter your Password Reset Question and Answer. This can be used at a later time if you forget your password. 8. Enter your e-mail address. You will receive e-mail notification when new information is available in [address] 9. Click Sign Up. You can now view and download portions of your medical record. 10. Click the Download Summary menu link to download a portable copy of your medical information. Additional Information    If you have questions, please visit the Frequently Asked Questions section of the Sunsea website at [email or web address] Remember, Sunsea is NOT to be used for urgent needs. For medical emergencies, dial 911.

## 2021-12-29 ENCOUNTER — TELEPHONE (OUTPATIENT)
Dept: INTERNAL MEDICINE CLINIC | Age: 71
End: 2021-12-29

## 2022-01-09 ENCOUNTER — HOSPITAL ENCOUNTER (EMERGENCY)
Age: 72
Discharge: HOME | End: 2022-01-09
Attending: EMERGENCY MEDICINE
Payer: MEDICARE

## 2022-01-09 VITALS
SYSTOLIC BLOOD PRESSURE: 174 MMHG | HEART RATE: 72 BPM | DIASTOLIC BLOOD PRESSURE: 96 MMHG | TEMPERATURE: 98.9 F | WEIGHT: 178 LBS | RESPIRATION RATE: 16 BRPM | HEIGHT: 67 IN | OXYGEN SATURATION: 99 % | BODY MASS INDEX: 27.94 KG/M2

## 2022-01-09 DIAGNOSIS — I10 PRIMARY HYPERTENSION: Primary | ICD-10-CM

## 2022-01-09 DIAGNOSIS — E86.0 DEHYDRATION: ICD-10-CM

## 2022-01-09 LAB
ALBUMIN SERPL-MCNC: 3.6 G/DL (ref 3.5–5)
ALBUMIN/GLOB SERPL: 1 {RATIO} (ref 1.1–2.2)
ALP SERPL-CCNC: 68 U/L (ref 45–117)
ALT SERPL-CCNC: 19 U/L (ref 12–78)
ANION GAP SERPL CALC-SCNC: 11 MMOL/L (ref 5–15)
APPEARANCE UR: ABNORMAL
AST SERPL-CCNC: 28 U/L (ref 15–37)
BACTERIA URNS QL MICRO: NEGATIVE /HPF
BASOPHILS # BLD: 0 K/UL (ref 0–0.1)
BASOPHILS NFR BLD: 1 % (ref 0–1)
BILIRUB SERPL-MCNC: 0.5 MG/DL (ref 0.2–1)
BILIRUB UR QL: NEGATIVE
BUN SERPL-MCNC: 15 MG/DL (ref 6–20)
BUN/CREAT SERPL: 11 (ref 12–20)
CALCIUM SERPL-MCNC: 8.8 MG/DL (ref 8.5–10.1)
CHLORIDE SERPL-SCNC: 103 MMOL/L (ref 97–108)
CO2 SERPL-SCNC: 28 MMOL/L (ref 21–32)
COLOR UR: ABNORMAL
CREAT SERPL-MCNC: 1.31 MG/DL (ref 0.55–1.02)
DIFFERENTIAL METHOD BLD: NORMAL
EOSINOPHIL # BLD: 0.1 K/UL (ref 0–0.4)
EOSINOPHIL NFR BLD: 1 % (ref 0–7)
EPITH CASTS URNS QL MICRO: ABNORMAL /LPF
ERYTHROCYTE [DISTWIDTH] IN BLOOD BY AUTOMATED COUNT: 13.9 % (ref 11.5–14.5)
GLOBULIN SER CALC-MCNC: 3.5 G/DL (ref 2–4)
GLUCOSE SERPL-MCNC: 80 MG/DL (ref 65–100)
GLUCOSE UR STRIP.AUTO-MCNC: NEGATIVE MG/DL
HCT VFR BLD AUTO: 37.8 % (ref 35–47)
HGB BLD-MCNC: 12.3 G/DL (ref 11.5–16)
HGB UR QL STRIP: NEGATIVE
IMM GRANULOCYTES # BLD AUTO: 0 K/UL (ref 0–0.04)
IMM GRANULOCYTES NFR BLD AUTO: 0 % (ref 0–0.5)
KETONES UR QL STRIP.AUTO: ABNORMAL MG/DL
LEUKOCYTE ESTERASE UR QL STRIP.AUTO: NEGATIVE
LYMPHOCYTES # BLD: 1.1 K/UL (ref 0.8–3.5)
LYMPHOCYTES NFR BLD: 22 % (ref 12–49)
MCH RBC QN AUTO: 28.9 PG (ref 26–34)
MCHC RBC AUTO-ENTMCNC: 32.5 G/DL (ref 30–36.5)
MCV RBC AUTO: 88.7 FL (ref 80–99)
MONOCYTES # BLD: 0.4 K/UL (ref 0–1)
MONOCYTES NFR BLD: 7 % (ref 5–13)
NEUTS SEG # BLD: 3.5 K/UL (ref 1.8–8)
NEUTS SEG NFR BLD: 69 % (ref 32–75)
NITRITE UR QL STRIP.AUTO: NEGATIVE
NRBC # BLD: 0 K/UL (ref 0–0.01)
NRBC BLD-RTO: 0 PER 100 WBC
PH UR STRIP: 6.5 [PH] (ref 5–8)
PLATELET # BLD AUTO: 190 K/UL (ref 150–400)
PMV BLD AUTO: 10.2 FL (ref 8.9–12.9)
POTASSIUM SERPL-SCNC: 3.5 MMOL/L (ref 3.5–5.1)
PROT SERPL-MCNC: 7.1 G/DL (ref 6.4–8.2)
PROT UR STRIP-MCNC: NEGATIVE MG/DL
RBC # BLD AUTO: 4.26 M/UL (ref 3.8–5.2)
RBC #/AREA URNS HPF: ABNORMAL /HPF (ref 0–5)
SODIUM SERPL-SCNC: 142 MMOL/L (ref 136–145)
SP GR UR REFRACTOMETRY: 1.01 (ref 1–1.03)
UA: UC IF INDICATED,UAUC: ABNORMAL
UROBILINOGEN UR QL STRIP.AUTO: 0.2 EU/DL (ref 0.2–1)
WBC # BLD AUTO: 5 K/UL (ref 3.6–11)
WBC URNS QL MICRO: ABNORMAL /HPF (ref 0–4)

## 2022-01-09 PROCEDURE — 81001 URINALYSIS AUTO W/SCOPE: CPT

## 2022-01-09 PROCEDURE — 93005 ELECTROCARDIOGRAM TRACING: CPT

## 2022-01-09 PROCEDURE — 96360 HYDRATION IV INFUSION INIT: CPT

## 2022-01-09 PROCEDURE — 85025 COMPLETE CBC W/AUTO DIFF WBC: CPT

## 2022-01-09 PROCEDURE — 99284 EMERGENCY DEPT VISIT MOD MDM: CPT

## 2022-01-09 PROCEDURE — 80053 COMPREHEN METABOLIC PANEL: CPT

## 2022-01-09 PROCEDURE — 36415 COLL VENOUS BLD VENIPUNCTURE: CPT

## 2022-01-09 PROCEDURE — 96361 HYDRATE IV INFUSION ADD-ON: CPT

## 2022-01-09 PROCEDURE — 74011250636 HC RX REV CODE- 250/636: Performed by: EMERGENCY MEDICINE

## 2022-01-09 RX ADMIN — SODIUM CHLORIDE 1000 ML: 9 INJECTION, SOLUTION INTRAVENOUS at 18:30

## 2022-01-10 LAB
ATRIAL RATE: 64 BPM
CALCULATED R AXIS, ECG10: -48 DEGREES
CALCULATED T AXIS, ECG11: -4 DEGREES
DIAGNOSIS, 93000: NORMAL
P-R INTERVAL, ECG05: 150 MS
Q-T INTERVAL, ECG07: 486 MS
QRS DURATION, ECG06: 150 MS
QTC CALCULATION (BEZET), ECG08: 501 MS
VENTRICULAR RATE, ECG03: 64 BPM

## 2022-01-10 NOTE — ED NOTES
Bedside and Verbal shift change report given to [address], RN (oncoming nurse) by ANDRAE NGUYEN (offgoing nurse). Report included the following information SBAR.

## 2022-01-10 NOTE — TELEPHONE ENCOUNTER
SPOKE WITH CELINA WITH [facility] VERBAL ORDERS FOR SOCIAL WORK. WORK EVALUATION AND COMMUNITY RESOURCES. , PER DR LINDSEY GARCIA.

## 2022-01-10 NOTE — ED NOTES
Patient given copy of dc instructions and 0 script(s). Patient verbalized understanding of instructions and script (s). Patient given a current medication reconciliation form and verbalized understanding of their medications. Patient verbalized understanding of the importance of discussing medications with  his or her physician or clinic they will be following up with. Patient alert and oriented and in no acute distress. Patient discharged home via wheelchair.

## 2022-01-10 NOTE — ED PROVIDER NOTES
EMERGENCY DEPARTMENT HISTORY AND PHYSICAL EXAM      Date: 1/9/2022  Patient Name: Delfin Krishna    History of Presenting Illness     Chief Complaint   Patient presents with    Fatigue     complains of fatigue times three days     History Provided By: Patient    HPI: Delfin Krishna, 70 y.o. female with past medical history significant for hypertension who presents via EMS to the ED with cc of generalized fatigue and weakness for the past 2 to 3 days. Patient denies any nausea, vomiting, diarrhea, fevers, or chills. She also denies any cough. She states it feels like the energy has been sucked out of her. She denies any dysuria or urinary frequency. She does state that she has not had much of an appetite. PMHx: Hypertension  Social Hx: Rare alcohol use, denies tobacco use, denies illegal drug use    PCP: Shabnam Joseph MD    There are no other complaints, changes, or physical findings at this time. No current facility-administered medications on file prior to encounter. Current Outpatient Medications on File Prior to Encounter   Medication Sig Dispense Refill    apixaban (Eliquis) 5 mg tablet Take 1 tablet by mouth twice daily 180 Tablet 3    amiodarone (Pacerone) 200 mg tablet Take 1 Tablet by mouth daily. 90 Tablet 1    amLODIPine (NORVASC) 5 mg tablet Take 1 Tab by mouth daily. 30 Tab 12    hydroCHLOROthiazide (HYDRODIURIL) 25 mg tablet Take 1 Tab by mouth daily. 30 Tab 12    potassium 99 mg tablet Take 99 mg by mouth daily.  ZINC PO Take  by mouth daily.  metoprolol succinate (TOPROL-XL) 25 mg XL tablet Take 0.5 Tablets by mouth daily. Take 1 tab at night (Patient not taking: Reported on 9/1/2021) 45 Tablet 1    cholecalciferol (VITAMIN D3) (1000 Units /25 mcg) tablet Take 1 Tab by mouth daily. 30 Tab 0    cyanocobalamin 1,000 mcg tablet Take 1 Tab by mouth daily.  30 Tab 0     Past History     Past Medical History:  Past Medical History:   Diagnosis Date    History of mammogram 2010    normal    Hypertension     Pap smear for cervical cancer screening 2011    normal     Past Surgical History:  Past Surgical History:   Procedure Laterality Date    HX GYN      hystterectomy    OR COMPRE EP EVAL ABLTJ ATR FIB PULM VEIN ISOLATION N/A 11/30/2020    ABLATION A-FIB  W COMPLETE EP STUDY performed by Yrn Leigh MD at [facility] CARDIAC CATH LAB    OR ICAR CATHETER ABLATION ARRHYTHMIA ADD ON N/A 11/30/2020    Ablation Svt/Vt Add On performed by Yrn Leigh MD at [facility] CARDIAC CATH LAB    OR INTRACARD ECHO, THER/DX INTERVENT N/A 11/30/2020    Intracardiac Echocardiogram performed by Yrn Leigh MD at [facility] CARDIAC CATH LAB    OR INTRACARDIAC ELECTROPHYSIOLOGIC 3D MAPPING N/A 11/30/2020    Ep 3d Mapping performed by Yrn Leigh MD at [facility] CARDIAC CATH LAB    OR STIM/PACING HEART POST IV DRUG INFU N/A 11/30/2020    Drug Stimulation performed by Yrn Leigh MD at [facility] LAB     Family History:  History reviewed. No pertinent family history. Social History:  Social History     Tobacco Use    Smoking status: Never Smoker    Smokeless tobacco: Never Used   Substance Use Topics    Alcohol use: Yes     Comment: occasional    Drug use: No     Allergies: Allergies   Allergen Reactions    Codeine Itching and Swelling     Review of Systems   Review of Systems   Constitutional: Positive for fatigue. Negative for chills and fever. HENT: Negative for congestion, rhinorrhea, sneezing and sore throat. Eyes: Negative for redness and visual disturbance. Respiratory: Negative for shortness of breath. Cardiovascular: Negative for leg swelling. Gastrointestinal: Negative for abdominal pain, nausea and vomiting. Genitourinary: Negative for difficulty urinating and frequency. Musculoskeletal: Negative for back pain, myalgias and neck stiffness. Skin: Negative for rash. Neurological: Positive for weakness.  Negative for dizziness, syncope and headaches. Hematological: Negative for adenopathy. All other systems reviewed and are negative. Physical Exam   Physical Exam  Vitals and nursing note reviewed. Constitutional:       Appearance: Normal appearance. She is well-developed. HENT:      Head: Normocephalic and atraumatic. Eyes:      Conjunctiva/sclera: Conjunctivae normal.   Cardiovascular:      Rate and Rhythm: Normal rate and regular rhythm. Pulses: Normal pulses. Heart sounds: Normal heart sounds, S1 normal and S2 normal.   Pulmonary:      Effort: Pulmonary effort is normal. No respiratory distress. Breath sounds: Normal breath sounds. No wheezing. Abdominal:      General: Bowel sounds are normal. There is no distension. Palpations: Abdomen is soft. Tenderness: There is no abdominal tenderness. There is no rebound. Musculoskeletal:         General: Normal range of motion. Cervical back: Full passive range of motion without pain, normal range of motion and neck supple. Skin:     General: Skin is warm and dry. Findings: No rash. Neurological:      Mental Status: She is alert and oriented to person, place, and time. Psychiatric:         Speech: Speech normal.         Behavior: Behavior normal.         Thought Content:  Thought content normal.         Judgment: Judgment normal.       Diagnostic Study Results   Labs -     Recent Results (from the past 12 hour(s))   EKG, 12 LEAD, INITIAL    Collection Time: 01/09/22  6:15 PM   Result Value Ref Range    Ventricular Rate 64 BPM    Atrial Rate 64 BPM    P-R Interval 150 ms    QRS Duration 150 ms    Q-T Interval 486 ms    QTC Calculation (Bezet) 501 ms    Calculated R Axis -48 degrees    Calculated T Axis -4 degrees    Diagnosis       Sinus rhythm with premature supraventricular complexes  Left axis deviation  Right bundle branch block  Septal infarct , age undetermined  Inferior infarct , age undetermined  Abnormal ECG  When compared with ECG of 01-SEP-2021 18:29,  premature supraventricular complexes are now present  Septal infarct is now present     CBC WITH AUTOMATED DIFF    Collection Time: 01/09/22  6:29 PM   Result Value Ref Range    WBC 5.0 3.6 - 11.0 K/uL    RBC 4.26 3.80 - 5.20 M/uL    HGB 12.3 11.5 - 16.0 g/dL    HCT 37.8 35.0 - 47.0 %    MCV 88.7 80.0 - 99.0 FL    MCH 28.9 26.0 - 34.0 PG    MCHC 32.5 30.0 - 36.5 g/dL    RDW 13.9 11.5 - 14.5 %    PLATELET 463 828 - 184 K/uL    MPV 10.2 8.9 - 12.9 FL    NRBC 0.0 0  WBC    ABSOLUTE NRBC 0.00 0.00 - 0.01 K/uL    NEUTROPHILS 69 32 - 75 %    LYMPHOCYTES 22 12 - 49 %    MONOCYTES 7 5 - 13 %    EOSINOPHILS 1 0 - 7 %    BASOPHILS 1 0 - 1 %    IMMATURE GRANULOCYTES 0 0.0 - 0.5 %    ABS. NEUTROPHILS 3.5 1.8 - 8.0 K/UL    ABS. LYMPHOCYTES 1.1 0.8 - 3.5 K/UL    ABS. MONOCYTES 0.4 0.0 - 1.0 K/UL    ABS. EOSINOPHILS 0.1 0.0 - 0.4 K/UL    ABS. BASOPHILS 0.0 0.0 - 0.1 K/UL    ABS. IMM. GRANS. 0.0 0.00 - 0.04 K/UL    DF AUTOMATED     METABOLIC PANEL, COMPREHENSIVE    Collection Time: 01/09/22  6:29 PM   Result Value Ref Range    Sodium 142 136 - 145 mmol/L    Potassium 3.5 3.5 - 5.1 mmol/L    Chloride 103 97 - 108 mmol/L    CO2 28 21 - 32 mmol/L    Anion gap 11 5 - 15 mmol/L    Glucose 80 65 - 100 mg/dL    BUN 15 6 - 20 MG/DL    Creatinine 1.31 (H) 0.55 - 1.02 MG/DL    BUN/Creatinine ratio 11 (L) 12 - 20      GFR est AA 49 (L) >60 ml/min/1.73m2    GFR est non-AA 40 (L) >60 ml/min/1.73m2    Calcium 8.8 8.5 - 10.1 MG/DL    Bilirubin, total 0.5 0.2 - 1.0 MG/DL    ALT (SGPT) 19 12 - 78 U/L    AST (SGOT) 28 15 - 37 U/L    Alk. phosphatase 68 45 - 117 U/L    Protein, total 7.1 6.4 - 8.2 g/dL    Albumin 3.6 3.5 - 5.0 g/dL    Globulin 3.5 2.0 - 4.0 g/dL    A-G Ratio 1.0 (L) 1.1 - 2.2         Radiologic Studies -   No orders to display     No results found. Medical Decision Making   I am the first provider for this patient.     I reviewed the vital signs, available nursing notes, past medical history, past surgical history, family history and social history. Vital Signs-Reviewed the patient's vital signs. Patient Vitals for the past 24 hrs:   Temp Pulse Resp BP SpO2   01/09/22 1658 98.9 °F (37.2 °C) 72 16 (!) 174/96 99 %     Pulse Oximetry Analysis - 99% on RA (normal)    ED EKG interpretation: 18:15  Rhythm: normal sinus rhythm, RBBB and PVC's; and regular . Rate (approx.): 64; Axis: left axis deviation; P wave: normal; QRS interval: normal ; ST/T wave: non-specific changes; Other findings: unchanged from previous ekg. This EKG was interpreted by Anastasia Larson MD,ED Provider. Records Reviewed: Nursing Notes, Old Medical Records, Previous electrocardiograms, Previous Radiology Studies and Previous Laboratory Studies    Provider Notes (Medical Decision Making):   72-year-old female presents with generalized fatigue and weakness for the past 3 days. Differential includes electrolyte abnormality, UTI, anemia, and low suspicion for COVID-19 or pneumonia. She is hypertensive but does have a history of hypertension. Will check basic labs including a UA, treat with IV fluids, and reassess. ED Course:   Initial assessment performed. The patients presenting problems have been discussed, and they are in agreement with the care plan formulated and outlined with them. I have encouraged them to ask questions as they arise throughout their visit. Progress Note  7:03 PM  I have re-evaluated pt and her creatinine is 1.3, slightly higher than her baseline. She is getting a liter of fluids. Will reassess after her IV fluids. BEDSIDE SIGN OUT:  7:24 PM  Discussed pt's hx, disposition, and available diagnostic and imaging results with Dr. Shaila Booker. Reviewed care plans. Both providers and patient are in agreement with care plan. Gagandeep Reveles MD is transferring care at this time.      ED Course as of 01/12/22 1057   Sun Jan 09, 2022 2128 UA without evidence of infection; pt feeling better after fluid bolus; encourage patient to increase PO fluids the next several days and follow-up with PCP next week. Progress Note:  9:29 PM  Pt states she feels much better after ED treatment; pt able to tolerate PO and ambulate per baseline. Pt is clinically safe for discharge. Madelin Chance's final labs and imaging have been reviewed with her. She has been counseled regarding her diagnosis. She verbally conveys understanding and agreement of the signs, symptoms, diagnosis, treatment and prognosis and additionally agrees to follow up as recommended with Dr. Danial Sagastume in 24 - 48 hours. All questions have been answered, pt voiced understanding and agreement with plan. Specific return precautions provided as well as instructions to return to the ED should sx worsen at any time. At time of discharge, pt had stable vital signs and had no questions or concerns, and was very satisfied with overall care. [HW]      ED Course User Index  [HW] Garrison Fish MD       Progress Note:   Updated pt on all returned results and findings. Discussed the importance of proper follow up as referred below along with return precautions. Pt in agreement with the care plan and expresses agreement with and understanding of all items discussed. Disposition:  Discharge Note:  The pt is ready for discharge. The pt's signs, symptoms, diagnosis, and discharge instructions have been discussed and pt has conveyed their understanding. The pt is to follow up as recommended or return to ER should their symptoms worsen. Plan has been discussed and pt is in agreement. PLAN:  1. Current Discharge Medication List        2.    Follow-up Information     Follow up With Specialties Details Why Contact Info    China Bynum MD Internal Medicine Schedule an appointment as soon as possible for a visit   [address] [facility] [phone]      [facility] EMERGENCY DEPT Emergency Medicine  As needed, If symptoms worsen [address] [address]  [phone]        Return to ED if worse     Diagnosis     Clinical Impression:   1. Primary hypertension    2. Dehydration            Please note that this dictation was completed with Dragon, computer voice recognition software. Quite often unanticipated grammatical, syntax, homophones, and other interpretive errors are inadvertently transcribed by the computer software. Please disregard these errors. Additionally, please excuse any errors that have escaped final proofreading.

## 2022-01-11 ENCOUNTER — TELEPHONE (OUTPATIENT)
Dept: INTERNAL MEDICINE CLINIC | Age: 72
End: 2022-01-11

## 2022-01-11 ENCOUNTER — APPOINTMENT (OUTPATIENT)
Dept: CT IMAGING | Age: 72
DRG: 556 | End: 2022-01-11
Attending: EMERGENCY MEDICINE
Payer: MEDICARE

## 2022-01-11 ENCOUNTER — APPOINTMENT (OUTPATIENT)
Dept: GENERAL RADIOLOGY | Age: 72
DRG: 556 | End: 2022-01-11
Attending: STUDENT IN AN ORGANIZED HEALTH CARE EDUCATION/TRAINING PROGRAM
Payer: MEDICARE

## 2022-01-11 ENCOUNTER — HOSPITAL ENCOUNTER (INPATIENT)
Age: 72
LOS: 2 days | Discharge: NURSING, REHAB OR LONG-TERM CARE | DRG: 556 | End: 2022-01-13
Attending: STUDENT IN AN ORGANIZED HEALTH CARE EDUCATION/TRAINING PROGRAM | Admitting: INTERNAL MEDICINE
Payer: MEDICARE

## 2022-01-11 DIAGNOSIS — R62.7 ADULT FAILURE TO THRIVE: Primary | ICD-10-CM

## 2022-01-11 DIAGNOSIS — R26.89 DECREASED FUNCTIONAL MOBILITY: ICD-10-CM

## 2022-01-11 DIAGNOSIS — I89.0 LYMPHEDEMA: ICD-10-CM

## 2022-01-11 DIAGNOSIS — R53.1 WEAKNESS GENERALIZED: ICD-10-CM

## 2022-01-11 DIAGNOSIS — R77.8 ELEVATED TROPONIN: ICD-10-CM

## 2022-01-11 PROBLEM — I63.9 ACUTE CVA (CEREBROVASCULAR ACCIDENT) (HCC): Status: ACTIVE | Noted: 2022-01-11

## 2022-01-11 LAB
ALBUMIN SERPL-MCNC: 3.9 G/DL (ref 3.5–5)
ALBUMIN/GLOB SERPL: 1 {RATIO} (ref 1.1–2.2)
ALP SERPL-CCNC: 74 U/L (ref 45–117)
ALT SERPL-CCNC: 26 U/L (ref 12–78)
ANION GAP SERPL CALC-SCNC: 7 MMOL/L (ref 5–15)
APPEARANCE UR: ABNORMAL
AST SERPL-CCNC: 67 U/L (ref 15–37)
ATRIAL RATE: 72 BPM
BACTERIA URNS QL MICRO: ABNORMAL /HPF
BASOPHILS # BLD: 0.1 K/UL (ref 0–0.1)
BASOPHILS NFR BLD: 1 % (ref 0–1)
BILIRUB SERPL-MCNC: 0.7 MG/DL (ref 0.2–1)
BILIRUB UR QL: NEGATIVE
BNP SERPL-MCNC: 106 PG/ML
BUN SERPL-MCNC: 13 MG/DL (ref 6–20)
BUN/CREAT SERPL: 12 (ref 12–20)
CALCIUM SERPL-MCNC: 8.9 MG/DL (ref 8.5–10.1)
CALCULATED P AXIS, ECG09: 83 DEGREES
CALCULATED R AXIS, ECG10: -66 DEGREES
CALCULATED T AXIS, ECG11: 2 DEGREES
CHLORIDE SERPL-SCNC: 105 MMOL/L (ref 97–108)
CO2 SERPL-SCNC: 27 MMOL/L (ref 21–32)
COLOR UR: ABNORMAL
COMMENT, HOLDF: NORMAL
CREAT SERPL-MCNC: 1.12 MG/DL (ref 0.55–1.02)
DIAGNOSIS, 93000: NORMAL
DIFFERENTIAL METHOD BLD: ABNORMAL
EOSINOPHIL # BLD: 0 K/UL (ref 0–0.4)
EOSINOPHIL NFR BLD: 0 % (ref 0–7)
EPITH CASTS URNS QL MICRO: ABNORMAL /LPF
ERYTHROCYTE [DISTWIDTH] IN BLOOD BY AUTOMATED COUNT: 13.8 % (ref 11.5–14.5)
GLOBULIN SER CALC-MCNC: 4.1 G/DL (ref 2–4)
GLUCOSE SERPL-MCNC: 88 MG/DL (ref 65–100)
GLUCOSE UR STRIP.AUTO-MCNC: NEGATIVE MG/DL
HCT VFR BLD AUTO: 41.7 % (ref 35–47)
HGB BLD-MCNC: 13.6 G/DL (ref 11.5–16)
HGB UR QL STRIP: ABNORMAL
HYALINE CASTS URNS QL MICRO: ABNORMAL /LPF (ref 0–5)
IMM GRANULOCYTES # BLD AUTO: 0 K/UL (ref 0–0.04)
IMM GRANULOCYTES NFR BLD AUTO: 0 % (ref 0–0.5)
KETONES UR QL STRIP.AUTO: 40 MG/DL
LEUKOCYTE ESTERASE UR QL STRIP.AUTO: NEGATIVE
LYMPHOCYTES # BLD: 0.8 K/UL (ref 0.8–3.5)
LYMPHOCYTES NFR BLD: 12 % (ref 12–49)
MAGNESIUM SERPL-MCNC: 2.3 MG/DL (ref 1.6–2.4)
MCH RBC QN AUTO: 29.1 PG (ref 26–34)
MCHC RBC AUTO-ENTMCNC: 32.6 G/DL (ref 30–36.5)
MCV RBC AUTO: 89.1 FL (ref 80–99)
MONOCYTES # BLD: 0.4 K/UL (ref 0–1)
MONOCYTES NFR BLD: 6 % (ref 5–13)
NEUTS SEG # BLD: 5 K/UL (ref 1.8–8)
NEUTS SEG NFR BLD: 81 % (ref 32–75)
NITRITE UR QL STRIP.AUTO: NEGATIVE
NRBC # BLD: 0 K/UL (ref 0–0.01)
NRBC BLD-RTO: 0 PER 100 WBC
P-R INTERVAL, ECG05: 168 MS
PH UR STRIP: 6 [PH] (ref 5–8)
PLATELET # BLD AUTO: 174 K/UL (ref 150–400)
PMV BLD AUTO: 9.6 FL (ref 8.9–12.9)
POTASSIUM SERPL-SCNC: 3.4 MMOL/L (ref 3.5–5.1)
PROT SERPL-MCNC: 8 G/DL (ref 6.4–8.2)
PROT UR STRIP-MCNC: 30 MG/DL
Q-T INTERVAL, ECG07: 466 MS
QRS DURATION, ECG06: 138 MS
QTC CALCULATION (BEZET), ECG08: 510 MS
RBC # BLD AUTO: 4.68 M/UL (ref 3.8–5.2)
RBC #/AREA URNS HPF: ABNORMAL /HPF (ref 0–5)
RBC MORPH BLD: ABNORMAL
SAMPLES BEING HELD,HOLD: NORMAL
SODIUM SERPL-SCNC: 139 MMOL/L (ref 136–145)
SP GR UR REFRACTOMETRY: 1.02 (ref 1–1.03)
UR CULT HOLD, URHOLD: NORMAL
UROBILINOGEN UR QL STRIP.AUTO: 0.2 EU/DL (ref 0.2–1)
VENTRICULAR RATE, ECG03: 72 BPM
WBC # BLD AUTO: 6.3 K/UL (ref 3.6–11)
WBC URNS QL MICRO: ABNORMAL /HPF (ref 0–4)

## 2022-01-11 PROCEDURE — 85025 COMPLETE CBC W/AUTO DIFF WBC: CPT

## 2022-01-11 PROCEDURE — 65270000029 HC RM PRIVATE

## 2022-01-11 PROCEDURE — 81001 URINALYSIS AUTO W/SCOPE: CPT

## 2022-01-11 PROCEDURE — 71046 X-RAY EXAM CHEST 2 VIEWS: CPT

## 2022-01-11 PROCEDURE — 80053 COMPREHEN METABOLIC PANEL: CPT

## 2022-01-11 PROCEDURE — 97530 THERAPEUTIC ACTIVITIES: CPT

## 2022-01-11 PROCEDURE — 83735 ASSAY OF MAGNESIUM: CPT

## 2022-01-11 PROCEDURE — 93005 ELECTROCARDIOGRAM TRACING: CPT

## 2022-01-11 PROCEDURE — 65660000000 HC RM CCU STEPDOWN

## 2022-01-11 PROCEDURE — 36415 COLL VENOUS BLD VENIPUNCTURE: CPT

## 2022-01-11 PROCEDURE — 83880 ASSAY OF NATRIURETIC PEPTIDE: CPT

## 2022-01-11 PROCEDURE — 97161 PT EVAL LOW COMPLEX 20 MIN: CPT

## 2022-01-11 PROCEDURE — 70450 CT HEAD/BRAIN W/O DYE: CPT

## 2022-01-11 PROCEDURE — 99285 EMERGENCY DEPT VISIT HI MDM: CPT

## 2022-01-11 NOTE — PROGRESS NOTES
Care Management:    Transition of Care Plan:     · RUR:  · Open to [facility] for nursing and social work. If admitted, will need [facility] orders for nursing and therapy.)  · Disposition: home with [facility] vs SNF  · Transportation: friend vs BLS      Met with patient in the ED. She confirmed her address [address], [address], [address] Patient lives alone in a boarding house. Patient was independent with her ADLs, but has needed increasing assistance lately. She began to lean to her left side about 3 weeks ago. Then 1 week ago, she began having increased difficulty caring for herself. Patient stated she spoke with a  from [facility] today. DME: cane, tub transfer bench-new, rolling walker  ADLs: decrease in ability to care for self over last week  Pharmacy:   Previous HH/SNF/rehab: [address] [place] [address] [place]: [facility] only    CM called [facility] ([phone]) and spoke with clinical manager Lizett Cline. Patient is currently open to them for nursing, PT was awaiting new orders. She said the  Cathy Rader saw patient today in her home. While there, patient complained of dizziness, and pain on her left side. Patient requested to go to [facility] via EMS. SW stayed with her until EMS took her. She will help patient complete a Medicaid application. Social work recommends assisted living type facility for patient. CM called Medicaid Hotline. Patient has Medicaid [facility] only. Updated Lizett Cline with [facility]. If patient is admitted to hospital, will ask unit CM to complete UAI. If patient is discharged from ED,  from [address] will follow up with patient to assist her with requesting  complete UAI.     Reason for Admission:                     RUR Score:      NA currently ED patient            Plan for utilizing home health:   Open to [facility]       PCP: First and Last name:  Hussein Ulloa MD     Name of Practice: [facility]   Are you a current patient: Yes/No: yes   Approximate date of last visit: 11-23-21 virtual   Can you participate in a virtual visit with your PCP: yes                    Current Advanced Directive/Advance Care Plan: Prior      Healthcare Decision Maker:             Primary Decision Maker: Rose Marie Leora - [phone]    Secondary Decision Maker: Leydi Garcia - [phone]                Care Management Interventions  PCP Verified by CM: Yes (Dr. Sol Manzo)  Last Visit to PCP: 11/23/21  Palliative Care Criteria Met (RRAT>21 & CHF Dx)?: No  Mode of Transport at Discharge:  Other (see comment)  Transition of Care Consult (CM Consult): Discharge Planning  Discharge Durable Medical Equipment: No  Physical Therapy Consult: Yes  Speech Therapy Consult: No  Support Systems: Other (Comment)  Confirm Follow Up Transport: Friends  [place] Resource Information Provided?: No  Discharge Location  Discharge Placement: Home with home health    JALIL Restrepo

## 2022-01-11 NOTE — ED TRIAGE NOTES
Pt c/o left sided weakness x 2 weeks  , mostly legs, BS 86 , swelling to both legs , left side worse

## 2022-01-11 NOTE — SENIOR SERVICES NOTE
Problem: Mobility Impaired (Adult and Pediatric)  Goal: *Acute Goals and Plan of Care (Insert Text)  Description: FUNCTIONAL STATUS PRIOR TO ADMISSION: Patient was modified independent using a single point cane for functional mobility, able to get up the steps to her bedroom, denies falls in the past year. HOME SUPPORT PRIOR TO ADMISSION: The patient lived in a group home. She is receiving HHPT/OT/SW. She relies on friends for transportation to shop. She has family that recently moved to [facility]. Physical Therapy Goals  Initiated 1/11/2022  1. Patient will move from supine to sit and sit to supine , scoot up and down, and roll side to side in bed with minimal assistance/contact guard assist within 7 day(s). 2.  Patient will transfer from bed to chair and chair to bed with contact guard assist using the least restrictive device within 7 day(s). 3.  Patient will perform sit to stand with contact guard assist within 7 day(s). 4.  Patient will ambulate with contact guard assist for 50 feet with the least restrictive device within 7 day(s). 5.  Patient will ascend/descend 6 stairs with handrail(s) with minimal assistance/contact guard assist within 7 day(s). Outcome: Not Met     PHYSICAL THERAPY EVALUATION in the EMERGENCY DEPARTMENT  Patient: Melissa Ogden (55 y.o. female)  Date: 1/11/2022  Primary Diagnosis: No admission diagnoses are documented for this encounter. Precautions:  Fall    ASSESSMENT  Based on the objective data described below, the patient presents with decreased functional independence d/t impaired balance (sitting & standing), generalized weakness, BLE edema and decreased activity tolerance. Report received from ED attending. This PT and SED NP received patient sitting in a wheelchair in the ED waiting room. Noted pt leaning to the left, required max A to reposition and pillow prop to maintain upright sitting. Transported pt to the back holland of the ED for privacy. At baseline, patient is independent w/ ADLs, ambulating w/ a cane and on the steps to get to her bedroom. She has been experiencing a decline in strength and mobility, now open to home health for PT, OT, SW.  Today, patient required moderate assist for sit<->stand & WC<->commode, unable to ambulate. Takes pt extra time and taxing effort to transition positions. She relied heavily on the wall grab bar for support and w/ strong left lean onto the wall for stand pivot transfer to/ from the [facility] 23 & commode. She is able to take small sliding steps w/ moderate assist of this PT and heavy support of the grab bar & wall. She is unable to maintain upright standing or sitting posture (leans left) and at risk for falls. Of note, pt reports a near fall in her home today. The home health  arrived and called EMS d/t pt's inability to mobilize. Patient was assisted back to the waiting room, provided blankets, snacks. At this time, patient is not able to manage her mobility or functional tasks on her own. Recommend continued acute physical therapy and an occupational therapy consult with transition to SNF level rehab at discharge. If admitted acute therapy will follow. Current Level of Function Impacting Discharge (mobility/balance): Mod Assist sit<->stand and WC<->commode, unable to ambulate, left lean    Functional Outcome Measure: The patient scored 2/28 on the Tinetti outcome measure which is indicative of high fall risk (unable to complete ambu. Other factors to consider for discharge: Lives alone in a group home w/ no caregiver support available     Patient will benefit from skilled therapy intervention to address the above noted impairments.        PLAN :  Recommendations and Planned Interventions: bed mobility training, transfer training, gait training, therapeutic exercises, neuromuscular re-education, edema management/control, patient and family training/education and therapeutic activities      Frequency/Duration: Patient will be followed by physical therapy:  5 times a week to address goals. Recommendation for discharge: (in order for the patient to meet his/her long term goals)  Therapy up to 5 days/week in SNF setting    This discharge recommendation:  Has been made in collaboration with the attending provider and/or case management    IF patient discharges home will need the following DME: to be determined (TBD)         SUBJECTIVE:   Patient stated I can't go back home. Can I stay here tonight?     OBJECTIVE DATA SUMMARY:   HISTORY:    Past Medical History:   Diagnosis Date    History of mammogram 2010    normal    Hypertension     Pap smear for cervical cancer screening 2011    normal     Past Surgical History:   Procedure Laterality Date    HX GYN      hystterectomy    MI COMPRE EP EVAL ABLTJ ATR FIB PULM VEIN ISOLATION N/A 11/30/2020    ABLATION A-FIB  W COMPLETE EP STUDY performed by Du Locke MD at [facility] CARDIAC CATH LAB    MI ICAR CATHETER ABLATION ARRHYTHMIA ADD ON N/A 11/30/2020    Ablation Svt/Vt Add On performed by Du Locke MD at [facility]ATH [facility]    MI INTRACARD ECHO, THER/DX INTERVENT N/A 11/30/2020    Intracardiac Echocardiogram performed by Du Locke MD at [facility] CARDIAC CATH LAB    MI INTRACARDIAC ELECTROPHYSIOLOGIC 3D MAPPING N/A 11/30/2020    Ep 3d Mapping performed by Du Locke MD at [facility] CARDIAC CATH LAB    MI STIM/PACING HEART POST IV DRUG INFU N/A 11/30/2020    Drug Stimulation performed by Du Locke MD at [facility] CARDIAC CATH LAB       Personal factors and/or comorbidities impacting plan of care: [facility], reports having home health on/ off the past year since leaving [facility] [address]: Group home  # Steps to Enter: 5  Rails to Enter: Yes  One/Two Story Residence: Two story  # of Interior Steps: 14 (to her bedroom)  Lift Chair Available: No  Living Alone: Yes  Support Systems: Other (Comment)  Current DME Used/Available at Home: Cane, straight,Walker, rolling,Transfer bench  Tub or Shower Type: Tub/Shower combination    EXAMINATION/PRESENTATION/DECISION MAKING:   Critical Behavior:  Neurologic State: Alert  Orientation Level: Oriented to person,Oriented to place,Oriented to situation  Cognition: Appropriate decision making,Appropriate for age attention/concentration,Appropriate safety awareness,Follows commands     Hearing:    Skin:  Dry, flaking  Edema: BLEs  Range Of Motion:  AROM: Generally decreased, functional                       Strength:    Strength: Generally decreased, functional   Unable to lift LE's off WC foot rests                    Tone & Sensation:                                  Coordination:  Coordination: Generally decreased, functional  Vision:      Functional Mobility:  Bed Mobility:  Received/ remained sitting in the WC           Transfers:  Sit to Stand: Moderate assistance;Assist x1;Additional time; Adaptive equipment  Stand to Sit: Moderate assistance;Assist x1;Additional time; Adaptive equipment  Stand Pivot Transfers: Minimum assistance;Assist x1;Additional time; Adaptive equipment (WC<->toilet)     Manual assist needed for LE management off/ on the WC. Cues provided for transfer technique, positioning, use of grab bars on bathroom wall. Balance:   Sitting: Impaired; With support  Sitting - Static: Poor (constant support) (left lean)  Sitting - Dynamic: Poor (constant support)  Standing: Impaired; With support  Standing - Static: Constant support  Ambulation/Gait Training:  Able to take a few small sliding steps to/ from commode and wheelchair. Takes extra time. Posture remained flexed and pt leaning onto the grab bar on the wall. Cues (verbal and manual) provided for upright posture with partial correction, does not maintain.            Functional Measure:  Tinetti test:    Sitting Balance: 0  Arises: 0  Attempts to Rise: 0  Immediate Standing Balance: 1  Standing Balance: 1 (grab bar)  Nudged: 0  Eyes Closed: 0  Turn 360 Degrees - Continuous/Discontinuous: 0  Turn 360 Degrees - Steady/Unsteady: 0  Sitting Down: 0  Balance Score: 2 Balance total score  Indication of Gait: 0  R Step Length/Height: 0  L Step Length/Height: 0  R Foot Clearance: 0  L Foot Clearance: 0  Step Symmetry: 0  Step Continuity: 0  Path: 0  Trunk: 0  Walking Time: 0  Gait Score: 0 Gait total score  Total Score: 2/28 Overall total score         Tinetti Tool Score Risk of Falls  <19 = High Fall Risk  19-24 = Moderate Fall Risk  25-28 = Low Fall Risk  Tinetti ME. Performance-Oriented Assessment of Mobility Problems in Elderly Patients. Pineda 66; [id number].  (Scoring Description: PT Bulletin Feb. 10, 1993)    Older adults: Enmanuel Stroud et al, 2009; n = 1000 [facility] elderly evaluated with ABC, ELISHA, ADL, and IADL)  · Mean ELISHA score for males aged 69-68 years = 26.21(3.40)  · Mean ELISHA score for females age 69-68 years = 25.16(4.30)  · Mean ELISHA score for males over 80 years = 23.29(6.02)  · Mean ELISHA score for females over 80 years = 17.20(8.32)            Physical Therapy Evaluation Charge Determination   History Examination Presentation Decision-Making   MEDIUM  Complexity : 1-2 comorbidities / personal factors will impact the outcome/ POC  LOW Complexity : 1-2 Standardized tests and measures addressing body structure, function, activity limitation and / or participation in recreation  LOW Complexity : Stable, uncomplicated  Other outcome measures Tinetti 0/28  HIGH       Based on the above components, the patient evaluation is determined to be of the following complexity level: LOW     Pain Rating:  None indicated    Activity Tolerance:   Fair and requires rest breaks    After treatment patient left in no apparent distress:   Sitting in a wheelchair in the ED waiting room    COMMUNICATION/EDUCATION:   The patients plan of care was discussed with: Physician, Case management, and SED NP . Fall prevention education was provided and the patient/caregiver indicated understanding., Patient/family have participated as able in goal setting and plan of care. , and Patient/family agree to work toward stated goals and plan of care.     Thank you for this referral.  Iraida Dewey, PT   Time Calculation: 46 mins

## 2022-01-11 NOTE — SENIOR SERVICES NOTE
Senior Services ED [facility]. Chart Reviewed. I have collaborated with Dr. Ronaldo Vargas prior to seeing patient, concern for lack of self-care. Patient greeted in waiting room, sitting in wheelchair leaning to the left side. Patient is alert, SSED team present to include: Emelina Miller CM, Tonya No, PT and myself introduced ourselves to patient, receptive. Patient moved to the back CT hallway for privacy, patient stated that she needed to void, I was able to obtain a hat for the commode to catch the urine sample as patient stated that is unable to hold the cup and urinate.   -Refer to Kathy Vargas PT note for full details. Discussion occurred during patient going to the bathroom. She verbalized that she has a grand daughter and grand son that was assisting however they moved to [place] for employment. Patient currently living alone. I observed patient attempting to get onto commode, requiring MAX assist, while sitting on the commode patient leaned onto the hand rail and was pulling the toilet paper off the roll and asked for assistance tearing the paper.   -Concern for patient leaning to the Left, patient states that she has been leaning to the left for the past 3 weeks.   -Patient seen by community  today and she assisted patient with calling 911 and waiting until they arrived, due to patient not being able to care for herself with increased weakness and debility.   -Patient lives independently and is stating that she has had an increased debility over the last week+ with being able to care for herself, seen at [facility] on 1/9/22 for similar complaints. Noted, that the patient was NOT ambulated prior to discharging safely on 1/9/22. I have collaborated findings with the SSED team along with Dr. Ronaldo Vargas. Patient is alert, oriented, CT scan of head showing no signs of acute infarction or hemorrhage.  Patient would benefit form further imaging, strong predominant lean to the Left, that she states is new and started 3 weeks ago. ?? Urine was obtained and sent to Lab, patient pending full ED evaluation prior to disposition, patient unable to ambulate, lives alone, at the minimum will benefit from SNF placement for Rehab- talking with Teodora Pena CM she states that the patient will require authorization for placement as well as OT evaluation. Patient is not suitable for safe discharge home. Thank you for the opportunity to participate in this patients care.    Annalee Adair 371, NP  SSED  5:15 PM  [phone]

## 2022-01-11 NOTE — ED PROVIDER NOTES
Patient is a 66-year-old female presenting the ED with bilateral lower extremity swelling, generalized fatigue, generalized weakness left worse than right. Symptoms have been present for greater than 2 weeks. Patient denies any other stroke symptoms just generalized fatigue and weakness. Patient was triaged and CT scan ordered. CT with no acute bleeds or other findings at this time. Lab work also obtained that time with no critical values. Patient evaluated in wheelchair in triage room. Senior services consulted for patient's worsening mobility, ADL problems. Patient uses cane at baseline. Patient has home PT 1 to 2 days a week, home health 2 to 3 days a week. .      Fatigue  This is a chronic problem. The current episode started more than 1 week ago. The problem has been gradually worsening. Affected Side: Left worse than right, chronic. Pertinent negatives include no mental status change. There has been no fever. Pertinent negatives include no shortness of breath, no chest pain, no vomiting, no altered mental status, no confusion, no headaches and no nausea.  There were no medications administered prior to arrival.        Past Medical History:   Diagnosis Date    History of mammogram 2010    normal    Hypertension     Pap smear for cervical cancer screening 2011    normal       Past Surgical History:   Procedure Laterality Date    HX GYN      hystterectomy    HI COMPRE EP EVAL ABLTJ ATR FIB PULM VEIN ISOLATION N/A 11/30/2020    ABLATION A-FIB  W COMPLETE EP STUDY performed by Magda Whitehead MD at [facility] CARDIAC CATH LAB    HI ICAR CATHETER ABLATION ARRHYTHMIA ADD ON N/A 11/30/2020    Ablation Svt/Vt Add On performed by Magda Whitehead MD at [facility] CARDIAC CATH LAB    HI INTRACARD ECHO, THER/DX INTERVENT N/A 11/30/2020    Intracardiac Echocardiogram performed by Magda Whitehead MD at [facility] CARDIAC CATH LAB    HI INTRACARDIAC ELECTROPHYSIOLOGIC 3D MAPPING N/A 11/30/2020    Ep 3d Mapping performed by Jose Espana MD at [facility] CARDIAC CATH LAB    VA STIM/PACING HEART POST IV DRUG INFU N/A 11/30/2020    Drug Stimulation performed by Jose Espana MD at [facility] CARDIAC CATH LAB         No family history on file. Social History     Socioeconomic History    Marital status:      Spouse name: Not on file    Number of children: Not on file    Years of education: Not on file    Highest education level: Not on file   Occupational History    Not on file   Tobacco Use    Smoking status: Never Smoker    Smokeless tobacco: Never Used   Substance and Sexual Activity    Alcohol use: Yes     Comment: occasional    Drug use: No    Sexual activity: Not on file   Other Topics Concern    Not on file   Social History Narrative    Not on file     Social Determinants of Health     Financial Resource Strain:     Difficulty of Paying Living Expenses: Not on file   Food Insecurity:     Worried About Running Out of Food in the Last Year: Not on file    Wild of Food in the Last Year: Not on file   Transportation Needs:     Lack of Transportation (Medical): Not on file    Lack of Transportation (Non-Medical):  Not on file   Physical Activity:     Days of Exercise per Week: Not on file    Minutes of Exercise per Session: Not on file   Stress:     Feeling of Stress : Not on file   Social Connections:     Frequency of Communication with Friends and Family: Not on file    Frequency of Social Gatherings with Friends and Family: Not on file    Attends Pent[employer] Services: Not on file    Active Member of Clubs or Organizations: Not on file    Attends Club or Organization Meetings: Not on file    Marital Status: Not on file   Intimate Partner Violence:     Fear of Current or Ex-Partner: Not on file    Emotionally Abused: Not on file    Physically Abused: Not on file    Sexually Abused: Not on file   Housing Stability:     Unable to Pay for Housing in the Last Year: Not on file    Number of Places Lived in the Last Year: Not on file    Unstable Housing in the Last Year: Not on file         ALLERGIES: Codeine    Review of Systems   Constitutional: Positive for activity change and fatigue. HENT: Negative. Eyes: Negative. Respiratory: Negative. Negative for shortness of breath. Cardiovascular: Positive for leg swelling ( Lymphedema). Negative for chest pain. Gastrointestinal: Negative. Negative for nausea and vomiting. Endocrine: Negative. Genitourinary: Negative. Musculoskeletal: Negative. Skin: Negative. Allergic/Immunologic: Negative. Neurological: Negative. Negative for headaches. Hematological: Negative. Psychiatric/Behavioral: Negative. Negative for confusion. Vitals:    01/11/22 1135   BP: (!) 164/90   Pulse: 66   Resp: 16   Temp: 97.1 °F (36.2 °C)   SpO2: 100%            Physical Exam  Vitals and nursing note reviewed. Constitutional:       General: She is not in acute distress. Appearance: Normal appearance. HENT:      Head: Normocephalic and atraumatic. Right Ear: External ear normal.      Left Ear: External ear normal.      Nose: Nose normal.   Eyes:      Extraocular Movements: Extraocular movements intact. Conjunctiva/sclera: Conjunctivae normal.   Cardiovascular:      Rate and Rhythm: Normal rate. Pulses: Normal pulses. Radial pulses are 2+ on the right side and 2+ on the left side. Heart sounds: Normal heart sounds. Pulmonary:      Effort: Pulmonary effort is normal.      Breath sounds: Normal breath sounds. Chest:      Chest wall: No deformity or tenderness. Abdominal:      General: Abdomen is flat. There is no distension. Tenderness: There is no abdominal tenderness. Musculoskeletal:         General: No deformity or signs of injury. Normal range of motion. Cervical back: Normal range of motion and neck supple. No tenderness. Right lower leg: Edema present.       Left lower leg: Edema present. Comments: Lower extremity swelling consistent with lymphedema   Skin:     General: Skin is warm and dry. Capillary Refill: Capillary refill takes less than 2 seconds. Neurological:      General: No focal deficit present. Mental Status: She is alert and oriented to person, place, and time. Mental status is at baseline. Gait: Gait abnormal.      Comments: Patient in wheelchair leaning to the left due to generalized fatigue. Psychiatric:         Attention and Perception: Attention normal.         Mood and Affect: Mood normal.         Behavior: Behavior normal.         Thought Content:  Thought content normal.         Judgment: Judgment normal.          [facility]  ED Course as of 01/11/22 1806   Tue Jan 11, 2022   1519 Potassium(!): 3.4 [AL]   1519 Creatinine(!): 1.12 [AL]   1619 NT pro-BNP: 106 [AL]   1620 Magnesium: 2.3 [AL]   1730 Leukocyte Esterase: Negative [AL]   1730 Nitrites: Negative [AL]   4172 Blood(!): LARGE [AL]      ED Course User Index  [AL] Valencia Chavez MD     LABORATORY RESULTS:  Labs Reviewed   METABOLIC PANEL, COMPREHENSIVE - Abnormal; Notable for the following components:       Result Value    Potassium 3.4 (*)     Creatinine 1.12 (*)     GFR est AA 58 (*)     GFR est non-AA 48 (*)     AST (SGOT) 67 (*)     Globulin 4.1 (*)     A-G Ratio 1.0 (*)     All other components within normal limits   CBC WITH AUTOMATED DIFF - Abnormal; Notable for the following components:    NEUTROPHILS 81 (*)     All other components within normal limits   URINALYSIS W/MICROSCOPIC - Abnormal; Notable for the following components:    Appearance CLOUDY (*)     Protein 30 (*)     Ketone 40 (*)     Blood LARGE (*)     Epithelial cells MODERATE (*)     Bacteria 1+ (*)     All other components within normal limits   URINE CULTURE HOLD SAMPLE   SAMPLES BEING HELD   NT-PRO BNP   MAGNESIUM       IMAGING RESULTS:  XR CHEST PA LAT   Final Result   No acute process or change compared to the prior exam.          CT HEAD WO CONT   Final Result   No acute intracranial abnormality or significant change. MEDICATIONS GIVEN:  Medications - No data to display    Differential diagnosis: CHF, lymphedema, head bleed, failure to thrive, pulmonary edema, pneumonia, UTI    ED physician interpretation of imaging: CT head without bleeds, chest x-ray without focal pneumonia  ED physician interpretation of laboratory results: Lab work without critical values requiring emergency medical intervention. MDM: Patient is a 70-year-old female with approximately 3 weeks of generalized fatigue and weakness with weakness more pronounced on the left side per patient outside of any window for stroke who was evaluated by Senior services and physical therapy who has severe mobility limitations and inability to conduct ADLs hospital admission for further treatment evaluation. Patient's vital signs are stable and patient afebrile. Perfect Serve Consult for Admission  6:04 PM    ED Room Number: FT5/RRE  Patient Name and age:  Guilherme Huerta 70 y.o.  female  Working Diagnosis:   1. Adult failure to thrive    2. Decreased functional mobility    3. Lymphedema        COVID-19 Suspicion:  no  Sepsis present:  no  Reassessment needed: no  Code Status:  Full Code  Readmission: no  Isolation Requirements:  no  Recommended Level of Care:  med/surg  Department:  [facility] Adult ED - 21     Other: Patient presented ED with 3 weeks of worsening weakness, left worse than right. CT without acute injuries. Well out of window for any sort of code stroke/stroke activation. Patient mated by Senior services in the ED, patient is too weak to rip toilet paper, can  Only seen in the ED for dehydration likely due to severe mobility restrictions leading to failure to thrive.     Procedures

## 2022-01-12 ENCOUNTER — APPOINTMENT (OUTPATIENT)
Dept: MRI IMAGING | Age: 72
DRG: 556 | End: 2022-01-12
Attending: INTERNAL MEDICINE
Payer: MEDICARE

## 2022-01-12 ENCOUNTER — APPOINTMENT (OUTPATIENT)
Dept: NON INVASIVE DIAGNOSTICS | Age: 72
DRG: 556 | End: 2022-01-12
Attending: INTERNAL MEDICINE
Payer: MEDICARE

## 2022-01-12 PROBLEM — I63.9 ACUTE CVA (CEREBROVASCULAR ACCIDENT) (HCC): Status: RESOLVED | Noted: 2022-01-11 | Resolved: 2022-01-12

## 2022-01-12 LAB
ALBUMIN SERPL-MCNC: 3.3 G/DL (ref 3.5–5)
ALBUMIN/GLOB SERPL: 0.9 {RATIO} (ref 1.1–2.2)
ALP SERPL-CCNC: 68 U/L (ref 45–117)
ALT SERPL-CCNC: 28 U/L (ref 12–78)
ANION GAP SERPL CALC-SCNC: 8 MMOL/L (ref 5–15)
AST SERPL-CCNC: 88 U/L (ref 15–37)
BASOPHILS # BLD: 0 K/UL (ref 0–0.1)
BASOPHILS NFR BLD: 1 % (ref 0–1)
BILIRUB SERPL-MCNC: 0.5 MG/DL (ref 0.2–1)
BUN SERPL-MCNC: 17 MG/DL (ref 6–20)
BUN/CREAT SERPL: 14 (ref 12–20)
CALCIUM SERPL-MCNC: 9 MG/DL (ref 8.5–10.1)
CHLORIDE SERPL-SCNC: 107 MMOL/L (ref 97–108)
CHOLEST SERPL-MCNC: 205 MG/DL
CO2 SERPL-SCNC: 25 MMOL/L (ref 21–32)
COVID-19 RAPID TEST, COVR: NOT DETECTED
CREAT SERPL-MCNC: 1.24 MG/DL (ref 0.55–1.02)
CRP SERPL-MCNC: 1.96 MG/DL (ref 0–0.6)
DIFFERENTIAL METHOD BLD: NORMAL
ECHO AO ROOT DIAM: 2.7 CM
ECHO AO ROOT INDEX: 1.41 CM/M2
ECHO AR MAX VEL PISA: 3.8 M/S
ECHO AV AREA PEAK VELOCITY: 2.1 CM2
ECHO AV AREA PEAK VELOCITY: 2.2 CM2
ECHO AV AREA PEAK VELOCITY: 2.3 CM2
ECHO AV AREA PEAK VELOCITY: 2.4 CM2
ECHO AV AREA VTI: 2.2 CM2
ECHO AV AREA VTI: 2.8 CM2
ECHO AV MEAN GRADIENT: 11 MMHG
ECHO AV MEAN VELOCITY: 1.6 M/S
ECHO AV PEAK GRADIENT: 18 MMHG
ECHO AV PEAK GRADIENT: 22 MMHG
ECHO AV PEAK VELOCITY: 2.1 M/S
ECHO AV PEAK VELOCITY: 2.3 M/S
ECHO AV REGURGITANT PHT: 710.9 MILLISECOND
ECHO AV VTI: 40.8 CM
ECHO LA DIAMETER INDEX: 1.82 CM/M2
ECHO LA DIAMETER: 3.5 CM
ECHO LA TO AORTIC ROOT RATIO: 1.3
ECHO LA VOL 2C: 84 ML (ref 22–52)
ECHO LA VOL 4C: 52 ML (ref 22–52)
ECHO LA VOLUME AREA LENGTH: 69 ML
ECHO LA VOLUME INDEX A2C: 44 ML/M2 (ref 16–34)
ECHO LA VOLUME INDEX A4C: 27 ML/M2 (ref 16–34)
ECHO LA VOLUME INDEX AREA LENGTH: 36 ML/M2 (ref 16–34)
ECHO LV E' LATERAL VELOCITY: 8 CM/S
ECHO LV E' SEPTAL VELOCITY: 6 CM/S
ECHO LV FRACTIONAL SHORTENING: 37 % (ref 28–44)
ECHO LV INTERNAL DIMENSION DIASTOLE INDEX: 1.98 CM/M2
ECHO LV INTERNAL DIMENSION DIASTOLIC: 3.8 CM (ref 3.9–5.3)
ECHO LV INTERNAL DIMENSION SYSTOLIC INDEX: 1.25 CM/M2
ECHO LV INTERNAL DIMENSION SYSTOLIC: 2.4 CM
ECHO LV IVSD: 1.2 CM (ref 0.6–0.9)
ECHO LV MASS 2D: 163 G (ref 67–162)
ECHO LV MASS INDEX 2D: 84.9 G/M2 (ref 43–95)
ECHO LV POSTERIOR WALL DIASTOLIC: 1.3 CM (ref 0.6–0.9)
ECHO LV RELATIVE WALL THICKNESS RATIO: 0.68
ECHO LVOT AREA: 3.1 CM2
ECHO LVOT AV VTI INDEX: 0.7
ECHO LVOT DIAM: 2 CM
ECHO LVOT MEAN GRADIENT: 5 MMHG
ECHO LVOT PEAK GRADIENT: 10 MMHG
ECHO LVOT PEAK VELOCITY: 1.5 M/S
ECHO LVOT STROKE VOLUME INDEX: 46.4 ML/M2
ECHO LVOT SV: 89.2 ML
ECHO LVOT VTI: 28.4 CM
ECHO MV A VELOCITY: 0.67 M/S
ECHO MV AREA PHT: 3.5 CM2
ECHO MV E DECELERATION TIME (DT): 215.5 MS
ECHO MV E VELOCITY: 0.85 M/S
ECHO MV E/A RATIO: 1.27
ECHO MV E/E' LATERAL: 10.63
ECHO MV E/E' RATIO (AVERAGED): 12.4
ECHO MV E/E' SEPTAL: 14.17
ECHO MV PRESSURE HALF TIME (PHT): 62.5 MS
ECHO PV MAX VELOCITY: 0.6 M/S
ECHO PV PEAK GRADIENT: 1 MMHG
ECHO RV FREE WALL PEAK S': 15 CM/S
ECHO RV TAPSE: 2.3 CM (ref 1.5–2)
EOSINOPHIL # BLD: 0.2 K/UL (ref 0–0.4)
EOSINOPHIL NFR BLD: 3 % (ref 0–7)
ERYTHROCYTE [DISTWIDTH] IN BLOOD BY AUTOMATED COUNT: 14.1 % (ref 11.5–14.5)
EST. AVERAGE GLUCOSE BLD GHB EST-MCNC: 103 MG/DL
FOLATE SERPL-MCNC: 6.9 NG/ML (ref 5–21)
GLOBULIN SER CALC-MCNC: 3.7 G/DL (ref 2–4)
GLUCOSE SERPL-MCNC: 85 MG/DL (ref 65–100)
HBA1C MFR BLD: 5.2 % (ref 4–5.6)
HCT VFR BLD AUTO: 41.7 % (ref 35–47)
HDLC SERPL-MCNC: 63 MG/DL
HDLC SERPL: 3.3 {RATIO} (ref 0–5)
HGB BLD-MCNC: 13.3 G/DL (ref 11.5–16)
IMM GRANULOCYTES # BLD AUTO: 0 K/UL (ref 0–0.04)
IMM GRANULOCYTES NFR BLD AUTO: 0 % (ref 0–0.5)
LDLC SERPL CALC-MCNC: 128.2 MG/DL (ref 0–100)
LYMPHOCYTES # BLD: 1.1 K/UL (ref 0.8–3.5)
LYMPHOCYTES NFR BLD: 20 % (ref 12–49)
MAGNESIUM SERPL-MCNC: 2.3 MG/DL (ref 1.6–2.4)
MCH RBC QN AUTO: 29.4 PG (ref 26–34)
MCHC RBC AUTO-ENTMCNC: 31.9 G/DL (ref 30–36.5)
MCV RBC AUTO: 92.1 FL (ref 80–99)
MONOCYTES # BLD: 0.5 K/UL (ref 0–1)
MONOCYTES NFR BLD: 9 % (ref 5–13)
NEUTS SEG # BLD: 3.7 K/UL (ref 1.8–8)
NEUTS SEG NFR BLD: 67 % (ref 32–75)
NRBC # BLD: 0 K/UL (ref 0–0.01)
NRBC BLD-RTO: 0 PER 100 WBC
PHOSPHATE SERPL-MCNC: 3.6 MG/DL (ref 2.6–4.7)
PLATELET # BLD AUTO: 156 K/UL (ref 150–400)
PMV BLD AUTO: 10 FL (ref 8.9–12.9)
POTASSIUM SERPL-SCNC: 3.2 MMOL/L (ref 3.5–5.1)
PROT SERPL-MCNC: 7 G/DL (ref 6.4–8.2)
RBC # BLD AUTO: 4.53 M/UL (ref 3.8–5.2)
SODIUM SERPL-SCNC: 140 MMOL/L (ref 136–145)
SOURCE, COVRS: NORMAL
TRIGL SERPL-MCNC: 69 MG/DL (ref ?–150)
TROPONIN-HIGH SENSITIVITY: 251 NG/L (ref 0–51)
TROPONIN-HIGH SENSITIVITY: 287 NG/L (ref 0–51)
TSH SERPL DL<=0.05 MIU/L-ACNC: 0.51 UIU/ML (ref 0.36–3.74)
VIT B12 SERPL-MCNC: >2000 PG/ML (ref 193–986)
VLDLC SERPL CALC-MCNC: 13.8 MG/DL
WBC # BLD AUTO: 5.5 K/UL (ref 3.6–11)

## 2022-01-12 PROCEDURE — 97530 THERAPEUTIC ACTIVITIES: CPT

## 2022-01-12 PROCEDURE — 80061 LIPID PANEL: CPT

## 2022-01-12 PROCEDURE — 84484 ASSAY OF TROPONIN QUANT: CPT

## 2022-01-12 PROCEDURE — 97165 OT EVAL LOW COMPLEX 30 MIN: CPT

## 2022-01-12 PROCEDURE — 85025 COMPLETE CBC W/AUTO DIFF WBC: CPT

## 2022-01-12 PROCEDURE — 70544 MR ANGIOGRAPHY HEAD W/O DYE: CPT

## 2022-01-12 PROCEDURE — 99222 1ST HOSP IP/OBS MODERATE 55: CPT | Performed by: PSYCHIATRY & NEUROLOGY

## 2022-01-12 PROCEDURE — 83036 HEMOGLOBIN GLYCOSYLATED A1C: CPT

## 2022-01-12 PROCEDURE — 93306 TTE W/DOPPLER COMPLETE: CPT

## 2022-01-12 PROCEDURE — 80053 COMPREHEN METABOLIC PANEL: CPT

## 2022-01-12 PROCEDURE — 74011250636 HC RX REV CODE- 250/636: Performed by: INTERNAL MEDICINE

## 2022-01-12 PROCEDURE — 87635 SARS-COV-2 COVID-19 AMP PRB: CPT

## 2022-01-12 PROCEDURE — 70551 MRI BRAIN STEM W/O DYE: CPT

## 2022-01-12 PROCEDURE — 83735 ASSAY OF MAGNESIUM: CPT

## 2022-01-12 PROCEDURE — 82607 VITAMIN B-12: CPT

## 2022-01-12 PROCEDURE — 65660000000 HC RM CCU STEPDOWN

## 2022-01-12 PROCEDURE — 84100 ASSAY OF PHOSPHORUS: CPT

## 2022-01-12 PROCEDURE — 36415 COLL VENOUS BLD VENIPUNCTURE: CPT

## 2022-01-12 PROCEDURE — 93306 TTE W/DOPPLER COMPLETE: CPT | Performed by: SPECIALIST

## 2022-01-12 PROCEDURE — 84443 ASSAY THYROID STIM HORMONE: CPT

## 2022-01-12 PROCEDURE — 74011250637 HC RX REV CODE- 250/637: Performed by: FAMILY MEDICINE

## 2022-01-12 PROCEDURE — 74011250637 HC RX REV CODE- 250/637: Performed by: INTERNAL MEDICINE

## 2022-01-12 PROCEDURE — 99222 1ST HOSP IP/OBS MODERATE 55: CPT | Performed by: SPECIALIST

## 2022-01-12 PROCEDURE — 86140 C-REACTIVE PROTEIN: CPT

## 2022-01-12 PROCEDURE — 97116 GAIT TRAINING THERAPY: CPT

## 2022-01-12 PROCEDURE — 82746 ASSAY OF FOLIC ACID SERUM: CPT

## 2022-01-12 RX ORDER — METOPROLOL SUCCINATE 25 MG/1
12.5 TABLET, EXTENDED RELEASE ORAL DAILY
Status: DISCONTINUED | OUTPATIENT
Start: 2022-01-12 | End: 2022-01-13 | Stop reason: HOSPADM

## 2022-01-12 RX ORDER — BISACODYL 5 MG
5 TABLET, DELAYED RELEASE (ENTERIC COATED) ORAL DAILY PRN
Status: DISCONTINUED | OUTPATIENT
Start: 2022-01-12 | End: 2022-01-13 | Stop reason: HOSPADM

## 2022-01-12 RX ORDER — ONDANSETRON 2 MG/ML
4 INJECTION INTRAMUSCULAR; INTRAVENOUS
Status: DISCONTINUED | OUTPATIENT
Start: 2022-01-12 | End: 2022-01-13 | Stop reason: HOSPADM

## 2022-01-12 RX ORDER — LANOLIN ALCOHOL/MO/W.PET/CERES
1000 CREAM (GRAM) TOPICAL DAILY
Status: DISCONTINUED | OUTPATIENT
Start: 2022-01-12 | End: 2022-01-13 | Stop reason: HOSPADM

## 2022-01-12 RX ORDER — AMIODARONE HYDROCHLORIDE 200 MG/1
200 TABLET ORAL DAILY
Status: DISCONTINUED | OUTPATIENT
Start: 2022-01-12 | End: 2022-01-13 | Stop reason: HOSPADM

## 2022-01-12 RX ORDER — AMMONIUM LACTATE 12 G/100G
LOTION TOPICAL 2 TIMES DAILY
Status: DISCONTINUED | OUTPATIENT
Start: 2022-01-12 | End: 2022-01-13 | Stop reason: HOSPADM

## 2022-01-12 RX ORDER — ACETAMINOPHEN 650 MG/1
650 SUPPOSITORY RECTAL
Status: DISCONTINUED | OUTPATIENT
Start: 2022-01-12 | End: 2022-01-13 | Stop reason: HOSPADM

## 2022-01-12 RX ORDER — DEXTROMETHORPHAN HYDROBROMIDE, GUAIFENESIN 5; 100 MG/5ML; MG/5ML
650 LIQUID ORAL
COMMUNITY

## 2022-01-12 RX ORDER — POTASSIUM CHLORIDE 750 MG/1
40 TABLET, FILM COATED, EXTENDED RELEASE ORAL
Status: COMPLETED | OUTPATIENT
Start: 2022-01-12 | End: 2022-01-12

## 2022-01-12 RX ORDER — HYDROCHLOROTHIAZIDE 25 MG/1
25 TABLET ORAL DAILY
Status: DISCONTINUED | OUTPATIENT
Start: 2022-01-12 | End: 2022-01-13 | Stop reason: HOSPADM

## 2022-01-12 RX ORDER — MELATONIN
1000 DAILY
Status: DISCONTINUED | OUTPATIENT
Start: 2022-01-12 | End: 2022-01-13 | Stop reason: HOSPADM

## 2022-01-12 RX ORDER — ACETAMINOPHEN 325 MG/1
650 TABLET ORAL
Status: DISCONTINUED | OUTPATIENT
Start: 2022-01-12 | End: 2022-01-13 | Stop reason: HOSPADM

## 2022-01-12 RX ORDER — AMLODIPINE BESYLATE 5 MG/1
5 TABLET ORAL DAILY
Status: DISCONTINUED | OUTPATIENT
Start: 2022-01-12 | End: 2022-01-13 | Stop reason: HOSPADM

## 2022-01-12 RX ORDER — SODIUM CHLORIDE, SODIUM LACTATE, POTASSIUM CHLORIDE, CALCIUM CHLORIDE 600; 310; 30; 20 MG/100ML; MG/100ML; MG/100ML; MG/100ML
75 INJECTION, SOLUTION INTRAVENOUS CONTINUOUS
Status: DISCONTINUED | OUTPATIENT
Start: 2022-01-12 | End: 2022-01-13 | Stop reason: HOSPADM

## 2022-01-12 RX ADMIN — APIXABAN 5 MG: 5 TABLET, FILM COATED ORAL at 09:55

## 2022-01-12 RX ADMIN — METOPROLOL SUCCINATE 12.5 MG: 25 TABLET, FILM COATED, EXTENDED RELEASE ORAL at 13:13

## 2022-01-12 RX ADMIN — Medication 1000 UNITS: at 09:53

## 2022-01-12 RX ADMIN — Medication: at 18:16

## 2022-01-12 RX ADMIN — AMLODIPINE BESYLATE 5 MG: 5 TABLET ORAL at 09:53

## 2022-01-12 RX ADMIN — ACETAMINOPHEN 650 MG: 325 TABLET ORAL at 21:27

## 2022-01-12 RX ADMIN — CYANOCOBALAMIN TAB 500 MCG 1000 MCG: 500 TAB at 09:53

## 2022-01-12 RX ADMIN — ACETAMINOPHEN 650 MG: 325 TABLET ORAL at 10:07

## 2022-01-12 RX ADMIN — AMIODARONE HYDROCHLORIDE 200 MG: 200 TABLET ORAL at 09:55

## 2022-01-12 RX ADMIN — HYDROCHLOROTHIAZIDE 25 MG: 25 TABLET ORAL at 09:54

## 2022-01-12 RX ADMIN — POTASSIUM CHLORIDE 40 MEQ: 750 TABLET, FILM COATED, EXTENDED RELEASE ORAL at 10:06

## 2022-01-12 RX ADMIN — APIXABAN 5 MG: 5 TABLET, FILM COATED ORAL at 18:16

## 2022-01-12 RX ADMIN — SODIUM CHLORIDE, POTASSIUM CHLORIDE, SODIUM LACTATE AND CALCIUM CHLORIDE 75 ML/HR: 600; 310; 30; 20 INJECTION, SOLUTION INTRAVENOUS at 10:19

## 2022-01-12 NOTE — ED NOTES
Verbal shift change report given to Monica Grayson RN (oncoming nurse) by Fish Melara RN (offgoing nurse). Report included the following information SBAR, ED Summary, Intake/Output, MAR and Recent Results.

## 2022-01-12 NOTE — WOUND CARE
Wound consult placed for lower legs  Bilateral lower lower legs and feet with Xerosis. Extremely long toenail that are curled. No skin breakdown    Recommend LacHydrin 12% lotion to bilateral lower legs and feet BID    Wound care will sign off, no wound care needs at this time.  Please re-consult wound care if needed    Anjel Pink MSN, RN, [facility], [address] [facility]  Certified Wound and Ostomy Nurse  office [phone]  Can be reached through [address] [place] [address]  pager [phone] or call  to page

## 2022-01-12 NOTE — PROGRESS NOTES
[facility] Adult  Hospitalist Group                                                                                          Hospitalist Progress Note  Ernie Patel MD  Answering service: [phone] from in house phone        Date of Service:  2022  NAME:  Марина Gutierrez  :  1950  MRN:  [id number]      Admission Summary:     Patient presents with left-sided weakness that has developed about 2 weeks ago. Also with some fatigue. Patient presented to the ER, evaluation with MRI of the brain shows mild to moderate chronic microvascular ischemic change with mild cerebral atrophy, there is no acute infarction. Echocardiogram is pending. Physical therapy evaluated the patient and recommended placement to SNF. Interval history / Subjective:       Patient has no acute complaints.   Denies any pain, headache or dizziness     Assessment & Plan:     Left-sided weakness  -Left-sided weakness of 2 weeks duration, unclear etiology  -MRI of the brain negative for acute infarction, MRA shows moderate to severe basilar stenosis, as patient already been on Eliquis for A. fib, antiplatelet therapy was deferred  -Echocardiogram pending  -Neurology evaluated the patient, her weakness seems to be more arthritic in nature, consider outpatient rheumatology evaluation if not previously considered  -PT evaluating the patient, recommendation for SNF     Elevated troponin, likely demand ischemia  -Likely demand ischemia in the setting of uncontrolled blood pressure on presentation, and possible low troponin clearance from DANIELLE  -Patient with left heart cath 2020 with mild circumflex disease  -Cardiology evaluated the patient, echo pending  -No further work-up was recommended, recommending outpatient follow-up     Paroxysmal atrial fibrillation  -Continue amiodarone, patient was ruled out for stroke and therefore okay to continue with anticoagulation with Eliquis     Hypertension  -Continue amlodipine, HCTZ and metoprolol  -Blood pressure is improving since the admission    Code status: FULL  DVT prophylaxis: Eliquis    Care Plan discussed with: Patient/Family  Anticipated Disposition: SNF/LTC  Anticipated Discharge: 24 hours to 48 hours     Hospital Problems  Date Reviewed: 1/12/2022          Codes Class Noted POA    * (Principal) Weakness generalized ICD-10-CM: R53.1  ICD-9-CM: 780.79  11/24/2020 Yes                Review of Systems:   A comprehensive review of systems was negative except for that written in the HPI. Vital Signs:    Last 24hrs VS reviewed since prior progress note. Most recent are:  Visit Vitals  BP (!) 146/72   Pulse 72   Temp 98.1 °F (36.7 °C)   Resp 15   Ht 5' 7\" (1.702 m)   Wt 80.7 kg (178 lb)   SpO2 100%   BMI 27.88 kg/m²       No intake or output data in the 24 hours ending 01/12/22 1346     Physical Examination:     I had a face to face encounter with this patient and independently examined them on 1/12/2022 as outlined below:          Constitutional:  No acute distress, cooperative, pleasant    ENT:  Oral mucosa moist, oropharynx benign. Resp:  CTA bilaterally. No wheezing/rhonchi/rales. No accessory muscle use   CV:  Regular rhythm, normal rate, no murmurs, gallops, rubs    GI:  Soft, non distended, non tender. normoactive bowel sounds, no hepatosplenomegaly     Musculoskeletal:  No edema, warm, 2+ pulses throughout    Neurologic:  Moves all extremities.   AAOx3, CN II-XII reviewed            Data Review:    Review and/or order of clinical lab test      Labs:     Recent Labs     01/12/22 0221 01/11/22  1326   WBC 5.5 6.3   HGB 13.3 13.6   HCT 41.7 41.7    174     Recent Labs     01/12/22 0221 01/11/22  1326 01/09/22  1829    139 142   K 3.2* 3.4* 3.5    105 103   CO2 25 27 28   BUN 17 13 15   CREA 1.24* 1.12* 1.31*   GLU 85 88 80   CA 9.0 8.9 8.8   MG 2.3 2.3  --    PHOS 3.6  --   --      Recent Labs     01/12/22 0221 01/11/22  1324 01/09/22  1829   ALT 28 26 19   AP 68 74 68   TBILI 0.5 0.7 0.5   TP 7.0 8.0 7.1   ALB 3.3* 3.9 3.6   GLOB 3.7 4.1* 3.5     No results for input(s): INR, PTP, APTT, INREXT in the last 72 hours. No results for input(s): FE, TIBC, PSAT, FERR in the last 72 hours. Lab Results   Component Value Date/Time    Folate 6.9 01/12/2022 02:21 AM      No results for input(s): PH, PCO2, PO2 in the last 72 hours. No results for input(s): CPK, CKNDX, TROIQ in the last 72 hours.     No lab exists for component: CPKMB  Lab Results   Component Value Date/Time    Cholesterol, total 205 (H) 01/12/2022 02:21 AM    HDL Cholesterol 63 01/12/2022 02:21 AM    LDL, calculated 128.2 (H) 01/12/2022 02:21 AM    Triglyceride 69 01/12/2022 02:21 AM    CHOL/HDL Ratio 3.3 01/12/2022 02:21 AM     No results found for: [facility]  Lab Results   Component Value Date/Time    Color YELLOW/STRAW 01/11/2022 04:24 PM    Appearance CLOUDY (A) 01/11/2022 04:24 PM    Specific gravity 1.019 01/11/2022 04:24 PM    pH (UA) 6.0 01/11/2022 04:24 PM    Protein 30 (A) 01/11/2022 04:24 PM    Glucose Negative 01/11/2022 04:24 PM    Ketone 40 (A) 01/11/2022 04:24 PM    Bilirubin Negative 01/11/2022 04:24 PM    Urobilinogen 0.2 01/11/2022 04:24 PM    Nitrites Negative 01/11/2022 04:24 PM    Leukocyte Esterase Negative 01/11/2022 04:24 PM    Epithelial cells MODERATE (A) 01/11/2022 04:24 PM    Bacteria 1+ (A) 01/11/2022 04:24 PM    WBC 0-4 01/11/2022 04:24 PM    RBC 5-10 01/11/2022 04:24 PM         Medications Reviewed:     Current Facility-Administered Medications   Medication Dose Route Frequency    amiodarone (CORDARONE) tablet 200 mg  200 mg Oral DAILY    amLODIPine (NORVASC) tablet 5 mg  5 mg Oral DAILY    apixaban (ELIQUIS) tablet 5 mg  5 mg Oral BID    cholecalciferol (VITAMIN D3) (1000 Units /25 mcg) tablet 1,000 Units  1,000 Units Oral DAILY    cyanocobalamin (VITAMIN B12) tablet 1,000 mcg  1,000 mcg Oral DAILY    hydroCHLOROthiazide (HYDRODIURIL) tablet 25 mg  25 mg Oral DAILY    metoprolol succinate (TOPROL-XL) XL tablet 12.5 mg  12.5 mg Oral DAILY    acetaminophen (TYLENOL) tablet 650 mg  650 mg Oral Q4H PRN    Or    acetaminophen (TYLENOL) solution 650 mg  650 mg Per NG tube Q4H PRN    Or    acetaminophen (TYLENOL) suppository 650 mg  650 mg Rectal Q4H PRN    lactated Ringers infusion  75 mL/hr IntraVENous CONTINUOUS    ondansetron (ZOFRAN) injection 4 mg  4 mg IntraVENous Q6H PRN    bisacodyL (DULCOLAX) tablet 5 mg  5 mg Oral DAILY PRN    ammonium lactate (LAC-HYDRIN) 12 % lotion   Topical BID     ______________________________________________________________________  EXPECTED LENGTH OF STAY: - - -  ACTUAL LENGTH OF STAY:          1                 Lise Mcmillan MD

## 2022-01-12 NOTE — H&P
[address]  HISTORY AND PHYSICAL    Name:  Neeta Owen  MR#:  [id number]  :  1950  ACCOUNT #:  [de-identified]  ADMIT DATE:  2022      The patient was seen, evaluated, and admitted by me on 2022. PRIMARY CARE PHYSICIAN:  Ros Mcdonald MD    SOURCE OF INFORMATION:  Patient who is not a good historian and review of ED and old electronic medical records. CHIEF COMPLAINT:  Left-sided weakness. HISTORY OF PRESENT ILLNESS:  This is a 70-year-old woman with a past medical history significant for hypertension, paroxysmal atrial fibrillation, status post ablation, on Eliquis for anticoagulation, who was in her usual state of health until about 2 weeks ago when the patient developed left-sided weakness. The patient also complained of weakness, described as generalized weakness as well as fatigue. As a result of the left-sided weakness, it has become increasingly difficult for the patient to carry out activities of daily living at home. The patient has a home physical therapy 1-2 days a week and home health 2-3 days a week. No history of recent fall. No fever, no rigors, no chills. The patient was brought to the emergency room for further evaluation. She was last admitted to the hospital from 2020 to 2020. The patient was admitted for evaluation following a fall. When the patient arrived at the emergency room, CT scan of the head was obtained. This did not show any acute pathology. The patient was subsequently referred to the hospitalist service for evaluation for admission. PAST MEDICAL HISTORY:  Hypertension and paroxysmal atrial fibrillation. ALLERGIES:  THE PATIENT IS ALLERGIC TO CODEINE. MEDICATIONS:  Amiodarone 200 mg daily, Norvasc 5 mg daily, Eliquis 5 mg twice daily, hydrochlorothiazide 25 mg daily, Toprol-XL 25 mg 1/2 tablet daily, and potassium 99 mg daily. FAMILY HISTORY:  This was reviewed.   Her mother had hypertension. PAST SURGICAL HISTORY:  This is significant for hysterectomy and cardiac ablation for AFib. SOCIAL HISTORY:  No history of alcohol or tobacco abuse. REVIEW OF SYSTEMS:  HEAD, EYES, EARS, NOSE, AND THROAT:  No headache, no dizziness, no blurring of vision, no photophobia. RESPIRATORY:  No cough, no shortness of breath, no hemoptysis. CARDIOVASCULAR:  No chest pain, no orthopnea, no palpitation. GASTROINTESTINAL:  No nausea or vomiting. No diarrhea. No constipation. GENITOURINARY:  No dysuria, no urgency, and no frequency. All other systems are reviewed and they are negative. PHYSICAL EXAMINATION:  GENERAL APPEARANCE:  The patient appeared ill, in moderate distress. VITAL SIGNS:  On arrival at the emergency room, temperature 97.1, pulse 66, respiratory rate 16, blood pressure 164/90, and oxygen saturation 100%. HEAD:  Normocephalic and atraumatic. EYES:  Normal eye movement. No redness, no drainage, no discharge. EARS:  Normal external ears with no obvious drainage. NOSE:  No deformity and no drainage. MOUTH AND THROAT:  No visible oral lesion. Dry oral mucosa. NECK:  Neck is supple. No JVD, no thyromegaly. CHEST:  Few expiratory wheezing, no crackles. HEART:  Normal S1 and S2, regular. No clinically appreciable murmur. ABDOMEN:  Soft and nontender. Normal bowel sounds. CENTRAL NERVOUS SYSTEM:  Alert and oriented to person and place. No gross focal neurological deficit. EXTREMITIES:  Edema 2+. Pulses 2+ bilaterally. MUSCULOSKELETAL:  No obvious joint deformity or swelling. SKIN:  No active skin lesions seen in the exposed parts of the body. PSYCHIATRIC:  Normal mood and affect. LYMPHATIC:  No cervical lymphadenopathy. DIAGNOSTIC DATA:  EKG shows sinus rhythm and nonspecific ST and T-wave abnormalities. Chest x-ray, no acute process. CT scan of the head without contrast negative.     LABORATORY DATA:  Chemistry, sodium 139, potassium 3.4, chloride 105, CO2 of 27, glucose 88, BUN 13, creatinine 1.12, calcium 8.9. Total bilirubin 0.7, ALT 26, AST 67, alkaline phosphatase 74, total protein 8.0, albumin level 3.9. Hematology, WBC 6.3, hemoglobin 13.6, hematocrit 41.7, platelets 052. Magnesium 2.5. Pro-BNP level 106. Urinalysis, this is significant for negative nitrite, negative leukocyte esterase, 1+ bacteria. ASSESSMENT:  1. Suspected acute cerebrovascular accident. 2.  Paroxysmal atrial fibrillation. 3.  Hypertension. 4.  Acute kidney injury. 5.  Hypokalemia. 6.  Bilateral lower extremity lymphedema. PLAN:  1. Suspected acute CVA. We will obtain MRI, MRA of the brain to evaluate the patient for stroke. We will check carotid ultrasound. We will also obtain echocardiogram.  We will check urine drug screen, U16, and folic acid level. 2.  Paroxysmal atrial fibrillation. The patient appears to be in normal sinus rhythm at present. We will continue with Eliquis for anticoagulation and amiodarone. 3.  Hypertension. We will resume preadmission medication. We will monitor the patient's blood pressure closely. 4.  Acute kidney injury. This is most likely due to volume depletion. We will carry out fluid therapy and monitor the patient's renal function closely. 5.  Hypokalemia. We will replace potassium and repeat level. We will also check magnesium level. 6.  Bilateral lower extremity lymphedema. We will carry out supportive treatment. The patient will continue to follow up [facility]. 7.  Other issues:  Code status, the patient is a full code. We will place the patient on heparin for DVT prophylaxis. Functional status at prior to admission: Patient came from home, patient is ambulatory with assistive device. COVID precaution: Patient was wearing a facemask, I was wearing a face mask and gloves for this patient encounter. .  Addendum: The chest troponin order on admission came back positive at 287: No chest pain.   Will obtain echocardiogram and trend troponin level. Cardiology consult requested. Patient is already on full dose anticoagulation with Eliquis and also on a beta-blocker.     Merary Godinez MD      [id number]  D:  01/12/2022 0:56  T:  01/12/2022 2:35  JOB #:  [id number]  CC:  Ros Mcdonald MD

## 2022-01-12 NOTE — PROGRESS NOTES
Problem: Falls - Risk of  Goal: *Absence of Falls  Description: Document Gasca Blades Fall Risk and appropriate interventions in the flowsheet.   Outcome: Progressing Towards Goal  Note: Fall Risk Interventions:                 Elimination Interventions: Call light in reach,Patient to call for help with toileting needs              Problem: Pain  Goal: *Control of Pain  Outcome: Progressing Towards Goal  Goal: *PALLIATIVE CARE:  Alleviation of Pain  Outcome: Progressing Towards Goal     Problem: TIA/CVA Stroke: 0-24 hours  Goal: Off Pathway (Use only if patient is Off Pathway)  Outcome: Progressing Towards Goal  Goal: Activity/Safety  Outcome: Progressing Towards Goal  Goal: Consults, if ordered  Outcome: Progressing Towards Goal  Goal: Diagnostic Test/Procedures  Outcome: Progressing Towards Goal  Goal: Nutrition/Diet  Outcome: Progressing Towards Goal  Goal: Discharge Planning  Outcome: Progressing Towards Goal  Goal: Medications  Outcome: Progressing Towards Goal  Goal: Respiratory  Outcome: Progressing Towards Goal  Goal: Treatments/Interventions/Procedures  Outcome: Progressing Towards Goal  Goal: Minimize risk of bleeding post-thrombolytic infusion  Outcome: Progressing Towards Goal  Goal: Monitor for complications post-thrombolytic infusion  Outcome: Progressing Towards Goal  Goal: Psychosocial  Outcome: Progressing Towards Goal  Goal: *Hemodynamically stable  Outcome: Progressing Towards Goal  Goal: *Neurologically stable  Description: Absence of additional neurological deficits    Outcome: Progressing Towards Goal  Goal: *Verbalizes anxiety and depression are reduced or absent  Outcome: Progressing Towards Goal  Goal: *Absence of Signs of Aspiration on Current Diet  Outcome: Progressing Towards Goal  Goal: *Absence of deep venous thrombosis signs and symptoms(Stroke Metric)  Outcome: Progressing Towards Goal  Goal: *Ability to perform ADLs and demonstrates progressive mobility and function  Outcome: Progressing Towards Goal  Goal: *Stroke education started(Stroke Metric)  Outcome: Progressing Towards Goal  Goal: *Dysphagia screen performed(Stroke Metric)  Outcome: Progressing Towards Goal  Goal: *Rehab consulted(Stroke Metric)  Outcome: Progressing Towards Goal

## 2022-01-12 NOTE — PROGRESS NOTES
Problem: Mobility Impaired (Adult and Pediatric)  Goal: *Acute Goals and Plan of Care (Insert Text)  Description: FUNCTIONAL STATUS PRIOR TO ADMISSION: Patient was modified independent using a single point cane for functional mobility, able to get up the steps to her bedroom, denies falls in the past year. HOME SUPPORT PRIOR TO ADMISSION: The patient lived in a group home. She is receiving HHPT/OT/SW. She relies on friends for transportation to shop. She has family that recently moved to [facility]. Physical Therapy Goals  Initiated 1/11/2022  1. Patient will move from supine to sit and sit to supine , scoot up and down, and roll side to side in bed with minimal assistance/contact guard assist within 7 day(s). 2.  Patient will transfer from bed to chair and chair to bed with contact guard assist using the least restrictive device within 7 day(s). 3.  Patient will perform sit to stand with contact guard assist within 7 day(s). 4.  Patient will ambulate with contact guard assist for 50 feet with the least restrictive device within 7 day(s). 5.  Patient will ascend/descend 6 stairs with handrail(s) with minimal assistance/contact guard assist within 7 day(s). Outcome: Progressing Towards Goal   PHYSICAL THERAPY TREATMENT  Patient: Manuel Sandoval (25 y.o. female)  Date: 1/12/2022  Diagnosis: Acute CVA (cerebrovascular accident) [facility]) [I63.9] Weakness generalized       Precautions: Fall  Chart, physical therapy assessment, plan of care and goals were reviewed. ASSESSMENT  Patient continues with skilled PT services and is progressing towards goals. Patient initially agreeable to participate, then requesting to come back, education provided regarding purpose of therapy and patient then agreeable. Generally oriented but intermittent confusion, quickly redirected.   Patient initially refusing to have very soiled and tight socks removed off edematous LEs and feet but agreeable with further education regarding need for non-skid ones and to allow skin to rebound from sock marks. Patient with extensive scaliness in lower legs and feet, sloughing off in copious amounts with sock removal with extremely long toenails noted. RN made aware. Patient continues to require MAX A to achieve sitting EOB from supine, with L lean noted. Worked with maintaining midline with tactile and VCs. Patient unable to use RUE effectively today 2/2 increased shoulder pain, reporting to therapist its related to her arthritis. Patient was able to come to stand with RW and MIN A x 2, take steps laterally along bed to [facility] with same assist.  Anticipate may be ready for transfer to chair next session or two. Cont to recommend SNF rehab, patient well below her baseline mobility level and at high risk for falls and further decompensation. Current Level of Function Impacting Discharge (mobility/balance): MAX A bed mob, MIN A x 2 for standing, sidesteps    Other factors to consider for discharge: group home environment, well below baseline         PLAN :  Patient continues to benefit from skilled intervention to address the above impairments. Continue treatment per established plan of care. to address goals. Recommendation for discharge: (in order for the patient to meet his/her long term goals)  Therapy up to 5 days/week in SNF setting    This discharge recommendation:  Has been made in collaboration with the attending provider and/or case management    IF patient discharges home will need the following DME: to be determined (TBD)       SUBJECTIVE:   Patient stated I just don't want to remove those socks.     OBJECTIVE DATA SUMMARY:   Critical Behavior:  Neurologic State: Alert  Orientation Level: Oriented to person,Oriented to place,Oriented to situation (transient confusion)  Cognition: Follows commands,Impaired decision making  Safety/Judgement: Decreased insight into deficits,Decreased awareness of need for safety,Decreased awareness of need for assistance,Fall prevention  Functional Mobility Training:  Bed Mobility:           Scooting: Maximum assistance        Transfers:  Sit to Stand: Minimum assistance;Assist x2  Stand to Sit: Minimum assistance;Assist x2                             Balance:  Sitting: Impaired  Sitting - Static: Fair (occasional)  Sitting - Dynamic: Fair (occasional); Poor (constant support)  Standing: Impaired; With support  Standing - Static: Fair;Constant support  Standing - Dynamic : Fair;Poor;Constant support  Ambulation/Gait Training:  Distance (ft): 3 Feet (ft)  Assistive Device: Gait belt;Walker, rolling  Ambulation - Level of Assistance: Minimal assistance;Assist x2 (sidesteps along bed to [facility])        Gait Abnormalities: Trunk sway increased        Base of Support: Center of gravity altered     Speed/Kalie: Slow;Shuffled                   Pain Rating:  None reported    Activity Tolerance:   Fair    After treatment patient left in no apparent distress:   Supine in bed, Call bell within reach, and Bed / chair alarm activated    COMMUNICATION/COLLABORATION:   The patients plan of care was discussed with: Occupational therapist and Registered nurse.      Moses Garcia, PT   Time Calculation: 38 mins

## 2022-01-12 NOTE — PROGRESS NOTES
Transition of Care Plan to SNF/Rehab    SNF/Rehab Transition:  Patient has been accepted to [facility] and meets criteria for admission. Patient will transported by [facility] and expected to leave at 1800. Communication to Patient/Family:  Met with patient and left message for Radha Ramirez, patient's grandson and primary decision maker, regarding discharge plan. Communication to SNF/Rehab:  Bedside RN, Kennedy Graves, has been notified to update the transition plan to the facility and call report (phone number [phone]). Discharge information has been updated on the AVS.     Discharge instructions to be fax'd to facility at [facility] #[facility]). Nursing Please include all hard scripts for controlled substances, med rec and dc summary, and AVS in packet. Reviewed and confirmed with facility, [facility], can manage the patient care needs for the following:     Anastacia Youssef with (X) only those applicable:    Medication:  [x]  Medications will be available at the facility  []  IV Antibiotics   []  Controlled Substance - hard copy to be sent with patient   [x]  Weekly Labs   Documents:  [x] Hard RX  [x] MAR  [x] Kardex  [x] AVS  [x]Transfer Summary  [x]Discharge   Equipment:  []  CPAP/BiPAP  []  Wound Vacuum  []  Luo or Urinary Device  []  PICC/Central Line  []  Nebulizer  []  Ventilator   Treatment:  []Isolation (for MRSA, VRE, etc.)  []Surgical Drain Management  []Tracheostomy Care  []Dressing Changes  []Dialysis with transportation and chair time   []PEG Care  []Oxygen  []Daily Weights for Heart Failure   Dietary:  []Any diet limitations  []Tube Feedings   []Total Parenteral Management (TPN)   Eligible for Medicaid Long Term Services and Supports  Yes:  [x] Eligible for medical assistance or will become eligible within 180 days and UAI completed. [] Provider/Patient and/or support system has requested screening.   [] UAI copy provided to patient or responsible party,  [x] UAI unavailable at discharge will send once processed to SNF provider. [] UAI unavailable at discharged mailed to patient  No:   [] Private pay and is not financially eligible for Medicaid within the next 180 days. [] Reside out-of-state. [] A residents of a state owned/operated facility that is licensed  by [address] [employer] [facility] or [facility]  [] Enrollment in [facility] hospice services  [] [facility]  [] Patient /Family declines to have screening completed or provide financial information for screening     Financial Resources:  Medicaid    [] Initiated and application pending   [x] Full coverage     Advanced Care Plan:  [x]Surrogate Decision Maker of Care  []POA  [x]Communicated Code Status FULL   Other     Talita Bustillo M.S.MANAS.

## 2022-01-12 NOTE — PROGRESS NOTES
1/12/2021; 08:55 -  CM received call from Gamaliel Adamson: [phone], Option 3) identifying that patient's Donata Comber is in place. Paras Cure #: [id number]; Approved for 3 days with start date of 1/12 - next review date of 1/14; Fax updates to F: [phone] ATTN: Antonino SWAN CM provided SBAR to Unit CM regarding the above.     CRM: Susan Islas, MPH, [address] [place] [address]; Z: [phone]

## 2022-01-12 NOTE — PROGRESS NOTES
Transition of Care Plan   RUR- Low 13%   DISPOSITION: SNF - [facility] - pending troponin level   F/U with PCP/Specialist     Transport: [facility] 1/12 12pm    Per previous CM note, patient prefers to go to [facility] at discharge. Auth was started 1/11 through [facility] and likely will result today. CM spoke with Starr Easley, liaison at [facility] regarding bed availability today. Pending. [facility] requested for 4pm today, pending medical stability. 11:00am: Patient has received insurance auth through [facility]. Holly Copa #: [id number]; Approved for 3 days with start date of 1/12 - next review date of 1/14; Fax updates to F: [phone] ATTN: Estrella Whaley; This information has been provided to George Zuleta at [facility]. Awaiting bed confirmation.     3:02pm: Bed at [facility] confirmed for today. Room #136B, Call Report #[phone] [facility]. Patient's echo has been completed but still needs to be read. [facility] pushed to 6pm this evening to allow more time for echo. [facility] packet on chart, please call on call CM #[phone] if transport needs to be cancelled or rescheduled for tomorrow. 3:30pm: CM met with patient at bedside to discuss DC plan this evening vs tomorrow. Patient prefers to DC tomorrow morning. However, echo still needs to be read. CM reiterated that MD makes discharge decision. Will leave DC arranged for 6pm and allow on call CM to reschedule if need be. Dr. Queen Lopez informed. 4:31m: Per Dr. Tata Payne would like to obtain 2nd troponin prior to DC. [facility] re-scheduled for tomorrow at [facility].

## 2022-01-12 NOTE — PROGRESS NOTES
01/11/22    ECHO ADULT COMPLETE 01/12/2022 1/12/2022    Interpretation Summary    Left Ventricle: Left ventricle size is normal. Moderately increased wall thickness. Normal wall motion. Normal left ventricular systolic function with a visually estimated EF of 60 - 65%. Normal diastolic function.   Aortic Valve: Mild sclerosis of the aortic valve cusps.   Left Atrium: Left atrium is mildly dilated.   Interatrial Septum: Grade 0 Absence of bubbles. Agitated saline study was negative with and without provocation.     Signed by: Becka Buckner MD on 1/12/2022  3:39       Echo as above    Troponin elevated likely demand ischemia in a patient with no cardiac symptoms     No additional interventions from cardiology (but obtain second troponin before dc)    Ok to dc from my standpoint if 2nd troponin not >300    Follow up with cardiology in 1-2 weeks

## 2022-01-12 NOTE — PROGRESS NOTES
TRANSFER - OUT REPORT:    Verbal report given to Sparkle Hess (name) on Amelie Ivey  being transferred to  (unit) for routine progression of care       Report consisted of patients Situation, Background, Assessment and   Recommendations(SBAR). Information from the following report(s) SBAR, Kardex, Intake/Output, MAR and Recent Results was reviewed with the receiving nurse. Lines:   Peripheral IV 01/11/22 Left Antecubital (Active)        Opportunity for questions and clarification was provided.

## 2022-01-12 NOTE — CONSULTS
NEUROLOGY CONSULT NOTE    Name Gary Krishna Age 70 y.o. MRN [id number]  1950     Consulting Physician: Mamie Faith MD      Chief Complaint:  Left weakness     Assessment/Plan:     Principal Problem:    Weakness generalized (2020)      70year old AAF with a h/o pAfib on Eliquis, SVT, HTN admitted due to functional decline, generalized weakness confounded by superimposed severe arthritis. MRI Brain/MRA this admission did not reveal any acute intracranial pathology/ischemia, noted moderate to severe basilar stenosis. As she is already maintained on NOAC, will defer addition of antiplatelet therapy for basilar stenosis due to fall risk and potential for hemorrhage. Recommend PT/OT evaluation to address general functional decline. No indication of acute process on examination or imaging this admission. Pain appears to be a limiting factor- will defer management to primary team. May benefit from outpatient Rheumatology evaluation if not previously considered. Please call if further questions. Thank you very much for this referral. I appreciate the opportunity to participate in this patient's care. History of Present Illness: This is a 70 y.o.   female, we were asked to see for left weakness. PMH notable for pAfib on Eliquis, SVT, HTN. Patient is admitted due to decline in functional status over the past 2 weeks. She endorses generalized weakness confounded by chronic pain symptoms from her arthritis. Arthritis affects her RUE greater than other limbs, however she is also recently experiencing some spasms of her LLE making it difficulty to ambulate with assist device. She denies LBP, radicular symptoms, numbness/tingling of the extremities. She states her LUE is unaffected, however her RUE is not functional due to severe arthritis. She denies dysarthria, aphasia, vision deficits. She endorses a more gradual generalized decline over several weeks.  MRI Brain/MRA this admission did not reveal any acute intracranial pathology, noted moderate to severe basilar stenosis. Allergies   Allergen Reactions    Codeine Itching and Swelling        Prior to Admission medications    Medication Sig Start Date End Date Taking? Authorizing Provider   acetaminophen (Tylenol Arthritis Pain) 650 mg TbER Take 650 mg by mouth nightly. Yes Provider, Historical   potassium 99 mg tablet Take 99 mg by mouth daily. Yes Provider, Historical   ZINC PO Take  by mouth daily. Yes Provider, Historical   apixaban (Eliquis) 5 mg tablet Take 1 tablet by mouth twice daily 7/16/21  Yes Kevon Callander., NP   amiodarone (Pacerone) 200 mg tablet Take 1 Tablet by mouth daily. 7/16/21  Yes Amos BAXTER NP   amLODIPine (NORVASC) 5 mg tablet Take 1 Tab by mouth daily. 2/17/21  Yes Silvio Morris MD   hydroCHLOROthiazide (HYDRODIURIL) 25 mg tablet Take 1 Tab by mouth daily. 2/17/21  Yes Silvio Morris MD   cholecalciferol (VITAMIN D3) (1000 Units /25 mcg) tablet Take 1 Tab by mouth daily. 12/2/20  Yes Cesia Omer NP   cyanocobalamin 1,000 mcg tablet Take 1 Tab by mouth daily. 12/2/20  Yes Cesia Omer NP   metoprolol succinate (TOPROL-XL) 25 mg XL tablet Take 0.5 Tablets by mouth daily.  Take 1 tab at night  Patient not taking: Reported on 9/1/2021 7/16/21   Kevon Callander., NP       Past Medical History:   Diagnosis Date    History of mammogram 2010    normal    Hypertension     Pap smear for cervical cancer screening 2011    normal        Past Surgical History:   Procedure Laterality Date    HX GYN      hystterectomy    AL COMPRE EP EVAL ABLTJ ATR FIB PULM VEIN ISOLATION N/A 11/30/2020    ABLATION A-FIB  W COMPLETE EP STUDY performed by Yrn Leigh MD at [facility]AC CATH LAB    AL ICAR CATHETER ABLATION ARRHYTHMIA ADD ON N/A 11/30/2020    Ablation Svt/Vt Add On performed by Yrn Leigh MD at [facility] CARDIAC CATH LAB    AL INTRACARD ECHO, THER/DX INTERVENT N/A 11/30/2020    Intracardiac Echocardiogram performed by Elizabeth Cash MD at [facility] CARDIAC CATH LAB    MA INTRACARDIAC ELECTROPHYSIOLOGIC 3D MAPPING N/A 11/30/2020    Ep 3d Mapping performed by Elizabeth Cash MD at [facility] CARDIAC CATH LAB    MA STIM/PACING HEART POST IV DRUG INFU N/A 11/30/2020    Drug Stimulation performed by Elizabeth Cash MD at [facility] CARDIAC CATH LAB        Social History     Tobacco Use    Smoking status: Never Smoker    Smokeless tobacco: Never Used   Substance Use Topics    Alcohol use: Yes     Comment: occasional       FHX reviewed and non-contributory. Review of Systems:   Comprehensive review of systems performed and negative except for as listed above. Exam:     Visit Vitals  BP (!) 147/80 (BP 1 Location: Right upper arm)   Pulse 62   Temp 98.1 °F (36.7 °C)   Resp 15   SpO2 100%        General: Patient in no apparent distress   Head: Normocephalic, atraumatic, anicteric sclera   Lungs:  Clear to auscultation bilaterally, No wheezes or rubs   Cardiac: Regular rate and rhythm with no murmurs. Abd: Bowel sounds were audible. No tenderness on palpation   Ext: +B/L severe pedal edema   Skin: No overt signs of rash     Neurological Exam:  Mental Status: Alert and oriented to person place and time   Speech: Fluent no aphasia or dysarthria. Cranial Nerves:   Intact visual fields. Facial sensation is normal. Facial movement is symmetric. Palate is midline. Normal sternocleidomastoid strength. Tongue is midline. Hearing is intact bilaterally. Eyes: PERRL, EOM's full, no nystagmus, no ptosis. Motor:  LUE 5/5, RUE limited by pain, LE 4+/5 b/l   Reflexes:   Deep tendon reflexes 1+/4 and symmetrical.  Plantar response is downgoing b/l. Sensory:   Symmetrically intact with no perceived deficits modalities involving small or large fibers. Gait:  Gait is deferred    Tremor:   No tremor noted. Cerebellar:  No ataxia, although limited RUE 2/2 pain.    Neurovascular: No carotid bruits. Imaging    MRI Results (maximum last 3): Results from Hospital Encounter encounter on 01/11/22    MRA BRAIN WO CONT    Narrative  Clinical history: Suspected acute CVA  INDICATION:   Suspected acute CVA  COMPARISON: None  TECHNIQUE:  3-D time-of-flight MRA of the brain was performed. Multiplanar  reconstructions were obtained. FINDINGS:  The left vertebral artery slightly larger than right vertebral artery. There is  severe stenosis of the mid basilar artery. Persistent fetal circulation to the  right PCA. Left sided posterior to indicating artery. Petrous and cavernous ICAs  are patent. M1 segments are patent. Mild multifocal M1 segment stenosis on the  right. M2 segments demonstrate symmetric arborization. A2 and A3 segments are  within normal limits. Hypoplastic A1 on the right. . The internal carotid,  anterior cerebral, and middle cerebral arteries are patent. . There is no  aneurysm. .    Impression  Moderate to severe stenosis proximal basilar artery. Mild stenosis distal  basilar artery    Otherwise no dissection or stenosis. MRI BRAIN WO CONT    Narrative  CLINICAL HISTORY: Suspected CVA. INDICATION: Suspected CVA. COMPARISON: CT 1/11/2022    TECHNIQUE: MR examination of the brain includes axial and sagittal T1, coronal  T2, axial T2, axial FLAIR, axial gradient echo, axial DWI. CONTRAST: None    FINDINGS:  There is no intracranial mass, hemorrhage or evidence of acute infarction. Confluent periventricular and scattered foci of increased T2 signal intensity  and corona radiata and centrum semiovale. There is sulcal and ventricular  prominence. IACs are symmetric in size. Chronic focus of hemosiderin deposition  in the left basal ganglia. The brain architecture is normal. There is no evidence of midline shift or  mass-effect. There are no extra-axial fluid collections. There is no Chiari or  syrinx. The pituitary and infundibulum are grossly unremarkable.  There is no  skull base mass. Cerebellopontine angles are grossly unremarkable. The major  intracranial vascular flow-voids are unremarkable. The cavernous sinuses are  symmetric. Optic chiasm and infundibulum grossly unremarkable. Orbits are  grossly symmetric. Dural venous sinuses are grossly patent. The mastoid air cells are well pneumatized and clear. Impression  Mild to moderate chronic microvascular ischemic change and mild cerebral  atrophy. There is no intracranial mass, hemorrhage or evidence of acute infarction. No acute intracranial process is demonstrated. Lab Review  Lab Results   Component Value Date/Time    WBC 5.5 01/12/2022 02:21 AM    HCT 41.7 01/12/2022 02:21 AM    HGB 13.3 01/12/2022 02:21 AM    PLATELET 882 55/18/4853 02:21 AM     Lab Results   Component Value Date/Time    Sodium 140 01/12/2022 02:21 AM    Potassium 3.2 (L) 01/12/2022 02:21 AM    Chloride 107 01/12/2022 02:21 AM    CO2 25 01/12/2022 02:21 AM    Glucose 85 01/12/2022 02:21 AM    BUN 17 01/12/2022 02:21 AM    Creatinine 1.24 (H) 01/12/2022 02:21 AM    Calcium 9.0 01/12/2022 02:21 AM     No components found for: TROPQUANT  No results found for: LUCY    Signed:  Arjun Bailey.  Nguyen Dominguez DO  1/12/2022  11:22 AM

## 2022-01-12 NOTE — PROGRESS NOTES
1/11/2022; 18:55 -   CM received SBAR from BENY MARTINEZ. CM Team rounded on patient with PT and ED Attending. Patient's case to be reviewed with hospitalist team for possible admission. If needed, patient to be held in the ED overnight, as patient is not safe to discharge to home at this time. CM contacted [facility]: [phone], Option 7, Option 1) to initiate patient's SNF Auth request.  CM faxed: face sheet, SS NP, BENY CM, and PT notes to confirmed fax F: [phone] for REF #: [id number] with request to have SNF Auth processed as a STAT Request.  Confirmed status of transmission of faxing. Patient's preference for SNF facility is [facility]. CM submitted referral via [facility]. CM requested for OT Consult from ED Attending. Transition of Care Plan:     The Plan for Transition of Care is related to the following treatment goals: SNF at [facility]    The Patient was provided with a choice of provider and agrees  with the discharge plan. Yes [x] No []    A Freedom of choice list was provided with basic dialogue that supports the patient's individualized plan of care/goals and shares the quality data associated with the providers.        Yes [x] No []      CRM: Francisco J Crockett, MPH, [address] [place] [address]; Z: [phone]

## 2022-01-12 NOTE — CONSULTS
[facility]  Cardiology Care Note                                    Initial visit      Patient Name: Amelie Ivey - RCC:[id number] - [facility]  Primary Cardiologist: Has seen Dr. Angy Jaimes,   Consulting Cardiologist: Dread Berger MD  Reason for Consult: elevated troponin     HPI: Ms. Matt Kaur is a 70 y.o. female with PMH of PAF on eliquis s/p ablation 2020, RBBB,  NSVT/SVT s/p ablation 2020, Aortic insufficiency, HTN, HLD, Who presented with chief c/o Left sided weakness which started 2 weeks ago. Workup thus far has shown no evidence of CVA on imaging. Cardiology is consulted as pt had mild troponin elevation. She denies chest pain, palpitations, arm pain. She does have chronic bilateral shoulder discomfort with movement of arms. She reports she sometimes has dizziness/lighheadednes when getting up in the morning but no history of syncope. Reports history of LINN but has had this even before she had LHC in 2020 (which showed essentially normal coronaries). Reports compliance with medications including amiodarone. Reports chronic BLE edema since she was treated for allergies with prednisone last year. She last saw [facility] cardiology NP in 2020 but has since been lost to follow up with both Dr. Luis Ortiz and Dr. Angy Jaimes. She indicates she would like to change cardiologist, requests [facility] to follow outpatient. Assessment and Plan     1. Elevated troponin   -Troponin 287, repeat pending.    -no chest pain   -EKG NSR, RBBB, LAFB, (Hx of RBBB). -Had [address] [place] [address] 2020 with only mild lt cx disease, MLI LAD. Would be highly unusual to develop obstructive CAD in essentially 1 year.     -Echocardiogram ordered by primary team. Will review but no further ischemic evaluation planned.   -Suspect troponin elevation  due to demand but will review echo. If echo normal no additional cardiac interventions    2. History of PAF   S/p afib ablation 1/2020   -no PAF on tele review   -TSH 0.51   -continue Toprol, amiodarone   -no PAF on tele review    3. History of NSVT/SVT   -s/p ablation 11/2020   -no evidence of NSVT/SVT on tele review   -Amiodarone, BB  as above,  (TSH)    4. History of aortic insufficiency   -mild by echo 9/7/2020     5. Left sided weakness   -MRI brain and CT head without evidence of CVA   -neuro consult pending. 6. HLD   -, HDL 63   -reports prior intollerance to statin - made her tired. Elevated troponin in pt without chest pain who presented with Left sided weakness. She had LHC just over 1 year ago with no significant CAD. Suspect trop elevation due to demand. She prefers follow up with [facility] for cardiology care. Will arrange followup at our office. Cardiac testing history :  09/04/20    ECHO ADULT COMPLETE 09/07/2020 9/7/2020    Interpretation Summary  · LV: Estimated LVEF is 55 - 60%. Visually measured ejection fraction. Normal cavity size and systolic function (ejection fraction normal). Mild concentric hypertrophy. Mild (grade 1) left ventricular diastolic dysfunction. · AV: Probably trileaflet aortic valve. Mild aortic valve regurgitation is present. Signed by: Sosa Ho MD on 9/7/2020 12:09 PM        [address] [place] [address] 11/27/20:   Left Main   The vessel is angiographically normal.   Left Anterior Descending   The vessel exhibits minimal luminal irregularities. First Diagonal Branch   The vessel is angiographically normal.   Second Diagonal Branch   The vessel is angiographically normal.   Left Circumflex   There is mild disease. First Obtuse Marginal Branch   The vessel is angiographically normal.   Second Obtuse Marginal Branch   There is mild disease.    Right Coronary Artery   The vessel is angiographically normal.     Intervention      No interventions have been documented        ____________________________________________________________      Patient Active Problem List Diagnosis Code    Hypertension I10    Fatigue R53.83    Fall W19. XXXA    Elevated troponin R77.8    Hypokalemia E87.6    Weakness generalized R53.1    Rhabdomyolysis M62.82    Volume depletion E86.9    DANIELLE (acute kidney injury) ([facility] [address] N17.9    SVT (supraventricular tachycardia) (HCC) I47.1    Paroxysmal atrial fibrillation (HCC) I48.0     No specialty comments available.       Review of Systems:    [] Patient unable to provide secondary to condition    CONSTITUTIONAL: fatigue  Since SVT  Ablation in 2020   ENT/MOUTH: negative     EYES: negative    CARDIOVASCULAR: LINN, edema , LINN for over 1 year, BLE edema since last spring     RESPIRATORY: negative      GASTROINTESTINAL: negative     GENITOURINARY: negative     MUSCULOSKELETAL: negative      SKIN: negative    NEUROLOGICAL: occasional dizziness/lightheadedness in early a.m. when she gets up     PSYCHOLOGICAL: negative      HEME/LYMPH: negative    ENDOCRINE: negative        Past Medical History:   Diagnosis Date    History of mammogram 2010    normal    Hypertension     Pap smear for cervical cancer screening 2011    normal     Past Surgical History:   Procedure Laterality Date    HX GYN      hystterectomy    MD COMPRE EP EVAL ABLTJ ATR FIB PULM VEIN ISOLATION N/A 11/30/2020    ABLATION A-FIB  W COMPLETE EP STUDY performed by Leonides [facility] MD at [facility] CARDIAC CATH LAB    MD ICAR CATHETER ABLATION ARRHYTHMIA ADD ON N/A 11/30/2020    Ablation Svt/Vt Add On performed by Leonides Nicole MD at [facility] CARDIAC CATH LAB    MD INTRACARD ECHO, THER/DX INTERVENT N/A 11/30/2020    Intracardiac Echocardiogram performed by Leonides Nicole MD at [facility] CARDIAC CATH LAB    MD INTRACARDIAC ELECTROPHYSIOLOGIC 3D MAPPING N/A 11/30/2020    Ep 3d Mapping performed by Leonides Nicole MD at [facility] CARDIAC CATH LAB    MD STIM/PACING HEART POST IV DRUG INFU N/A 11/30/2020    Drug Stimulation performed by Leonides Nicole MD at [address] [place] [address] CATH LAB     Current Facility-Administered Medications   Medication Dose Route Frequency    amiodarone (CORDARONE) tablet 200 mg  200 mg Oral DAILY    amLODIPine (NORVASC) tablet 5 mg  5 mg Oral DAILY    apixaban (ELIQUIS) tablet 5 mg  5 mg Oral BID    cholecalciferol (VITAMIN D3) (1000 Units /25 mcg) tablet 1,000 Units  1,000 Units Oral DAILY    cyanocobalamin (VITAMIN B12) tablet 1,000 mcg  1,000 mcg Oral DAILY    hydroCHLOROthiazide (HYDRODIURIL) tablet 25 mg  25 mg Oral DAILY    metoprolol succinate (TOPROL-XL) XL tablet 12.5 mg  12.5 mg Oral DAILY    acetaminophen (TYLENOL) tablet 650 mg  650 mg Oral Q4H PRN    Or    acetaminophen (TYLENOL) solution 650 mg  650 mg Per NG tube Q4H PRN    Or    acetaminophen (TYLENOL) suppository 650 mg  650 mg Rectal Q4H PRN    lactated Ringers infusion  75 mL/hr IntraVENous CONTINUOUS    ondansetron (ZOFRAN) injection 4 mg  4 mg IntraVENous Q6H PRN    bisacodyL (DULCOLAX) tablet 5 mg  5 mg Oral DAILY PRN       Allergies   Allergen Reactions    Codeine Itching and Swelling        FmHx: family history is not on file. Social Hx :  reports that she has never smoked. She has never used smokeless tobacco. She reports current alcohol use. She reports that she does not use drugs. Objective:    Physical Exam    Vitals:   Vitals:    01/12/22 0000 01/12/22 0621 01/12/22 1000 01/12/22 1141   BP: (!) 111/53 (!) 147/80  (!) 147/80   Pulse: 65 69 62    Resp: 18 15     Temp: 97.3 °F (36.3 °C) 98.1 °F (36.7 °C)     SpO2: 99% 100%     Weight:    178 lb (80.7 kg)   Height:    5' 7\" (1.702 m)       General:    Alert, cooperative, no distress, appears stated age. Neck:   Supple   Back:     Symmetric, . Lungs:     Clear to auscultation bilaterally. Heart[de-identified]    Regular rate and rhythm, S1, S2 normal, no murmur, click, rub or gallop. Abdomen:     Soft, non-tender.  Bowel sounds normal.    Extremities:   Extremities vascular skin changes, trace edema,    Vascular:   Pulses - 2+   Skin:   Skin color normal. No rashes or lesions on visible areas   Neurologic:   Alert, PATRICIO. Telemetry: NSR    ECG:   EKG Results     Procedure 720 Value Units Date/Time    EKG, 12 LEAD, INITIAL [[id number]]     Order Status: Sent     EKG, 12 LEAD, INITIAL [[id number]] Collected: 01/11/22 1257    Order Status: Completed Updated: 01/11/22 1402     Ventricular Rate 72 BPM      Atrial Rate 72 BPM      P-R Interval 168 ms      QRS Duration 138 ms      Q-T Interval 466 ms      QTC Calculation (Bezet) 510 ms      Calculated P Axis 83 degrees      Calculated R Axis -66 degrees      Calculated T Axis 2 degrees      Diagnosis --     Sinus rhythm with premature atrial complexes  Left anterior fascicular block  Right bundle branch block  ** Bifascicular block **  When compared with ECG of 09-JAN-2022 18:15,  Criteria for Septal infarct are no longer present  Nonspecific T wave abnormality no longer evident in Lateral leads  Confirmed by Naman Otoole M.D., Dora Sherwin ([id number]) on 1/11/2022 2:02:15 PM            Data Review:     Radiology:   XR Results (most recent):  Results from Hospital Encounter encounter on 01/11/22    XR CHEST PA LAT    Narrative  Exam:  2 view chest    Indication: Lower extremity swelling, possible congestive heart failure    Comparison to 11/24/2020. Frontal and lateral views demonstrate normal heart size. There is no acute  process in the lung fields. Degenerative changes are seen in the thoracic spine. Impression  No acute process or change compared to the prior exam.        No results for input(s): CPK, TROIQ in the last 72 hours.     No lab exists for component: CKQMB, CPKMB, BMPP  Recent Labs     01/12/22 0221 01/11/22  1326    139   K 3.2* 3.4*    105   CO2 25 27   BUN 17 13   CREA 1.24* 1.12*   GLU 85 88   PHOS 3.6  --    CA 9.0 8.9     Recent Labs     01/12/22 0221 01/11/22  1326   WBC 5.5 6.3   HGB 13.3 13.6   HCT 41.7 41.7    174     Recent Labs     01/12/22 0221 01/11/22  1326   AP 68 74     Recent Labs     01/12/22 0221   CHOL 205*   LDLC 128.2*     Recent Labs     01/12/22 0221   CRP 1.96*   TSH 0.51       Jocelyn Adler MD    [facility], [address] [address]  [address]  [phone]      CC:Ciaran Hansen MD

## 2022-01-12 NOTE — PROGRESS NOTES
Speech pathology note  Reviewed chart and note patient admitted with L weakness starting 2 weeks ago with concern for CVA. Note CT showed No acute intracranial abnormality or significant change and MRI showed Mild to moderate chronic microvascular ischemic change and mild cerebral atrophy, There is no intracranial mass, hemorrhage or evidence of acute infarction, and No acute intracranial process is demonstrated. Note patient passed the STAND and a regular diet was ordered. Discussed case with RN who reported no SLP-related concerns. Formal SLP evaluation not clinically indicated at this time. Will sign off. Please re-consult if further needs arise. Thank you.     Lennie Pineda, CCC-SLP

## 2022-01-12 NOTE — PROGRESS NOTES
Problem: Self Care Deficits Care Plan (Adult)  Goal: *Acute Goals and Plan of Care (Insert Text)  Description: FUNCTIONAL STATUS PRIOR TO ADMISSION: Patient was modified independent using a single point cane for functional mobility. Per patient report, able to ambulate up stairs to bedroom without assistance. Patient reports being independent in BADLs and reports \"wash-up\" method for bathing (unable to get in tub/shower x1.5 years). Patient has history of decreased R shoulder ROM. HOME SUPPORT: Patient lived in a group home. Receives HH PT 1-2 days/week. Occupational Therapy Goals  Initiated 1/12/2022   1. Patient will perform EOB grooming at midline orientation with supervision/set-up within 7 day(s). 2.  Patient will perform upper body dressing with supervision/set-up within 7 day(s). 3.  Patient will perform lower body dressing with moderate assistance within 7 day(s). 4.  Patient will perform toilet transfers to [facility] with minimal assistance using RW within 7 day(s). 5.  Patient will perform all aspects of toileting with minimal assistance using RW within 7 day(s). 6.  Patient will participate in upper extremity therapeutic exercise/activities with minimal assistance within 7 day(s). 7.  Patient will utilize energy conservation and fall prevention techniques during functional activities with verbal cues within 7 day(s). 8.  Patient will perform seated bathing with minimal assistance within 7 day(s).   Outcome: Progressing Towards Goal   OCCUPATIONAL THERAPY EVALUATION  Patient: Mike Murphy (67 y.o. female)  Date: 1/12/2022  Primary Diagnosis: Acute CVA (cerebrovascular accident) [facility]) [I63.9]        Precautions:  Fall    ASSESSMENT  Based on the objective data described below, the patient presents with generalized weakness (L>R), impaired functional mobility and balance, decreased activity tolerance, altered cognition (decreased attention, problem-solving, insight), and overall limited independence in ADLs s/p admission for CVA work-up. Of note, patient imaging thus far negative for acute infarct. This date, patient received resting in bed, presenting with transient confusion (repeatedly stating she would not use a bed pan and would just walk to the bathroom despite not being able to stand thus far during admission) and agreeable to session although at times resistant to instructions. Patient initially refusing to doff socks despite education on high risk of wounds as current socks were significantly cutting into edematous Les; patient eventually agreeable and requiring total assist to don larger pair of hospital socks. Seated EOB, patient with constant L lateral lean, requiring cues verbal and tactile cues to attempt correction to midline. From [facility] elevated position, patient standing with min assist x2 and taking sidesteps towards HOB. At this time, patient well below reported mod I baseline. For best outcome, recommend SNF rehab at discharge. Current Level of Function Impacting Discharge (ADLs/self-care): SBA - total A; cognitive cues    Functional Outcome Measure: The patient scored 25/100 on the Barthel Index. Initiated [employer], however unable to perform 2/2 decreased attention, perseveration, and patient reluctance to attempt movements 2/2 reported pain. Other factors to consider for discharge: questionable historian; AMS     Patient will benefit from skilled therapy intervention to address the above noted impairments. PLAN :  Recommendations and Planned Interventions: self care training, functional mobility training, therapeutic exercise, balance training, therapeutic activities, endurance activities, neuromuscular re-education, and patient education    Frequency/Duration: Patient will be followed by occupational therapy 5 times a week to address goals.     Recommendation for discharge: (in order for the patient to meet his/her long term goals)  Therapy up to 5 days/week in SNF setting    This discharge recommendation:  Has not yet been discussed the attending provider and/or case management    IF patient discharges home will need the following DME: TBD       SUBJECTIVE:   Patient stated i'm a tough customer but you won me over.     OBJECTIVE DATA SUMMARY:   HISTORY:   Past Medical History:   Diagnosis Date    History of mammogram 2010    normal    Hypertension     Pap smear for cervical cancer screening 2011    normal     Past Surgical History:   Procedure Laterality Date    HX GYN      hystterectomy    NH COMPRE EP EVAL ABLTJ ATR FIB PULM VEIN ISOLATION N/A 11/30/2020    ABLATION A-FIB  W COMPLETE EP STUDY performed by Fermín Chauhan MD at [facility] CARDIAC CATH LAB    NH ICAR CATHETER ABLATION ARRHYTHMIA ADD ON N/A 11/30/2020    Ablation Svt/Vt Add On performed by Fermín Chauhan MD at [facility] CARDIAC CATH LAB    NH INTRACARD ECHO, THER/DX INTERVENT N/A 11/30/2020    Intracardiac Echocardiogram performed by Fermín Chauhan MD at [facility] CARDIAC CATH LAB    NH INTRACARDIAC ELECTROPHYSIOLOGIC 3D MAPPING N/A 11/30/2020    Ep 3d Mapping performed by Fermín Chauhan MD at [facility] CARDIAC CATH LAB    NH STIM/PACING HEART POST IV DRUG INFU N/A 11/30/2020    Drug Stimulation performed by Fermín Chauhan MD at [facility] CARDIAC CATH LAB     Expanded or extensive additional review of patient history:     Home Situation  Home Environment: Group home  # Steps to Enter: 5  Rails to Enter: Yes  One/Two Story Residence: Two story  # of Interior Steps: 14  Lift Chair Available: No  Living Alone: Yes  Support Systems: Friend/Neighbor,Other Family Member(s),Child(severo)  Patient Expects to be Discharged to[de-identified] Skilled nursing facility  Current DME Used/Available at Home: Yash Lopes, wes,Walker, rolling,Transfer bench  Tub or Shower Type: Tub/Shower combination    Hand dominance: Right    EXAMINATION OF PERFORMANCE DEFICITS:  Cognitive/Behavioral Status:  Neurologic State: Alert  Orientation Level: Oriented to person;Oriented to place;Oriented to situation (transient confusion)  Cognition: Follows commands; Impaired decision making  Perception: Verbal;Visual;Tactile;Cues to maintain midline in sitting  Perseveration: No perseveration noted  Safety/Judgement: Decreased insight into deficits; Decreased awareness of need for safety;Decreased awareness of need for assistance; Fall prevention    Skin: BLEs extremely dry, significant amounts of dry skin in socks    Edema:     Hearing: Auditory  Auditory Impairment: None    Vision/Perceptual:    Corrective Lenses: Glasses    Range of Motion:  AROM: Generally decreased, functional    Strength:  Strength: Generally decreased, functional    Coordination:  Coordination: Generally decreased, functional  Fine Motor Skills-Upper: Left Intact; Right Intact    Gross Motor Skills-Upper: Right Impaired;Left Intact    Tone & Sensation:      Balance:  Sitting: Impaired  Sitting - Static: Fair (occasional)  Sitting - Dynamic: Fair (occasional); Poor (constant support)  Standing: Impaired; With support  Standing - Static: Fair;Constant support  Standing - Dynamic : Fair;Poor;Constant support    Functional Mobility and Transfers for ADLs:  Bed Mobility:  Scooting: Maximum assistance    Transfers:  Sit to Stand: Minimum assistance;Assist x2  Stand to Sit: Minimum assistance;Assist x2  Toilet Transfer : Moderate assistance;Assist x2 (infer to [facility]; rec bedpan until trial [facility])    ADL Assessment:  Feeding: Setup;Stand-by assistance    Oral Facial Hygiene/Grooming: Minimum assistance    Bathing: Maximum assistance    Upper Body Dressing: Moderate assistance    Lower Body Dressing: Total assistance    Toileting: Maximum assistance    ADL Intervention and task modifications:  Lower Body Dressing Assistance  Dressing Assistance: Total assistance(dependent)  Socks:  Total assistance (dependent)    Cognitive Retraining  Problem Solving: Identifying the problem  Executive Functions: Executing cognitive plans  Organizing/Sequencing: Breaking task down  Attention to Task: Distractibility  Safety/Judgement: Decreased insight into deficits; Decreased awareness of need for safety;Decreased awareness of need for assistance; Fall prevention    Functional Measure:    Barthel Index:  Bathin  Bladder: 5  Bowels: 5  Groomin  Dressin  Feedin  Mobility: 0  Stairs: 0  Toilet Use: 5  Transfer (Bed to Chair and Back): 5  Total: 25/100      The Barthel ADL Index: Guidelines  1. The index should be used as a record of what a patient does, not as a record of what a patient could do. 2. The main aim is to establish degree of independence from any help, physical or verbal, however minor and for whatever reason. 3. The need for supervision renders the patient not independent. 4. A patient's performance should be established using the best available evidence. Asking the patient, friends/relatives and nurses are the usual sources, but direct observation and common sense are also important. However direct testing is not needed. 5. Usually the patient's performance over the preceding 24-48 hours is important, but occasionally longer periods will be relevant. 6. Middle categories imply that the patient supplies over 50 per cent of the effort. 7. Use of aids to be independent is allowed. Score Interpretation (from [address] [address])    Independent   60-79 Minimally independent   40-59 Partially dependent   20-39 Very dependent   <20 Totally dependent     -Demario Romero., Barthel, D.W. (1965). Functional evaluation: the Barthel Index. [address] [facility] ([address] (). The Barthel activities of daily living index: self-reporting versus actual performance in the old (> or = 75 years). Journal of [address] [place] [address] 45(7), [address] [facility], .Nery., Rishi Wiggins., Nini Mccray. (1999).  Measuring the change in disability after inpatient rehabilitation; comparison of the responsiveness of the Barthel Index and Functional [place] Measure. Journal of Neurology, Neurosurgery, and Psychiatry, 66(4), 721-831. INDY Hutchins, ALONZO Dunne, & Jacinda Lomas M.A. (2004) Assessment of post-stroke quality of life in cost-effectiveness studies: The usefulness of the Barthel Index and the EuroQoL-5D. Quality of Life Research, 15, 254-67         Occupational Therapy Evaluation Charge Determination   History Examination Decision-Making   LOW Complexity : Brief history review  LOW Complexity : 1-3 performance deficits relating to physical, cognitive , or psychosocial skils that result in activity limitations and / or participation restrictions  LOW Complexity : No comorbidities that affect functional and no verbal or physical assistance needed to complete eval tasks       Based on the above components, the patient evaluation is determined to be of the following complexity level: LOW   Pain Rating:  Patient reporting pain / sensitivity 2/2 edematous BLEs. Activity Tolerance:   Fair and requires frequent rest breaks    After treatment patient left in no apparent distress:    Supine in bed, Call bell within reach, Bed / chair alarm activated, and Side rails x 3    COMMUNICATION/EDUCATION:   The patients plan of care was discussed with: Physical therapist and Registered nurse. Patient was educated regarding her deficit(s) of impaired balance, weakness, and decreased independence in ADLs. She demonstrated Fair understanding. Patient and/or family was verbally educated on the BE FAST acronym for signs/symptoms of CVA and TIA. Home safety education was provided and the patient/caregiver indicated understanding., Patient/family have participated as able in goal setting and plan of care. , and Patient/family agree to work toward stated goals and plan of care.     Thank you for this referral.  Luana Leblanc OT  Time Calculation: 33 mins

## 2022-01-13 VITALS
WEIGHT: 178 LBS | RESPIRATION RATE: 13 BRPM | HEART RATE: 54 BPM | TEMPERATURE: 98.9 F | SYSTOLIC BLOOD PRESSURE: 143 MMHG | OXYGEN SATURATION: 100 % | BODY MASS INDEX: 27.94 KG/M2 | DIASTOLIC BLOOD PRESSURE: 64 MMHG | HEIGHT: 67 IN

## 2022-01-13 PROBLEM — M19.90 ARTHRITIS: Status: ACTIVE | Noted: 2022-01-13

## 2022-01-13 PROCEDURE — 74011250637 HC RX REV CODE- 250/637: Performed by: INTERNAL MEDICINE

## 2022-01-13 PROCEDURE — 94760 N-INVAS EAR/PLS OXIMETRY 1: CPT

## 2022-01-13 RX ADMIN — HYDROCHLOROTHIAZIDE 25 MG: 25 TABLET ORAL at 09:50

## 2022-01-13 RX ADMIN — METOPROLOL SUCCINATE 12.5 MG: 25 TABLET, FILM COATED, EXTENDED RELEASE ORAL at 09:50

## 2022-01-13 RX ADMIN — Medication 1000 UNITS: at 09:54

## 2022-01-13 RX ADMIN — AMLODIPINE BESYLATE 5 MG: 5 TABLET ORAL at 09:49

## 2022-01-13 RX ADMIN — ACETAMINOPHEN 650 MG: 325 TABLET ORAL at 13:27

## 2022-01-13 RX ADMIN — APIXABAN 5 MG: 5 TABLET, FILM COATED ORAL at 09:49

## 2022-01-13 RX ADMIN — ACETAMINOPHEN 650 MG: 325 TABLET ORAL at 09:49

## 2022-01-13 RX ADMIN — CYANOCOBALAMIN TAB 500 MCG 1000 MCG: 500 TAB at 09:50

## 2022-01-13 NOTE — PROGRESS NOTES
1125: Report called to [employer], LPN at [facility]. 1130: I have reviewed discharge instructions with the patient. The patient verbalized understanding. 1425: Patient discharged via [facility] to [facility] with discharge instructions and belongings.

## 2022-01-13 NOTE — PROGRESS NOTES
I have address blood draw with Patient 3 times and she continues to refuse blood work. Will pass it on to day shift to speak with patient and provider.

## 2022-01-13 NOTE — PROGRESS NOTES
Problem: Falls - Risk of  Goal: *Absence of Falls  Description: Document Rebekah Marking Fall Risk and appropriate interventions in the flowsheet.   Outcome: Progressing Towards Goal  Note: Fall Risk Interventions:                 Elimination Interventions: Call light in reach

## 2022-01-13 NOTE — PROGRESS NOTES
Verbal shift change report given to Denise (oncoming nurse) by Leonie/JEFF (offgoing nurse). Report included the following information SBAR, Kardex, ED Summary, OR Summary, Procedure Summary, Intake/Output, MAR, Recent Results, Med Rec Status, Pre Procedure Checklist, Procedure Verification, Quality Measures and Dual Neuro Assessment.

## 2022-01-13 NOTE — DISCHARGE SUMMARY
Discharge Summary       PATIENT ID: Guilherme Huerta  MRN: [id number]   YOB: 1950    DATE OF ADMISSION: 1/11/2022  6:02 PM    DATE OF DISCHARGE: 01/13/2022  PRIMARY CARE PROVIDER: Joss Leal MD     DISCHARGING PROVIDER: Yehuda Mcgowan MD    To contact this individual call [phone] and ask the  to page. If unavailable ask to be transferred the Adult Hospitalist Department. CONSULTATIONS: ED CONSULT TO SENIOR SERVICES CASE MANAGEMENT  ED CONSULT TO SENIOR SERVICES PHYSICAL THERAPY  ED CONSULT TO SENIOR SERVICES NURSE PRACTITIONER  IP CONSULT TO NEUROLOGY  IP CONSULT TO CARDIOLOGY    PROCEDURES/SURGERIES: * No surgery found *    ADMITTING DIAGNOSES & HOSPITAL COURSE:   L sided weakness  Arthritis  Paroxysmal Afib  HTN      This is a 70-year-old woman with a past medical history significant for hypertension, paroxysmal atrial fibrillation, status post ablation, on Eliquis for anticoagulation, who was in her usual state of health until about 2 weeks ago when the patient developed left-sided weakness. The patient also complained of weakness, described as generalized weakness as well as fatigue. As a result of the left-sided weakness, it has become increasingly difficult for the patient to carry out activities of daily living at home. The patient has a home physical therapy 1-2 days a week and home health 2-3 days a week. Pt was admitted and ruled out for CVA with negative MRI. 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 Pt has h/o Afib and already on eliquis. Antiplatelet therapy was deferred due to being on [facility] and additionally fall risk and potential hemorrhage. Pt stable for DC to SNF for ongoing PT/OT. Can consider rheum workup as o/p in future.      DISCHARGE DIAGNOSES / PLAN:      Left-sided weakness  proximal basilar artery stenosis  Arthritis   -Left-sided weakness of 2 weeks duration, unclear etiology  -MRI of the brain negative for acute infarction, MRA shows moderate to severe basilar stenosis, as patient already been on Eliquis for A. fib, antiplatelet therapy was deferred  -Echocardiogram with normal EF and no PFO  -Neurology evaluated the patient, her weakness seems to be more arthritic in nature, consider outpatient rheumatology evaluation if not previously considered  -PT evaluating the patient, recommendation for SNF      Elevated troponin, likely demand ischemia  -Likely demand ischemia in the setting of uncontrolled blood pressure on presentation, and possible low troponin clearance from DANIELLE  -Patient with left heart cath 11/27/2020 with mild circumflex disease  -Cardiology evaluated the patient, echo with normal EF, and neg for PFO  -No further work-up was recommended, recommending outpatient follow-up with Dr. Nicholas De La Rosa      Paroxysmal atrial fibrillation  -Continue amiodarone, patient was ruled out for stroke and therefore okay to continue with anticoagulation with Eliquis     Hypertension  -Continue amlodipine, HCTZ and metoprolol  -Blood pressure is improving since the admission        ADDITIONAL CARE RECOMMENDATIONS:   - Please take all medications as prescribed. Note changes as below. **No changes   - Please make sure to follow up with your primary care physician within 1-2 weeks of discharge for hospital follow up. You should also follow up with cardiology. You should also ask your primary care physician to work you up for possible causes of autoimmune arthritis. - Please make sure to continue to monitor for: Chest pain, shortness of breath, high fevers,  and return to the Emergency Department with any of these symptoms.   - Please get up slowly from a seated or laying position, avoid falls. - Avoid tobacco, alcohol and other illicit drug use.    - Diet restrictions: None, resume prior   - Activity restrictions: Per PT/OT  - Wound care: None          PENDING TEST RESULTS:   At the time of discharge the following test results are still pending: None    FOLLOW UP APPOINTMENTS:    Follow-up Information     Follow up With Specialties Details Why Contact Info    [address] [facility] [address]   [address]  [phone]    Rl Serrano MD Cardiology On 1/28/2022 8AM with Dr. Merlene Mills [address] [facility] [phone]               DIET: Regular Diet    ACTIVITY: Activity as tolerated    WOUND CARE: None    EQUIPMENT needed: None      DISCHARGE MEDICATIONS:  Current Discharge Medication List      CONTINUE these medications which have NOT CHANGED    Details   acetaminophen (Tylenol Arthritis Pain) 650 mg TbER Take 650 mg by mouth nightly. ZINC PO Take  by mouth daily. apixaban (Eliquis) 5 mg tablet Take 1 tablet by mouth twice daily  Qty: 180 Tablet, Refills: 3      amiodarone (Pacerone) 200 mg tablet Take 1 Tablet by mouth daily. Qty: 90 Tablet, Refills: 1      amLODIPine (NORVASC) 5 mg tablet Take 1 Tab by mouth daily. Qty: 30 Tab, Refills: 12      hydroCHLOROthiazide (HYDRODIURIL) 25 mg tablet Take 1 Tab by mouth daily. Qty: 30 Tab, Refills: 12      cholecalciferol (VITAMIN D3) (1000 Units /25 mcg) tablet Take 1 Tab by mouth daily. Qty: 30 Tab, Refills: 0      cyanocobalamin 1,000 mcg tablet Take 1 Tab by mouth daily. Qty: 30 Tab, Refills: 0      metoprolol succinate (TOPROL-XL) 25 mg XL tablet Take 0.5 Tablets by mouth daily. Take 1 tab at night  Qty: 45 Tablet, Refills: 1         STOP taking these medications       potassium 99 mg tablet Comments:   Reason for Stopping:                 NOTIFY YOUR PHYSICIAN FOR ANY OF THE FOLLOWING:   Fever over 101 degrees for 24 hours. Chest pain, shortness of breath, fever, chills, nausea, vomiting, diarrhea, change in mentation, falling, weakness, bleeding. Severe pain or pain not relieved by medications. Or, any other signs or symptoms that you may have questions about.     DISPOSITION: Home With:   OT  PT  HH  RN      X SNF    Independent/assisted living    Hospice    Other:       PATIENT CONDITION AT DISCHARGE:     Functional status   X Poor     Deconditioned     Independent      Cognition   X  Lucid     Forgetful     Dementia      Catheters/lines (plus indication)    Luo     PICC     PEG    X None      Code status    X Full code     DNR      PHYSICAL EXAMINATION AT DISCHARGE:  Visit Vitals  /67 (BP 1 Location: Right upper arm, BP Patient Position: At rest)   Pulse 60   Temp 97.9 °F (36.6 °C)   Resp 12   Ht 5' 7\" (1.702 m)   Wt 80.7 kg (178 lb)   SpO2 99%   BMI 27.88 kg/m²     Gen: NAD, awake in bed  HEENT: NC/AT, sclera anicteric, PERRL, EOMI  CV: RRR no m/r/g, normal S1 and S2, no pedal edema   Resp: CTA b/l no increased work of breathing, no wheezing or rhonchi, speaking in full sentences   Abd: NT/ND, normal bowel sounds, no rebound or guarding  Ext: 2+ pulses, no edema  Skin: No rashes or lesions      CHRONIC MEDICAL DIAGNOSES:  Problem List as of 1/13/2022 Date Reviewed: 1/12/2022          Codes Class Noted - Resolved    Paroxysmal atrial fibrillation (HCC) (Chronic) ICD-10-CM: I48.0  ICD-9-CM: 427.31  11/30/2020 - Present        SVT (supraventricular tachycardia) ([facility] ICD-10-CM: I47.1  ICD-9-CM: 427.89  11/28/2020 - Present        * (Principal) Weakness generalized ICD-10-CM: R53.1  ICD-9-CM: 780.79  11/24/2020 - Present        Rhabdomyolysis ICD-10-CM: M62.82  ICD-9-CM: 728.88  11/24/2020 - Present        Volume depletion ICD-10-CM: E86.9  ICD-9-CM: 276.50  11/24/2020 - Present        DANIELLE (acute kidney injury) ([facility] ICD-10-CM: N17.9  ICD-9-CM: 584.9  11/24/2020 - Present        Hypokalemia ICD-10-CM: E87.6  ICD-9-CM: 276.8  9/6/2020 - Present        Fatigue ICD-10-CM: R53.83  ICD-9-CM: 780.79  9/5/2020 - Present        Fall ICD-10-CM: W19. Colon Areola  ICD-9-CM: E888.9  9/5/2020 - Present        Elevated troponin ICD-10-CM: R77.8  ICD-9-CM: 790.6  9/5/2020 - Present        Hypertension ICD-10-CM: I10  ICD-9-CM: 401.9  10/27/2011 - Present        RESOLVED: Acute CVA (cerebrovascular accident) ([facility] [address] ICD-10-CM: I63.9  ICD-9-CM: 434.91  1/11/2022 - 1/12/2022        RESOLVED: Screening cholesterol level ICD-10-CM: Z13.220  ICD-9-CM: V77.91  10/27/2011 - 10/1/2019        RESOLVED: Screening for thyroid disorder ICD-10-CM: Z13.29  ICD-9-CM: V77.0  10/27/2011 - 10/1/2019              Greater than 30 minutes were spent with the patient on counseling and coordination of care    Signed:   Lawrence Diaz MD  1/13/2022  10:33 AM

## 2022-01-14 ENCOUNTER — PATIENT OUTREACH (OUTPATIENT)
Dept: CASE MANAGEMENT | Age: 72
End: 2022-01-14

## 2022-01-17 ENCOUNTER — TELEPHONE (OUTPATIENT)
Dept: RHEUMATOLOGY | Age: 72
End: 2022-01-17

## 2022-01-17 NOTE — TELEPHONE ENCOUNTER
Maxi Ramirez from Dr. Laney Lozada office called to schedule a NP appt. Advised no appointments until September, but would need a peer to peer consult for a sooner appt. Also requested referral to come via fax.

## 2022-01-19 NOTE — TELEPHONE ENCOUNTER
Chart reviewed, no record of LUCY but chronic troponin elevation and h/o low-grade CK elevations. Will reach out tomorrow for full story and expedited followup.

## 2022-01-19 NOTE — TELEPHONE ENCOUNTER
Cristina Byrne from [employer] called to speak with Dr Diomedes Kelly or Dr Kenna Carranza to have consult for patient who has Positive LUCY and left sided weakness. The cell phone number is [phone]. Also provided Dr Rita Carranza cell numbers for p2p.

## 2022-01-20 NOTE — TELEPHONE ENCOUNTER
Spoke with covering MD  Believed the LUCY was 1:40 which we consider a negative study; nevertheless high CK's and chronic troponin elevation worth evaluating. Will see if we can get her in within the next 3-4 weeks.

## 2022-01-21 ENCOUNTER — TELEPHONE (OUTPATIENT)
Dept: RHEUMATOLOGY | Age: 72
End: 2022-01-21

## 2022-01-21 NOTE — TELEPHONE ENCOUNTER
Call patient per doc request to schedule within the next 4 weeks patient has a voice mail box that is full and can not leave messages.  Sdh

## 2022-01-25 ENCOUNTER — TELEPHONE (OUTPATIENT)
Dept: CARDIOLOGY CLINIC | Age: 72
End: 2022-01-25

## 2022-01-25 NOTE — TELEPHONE ENCOUNTER
Attempted to call pt and advise her appt moved to 10am 1/28/22 as Dr. Renay Camarena does not see 8am pts. Unable to leave . Appt moved.

## 2022-01-28 ENCOUNTER — OFFICE VISIT (OUTPATIENT)
Dept: CARDIOLOGY CLINIC | Age: 72
End: 2022-01-28
Payer: MEDICARE

## 2022-01-28 ENCOUNTER — PATIENT OUTREACH (OUTPATIENT)
Dept: CASE MANAGEMENT | Age: 72
End: 2022-01-28

## 2022-01-28 VITALS
SYSTOLIC BLOOD PRESSURE: 138 MMHG | WEIGHT: 167 LBS | HEIGHT: 67 IN | RESPIRATION RATE: 16 BRPM | DIASTOLIC BLOOD PRESSURE: 80 MMHG | HEART RATE: 82 BPM | BODY MASS INDEX: 26.21 KG/M2 | OXYGEN SATURATION: 98 %

## 2022-01-28 DIAGNOSIS — E78.2 MIXED HYPERLIPIDEMIA: ICD-10-CM

## 2022-01-28 DIAGNOSIS — I48.0 PAROXYSMAL ATRIAL FIBRILLATION (HCC): Primary | ICD-10-CM

## 2022-01-28 PROCEDURE — 99214 OFFICE O/P EST MOD 30 MIN: CPT | Performed by: SPECIALIST

## 2022-01-28 PROCEDURE — G9899 SCRN MAM PERF RSLTS DOC: HCPCS | Performed by: SPECIALIST

## 2022-01-28 PROCEDURE — 1101F PT FALLS ASSESS-DOCD LE1/YR: CPT | Performed by: SPECIALIST

## 2022-01-28 PROCEDURE — G8536 NO DOC ELDER MAL SCRN: HCPCS | Performed by: SPECIALIST

## 2022-01-28 PROCEDURE — 1111F DSCHRG MED/CURRENT MED MERGE: CPT | Performed by: SPECIALIST

## 2022-01-28 PROCEDURE — G8752 SYS BP LESS 140: HCPCS | Performed by: SPECIALIST

## 2022-01-28 PROCEDURE — G8754 DIAS BP LESS 90: HCPCS | Performed by: SPECIALIST

## 2022-01-28 PROCEDURE — G8432 DEP SCR NOT DOC, RNG: HCPCS | Performed by: SPECIALIST

## 2022-01-28 PROCEDURE — G8400 PT W/DXA NO RESULTS DOC: HCPCS | Performed by: SPECIALIST

## 2022-01-28 PROCEDURE — G8419 CALC BMI OUT NRM PARAM NOF/U: HCPCS | Performed by: SPECIALIST

## 2022-01-28 PROCEDURE — G8427 DOCREV CUR MEDS BY ELIG CLIN: HCPCS | Performed by: SPECIALIST

## 2022-01-28 PROCEDURE — 3017F COLORECTAL CA SCREEN DOC REV: CPT | Performed by: SPECIALIST

## 2022-01-28 PROCEDURE — 1090F PRES/ABSN URINE INCON ASSESS: CPT | Performed by: SPECIALIST

## 2022-01-28 RX ORDER — AMMONIUM LACTATE 12 G/100G
LOTION TOPICAL
COMMUNITY
Start: 2022-01-25

## 2022-01-28 NOTE — PROGRESS NOTES
[address] [place] [address] Dr Discharge Follow-Up      Date/Time:  2022 11:13 AM    Patient was admitted to [facility] on 22 and discharged to [address] [place] [address] on 22. The patients discharge diagnosis was General Weakness. Patient was discharge on  from [address] [place] [address]. The discharge summary from the post acute facilty was not available at the time of outreach. Patient was contacted within 1 business days of discharge from the post acute facility. LPN Care Coordinator contacted the patient by telephone to perform post hospital discharge follow up. Verified name and  with patient as identifiers. Provided introduction to self, and explanation of the LPN Care Coordinator role. Patient received post acute facility discharge instructions. LPN Care Coordinator reviewed discharge instructions and red flags with patient who verbalized understanding. Patient given an opportunity to ask questions and does not have any further questions or concerns at this time. The patient agrees to contact the PCP office for questions related to their healthcare. LPN Care Coordinator provided contact information for future reference. Advance Care Planning:   Does patient have an Advance Directive:  reviewed and current     Home Health orders at discharge: PT, OT, [address]: [place]   Date of initial visit: TBD    [facility] ordered at discharge: none  [facility]: [address]  received: NA    Medication(s):   The post acute facility medication discharge list was not available for this call.       [facility] follow up appointment(s):   Future Appointments   Date Time Provider Susy Rand   2/15/2022  2:00 PM MD SEAMUS Huston BS AMB   2022 10:00 AM MD ABIGAIL Parra AMB      Non-[facility] follow up appointment(s):Patient saw Ortho today 22  [facility]:  information provided as a resource

## 2022-01-28 NOTE — PATIENT INSTRUCTIONS
Please STOP taking Toprol. Follow up with Dr. Tae Shirley in 6 months and have FASTING labs about 1 week prior.

## 2022-01-28 NOTE — PROGRESS NOTES
[facility]                                                            OFFICE NOTE        Oliva Bence M.D.,DARINEL SUHAS KNAPP   1950  [id number]    Date/Time:  1/28/20228:18 AM            SUBJECTIVE:  She reports weakness but no sob or cp or palpitations or syncope       Assessment/Plan    1. Weakness: I suspect her weakness is not cardiac related. She does have normal ejection fraction by recent echocardiogram.  Relatively recent cardiac catheterization of 2020 failed to reveal any significant flow-limiting stenosis. She is not sure of her medications but she seems to be on may be half a tablet of 25 mg of Toprol. I am not sure that is needed at this time since she is also on amiodarone. If she is taking Toprol this will be stopped. Continue with rehab. 2.  Paroxysmal atrial fibrillation-atrial tachycardia: Currently normal sinus rhythm. Status post atrial tachycardia ablation in November 2020. Her EKG pain [facility] on January 11, 2022 revealed a normal sinus rhythm with PACs right bundle branch block and no significant ST-T changes. Continue amiodarone for now. Her TSH was normal in January 2022. Her liver function test showed just mild elevation of the AST at 88. Recheck liver function test and TSH in 6 months from now. Continue with Eliquis no untoward effects thus far. 3.  Elevated troponin: This was noted in the hospital suspect related to demand ischemia in a patient with essentially normal cardiac catheterization in November 2020. No additional interventions at this time. Continue with current medications. 4.  Hypertension: Blood pressure seems to be controlled. Continue amlodipine hydrochlorothiazide for now. She is aware of potential lower extremity edema from amlodipine.     5.  HLD: She tells me closely followed by her primary care physician but will recheck lipids at the next office visit nonetheless. There are see her back in 6 months. HPI     79 y.o. female with PMH of HTN, HLD who presented 9/4/2020 with complaints of dizziness and weakness, collapse from bed to floor. She denies any syncope or LOC. States she felt weak, slipped and fell. Now realizes she has been feeling weak for few weeks she believes. When she arrived her creatinine was elevated and  Troponin mildly elevated but flat. Cardiac w/u negative at that time      Patient has undergone atrial tachycardia ablation on November 2020. Prior to that she has undergone cardiac catheterization which is failed to reveal any significant coronary disease still in November 2020. Placed on amiodarone and oral anticoagulation by Dr. Nataly De Dios. (11-20 20)    On 1/22/22 admitted to [facility] for weakness:Pt was admitted and ruled out for CVA with negative MRI. 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 Pt has h/o Afib and already on eliquis. Antiplatelet therapy was deferred due to being on [facility] and additionally fall risk and potential hemorrhage    Cardiology consulted at that time for elevated troponin        CARDIAC STUDIES        01/11/22    ECHO ADULT COMPLETE 01/12/2022 1/12/2022    Interpretation Summary    Left Ventricle: Left ventricle size is normal. Moderately increased wall thickness. Normal wall motion. Normal left ventricular systolic function with a visually estimated EF of 60 - 65%. Normal diastolic function.   Aortic Valve: Mild sclerosis of the aortic valve cusps.   Left Atrium: Left atrium is mildly dilated.   Interatrial Septum: Grade 0 Absence of bubbles. Agitated saline study was negative with and without provocation. Signed by: Rizwan Lewis MD on 1/12/2022  3:39 PM            09/04/20    NUCLEAR CARDIAC STRESS TEST 09/08/2020, 09/09/2020 9/9/2020    Interpretation Summary  · Baseline ECG: Sinus bradycardia, right bundle branch block.   · Inconclusive stress test.    Indication: Mild troponin elevation. A Lexiscan myocardial SPECT gated wall motion study was performed utilizing 10.5 mCi of Tc MYOVIEW for rest and 31.2 mCi for stress. SPECT imaging at stress and rest demonstrates no definite evidence of ischemia and/or infarction. Left ventricular ejection fraction equals 69%. Left ventricular wall motion and thickening are within normal limits. Impression  :  1. No definite evidence of ischemia and/or infarction. 2. LVEF equals 69%. Left ventricular wall motion and thickening is normal.    Signed by: Derrell Younger MD on 9/8/2020  4:38 PM, Signed by: Kyle Vergara MD on 9/9/2020  1:02 PM            11/24/20    CARDIAC PROCEDURE 11/27/2020 11/27/2020  Signed by: Kandice Johnson MD on 11/27/2020 10:19 AM  Left Main   The vessel is angiographically normal.   Left Anterior Descending   The vessel exhibits minimal luminal irregularities. First Diagonal Branch   The vessel is angiographically normal.   Second Diagonal Branch   The vessel is angiographically normal.   Left Circumflex   There is mild disease. First Obtuse Marginal Branch   The vessel is angiographically normal.   Second Obtuse Marginal Branch   There is mild disease. Right Coronary Artery   The vessel is angiographically normal.             EKG Results     None              IMAGING      MRI Results (most recent):  Results from Hospital Encounter encounter on 01/11/22    MRA BRAIN WO CONT    Narrative  Clinical history: Suspected acute CVA  INDICATION:   Suspected acute CVA  COMPARISON: None  TECHNIQUE:  3-D time-of-flight MRA of the brain was performed. Multiplanar  reconstructions were obtained. FINDINGS:  The left vertebral artery slightly larger than right vertebral artery. There is  severe stenosis of the mid basilar artery. Persistent fetal circulation to the  right PCA. Left sided posterior to indicating artery. Petrous and cavernous ICAs  are patent. M1 segments are patent. Mild multifocal M1 segment stenosis on the  right. M2 segments demonstrate symmetric arborization. A2 and A3 segments are  within normal limits. Hypoplastic A1 on the right. . The internal carotid,  anterior cerebral, and middle cerebral arteries are patent. . There is no  aneurysm. .    Impression  Moderate to severe stenosis proximal basilar artery. Mild stenosis distal  basilar artery    Otherwise no dissection or stenosis. CT Results (most recent):  Results from Hospital Encounter encounter on 01/11/22    CT HEAD WO CONT    Narrative  EXAM:  CT HEAD WO CONT  INDICATION:  weakness, 2 week history of left-sided weakness greater in the  legs. Swelling. TECHNIQUE:  Thin axial images were obtained through the calvarium and saved with standard  and bone window algorithm. Coronal and sagittal reconstructions were generated. CT dose reduction was achieved through use of a standardized protocol tailored  for this examination and automatic exposure control for dose modulation. COMPARISON: CT head 9/1/21 and 9/5/20. FINDINGS:  The ventricular size and configuration are within normal limits. Ill-defined areas of decreased attenuation in the white matter may be related to  mild chronic small vessel ischemic change. This is unchanged. The cerebral  density is otherwise within normal limits. No evidence of intracranial hemorrhage, infarct, mass or abnormal extra-axial  fluid collections. Visualized osseous structures of the calvarium and skull base including  paranasal sinuses are unremarkable. Impression  No acute intracranial abnormality or significant change. XR Results (most recent):  Results from Hospital Encounter encounter on 01/11/22    XR CHEST PA LAT    Narrative  Exam:  2 view chest    Indication: Lower extremity swelling, possible congestive heart failure    Comparison to 11/24/2020. Frontal and lateral views demonstrate normal heart size.  There is no acute  process in the lung fields. Degenerative changes are seen in the thoracic spine. Impression  No acute process or change compared to the prior exam.          Past Medical History:   Diagnosis Date    History of mammogram 2010    normal    Hypertension     Pap smear for cervical cancer screening 2011    normal     Past Surgical History:   Procedure Laterality Date    HX GYN      hystterectomy    [place] COMPRE EP EVAL ABLTJ ATR FIB PULM VEIN ISOLATION N/A 11/30/2020    ABLATION A-FIB  W COMPLETE EP STUDY performed by [facility] Castillo MD at [facility]    [place] ICAR CATHETER ABLATION ARRHYTHMIA ADD ON N/A 11/30/2020    Ablation Svt/Vt Add On performed by Nathan Castillo MD at [facility] CARDIAC CATH LAB    [place] INTRACARD ECHO, THER/DX INTERVENT N/A 11/30/2020    Intracardiac Echocardiogram performed by Nathan Castillo MD at [facility] CARDIAC CATH LAB    [place] INTRACARDIAC ELECTROPHYSIOLOGIC 3D MAPPING N/A 11/30/2020    Ep 3d Mapping performed by Nathan Castillo MD at [facility] CARDIAC CATH LAB    [place] STIM/PACING HEART POST IV DRUG INFU N/A 11/30/2020    Drug Stimulation performed by Nathan Castillo MD at [facility] CARDIAC CATH LAB     Social History     Tobacco Use    Smoking status: Never Smoker    Smokeless tobacco: Never Used   Substance Use Topics    Alcohol use: Yes     Comment: occasional    Drug use: No     No family history on file. Allergies   Allergen Reactions    Codeine Itching and Swelling         Visit Vitals  Resp 16   Ht 5' 7\" (1.702 m)   BMI 27.88 kg/m²         There were no vitals filed for this visit. Review of Systems:   Pertinent items are noted in the History of Present Illness. Neck: no JVD  Heart: regular rate and rhythm  Lungs: clear to auscultation bilaterally  Abdomen: soft, non-tender.  Bowel sounds normal. No masses,  no organomegaly  Extremities: venous stasis dermatitis noted      Current Outpatient Medications on File Prior to Visit   Medication Sig Dispense Refill    acetaminophen (Tylenol Arthritis Pain) 650 mg TbER Take 650 mg by mouth nightly.  ZINC PO Take  by mouth daily.  apixaban (Eliquis) 5 mg tablet Take 1 tablet by mouth twice daily 180 Tablet 3    amiodarone (Pacerone) 200 mg tablet Take 1 Tablet by mouth daily. 90 Tablet 1    metoprolol succinate (TOPROL-XL) 25 mg XL tablet Take 0.5 Tablets by mouth daily. Take 1 tab at night (Patient not taking: Reported on 9/1/2021) 45 Tablet 1    amLODIPine (NORVASC) 5 mg tablet Take 1 Tab by mouth daily. 30 Tab 12    hydroCHLOROthiazide (HYDRODIURIL) 25 mg tablet Take 1 Tab by mouth daily. 30 Tab 12    cholecalciferol (VITAMIN D3) (1000 Units /25 mcg) tablet Take 1 Tab by mouth daily. 30 Tab 0    cyanocobalamin 1,000 mcg tablet Take 1 Tab by mouth daily. 30 Tab 0     No current facility-administered medications on file prior to visit. Amber Cagle had no medications administered during this visit. Current Outpatient Medications   Medication Sig    acetaminophen (Tylenol Arthritis Pain) 650 mg TbER Take 650 mg by mouth nightly.  ZINC PO Take  by mouth daily.  apixaban (Eliquis) 5 mg tablet Take 1 tablet by mouth twice daily    amiodarone (Pacerone) 200 mg tablet Take 1 Tablet by mouth daily.  metoprolol succinate (TOPROL-XL) 25 mg XL tablet Take 0.5 Tablets by mouth daily. Take 1 tab at night (Patient not taking: Reported on 9/1/2021)    amLODIPine (NORVASC) 5 mg tablet Take 1 Tab by mouth daily.  hydroCHLOROthiazide (HYDRODIURIL) 25 mg tablet Take 1 Tab by mouth daily.  cholecalciferol (VITAMIN D3) (1000 Units /25 mcg) tablet Take 1 Tab by mouth daily.  cyanocobalamin 1,000 mcg tablet Take 1 Tab by mouth daily. No current facility-administered medications for this visit.          Lab Results   Component Value Date/Time    Cholesterol, total 205 (H) 01/12/2022 02:21 AM    Cholesterol (POC) 204 11/03/2021 11:03 AM    HDL Cholesterol 63 01/12/2022 02:21 AM    HDL Cholesterol (POC) 70 11/03/2021 11:03 AM    LDL Cholesterol (POC) 124 11/03/2021 11:03 AM    LDL, calculated 128.2 (H) 01/12/2022 02:21 AM    VLDL, calculated 13.8 01/12/2022 02:21 AM    Triglyceride 69 01/12/2022 02:21 AM    Triglycerides (POC) 50 11/03/2021 11:03 AM    CHOL/HDL Ratio 3.3 01/12/2022 02:21 AM       Lab Results   Component Value Date/Time    Sodium 140 01/12/2022 02:21 AM    Potassium 3.2 (L) 01/12/2022 02:21 AM    Chloride 107 01/12/2022 02:21 AM    CO2 25 01/12/2022 02:21 AM    Anion gap 8 01/12/2022 02:21 AM    Glucose 85 01/12/2022 02:21 AM    BUN 17 01/12/2022 02:21 AM    Creatinine 1.24 (H) 01/12/2022 02:21 AM    BUN/Creatinine ratio 14 01/12/2022 02:21 AM    GFR est AA 52 (L) 01/12/2022 02:21 AM    GFR est non-AA 43 (L) 01/12/2022 02:21 AM    Calcium 9.0 01/12/2022 02:21 AM       Lab Results   Component Value Date/Time    ALT (SGPT) 28 01/12/2022 02:21 AM    Alk.  phosphatase 68 01/12/2022 02:21 AM    Bilirubin, total 0.5 01/12/2022 02:21 AM       Lab Results   Component Value Date/Time    WBC 5.5 01/12/2022 02:21 AM    HGB 13.3 01/12/2022 02:21 AM    HCT 41.7 01/12/2022 02:21 AM    PLATELET 832 38/02/5493 02:21 AM    MCV 92.1 01/12/2022 02:21 AM       Lab Results   Component Value Date/Time    TSH 0.51 01/12/2022 02:21 AM         Lab Results   Component Value Date/Time    Cholesterol, total 205 (H) 01/12/2022 02:21 AM    Cholesterol, total 278 (H) 10/01/2014 01:04 PM    Cholesterol, total 261 (H) 10/27/2011 03:16 PM    Cholesterol (POC) 204 11/03/2021 11:03 AM    Cholesterol (POC) 229 04/18/2019 04:14 PM    Cholesterol (POC) 239 02/02/2017 10:51 AM    Cholesterol (POC) 243 12/01/2015 11:48 AM    Cholesterol (POC) 234 05/12/2015 12:37 PM    HDL Cholesterol 63 01/12/2022 02:21 AM    HDL Cholesterol 60 10/01/2014 01:04 PM    HDL Cholesterol 55 10/27/2011 03:16 PM    LDL, calculated 128.2 (H) 01/12/2022 02:21 AM    LDL, calculated 196 (H) 10/01/2014 01:04 PM    LDL, calculated 167 (H) 10/27/2011 03:16 PM Triglyceride 69 01/12/2022 02:21 AM    Triglyceride 112 10/01/2014 01:04 PM    Triglyceride 194 (H) 10/27/2011 03:16 PM    Triglycerides (POC) 50 11/03/2021 11:03 AM    Triglycerides (POC) 55 04/18/2019 04:14 PM    Triglycerides (POC) 74 02/02/2017 10:51 AM    Triglycerides (POC) 114 12/01/2015 11:48 AM    Triglycerides (POC) 97 05/12/2015 12:37 PM    CHOL/HDL Ratio 3.3 01/12/2022 02:21 AM                Please note that this dictation was completed with [email or web address], the [employer] voice recognition software. Quite often unanticipated grammatical, syntax, homophones, and other interpretative errors are inadvertently transcribed by the computer software. Please disregard these errors. Please excuse any errors that have escaped final proofreading.

## 2022-01-28 NOTE — PROGRESS NOTES
Visit Vitals  /80   Pulse 82   Resp 16   Ht 5' 7\" (1.702 m)   Wt 167 lb (75.8 kg)   SpO2 98%   BMI 26.16 kg/m²

## 2022-02-14 ENCOUNTER — PATIENT OUTREACH (OUTPATIENT)
Dept: CASE MANAGEMENT | Age: 72
End: 2022-02-14

## 2022-02-14 NOTE — PROGRESS NOTES
Patient has graduated from the Transitions of Care Coordination  program on 2/14/22. Patient/family has the ability to self-manage at this time Care management goals have been completed. Patient was not referred to the [facility] team for further management. Goals Addressed    None         Patient has Care Transition Nurse's contact information for any further questions, concerns, or needs.   Patients upcoming visits:    Future Appointments   Date Time Provider Susy Rand   7/29/2022 10:00 AM MD ABIGAIL Matias AMB

## 2022-02-15 ENCOUNTER — TELEPHONE (OUTPATIENT)
Dept: INTERNAL MEDICINE CLINIC | Age: 72
End: 2022-02-15

## 2022-02-15 NOTE — TELEPHONE ENCOUNTER
----- Message from Leandershanice Leoncio sent at 2/14/2022  3:51 PM EST -----  Subject: Message to Provider    QUESTIONS  Information for Provider? needs verbal order for 7 visit of Home Physical   Therapy #[phone]  ---------------------------------------------------------------------------  --------------  CALL BACK INFO  What is the best way for the office to contact you? OK to leave message on   voicemail  Preferred Call Back Phone Number? [phone]  ---------------------------------------------------------------------------  --------------  SCRIPT ANSWERS  Relationship to Patient? Third Party  Representative Name?  Domenic Sanchez
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
## 2022-03-18 PROBLEM — R53.1 WEAKNESS GENERALIZED: Status: ACTIVE | Noted: 2020-11-24

## 2022-03-19 PROBLEM — R53.83 FATIGUE: Status: ACTIVE | Noted: 2020-09-05

## 2022-03-19 PROBLEM — R77.8 ELEVATED TROPONIN: Status: ACTIVE | Noted: 2020-09-05

## 2022-03-19 PROBLEM — M19.90 ARTHRITIS: Status: ACTIVE | Noted: 2022-01-13

## 2022-03-19 PROBLEM — N17.9 AKI (ACUTE KIDNEY INJURY) (HCC): Status: ACTIVE | Noted: 2020-11-24

## 2022-03-19 PROBLEM — E86.9 VOLUME DEPLETION: Status: ACTIVE | Noted: 2020-11-24

## 2022-03-19 PROBLEM — I48.0 PAROXYSMAL ATRIAL FIBRILLATION (HCC): Status: ACTIVE | Noted: 2020-11-30

## 2022-03-19 PROBLEM — M62.82 RHABDOMYOLYSIS: Status: ACTIVE | Noted: 2020-11-24

## 2022-03-19 PROBLEM — R79.89 ELEVATED TROPONIN: Status: ACTIVE | Noted: 2020-09-05

## 2022-03-19 PROBLEM — I47.10 SVT (SUPRAVENTRICULAR TACHYCARDIA): Status: ACTIVE | Noted: 2020-11-28

## 2022-03-19 PROBLEM — I47.1 SVT (SUPRAVENTRICULAR TACHYCARDIA) (HCC): Status: ACTIVE | Noted: 2020-11-28

## 2022-03-19 PROBLEM — E87.6 HYPOKALEMIA: Status: ACTIVE | Noted: 2020-09-06

## 2022-05-20 ENCOUNTER — OFFICE VISIT (OUTPATIENT)
Dept: INTERNAL MEDICINE CLINIC | Age: 72
End: 2022-05-20
Payer: MEDICARE

## 2022-05-20 DIAGNOSIS — M12.811 ROTATOR CUFF ARTHROPATHY OF RIGHT SHOULDER: ICD-10-CM

## 2022-05-20 DIAGNOSIS — Z09 HOSPITAL DISCHARGE FOLLOW-UP: ICD-10-CM

## 2022-05-20 DIAGNOSIS — I10 ESSENTIAL HYPERTENSION: Primary | ICD-10-CM

## 2022-05-20 DIAGNOSIS — E78.00 HYPERCHOLESTEREMIA: ICD-10-CM

## 2022-05-20 DIAGNOSIS — I48.0 PAF (PAROXYSMAL ATRIAL FIBRILLATION) (HCC): ICD-10-CM

## 2022-05-20 PROCEDURE — G8400 PT W/DXA NO RESULTS DOC: HCPCS | Performed by: INTERNAL MEDICINE

## 2022-05-20 PROCEDURE — G8427 DOCREV CUR MEDS BY ELIG CLIN: HCPCS | Performed by: INTERNAL MEDICINE

## 2022-05-20 PROCEDURE — G8419 CALC BMI OUT NRM PARAM NOF/U: HCPCS | Performed by: INTERNAL MEDICINE

## 2022-05-20 PROCEDURE — G8756 NO BP MEASURE DOC: HCPCS | Performed by: INTERNAL MEDICINE

## 2022-05-20 PROCEDURE — 99214 OFFICE O/P EST MOD 30 MIN: CPT | Performed by: INTERNAL MEDICINE

## 2022-05-20 PROCEDURE — G8510 SCR DEP NEG, NO PLAN REQD: HCPCS | Performed by: INTERNAL MEDICINE

## 2022-05-20 PROCEDURE — G9899 SCRN MAM PERF RSLTS DOC: HCPCS | Performed by: INTERNAL MEDICINE

## 2022-05-20 PROCEDURE — 1111F DSCHRG MED/CURRENT MED MERGE: CPT | Performed by: INTERNAL MEDICINE

## 2022-05-20 PROCEDURE — 3017F COLORECTAL CA SCREEN DOC REV: CPT | Performed by: INTERNAL MEDICINE

## 2022-05-20 PROCEDURE — G8536 NO DOC ELDER MAL SCRN: HCPCS | Performed by: INTERNAL MEDICINE

## 2022-05-20 PROCEDURE — 1090F PRES/ABSN URINE INCON ASSESS: CPT | Performed by: INTERNAL MEDICINE

## 2022-05-20 PROCEDURE — 1101F PT FALLS ASSESS-DOCD LE1/YR: CPT | Performed by: INTERNAL MEDICINE

## 2022-05-20 RX ORDER — MEGESTROL ACETATE 20 MG/1
20 TABLET ORAL DAILY
Qty: 30 TABLET | Refills: 3 | Status: SHIPPED | OUTPATIENT
Start: 2022-05-20

## 2022-05-20 RX ORDER — AMIODARONE HYDROCHLORIDE 200 MG/1
200 TABLET ORAL DAILY
Qty: 90 TABLET | Refills: 3 | Status: SHIPPED | OUTPATIENT
Start: 2022-05-20

## 2022-05-20 RX ORDER — DICLOFENAC SODIUM 75 MG/1
75 TABLET, DELAYED RELEASE ORAL 2 TIMES DAILY
Qty: 60 TABLET | Refills: 3 | Status: SHIPPED | OUTPATIENT
Start: 2022-05-20 | End: 2022-06-27

## 2022-05-20 RX ORDER — AMLODIPINE BESYLATE 5 MG/1
5 TABLET ORAL DAILY
Qty: 90 TABLET | Refills: 3 | Status: SHIPPED | OUTPATIENT
Start: 2022-05-20

## 2022-05-20 RX ORDER — HYDROCHLOROTHIAZIDE 25 MG/1
25 TABLET ORAL DAILY
Qty: 90 TABLET | Refills: 3 | Status: SHIPPED | OUTPATIENT
Start: 2022-05-20

## 2022-05-20 RX ORDER — MELATONIN
1000 DAILY
Qty: 30 TABLET | Refills: 0 | Status: CANCELLED | OUTPATIENT
Start: 2022-05-20

## 2022-05-20 RX ORDER — LANOLIN ALCOHOL/MO/W.PET/CERES
1000 CREAM (GRAM) TOPICAL DAILY
Qty: 30 TABLET | Refills: 0 | Status: CANCELLED | OUTPATIENT
Start: 2022-05-20

## 2022-05-20 NOTE — PATIENT INSTRUCTIONS
Path101 Activation    Thank you for requesting access to Path101. Please follow the instructions below to securely access and download your online medical record. Path101 allows you to send messages to your doctor, view your test results, renew your prescriptions, schedule appointments, and more. How Do I Sign Up? 1. In your internet browser, go to [email or web address]  2. Click on the First Time User? Click Here link in the Sign In box. You will be redirect to the New Member Sign Up page. 3. Enter your Path101 Access Code exactly as it appears below. You will not need to use this code after youve completed the sign-up process. If you do not sign up before the expiration date, you must request a new code. Path101 Access Code: [email or web address]  Expires: 2022  1:58 PM (This is the date your Path101 access code will )    4. Enter the last four digits of your Social Security Number (xxxx) and Date of Birth (mm/dd/yyyy) as indicated and click Submit. You will be taken to the next sign-up page. 5. Create a Path101 ID. This will be your Path101 login ID and cannot be changed, so think of one that is secure and easy to remember. 6. Create a Path101 password. You can change your password at any time. 7. Enter your Password Reset Question and Answer. This can be used at a later time if you forget your password. 8. Enter your e-mail address. You will receive e-mail notification when new information is available in [address]. 9. Click Sign Up. You can now view and download portions of your medical record. 10. Click the Download Summary menu link to download a portable copy of your medical information. Additional Information    If you have questions, please visit the Frequently Asked Questions section of the [email or web address] website at [email or web address] Remember, Path101 is NOT to be used for urgent needs. For medical emergencies, dial 911.

## 2022-05-20 NOTE — PROGRESS NOTES
Jena Jefferson is a 67 y.o. female and presents with Hospital Follow Up  . Subjective:  Hypertension Review:  The patient has essential hypertension  Diet and Lifestyle: generally follows a  low sodium diet, exercises sporadically  Home BP Monitoring: is not measured at home. Pertinent ROS: taking medications as instructed, no medication side effects noted, no TIA's, no chest pain on exertion, no dyspnea on exertion, no swelling of ankles. Shoulder Pain Review:  Patient complains of right side shoulder pain. The symptoms began several weeks ago Course of symptoms since onset has been gradually worsening. Pain is described as overall severity = moderate. Symptoms were incited by no known event. Patient denies N/A. Therapy to date includes OTC analgesics: effective. States she is on a blood thinner    She has basilar artery stenosis    She has a history of PAF    She is wheel chair and needs an aide    Review of Systems  Constitutional: negative for fevers, chills, anorexia and weight loss  Eyes:   negative for visual disturbance and irritation  ENT:   negative for tinnitus,sore throat,nasal congestion,ear pains. hoarseness  Respiratory:  negative for cough, hemoptysis, dyspnea,wheezing  CV:   negative for chest pain, palpitations, lower extremity edema  GI:   negative for nausea, vomiting, diarrhea, abdominal pain,melena  Endo:               negative for polyuria,polydipsia,polyphagia,heat intolerance  Genitourinary: negative for frequency, dysuria and hematuria  Integument:  negative for rash and pruritus  Hematologic:  negative for easy bruising and gum/nose bleeding  Musculoskel: myalgias, arthralgias, joint pain  Neurological:  negative for headaches, dizziness, vertigo, memory problems and gait   Behavl/Psych: negative for feelings of anxiety, depression, mood changes    Past Medical History:   Diagnosis Date    History of mammogram 2010    normal    Hypertension     Pap smear for cervical cancer screening 2011    normal     Past Surgical History:   Procedure Laterality Date    HX GYN      hystterectomy    MT COMPRE EP EVAL ABLTJ ATR FIB PULM VEIN ISOLATION N/A 11/30/2020    ABLATION A-FIB  W COMPLETE EP STUDY performed by [facility]ika Khan [facility] at [facility]ARD[facility]    MT ICAR CATHETER ABLATION ARRHYTHMIA ADD ON N/A 11/30/2020    Ablation Svt/Vt Add On performed by Anika Khan MD at [facility] CARDIAC CATH LAB    MT INTRACARD ECHO, THER/DX INTERVENT N/A 11/30/2020    Intracardiac Echocardiogram performed by Anika Khan MD at [facility] CARDIAC CATH LAB    MT INTRACARDIAC ELECTROPHYSIOLOGIC 3D MAPPING N/A 11/30/2020    Ep 3d Mapping performed by Anika Khan MD at [facility] CARDIAC CATH LAB    MT STIM/PACING HEART POST IV DRUG INFU N/A 11/30/2020    Drug Stimulation performed by Anika Khan MD at [facility] CARDIAC CATH LAB     Social History     Socioeconomic History    Marital status:    Tobacco Use    Smoking status: Never Smoker    Smokeless tobacco: Never Used   Substance and Sexual Activity    Alcohol use: Yes     Comment: occasional    Drug use: No     History reviewed. No pertinent family history. Current Outpatient Medications   Medication Sig Dispense Refill    hydroCHLOROthiazide (HYDRODIURIL) 25 mg tablet Take 1 Tablet by mouth daily. 90 Tablet 3    amLODIPine (NORVASC) 5 mg tablet Take 1 Tablet by mouth daily. 90 Tablet 3    amiodarone (Pacerone) 200 mg tablet Take 1 Tablet by mouth daily. 90 Tablet 3    apixaban (Eliquis) 5 mg tablet Take 1 tablet by mouth twice daily 180 Tablet 3    ammonium lactate (LAC-HYDRIN) 12 % lotion       acetaminophen (Tylenol Arthritis Pain) 650 mg TbER Take 650 mg by mouth nightly.  ZINC PO Take  by mouth daily.  cholecalciferol (VITAMIN D3) (1000 Units /25 mcg) tablet Take 1 Tab by mouth daily. 30 Tab 0    cyanocobalamin 1,000 mcg tablet Take 1 Tab by mouth daily.  30 Tab 0     Allergies   Allergen Reactions    Codeine Itching and Swelling       Objective: There were no vitals taken for this visit. Physical Exam:   General appearance - alert, well appearing, and in mild distress  Mental status - alert, oriented to person, place, and time  EYE-SUSSY, EOMI, corneas normal, no foreign bodies  ENT-ENT exam normal, no neck nodes or sinus tenderness  Nose - normal and patent, no erythema, discharge or polyps  Mouth - mucous membranes moist, pharynx normal without lesions  Neck - supple, no significant adenopathy   Chest - clear to auscultation, no wheezes, rales or rhonchi, symmetric air entry   Heart - normal rate, regular rhythm, normal S1, S2, no murmurs, rubs, clicks or gallops   Abdomen - soft, nontender, nondistended, no masses or organomegaly  Lymph- no adenopathy palpable  Ext-peripheral pulses normal, no pedal edema, no clubbing or cyanosis  Skin-Warm and dry. no hyperpigmentation, vitiligo, or suspicious lesions  Neuro -alert, oriented, normal speech, no focal findings or movement disorder noted  Neck-normal C-spine, no tenderness, full ROM without pain  Feet-no nail deformities or callus formation with good pulses noted  Rt.shoulder-reduced range of motion of rt, subdeltoid tenderness, positive impingement signs    Results for orders placed or performed during the hospital encounter of 01/11/22   URINE CULTURE HOLD SAMPLE    Specimen: Serum; Urine   Result Value Ref Range    Urine culture hold        Urine on hold in Microbiology dept for 2 days. If unpreserved urine is submitted, it cannot be used for addtional testing after 24 hours, recollection will be required.    COVID-19 RAPID TEST   Result Value Ref Range    Specimen source Nasopharyngeal      COVID-19 rapid test Not detected NOTD     METABOLIC PANEL, COMPREHENSIVE   Result Value Ref Range    Sodium 139 136 - 145 mmol/L    Potassium 3.4 (L) 3.5 - 5.1 mmol/L    Chloride 105 97 - 108 mmol/L    CO2 27 21 - 32 mmol/L    Anion gap 7 5 - 15 mmol/L    Glucose 88 65 - 100 mg/dL    BUN 13 6 - 20 MG/DL    Creatinine 1.12 (H) 0.55 - 1.02 MG/DL    BUN/Creatinine ratio 12 12 - 20      GFR est AA 58 (L) >60 ml/min/1.73m2    GFR est non-AA 48 (L) >60 ml/min/1.73m2    Calcium 8.9 8.5 - 10.1 MG/DL    Bilirubin, total 0.7 0.2 - 1.0 MG/DL    ALT (SGPT) 26 12 - 78 U/L    AST (SGOT) 67 (H) 15 - 37 U/L    Alk. phosphatase 74 45 - 117 U/L    Protein, total 8.0 6.4 - 8.2 g/dL    Albumin 3.9 3.5 - 5.0 g/dL    Globulin 4.1 (H) 2.0 - 4.0 g/dL    A-G Ratio 1.0 (L) 1.1 - 2.2     SAMPLES BEING HELD   Result Value Ref Range    SAMPLES BEING HELD 1BLU,1RED     COMMENT        Add-on orders for these samples will be processed based on acceptable specimen integrity and analyte stability, which may vary by analyte. CBC WITH AUTOMATED DIFF   Result Value Ref Range    WBC 6.3 3.6 - 11.0 K/uL    RBC 4.68 3.80 - 5.20 M/uL    HGB 13.6 11.5 - 16.0 g/dL    HCT 41.7 35.0 - 47.0 %    MCV 89.1 80.0 - 99.0 FL    MCH 29.1 26.0 - 34.0 PG    MCHC 32.6 30.0 - 36.5 g/dL    RDW 13.8 11.5 - 14.5 %    PLATELET 144 673 - 230 K/uL    MPV 9.6 8.9 - 12.9 FL    NRBC 0.0 0  WBC    ABSOLUTE NRBC 0.00 0.00 - 0.01 K/uL    NEUTROPHILS 81 (H) 32 - 75 %    LYMPHOCYTES 12 12 - 49 %    MONOCYTES 6 5 - 13 %    EOSINOPHILS 0 0 - 7 %    BASOPHILS 1 0 - 1 %    IMMATURE GRANULOCYTES 0 0.0 - 0.5 %    ABS. NEUTROPHILS 5.0 1.8 - 8.0 K/UL    ABS. LYMPHOCYTES 0.8 0.8 - 3.5 K/UL    ABS. MONOCYTES 0.4 0.0 - 1.0 K/UL    ABS. EOSINOPHILS 0.0 0.0 - 0.4 K/UL    ABS. BASOPHILS 0.1 0.0 - 0.1 K/UL    ABS. IMM.  GRANS. 0.0 0.00 - 0.04 K/UL    DF SMEAR SCANNED      RBC COMMENTS ANISOCYTOSIS  1+       NT-PRO BNP   Result Value Ref Range    NT pro- <125 PG/ML   MAGNESIUM   Result Value Ref Range    Magnesium 2.3 1.6 - 2.4 mg/dL   URINALYSIS W/MICROSCOPIC   Result Value Ref Range    Color YELLOW/STRAW      Appearance CLOUDY (A) CLEAR      Specific gravity 1.019 1.003 - 1.030      pH (UA) 6.0 5.0 - 8.0      Protein 30 (A) NEG mg/dL    Glucose Negative NEG mg/dL    Ketone 40 (A) NEG mg/dL    Bilirubin Negative NEG      Blood LARGE (A) NEG      Urobilinogen 0.2 0.2 - 1.0 EU/dL    Nitrites Negative NEG      Leukocyte Esterase Negative NEG      WBC 0-4 0 - 4 /hpf    RBC 5-10 0 - 5 /hpf    Epithelial cells MODERATE (A) FEW /lpf    Bacteria 1+ (A) NEG /hpf    Hyaline cast 2-5 0 - 5 /lpf   LIPID PANEL   Result Value Ref Range    Cholesterol, total 205 (H) <200 MG/DL    Triglyceride 69 <150 MG/DL    HDL Cholesterol 63 MG/DL    LDL, calculated 128.2 (H) 0 - 100 MG/DL    VLDL, calculated 13.8 MG/DL    CHOL/HDL Ratio 3.3 0.0 - 5.0     HEMOGLOBIN A1C WITH EAG   Result Value Ref Range    Hemoglobin A1c 5.2 4.0 - 5.6 %    Est. average glucose 103 mg/dL   CBC WITH AUTOMATED DIFF   Result Value Ref Range    WBC 5.5 3.6 - 11.0 K/uL    RBC 4.53 3.80 - 5.20 M/uL    HGB 13.3 11.5 - 16.0 g/dL    HCT 41.7 35.0 - 47.0 %    MCV 92.1 80.0 - 99.0 FL    MCH 29.4 26.0 - 34.0 PG    MCHC 31.9 30.0 - 36.5 g/dL    RDW 14.1 11.5 - 14.5 %    PLATELET 028 816 - 725 K/uL    MPV 10.0 8.9 - 12.9 FL    NRBC 0.0 0  WBC    ABSOLUTE NRBC 0.00 0.00 - 0.01 K/uL    NEUTROPHILS 67 32 - 75 %    LYMPHOCYTES 20 12 - 49 %    MONOCYTES 9 5 - 13 %    EOSINOPHILS 3 0 - 7 %    BASOPHILS 1 0 - 1 %    IMMATURE GRANULOCYTES 0 0.0 - 0.5 %    ABS. NEUTROPHILS 3.7 1.8 - 8.0 K/UL    ABS. LYMPHOCYTES 1.1 0.8 - 3.5 K/UL    ABS. MONOCYTES 0.5 0.0 - 1.0 K/UL    ABS. EOSINOPHILS 0.2 0.0 - 0.4 K/UL    ABS. BASOPHILS 0.0 0.0 - 0.1 K/UL    ABS. IMM.  GRANS. 0.0 0.00 - 0.04 K/UL    DF AUTOMATED     METABOLIC PANEL, COMPREHENSIVE   Result Value Ref Range    Sodium 140 136 - 145 mmol/L    Potassium 3.2 (L) 3.5 - 5.1 mmol/L    Chloride 107 97 - 108 mmol/L    CO2 25 21 - 32 mmol/L    Anion gap 8 5 - 15 mmol/L    Glucose 85 65 - 100 mg/dL    BUN 17 6 - 20 MG/DL    Creatinine 1.24 (H) 0.55 - 1.02 MG/DL    BUN/Creatinine ratio 14 12 - 20      GFR est AA 52 (L) >60 ml/min/1.73m2    GFR est non-AA 43 (L) >60 ml/min/1.73m2    Calcium 9.0 8.5 - 10.1 MG/DL    Bilirubin, total 0.5 0.2 - 1.0 MG/DL    ALT (SGPT) 28 12 - 78 U/L    AST (SGOT) 88 (H) 15 - 37 U/L    Alk.  phosphatase 68 45 - 117 U/L    Protein, total 7.0 6.4 - 8.2 g/dL    Albumin 3.3 (L) 3.5 - 5.0 g/dL    Globulin 3.7 2.0 - 4.0 g/dL    A-G Ratio 0.9 (L) 1.1 - 2.2     TSH 3RD GENERATION   Result Value Ref Range    TSH 0.51 0.36 - 3.74 uIU/mL   MAGNESIUM   Result Value Ref Range    Magnesium 2.3 1.6 - 2.4 mg/dL   PHOSPHORUS   Result Value Ref Range    Phosphorus 3.6 2.6 - 4.7 MG/DL   FOLATE   Result Value Ref Range    Folate 6.9 5.0 - 21.0 ng/mL   VITAMIN B12   Result Value Ref Range    Vitamin B12 >2,000 (H) 193 - 986 pg/mL   C REACTIVE PROTEIN, QT   Result Value Ref Range    C-Reactive protein 1.96 (H) 0.00 - 0.60 mg/dL   TROPONIN-HIGH SENSITIVITY   Result Value Ref Range    Troponin-High Sensitivity 287 (HH) 0 - 51 ng/L   TROPONIN-HIGH SENSITIVITY   Result Value Ref Range    Troponin-High Sensitivity 251 (HH) 0 - 51 ng/L   EKG, 12 LEAD, INITIAL   Result Value Ref Range    Ventricular Rate 72 BPM    Atrial Rate 72 BPM    P-R Interval 168 ms    QRS Duration 138 ms    Q-T Interval 466 ms    QTC Calculation (Bezet) 510 ms    Calculated P Axis 83 degrees    Calculated R Axis -66 degrees    Calculated T Axis 2 degrees    Diagnosis       Sinus rhythm with premature atrial complexes  Left anterior fascicular block  Right bundle branch block  ** Bifascicular block **  When compared with ECG of 09-JAN-2022 18:15,  Criteria for Septal infarct are no longer present  Nonspecific T wave abnormality no longer evident in Lateral leads  Confirmed by Barbie Conner M.D., Ksenia Badillo ([id number]) on 1/11/2022 2:02:15 PM     ECHO ADULT COMPLETE   Result Value Ref Range    IVSd 1.2 (A) 0.6 - 0.9 cm    LVIDd 3.8 (A) 3.9 - 5.3 cm    LVIDs 2.4 cm    LVOT Diameter 2.0 cm    LVPWd 1.3 (A) 0.6 - 0.9 cm    LVOT SV 89.2 ml    LVOT Peak Gradient 10 mmHg    LVOT Mean Gradient 5 mmHg    LVOT Peak Velocity 1.5 m/s    LVOT VTI 28.4 cm RV Free Wall Peak S' 15 cm/s    LA Diameter 3.5 cm    LA Volume A/L 69 mL    LA Volume 2C 84 (A) 22 - 52 mL    LA Volume 4C 52 22 - 52 mL    AV Area by Peak Velocity 2.3 cm2    AV Area by Peak Velocity 2.4 cm2    AV Area by Peak Velocity 2.1 cm2    AV Area by Peak Velocity 2.2 cm2    AV Area by VTI 2.2 cm2    AV Area by VTI 2.8 cm2    AR .9 millisecond    AR Max Velocity PISA 3.8 m/s    AV Peak Gradient 22 mmHg    AV Peak Gradient 18 mmHg    AV Mean Gradient 11 mmHg    AV Peak Velocity 2.1 m/s    AV Peak Velocity 2.3 m/s    AV Mean Velocity 1.6 m/s    AV VTI 40.8 cm    MV A Velocity 0.67 m/s    MV E Wave Deceleration Time 215.5 ms    MV E Velocity 0.85 m/s    LV E' Lateral Velocity 8 cm/s    LV E' Septal Velocity 6 cm/s    MV PHT 62.5 ms    MV Area by PHT 3.5 cm2    PV Peak Gradient 1 mmHg    PV Max Velocity 0.6 m/s    TAPSE 2.3 (A) 1.5 - 2.0 cm    Aortic Root 2.7 cm    Fractional Shortening 2D 37 28 - 44 %    LVIDd Index 1.98 cm/m2    LVIDs Index 1.25 cm/m2    LV RWT Ratio 0.68     LV Mass 2D 163.0 (A) 67 - 162 g    LV Mass 2D Index 84.9 43 - 95 g/m2    MV E/A 1.27     E/E' Ratio (Averaged) 12.40     E/E' Lateral 10.63     E/E' Septal 14.17     LA Volume Index A/L 36 16 - 34 mL/m2    LVOT Stroke Volume Index 46.4 mL/m2    LVOT Area 3.1 cm2    LA Volume Index 2C 44 (A) 16 - 34 mL/m2    LA Volume Index 4C 27 16 - 34 mL/m2    LA Size Index 1.82 cm/m2    LA/AO Root Ratio 1.30     Ao Root Index 1.41 cm/m2    LVOT:AV VTI Index 0.70        Assessment/Plan:    ICD-10-CM ICD-9-CM    1. Essential hypertension  I10 401.9 CBC W/O DIFF      METABOLIC PANEL, COMPREHENSIVE   2. Hypercholesteremia  E78.00 272.0 LIPID PANEL   3. PAF (paroxysmal atrial fibrillation) (HCC)  I48.0 427.31    4.  Rotator cuff arthropathy of right shoulder  M12.811 716.81      Orders Placed This Encounter    CBC W/O DIFF     Standing Status:   Future     Standing Expiration Date:   0/64/0522    METABOLIC PANEL, COMPREHENSIVE     Standing Status:   Future     Standing Expiration Date:   5/20/2023    LIPID PANEL     Standing Status:   Future     Standing Expiration Date:   11/20/2022    hydroCHLOROthiazide (HYDRODIURIL) 25 mg tablet     Sig: Take 1 Tablet by mouth daily. Dispense:  90 Tablet     Refill:  3    amLODIPine (NORVASC) 5 mg tablet     Sig: Take 1 Tablet by mouth daily. Dispense:  90 Tablet     Refill:  3    amiodarone (Pacerone) 200 mg tablet     Sig: Take 1 Tablet by mouth daily. Dispense:  90 Tablet     Refill:  3    apixaban (Eliquis) 5 mg tablet     Sig: Take 1 tablet by mouth twice daily     Dispense:  180 Tablet     Refill:  3     call if any problems,Take 81mg aspirin daily  There are no Patient Instructions on file for this visit. I have reviewed with the patient details of the assessment and plan and all questions were answered. Relevent patient education was performed. The most recent lab findings were reviewed with the patient. An After Visit Summary was printed and given to the patient.

## 2022-05-21 LAB
ALBUMIN SERPL-MCNC: 3.6 G/DL (ref 3.5–5)
ALBUMIN/GLOB SERPL: 1 {RATIO} (ref 1.1–2.2)
ALP SERPL-CCNC: 76 U/L (ref 45–117)
ALT SERPL-CCNC: 14 U/L (ref 12–78)
ANION GAP SERPL CALC-SCNC: 8 MMOL/L (ref 5–15)
AST SERPL-CCNC: 17 U/L (ref 15–37)
BILIRUB SERPL-MCNC: 0.4 MG/DL (ref 0.2–1)
BUN SERPL-MCNC: 19 MG/DL (ref 6–20)
BUN/CREAT SERPL: 16 (ref 12–20)
CALCIUM SERPL-MCNC: 8.8 MG/DL (ref 8.5–10.1)
CHLORIDE SERPL-SCNC: 105 MMOL/L (ref 97–108)
CO2 SERPL-SCNC: 27 MMOL/L (ref 21–32)
CREAT SERPL-MCNC: 1.21 MG/DL (ref 0.55–1.02)
ERYTHROCYTE [DISTWIDTH] IN BLOOD BY AUTOMATED COUNT: 14.8 % (ref 11.5–14.5)
GLOBULIN SER CALC-MCNC: 3.7 G/DL (ref 2–4)
GLUCOSE SERPL-MCNC: 107 MG/DL (ref 65–100)
HCT VFR BLD AUTO: 37.6 % (ref 35–47)
HGB BLD-MCNC: 12.2 G/DL (ref 11.5–16)
MCH RBC QN AUTO: 29.3 PG (ref 26–34)
MCHC RBC AUTO-ENTMCNC: 32.4 G/DL (ref 30–36.5)
MCV RBC AUTO: 90.2 FL (ref 80–99)
NRBC # BLD: 0 K/UL (ref 0–0.01)
NRBC BLD-RTO: 0 PER 100 WBC
PLATELET # BLD AUTO: 188 K/UL (ref 150–400)
PMV BLD AUTO: 10.5 FL (ref 8.9–12.9)
POTASSIUM SERPL-SCNC: 3.6 MMOL/L (ref 3.5–5.1)
PROT SERPL-MCNC: 7.3 G/DL (ref 6.4–8.2)
RBC # BLD AUTO: 4.17 M/UL (ref 3.8–5.2)
SODIUM SERPL-SCNC: 140 MMOL/L (ref 136–145)
WBC # BLD AUTO: 4.3 K/UL (ref 3.6–11)

## 2022-06-17 ENCOUNTER — APPOINTMENT (OUTPATIENT)
Dept: CT IMAGING | Age: 72
DRG: 143 | End: 2022-06-17
Attending: EMERGENCY MEDICINE
Payer: MEDICARE

## 2022-06-17 ENCOUNTER — APPOINTMENT (OUTPATIENT)
Dept: MRI IMAGING | Age: 72
DRG: 143 | End: 2022-06-17
Attending: HOSPITALIST
Payer: MEDICARE

## 2022-06-17 ENCOUNTER — HOSPITAL ENCOUNTER (INPATIENT)
Age: 72
LOS: 10 days | Discharge: NURSING, REHAB OR LONG-TERM CARE | DRG: 143 | End: 2022-06-27
Attending: EMERGENCY MEDICINE | Admitting: HOSPITALIST
Payer: MEDICARE

## 2022-06-17 ENCOUNTER — APPOINTMENT (OUTPATIENT)
Dept: GENERAL RADIOLOGY | Age: 72
DRG: 143 | End: 2022-06-17
Attending: EMERGENCY MEDICINE
Payer: MEDICARE

## 2022-06-17 DIAGNOSIS — R53.1 ACUTE LEFT-SIDED WEAKNESS: Primary | ICD-10-CM

## 2022-06-17 PROBLEM — I63.9 CVA (CEREBRAL VASCULAR ACCIDENT) (HCC): Status: ACTIVE | Noted: 2022-06-17

## 2022-06-17 LAB
ALBUMIN SERPL-MCNC: 3.5 G/DL (ref 3.5–5)
ALBUMIN/GLOB SERPL: 0.9 (ref 1.1–2.2)
ALP SERPL-CCNC: 68 U/L (ref 45–117)
ALT SERPL-CCNC: 28 U/L (ref 12–78)
ANION GAP SERPL CALC-SCNC: 6 MMOL/L (ref 5–15)
APPEARANCE UR: CLEAR
APTT PPP: 27.1 SEC (ref 22.1–31)
AST SERPL-CCNC: 86 U/L (ref 15–37)
ATRIAL RATE: 64 BPM
BACTERIA URNS QL MICRO: NEGATIVE /HPF
BASOPHILS # BLD: 0 K/UL (ref 0–0.1)
BASOPHILS NFR BLD: 1 % (ref 0–1)
BILIRUB SERPL-MCNC: 0.9 MG/DL (ref 0.2–1)
BILIRUB UR QL: NEGATIVE
BUN SERPL-MCNC: 22 MG/DL (ref 6–20)
BUN/CREAT SERPL: 19 (ref 12–20)
CALCIUM SERPL-MCNC: 9.6 MG/DL (ref 8.5–10.1)
CALCULATED P AXIS, ECG09: 18 DEGREES
CALCULATED R AXIS, ECG10: -51 DEGREES
CALCULATED T AXIS, ECG11: 20 DEGREES
CHLORIDE SERPL-SCNC: 107 MMOL/L (ref 97–108)
CO2 SERPL-SCNC: 26 MMOL/L (ref 21–32)
COLOR UR: ABNORMAL
COMMENT, HOLDF: NORMAL
CREAT SERPL-MCNC: 1.15 MG/DL (ref 0.55–1.02)
DIAGNOSIS, 93000: NORMAL
DIFFERENTIAL METHOD BLD: ABNORMAL
EOSINOPHIL # BLD: 0.1 K/UL (ref 0–0.4)
EOSINOPHIL NFR BLD: 1 % (ref 0–7)
EPITH CASTS URNS QL MICRO: ABNORMAL /LPF
ERYTHROCYTE [DISTWIDTH] IN BLOOD BY AUTOMATED COUNT: 14.6 % (ref 11.5–14.5)
GLOBULIN SER CALC-MCNC: 4 G/DL (ref 2–4)
GLUCOSE SERPL-MCNC: 80 MG/DL (ref 65–100)
GLUCOSE UR STRIP.AUTO-MCNC: NEGATIVE MG/DL
HCT VFR BLD AUTO: 40.1 % (ref 35–47)
HGB BLD-MCNC: 13.4 G/DL (ref 11.5–16)
HGB UR QL STRIP: ABNORMAL
IMM GRANULOCYTES # BLD AUTO: 0 K/UL (ref 0–0.04)
IMM GRANULOCYTES NFR BLD AUTO: 0 % (ref 0–0.5)
INR PPP: 1.1 (ref 0.9–1.1)
KETONES UR QL STRIP.AUTO: 15 MG/DL
LEUKOCYTE ESTERASE UR QL STRIP.AUTO: NEGATIVE
LYMPHOCYTES # BLD: 1 K/UL (ref 0.8–3.5)
LYMPHOCYTES NFR BLD: 14 % (ref 12–49)
MCH RBC QN AUTO: 28.6 PG (ref 26–34)
MCHC RBC AUTO-ENTMCNC: 33.4 G/DL (ref 30–36.5)
MCV RBC AUTO: 85.5 FL (ref 80–99)
MONOCYTES # BLD: 0.5 K/UL (ref 0–1)
MONOCYTES NFR BLD: 7 % (ref 5–13)
MUCOUS THREADS URNS QL MICRO: ABNORMAL /LPF
NEUTS SEG # BLD: 5.5 K/UL (ref 1.8–8)
NEUTS SEG NFR BLD: 77 % (ref 32–75)
NITRITE UR QL STRIP.AUTO: NEGATIVE
NRBC # BLD: 0 K/UL (ref 0–0.01)
NRBC BLD-RTO: 0 PER 100 WBC
P-R INTERVAL, ECG05: 138 MS
PH UR STRIP: 5.5 (ref 5–8)
PLATELET # BLD AUTO: 150 K/UL (ref 150–400)
PMV BLD AUTO: 9.5 FL (ref 8.9–12.9)
POTASSIUM SERPL-SCNC: 4.1 MMOL/L (ref 3.5–5.1)
PROT SERPL-MCNC: 7.5 G/DL (ref 6.4–8.2)
PROT UR STRIP-MCNC: 30 MG/DL
PROTHROMBIN TIME: 11.7 SEC (ref 9–11.1)
Q-T INTERVAL, ECG07: 484 MS
QRS DURATION, ECG06: 150 MS
QTC CALCULATION (BEZET), ECG08: 499 MS
RBC # BLD AUTO: 4.69 M/UL (ref 3.8–5.2)
RBC #/AREA URNS HPF: ABNORMAL /HPF (ref 0–5)
SAMPLES BEING HELD,HOLD: NORMAL
SODIUM SERPL-SCNC: 139 MMOL/L (ref 136–145)
SP GR UR REFRACTOMETRY: 1.02 (ref 1–1.03)
THERAPEUTIC RANGE,PTTT: NORMAL SECS (ref 58–77)
UR CULT HOLD, URHOLD: NORMAL
UROBILINOGEN UR QL STRIP.AUTO: 0.2 EU/DL (ref 0.2–1)
VENTRICULAR RATE, ECG03: 64 BPM
WBC # BLD AUTO: 7.1 K/UL (ref 3.6–11)
WBC URNS QL MICRO: ABNORMAL /HPF (ref 0–4)

## 2022-06-17 PROCEDURE — 81001 URINALYSIS AUTO W/SCOPE: CPT

## 2022-06-17 PROCEDURE — 74011250637 HC RX REV CODE- 250/637: Performed by: HOSPITALIST

## 2022-06-17 PROCEDURE — 65270000046 HC RM TELEMETRY

## 2022-06-17 PROCEDURE — 70496 CT ANGIOGRAPHY HEAD: CPT

## 2022-06-17 PROCEDURE — 85025 COMPLETE CBC W/AUTO DIFF WBC: CPT

## 2022-06-17 PROCEDURE — 36415 COLL VENOUS BLD VENIPUNCTURE: CPT

## 2022-06-17 PROCEDURE — 70551 MRI BRAIN STEM W/O DYE: CPT

## 2022-06-17 PROCEDURE — 0042T CT CODE NEURO PERF W CBF: CPT

## 2022-06-17 PROCEDURE — 71045 X-RAY EXAM CHEST 1 VIEW: CPT

## 2022-06-17 PROCEDURE — 85730 THROMBOPLASTIN TIME PARTIAL: CPT

## 2022-06-17 PROCEDURE — 99223 1ST HOSP IP/OBS HIGH 75: CPT | Performed by: PSYCHIATRY & NEUROLOGY

## 2022-06-17 PROCEDURE — 70450 CT HEAD/BRAIN W/O DYE: CPT

## 2022-06-17 PROCEDURE — 4A03X5D MEASUREMENT OF ARTERIAL FLOW, INTRACRANIAL, EXTERNAL APPROACH: ICD-10-PCS | Performed by: INTERNAL MEDICINE

## 2022-06-17 PROCEDURE — 99285 EMERGENCY DEPT VISIT HI MDM: CPT

## 2022-06-17 PROCEDURE — 93005 ELECTROCARDIOGRAM TRACING: CPT

## 2022-06-17 PROCEDURE — 85610 PROTHROMBIN TIME: CPT

## 2022-06-17 PROCEDURE — 74011000636 HC RX REV CODE- 636: Performed by: RADIOLOGY

## 2022-06-17 PROCEDURE — 80053 COMPREHEN METABOLIC PANEL: CPT

## 2022-06-17 RX ORDER — TRAMADOL HYDROCHLORIDE 50 MG/1
50 TABLET ORAL
Status: DISCONTINUED | OUTPATIENT
Start: 2022-06-17 | End: 2022-06-27 | Stop reason: HOSPADM

## 2022-06-17 RX ORDER — LANOLIN ALCOHOL/MO/W.PET/CERES
1000 CREAM (GRAM) TOPICAL DAILY
Status: DISCONTINUED | OUTPATIENT
Start: 2022-06-18 | End: 2022-06-27 | Stop reason: HOSPADM

## 2022-06-17 RX ORDER — BALSAM PERU/CASTOR OIL
OINTMENT (GRAM) TOPICAL 2 TIMES DAILY
Status: DISCONTINUED | OUTPATIENT
Start: 2022-06-17 | End: 2022-06-27 | Stop reason: HOSPADM

## 2022-06-17 RX ORDER — GUAIFENESIN 100 MG/5ML
81 LIQUID (ML) ORAL DAILY
Status: DISCONTINUED | OUTPATIENT
Start: 2022-06-18 | End: 2022-06-17

## 2022-06-17 RX ORDER — MEGESTROL ACETATE 40 MG/ML
20 SUSPENSION ORAL DAILY
Status: DISCONTINUED | OUTPATIENT
Start: 2022-06-18 | End: 2022-06-27 | Stop reason: HOSPADM

## 2022-06-17 RX ORDER — AMMONIUM LACTATE 12 G/100G
LOTION TOPICAL 2 TIMES DAILY
Status: DISCONTINUED | OUTPATIENT
Start: 2022-06-17 | End: 2022-06-27 | Stop reason: HOSPADM

## 2022-06-17 RX ORDER — AMLODIPINE BESYLATE 5 MG/1
5 TABLET ORAL DAILY
Status: DISCONTINUED | OUTPATIENT
Start: 2022-06-18 | End: 2022-06-27 | Stop reason: HOSPADM

## 2022-06-17 RX ORDER — LABETALOL HYDROCHLORIDE 5 MG/ML
5 INJECTION, SOLUTION INTRAVENOUS
Status: DISCONTINUED | OUTPATIENT
Start: 2022-06-17 | End: 2022-06-20

## 2022-06-17 RX ORDER — AMIODARONE HYDROCHLORIDE 200 MG/1
200 TABLET ORAL DAILY
Status: DISCONTINUED | OUTPATIENT
Start: 2022-06-18 | End: 2022-06-27 | Stop reason: HOSPADM

## 2022-06-17 RX ORDER — ACETAMINOPHEN 325 MG/1
650 TABLET ORAL
Status: DISCONTINUED | OUTPATIENT
Start: 2022-06-17 | End: 2022-06-27 | Stop reason: HOSPADM

## 2022-06-17 RX ORDER — MELATONIN
1000 DAILY
Status: DISCONTINUED | OUTPATIENT
Start: 2022-06-18 | End: 2022-06-27 | Stop reason: HOSPADM

## 2022-06-17 RX ORDER — ACETAMINOPHEN 650 MG/1
650 SUPPOSITORY RECTAL
Status: DISCONTINUED | OUTPATIENT
Start: 2022-06-17 | End: 2022-06-27 | Stop reason: HOSPADM

## 2022-06-17 RX ADMIN — Medication: at 17:46

## 2022-06-17 RX ADMIN — Medication: at 17:45

## 2022-06-17 RX ADMIN — IOPAMIDOL 100 ML: 755 INJECTION, SOLUTION INTRAVENOUS at 13:32

## 2022-06-17 RX ADMIN — APIXABAN 5 MG: 5 TABLET, FILM COATED ORAL at 22:51

## 2022-06-17 RX ADMIN — IOPAMIDOL 40 ML: 755 INJECTION, SOLUTION INTRAVENOUS at 13:32

## 2022-06-17 NOTE — ED TRIAGE NOTES
Pt arrives via EMS from home with CC left side weakness. Woke up this morninig with increased weakness. Left  strength weaker than right. No slurred speech. LKW: 10 PM last night. .      Eliquis for A fib,

## 2022-06-17 NOTE — PROGRESS NOTES
SLP Contact Note    Consult received and appreciated. Patient chart reviewed. Pt currently undergoing neuro work-up for stroke. Pt passed [place] swallow screen. CT is negative for acute infarct. Will await further work-up and complete evaluation as indicated.       Thank you,  Felicia Sanchez M.Ed, Chavo Hodges  Speech-Language Pathologist

## 2022-06-17 NOTE — PROGRESS NOTES
Neurocritical Care Code Stroke Documentation      Symptoms:   Left sided weakness, lower extremity pitting edema, worse on left leg   Last Known Well: 10:00 pm   Medical hx: Past Medical History:   Diagnosis Date    History of mammogram 2010    normal    Hypertension     Pap smear for cervical cancer screening 2011    normal      Anticoagulation: Eliquis   VAN:   Negative   NIHSS:   1a-LOC:0    1b-Month/Age:0    1c-Open/Close Hand:0    2-Best Gaze:0    3-Visual Fields:0    4-Facial Palsy:0    5a-Left Arm:0    5b-Right Arm:0    6a-Left Le    6b-Right Le    7-Limb Ataxia:0    8-Sensory:1    9-Best Language:0    10-Dysarthria:0    11-Extinction/Inattention:0  TOTAL SCORE:2    Pre-stroke mRS: 4   Imaging:   CT- No acute intracranial abnormality    CTA-. No evidence of flow-limiting stenosis or aneurysm. Scattered atherosclerotic  disease    CTP- No perfusion defect   Plan:   TPA Candidate: NO    Mechanical thrombectomy Candidate: NO     *Perform dysphagia screening prior to any PO intake*    Discussed with: Dr. Christine Rasmussen, ROSA attending, Dr. Delaney Antoine Teleneurology    Arrival time: 12:19  Time spent: 15 minutes.      Bismark Arreguin NP  Neurocritical Care Nurse Practitioner  [phone]

## 2022-06-17 NOTE — WOUND CARE
WOCN Note:     New consult placed by Dr. Adela Chen for wound. Chart shows:  Admitted on 6/17/22 from home. Admitted for weakness & CVA. Assessment:   Appropriately conversational and requires some assist in repositioning onto right side. She reports being mobile prior to today's weakness. Reports no pain. Wearing briefs and has a Pure Wick. Surface: stretcher - seen in ER    Bilateral heels intact with thickly encrusted skin plaques. Heels offloaded with pillows. Generalized edema to lower legs but more so in left. Thick, dry skin plaques to bilateral lower legs. No open wounds noted on legs. 1. POA left ischial evolving deep tissue injury  2.2 x 4.3 x 0.1 cm  100% non-blanching burgundy/purple with epithelial tissue loss  no exudate   Tx: covered with foam dressing     2. POA gluteal cleft and sacrum  Mixed injury of pressure and moisture  Linear split in gluteal cleft with MASD surrounding it    Wound Recommendations:    Lower legs: lac-hydrin daily  Left ischial and sacrum: venelex twice daily and cover with foam dressing; change dressing as needed    PI Prevention:  Turn/reposition approximately every 2 hours  Offload heels with heels hanging off end of pillow at all times while in bed. Sacral Foam dressing: lift to assess regularly; change as needed. Discontinue if incontinence is frequently soiling dressing. Low Air Loss mattress: Use only flat sheet and one incontinence pad. Please call [facility] @ [phone] for bed to be picked up at discharge.      Transition of Care: Plan to follow weekly and as needed while admitted to hospital.     MANPREET Wisdom, RN, [facility]  Certified Wound, Ostomy, Continence Nurse  office [phone]  Available via [facility]

## 2022-06-17 NOTE — CONSULTS
NEUROLOGY   INPATIENT EVALUATION/CONSULTATION       PATIENT NAME: Aury Tobin    MRN: [id number]    REASON FOR CONSULTATION: Left sided weakness    06/17/22      HISTORY OF PRESENT ILLNESS:  Aury Tobin is a 67 y.o. female who presents to [facility] with complaints of increasing swelling and weakness on her left side since yesterday evening. History is rather limited though patient says she was in her usual state of health when she slipped yesterday noted having more trouble getting up. This morning says she really can get up and using her left leg where she notes more prominent swelling. She then came into the ER as a stroke alert CT CTA were obtained without any significant findings other than concern for malignancy as well as a possible pulmonary embolism. Neurology consultation was then requested. Patient is on Eliquis for A. fib says she is compliant with her meds. Does not really endorse much weakness. Seems indicate that it similar to when she was in the hospital in January with weakness for 2 weeks with a negative MRI.   Presumably went back to normal in the interval.    PAST MEDICAL HISTORY:  Past Medical History:   Diagnosis Date    History of mammogram 2010    normal    Hypertension     Pap smear for cervical cancer screening 2011    normal       PAST SURGICAL HISTORY:  Past Surgical History:   Procedure Laterality Date    HX GYN      hystterectomy    [facility] COMPRE EP EVAL ABLTJ ATR FIB PULM VEIN ISOLATION N/A 11/30/2020    ABLATION A-FIB  W COMPLETE EP STUDY performed by Chantelle Huerta MD at [facility] C[facility] CATH LAB    VA ICAR CATHETER ABLATION ARRHYTHMIA ADD ON N/A 11/30/2020    Ablation Svt/Vt Add On performed by Chantelle Huerta MD at [facility] C[facility]ATH [facility]    VA INTRACARD ECHO, THER/DX INTERVENT N/A 11/30/2020    Intracardiac Echocardiogram performed by Chantelle Huerta MD at [facility] CARDIAC CATH LAB    VA INTRACARDIAC ELECTROPHYSIOLOGIC 3D MAPPING N/A 11/30/2020    Ep 3d Mapping performed by Staci Stephenson MD at [facility] CARDIAC CATH LAB    CT STIM/PACING HEART POST IV DRUG INFU N/A 11/30/2020    Drug Stimulation performed by Staci Stephenson MD at [facility] CARDIAC CATH LAB       FAMILY HISTORY:   No family history on file. SOCIAL HISTORY:  Social History     Socioeconomic History    Marital status:    Tobacco Use    Smoking status: Never Smoker    Smokeless tobacco: Never Used   Substance and Sexual Activity    Alcohol use: Yes     Comment: occasional    Drug use: No         MEDICATIONS:   Current Facility-Administered Medications   Medication Dose Route Frequency Provider Last Rate Last Admin    acetaminophen (TYLENOL) tablet 650 mg  650 mg Oral Q4H PRN Cora Santos MD        Or   Arya Denise acetaminophen (TYLENOL) solution 650 mg  650 mg Per NG tube Q4H PRN Cora Santos MD        Or   Arya Denise acetaminophen (TYLENOL) suppository 650 mg  650 mg Rectal Q4H PRN Cora Santos MD        labetaloL (NORMODYNE;TRANDATE) injection 5 mg  5 mg IntraVENous Q10MIN PRN Cora Santos MD        [START ON 6/18/2022] amiodarone (CORDARONE) tablet 200 mg  200 mg Oral DAILY Cora Santos MD        apixaban Gala Le) tablet 5 mg  5 mg Oral Q12H MD Arya Fair[place] [facility] ON 6/18/2022] amLODIPine (NORVASC) tablet 5 mg  5 mg Oral DAILY MD Arya [place] [START ON 6/18/2022] cholecalciferol (VITAMIN D3) (1000 Units /25 mcg) tablet 1,000 Units  1,000 Units Oral DAILY MD Arya Faibranks [START ON 6/18/2022] cyanocobalamin (VITAMIN B12) tablet 1,000 mcg  1,000 mcg Oral DAILY Cora Santos MD        traMADoL [place]) tablet 50 mg  50 mg Oral Q6H PRN MD Arya Fairbanks [START ON 6/18/2022] megestroL (MEGACE) tablet 20 mg  20 mg Oral DAILY Cora Santos MD         Current Outpatient Medications   Medication Sig Dispense Refill    hydroCHLOROthiazide (HYDRODIURIL) 25 mg tablet Take 1 Tablet by mouth daily. 90 Tablet 3    amLODIPine (NORVASC) 5 mg tablet Take 1 Tablet by mouth daily.  90 Tablet 3    amiodarone (Pacerone) 200 mg tablet Take 1 Tablet by mouth daily. 90 Tablet 3    apixaban (Eliquis) 5 mg tablet Take 1 tablet by mouth twice daily 180 Tablet 3    diclofenac EC (VOLTAREN) 75 mg EC tablet Take 1 Tablet by mouth two (2) times a day. 60 Tablet 3    megestroL (MEGACE) 20 mg tablet Take 1 Tablet by mouth daily. 30 Tablet 3    ammonium lactate (LAC-HYDRIN) 12 % lotion       acetaminophen (Tylenol Arthritis Pain) 650 mg TbER Take 650 mg by mouth nightly.  ZINC PO Take  by mouth daily.  cholecalciferol (VITAMIN D3) (1000 Units /25 mcg) tablet Take 1 Tab by mouth daily. 30 Tab 0    cyanocobalamin 1,000 mcg tablet Take 1 Tab by mouth daily. 30 Tab 0         ALLERGIES:  Allergies   Allergen Reactions    Codeine Itching and Swelling         REVIEW OF SYSTEMS:  10 point ROS reviewed with patient and negative except for those listed above. PHYSICAL EXAM:  Vital Signs:   Visit Vitals  BP (!) 155/84   Pulse 77   Temp 98.3 °F (36.8 °C)   Resp 17   SpO2 94%     Elderly female resting in bed appearing somewhat withdrawn. HEENT is grossly unremarkable neck appears supple. Cardiopulmonary exams are unrevealing. Abdomen is nontender/nondistended. Lower extremities are grossly edematous left greater than right with chronic skin changes and grossly misshapened nails. Neurologically, patient appears alert and oriented attention is intact. Speech is clear, language fluent. Cranial nerves II through XII are grossly unrevealing. Motorically patient has adhesive capsulitis of the right shoulder limiting mobility there. Left arm is grossly 4 to 4+ out of 5 approximately there is no pronation or finger flexion noted though arm does drift (suggestive of rotator cuff pathology). Upper extremity appears strong distally. Lower extremities right iliopsoas is 4-/5 left is 3+ out of 5 where swelling is more prominent. Remainder examination of lower extremities is deferred.   Sensation appears grossly intact in upper extremities. Coordination is intact upper extremities. Remainder examination is deferred. PERTINENT DATA:    CT Results (maximum last 3): Results from Hospital Encounter encounter on 06/17/22    CT CODE NEURO PERF W CBF    Narrative  EXAM:  CTA CODE NEURO HEAD AND NECK W CONT, CT CODE NEURO PERF W CBF    INDICATION:   left sided weakness, code S 2    COMPARISON:  CT head 6/17/2022, MRI brain 1/12/2022. CONTRAST:  140 mL of Isovue-370. TECHNIQUE:  Unenhanced  images were obtained to localize the volume for  acquisition. Multislice helical axial CT angiography was performed from the  aortic arch to the top of the head during uneventful rapid bolus intravenous  contrast administration. Coronal and sagittal reformations and 3D post  processing was performed. CT dose reduction was achieved through use of a  standardized protocol tailored for this examination and automatic exposure  control for dose modulation. CT brain perfusion was performed with generation of hemodynamic maps of multiple  parameters, including cerebral blood flow, cerebral blood volume, and MTT (mean  transit time). CT dose reduction was achieved through use of a standardized  protocol tailored for this examination and automatic exposure control for dose  modulation. This study was analyzed by the [address]  algorithm. FINDINGS:    CTA Head:  There is no evidence of large vessel occlusion or flow-limiting stenosis of the  intracranial internal carotid, anterior cerebral, and middle cerebral arteries. Calcification of the bilateral carotid siphons without significant stenosis. The  anterior communicating artery is patent, with small right A1 segment. There is no evidence of large vessel occlusion or flow-limiting stenosis of the  intracranial vertebral arteries, basilar artery, or posterior cerebral arteries. Multifocal mild to moderate stenoses of the basilar artery.  Fetal origin of the  bilateral posterior cerebral arteries. There is no evidence of aneurysm or vascular malformation. The dural venous  sinuses and deep cerebral venous system are patent. No evidence of abnormal  enhancement on delayed phase images. CTA NECK:  NASCET method was utilized for calculating stenosis. The aortic arch is unremarkable. The common carotid arteries demonstrate no  significant stenosis. There is no evidence of significant stenosis in the  cervical right internal carotid artery. There is no evidence of significant  stenosis in the cervical left internal carotid artery. There is a left dominant vertebrobasilar arterial system. Moderate stenosis at  the origin of the right vertebral artery from calcified plaque. No significant  stenosis of the cervical left vertebral artery. Partially visualized small acute pulmonary embolus in the distal right main  pulmonary artery (series 3 image 2). Multinodular thyroid gland. There is a heterogeneously enhancing lesion in the  left tonsillar fossa extending into the glossotonsillar sulcus measuring 1.7 x  0.9 cm (series 3 image 75). There is a confluent, centrally necrotic left level  2A lymph node measuring 2.1 x 2.1 x 3.4 cm (series 3 image 69 and series 603  image 184). Visualized lung apices are clear. No acute fracture or aggressive  osseous lesion. CT Brain Perfusion:  There are no regional areas of elevated Tmax, decreased cerebral blood flow or  blood volume. rCBF < 30% = 0 cc. Tmax > 6 seconds = 0 cc. Impression  CTA Head:  1. No evidence of flow-limiting stenosis or aneurysm. Scattered atherosclerotic  disease as above. CTA Neck:  1. No evidence of flow-limiting stenosis. 2. Moderate stenosis at the origin of the nondominant right vertebral artery. 3. Partially visualized small acute pulmonary embolus in the distal right main  pulmonary artery.   4. 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 Large centrally necrotic left level 2A lymph node  measuring 2.1 x 2.1 x 3.4 cm, consistent with jarrell metastasis. ENT consultation  is recommended. 5. Multinodular thyroid gland. CT Brain Perfusion:  1. No acute abnormality. Attempt was made to call the ER on 6/17/2022 1:59 PM by Dereck Ramos MD. I was  told the ER physician was busy in another room. [address] CONT    Narrative  EXAM:  CTA CODE NEURO HEAD AND NECK W CONT, CT CODE NEURO PERF W CBF    INDICATION:   left sided weakness, code S 2    COMPARISON:  CT head 6/17/2022, MRI brain 1/12/2022. CONTRAST:  140 mL of Isovue-370. TECHNIQUE:  Unenhanced  images were obtained to localize the volume for  acquisition. Multislice helical axial CT angiography was performed from the  aortic arch to the top of the head during uneventful rapid bolus intravenous  contrast administration. Coronal and sagittal reformations and 3D post  processing was performed. CT dose reduction was achieved through use of a  standardized protocol tailored for this examination and automatic exposure  control for dose modulation. CT brain perfusion was performed with generation of hemodynamic maps of multiple  parameters, including cerebral blood flow, cerebral blood volume, and MTT (mean  transit time). CT dose reduction was achieved through use of a standardized  protocol tailored for this examination and automatic exposure control for dose  modulation. This study was analyzed by the [address]  algorithm. FINDINGS:    CTA Head:  There is no evidence of large vessel occlusion or flow-limiting stenosis of the  intracranial internal carotid, anterior cerebral, and middle cerebral arteries. Calcification of the bilateral carotid siphons without significant stenosis. The  anterior communicating artery is patent, with small right A1 segment.     There is no evidence of large vessel occlusion or flow-limiting stenosis of the  intracranial vertebral arteries, basilar artery, or posterior cerebral arteries. Multifocal mild to moderate stenoses of the basilar artery. Fetal origin of the  bilateral posterior cerebral arteries. There is no evidence of aneurysm or vascular malformation. The dural venous  sinuses and deep cerebral venous system are patent. No evidence of abnormal  enhancement on delayed phase images. CTA NECK:  NASCET method was utilized for calculating stenosis. The aortic arch is unremarkable. The common carotid arteries demonstrate no  significant stenosis. There is no evidence of significant stenosis in the  cervical right internal carotid artery. There is no evidence of significant  stenosis in the cervical left internal carotid artery. There is a left dominant vertebrobasilar arterial system. Moderate stenosis at  the origin of the right vertebral artery from calcified plaque. No significant  stenosis of the cervical left vertebral artery. Partially visualized small acute pulmonary embolus in the distal right main  pulmonary artery (series 3 image 2). Multinodular thyroid gland. There is a heterogeneously enhancing lesion in the  left tonsillar fossa extending into the glossotonsillar sulcus measuring 1.7 x  0.9 cm (series 3 image 75). There is a confluent, centrally necrotic left level  2A lymph node measuring 2.1 x 2.1 x 3.4 cm (series 3 image 69 and series 603  image 184). Visualized lung apices are clear. No acute fracture or aggressive  osseous lesion. CT Brain Perfusion:  There are no regional areas of elevated Tmax, decreased cerebral blood flow or  blood volume. rCBF < 30% = 0 cc. Tmax > 6 seconds = 0 cc. Impression  CTA Head:  1. No evidence of flow-limiting stenosis or aneurysm. Scattered atherosclerotic  disease as above. CTA Neck:  1. No evidence of flow-limiting stenosis.   2. Moderate stenosis at the origin of the nondominant right vertebral artery. 3. Partially visualized small acute pulmonary embolus in the distal right main  pulmonary artery. 4. 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 Large centrally necrotic left level 2A lymph node  measuring 2.1 x 2.1 x 3.4 cm, consistent with jarrell metastasis. ENT consultation  is recommended. 5. Multinodular thyroid gland. CT Brain Perfusion:  1. No acute abnormality. Attempt was made to call the ER on 6/17/2022 1:59 PM by Emilia Overton MD. I was  told the ER physician was busy in another room. 1      CT CODE NEURO HEAD WO CONTRAST    Narrative  EXAM:  CT CODE NEURO HEAD WO CONTRAST    INDICATION: Left-sided weakness    COMPARISON: MRI 1/12/2022. CT 1/11/2022. TECHNIQUE: Axial noncontrast head CT from foramen magnum to vertex. Coronal and  sagittal reformatted images were obtained. CT dose reduction was achieved  through use of a standardized protocol tailored for this examination and  automatic exposure control for dose modulation. Adaptive statistical iterative  reconstruction (ASIR) was utilized. FINDINGS:  There is diffuse age-related parenchymal volume loss. The ventricles  and sulci are age-appropriate without hydrocephalus. There is no mass effect or  midline shift. There is no intracranial hemorrhage or extra-axial fluid  collection. Scattered foci of low attenuation in the periventricular white  matter represent stable chronic microvascular ischemic changes. The gray-white  matter differentiation is maintained. The basal cisterns are patent. The osseous structures are intact. The visualized paranasal sinuses and mastoid  air cells are clear. Impression  No acute intracranial abnormality. MRI Results (maximum last 3):   Results from [place] encounter on 01/11/22    MRA BRAIN WO CONT    Narrative  Clinical history: Suspected acute CVA  INDICATION:   Suspected acute CVA  COMPARISON: None  TECHNIQUE:  3-D time-of-flight MRA of the brain was performed. Multiplanar  reconstructions were obtained. FINDINGS:  The left vertebral artery slightly larger than right vertebral artery. There is  severe stenosis of the mid basilar artery. Persistent fetal circulation to the  right PCA. Left sided posterior to indicating artery. Petrous and cavernous ICAs  are patent. M1 segments are patent. Mild multifocal M1 segment stenosis on the  right. M2 segments demonstrate symmetric arborization. A2 and A3 segments are  within normal limits. Hypoplastic A1 on the right. . The internal carotid,  anterior cerebral, and middle cerebral arteries are patent. . There is no  aneurysm. .    Impression  Moderate to severe stenosis proximal basilar artery. Mild stenosis distal  basilar artery    Otherwise no dissection or stenosis. MRI BRAIN WO CONT    Narrative  CLINICAL HISTORY: Suspected CVA. INDICATION: Suspected CVA. COMPARISON: CT 1/11/2022    TECHNIQUE: MR examination of the brain includes axial and sagittal T1, coronal  T2, axial T2, axial FLAIR, axial gradient echo, axial DWI. CONTRAST: None    FINDINGS:  There is no intracranial mass, hemorrhage or evidence of acute infarction. Confluent periventricular and scattered foci of increased T2 signal intensity  and corona radiata and centrum semiovale. There is sulcal and ventricular  prominence. IACs are symmetric in size. Chronic focus of hemosiderin deposition  in the left basal ganglia. The brain architecture is normal. There is no evidence of midline shift or  mass-effect. There are no extra-axial fluid collections. There is no Chiari or  syrinx. The pituitary and infundibulum are grossly unremarkable. There is no  skull base mass. Cerebellopontine angles are grossly unremarkable. The major  intracranial vascular flow-voids are unremarkable. The cavernous sinuses are  symmetric.  Optic chiasm and infundibulum grossly unremarkable. Orbits are  grossly symmetric. Dural venous sinuses are grossly patent. The mastoid air cells are well pneumatized and clear. Impression  Mild to moderate chronic microvascular ischemic change and mild cerebral  atrophy. There is no intracranial mass, hemorrhage or evidence of acute infarction. No acute intracranial process is demonstrated. ASSESSMENT:      Indira Milan is a 80-year-old right-hand-dominant female presents to [facility] with complaints of left-sided weakness mostly in the leg starting last night into this morning.     RECOMMENDATIONS:  Left-sided weakness (?):  Patient is a vague historian, reports having prior episode of left-sided weakness which they thought was a stroke but proven not to be (this is confirmed reviewing chart from January 2022)    Furthermore her function her left arm is better than her right arm there is drift without pronation or finger flexion in the legs there is slight asymmetry in mobility with left leg with more notable swelling    As such reasonable to obtain MRI but would not pursue idea of stroke further until that point (I have canceled echo and aspirin as stroke is not a clear diagnosis)    Do not entertain any other neurologic causes of her slight left-sided weakness can get appears largely mechanical in the leg where swelling is asymmetric    Did briefly discuss findings of CT with patient in terms of concern for malignancy recommend consideration of ENT evaluation per primary team    Would maintain blood pressure less than 165/95, await results of MRI and call neurology if significant    Otherwise no further recommendations at this time    Defer management of acute pulmonary embolus noted on CTA to primary team not clearly symptomatic      Sarah Hartley MD

## 2022-06-17 NOTE — PROGRESS NOTES
Problem: Pressure Injury - Risk of  Goal: *Prevention of pressure injury  Description: Document Bin Scale and appropriate interventions in the flowsheet.   Outcome: Progressing Towards Goal  Note: Pressure Injury Interventions:  Sensory Interventions: Assess changes in LOC,Discuss PT/OT consult with provider,Float heels,Minimize linen layers,Pad between skin to skin    Moisture Interventions: Absorbent underpads,Internal/External urinary devices,Limit adult briefs,Minimize layers    Activity Interventions: Pressure redistribution bed/mattress(bed type),PT/OT evaluation    Mobility Interventions: HOB 30 degrees or less,Pressure redistribution bed/mattress (bed type),PT/OT evaluation    Nutrition Interventions: Document food/fluid/supplement intake    Friction and Shear Interventions: HOB 30 degrees or less,Minimize layers                Problem: Patient Education: Go to Patient Education Activity  Goal: Patient/Family Education  Outcome: Progressing Towards Goal     Problem: Patient Education: Go to Patient Education Activity  Goal: Patient/Family Education  Outcome: Progressing Towards Goal     Problem: TIA/CVA Stroke: 0-24 hours  Goal: Off Pathway (Use only if patient is Off Pathway)  Outcome: Progressing Towards Goal  Goal: Activity/Safety  Outcome: Progressing Towards Goal  Goal: Consults, if ordered  Outcome: Progressing Towards Goal  Goal: Diagnostic Test/Procedures  Outcome: Progressing Towards Goal  Goal: Nutrition/Diet  Outcome: Progressing Towards Goal  Goal: Discharge Planning  Outcome: Progressing Towards Goal  Goal: Medications  Outcome: Progressing Towards Goal  Goal: Respiratory  Outcome: Progressing Towards Goal  Goal: Treatments/Interventions/Procedures  Outcome: Progressing Towards Goal  Goal: Minimize risk of bleeding post-thrombolytic infusion  Outcome: Progressing Towards Goal  Goal: Monitor for complications post-thrombolytic infusion  Outcome: Progressing Towards Goal  Goal: Psychosocial  Outcome: Progressing Towards Goal  Goal: *Hemodynamically stable  Outcome: Progressing Towards Goal  Goal: *Neurologically stable  Description: Absence of additional neurological deficits    Outcome: Progressing Towards Goal  Goal: *Verbalizes anxiety and depression are reduced or absent  Outcome: Progressing Towards Goal  Goal: *Absence of Signs of Aspiration on Current Diet  Outcome: Progressing Towards Goal  Goal: *Absence of deep venous thrombosis signs and symptoms(Stroke Metric)  Outcome: Progressing Towards Goal  Goal: *Ability to perform ADLs and demonstrates progressive mobility and function  Outcome: Progressing Towards Goal  Goal: *Stroke education started(Stroke Metric)  Outcome: Progressing Towards Goal  Goal: *Dysphagia screen performed(Stroke Metric)  Outcome: Progressing Towards Goal  Goal: *Rehab consulted(Stroke Metric)  Outcome: Progressing Towards Goal     Problem: TIA/CVA Stroke: 0-24 hours  Goal: Off Pathway (Use only if patient is Off Pathway)  Outcome: Progressing Towards Goal  Goal: Activity/Safety  Outcome: Progressing Towards Goal  Goal: Consults, if ordered  Outcome: Progressing Towards Goal  Goal: Diagnostic Test/Procedures  Outcome: Progressing Towards Goal  Goal: Nutrition/Diet  Outcome: Progressing Towards Goal  Goal: Discharge Planning  Outcome: Progressing Towards Goal  Goal: Medications  Outcome: Progressing Towards Goal  Goal: Respiratory  Outcome: Progressing Towards Goal  Goal: Treatments/Interventions/Procedures  Outcome: Progressing Towards Goal  Goal: Minimize risk of bleeding post-thrombolytic infusion  Outcome: Progressing Towards Goal  Goal: Monitor for complications post-thrombolytic infusion  Outcome: Progressing Towards Goal  Goal: Psychosocial  Outcome: Progressing Towards Goal  Goal: *Hemodynamically stable  Outcome: Progressing Towards Goal  Goal: *Neurologically stable  Description: Absence of additional neurological deficits    Outcome: Progressing Towards Goal  Goal: *Verbalizes anxiety and depression are reduced or absent  Outcome: Progressing Towards Goal  Goal: *Absence of Signs of Aspiration on Current Diet  Outcome: Progressing Towards Goal  Goal: *Absence of deep venous thrombosis signs and symptoms(Stroke Metric)  Outcome: Progressing Towards Goal  Goal: *Ability to perform ADLs and demonstrates progressive mobility and function  Outcome: Progressing Towards Goal  Goal: *Stroke education started(Stroke Metric)  Outcome: Progressing Towards Goal  Goal: *Dysphagia screen performed(Stroke Metric)  Outcome: Progressing Towards Goal  Goal: *Rehab consulted(Stroke Metric)  Outcome: Progressing Towards Goal     Problem: Ischemic Stroke: Discharge Outcomes  Goal: *Verbalizes anxiety and depression are reduced or absent  Outcome: Progressing Towards Goal  Goal: *Verbalize understanding of risk factor modification(Stroke Metric)  Outcome: Progressing Towards Goal  Goal: *Hemodynamically stable  Outcome: Progressing Towards Goal  Goal: *Absence of aspiration pneumonia  Outcome: Progressing Towards Goal  Goal: *Aware of needed dietary changes  Outcome: Progressing Towards Goal  Goal: *Verbalize understanding of prescribed medications including anti-coagulants, anti-lipid, and/or anti-platelets(Stroke Metric)  Outcome: Progressing Towards Goal  Goal: *Tolerating diet  Outcome: Progressing Towards Goal  Goal: *Aware of follow-up diagnostics related to anticoagulants  Outcome: Progressing Towards Goal  Goal: *Ability to perform ADLs and demonstrates progressive mobility and function  Outcome: Progressing Towards Goal  Goal: *Absence of DVT(Stroke Metric)  Outcome: Progressing Towards Goal  Goal: *Absence of aspiration  Outcome: Progressing Towards Goal  Goal: *Optimal pain control at patient's stated goal  Outcome: Progressing Towards Goal  Goal: *Home safety concerns addressed  Outcome: Progressing Towards Goal  Goal: *Describes available resources and support systems  Outcome: Progressing Towards Goal  Goal: *Verbalizes understanding of activation of EMS(911) for stroke symptoms(Stroke Metric)  Outcome: Progressing Towards Goal  Goal: *Understands and describes signs and symptoms to report to providers(Stroke Metric)  Outcome: Progressing Towards Goal  Goal: *Neurolgocially stable (absence of additional neurological deficits)  Outcome: Progressing Towards Goal  Goal: *Verbalizes importance of follow-up with primary care physician(Stroke Metric)  Outcome: Progressing Towards Goal  Goal: *Smoking cessation discussed,if applicable(Stroke Metric)  Outcome: Progressing Towards Goal  Goal: *Depression screening completed(Stroke Metric)  Outcome: Progressing Towards Goal     Problem: Falls - Risk of  Goal: *Absence of Falls  Description: Document Pam Fall Risk and appropriate interventions in the flowsheet.   Outcome: Progressing Towards Goal  Note: Fall Risk Interventions:  Mobility Interventions: Communicate number of staff needed for ambulation/transfer,OT consult for ADLs,Patient to call before getting OOB,PT Consult for mobility concerns,PT Consult for assist device competence         Medication Interventions: Evaluate medications/consider consulting pharmacy,Patient to call before getting OOB    Elimination Interventions: Call light in reach,Stay With Me (per policy),Toileting schedule/hourly rounds,Patient to call for help with toileting needs,Toilet paper/wipes in reach              Problem: Patient Education: Go to Patient Education Activity  Goal: Patient/Family Education  Outcome: Progressing Towards Goal

## 2022-06-17 NOTE — ROUTINE PROCESS
TRANSFER - OUT REPORT:    Verbal report given to [address] Saldana RN(name) on Tatum Dupont  being transferred to [facility](unit) for routine progression of care       Report consisted of patients Situation, Background, Assessment and   Recommendations(SBAR). Information from the following report(s) SBAR, Kardex, Intake/Output, MAR and Recent Results was reviewed with the receiving nurse. Lines:   Peripheral IV 06/17/22 Left Antecubital (Active)   Site Assessment Clean, dry, & intact 06/17/22 1247   Phlebitis Assessment 0 06/17/22 1247   Infiltration Assessment 0 06/17/22 1247   Dressing Status Clean, dry, & intact 06/17/22 1247   Hub Color/Line Status Pink;Capped;Flushed 06/17/22 1247   Action Taken Blood drawn 06/17/22 1247        Opportunity for questions and clarification was provided.       Patient transported with:   Registered Nurse

## 2022-06-17 NOTE — WOUND CARE
WOCN Note:     New consult placed by Dr. Eladio Nyhan for wound. Chart shows:  Admitted on 6/17/22 from home. Admitted for weakness & CVA. Assessment:   Appropriately conversational and requires some assist in repositioning onto right side. She reports being mobile prior to today's weakness. Reports no pain. Wearing briefs and has a Pure Wick. Surface: stretcher - seen in ER    Bilateral heels intact with thickly encrusted skin plaques. Heels offloaded with pillows. Generalized edema to lower legs but more so in left. Thick, dry skin plaques to bilateral lower legs. No open wounds noted on legs. 1. POA left ischial evolving deep tissue injury  2.2 x 4.3 x 0.1 cm  100% non-blanching burgundy/purple with epithelial tissue loss  no exudate   Tx: covered with foam dressing     2. POA gluteal cleft and sacrum  Mixed injury of pressure and moisture  Linear split in gluteal cleft with MASD surrounding it    Wound Recommendations:    Lower legs: lac-hydrin daily  Left ischial and sacrum: venelex twice daily and cover with foam dressing; change dressing as needed    PI Prevention:  Turn/reposition approximately every 2 hours  Offload heels with heels hanging off end of pillow at all times while in bed. Sacral Foam dressing: lift to assess regularly; change as needed. Discontinue if incontinence is frequently soiling dressing. Low Air Loss mattress: Use only flat sheet and one incontinence pad. Please call Aryan @ [phone] for bed to be picked up at discharge.      Transition of Care: Plan to follow weekly and as needed while admitted to hospital.     MANPREET Aviles, RN, [facility]  Certified Wound, Ostomy, Continence Nurse  office [phone]  Available via [employer]

## 2022-06-17 NOTE — ED PROVIDER NOTES
Charles is a 70-year-old female without any prior history of stroke who has a history of hypertension. She states that she was doing okay yesterday and last known well was about 10:00 when she went to bed. When she got up this morning she noted she was extremely weak on the left side. Normally she can get up and walk around but was unable to do so this morning because of this weakness. She states that she still weak. There is no headache, no history of any fever or chill, no nausea or vomiting and no other acute symptomatology. She has been taking her medications as directed. He has chronically swollen lower extremities. She states her skin is gotten extremely dry recently. She has no shortness of breath, nausea or vomiting and no other GI or  symptoms. Past Medical History:   Diagnosis Date    History of mammogram 2010    normal    Hypertension     Pap smear for cervical cancer screening 2011    normal       Past Surgical History:   Procedure Laterality Date    HX GYN      hystterectomy    CA COMPRE EP EVAL ABLTJ ATR FIB PULM VEIN ISOLATION N/A 11/30/2020    ABLATION A-FIB  W COMPLETE EP STUDY performed by Melly Herron MD at [facility] CARDIAC CATH LAB    CA ICAR CATHETER ABLATION ARRHYTHMIA ADD ON N/A 11/30/2020    Ablation Svt/Vt Add On performed by Melly Herron MD at [facility] CARDIAC CATH LAB    CA INTRACARD ECHO, THER/DX INTERVENT N/A 11/30/2020    Intracardiac Echocardiogram performed by Melly Herron MD at [facility] CARDIAC CATH LAB    CA INTRACARDIAC ELECTROPHYSIOLOGIC 3D MAPPING N/A 11/30/2020    Ep 3d Mapping performed by Melly Herron MD at [facility] CARDIAC CATH LAB    CA STIM/PACING HEART POST IV DRUG INFU N/A 11/30/2020    Drug Stimulation performed by [facility]ly [facility]on MD at [facility] LAB         No family history on file.     Social History     Socioeconomic History    Marital status:      Spouse name: Not on file    Number of children: Not on file  Years of education: Not on file    Highest education level: Not on file   Occupational History    Not on file   Tobacco Use    Smoking status: Never Smoker    Smokeless tobacco: Never Used   Substance and Sexual Activity    Alcohol use: Yes     Comment: occasional    Drug use: No    Sexual activity: Not on file   Other Topics Concern    Not on file   Social History Narrative    Not on file     Social Determinants of Health     Financial Resource Strain:     Difficulty of Paying Living Expenses: Not on file   Food Insecurity:     Worried About Running Out of Food in the Last Year: Not on file    Wild of Food in the Last Year: Not on file   Transportation Needs:     Lack of Transportation (Medical): Not on file    Lack of Transportation (Non-Medical): Not on file   Physical Activity:     Days of Exercise per Week: Not on file    Minutes of Exercise per Session: Not on file   Stress:     Feeling of Stress : Not on file   Social Connections:     Frequency of Communication with Friends and Family: Not on file    Frequency of Social Gatherings with Friends and Family: Not on file    Attends Sabianist Services: Not on file    Active Member of [address] [place] [address] or Organizations: Not on file    Attends Club or Organization Meetings: Not on file    Marital Status: Not on file   Intimate Partner Violence:     Fear of Current or Ex-Partner: Not on file    Emotionally Abused: Not on file    Physically Abused: Not on file    Sexually Abused: Not on file   Housing Stability:     Unable to Pay for Housing in the Last Year: Not on file    Number of [place] in the Last Year: Not on file    Unstable Housing in the Last Year: Not on file         ALLERGIES: Codeine    Review of Systems   Constitutional: Negative for activity change, appetite change, fatigue and fever. HENT: Negative for ear pain, facial swelling, sore throat and trouble swallowing.     Eyes: Negative for pain, discharge and visual disturbance. Respiratory: Negative for chest tightness, shortness of breath and wheezing. Cardiovascular: Negative for chest pain and palpitations. Gastrointestinal: Negative for abdominal pain, blood in stool, nausea and vomiting. Genitourinary: Negative for difficulty urinating, flank pain and hematuria. Musculoskeletal: Negative for arthralgias, joint swelling, myalgias and neck pain. Skin: Negative for color change and rash. Neurological: Positive for weakness ( Left arm and leg). Negative for dizziness, numbness and headaches. Inability to walk due to weakness in the left arm and leg   Hematological: Negative for adenopathy. Does not bruise/bleed easily. Psychiatric/Behavioral: Negative for behavioral problems, confusion and sleep disturbance. All other systems reviewed and are negative. There were no vitals filed for this visit. Physical Exam  Vitals and nursing note reviewed. Constitutional:       General: She is not in acute distress. Appearance: She is well-developed. She is ill-appearing ( Chronic). Comments: This is a 70-year-old female who lives at home and apparently had some family there but no one was able to help her this morning. She presents with weakness in her left arm and leg and inability to walk. Vital signs as noted. HENT:      Head: Normocephalic and atraumatic. Right Ear: External ear normal.      Left Ear: External ear normal.      Nose: Nose normal.   Eyes:      General: No scleral icterus. Conjunctiva/sclera: Conjunctivae normal.      Pupils: Pupils are equal, round, and reactive to light. Neck:      Thyroid: No thyromegaly. Vascular: No JVD. Trachea: No tracheal deviation. Comments: No carotid bruits noted. Cardiovascular:      Rate and Rhythm: Normal rate and regular rhythm. Heart sounds: Normal heart sounds. No murmur heard. No friction rub. No gallop.     Pulmonary:      Effort: Pulmonary effort is normal. No respiratory distress. Breath sounds: Normal breath sounds. No wheezing or rales. Chest:      Chest wall: No tenderness. Abdominal:      General: Bowel sounds are normal. There is no distension. Palpations: Abdomen is soft. There is no mass. Tenderness: There is no abdominal tenderness. There is no guarding or rebound. Musculoskeletal:         General: Swelling present. No tenderness. Normal range of motion. Cervical back: Normal range of motion and neck supple. Right lower leg: Edema present. Left lower leg: Edema present. Comments: There is 3+ edema in both lower extremities including the ankle and feet. Skin is extremely dry and scaling. No pain is noted. There is some mild weakness in the left leg. Lymphadenopathy:      Cervical: No cervical adenopathy. Skin:     General: Skin is warm and dry. Capillary Refill: Capillary refill takes 2 to 3 seconds. Coloration: Skin is not jaundiced. Findings: No erythema or rash. Neurological:      Mental Status: She is alert and oriented to person, place, and time. Cranial Nerves: No cranial nerve deficit. Motor: Weakness ( Left leg with only mild weakness in the left arm.) present. Coordination: Coordination normal.      Deep Tendon Reflexes: Reflexes are normal and symmetric. Psychiatric:         Mood and Affect: Mood normal.         Behavior: Behavior normal.         Thought Content:  Thought content normal.         Judgment: Judgment normal.          MDM  Number of Diagnoses or Management Options  Acute left-sided weakness: new and requires workup     Amount and/or Complexity of Data Reviewed  Clinical lab tests: ordered and reviewed  Tests in the radiology section of CPT®: ordered and reviewed  Decide to obtain previous medical records or to obtain history from someone other than the patient: yes  Review and summarize past medical records: yes  Discuss the patient with other providers: yes  Independent visualization of images, tracings, or specimens: yes    Risk of Complications, Morbidity, and/or Mortality  Presenting problems: high  Diagnostic procedures: high  Management options: high    Patient Progress  Patient progress: stable         Procedures  This is a 70-year-old female who presents with complaint of left arm and leg weakness to the extent that she is having difficulty walking. Her last known well about 10:00 last night. She she presents with swelling in both lower extremities as well. A level 2 code stroke was called on her and she was evaluated by Dr. Nedra Martínez with telemetry neuro. He felt as though this patient should be admitted for further evaluation of stroke. She clearly has some peripheral edema and will need general medical evaluation. She is on Eliquis and he would like to keep her on that it is the blood thinner without aspirin being added at this time until she is seen by neurology here. Lab work appears to be unremarkable. ED MD EKG interpretation: There is a normal sinus rhythm at 64 beats a minute. No ectopy is noted. Right bundle branch block is noted. No ischemic changes appreciated. 1:41 PM    Perfect Serve Consult for Admission  1:42 PM    ED Room Number: ER12/12  Patient Name and age:  Alaina Hernandez 67 y.o.  female  Working Diagnosis:   1. Acute left-sided weakness        COVID-19 Suspicion:  no  Sepsis present:  no  Reassessment needed: no  Code Status:  Full Code  Readmission: no  Isolation Requirements:  no  Recommended Level of Care:  telemetry  Department:[facility] Adult ED - 21   Other:       Total critical care time spent exclusive of procedures:  40 minutes

## 2022-06-17 NOTE — H&P
[address] [facility] Adult  Hospitalist Group  History and Physical    Date of Service:  6/17/2022  Primary Care Provider: Freya Jiménez MD  Source of information: The patient    Chief Complaint: Extremity Weakness      History of Presenting Illness:   Gray Lyn is a 67 y.o. female who presents with left side weakness   Gray Lyn is a 67 y.o. female who presents to [facility] with complaints of increasing swelling and weakness on her left side since yesterday evening. More weaker in her left leg. History is rather limited though patient says she was in her usual state of health when she slipped yesterday noted having more trouble getting up. This morning says she really can get up and using her left leg where she notes more prominent swelling. She then came into the ER as a stroke alert CT CTA were obtained without any significant findings other than concern for malignancy as well as a possible pulmonary embolism. Neurology consultation was then requested. Patient is on Eliquis for A. fib says she is compliant with her meds. Does not really endorse much weakness. Seems indicate that it similar to when she was in the hospital in January with weakness for 2 weeks with a negative MRI. Presumably went back to normal in the interval.    Normally she can get up and walk around but was unable to do so this morning because of this weakness. She states that she still weak. There is no headache, no history of any fever or chill, no nausea or vomiting and no other acute symptomatology. Has chronically swollen lower extremities. She states her skin is gotten extremely dry recently. She has no shortness of breath, nausea or vomiting and no other GI or  symptoms. REVIEW OF SYSTEMS:  General: HPI, no changes of appetite  HEENT: no headache, no vision changes, no nose discharge, no hearing changes   RES: no wheezing, no cough, no sob  CVS: no cp, no palpitation. Muscular: HPI. Skin: no rash, no itching   GI: no vomiting, no diarrhea  : no dysuria, no hematuria  Hemo: no gum bleeding, no petechial   Neuro: HPI no polydipsia   Endo:   Psych: denied depression     Past Medical History:   Diagnosis Date    History of mammogram 2010    normal    Hypertension     Pap smear for cervical cancer screening 2011    normal      Past Surgical History:   Procedure Laterality Date    HX GYN      hystterectomy    NM COMPRE EP EVAL ABLTJ ATR FIB PULM VEIN ISOLATION N/A 11/30/2020    ABLATION A-FIB  W COMPLETE EP STUDY performed by Tanner Marc MD at [facility]"" CARDIAC CATH LAB    NM ICAR CATHETER ABLATION ARRHYTHMIA ADD ON N/A 11/30/2020    Ablation Svt/Vt Add On performed by Tanner Marc MD at [facility] CARDIAC CATH LAB    NM INTRACARD ECHO, THER/DX INTERVENT N/A 11/30/2020    Intracardiac Echocardiogram performed by Tanner Marc MD at \[facility]"" CARDIAC CATH LAB    NM INTRACARDIAC ELECTROPHYSIOLOGIC 3D MAPPING N/A 11/30/2020    Ep 3d Mapping performed by Tanner Marc MD at \[facility]"" CARDIAC CATH LAB    NM STIM/PACING HEART POST IV DRUG INFU N/A 11/30/2020    Drug Stimulation performed by Tanner Marc MD at [facility]     Prior to Admission medications    Medication Sig Start Date End Date Taking? Authorizing Provider   hydroCHLOROthiazide (HYDRODIURIL) 25 mg tablet Take 1 Tablet by mouth daily. 5/20/22   Maximino Cordero MD   amLODIPine (NORVASC) 5 mg tablet Take 1 Tablet by mouth daily. 5/20/22   Maximino Cordero MD   amiodarone (Pacerone) 200 mg tablet Take 1 Tablet by mouth daily. 5/20/22   Maximino Cordero MD   apixaban (Eliquis) 5 mg tablet Take 1 tablet by mouth twice daily 5/20/22   Maximino Cordero MD   diclofenac EC (VOLTAREN) 75 mg EC tablet Take 1 Tablet by mouth two (2) times a day. 5/20/22   Maximino Cordero MD   megestroL (MEGACE) 20 mg tablet Take 1 Tablet by mouth daily.  5/20/22   Maximino Cordero MD   ammonium lactate (LAC-HYDRIN) 12 % lotion  1/25/22   Provider, Historical   acetaminophen (Tylenol Arthritis Pain) 650 mg TbER Take 650 mg by mouth nightly. Provider, Historical   ZINC PO Take  by mouth daily. Provider, Historical   cholecalciferol (VITAMIN D3) (1000 Units /25 mcg) tablet Take 1 Tab by mouth daily. 12/2/20   Fabienne Misty, ISIDORO   cyanocobalamin 1,000 mcg tablet Take 1 Tab by mouth daily. 12/2/20   Fabiennekory Jay NP     Allergies   Allergen Reactions    Codeine Itching and Swelling      No family history on file. + HTN parents. Social History:  reports that she has never smoked. She has never used smokeless tobacco. She reports current alcohol use. She reports that she does not use drugs. Family and social history were personally reviewed, all pertinent and relevant details are outlined as above. Objective:     Visit Vitals  BP (!) 155/84   Pulse 77   Temp 98.3 °F (36.8 °C)   Resp 17   SpO2 94%      O2 Device: None (Room air)    PHYSICAL EXAM:   General: Alert x oriented x 3, awake, no acute distress, resting in bed   HEENT: PEERL, EOMI, moist mucus membranes  Neck: Supple, no JVD, no meningeal signs  Chest: Clear to auscultation bilaterally   CVS: RRR, S1 S2 heard, no murmurs/rubs/gallops  Abd: Soft, non-tender, non-distended, +bowel sounds   Ext: No clubbing, ROM limited by her arthritis. Severe skin changes and poor feet nail noticed both lower legs and swelling. Neuro/Psych: Pleasant mood and affect, CN 2-12 grossly intact, sensory grossly within normal limit,left leg 4-/5, left arm 5-/5. Right ext 5-/5. Cap refill: Brisk, less than 3 seconds  Pulses: 2+, symmetric in all extremities  Skin: Warm, dry, without rashes or lesions    Data Review: All diagnostic labs and studies have been reviewed.     Abnormal Labs Reviewed   METABOLIC PANEL, COMPREHENSIVE - Abnormal; Notable for the following components:       Result Value    BUN 22 (*)     Creatinine 1.15 (*)     GFR est AA 56 (*)     GFR est non-AA 46 (*)     AST (SGOT) 86 (*)     A-G Ratio 0.9 (*)     All other components within normal limits   CBC WITH AUTOMATED DIFF - Abnormal; Notable for the following components:    RDW 14.6 (*)     NEUTROPHILS 77 (*)     All other components within normal limits   URINALYSIS W/ RFLX MICROSCOPIC - Abnormal; Notable for the following components:    Protein 30 (*)     Ketone 15 (*)     Blood MODERATE (*)     Mucus 1+ (*)     All other components within normal limits   PROTHROMBIN TIME + INR - Abnormal; Notable for the following components:    Prothrombin time 11.7 (*)     All other components within normal limits       All Micro Results     Procedure Component Value Units Date/Time    URINE CULTURE HOLD SAMPLE [[id number]] Collected: 06/17/22 1416    Order Status: Completed Specimen: Serum Updated: 06/17/22 1419     Urine culture hold       Urine on hold in Microbiology dept for 2 days. If unpreserved urine is submitted, it cannot be used for addtional testing after 24 hours, recollection will be required. IMAGING:   CTA CODE NEURO HEAD AND NECK W CONT   Final Result   CTA Head:   1. No evidence of flow-limiting stenosis or aneurysm. Scattered atherosclerotic   disease as above. CTA Neck:   1. No evidence of flow-limiting stenosis. 2. Moderate stenosis at the origin of the nondominant right vertebral artery. 3. Partially visualized small acute pulmonary embolus in the distal right main   pulmonary artery. 4. 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 ENT consultation   is recommended. 5. Multinodular thyroid gland. CT Brain Perfusion:   1. No acute abnormality.       Attempt was made to call the ER on 6/17/2022 1:59 PM by Norman Day MD. I was told the ER physician was busy in another room. [facility]   Final Result   CTA Head:   1. No evidence of flow-limiting stenosis or aneurysm. Scattered atherosclerotic   disease as above. CTA Neck:   1. No evidence of flow-limiting stenosis. 2. Moderate stenosis at the origin of the nondominant right vertebral artery. 3. Partially visualized small acute pulmonary embolus in the distal right main   pulmonary artery. 4. 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 ENT consultation   is recommended. 5. Multinodular thyroid gland. CT Brain Perfusion:   1. No acute abnormality. Attempt was made to call the ER on 6/17/2022 1:59 PM by Sharif Jose MD. I was   told the ER physician was busy in another room. [phone]         XR CHEST PORT   Final Result      No acute process. CT CODE NEURO HEAD WO CONTRAST   Final Result   No acute intracranial abnormality.           MRI BRAIN WO CONT    (Results Pending)        ECG/ECHO:    Results for orders placed or performed during the hospital encounter of 06/17/22   EKG, 12 LEAD, INITIAL   Result Value Ref Range    Ventricular Rate 64 BPM    Atrial Rate 64 BPM    P-R Interval 138 ms    QRS Duration 150 ms    Q-T Interval 484 ms    QTC Calculation (Bezet) 499 ms    Calculated P Axis 18 degrees    Calculated R Axis -51 degrees    Calculated T Axis 20 degrees    Diagnosis       Normal sinus rhythm  Left axis deviation  Right bundle branch block  Abnormal ECG  No previous ECGs available  Confirmed by Gina Powell ([id number]) on 6/17/2022 3:45:20 PM          Assessment:   Given the patient's current clinical presentation, there is a high level of concern for decompensation if discharged from the emergency department. Complex decision making was performed, which includes reviewing the patient's available past medical records, laboratory results, and imaging studies. Active Problems:    CVA (cerebral vascular accident) ([address] (6/17/2022)      Plan:     1. Left side weakness: neuro consulted, stroke work up including MRI, echo. A1c, flp. Continue telemonitor  2. chornic a fib: continue amiodarone, eliquis   3. Chronic leg lymphedema/skin changes: wound care , check venous doppler   4. Left  left tonsillar mass: concerning malignancy. Consider ENT evaluation. 5. Acute PE: CTA of neck reported: Partially visualized small acute pulmonary embolus in the distal right main pulmonary artery. Continue eliquis. Follow echo   6. Multinodular thyroid gland: check TSH       DIET: ADULT DIET Regular   ISOLATION PRECAUTIONS: There are currently no Active Isolations  CODE STATUS: Full Code   DVT PROPHYLAXIS: Eliquis  FUNCTIONAL STATUS PRIOR TO HOSPITALIZATION: Capable of only limited self-care; confined to bed or chair likely more than 50% of waking hours. EARLY MOBILITY ASSESSMENT: Recommend an assessment from physical therapy and/or occupational therapy  ANTICIPATED DISCHARGE: Greater than 48 hours. EMERGENCY CONTACT/SURROGATE DECISION MAKER: pt makes her own decision     CRITICAL CARE WAS PERFORMED FOR THIS ENCOUNTER: NO.      Signed By: Adelaide Briggs MD     June 17, 2022         Please note that this dictation may have been completed with Sidra Cardona, the computer voice recognition software. Quite often unanticipated grammatical, syntax, homophones, and other interpretive errors are inadvertently transcribed by the computer software. Please disregard these errors. Please excuse any errors that have escaped final proofreading.

## 2022-06-17 NOTE — ROUTINE PROCESS
TRANSFER - OUT REPORT:    Verbal report given to [facility], RN(name) on Kt Wu  being transferred to NSTU(unit) for routine progression of care       Report consisted of patients Situation, Background, Assessment and   Recommendations(SBAR). Information from the following report(s) SBAR, Kardex, Intake/Output, MAR and Recent Results was reviewed with the receiving nurse. Lines:   Peripheral IV 06/17/22 Left Antecubital (Active)   Site Assessment Clean, dry, & intact 06/17/22 1247   Phlebitis Assessment 0 06/17/22 1247   Infiltration Assessment 0 06/17/22 1247   Dressing Status Clean, dry, & intact 06/17/22 1247   Hub Color/Line Status Pink;Capped;Flushed 06/17/22 1247   Action Taken Blood drawn 06/17/22 1247        Opportunity for questions and clarification was provided.       Patient transported with:   Registered Nurse

## 2022-06-17 NOTE — PROGRESS NOTES
Neurocritical Care Code Stroke Documentation      Symptoms:   Left sided weakness, lower extremity pitting edema, worse on left leg   Last Known Well: 10:00 pm   Medical hx: Past Medical History:   Diagnosis Date    History of mammogram 2010    normal    Hypertension     Pap smear for cervical cancer screening 2011    normal      Anticoagulation: Eliquis   VAN:   Negative   NIHSS:   1a-LOC:0    1b-Month/Age:0    1c-Open/Close Hand:0    2-Best Gaze:0    3-Visual Fields:0    4-Facial Palsy:0    5a-Left Arm:0    5b-Right Arm:0    6a-Left Le    6b-Right Le    7-Limb Ataxia:0    8-Sensory:1    9-Best Language:0    10-Dysarthria:0    11-Extinction/Inattention:0  TOTAL SCORE:2    Pre-stroke mRS: 4   Imaging:   CT- No acute intracranial abnormality    CTA-. No evidence of flow-limiting stenosis or aneurysm. Scattered atherosclerotic  disease    CTP- No perfusion defect   Plan:   TPA Candidate: NO    Mechanical thrombectomy Candidate: NO     *Perform dysphagia screening prior to any PO intake*    Discussed with: Dr. Beverly Barnhart, ER attending, Dr. Karen Fabian Teleneurology    Arrival time: 12:19  Time spent: 15 minutes.      Romel Schwartz NP  Neurocritical Care Nurse Practitioner  [phone]

## 2022-06-17 NOTE — CONSULTS
NEUROLOGY   INPATIENT EVALUATION/CONSULTATION       PATIENT NAME: Rebeka Arreola    MRN: [id number]    REASON FOR CONSULTATION: Left sided weakness    06/17/22      HISTORY OF PRESENT ILLNESS:  Rebeka Arreola is a 67 y.o. female who presents to [facility] with complaints of increasing swelling and weakness on her left side since yesterday evening. History is rather limited though patient says she was in her usual state of health when she slipped yesterday noted having more trouble getting up. This morning says she really can get up and using her left leg where she notes more prominent swelling. She then came into the ER as a stroke alert CT CTA were obtained without any significant findings other than concern for malignancy as well as a possible pulmonary embolism. Neurology consultation was then requested. Patient is on Eliquis for A. fib says she is compliant with her meds. Does not really endorse much weakness. Seems indicate that it similar to when she was in the hospital in January with weakness for 2 weeks with a negative MRI.   Presumably went back to normal in the interval.    PAST MEDICAL HISTORY:  Past Medical History:   Diagnosis Date    History of mammogram 2010    normal    Hypertension     Pap smear for cervical cancer screening 2011    normal       PAST SURGICAL HISTORY:  Past Surgical History:   Procedure Laterality Date    HX GYN      hystterectomy    DC COMPRE EP EVAL ABLTJ ATR FIB PULM VEIN ISOLATION N/A 11/30/2020    ABLATION A-FIB  W COMPLETE EP STUDY performed by Frank Watson MD at [facility]ATH LAB    DC ICAR CATHETER ABLATION ARRHYTHMIA ADD ON N/A 11/30/2020    Ablation Svt/Vt Add On performed by Frank Watson MD at [facility] C[facility]ATH [facility]    DC INTRACARD ECHO, THER/DX INTERVENT N/A 11/30/2020    Intracardiac Echocardiogram performed by Frank [facility] MD at [facility] CARDIAC CATH [facility]    DC INTRACARDIAC ELECTROPHYSIOLOGIC 3D MAPPING N/A 11/30/2020    Ep 3d Mapping performed by Edel Pittman MD at [facility] CARDIAC CATH LAB    [place] STIM/PACING HEART POST IV DRUG INFU N/A 11/30/2020    Drug Stimulation performed by Edel Pittman MD at [facility] CARDIAC CATH LAB       FAMILY HISTORY:   No family history on file. SOCIAL HISTORY:  Social History     Socioeconomic History    Marital status:    Tobacco Use    Smoking status: Never Smoker    Smokeless tobacco: Never Used   Substance and Sexual Activity    Alcohol use: Yes     Comment: occasional    Drug use: No         MEDICATIONS:   Current Facility-Administered Medications   Medication Dose Route Frequency Provider Last Rate Last Admin    acetaminophen (TYLENOL) tablet 650 mg  650 mg Oral Q4H PRN Fatimah Cheema MD        Or   Batsheva Fitting acetaminophen (TYLENOL) solution 650 mg  650 mg Per NG tube Q4H PRN Fatimah Cheema MD        Or   Batsheva Fitting acetaminophen (TYLENOL) suppository 650 mg  650 mg Rectal Q4H PRN Fatimah Cheema MD        labetaloL (NORMODYNE;TRANDATE) injection 5 mg  5 mg IntraVENous Q10MIN PRN Fatimah Cheema MD        [START ON 6/18/2022] amiodarone (CORDARONE) tablet 200 mg  200 mg Oral DAILY Fatimah Cheema MD        apixaban Cherylin Alto) tablet 5 mg  5 mg Oral Q12H MD Batsheva Montoya Fitting Paramjit Lint ON 6/18/2022] amLODIPine (NORVASC) tablet 5 mg  5 mg Oral DAILY MD Batsheva Montoya Fitting [START ON 6/18/2022] cholecalciferol (VITAMIN D3) (1000 Units /25 mcg) tablet 1,000 Units  1,000 Units Oral DAILY MD Batsheva Montoya Fitting [START ON 6/18/2022] cyanocobalamin (VITAMIN B12) tablet 1,000 mcg  1,000 mcg Oral DAILY Fatimah Cheema MD        traMADoL Jessica Stade) tablet 50 mg  50 mg Oral Q6H PRN MD Batsheva Montoya Fitting [START ON 6/18/2022] megestroL (MEGACE) tablet 20 mg  20 mg Oral DAILY Fatimah Cheema MD         Current Outpatient Medications   Medication Sig Dispense Refill    hydroCHLOROthiazide (HYDRODIURIL) 25 mg tablet Take 1 Tablet by mouth daily. 90 Tablet 3    amLODIPine (NORVASC) 5 mg tablet Take 1 Tablet by mouth daily.  90 Tablet 3    amiodarone (Pacerone) 200 mg tablet Take 1 Tablet by mouth daily. 90 Tablet 3    apixaban (Eliquis) 5 mg tablet Take 1 tablet by mouth twice daily 180 Tablet 3    diclofenac EC (VOLTAREN) 75 mg EC tablet Take 1 Tablet by mouth two (2) times a day. 60 Tablet 3    megestroL (MEGACE) 20 mg tablet Take 1 Tablet by mouth daily. 30 Tablet 3    ammonium lactate (LAC-HYDRIN) 12 % lotion       acetaminophen (Tylenol Arthritis Pain) 650 mg TbER Take 650 mg by mouth nightly.  ZINC PO Take  by mouth daily.  cholecalciferol (VITAMIN D3) (1000 Units /25 mcg) tablet Take 1 Tab by mouth daily. 30 Tab 0    cyanocobalamin 1,000 mcg tablet Take 1 Tab by mouth daily. 30 Tab 0         ALLERGIES:  Allergies   Allergen Reactions    Codeine Itching and Swelling         REVIEW OF SYSTEMS:  10 point ROS reviewed with patient and negative except for those listed above. PHYSICAL EXAM:  Vital Signs:   Visit Vitals  BP (!) 155/84   Pulse 77   Temp 98.3 °F (36.8 °C)   Resp 17   SpO2 94%     Elderly female resting in bed appearing somewhat withdrawn. HEENT is grossly unremarkable neck appears supple. Cardiopulmonary exams are unrevealing. Abdomen is nontender/nondistended. Lower extremities are grossly edematous left greater than right with chronic skin changes and grossly misshapened nails. Neurologically, patient appears alert and oriented attention is intact. Speech is clear, language fluent. Cranial nerves II through XII are grossly unrevealing. Motorically patient has adhesive capsulitis of the right shoulder limiting mobility there. Left arm is grossly 4 to 4+ out of 5 approximately there is no pronation or finger flexion noted though arm does drift (suggestive of rotator cuff pathology). Upper extremity appears strong distally. Lower extremities right iliopsoas is 4-/5 left is 3+ out of 5 where swelling is more prominent. Remainder examination of lower extremities is deferred.   Sensation appears grossly intact in upper extremities. Coordination is intact upper extremities. Remainder examination is deferred. PERTINENT DATA:    CT Results (maximum last 3): Results from Hospital Encounter encounter on 06/17/22    CT CODE NEURO PERF W CBF    Narrative  EXAM:  CTA CODE NEURO HEAD AND NECK W CONT, CT CODE NEURO PERF W CBF    INDICATION:   left sided weakness, code S 2    COMPARISON:  CT head 6/17/2022, MRI brain 1/12/2022. CONTRAST:  140 mL of Isovue-370. TECHNIQUE:  Unenhanced  images were obtained to localize the volume for  acquisition. Multislice helical axial CT angiography was performed from the  aortic arch to the top of the head during uneventful rapid bolus intravenous  contrast administration. Coronal and sagittal reformations and 3D post  processing was performed. CT dose reduction was achieved through use of a  standardized protocol tailored for this examination and automatic exposure  control for dose modulation. CT brain perfusion was performed with generation of hemodynamic maps of multiple  parameters, including cerebral blood flow, cerebral blood volume, and MTT (mean  transit time). CT dose reduction was achieved through use of a standardized  protocol tailored for this examination and automatic exposure control for dose  modulation. This study was analyzed by the [address]  algorithm. FINDINGS:    CTA Head:  There is no evidence of large vessel occlusion or flow-limiting stenosis of the  intracranial internal carotid, anterior cerebral, and middle cerebral arteries. Calcification of the bilateral carotid siphons without significant stenosis. The  anterior communicating artery is patent, with small right A1 segment. There is no evidence of large vessel occlusion or flow-limiting stenosis of the  intracranial vertebral arteries, basilar artery, or posterior cerebral arteries. Multifocal mild to moderate stenoses of the basilar artery.  Fetal origin of the  bilateral posterior cerebral arteries. There is no evidence of aneurysm or vascular malformation. The dural venous  sinuses and deep cerebral venous system are patent. No evidence of abnormal  enhancement on delayed phase images. CTA NECK:  NASCET method was utilized for calculating stenosis. The aortic arch is unremarkable. The common carotid arteries demonstrate no  significant stenosis. There is no evidence of significant stenosis in the  cervical right internal carotid artery. There is no evidence of significant  stenosis in the cervical left internal carotid artery. There is a left dominant vertebrobasilar arterial system. Moderate stenosis at  the origin of the right vertebral artery from calcified plaque. No significant  stenosis of the cervical left vertebral artery. Partially visualized small acute pulmonary embolus in the distal right main  pulmonary artery (series 3 image 2). Multinodular thyroid gland. There is a heterogeneously enhancing lesion in the  left tonsillar fossa extending into the glossotonsillar sulcus measuring 1.7 x  0.9 cm (series 3 image 75). There is a confluent, centrally necrotic left level  2A lymph node measuring 2.1 x 2.1 x 3.4 cm (series 3 image 69 and series 603  image 184). Visualized lung apices are clear. No acute fracture or aggressive  osseous lesion. CT Brain Perfusion:  There are no regional areas of elevated Tmax, decreased cerebral blood flow or  blood volume. rCBF < 30% = 0 cc. Tmax > 6 seconds = 0 cc. Impression  CTA Head:  1. No evidence of flow-limiting stenosis or aneurysm. Scattered atherosclerotic  disease as above. CTA Neck:  1. No evidence of flow-limiting stenosis. 2. Moderate stenosis at the origin of the nondominant right vertebral artery. 3. Partially visualized small acute pulmonary embolus in the distal right main  pulmonary artery.   4. 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 Large centrally necrotic left level 2A lymph node  measuring 2.1 x 2.1 x 3.4 cm, consistent with jarrell metastasis. ENT consultation  is recommended. 5. Multinodular thyroid gland. CT Brain Perfusion:  1. No acute abnormality. Attempt was made to call the ER on 6/17/2022 1:59 PM by Shira Reyes MD. I was  told the ER physician was busy in another room. [address] CONT    Narrative  EXAM:  CTA CODE NEURO HEAD AND NECK W CONT, CT CODE NEURO PERF W CBF    INDICATION:   left sided weakness, code S 2    COMPARISON:  CT head 6/17/2022, MRI brain 1/12/2022. CONTRAST:  140 mL of Isovue-370. TECHNIQUE:  Unenhanced  images were obtained to localize the volume for  acquisition. Multislice helical axial CT angiography was performed from the  aortic arch to the top of the head during uneventful rapid bolus intravenous  contrast administration. Coronal and sagittal reformations and 3D post  processing was performed. CT dose reduction was achieved through use of a  standardized protocol tailored for this examination and automatic exposure  control for dose modulation. CT brain perfusion was performed with generation of hemodynamic maps of multiple  parameters, including cerebral blood flow, cerebral blood volume, and MTT (mean  transit time). CT dose reduction was achieved through use of a standardized  protocol tailored for this examination and automatic exposure control for dose  modulation. This study was analyzed by the [address]  algorithm. FINDINGS:    CTA Head:  There is no evidence of large vessel occlusion or flow-limiting stenosis of the  intracranial internal carotid, anterior cerebral, and middle cerebral arteries. Calcification of the bilateral carotid siphons without significant stenosis. The  anterior communicating artery is patent, with small right A1 segment.     There is no evidence of large vessel occlusion or flow-limiting stenosis of the  intracranial vertebral arteries, basilar artery, or posterior cerebral arteries. Multifocal mild to moderate stenoses of the basilar artery. Fetal origin of the  bilateral posterior cerebral arteries. There is no evidence of aneurysm or vascular malformation. The dural venous  sinuses and deep cerebral venous system are patent. No evidence of abnormal  enhancement on delayed phase images. CTA NECK:  NASCET method was utilized for calculating stenosis. The aortic arch is unremarkable. The common carotid arteries demonstrate no  significant stenosis. There is no evidence of significant stenosis in the  cervical right internal carotid artery. There is no evidence of significant  stenosis in the cervical left internal carotid artery. There is a left dominant vertebrobasilar arterial system. Moderate stenosis at  the origin of the right vertebral artery from calcified plaque. No significant  stenosis of the cervical left vertebral artery. Partially visualized small acute pulmonary embolus in the distal right main  pulmonary artery (series 3 image 2). Multinodular thyroid gland. There is a heterogeneously enhancing lesion in the  left tonsillar fossa extending into the glossotonsillar sulcus measuring 1.7 x  0.9 cm (series 3 image 75). There is a confluent, centrally necrotic left level  2A lymph node measuring 2.1 x 2.1 x 3.4 cm (series 3 image 69 and series 603  image 184). Visualized lung apices are clear. No acute fracture or aggressive  osseous lesion. CT Brain Perfusion:  There are no regional areas of elevated Tmax, decreased cerebral blood flow or  blood volume. rCBF < 30% = 0 cc. Tmax > 6 seconds = 0 cc. Impression  CTA Head:  1. No evidence of flow-limiting stenosis or aneurysm. Scattered atherosclerotic  disease as above. CTA Neck:  1. No evidence of flow-limiting stenosis.   2. Moderate stenosis at the origin of the nondominant right vertebral artery. 3. Partially visualized small acute pulmonary embolus in the distal right main  pulmonary artery. 4. 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 Large centrally necrotic left level 2A lymph node  measuring 2.1 x 2.1 x 3.4 cm, consistent with jarrell metastasis. ENT consultation  is recommended. 5. Multinodular thyroid gland. CT Brain Perfusion:  1. No acute abnormality. Attempt was made to call the ER on 6/17/2022 1:59 PM by Shira Reyes MD. I was  told the ER physician was busy in another room. 1      CT CODE NEURO HEAD WO CONTRAST    Narrative  EXAM:  CT CODE NEURO HEAD WO CONTRAST    INDICATION: Left-sided weakness    COMPARISON: MRI 1/12/2022. CT 1/11/2022. TECHNIQUE: Axial noncontrast head CT from foramen magnum to vertex. Coronal and  sagittal reformatted images were obtained. CT dose reduction was achieved  through use of a standardized protocol tailored for this examination and  automatic exposure control for dose modulation. Adaptive statistical iterative  reconstruction (ASIR) was utilized. FINDINGS:  There is diffuse age-related parenchymal volume loss. The ventricles  and sulci are age-appropriate without hydrocephalus. There is no mass effect or  midline shift. There is no intracranial hemorrhage or extra-axial fluid  collection. Scattered foci of low attenuation in the periventricular white  matter represent stable chronic microvascular ischemic changes. The gray-white  matter differentiation is maintained. The basal cisterns are patent. The osseous structures are intact. The visualized paranasal sinuses and mastoid  air cells are clear. Impression  No acute intracranial abnormality. MRI Results (maximum last 3):   Results from [place] encounter on 01/11/22    MRA BRAIN WO CONT    Narrative  Clinical history: Suspected acute CVA  INDICATION:   Suspected acute CVA  COMPARISON: None  TECHNIQUE:  3-D time-of-flight MRA of the brain was performed. Multiplanar  reconstructions were obtained. FINDINGS:  The left vertebral artery slightly larger than right vertebral artery. There is  severe stenosis of the mid basilar artery. Persistent fetal circulation to the  right PCA. Left sided posterior to indicating artery. Petrous and cavernous ICAs  are patent. M1 segments are patent. Mild multifocal M1 segment stenosis on the  right. M2 segments demonstrate symmetric arborization. A2 and A3 segments are  within normal limits. Hypoplastic A1 on the right. . The internal carotid,  anterior cerebral, and middle cerebral arteries are patent. . There is no  aneurysm. .    Impression  Moderate to severe stenosis proximal basilar artery. Mild stenosis distal  basilar artery    Otherwise no dissection or stenosis. MRI BRAIN WO CONT    Narrative  CLINICAL HISTORY: Suspected CVA. INDICATION: Suspected CVA. COMPARISON: CT 1/11/2022    TECHNIQUE: MR examination of the brain includes axial and sagittal T1, coronal  T2, axial T2, axial FLAIR, axial gradient echo, axial DWI. CONTRAST: None    FINDINGS:  There is no intracranial mass, hemorrhage or evidence of acute infarction. Confluent periventricular and scattered foci of increased T2 signal intensity  and corona radiata and centrum semiovale. There is sulcal and ventricular  prominence. IACs are symmetric in size. Chronic focus of hemosiderin deposition  in the left basal ganglia. The brain architecture is normal. There is no evidence of midline shift or  mass-effect. There are no extra-axial fluid collections. There is no Chiari or  syrinx. The pituitary and infundibulum are grossly unremarkable. There is no  skull base mass. Cerebellopontine angles are grossly unremarkable. The major  intracranial vascular flow-voids are unremarkable. The cavernous sinuses are  symmetric.  Optic chiasm and infundibulum grossly unremarkable. Orbits are  grossly symmetric. Dural venous sinuses are grossly patent. The mastoid air cells are well pneumatized and clear. Impression  Mild to moderate chronic microvascular ischemic change and mild cerebral  atrophy. There is no intracranial mass, hemorrhage or evidence of acute infarction. No acute intracranial process is demonstrated. ASSESSMENT:      Karl Beckford is a 70-year-old right-hand-dominant female presents to [facility] with complaints of left-sided weakness mostly in the leg starting last night into this morning.     RECOMMENDATIONS:  Left-sided weakness (?):  Patient is a vague historian, reports having prior episode of left-sided weakness which they thought was a stroke but proven not to be (this is confirmed reviewing chart from January 2022)    Furthermore her function her left arm is better than her right arm there is drift without pronation or finger flexion in the legs there is slight asymmetry in mobility with left leg with more notable swelling    As such reasonable to obtain MRI but would not pursue idea of stroke further until that point (I have canceled echo and aspirin as stroke is not a clear diagnosis)    Do not entertain any other neurologic causes of her slight left-sided weakness can get appears largely mechanical in the leg where swelling is asymmetric    Did briefly discuss findings of CT with patient in terms of concern for malignancy recommend consideration of ENT evaluation per primary team    Would maintain blood pressure less than 165/95, await results of MRI and call neurology if significant    Otherwise no further recommendations at this time    Defer management of acute pulmonary embolus noted on CTA to primary team not clearly symptomatic      Torey Khan MD

## 2022-06-17 NOTE — PROGRESS NOTES
Care Management Interventions  PCP Verified by CM:  Yes  Last Visit to PCP: 05/20/22  Palliative Care Criteria Met (RRAT>21 & CHF Dx)?: No  Transition of Care Consult (CM Consult): Discharge Planning  MyChart Signup: Yes  Discharge Durable Medical Equipment: No  Health Maintenance Reviewed: Yes  Physical Therapy Consult: Yes  Occupational Therapy Consult: Yes  Speech Therapy Consult: Yes  Support Systems: Other Family Member(s),Friend/Neighbor  [employer] Information Provided?: No  Discharge Location  Patient Expects to be Discharged to[de-identified]  (TBD)

## 2022-06-17 NOTE — PROGRESS NOTES
Reason for Admission:  Left Side Weakness                      RUR Score:    12%                 Plan for utilizing home health: Previous provider Ramez Gallego        PCP: First and Last name:  Judie Borrero MD     Name of Practice:    Are you a current patient: Yes/No: yes   Approximate date of last visit: 5/20/22   Can you participate in a virtual visit with your PCP:                     Current Advanced Directive/Advance Care Plan: Full Code / AMD on file      Healthcare Decision Maker:   Click here to [address] including selection of the Healthcare Decision Maker Relationship (ie \"Primary\")             Primary Decision Maker: Francia Zamudio - [phone]    Secondary Decision Maker: Guille Kowalski - [phone]                  Transition of Care Plan:     CM consult received and appreciated. EMR reviewed. Met w/patient and introduced to role of CM. Patient is alert and provided history. Ms. Zayra Warren lives home alone and has supportive friend/ family. Grandchildren Jos Paz (transport to MD appointments) and Aleta Mike provide assistance. Patient states has been using a grocery roro. Informed had received HH/PT from [place] until April - [facility] services at 4 week intervals will need new order. Susan Brooks is SW for [place] and has been assisting w/ processes for an Aide. Noted Medicaid secondary.  eating dinner at time of CM visit is uncertain if updated numbers on file for grandchildren however states can look up in her phone and provide. [facility]/ local Pharmacy. No transitional care needs verbalized at this time. CM Department will continue to follow. Medicare pt has received, reviewed, and signed 1st IM letter informing them of their right to appeal the discharge. Signed copy has been placed on pt bedside chart.

## 2022-06-17 NOTE — ED PROVIDER NOTES
Charles is a 70-year-old female without any prior history of stroke who has a history of hypertension. She states that she was doing okay yesterday and last known well was about 10:00 when she went to bed. When she got up this morning she noted she was extremely weak on the left side. Normally she can get up and walk around but was unable to do so this morning because of this weakness. She states that she still weak. There is no headache, no history of any fever or chill, no nausea or vomiting and no other acute symptomatology. She has been taking her medications as directed. He has chronically swollen lower extremities. She states her skin is gotten extremely dry recently. She has no shortness of breath, nausea or vomiting and no other GI or  symptoms. Past Medical History:   Diagnosis Date    History of mammogram 2010    normal    Hypertension     Pap smear for cervical cancer screening 2011    normal       Past Surgical History:   Procedure Laterality Date    HX GYN      hystterectomy    CA COMPRE EP EVAL ABLTJ ATR FIB PULM VEIN ISOLATION N/A 11/30/2020    ABLATION A-FIB  W COMPLETE EP STUDY performed by Aj Cabrera MD at [facility] CARDIAC CATH LAB    CA ICAR CATHETER ABLATION ARRHYTHMIA ADD ON N/A 11/30/2020    Ablation Svt/Vt Add On performed by Aj Cabrera MD at [facility] CARDIAC CATH LAB    CA INTRACARD ECHO, THER/DX INTERVENT N/A 11/30/2020    Intracardiac Echocardiogram performed by Aj Cabrera MD at [facility] CARDIAC CATH LAB    CA INTRACARDIAC ELECTROPHYSIOLOGIC 3D MAPPING N/A 11/30/2020    Ep 3d Mapping performed by Aj Cabrera MD at [facility] CARDIAC CATH LAB    CA STIM/PACING HEART POST IV DRUG INFU N/A 11/30/2020    Drug Stimulation performed by Aj [facility]rera MD at [facility] LAB         No family history on file.     Social History     Socioeconomic History    Marital status:      Spouse name: Not on file    Number of children: Not on file  Years of education: Not on file    Highest education level: Not on file   Occupational History    Not on file   Tobacco Use    Smoking status: Never Smoker    Smokeless tobacco: Never Used   Substance and Sexual Activity    Alcohol use: Yes     Comment: occasional    Drug use: No    Sexual activity: Not on file   Other Topics Concern    Not on file   Social History Narrative    Not on file     Social Determinants of Health     Financial Resource Strain:     Difficulty of Paying Living Expenses: Not on file   Food Insecurity:     Worried About Running Out of Food in the Last Year: Not on file    Wild of Food in the Last Year: Not on file   Transportation Needs:     Lack of Transportation (Medical): Not on file    Lack of Transportation (Non-Medical): Not on file   Physical Activity:     Days of Exercise per Week: Not on file    Minutes of Exercise per Session: Not on file   Stress:     Feeling of Stress : Not on file   Social Connections:     Frequency of Communication with Friends and Family: Not on file    Frequency of Social Gatherings with Friends and Family: Not on file    Attends Christian Services: Not on file    Active Member of [address] [place] [address] or Organizations: Not on file    Attends Club or Organization Meetings: Not on file    Marital Status: Not on file   Intimate Partner Violence:     Fear of Current or Ex-Partner: Not on file    Emotionally Abused: Not on file    Physically Abused: Not on file    Sexually Abused: Not on file   Housing Stability:     Unable to Pay for Housing in the Last Year: Not on file    Number of [place] in the Last Year: Not on file    Unstable Housing in the Last Year: Not on file         ALLERGIES: Codeine    Review of Systems   Constitutional: Negative for activity change, appetite change, fatigue and fever. HENT: Negative for ear pain, facial swelling, sore throat and trouble swallowing.     Eyes: Negative for pain, discharge and visual disturbance. Respiratory: Negative for chest tightness, shortness of breath and wheezing. Cardiovascular: Negative for chest pain and palpitations. Gastrointestinal: Negative for abdominal pain, blood in stool, nausea and vomiting. Genitourinary: Negative for difficulty urinating, flank pain and hematuria. Musculoskeletal: Negative for arthralgias, joint swelling, myalgias and neck pain. Skin: Negative for color change and rash. Neurological: Positive for weakness ( Left arm and leg). Negative for dizziness, numbness and headaches. Inability to walk due to weakness in the left arm and leg   Hematological: Negative for adenopathy. Does not bruise/bleed easily. Psychiatric/Behavioral: Negative for behavioral problems, confusion and sleep disturbance. All other systems reviewed and are negative. There were no vitals filed for this visit. Physical Exam  Vitals and nursing note reviewed. Constitutional:       General: She is not in acute distress. Appearance: She is well-developed. She is ill-appearing ( Chronic). Comments: This is a 80-year-old female who lives at home and apparently had some family there but no one was able to help her this morning. She presents with weakness in her left arm and leg and inability to walk. Vital signs as noted. HENT:      Head: Normocephalic and atraumatic. Right Ear: External ear normal.      Left Ear: External ear normal.      Nose: Nose normal.   Eyes:      General: No scleral icterus. Conjunctiva/sclera: Conjunctivae normal.      Pupils: Pupils are equal, round, and reactive to light. Neck:      Thyroid: No thyromegaly. Vascular: No JVD. Trachea: No tracheal deviation. Comments: No carotid bruits noted. Cardiovascular:      Rate and Rhythm: Normal rate and regular rhythm. Heart sounds: Normal heart sounds. No murmur heard. No friction rub. No gallop.     Pulmonary:      Effort: Pulmonary effort is normal. No respiratory distress. Breath sounds: Normal breath sounds. No wheezing or rales. Chest:      Chest wall: No tenderness. Abdominal:      General: Bowel sounds are normal. There is no distension. Palpations: Abdomen is soft. There is no mass. Tenderness: There is no abdominal tenderness. There is no guarding or rebound. Musculoskeletal:         General: Swelling present. No tenderness. Normal range of motion. Cervical back: Normal range of motion and neck supple. Right lower leg: Edema present. Left lower leg: Edema present. Comments: There is 3+ edema in both lower extremities including the ankle and feet. Skin is extremely dry and scaling. No pain is noted. There is some mild weakness in the left leg. Lymphadenopathy:      Cervical: No cervical adenopathy. Skin:     General: Skin is warm and dry. Capillary Refill: Capillary refill takes 2 to 3 seconds. Coloration: Skin is not jaundiced. Findings: No erythema or rash. Neurological:      Mental Status: She is alert and oriented to person, place, and time. Cranial Nerves: No cranial nerve deficit. Motor: Weakness ( Left leg with only mild weakness in the left arm.) present. Coordination: Coordination normal.      Deep Tendon Reflexes: Reflexes are normal and symmetric. Psychiatric:         Mood and Affect: Mood normal.         Behavior: Behavior normal.         Thought Content:  Thought content normal.         Judgment: Judgment normal.          MDM  Number of Diagnoses or Management Options  Acute left-sided weakness: new and requires workup     Amount and/or Complexity of Data Reviewed  Clinical lab tests: ordered and reviewed  Tests in the radiology section of CPT®: ordered and reviewed  Decide to obtain previous medical records or to obtain history from someone other than the patient: yes  Review and summarize past medical records: yes  Discuss the patient with other providers: yes  Independent visualization of images, tracings, or specimens: yes    Risk of Complications, Morbidity, and/or Mortality  Presenting problems: high  Diagnostic procedures: high  Management options: high    Patient Progress  Patient progress: stable         Procedures  This is a 57-year-old female who presents with complaint of left arm and leg weakness to the extent that she is having difficulty walking. Her last known well about 10:00 last night. She she presents with swelling in both lower extremities as well. A level 2 code stroke was called on her and she was evaluated by Dr. Deloris Norton with telemetry neuro. He felt as though this patient should be admitted for further evaluation of stroke. She clearly has some peripheral edema and will need general medical evaluation. She is on Eliquis and he would like to keep her on that it is the blood thinner without aspirin being added at this time until she is seen by neurology here. Lab work appears to be unremarkable. ED MD EKG interpretation: There is a normal sinus rhythm at 64 beats a minute. No ectopy is noted. Right bundle branch block is noted. No ischemic changes appreciated. 1:41 PM    Perfect Serve Consult for Admission  1:42 PM    ED Room Number: ER12/12  Patient Name and age:  Portia Gonzales 67 y.o.  female  Working Diagnosis:   1. Acute left-sided weakness        COVID-19 Suspicion:  no  Sepsis present:  no  Reassessment needed: no  Code Status:  Full Code  Readmission: no  Isolation Requirements:  no  Recommended Level of Care:  telemetry  Department:[facility] Adult ED - 21   Other:       Total critical care time spent exclusive of procedures:  40 minutes

## 2022-06-17 NOTE — PROGRESS NOTES
Reason for Admission:  Left Side Weakness                      RUR Score:    12%                 Plan for utilizing home health: Previous provider [facility]        PCP: First and Last name:  Kristan Boxer., MD     Name of Practice:    Are you a current patient: Yes/No: yes   Approximate date of last visit: 5/20/22   Can you participate in a virtual visit with your PCP:                     Current Advanced Directive/Advance Care Plan: Full Code / AMD on file      Healthcare Decision Maker:   Click here to [address] including selection of the Healthcare Decision Maker Relationship (ie \"Primary\")             Primary Decision Maker: Rachel Alva - [phone]    Secondary Decision Maker: Janette Bob - [phone]                  Transition of Care Plan:     CM consult received and appreciated. EMR reviewed. Met w/patient and introduced to role of CM. Patient is alert and provided history. Ms. Sebastian Caruso lives home alone and has supportive friend/ family. Grandchildren Claudio Yo (transport to MD appointments) and Major Dross provide assistance. Patient states has been using a grocery roro. Informed had received HH/PT from [facility] until April - states services at 4 week intervals will need new order. Kelsea Olsen is SW for [facility] and has been assisting w/ processes for an Aide. Noted Medicaid secondary.  eating dinner at time of CM visit is uncertain if updated numbers on file for grandchildren however states can look up in her phone and provide. [facility]/ local Pharmacy. No transitional care needs verbalized at this time. CM Department will continue to follow. Medicare pt has received, reviewed, and signed 1st IM letter informing them of their right to appeal the discharge. Signed copy has been placed on pt bedside chart.

## 2022-06-17 NOTE — PROGRESS NOTES
Bedside and Verbal shift change report given to [employer] (oncoming nurse) by Caryle Kenning RN (offgoing nurse). Report included the following information SBAR, Kardex, Intake/Output, MAR, Recent Results and Med Rec Status.

## 2022-06-17 NOTE — PROGRESS NOTES
Bedside and Verbal shift change report given to [employer] (oncoming nurse) by Reggie Tejada RN (offgoing nurse). Report included the following information SBAR, Kardex, Intake/Output, MAR, Recent Results and Med Rec Status.

## 2022-06-17 NOTE — H&P
[address] [facility] Adult  Hospitalist Group  History and Physical    Date of Service:  6/17/2022  Primary Care Provider: Kelvin Lackey MD  Source of information: The patient    Chief Complaint: Extremity Weakness      History of Presenting Illness:   Joya Martino is a 67 y.o. female who presents with left side weakness   Joya Martino is a 67 y.o. female who presents to [facility] with complaints of increasing swelling and weakness on her left side since yesterday evening. More weaker in her left leg. History is rather limited though patient says she was in her usual state of health when she slipped yesterday noted having more trouble getting up. This morning says she really can get up and using her left leg where she notes more prominent swelling. She then came into the ER as a stroke alert CT CTA were obtained without any significant findings other than concern for malignancy as well as a possible pulmonary embolism. Neurology consultation was then requested. Patient is on Eliquis for A. fib says she is compliant with her meds. Does not really endorse much weakness. Seems indicate that it similar to when she was in the hospital in January with weakness for 2 weeks with a negative MRI. Presumably went back to normal in the interval.    Normally she can get up and walk around but was unable to do so this morning because of this weakness. She states that she still weak. There is no headache, no history of any fever or chill, no nausea or vomiting and no other acute symptomatology. Has chronically swollen lower extremities. She states her skin is gotten extremely dry recently. She has no shortness of breath, nausea or vomiting and no other GI or  symptoms. REVIEW OF SYSTEMS:  General: HPI, no changes of appetite  HEENT: no headache, no vision changes, no nose discharge, no hearing changes   RES: no wheezing, no cough, no sob  CVS: no cp, no palpitation. Muscular: HPI. Skin: no rash, no itching   GI: no vomiting, no diarrhea  : no dysuria, no hematuria  Hemo: no gum bleeding, no petechial   Neuro: HPI no polydipsia   Endo:   Psych: denied depression     Past Medical History:   Diagnosis Date    History of mammogram 2010    normal    Hypertension     Pap smear for cervical cancer screening 2011    normal      Past Surgical History:   Procedure Laterality Date    HX GYN      hystterectomy    [place] COMPRE EP EVAL ABLTJ ATR FIB PULM VEIN ISOLATION N/A 11/30/2020    ABLATION A-FIB  W COMPLETE EP STUDY performed by Jossie Cline MD at [facility] CARDIAC CATH LAB    [place] ICAR CATHETER ABLATION ARRHYTHMIA ADD ON N/A 11/30/2020    Ablation Svt/Vt Add On performed by Jossie Cline MD at [facility] CARDIAC CATH LAB    [place] INTRACARD ECHO, THER/DX INTERVENT N/A 11/30/2020    Intracardiac Echocardiogram performed by Jossie Cline MD at [facility] CARDIAC CATH LAB    [place] INTRACARDIAC ELECTROPHYSIOLOGIC 3D MAPPING N/A 11/30/2020    Ep 3d Mapping performed by Jossie Cline MD at [facility] CARDIAC CATH LAB    [place] STIM/PACING HEART POST IV DRUG INFU N/A 11/30/2020    Drug Stimulation performed by Joss[facility] Cline MD at [facility] LAB     Prior to Admission medications    Medication Sig Start Date End Date Taking? Authorizing Provider   hydroCHLOROthiazide (HYDRODIURIL) 25 mg tablet Take 1 Tablet by mouth daily. 5/20/22   Jose Petit MD   amLODIPine (NORVASC) 5 mg tablet Take 1 Tablet by mouth daily. 5/20/22   Jose Petit MD   amiodarone (Pacerone) 200 mg tablet Take 1 Tablet by mouth daily. 5/20/22   Jose Petit MD   apixaban (Eliquis) 5 mg tablet Take 1 tablet by mouth twice daily 5/20/22   Jose Petit MD   diclofenac EC (VOLTAREN) 75 mg EC tablet Take 1 Tablet by mouth two (2) times a day. 5/20/22   Jose Petit MD   megestroL (MEGACE) 20 mg tablet Take 1 Tablet by mouth daily.  5/20/22   Jose Petit MD   ammonium lactate (LAC-HYDRIN) 12 % lotion  1/25/22   Provider, Historical   acetaminophen (Tylenol Arthritis Pain) 650 mg TbER Take 650 mg by mouth nightly. Provider, Historical   ZINC PO Take  by mouth daily. Provider, Historical   cholecalciferol (VITAMIN D3) (1000 Units /25 mcg) tablet Take 1 Tab by mouth daily. 12/2/20   Erenest Irene, NP   cyanocobalamin 1,000 mcg tablet Take 1 Tab by mouth daily. 12/2/20   Erenest Irene, NP     Allergies   Allergen Reactions    Codeine Itching and Swelling      No family history on file. + HTN parents. Social History:  reports that she has never smoked. She has never used smokeless tobacco. She reports current alcohol use. She reports that she does not use drugs. Family and social history were personally reviewed, all pertinent and relevant details are outlined as above. Objective:     Visit Vitals  BP (!) 155/84   Pulse 77   Temp 98.3 °F (36.8 °C)   Resp 17   SpO2 94%      O2 Device: None (Room air)    PHYSICAL EXAM:   General: Alert x oriented x 3, awake, no acute distress, resting in bed   HEENT: PEERL, EOMI, moist mucus membranes  Neck: Supple, no JVD, no meningeal signs  Chest: Clear to auscultation bilaterally   CVS: RRR, S1 S2 heard, no murmurs/rubs/gallops  Abd: Soft, non-tender, non-distended, +bowel sounds   Ext: No clubbing, ROM limited by her arthritis. Severe skin changes and poor feet nail noticed both lower legs and swelling. Neuro/Psych: Pleasant mood and affect, CN 2-12 grossly intact, sensory grossly within normal limit,left leg 4-/5, left arm 5-/5. Right ext 5-/5. Cap refill: Brisk, less than 3 seconds  Pulses: 2+, symmetric in all extremities  Skin: Warm, dry, without rashes or lesions    Data Review: All diagnostic labs and studies have been reviewed.     Abnormal Labs Reviewed   METABOLIC PANEL, COMPREHENSIVE - Abnormal; Notable for the following components:       Result Value    BUN 22 (*)     Creatinine 1.15 (*)     GFR est AA 56 (*)     GFR est non-AA 46 (*)     AST (SGOT) 86 (*)     A-G Ratio 0.9 (*)     All other components within normal limits   CBC WITH AUTOMATED DIFF - Abnormal; Notable for the following components:    RDW 14.6 (*)     NEUTROPHILS 77 (*)     All other components within normal limits   URINALYSIS W/ RFLX MICROSCOPIC - Abnormal; Notable for the following components:    Protein 30 (*)     Ketone 15 (*)     Blood MODERATE (*)     Mucus 1+ (*)     All other components within normal limits   PROTHROMBIN TIME + INR - Abnormal; Notable for the following components:    Prothrombin time 11.7 (*)     All other components within normal limits       All Micro Results     Procedure Component Value Units Date/Time    URINE CULTURE HOLD SAMPLE [[id number]] Collected: 06/17/22 1416    Order Status: Completed Specimen: Serum Updated: 06/17/22 1419     Urine culture hold       Urine on hold in Microbiology dept for 2 days. If unpreserved urine is submitted, it cannot be used for addtional testing after 24 hours, recollection will be required. IMAGING:   CTA CODE NEURO HEAD AND NECK W CONT   Final Result   CTA Head:   1. No evidence of flow-limiting stenosis or aneurysm. Scattered atherosclerotic   disease as above. CTA Neck:   1. No evidence of flow-limiting stenosis. 2. Moderate stenosis at the origin of the nondominant right vertebral artery. 3. Partially visualized small acute pulmonary embolus in the distal right main   pulmonary artery. 4. 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 ENT consultation   is recommended. 5. Multinodular thyroid gland. CT Brain Perfusion:   1. No acute abnormality.       Attempt was made to call the ER on 6/17/2022 1:59 PM by Love Santillan MD. I was told the ER physician was busy in another room. [facility]   Final Result   CTA Head:   1. No evidence of flow-limiting stenosis or aneurysm. Scattered atherosclerotic   disease as above. CTA Neck:   1. No evidence of flow-limiting stenosis. 2. Moderate stenosis at the origin of the nondominant right vertebral artery. 3. Partially visualized small acute pulmonary embolus in the distal right main   pulmonary artery. 4. 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 ENT consultation   is recommended. 5. Multinodular thyroid gland. CT Brain Perfusion:   1. No acute abnormality. Attempt was made to call the ER on 6/17/2022 1:59 PM by Joni Israel MD. I was   told the ER physician was busy in another room. [phone]         XR CHEST PORT   Final Result      No acute process. CT CODE NEURO HEAD WO CONTRAST   Final Result   No acute intracranial abnormality.           MRI BRAIN WO CONT    (Results Pending)        ECG/ECHO:    Results for orders placed or performed during the hospital encounter of 06/17/22   EKG, 12 LEAD, INITIAL   Result Value Ref Range    Ventricular Rate 64 BPM    Atrial Rate 64 BPM    P-R Interval 138 ms    QRS Duration 150 ms    Q-T Interval 484 ms    QTC Calculation (Bezet) 499 ms    Calculated P Axis 18 degrees    Calculated R Axis -51 degrees    Calculated T Axis 20 degrees    Diagnosis       Normal sinus rhythm  Left axis deviation  Right bundle branch block  Abnormal ECG  No previous ECGs available  Confirmed by Larissa Smith ([id number]) on 6/17/2022 3:45:20 PM          Assessment:   Given the patient's current clinical presentation, there is a high level of concern for decompensation if discharged from the emergency department. Complex decision making was performed, which includes reviewing the patient's available past medical records, laboratory results, and imaging studies. Active Problems:    CVA (cerebral vascular accident) ([facility] [address] (6/17/2022)      Plan:     1. Left side weakness: neuro consulted, stroke work up including MRI, echo. A1c, flp. Continue telemonitor  2. chornic a fib: continue amiodarone, eliquis   3. Chronic leg lymphedema/skin changes: wound care , check venous doppler   4. Left  left tonsillar mass: concerning malignancy. Consider ENT evaluation. 5. Acute PE: CTA of neck reported: Partially visualized small acute pulmonary embolus in the distal right main pulmonary artery. Continue eliquis. Follow echo   6. Multinodular thyroid gland: check TSH       DIET: ADULT DIET Regular   ISOLATION PRECAUTIONS: There are currently no Active Isolations  CODE STATUS: Full Code   DVT PROPHYLAXIS: Eliquis  FUNCTIONAL STATUS PRIOR TO HOSPITALIZATION: Capable of only limited self-care; confined to bed or chair likely more than 50% of waking hours. EARLY MOBILITY ASSESSMENT: Recommend an assessment from physical therapy and/or occupational therapy  ANTICIPATED DISCHARGE: Greater than 48 hours. EMERGENCY CONTACT/SURROGATE DECISION MAKER: pt makes her own decision     CRITICAL CARE WAS PERFORMED FOR THIS ENCOUNTER: NO.      Signed By: Aminta Ferrera MD     June 17, 2022         Please note that this dictation may have been completed with Adilson Older, the computer voice recognition software. Quite often unanticipated grammatical, syntax, homophones, and other interpretive errors are inadvertently transcribed by the computer software. Please disregard these errors. Please excuse any errors that have escaped final proofreading.

## 2022-06-17 NOTE — PROGRESS NOTES
SLP Contact Note    Consult received and appreciated. Patient chart reviewed. Pt currently undergoing neuro work-up for stroke. Pt passed [place] swallow screen. CT is negative for acute infarct. Will await further work-up and complete evaluation as indicated.       Thank you,  BEBETO MagdalenoEd, [id number] [facility]  Speech-Language Pathologist

## 2022-06-18 LAB
CHOLEST SERPL-MCNC: 164 MG/DL
EST. AVERAGE GLUCOSE BLD GHB EST-MCNC: 103 MG/DL
HBA1C MFR BLD: 5.2 % (ref 4–5.6)
HDLC SERPL-MCNC: 54 MG/DL
HDLC SERPL: 3 (ref 0–5)
LDLC SERPL CALC-MCNC: 96 MG/DL (ref 0–100)
T3FREE SERPL-MCNC: 1.3 PG/ML (ref 2.2–4)
T4 FREE SERPL-MCNC: 1.6 NG/DL (ref 0.8–1.5)
TRIGL SERPL-MCNC: 70 MG/DL (ref ?–150)
TSH SERPL DL<=0.05 MIU/L-ACNC: 0.51 UIU/ML (ref 0.36–3.74)
VLDLC SERPL CALC-MCNC: 14 MG/DL

## 2022-06-18 PROCEDURE — 65270000046 HC RM TELEMETRY

## 2022-06-18 PROCEDURE — 74011250637 HC RX REV CODE- 250/637: Performed by: INTERNAL MEDICINE

## 2022-06-18 PROCEDURE — 97116 GAIT TRAINING THERAPY: CPT

## 2022-06-18 PROCEDURE — 80061 LIPID PANEL: CPT

## 2022-06-18 PROCEDURE — 84481 FREE ASSAY (FT-3): CPT

## 2022-06-18 PROCEDURE — 84439 ASSAY OF FREE THYROXINE: CPT

## 2022-06-18 PROCEDURE — 36415 COLL VENOUS BLD VENIPUNCTURE: CPT

## 2022-06-18 PROCEDURE — 83036 HEMOGLOBIN GLYCOSYLATED A1C: CPT

## 2022-06-18 PROCEDURE — 97530 THERAPEUTIC ACTIVITIES: CPT

## 2022-06-18 PROCEDURE — 84443 ASSAY THYROID STIM HORMONE: CPT

## 2022-06-18 PROCEDURE — 74011250637 HC RX REV CODE- 250/637: Performed by: HOSPITALIST

## 2022-06-18 PROCEDURE — 97161 PT EVAL LOW COMPLEX 20 MIN: CPT

## 2022-06-18 RX ADMIN — Medication: at 10:04

## 2022-06-18 RX ADMIN — Medication 1000 UNITS: at 09:55

## 2022-06-18 RX ADMIN — CYANOCOBALAMIN TAB 500 MCG 1000 MCG: 500 TAB at 09:55

## 2022-06-18 RX ADMIN — AMIODARONE HYDROCHLORIDE 200 MG: 200 TABLET ORAL at 09:55

## 2022-06-18 RX ADMIN — Medication: at 18:13

## 2022-06-18 RX ADMIN — APIXABAN 5 MG: 5 TABLET, FILM COATED ORAL at 14:15

## 2022-06-18 RX ADMIN — APIXABAN 10 MG: 5 TABLET, FILM COATED ORAL at 21:43

## 2022-06-18 RX ADMIN — APIXABAN 5 MG: 5 TABLET, FILM COATED ORAL at 09:55

## 2022-06-18 RX ADMIN — MEGESTROL ACETATE 20 MG: 40 SUSPENSION ORAL at 09:55

## 2022-06-18 RX ADMIN — AMLODIPINE BESYLATE 5 MG: 5 TABLET ORAL at 09:55

## 2022-06-18 NOTE — PROGRESS NOTES
Bedside shift change report given to [address] [place] [address] Nick (oncoming nurse) by Sg Kenyon (offgoing nurse). Report included the following information SBAR, Recent Results and Alarm Parameters .

## 2022-06-18 NOTE — PROGRESS NOTES
Problem: Mobility Impaired (Adult and Pediatric)  Goal: *Acute Goals and Plan of Care (Insert Text)  Description:   FUNCTIONAL STATUS PRIOR TO ADMISSION: Pt resides in a communal-living situation in a duplex with shared kitchen and bathroom. Pt reports completing her own ADLs and functional mobility; however, pt with LE skin and foot concerns suggesting self-care is challenging and inefficient. She reports receiving groceries via an roro and HHPT/OT. Pt also reports of being weary of using DME for safety in her bathroom as she shares the space and is not comfortable putting an elevated toilet seat in the BR (she does use a shower chair). HOME SUPPORT PRIOR TO ADMISSION: The patient lived alone with no local support mentioned. Physical Therapy Goals  Initiated 6/18/2022  1. Patient will move from supine to sit and sit to supine  in bed with supervision/set-up within 7 day(s). 2.  Patient will transfer from bed to chair and chair to bed with minimal assistance/contact guard assist using the least restrictive device within 7 day(s). 3.  Patient will perform sit to stand with minimal assistance/contact guard assist within 7 day(s). 4.  Patient will ambulate with minimal assistance/contact guard assist for 10 feet with the least restrictive device within 7 day(s). 5.  Patient will improve Argueta Balance score by 7 points within 7 days. Outcome: Progressing Towards Goal   PHYSICAL THERAPY EVALUATION- NEURO POPULATION  Patient: Dread Bhakta (89 y.o. female)  Date: 6/18/2022  Primary Diagnosis: CVA (cerebral vascular accident) [facility]) [I63.9]        Precautions:   Fall      ASSESSMENT  Pt seen following RN clearance. Based on the objective data described below, the patient presents with generally decreased LE strength (increased edema), balance, AROM, and activity tolerance resulting in impaired functional mobility from her reported baseline status of independent.   Pt pleasant and agreeable to bed assessment, bed mobility, transfers, brief gait training at the EOB and return to bed with LEs elevated. Pt with significant LE nail, skin, and edema concerns and RN aware. Pt motivated to work with therapies and may benefit from intensive 3 hrs of therapy per day for optimal recovery and return to safe mobility status for home alone. Current Level of Function Impacting Discharge (mobility/balance): upwards of moderate assist    Functional Outcome Measure: The patient scored Total: 3/56 on the [facility] Assessment which is indicative of high fall risk. Other factors to consider for discharge: pt lives alone and PLOF was likely not adequate for independence     Patient will benefit from skilled therapy intervention to address the above noted impairments. PLAN :  Recommendations and Planned Interventions: bed mobility training, transfer training, gait training, therapeutic exercises, edema management/control, patient and family training/education, and therapeutic activities      Frequency/Duration: Patient will be followed by physical therapy:  5 times a week to address goals. Recommendation for discharge: (in order for the patient to meet his/her long term goals)  Therapy 3 hours per day 5-7 days per week pending progress    This discharge recommendation:  Has not yet been discussed the attending provider and/or case management    IF patient discharges home will need the following DME: to be determined (TBD)         SUBJECTIVE:   Patient stated I am happy you came by today, I needed to start this. ..    OBJECTIVE DATA SUMMARY:   HISTORY:    Past Medical History:   Diagnosis Date    History of mammogram 2010    normal    Hypertension     Pap smear for cervical cancer screening 2011    normal     Past Surgical History:   Procedure Laterality Date    HX GYN      hystterectomy    NM COMPRE EP EVAL ABLTJ ATR FIB PULM VEIN ISOLATION N/A 11/30/2020    ABLATION A-FIB  W COMPLETE EP STUDY performed by Radha Mccallum MD at [facility] CARDIAC CATH LAB    PA ICAR CATHETER ABLATION ARRHYTHMIA ADD ON N/A 11/30/2020    Ablation Svt/Vt Add On performed by Radha Mccallum MD at [facility] CARDIAC CATH LAB    PA INTRACARD ECHO, THER/DX INTERVENT N/A 11/30/2020    Intracardiac Echocardiogram performed by Radha Mccallum MD at [facility] CARDIAC CATH LAB    PA INTRACARDIAC ELECTROPHYSIOLOGIC 3D MAPPING N/A 11/30/2020    Ep 3d Mapping performed by Radha Mccallum MD at [facility] CARDIAC CATH LAB    PA STIM/PACING HEART POST IV DRUG INFU N/A 11/30/2020    Drug Stimulation performed by Radha Mccallum MD at [facility] CARDIAC CATH LAB       Personal factors and/or comorbidities impacting plan of care: pt with paresthesias in LEs, edema, and circulatory impairments in LEs    [address]: Private residence (duplex)  # Steps to Enter: 5  Rails to Enter: Yes  Hand Rails : Right  One/Two Story Residence: Two story  # of Interior Steps: 7 (+7 (split))  [employer]: Right  Living Alone: Yes  Support Systems:  (pt lives with others in her duplex)  Patient Expects to be Discharged to[de-identified] Other: (unknown)  Current DME Used/Available at Home: [employer] [place], rollator,Walker, rolling,Shower chair (hurry cane)  Tub or Shower Type: Tub/Shower combination    EXAMINATION/PRESENTATION/DECISION MAKING:   Critical Behavior:  Neurologic State: Alert,Appropriate for age  Orientation Level: Appropriate for age  Cognition: Follows commands  Safety/Judgement: Fall prevention  Hearing:   Auditory  Auditory Impairment: None  Skin:  as mentioned above; LLE with increased discoloration, BLEs with flaking skin  Edema: BLEs  Range Of Motion:  AROM: Grossly decreased, non-functional           PROM:  (NT)           Strength:    Strength: Generally decreased, functional                    Tone & Sensation:   Tone: Normal              Sensation: Impaired (BLEs hypersensitive and with neuropathy)               Coordination:  Coordination: Generally decreased, functional  Vision:    glasses  Functional Mobility:  Bed Mobility:     Supine to Sit: Bed Modified; Moderate assistance; Additional time (HOB elevated, rail used, and LE assist provided/pull up)  Sit to Supine: Moderate assistance; Additional time;Bed Modified (cues for technique, rail use, and assist with LE  lift)  Scooting: Additional time; Moderate assistance (bed pad assist for hips/weight shift, pt able to scoot fwd once midline)  Transfers:  Sit to Stand: Moderate assistance; Additional time (EOB elevated, cues for \"nose over toes\", cues for feet width)  Stand to Sit: Additional time; Moderate assistance (cues for backing to support, reach back, controlled descent)                       Balance:   Sitting: Impaired; Without support  Sitting - Static: Fair (occasional)  Sitting - Dynamic: Fair (occasional)  Standing: Impaired; With support  Standing - Static: Fair;Constant support  Standing - Dynamic : Fair;Constant support  Ambulation/Gait Training:  Distance (ft): 4 Feet (ft) (side [facility] to [facility])  Assistive Device: Gait belt;Walker, rolling  Ambulation - Level of Assistance: Moderate assistance; Additional time     Gait Description (WDL): Exceptions to WDL  Gait Abnormalities: Antalgic;Decreased step clearance; Step to gait (trunk flexed, cues for weight shift/UE support for LE advanc)        Base of Support: Widened  Stance: Left decreased  Speed/Kalie: Slow;Pace decreased (<100 feet/min)  Step Length: Right shortened;Left shortened         Therapeutic Exercises:   APs, quad sets, glut sets perform prior to OOB x10 reps    Functional Measure  Argueta Balance Test:    Sitting to Standin  Standing Unsupported: 0  Sitting with Back Unsupported: 3  Standing to Sittin  Transfers: 0  Standing Unsupported with Eyes Closed: 0  Standing Unsupported with Feet Together: 0  Reach Forward with Outstretched Arm: 0   Object: 0  Turn to Look Over Shoulders: 0  Turn 360 Degrees: 0  Alternate Foot on Step/Stool: 0  Standing Unsupported One Foot in Front: 0  Stand on One Le  Total: 3/56         56=Maximum possible score;   0-20=High fall risk  21-40=Moderate fall risk   41-56=Low fall risk        Pain Rating:  NT; pain noted at groin (skin) and LEs    Activity Tolerance:   Fair and VSS on RA during session events      After treatment patient left in no apparent distress:   Supine in bed, Heels elevated for pressure relief, Call bell within reach, and Side rails x 3    COMMUNICATION/EDUCATION:   The patients plan of care was discussed with: Registered nurse. Fall prevention education was provided and the patient/caregiver indicated understanding., Patient/family have participated as able in goal setting and plan of care. , and Patient/family agree to work toward stated goals and plan of care.     Thank you for this referral.  Marizol Galan   Time Calculation: 47 mins

## 2022-06-18 NOTE — PROGRESS NOTES
Bedside shift change report given to Marcio Romero (oncoming nurse) by Jeffery Soria (offgoing nurse). Report included the following information SBAR, Recent Results and Alarm Parameters .

## 2022-06-18 NOTE — PROGRESS NOTES
[address] [facility] Adult  Hospitalist Group                                                                                          Hospitalist Progress Note  Merlene Valentin MD  Answering service: [phone] from in house phone        Date of Service:  2022  NAME:  Rebeka Arreola  :  1950  MRN:  [id number]      Admission Summary:   Reina Byrne a 67 y. o. female who presents to [facility] [address] with complaints of increasing swelling and weakness on her left side since yesterday evening. More weaker in her left leg.  History is rather limited though patient says she was in her usual state of health when she slipped yesterday noted having more trouble getting up.  This morning says she really can get up and using her left leg where she notes more prominent swelling.  She then came into the ER as a stroke alert CT CTA were obtained without any significant findings other than concern for malignancy as well as a possible pulmonary embolism.  Neurology consultation was then requested. Anita De Jesus is on Eliquis for A. fib says she is compliant with her meds.  Does not really endorse much weakness.  Seems indicate that it similar to when she was in the hospital in January with weakness for 2 weeks with a negative MRI.  Presumably went back to normal in the interval.    Normally she can get up and walk around but was unable to do so this morning because of this weakness.  She states that she still weak.  There is no headache, no history of any fever or chill, no nausea or vomiting and no other acute symptomatology. Has chronically swollen lower extremities.  She states her skin is gotten extremely dry recently. Malka Miguel has no shortness of breath, nausea or vomiting and no other GI or  symptoms.        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  Pt unaware of this mass  ENT consultation    CTA finding: Partially visualized small acute PE in the distal right main pulmonary artery  Increase Eliquis to VTE dosing for now  Will ask heme/onc to see given failure of Eliquis and likely new malignancy  Will switch to heparin drip if ENT requires any surgical intervention on tonsillar mass    Multinodular thyroid gland  TSH wnl  F/u OP    Chronic a fib: continue amiodarone, Eliquis    Chronic leg lymphedema/skin changes: wound care      Code status: Full Code  Prophylaxis: Eliquis high dose  Care Plan discussed with: Patient/Family  Anticipated Disposition: pending PT/OT   Anticipated Discharge: Greater than 48 hours pending ENT and heme/onc     Hospital Problems  Date Reviewed: 5/20/2022          Codes Class Noted POA    CVA (cerebral vascular accident) [facility]) ICD-10-CM: I63.9  ICD-9-CM: 434.91  6/17/2022 Unknown              Review of Systems:   Pertinent items are noted in HPI    Vital Signs:    Last 24hrs VS reviewed since prior progress note. Most recent are:  Visit Vitals  /62   Pulse 66   Temp 97.9 °F (36.6 °C)   Resp 16   SpO2 97%         Intake/Output Summary (Last 24 hours) at 6/18/2022 1305  Last data filed at 6/18/2022 0000  Gross per 24 hour   Intake 60 ml   Output --   Net 60 ml        Physical Examination:   I had a face to face encounter with this patient and independently examined them on 6/18/2022 as outlined below:    General: Alert, cooperative, no acute distress    EENT:  EOMI. Anicteric sclerae. MMM  Resp:  CTA bilaterally, no wheezing or rales.  No accessory muscle use  CV:  Irregular, No audible murmur  GI:  Soft, Non distended, Non tender  Neurologic:  Alert and oriented, normal speech, PATRICIO, strength 2/5 b/l LE, 4/5 b/l UE, CN equal, tongue midline but tremors when moving side to side  Extremities: Chronic lymphedema LLE>RLE, no cyanosis  Psych:   Not anxious or agitated  Skin:  Lymphedema b/l, chronic skin changes, no acute cellulitis, No jaundice       Data Review:   I personally reviewed: vitals, labs, imaging results, notes    Labs:     Recent Labs     06/17/22  1247   WBC 7.1   HGB 13.4   HCT 40.1        Recent Labs     06/17/22  1247      K 4.1      CO2 26   BUN 22*   CREA 1.15*   GLU 80   CA 9.6     Recent Labs     06/17/22  1247   ALT 28   AP 68   TBILI 0.9   TP 7.5   ALB 3.5   GLOB 4.0     Recent Labs     06/17/22  1247   INR 1.1   PTP 11.7*   APTT 27.1      No results for input(s): FE, TIBC, PSAT, FERR in the last 72 hours. Lab Results   Component Value Date/Time    Folate 6.9 01/12/2022 02:21 AM      No results for input(s): PH, PCO2, PO2 in the last 72 hours. No results for input(s): CPK, CKNDX, TROIQ in the last 72 hours.     No lab exists for component: CPKMB  Lab Results   Component Value Date/Time    Cholesterol, total 164 06/18/2022 02:20 AM    HDL Cholesterol 54 06/18/2022 02:20 AM    LDL, calculated 96 06/18/2022 02:20 AM    Triglyceride 70 06/18/2022 02:20 AM    CHOL/HDL Ratio 3.0 06/18/2022 02:20 AM     No results found for: [facility]  Lab Results   Component Value Date/Time    Color YELLOW/STRAW 06/17/2022 02:16 PM    Appearance CLEAR 06/17/2022 02:16 PM    Specific gravity 1.020 06/17/2022 02:16 PM    Specific gravity 1.019 01/11/2022 04:24 PM    pH (UA) 5.5 06/17/2022 02:16 PM    Protein 30 (A) 06/17/2022 02:16 PM    Glucose Negative 06/17/2022 02:16 PM    Ketone 15 (A) 06/17/2022 02:16 PM    Bilirubin Negative 06/17/2022 02:16 PM    Urobilinogen 0.2 06/17/2022 02:16 PM    Nitrites Negative 06/17/2022 02:16 PM    Leukocyte Esterase Negative 06/17/2022 02:16 PM    Epithelial cells FEW 06/17/2022 02:16 PM    Bacteria Negative 06/17/2022 02:16 PM    WBC 0-4 06/17/2022 02:16 PM    RBC 0-5 06/17/2022 02:16 PM       Medications Reviewed:     Current Facility-Administered Medications   Medication Dose Route Frequency    acetaminophen (TYLENOL) tablet 650 mg  650 mg Oral Q4H PRN    Or    acetaminophen (TYLENOL) solution 650 mg  650 mg Per NG tube Q4H PRN    Or    acetaminophen (TYLENOL) suppository 650 mg  650 mg Rectal Q4H PRN    labetaloL (NORMODYNE;TRANDATE) injection 5 mg  5 mg IntraVENous Q10MIN PRN    amiodarone (CORDARONE) tablet 200 mg  200 mg Oral DAILY    apixaban (ELIQUIS) tablet 5 mg  5 mg Oral Q12H    amLODIPine (NORVASC) tablet 5 mg  5 mg Oral DAILY    cholecalciferol (VITAMIN D3) (1000 Units /25 mcg) tablet 1,000 Units  1,000 Units Oral DAILY    cyanocobalamin (VITAMIN B12) tablet 1,000 mcg  1,000 mcg Oral DAILY    traMADoL (ULTRAM) tablet 50 mg  50 mg Oral Q6H PRN    megestroL (MEGACE) 400 mg/10 mL (10 mL) oral suspension 20 mg  20 mg Oral DAILY    balsam peru-castor oiL (VENELEX) ointment   Topical BID    ammonium lactate (LAC-HYDRIN) 12 % lotion   Topical BID     ______________________________________________________________________  EXPECTED LENGTH OF STAY: - - -  ACTUAL LENGTH OF STAY:          1                 Allan Drake MD

## 2022-06-18 NOTE — PROGRESS NOTES
[address] [facility] Adult  Hospitalist Group                                                                                          Hospitalist Progress Note  Aaron Stein MD  Answering service: [phone] from in house phone        Date of Service:  2022  NAME:  Kedar Okeefe  :  1950  MRN:  [id number]      Admission Summary:   Analisa Arceo a 67 y. o. female who presents to [facility] with complaints of increasing swelling and weakness on her left side since yesterday evening. More weaker in her left leg.  History is rather limited though patient says she was in her usual state of health when she slipped yesterday noted having more trouble getting up.  This morning says she really can get up and using her left leg where she notes more prominent swelling.  She then came into the ER as a stroke alert CT CTA were obtained without any significant findings other than concern for malignancy as well as a possible pulmonary embolism.  Neurology consultation was then requested. Susan Pemberton is on Eliquis for A. fib says she is compliant with her meds.  Does not really endorse much weakness.  Seems indicate that it similar to when she was in the hospital in January with weakness for 2 weeks with a negative MRI.  Presumably went back to normal in the interval.    Normally she can get up and walk around but was unable to do so this morning because of this weakness.  She states that she still weak.  There is no headache, no history of any fever or chill, no nausea or vomiting and no other acute symptomatology. Has chronically swollen lower extremities.  She states her skin is gotten extremely dry recently. [facility] has no shortness of breath, nausea or vomiting and no other GI or  symptoms.        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  Pt unaware of this mass  ENT consultation    CTA finding: Partially visualized small acute PE in the distal right main pulmonary artery  Increase Eliquis to VTE dosing for now  Will ask heme/onc to see given failure of Eliquis and likely new malignancy  Will switch to heparin drip if ENT requires any surgical intervention on tonsillar mass    Multinodular thyroid gland  TSH wnl  F/u OP    Chronic a fib: continue amiodarone, Eliquis    Chronic leg lymphedema/skin changes: wound care      Code status: Full Code  Prophylaxis: Eliquis high dose  Care Plan discussed with: Patient/Family  Anticipated Disposition: pending PT/OT   Anticipated Discharge: Greater than 48 hours pending ENT and heme/onc     Hospital Problems  Date Reviewed: 5/20/2022          Codes Class Noted POA    CVA (cerebral vascular accident) [facility]) ICD-10-CM: I63.9  ICD-9-CM: 434.91  6/17/2022 Unknown              Review of Systems:   Pertinent items are noted in HPI    Vital Signs:    Last 24hrs VS reviewed since prior progress note. Most recent are:  Visit Vitals  /62   Pulse 66   Temp 97.9 °F (36.6 °C)   Resp 16   SpO2 97%         Intake/Output Summary (Last 24 hours) at 6/18/2022 1305  Last data filed at 6/18/2022 0000  Gross per 24 hour   Intake 60 ml   Output --   Net 60 ml        Physical Examination:   I had a face to face encounter with this patient and independently examined them on 6/18/2022 as outlined below:    General: Alert, cooperative, no acute distress    EENT:  EOMI. Anicteric sclerae. MMM  Resp:  CTA bilaterally, no wheezing or rales.  No accessory muscle use  CV:  Irregular, No audible murmur  GI:  Soft, Non distended, Non tender  Neurologic:  Alert and oriented, normal speech, PATRICIO, strength 2/5 b/l LE, 4/5 b/l UE, CN equal, tongue midline but tremors when moving side to side  Extremities: Chronic lymphedema LLE>RLE, no cyanosis  Psych:   Not anxious or agitated  Skin:  Lymphedema b/l, chronic skin changes, no acute cellulitis, No jaundice       Data Review:   I personally reviewed: vitals, labs, imaging results, notes    Labs:     Recent Labs     06/17/22  1247   WBC 7.1   HGB 13.4   HCT 40.1        Recent Labs     06/17/22  1247      K 4.1      CO2 26   BUN 22*   CREA 1.15*   GLU 80   CA 9.6     Recent Labs     06/17/22  1247   ALT 28   AP 68   TBILI 0.9   TP 7.5   ALB 3.5   GLOB 4.0     Recent Labs     06/17/22  1247   INR 1.1   PTP 11.7*   APTT 27.1      No results for input(s): FE, TIBC, PSAT, FERR in the last 72 hours. Lab Results   Component Value Date/Time    Folate 6.9 01/12/2022 02:21 AM      No results for input(s): PH, PCO2, PO2 in the last 72 hours. No results for input(s): CPK, CKNDX, TROIQ in the last 72 hours.     No lab exists for component: CPKMB  Lab Results   Component Value Date/Time    Cholesterol, total 164 06/18/2022 02:20 AM    HDL Cholesterol 54 06/18/2022 02:20 AM    LDL, calculated 96 06/18/2022 02:20 AM    Triglyceride 70 06/18/2022 02:20 AM    CHOL/HDL Ratio 3.0 06/18/2022 02:20 AM     No results found for: [facility]  Lab Results   Component Value Date/Time    Color YELLOW/STRAW 06/17/2022 02:16 PM    Appearance CLEAR 06/17/2022 02:16 PM    Specific gravity 1.020 06/17/2022 02:16 PM    Specific gravity 1.019 01/11/2022 04:24 PM    pH (UA) 5.5 06/17/2022 02:16 PM    Protein 30 (A) 06/17/2022 02:16 PM    Glucose Negative 06/17/2022 02:16 PM    Ketone 15 (A) 06/17/2022 02:16 PM    Bilirubin Negative 06/17/2022 02:16 PM    Urobilinogen 0.2 06/17/2022 02:16 PM    Nitrites Negative 06/17/2022 02:16 PM    Leukocyte Esterase Negative 06/17/2022 02:16 PM    Epithelial cells FEW 06/17/2022 02:16 PM    Bacteria Negative 06/17/2022 02:16 PM    WBC 0-4 06/17/2022 02:16 PM    RBC 0-5 06/17/2022 02:16 PM       Medications Reviewed:     Current Facility-Administered Medications   Medication Dose Route Frequency    acetaminophen (TYLENOL) tablet 650 mg  650 mg Oral Q4H PRN    Or    acetaminophen (TYLENOL) solution 650 mg  650 mg Per NG tube Q4H PRN    Or    acetaminophen (TYLENOL) suppository 650 mg  650 mg Rectal Q4H PRN    labetaloL (NORMODYNE;TRANDATE) injection 5 mg  5 mg IntraVENous Q10MIN PRN    amiodarone (CORDARONE) tablet 200 mg  200 mg Oral DAILY    apixaban (ELIQUIS) tablet 5 mg  5 mg Oral Q12H    amLODIPine (NORVASC) tablet 5 mg  5 mg Oral DAILY    cholecalciferol (VITAMIN D3) (1000 Units /25 mcg) tablet 1,000 Units  1,000 Units Oral DAILY    cyanocobalamin (VITAMIN B12) tablet 1,000 mcg  1,000 mcg Oral DAILY    traMADoL (ULTRAM) tablet 50 mg  50 mg Oral Q6H PRN    megestroL (MEGACE) 400 mg/10 mL (10 mL) oral suspension 20 mg  20 mg Oral DAILY    balsam peru-castor oiL (VENELEX) ointment   Topical BID    ammonium lactate (LAC-HYDRIN) 12 % lotion   Topical BID     ______________________________________________________________________  EXPECTED LENGTH OF STAY: - - -  ACTUAL LENGTH OF STAY:          1                 Felisa Sorensen MD

## 2022-06-18 NOTE — PROGRESS NOTES
Problem: Mobility Impaired (Adult and Pediatric)  Goal: *Acute Goals and Plan of Care (Insert Text)  Description:   FUNCTIONAL STATUS PRIOR TO ADMISSION: Pt resides in a communal-living situation in a duplex with shared kitchen and bathroom. Pt reports completing her own ADLs and functional mobility; however, pt with LE skin and foot concerns suggesting self-care is challenging and inefficient. She reports receiving groceries via an roro and HHPT/OT. Pt also reports of being weary of using DME for safety in her bathroom as she shares the space and is not comfortable putting an elevated toilet seat in the BR (she does use a shower chair). HOME SUPPORT PRIOR TO ADMISSION: The patient lived alone with no local support mentioned. Physical Therapy Goals  Initiated 6/18/2022  1. Patient will move from supine to sit and sit to supine  in bed with supervision/set-up within 7 day(s). 2.  Patient will transfer from bed to chair and chair to bed with minimal assistance/contact guard assist using the least restrictive device within 7 day(s). 3.  Patient will perform sit to stand with minimal assistance/contact guard assist within 7 day(s). 4.  Patient will ambulate with minimal assistance/contact guard assist for 10 feet with the least restrictive device within 7 day(s). 5.  Patient will improve Argueta Balance score by 7 points within 7 days. Outcome: Progressing Towards Goal   PHYSICAL THERAPY EVALUATION- NEURO POPULATION  Patient: Maggie Hannah (55 y.o. female)  Date: 6/18/2022  Primary Diagnosis: CVA (cerebral vascular accident) [facility]) [I63.9]        Precautions:   Fall      ASSESSMENT  Pt seen following RN clearance. Based on the objective data described below, the patient presents with generally decreased LE strength (increased edema), balance, AROM, and activity tolerance resulting in impaired functional mobility from her reported baseline status of independent.   Pt pleasant and agreeable to bed assessment, bed mobility, transfers, brief gait training at the EOB and return to bed with LEs elevated. Pt with significant LE nail, skin, and edema concerns and RN aware. Pt motivated to work with therapies and may benefit from intensive 3 hrs of therapy per day for optimal recovery and return to safe mobility status for home alone. Current Level of Function Impacting Discharge (mobility/balance): upwards of moderate assist    Functional Outcome Measure: The patient scored Total: 3/56 on the [facility] Assessment which is indicative of high fall risk. Other factors to consider for discharge: pt lives alone and PLOF was likely not adequate for independence     Patient will benefit from skilled therapy intervention to address the above noted impairments. PLAN :  Recommendations and Planned Interventions: bed mobility training, transfer training, gait training, therapeutic exercises, edema management/control, patient and family training/education, and therapeutic activities      Frequency/Duration: Patient will be followed by physical therapy:  5 times a week to address goals. Recommendation for discharge: (in order for the patient to meet his/her long term goals)  Therapy 3 hours per day 5-7 days per week pending progress    This discharge recommendation:  Has not yet been discussed the attending provider and/or case management    IF patient discharges home will need the following DME: to be determined (TBD)         SUBJECTIVE:   Patient stated I am happy you came by today, I needed to start this. ..    OBJECTIVE DATA SUMMARY:   HISTORY:    Past Medical History:   Diagnosis Date    History of mammogram 2010    normal    Hypertension     Pap smear for cervical cancer screening 2011    normal     Past Surgical History:   Procedure Laterality Date    HX GYN      hystterectomy    MA COMPRE EP EVAL ABLTJ ATR FIB PULM VEIN ISOLATION N/A 11/30/2020    ABLATION A-FIB  W COMPLETE EP STUDY performed by Chantelle Huerta MD at [facility] CARDIAC CATH LAB    NM ICAR CATHETER ABLATION ARRHYTHMIA ADD ON N/A 11/30/2020    Ablation Svt/Vt Add On performed by Chantelle Huerta MD at [facility] CARDIAC CATH LAB    NM INTRACARD ECHO, THER/DX INTERVENT N/A 11/30/2020    Intracardiac Echocardiogram performed by Chantelle Huerta MD at [facility] CARDIAC CATH LAB    NM INTRACARDIAC ELECTROPHYSIOLOGIC 3D MAPPING N/A 11/30/2020    Ep 3d Mapping performed by Chantelle Huerta MD at [facility] CARDIAC CATH LAB    NM STIM/PACING HEART POST IV DRUG INFU N/A 11/30/2020    Drug Stimulation performed by Chantelle Huerta MD at [facility] CARDIAC CATH LAB       Personal factors and/or comorbidities impacting plan of care: pt with paresthesias in LEs, edema, and circulatory impairments in LEs    [address]: Private residence (duplex)  # Steps to Enter: 5  Rails to Enter: Yes  Hand Rails : Right  One/Two Story Residence: Two story  # of Interior Steps: 7 (+7 (split))  [employer]: Right  Living Alone: Yes  Support Systems:  (pt lives with others in her duplex)  Patient Expects to be Discharged to[de-identified] Other: (unknown)  Current DME Used/Available at Home: Anam Paling, rollator,Walker, rolling,Shower chair (hurry cane)  Tub or Shower Type: Tub/Shower combination    EXAMINATION/PRESENTATION/DECISION MAKING:   Critical Behavior:  Neurologic State: Alert,Appropriate for age  Orientation Level: Appropriate for age  Cognition: Follows commands  Safety/Judgement: Fall prevention  Hearing:   Auditory  Auditory Impairment: None  Skin:  as mentioned above; LLE with increased discoloration, BLEs with flaking skin  Edema: BLEs  Range Of Motion:  AROM: Grossly decreased, non-functional           PROM:  (NT)           Strength:    Strength: Generally decreased, functional                    Tone & Sensation:   Tone: Normal              Sensation: Impaired (BLEs hypersensitive and with neuropathy)               Coordination:  Coordination: Generally decreased, functional  Vision:    glasses  Functional Mobility:  Bed Mobility:     Supine to Sit: Bed Modified; Moderate assistance; Additional time (HOB elevated, rail used, and LE assist provided/pull up)  Sit to Supine: Moderate assistance; Additional time;Bed Modified (cues for technique, rail use, and assist with LE  lift)  Scooting: Additional time; Moderate assistance (bed pad assist for hips/weight shift, pt able to scoot fwd once midline)  Transfers:  Sit to Stand: Moderate assistance; Additional time (EOB elevated, cues for \"nose over toes\", cues for feet width)  Stand to Sit: Additional time; Moderate assistance (cues for backing to support, reach back, controlled descent)                       Balance:   Sitting: Impaired; Without support  Sitting - Static: Fair (occasional)  Sitting - Dynamic: Fair (occasional)  Standing: Impaired; With support  Standing - Static: Fair;Constant support  Standing - Dynamic : Fair;Constant support  Ambulation/Gait Training:  Distance (ft): 4 Feet (ft) (side [facility] to [facility])  Assistive Device: Gait belt;Walker, rolling  Ambulation - Level of Assistance: Moderate assistance; Additional time     Gait Description (WDL): Exceptions to WDL  Gait Abnormalities: Antalgic;Decreased step clearance; Step to gait (trunk flexed, cues for weight shift/UE support for LE advanc)        Base of Support: Widened  Stance: Left decreased  Speed/Kalie: Slow;Pace decreased (<100 feet/min)  Step Length: Right shortened;Left shortened         Therapeutic Exercises:   APs, quad sets, glut sets perform prior to OOB x10 reps    Functional Measure  Argueta Balance Test:    Sitting to Standin  Standing Unsupported: 0  Sitting with Back Unsupported: 3  Standing to Sittin  Transfers: 0  Standing Unsupported with Eyes Closed: 0  Standing Unsupported with Feet Together: 0  Reach Forward with Outstretched Arm: 0   Object: 0  Turn to Look Over Shoulders: 0  Turn 360 Degrees: 0  Alternate Foot on Step/Stool: 0  Standing Unsupported One Foot in Front: 0  Stand on One Le  Total: 3/56         56=Maximum possible score;   0-20=High fall risk  21-40=Moderate fall risk   41-56=Low fall risk        Pain Rating:  NT; pain noted at groin (skin) and LEs    Activity Tolerance:   Fair and VSS on RA during session events      After treatment patient left in no apparent distress:   Supine in bed, Heels elevated for pressure relief, Call bell within reach, and Side rails x 3    COMMUNICATION/EDUCATION:   The patients plan of care was discussed with: Registered nurse. Fall prevention education was provided and the patient/caregiver indicated understanding., Patient/family have participated as able in goal setting and plan of care. , and Patient/family agree to work toward stated goals and plan of care.     Thank you for this referral.  Anna Carter   Time Calculation: 47 mins

## 2022-06-19 LAB
ANION GAP SERPL CALC-SCNC: 6 MMOL/L (ref 5–15)
BUN SERPL-MCNC: 20 MG/DL (ref 6–20)
BUN/CREAT SERPL: 22 (ref 12–20)
CALCIUM SERPL-MCNC: 8.1 MG/DL (ref 8.5–10.1)
CHLORIDE SERPL-SCNC: 109 MMOL/L (ref 97–108)
CO2 SERPL-SCNC: 28 MMOL/L (ref 21–32)
CREAT SERPL-MCNC: 0.92 MG/DL (ref 0.55–1.02)
ERYTHROCYTE [DISTWIDTH] IN BLOOD BY AUTOMATED COUNT: 14.8 % (ref 11.5–14.5)
GLUCOSE SERPL-MCNC: 98 MG/DL (ref 65–100)
HCT VFR BLD AUTO: 37 % (ref 35–47)
HGB BLD-MCNC: 12.2 G/DL (ref 11.5–16)
MCH RBC QN AUTO: 28.6 PG (ref 26–34)
MCHC RBC AUTO-ENTMCNC: 33 G/DL (ref 30–36.5)
MCV RBC AUTO: 86.7 FL (ref 80–99)
NRBC # BLD: 0 K/UL (ref 0–0.01)
NRBC BLD-RTO: 0 PER 100 WBC
PLATELET # BLD AUTO: 146 K/UL (ref 150–400)
PMV BLD AUTO: 10 FL (ref 8.9–12.9)
POTASSIUM SERPL-SCNC: 3.5 MMOL/L (ref 3.5–5.1)
RBC # BLD AUTO: 4.27 M/UL (ref 3.8–5.2)
SODIUM SERPL-SCNC: 143 MMOL/L (ref 136–145)
WBC # BLD AUTO: 5.7 K/UL (ref 3.6–11)

## 2022-06-19 PROCEDURE — 97165 OT EVAL LOW COMPLEX 30 MIN: CPT

## 2022-06-19 PROCEDURE — 80048 BASIC METABOLIC PNL TOTAL CA: CPT

## 2022-06-19 PROCEDURE — 97535 SELF CARE MNGMENT TRAINING: CPT

## 2022-06-19 PROCEDURE — 74011250637 HC RX REV CODE- 250/637: Performed by: INTERNAL MEDICINE

## 2022-06-19 PROCEDURE — 65270000046 HC RM TELEMETRY

## 2022-06-19 PROCEDURE — 85027 COMPLETE CBC AUTOMATED: CPT

## 2022-06-19 PROCEDURE — 92610 EVALUATE SWALLOWING FUNCTION: CPT | Performed by: SPEECH-LANGUAGE PATHOLOGIST

## 2022-06-19 PROCEDURE — 74011250637 HC RX REV CODE- 250/637: Performed by: HOSPITALIST

## 2022-06-19 PROCEDURE — 36415 COLL VENOUS BLD VENIPUNCTURE: CPT

## 2022-06-19 RX ORDER — POTASSIUM CHLORIDE 750 MG/1
20 TABLET, FILM COATED, EXTENDED RELEASE ORAL
Status: COMPLETED | OUTPATIENT
Start: 2022-06-19 | End: 2022-06-19

## 2022-06-19 RX ADMIN — Medication: at 09:28

## 2022-06-19 RX ADMIN — Medication: at 18:32

## 2022-06-19 RX ADMIN — MEGESTROL ACETATE 20 MG: 40 SUSPENSION ORAL at 09:28

## 2022-06-19 RX ADMIN — APIXABAN 10 MG: 5 TABLET, FILM COATED ORAL at 21:32

## 2022-06-19 RX ADMIN — AMLODIPINE BESYLATE 5 MG: 5 TABLET ORAL at 09:27

## 2022-06-19 RX ADMIN — AMIODARONE HYDROCHLORIDE 200 MG: 200 TABLET ORAL at 09:27

## 2022-06-19 RX ADMIN — POTASSIUM CHLORIDE 20 MEQ: 750 TABLET, FILM COATED, EXTENDED RELEASE ORAL at 18:38

## 2022-06-19 RX ADMIN — Medication 1000 UNITS: at 09:27

## 2022-06-19 RX ADMIN — CYANOCOBALAMIN TAB 500 MCG 1000 MCG: 500 TAB at 09:33

## 2022-06-19 RX ADMIN — APIXABAN 10 MG: 5 TABLET, FILM COATED ORAL at 09:27

## 2022-06-19 NOTE — PROGRESS NOTES
[facility] Adult  Hospitalist Group                                                                                          Hospitalist Progress Note  Polina Briggs MD  Answering service: [phone] from in house phone        Date of Service:  2022  NAME:  Viv Bird  :  1950  MRN:  [id number]      Admission Summary:   Jesus Nathan a 67 y. o. female who presents to [address] with complaints of increasing swelling and weakness on her left side since yesterday evening. More weaker in her left leg.  History is rather limited though patient says she was in her usual state of health when she slipped yesterday noted having more trouble getting up.  This morning says she really can get up and using her left leg where she notes more prominent swelling.  She then came into the ER as a stroke alert CT CTA were obtained without any significant findings other than concern for malignancy as well as a possible pulmonary embolism.  Neurology consultation was then requested. Cecelia Carreon is on Eliquis for A. fib says she is compliant with her meds.  Does not really endorse much weakness.  Seems indicate that it similar to when she was in the hospital in January with weakness for 2 weeks with a negative MRI.  Presumably went back to normal in the interval.    Normally she can get up and walk around but was unable to do so this morning because of this weakness.  She states that she still weak.  There is no headache, no history of any fever or chill, no nausea or vomiting and no other acute symptomatology. Has chronically swollen lower extremities.  She states her skin is gotten extremely dry recently. Ganesh Wei has no shortness of breath, nausea or vomiting and no other GI or  symptoms.        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  Pt was previously unaware of this mass, has not been to a dentist recently  ENT consultation    CTA finding: Partially visualized small acute PE in the distal right main pulmonary artery  Increased Eliquis to VTE dosing for now  Heme/onc consult given failure of Eliquis and likely new malignancy  Will switch to heparin drip if ENT requires any surgical intervention on tonsillar mass (may need radiation prior to surgery)    Multinodular thyroid gland  TSH wnl  F/u OP    Chronic a fib: continue amiodarone, Eliquis    Chronic leg lymphedema/skin changes: wound care      Code status: Full Code  Prophylaxis: Eliquis high dose  Care Plan discussed with: Patient/Family  Anticipated Disposition: IPR   Anticipated Discharge: Greater than 48 hours pending ENT and heme/onc and CM dispo assistance     Hospital Problems  Date Reviewed: 5/20/2022          Codes Class Noted POA    CVA (cerebral vascular accident) [facility]) ICD-10-CM: I63.9  ICD-9-CM: 434.91  6/17/2022 Unknown              Review of Systems:   Pertinent items are noted in HPI    Vital Signs:    Last 24hrs VS reviewed since prior progress note. Most recent are:  Visit Vitals  /67   Pulse 72   Temp 98.4 °F (36.9 °C)   Resp 11   Wt 68.9 kg (151 lb 14.4 oz)   SpO2 98%   BMI 23.79 kg/m²         Intake/Output Summary (Last 24 hours) at 6/19/2022 1747  Last data filed at 6/19/2022 0000  Gross per 24 hour   Intake 120 ml   Output --   Net 120 ml        Physical Examination:   I had a face to face encounter with this patient and independently examined them on 6/19/2022 as outlined below:    General: Alert, cooperative, no acute distress    EENT:  EOMI. Anicteric sclerae. MMM  Resp:  CTA bilaterally, no wheezing or rales. No accessory muscle use  CV:  Irregular, No audible murmur  GI:  Soft, Non distended, Non tender  Neurologic:  Alert and oriented, normal speech, PATRICIO, UE>LE  Extremities: Chronic lymphedema LLE>RLE, no cyanosis  Psych:   Not anxious or agitated  Skin:  Lymphedema b/l, chronic skin changes, no acute cellulitis, No jaundice       Data Review:   I personally reviewed: vitals, labs, imaging results, notes    Labs:     Recent Labs     06/19/22  0452 06/17/22  1247   WBC 5.7 7.1   HGB 12.2 13.4   HCT 37.0 40.1   * 150     Recent Labs     06/19/22  0452 06/17/22  1247    139   K 3.5 4.1   * 107   CO2 28 26   BUN 20 22*   CREA 0.92 1.15*   GLU 98 80   CA 8.1* 9.6     Recent Labs     06/17/22  1247   ALT 28   AP 68   TBILI 0.9   TP 7.5   ALB 3.5   GLOB 4.0     Recent Labs     06/17/22  1247   INR 1.1   PTP 11.7*   APTT 27.1      No results for input(s): FE, TIBC, PSAT, FERR in the last 72 hours. Lab Results   Component Value Date/Time    Folate 6.9 01/12/2022 02:21 AM      No results for input(s): PH, PCO2, PO2 in the last 72 hours. No results for input(s): CPK, CKNDX, TROIQ in the last 72 hours.     No lab exists for component: CPKMB  Lab Results   Component Value Date/Time    Cholesterol, total 164 06/18/2022 02:20 AM    HDL Cholesterol 54 06/18/2022 02:20 AM    LDL, calculated 96 06/18/2022 02:20 AM    Triglyceride 70 06/18/2022 02:20 AM    CHOL/HDL Ratio 3.0 06/18/2022 02:20 AM     No results found for: [facility]  Lab Results   Component Value Date/Time    Color YELLOW/STRAW 06/17/2022 02:16 PM    Appearance CLEAR 06/17/2022 02:16 PM    Specific gravity 1.020 06/17/2022 02:16 PM    Specific gravity 1.019 01/11/2022 04:24 PM    pH (UA) 5.5 06/17/2022 02:16 PM    Protein 30 (A) 06/17/2022 02:16 PM    Glucose Negative 06/17/2022 02:16 PM    Ketone 15 (A) 06/17/2022 02:16 PM    Bilirubin Negative 06/17/2022 02:16 PM    Urobilinogen 0.2 06/17/2022 02:16 PM    Nitrites Negative 06/17/2022 02:16 PM    Leukocyte Esterase Negative 06/17/2022 02:16 PM    Epithelial cells FEW 06/17/2022 02:16 PM    Bacteria Negative 06/17/2022 02:16 PM    WBC 0-4 06/17/2022 02:16 PM    RBC 0-5 06/17/2022 02:16 PM       Medications Reviewed:     Current Facility-Administered Medications   Medication Dose Route Frequency    apixaban (ELIQUIS) tablet 10 mg  10 mg Oral Q12H    acetaminophen (TYLENOL) tablet 650 mg  650 mg Oral Q4H PRN    Or    acetaminophen (TYLENOL) solution 650 mg  650 mg Per NG tube Q4H PRN    Or    acetaminophen (TYLENOL) suppository 650 mg  650 mg Rectal Q4H PRN    labetaloL (NORMODYNE;TRANDATE) injection 5 mg  5 mg IntraVENous Q10MIN PRN    amiodarone (CORDARONE) tablet 200 mg  200 mg Oral DAILY    amLODIPine (NORVASC) tablet 5 mg  5 mg Oral DAILY    cholecalciferol (VITAMIN D3) (1000 Units /25 mcg) tablet 1,000 Units  1,000 Units Oral DAILY    cyanocobalamin (VITAMIN B12) tablet 1,000 mcg  1,000 mcg Oral DAILY    traMADoL (ULTRAM) tablet 50 mg  50 mg Oral Q6H PRN    megestroL (MEGACE) 400 mg/10 mL (10 mL) oral suspension 20 mg  20 mg Oral DAILY    balsam peru-castor oiL (VENELEX) ointment   Topical BID    ammonium lactate (LAC-HYDRIN) 12 % lotion   Topical BID     ______________________________________________________________________  EXPECTED LENGTH OF STAY: - - -  ACTUAL LENGTH OF STAY:          2                 Polina Briggs MD

## 2022-06-19 NOTE — PROGRESS NOTES
SPEECH PATHOLOGY BEDSIDE SWALLOW EVALUATION/DISCHARGE  Patient: Flako May (67 y.o. female)  Date: 6/19/2022  Primary Diagnosis: CVA (cerebral vascular accident) [facility]) [I63.9]       Precautions:   Fall    ASSESSMENT :  Based on the objective data described below, the patient presents with no oral or pharyngeal dysphagia with timely and complete mastication, timely swallow initiation and functional hyolaryngeal elevation/excursion via palpation. No s/s of aspiration observed including with rapid successive straw sips thins. Note patient with tonsillar mass with ENT consult pending. Depending on results of ENT evaluation and plan of care, patient will likely need re-assessment of swallowing in the future if intervention is planned (risks for dysphagia can be present post interventions). Education provided to patient to speak with her doctors if any difficulty swallowing arises and request re-assessment of swallow. She had good understanding and appropriate questions. Skilled acute therapy provided by a speech-language pathologist is not indicated at this time. PLAN :  Recommendations:  -recommend continue regular diet. No further SLP needs at this time. Discharge Recommendations: None     SUBJECTIVE:   Patient stated they've gone over that three times already. I know it all. When asked if she was aware of why ENT was going to be consulted before discussing dysphagia risks.     OBJECTIVE:     Past Medical History:   Diagnosis Date    History of mammogram 2010    normal    Hypertension     Pap smear for cervical cancer screening 2011    normal     Past Surgical History:   Procedure Laterality Date    HX GYN      hystterectomy    PA COMPRE EP EVAL ABLTJ ATR FIB PULM VEIN ISOLATION N/A 11/30/2020    ABLATION A-FIB  W COMPLETE EP STUDY performed by John Christopher MD at [facility] CARDIAC CATH LAB    PA ICAR CATHETER ABLATION ARRHYTHMIA ADD ON N/A 11/30/2020    Ablation Svt/Vt Add On performed by Deyanne Saint, MD at [address]  CARDIAC CATH LAB    ND INTRACARD ECHO, THER/DX INTERVENT N/A 11/30/2020    Intracardiac Echocardiogram performed by Deyanne Saint, MD at [facility] CARDIAC CATH LAB    ND INTRACARDIAC ELECTROPHYSIOLOGIC 3D MAPPING N/A 11/30/2020    Ep 3d Mapping performed by Deyanne Saint, MD at [facility] CARDIAC CATH LAB    ND STIM/PACING HEART POST IV DRUG INFU N/A 11/30/2020    Drug Stimulation performed by Deyanne Saint, MD at [facility] CARDIAC CATH LAB     Prior Level of Function/Home Situation:   Home Situation  Home Environment: Private residence (duplex)  # Steps to Enter: 5  Rails to Enter: Yes  Hand Rails : Right  One/Two Story Residence: Two story  # of Interior Steps: 7 (+7 (split))  [employer]: Right  Living Alone: Yes  Support Systems:  (pt lives with others in her duplex)  Patient Expects to be Discharged to[de-identified] Other: (unknown)  Current DME Used/Available at Home: Burnetta Angel, rollator,Walker, rolling,Shower chair (Numerify cane)  Tub or Shower Type: Tub/Shower combination  Diet prior to admission: regular  Current Diet:  regular   Cognitive and Communication Status:  Neurologic State: Alert  Orientation Level: Oriented X4  Cognition: Appropriate decision making,Appropriate for age attention/concentration  Perception: Appears intact  Perseveration: No perseveration noted  Safety/Judgement: Awareness of environment  Oral Assessment:  Oral Assessment  Labial: No impairment  Oral Hygiene: moist mucosa   Lingual: No impairment  Mandible: No impairment  P.O. Trials:  Patient Position: upright in bed   Vocal quality prior to P.O.: No impairment  Consistency Presented: Thin liquid; Solid  How Presented: Self-fed/presented;Straw;Successive swallows     Bolus Acceptance: No impairment  Bolus Formation/Control: No impairment     Propulsion: No impairment  Oral Residue: None  Initiation of Swallow: No impairment  Laryngeal Elevation: Functional  Aspiration Signs/Symptoms: None  Pharyngeal Phase Characteristics: No impairment, issues, or problems              Oral Phase Severity: No impairment  Pharyngeal Phase Severity : No impairment  NOMS:   The NOMS functional outcome measure was used to quantify this patient's level of swallowing impairment. Based on the NOMS, the patient was determined to be at level 7 for swallow function     NOMS Swallowing Levels:  Level 1 (CN): NPO  Level 2 (CM): NPO but takes consistency in therapy  Level 3 (CL): Takes less than 50% of nutrition p.o. and continues with nonoral feedings; and/or safe with mod cues; and/or max diet restriction  Level 4 (CK): Safe swallow but needs mod cues; and/or mod diet restriction; and/or still requires some nonoral feeding/supplements  Level 5 (CJ): Safe swallow with min diet restriction; and/or needs min cues  Level 6 (CI): Independent with p.o.; rare cues; usually self cues; may need to avoid some foods or needs extra time  Level 7 ([address] [place] [address]): Independent for all p.o.  BIANKA. (2003). National Outcomes Measurement System (NOMS): Adult Speech-Language Pathology User's Guide. Pain:           After treatment:   Patient left in no apparent distress in bed, Call bell within reach and Nursing notified    COMMUNICATION/EDUCATION:     The patient's plan of care including recommendations, planned interventions, and recommended diet changes were discussed with: Registered nurse. Thank you for this referral.  China Tucker M.CD.  CCC-SLP   Time Calculation: 10 mins

## 2022-06-19 NOTE — PROGRESS NOTES
Problem: Self Care Deficits Care Plan (Adult)  Goal: *Acute Goals and Plan of Care (Insert Text)  Description: FUNCTIONAL STATUS PRIOR TO ADMISSION: Patient was modified independent using a RW/cane for functional mobility on most days, used a wheelchair on other days. Was primarily completing sponge baths instead of showers. Independent in dressing/grooming ADL, used an roro for grocery delivery    HOME SUPPORT: Patient lives in communal living with separate bedrooms and shared kitchen/bathroom    Occupational Therapy Goals  Initiated 6/19/2022   1. Patient will perform grooming with modified independence within 7 day(s). 2.  Patient will perform toileting with minimal assistance/contact guard assist within 7 day(s). 3.  Patient will perform lower body dressing with minimal assistance/contact guard assist within 7 day(s). 4.  Patient will perform toilet transfers with moderate assistance  within 7 day(s). 5.  Patient will perform upper body dressing with moderate assistance  within 7 day(s). 6.  Patient will participate in upper extremity therapeutic exercise/activities with minimal assistance/contact guard assist within 7 day(s). 7.  Patient will utilize energy conservation techniques during functional activities with verbal cues within 7 day(s). 8.  Patient will improve their Fugl Khanna score by 5 points in prep for ADLs within 7 days. Outcome: Not Met   OCCUPATIONAL THERAPY EVALUATION  Patient: Genna Sheldon (67 y.o. female)  Date: 6/19/2022  Primary Diagnosis: CVA (cerebral vascular accident) [facility]) [I63.9]        Precautions:  Fall    ASSESSMENT  Based on the objective data described below, the patient presents with impaired balance, strength in LUE, ROM, and ability to complete ADL at baseline. Patient received reclined in bed, amenable to session. Requires Mod-Max A to complete transfers to EOB and then bedside commode.  Patient with LE edema and poor skin integrity reporting transfers to standing extremely more difficult than baseline. Patient voided, required Max A to transfer off BSC and total A for pericare. Patient's LUE remains below baseline strength, MMT grossly 4-/5 throughout. As patient remains well below baseline and has limited support at home, will likely benefit from IPR to maximize ADL independence and safety, if not then discharge to SNF. Current Level of Function Impacting Discharge (ADLs/self-care): Max A transfers, total A ADL    Functional Outcome Measure: The patient scored Total A-D  Total A-D (Motor Function): 61/66 on the Fugl-Khanna Assessment which is indicative of mild impairment in upper extremity functional status. Other factors to consider for discharge: well below baseline, limited home support     Patient will benefit from skilled therapy intervention to address the above noted impairments. PLAN :  Recommendations and Planned Interventions: self care training, functional mobility training, therapeutic exercise, balance training, therapeutic activities, endurance activities, neuromuscular re-education, patient education, home safety training, and family training/education    Frequency/Duration: Patient will be followed by occupational therapy 5 times a week to address goals.     Recommendation for discharge: (in order for the patient to meet his/her long term goals)  IPR, if not then SNF    This discharge recommendation:  Has not yet been discussed the attending provider and/or case management    IF patient discharges home will need the following DME: patient owns DME required for discharge       SUBJECTIVE:   Patient stated I can't believe how much weaker I am.    OBJECTIVE DATA SUMMARY:   HISTORY:   Past Medical History:   Diagnosis Date    History of mammogram 2010    normal    Hypertension     Pap smear for cervical cancer screening 2011    normal     Past Surgical History:   Procedure Laterality Date    HX GYN      hystterectomy    AVEL Brower EP EVAL ABLTJ ATR FIB PULM VEIN ISOLATION N/A 11/30/2020    ABLATION A-FIB  W COMPLETE EP STUDY performed by Tamra Castro MD at [facility]"" CARDIAC CATH LAB    DC ICAR CATHETER ABLATION ARRHYTHMIA ADD ON N/A 11/30/2020    Ablation Svt/Vt Add On performed by Tamra Castro MD at [facility] CARDIAC CATH LAB    DC INTRACARD ECHO, THER/DX INTERVENT N/A 11/30/2020    Intracardiac Echocardiogram performed by Tamra Castro MD at [facility]"" CARDIAC CATH LAB    DC INTRACARDIAC ELECTROPHYSIOLOGIC 3D MAPPING N/A 11/30/2020    Ep 3d Mapping performed by Tamra Castro MD at \[facility]"" CARDIAC CATH LAB    DC STIM/PACING HEART POST IV DRUG INFU N/A 11/30/2020    Drug Stimulation performed by Tamra Castro MD at [place] [address] LAB     Expanded or extensive additional review of patient history:     Home Situation  Home Environment: Private residence  # Steps to Enter: 5  Rails to Enter: Yes  Hand Rails : Right  One/Two Story Residence: Two story  # of Interior Steps: 7 (split level)  [employer]: Right  Living Alone: Yes  Support Systems:  (other members in duplex)  Patient Expects to be Discharged to[de-identified] Other: (unknown)  Current DME Used/Available at Home: Cecy Minus, rollator,Walker, rolling,Shower chair  Tub or Shower Type: Tub/Shower combination (mostly sponge bathes)    Hand dominance: Right    EXAMINATION OF PERFORMANCE DEFICITS:  Cognitive/Behavioral Status:  Neurologic State: Alert  Orientation Level: Oriented X4  Cognition: Appropriate decision making; Appropriate for age attention/concentration  Perception: Appears intact  Perseveration: No perseveration noted  Safety/Judgement: Awareness of environment    Skin: see WOCN note    Edema: BLE    Hearing:   Auditory  Auditory Impairment: None    Vision/Perceptual:    Tracking: Able to track stimulus in all quadrants w/o difficulty                           Corrective Lenses: Reading glasses    Range of Motion:    AROM: Generally decreased, functional  PROM: Generally decreased, functional (R shoulder arthritis baseline)                      Strength:  Strength: Generally decreased, functional                Coordination:  Coordination: Generally decreased, functional            Tone & Sensation:  Tone: Normal  Sensation: Impaired (BLE hypersensitivity)                      Balance:  Sitting: Impaired  Sitting - Static: Fair (occasional)  Sitting - Dynamic: Fair (occasional)  Standing: Impaired  Standing - Static: Constant support; Fair  Standing - Dynamic : Constant support;Poor    Functional Mobility and Transfers for ADLs:  Bed Mobility:  Supine to Sit: Moderate assistance;Bed Modified; Additional time  Sit to Supine: Moderate assistance; Additional time  Scooting: Maximum assistance; Additional time;Bed Modified    Transfers:  Sit to Stand: Maximum assistance; Additional time  Stand to Sit: Moderate assistance; Additional time  Toilet Transfer : Maximum assistance; Additional time [facility])    ADL Assessment:  Feeding: Setup    Oral Facial Hygiene/Grooming: Minimum assistance    Bathing: Maximum assistance    Upper Body Dressing: Moderate assistance    Lower Body Dressing: Total assistance    Toileting: Maximum assistance                ADL Intervention and task modifications:                           Lower Body Dressing Assistance  Protective Undergarmet: Maximum assistance  Socks:  Total assistance (dependent)  Position Performed: Supine    Toileting  Toileting Assistance: Maximum assistance  Bladder Hygiene: Maximum assistance  Bowel Hygiene: Maximum assistance  Clothing Management: Maximum assistance    Cognitive Retraining  Safety/Judgement: Awareness of environment       Functional Measure:  Fugl-Khanna Assessment of Motor Recovery after Stroke:   Reflex Activity  Flexors/Biceps/Fingers: Can be elicited  Extensors/Triceps: Can be elicited  Reflex Subtotal: 4    Volitional Movement Within Synergies  Shoulder Retraction: Full  Shoulder Elevation: Partial  Shoulder Abduction (90 degrees): Partial  Shoulder External Rotation: Full  Elbow Flexion: Full  Forearm Supination: Full  Shoulder Adduction/Internal Rotation: Full  Elbow Extension: Full  Forearm Pronation: Full  Subtotal: 16    Volitional Movement Mixing Synergies  Hand to Lumbar Spine: Full  Shoulder Flexion (0-90 degrees): Full  Pronation-Supination: Full  Subtotal: 6    Volitional Movement With Little or No Synergy  Shoulder Abduction (0-90 degrees): Full  Shoulder Flexion ( degrees): Full  Pronation/Supination: Full  Subtotal : 6    Normal Reflex Activity  Biceps, Triceps, Finger Flexors: Full  Subtotal : 2    Upper Extremity Total   Upper Extremity Total: 34    Wrist  Stability at 15 Degree Dorsiflexion: Full  Repeated Dorsiflexion/ Volar Flexion: Full  Stability at 15 Degree Dorsiflexion: Full  Repeated Dorsiflexion/ Volar Flexion: Full  Circumduction: Full  Wrist Total: 10    Hand  Mass Flexion: Partial  Mass Extension: Partial  Grasp A: Full  Grasp B: Full  Grasp C: Full  Grasp D: Full  Grasp E: Full  Hand Total: 12    Coordination/Speed  Tremor: None  Dysmetria: Slight  Time: <1s  Coordination/Speed Total : 5    Total A-D  Total A-D (Motor Function): 61/66     This is a reliable/valid measure of arm function after a neurological event. It has established value to characterize functional status and for measuring spontaneous and therapy-induced recovery; tests proximal and distal motor functions. Fugl-Khanna Assessment - UE scores recorded between five and 30 days post neurologic event can be used to predict UE recovery at six months post neurologic event. Severe = 0-21 points   Moderately Severe = 22-33 points   Moderate = 34-47 points   Mild = 48-66 points  CYNTHIA Pond, YOEL Benavidez, & ELIZ Goldman (1992). Measurement of motor recovery after stroke: Outcome assessment and sample size requirements. Stroke, 23, pp. 9537-8997. ------------------------------------------------------------------------------------------------------------------------------------------------------------------  MCID:  Stroke:   [place] Ravi et al, 2001; n = 171; mean age 79 (6) years; assessed within 16 (12) days of stroke, Acute Stroke)  FMA Motor Scores from Admission to Discharge   10 point increase in FMA Upper Extremity = 1.5 change in discharge FIM   10 point increase in FMA Lower Extremity = 1.9 change in discharge FIM  MDC:   Stroke:   Nasim Ramires et al, 2008, n = 14, mean age = 59.9 (14.6) years, assessed on average 14 (6.5) months post stroke, Chronic Stroke)   FMA = 5.2 points for the Upper Extremity portion of the assessment     Occupational Therapy Evaluation Charge Determination   History Examination Decision-Making   MEDIUM Complexity : Expanded review of history including physical, cognitive and psychosocial  history  LOW Complexity : 1-3 performance deficits relating to physical, cognitive , or psychosocial skils that result in activity limitations and / or participation restrictions  LOW Complexity : No comorbidities that affect functional and no verbal or physical assistance needed to complete eval tasks       Based on the above components, the patient evaluation is determined to be of the following complexity level: LOW   Pain Rating:  None reported    Activity Tolerance:   Fair and requires rest breaks    After treatment patient left in no apparent distress:    Supine in bed, Heels elevated for pressure relief, Call bell within reach, and Side rails x 3    COMMUNICATION/EDUCATION:   The patients plan of care was discussed with: Physical therapist and Registered nurse. Home safety education was provided and the patient/caregiver indicated understanding., Patient/family have participated as able in goal setting and plan of care. , and Patient/family agree to work toward stated goals and plan of care.     This patients plan of care is appropriate for delegation to [facility].     Thank you for this referral.  Mckinley Alvarez OT  Time Calculation: 40 mins

## 2022-06-19 NOTE — PROGRESS NOTES
[facility] Adult  Hospitalist Group                                                                                          Hospitalist Progress Note  Deepali Wong MD  Answering service: [phone] from in house phone        Date of Service:  2022  NAME:  Michael Francis  :  1950  MRN:  [id number]      Admission Summary:   Az Para a 67 y. o. female who presents to [facility] with complaints of increasing swelling and weakness on her left side since yesterday evening. More weaker in her left leg.  History is rather limited though patient says she was in her usual state of health when she slipped yesterday noted having more trouble getting up.  This morning says she really can get up and using her left leg where she notes more prominent swelling.  She then came into the ER as a stroke alert CT CTA were obtained without any significant findings other than concern for malignancy as well as a possible pulmonary embolism.  Neurology consultation was then requested. Trinidad Martinez is on Eliquis for A. fib says she is compliant with her meds.  Does not really endorse much weakness.  Seems indicate that it similar to when she was in the hospital in January with weakness for 2 weeks with a negative MRI.  Presumably went back to normal in the interval.    Normally she can get up and walk around but was unable to do so this morning because of this weakness.  She states that she still weak.  There is no headache, no history of any fever or chill, no nausea or vomiting and no other acute symptomatology. Has chronically swollen lower extremities.  She states her skin is gotten extremely dry recently. Shannon Brown has no shortness of breath, nausea or vomiting and no other GI or  symptoms.        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  Pt was previously unaware of this mass, has not been to a dentist recently  ENT consultation    CTA finding: Partially visualized small acute PE in the distal right main pulmonary artery  Increased Eliquis to VTE dosing for now  Heme/onc consult given failure of Eliquis and likely new malignancy  Will switch to heparin drip if ENT requires any surgical intervention on tonsillar mass (may need radiation prior to surgery)    Multinodular thyroid gland  TSH wnl  F/u OP    Chronic a fib: continue amiodarone, Eliquis    Chronic leg lymphedema/skin changes: wound care      Code status: Full Code  Prophylaxis: Eliquis high dose  Care Plan discussed with: Patient/Family  Anticipated Disposition: IPR   Anticipated Discharge: Greater than 48 hours pending ENT and heme/onc and CM dispo assistance     Hospital Problems  Date Reviewed: 5/20/2022          Codes Class Noted POA    CVA (cerebral vascular accident) [facility]) ICD-10-CM: I63.9  ICD-9-CM: 434.91  6/17/2022 Unknown              Review of Systems:   Pertinent items are noted in HPI    Vital Signs:    Last 24hrs VS reviewed since prior progress note. Most recent are:  Visit Vitals  /67   Pulse 72   Temp 98.4 °F (36.9 °C)   Resp 11   Wt 68.9 kg (151 lb 14.4 oz)   SpO2 98%   BMI 23.79 kg/m²         Intake/Output Summary (Last 24 hours) at 6/19/2022 1747  Last data filed at 6/19/2022 0000  Gross per 24 hour   Intake 120 ml   Output --   Net 120 ml        Physical Examination:   I had a face to face encounter with this patient and independently examined them on 6/19/2022 as outlined below:    General: Alert, cooperative, no acute distress    EENT:  EOMI. Anicteric sclerae. MMM  Resp:  CTA bilaterally, no wheezing or rales. No accessory muscle use  CV:  Irregular, No audible murmur  GI:  Soft, Non distended, Non tender  Neurologic:  Alert and oriented, normal speech, PATRICIO, UE>LE  Extremities: Chronic lymphedema LLE>RLE, no cyanosis  Psych:   Not anxious or agitated  Skin:  Lymphedema b/l, chronic skin changes, no acute cellulitis, No jaundice       Data Review:   I personally reviewed: vitals, labs, imaging results, notes    Labs:     Recent Labs     06/19/22  0452 06/17/22  1247   WBC 5.7 7.1   HGB 12.2 13.4   HCT 37.0 40.1   * 150     Recent Labs     06/19/22  0452 06/17/22  1247    139   K 3.5 4.1   * 107   CO2 28 26   BUN 20 22*   CREA 0.92 1.15*   GLU 98 80   CA 8.1* 9.6     Recent Labs     06/17/22  1247   ALT 28   AP 68   TBILI 0.9   TP 7.5   ALB 3.5   GLOB 4.0     Recent Labs     06/17/22  1247   INR 1.1   PTP 11.7*   APTT 27.1      No results for input(s): FE, TIBC, PSAT, FERR in the last 72 hours. Lab Results   Component Value Date/Time    Folate 6.9 01/12/2022 02:21 AM      No results for input(s): PH, PCO2, PO2 in the last 72 hours. No results for input(s): CPK, CKNDX, TROIQ in the last 72 hours.     No lab exists for component: CPKMB  Lab Results   Component Value Date/Time    Cholesterol, total 164 06/18/2022 02:20 AM    HDL Cholesterol 54 06/18/2022 02:20 AM    LDL, calculated 96 06/18/2022 02:20 AM    Triglyceride 70 06/18/2022 02:20 AM    CHOL/HDL Ratio 3.0 06/18/2022 02:20 AM     No results found for: [facility]  Lab Results   Component Value Date/Time    Color YELLOW/STRAW 06/17/2022 02:16 PM    Appearance CLEAR 06/17/2022 02:16 PM    Specific gravity 1.020 06/17/2022 02:16 PM    Specific gravity 1.019 01/11/2022 04:24 PM    pH (UA) 5.5 06/17/2022 02:16 PM    Protein 30 (A) 06/17/2022 02:16 PM    Glucose Negative 06/17/2022 02:16 PM    Ketone 15 (A) 06/17/2022 02:16 PM    Bilirubin Negative 06/17/2022 02:16 PM    Urobilinogen 0.2 06/17/2022 02:16 PM    Nitrites Negative 06/17/2022 02:16 PM    Leukocyte Esterase Negative 06/17/2022 02:16 PM    Epithelial cells FEW 06/17/2022 02:16 PM    Bacteria Negative 06/17/2022 02:16 PM    WBC 0-4 06/17/2022 02:16 PM    RBC 0-5 06/17/2022 02:16 PM       Medications Reviewed:     Current Facility-Administered Medications   Medication Dose Route Frequency    apixaban (ELIQUIS) tablet 10 mg  10 mg Oral Q12H    acetaminophen (TYLENOL) tablet 650 mg  650 mg Oral Q4H PRN    Or    acetaminophen (TYLENOL) solution 650 mg  650 mg Per NG tube Q4H PRN    Or    acetaminophen (TYLENOL) suppository 650 mg  650 mg Rectal Q4H PRN    labetaloL (NORMODYNE;TRANDATE) injection 5 mg  5 mg IntraVENous Q10MIN PRN    amiodarone (CORDARONE) tablet 200 mg  200 mg Oral DAILY    amLODIPine (NORVASC) tablet 5 mg  5 mg Oral DAILY    cholecalciferol (VITAMIN D3) (1000 Units /25 mcg) tablet 1,000 Units  1,000 Units Oral DAILY    cyanocobalamin (VITAMIN B12) tablet 1,000 mcg  1,000 mcg Oral DAILY    traMADoL (ULTRAM) tablet 50 mg  50 mg Oral Q6H PRN    megestroL (MEGACE) 400 mg/10 mL (10 mL) oral suspension 20 mg  20 mg Oral DAILY    balsam peru-castor oiL (VENELEX) ointment   Topical BID    ammonium lactate (LAC-HYDRIN) 12 % lotion   Topical BID     ______________________________________________________________________  EXPECTED LENGTH OF STAY: - - -  ACTUAL LENGTH OF STAY:          2                 Keanu Cruz MD

## 2022-06-19 NOTE — PROGRESS NOTES
SPEECH PATHOLOGY BEDSIDE SWALLOW EVALUATION/DISCHARGE  Patient: Gray Talley (67 y.o. female)  Date: 6/19/2022  Primary Diagnosis: CVA (cerebral vascular accident) [facility]) [I63.9]       Precautions:   Fall    ASSESSMENT :  Based on the objective data described below, the patient presents with no oral or pharyngeal dysphagia with timely and complete mastication, timely swallow initiation and functional hyolaryngeal elevation/excursion via palpation. No s/s of aspiration observed including with rapid successive straw sips thins. Note patient with tonsillar mass with ENT consult pending. Depending on results of ENT evaluation and plan of care, patient will likely need re-assessment of swallowing in the future if intervention is planned (risks for dysphagia can be present post interventions). Education provided to patient to speak with her doctors if any difficulty swallowing arises and request re-assessment of swallow. She had good understanding and appropriate questions. Skilled acute therapy provided by a speech-language pathologist is not indicated at this time. PLAN :  Recommendations:  -recommend continue regular diet. No further SLP needs at this time. Discharge Recommendations: None     SUBJECTIVE:   Patient stated they've gone over that three times already. I know it all. When asked if she was aware of why ENT was going to be consulted before discussing dysphagia risks.     OBJECTIVE:     Past Medical History:   Diagnosis Date    History of mammogram 2010    normal    Hypertension     Pap smear for cervical cancer screening 2011    normal     Past Surgical History:   Procedure Laterality Date    HX GYN      hystterectomy    [place] COMPRE EP EVAL ABLTJ ATR FIB PULM VEIN ISOLATION N/A 11/30/2020    ABLATION A-FIB  W COMPLETE EP STUDY performed by Jose Walsh MD at [facility]ARDIAC CATH LAB    [place] ICAR CATHETER ABLATION ARRHYTHMIA ADD ON N/A 11/30/2020    Ablation Svt/Vt Add On performed by Damari Juarez MD at [facility] CARDIAC CATH LAB    [place] INTRACARD ECHO, THER/DX INTERVENT N/A 11/30/2020    Intracardiac Echocardiogram performed by Damari [facility] MD at [facility] CARDIAC CATH [facility]    [place] INTRACARDIAC ELECTROPHYSIOLOGIC 3D MAPPING N/A 11/30/2020    Ep 3d Mapping performed by Damari Juarez MD at [facility] CARDIAC CATH LAB    [place] STIM/PACING HEART POST IV DRUG INFU N/A 11/30/2020    Drug Stimulation performed by Damari Juarez MD at [facility] CARDIAC CATH LAB     Prior Level of Function/Home Situation:   Home Situation  Home Environment: Private residence (duplex)  # Steps to Enter: 5  Rails to Enter: Yes  Hand Rails : Right  One/Two Story Residence: Two story  # of Interior Steps: 7 (+7 (split))  [employer]: Right  Living Alone: Yes  Support Systems:  (pt lives with others in her duplex)  Patient Expects to be Discharged to[de-identified] Other: (unknown)  Current DME Used/Available at Home: Felicie Boast, rollator,Walker, rolling,Shower chair ([employer] cane)  Tub or Shower Type: Tub/Shower combination  Diet prior to admission: regular  Current Diet:  regular   Cognitive and Communication Status:  Neurologic State: Alert  Orientation Level: Oriented X4  Cognition: Appropriate decision making,Appropriate for age attention/concentration  Perception: Appears intact  Perseveration: No perseveration noted  Safety/Judgement: Awareness of environment  Oral Assessment:  Oral Assessment  Labial: No impairment  Oral Hygiene: moist mucosa   Lingual: No impairment  Mandible: No impairment  P.O. Trials:  Patient Position: upright in bed   Vocal quality prior to P.O.: No impairment  Consistency Presented: Thin liquid; Solid  How Presented: Self-fed/presented;Straw;Successive swallows     Bolus Acceptance: No impairment  Bolus Formation/Control: No impairment     Propulsion: No impairment  Oral Residue: None  Initiation of Swallow: No impairment  Laryngeal Elevation: Functional  Aspiration Signs/Symptoms: None  Pharyngeal Phase Characteristics: No impairment, issues, or problems              Oral Phase Severity: No impairment  Pharyngeal Phase Severity : No impairment  NOMS:   The NOMS functional outcome measure was used to quantify this patient's level of swallowing impairment. Based on the NOMS, the patient was determined to be at level 7 for swallow function     NOMS Swallowing Levels:  Level 1 (CN): NPO  Level 2 (CM): NPO but takes consistency in therapy  Level 3 (CL): Takes less than 50% of nutrition p.o. and continues with nonoral feedings; and/or safe with mod cues; and/or max diet restriction  Level 4 (CK): Safe swallow but needs mod cues; and/or mod diet restriction; and/or still requires some nonoral feeding/supplements  Level 5 (CJ): Safe swallow with min diet restriction; and/or needs min cues  Level 6 (CI): Independent with p.o.; rare cues; usually self cues; may need to avoid some foods or needs extra time  Level 7 ([address] [place] [address]): Independent for all p.o.  BIANKA. (2003). National Outcomes Measurement System (NOMS): Adult Speech-Language Pathology User's Guide. Pain:           After treatment:   Patient left in no apparent distress in bed, Call bell within reach and Nursing notified    COMMUNICATION/EDUCATION:     The patient's plan of care including recommendations, planned interventions, and recommended diet changes were discussed with: Registered nurse. Thank you for this referral.  Madhu Mccracken M.CD.  CCC-SLP   Time Calculation: 10 mins

## 2022-06-19 NOTE — PROGRESS NOTES
Problem: Pressure Injury - Risk of  Goal: *Prevention of pressure injury  Description: Document Bin Scale and appropriate interventions in the flowsheet.   Outcome: Progressing Towards Goal  Note: Pressure Injury Interventions:  Sensory Interventions: Assess changes in LOC    Moisture Interventions: Check for incontinence Q2 hours and as needed,Offer toileting Q_hr,Moisture barrier,Minimize layers    Activity Interventions: Increase time out of bed,PT/OT evaluation    Mobility Interventions: Float heels,HOB 30 degrees or less,PT/OT evaluation    Nutrition Interventions: Document food/fluid/supplement intake,Discuss nutritional consult with provider    Friction and Shear Interventions: HOB 30 degrees or less                Problem: TIA/CVA Stroke: 0-24 hours  Goal: Off Pathway (Use only if patient is Off Pathway)  Outcome: Progressing Towards Goal  Goal: Activity/Safety  Outcome: Progressing Towards Goal  Goal: Consults, if ordered  Outcome: Progressing Towards Goal  Goal: Diagnostic Test/Procedures  Outcome: Progressing Towards Goal  Goal: Nutrition/Diet  Outcome: Progressing Towards Goal  Goal: Discharge Planning  Outcome: Progressing Towards Goal  Goal: Medications  Outcome: Progressing Towards Goal  Goal: Respiratory  Outcome: Progressing Towards Goal  Goal: Treatments/Interventions/Procedures  Outcome: Progressing Towards Goal  Goal: Minimize risk of bleeding post-thrombolytic infusion  Outcome: Progressing Towards Goal  Goal: Monitor for complications post-thrombolytic infusion  Outcome: Progressing Towards Goal  Goal: Psychosocial  Outcome: Progressing Towards Goal  Goal: *Hemodynamically stable  Outcome: Progressing Towards Goal  Goal: *Neurologically stable  Description: Absence of additional neurological deficits    Outcome: Progressing Towards Goal  Goal: *Verbalizes anxiety and depression are reduced or absent  Outcome: Progressing Towards Goal  Goal: *Absence of Signs of Aspiration on Current Diet  Outcome: Progressing Towards Goal  Goal: *Absence of deep venous thrombosis signs and symptoms(Stroke Metric)  Outcome: Progressing Towards Goal  Goal: *Ability to perform ADLs and demonstrates progressive mobility and function  Outcome: Progressing Towards Goal  Goal: *Stroke education started(Stroke Metric)  Outcome: Progressing Towards Goal  Goal: *Dysphagia screen performed(Stroke Metric)  Outcome: Progressing Towards Goal  Goal: *Rehab consulted(Stroke Metric)  Outcome: Progressing Towards Goal

## 2022-06-19 NOTE — PROGRESS NOTES
Problem: Self Care Deficits Care Plan (Adult)  Goal: *Acute Goals and Plan of Care (Insert Text)  Description: FUNCTIONAL STATUS PRIOR TO ADMISSION: Patient was modified independent using a RW/cane for functional mobility on most days, used a wheelchair on other days. Was primarily completing sponge baths instead of showers. Independent in dressing/grooming ADL, used an roro for grocery delivery    HOME SUPPORT: Patient lives in communal living with separate bedrooms and shared kitchen/bathroom    Occupational Therapy Goals  Initiated 6/19/2022   1. Patient will perform grooming with modified independence within 7 day(s). 2.  Patient will perform toileting with minimal assistance/contact guard assist within 7 day(s). 3.  Patient will perform lower body dressing with minimal assistance/contact guard assist within 7 day(s). 4.  Patient will perform toilet transfers with moderate assistance  within 7 day(s). 5.  Patient will perform upper body dressing with moderate assistance  within 7 day(s). 6.  Patient will participate in upper extremity therapeutic exercise/activities with minimal assistance/contact guard assist within 7 day(s). 7.  Patient will utilize energy conservation techniques during functional activities with verbal cues within 7 day(s). 8.  Patient will improve their Fugl Khanna score by 5 points in prep for ADLs within 7 days. Outcome: Not Met   OCCUPATIONAL THERAPY EVALUATION  Patient: Joya Martino (67 y.o. female)  Date: 6/19/2022  Primary Diagnosis: CVA (cerebral vascular accident) [facility]) [I63.9]        Precautions:  Fall    ASSESSMENT  Based on the objective data described below, the patient presents with impaired balance, strength in LUE, ROM, and ability to complete ADL at baseline. Patient received reclined in bed, amenable to session. Requires Mod-Max A to complete transfers to EOB and then bedside commode.  Patient with LE edema and poor skin integrity reporting transfers to standing extremely more difficult than baseline. Patient voided, required Max A to transfer off BSC and total A for pericare. Patient's LUE remains below baseline strength, MMT grossly 4-/5 throughout. As patient remains well below baseline and has limited support at home, will likely benefit from IPR to maximize ADL independence and safety, if not then discharge to SNF. Current Level of Function Impacting Discharge (ADLs/self-care): Max A transfers, total A ADL    Functional Outcome Measure: The patient scored Total A-D  Total A-D (Motor Function): 61/66 on the Fugl-Khanna Assessment which is indicative of mild impairment in upper extremity functional status. Other factors to consider for discharge: well below baseline, limited home support     Patient will benefit from skilled therapy intervention to address the above noted impairments. PLAN :  Recommendations and Planned Interventions: self care training, functional mobility training, therapeutic exercise, balance training, therapeutic activities, endurance activities, neuromuscular re-education, patient education, home safety training, and family training/education    Frequency/Duration: Patient will be followed by occupational therapy 5 times a week to address goals.     Recommendation for discharge: (in order for the patient to meet his/her long term goals)  IPR, if not then SNF    This discharge recommendation:  Has not yet been discussed the attending provider and/or case management    IF patient discharges home will need the following DME: patient owns DME required for discharge       SUBJECTIVE:   Patient stated I can't believe how much weaker I am.    OBJECTIVE DATA SUMMARY:   HISTORY:   Past Medical History:   Diagnosis Date    History of mammogram 2010    normal    Hypertension     Pap smear for cervical cancer screening 2011    normal     Past Surgical History:   Procedure Laterality Date    HX GYN      hystterectomy    [place] EP EVAL ABLTJ ATR FIB PULM VEIN ISOLATION N/A 11/30/2020    ABLATION A-FIB  W COMPLETE EP STUDY performed by Mena Cotter MD at [facility] CARDIAC CATH LAB    [place] ICAR CATHETER ABLATION ARRHYTHMIA ADD ON N/A 11/30/2020    Ablation Svt/Vt Add On performed by Mena Cotter MD at [facility] CARDIAC CATH LAB    [place] INTRACARD ECHO, THER/DX INTERVENT N/A 11/30/2020    Intracardiac Echocardiogram performed by Mena Cotter MD at [facility] CARDIAC CATH LAB    [place] INTRACARDIAC ELECTROPHYSIOLOGIC 3D MAPPING N/A 11/30/2020    Ep 3d Mapping performed by Mena Cotter MD at [facility] CARDIAC CATH LAB    [place] STIM/PACING HEART POST IV DRUG INFU N/A 11/30/2020    Drug Stimulation performed by M[facility] MD at [facility] LAB     Expanded or extensive additional review of patient history:     Home Situation  Home Environment: Private residence  # Steps to Enter: 5  Rails to Enter: Yes  Hand Rails : Right  One/Two Story Residence: Two story  # of Interior Steps: 7 (split level)  [employer]: Right  Living Alone: Yes  Support Systems:  (other members in duplex)  Patient Expects to be Discharged to[de-identified] Other: (unknown)  Current DME Used/Available at Home: Benetta Dasen, rollator,Walker, rolling,Shower chair  Tub or Shower Type: Tub/Shower combination (mostly sponge bathes)    Hand dominance: Right    EXAMINATION OF PERFORMANCE DEFICITS:  Cognitive/Behavioral Status:  Neurologic State: Alert  Orientation Level: Oriented X4  Cognition: Appropriate decision making; Appropriate for age attention/concentration  Perception: Appears intact  Perseveration: No perseveration noted  Safety/Judgement: Awareness of environment    Skin: see WOCN note    Edema: BLE    Hearing:   Auditory  Auditory Impairment: None    Vision/Perceptual:    Tracking: Able to track stimulus in all quadrants w/o difficulty                           Corrective Lenses: Reading glasses    Range of Motion:    AROM: Generally decreased, functional  PROM: Generally decreased, functional (R shoulder arthritis baseline)                      Strength:  Strength: Generally decreased, functional                Coordination:  Coordination: Generally decreased, functional            Tone & Sensation:  Tone: Normal  Sensation: Impaired (BLE hypersensitivity)                      Balance:  Sitting: Impaired  Sitting - Static: Fair (occasional)  Sitting - Dynamic: Fair (occasional)  Standing: Impaired  Standing - Static: Constant support; Fair  Standing - Dynamic : Constant support;Poor    Functional Mobility and Transfers for ADLs:  Bed Mobility:  Supine to Sit: Moderate assistance;Bed Modified; Additional time  Sit to Supine: Moderate assistance; Additional time  Scooting: Maximum assistance; Additional time;Bed Modified    Transfers:  Sit to Stand: Maximum assistance; Additional time  Stand to Sit: Moderate assistance; Additional time  Toilet Transfer : Maximum assistance; Additional time [facility])    ADL Assessment:  Feeding: Setup    Oral Facial Hygiene/Grooming: Minimum assistance    Bathing: Maximum assistance    Upper Body Dressing: Moderate assistance    Lower Body Dressing: Total assistance    Toileting: Maximum assistance                ADL Intervention and task modifications:                           Lower Body Dressing Assistance  Protective Undergarmet: Maximum assistance  Socks:  Total assistance (dependent)  Position Performed: Supine    Toileting  Toileting Assistance: Maximum assistance  Bladder Hygiene: Maximum assistance  Bowel Hygiene: Maximum assistance  Clothing Management: Maximum assistance    Cognitive Retraining  Safety/Judgement: Awareness of environment       Functional Measure:  Fugl-Khanna Assessment of Motor Recovery after Stroke:   Reflex Activity  Flexors/Biceps/Fingers: Can be elicited  Extensors/Triceps: Can be elicited  Reflex Subtotal: 4    Volitional Movement Within Synergies  Shoulder Retraction: Full  Shoulder Elevation: Partial  Shoulder Abduction (90 degrees): Partial  Shoulder External Rotation: Full  Elbow Flexion: Full  Forearm Supination: Full  Shoulder Adduction/Internal Rotation: Full  Elbow Extension: Full  Forearm Pronation: Full  Subtotal: 16    Volitional Movement Mixing Synergies  Hand to Lumbar Spine: Full  Shoulder Flexion (0-90 degrees): Full  Pronation-Supination: Full  Subtotal: 6    Volitional Movement With Little or No Synergy  Shoulder Abduction (0-90 degrees): Full  Shoulder Flexion ( degrees): Full  Pronation/Supination: Full  Subtotal : 6    Normal Reflex Activity  Biceps, Triceps, Finger Flexors: Full  Subtotal : 2    Upper Extremity Total   Upper Extremity Total: 34    Wrist  Stability at 15 Degree Dorsiflexion: Full  Repeated Dorsiflexion/ Volar Flexion: Full  Stability at 15 Degree Dorsiflexion: Full  Repeated Dorsiflexion/ Volar Flexion: Full  Circumduction: Full  Wrist Total: 10    Hand  Mass Flexion: Partial  Mass Extension: Partial  Grasp A: Full  Grasp B: Full  Grasp C: Full  Grasp D: Full  Grasp E: Full  Hand Total: 12    Coordination/Speed  Tremor: None  Dysmetria: Slight  Time: <1s  Coordination/Speed Total : 5    Total A-D  Total A-D (Motor Function): 61/66     This is a reliable/valid measure of arm function after a neurological event. It has established value to characterize functional status and for measuring spontaneous and therapy-induced recovery; tests proximal and distal motor functions. Fugl-Khanna Assessment - UE scores recorded between five and 30 days post neurologic event can be used to predict UE recovery at six months post neurologic event. Severe = 0-21 points   Moderately Severe = 22-33 points   Moderate = 34-47 points   Mild = 48-66 points  CYNTHIA Simms, YOEL Benavidez, & ELIZ Goldman (1992). Measurement of motor recovery after stroke: Outcome assessment and sample size requirements. Stroke, 23, pp. 0857-8481. ------------------------------------------------------------------------------------------------------------------------------------------------------------------  MCID:  Stroke:   Luana Gant et al, 2001; n = 171; mean age 79 (6) years; assessed within 16 (12) days of stroke, Acute Stroke)  FMA Motor Scores from Admission to Discharge   10 point increase in FMA Upper Extremity = 1.5 change in discharge FIM   10 point increase in FMA Lower Extremity = 1.9 change in discharge FIM  MDC:   Stroke:   Bobo Hansen et al, 2008, n = 14, mean age = 59.9 (14.6) years, assessed on average 14 (6.5) months post stroke, Chronic Stroke)   FMA = 5.2 points for the Upper Extremity portion of the assessment     Occupational Therapy Evaluation Charge Determination   History Examination Decision-Making   MEDIUM Complexity : Expanded review of history including physical, cognitive and psychosocial  history  LOW Complexity : 1-3 performance deficits relating to physical, cognitive , or psychosocial skils that result in activity limitations and / or participation restrictions  LOW Complexity : No comorbidities that affect functional and no verbal or physical assistance needed to complete eval tasks       Based on the above components, the patient evaluation is determined to be of the following complexity level: LOW   Pain Rating:  None reported    Activity Tolerance:   Fair and requires rest breaks    After treatment patient left in no apparent distress:    Supine in bed, Heels elevated for pressure relief, Call bell within reach, and Side rails x 3    COMMUNICATION/EDUCATION:   The patients plan of care was discussed with: Physical therapist and Registered nurse. Home safety education was provided and the patient/caregiver indicated understanding., Patient/family have participated as able in goal setting and plan of care. , and Patient/family agree to work toward stated goals and plan of care.     This patients plan of care is appropriate for delegation to [facility].     Thank you for this referral.  Cherise Krabbe, OT  Time Calculation: 40 mins

## 2022-06-20 ENCOUNTER — APPOINTMENT (OUTPATIENT)
Dept: CT IMAGING | Age: 72
DRG: 143 | End: 2022-06-20
Attending: INTERNAL MEDICINE
Payer: MEDICARE

## 2022-06-20 LAB
ALBUMIN SERPL-MCNC: 2.7 G/DL (ref 3.5–5)
ANION GAP SERPL CALC-SCNC: 5 MMOL/L (ref 5–15)
BUN SERPL-MCNC: 14 MG/DL (ref 6–20)
BUN/CREAT SERPL: 16 (ref 12–20)
CALCIUM SERPL-MCNC: 8.3 MG/DL (ref 8.5–10.1)
CHLORIDE SERPL-SCNC: 108 MMOL/L (ref 97–108)
CO2 SERPL-SCNC: 27 MMOL/L (ref 21–32)
CREAT SERPL-MCNC: 0.85 MG/DL (ref 0.55–1.02)
ERYTHROCYTE [DISTWIDTH] IN BLOOD BY AUTOMATED COUNT: 14.9 % (ref 11.5–14.5)
GLUCOSE SERPL-MCNC: 103 MG/DL (ref 65–100)
HCT VFR BLD AUTO: 34.3 % (ref 35–47)
HGB BLD-MCNC: 11.4 G/DL (ref 11.5–16)
MAGNESIUM SERPL-MCNC: 2.1 MG/DL (ref 1.6–2.4)
MCH RBC QN AUTO: 28.8 PG (ref 26–34)
MCHC RBC AUTO-ENTMCNC: 33.2 G/DL (ref 30–36.5)
MCV RBC AUTO: 86.6 FL (ref 80–99)
NRBC # BLD: 0 K/UL (ref 0–0.01)
NRBC BLD-RTO: 0 PER 100 WBC
PLATELET # BLD AUTO: 168 K/UL (ref 150–400)
PMV BLD AUTO: 9.7 FL (ref 8.9–12.9)
POTASSIUM SERPL-SCNC: 3.8 MMOL/L (ref 3.5–5.1)
RBC # BLD AUTO: 3.96 M/UL (ref 3.8–5.2)
SODIUM SERPL-SCNC: 140 MMOL/L (ref 136–145)
WBC # BLD AUTO: 5.6 K/UL (ref 3.6–11)

## 2022-06-20 PROCEDURE — 80048 BASIC METABOLIC PNL TOTAL CA: CPT

## 2022-06-20 PROCEDURE — 85027 COMPLETE CBC AUTOMATED: CPT

## 2022-06-20 PROCEDURE — 77030038269 HC DRN EXT URIN PURWCK BARD -A

## 2022-06-20 PROCEDURE — 71260 CT THORAX DX C+: CPT

## 2022-06-20 PROCEDURE — 83735 ASSAY OF MAGNESIUM: CPT

## 2022-06-20 PROCEDURE — 36415 COLL VENOUS BLD VENIPUNCTURE: CPT

## 2022-06-20 PROCEDURE — 74011250637 HC RX REV CODE- 250/637: Performed by: INTERNAL MEDICINE

## 2022-06-20 PROCEDURE — 82040 ASSAY OF SERUM ALBUMIN: CPT

## 2022-06-20 PROCEDURE — 74011000636 HC RX REV CODE- 636: Performed by: RADIOLOGY

## 2022-06-20 PROCEDURE — 74011250637 HC RX REV CODE- 250/637: Performed by: HOSPITALIST

## 2022-06-20 PROCEDURE — 97530 THERAPEUTIC ACTIVITIES: CPT

## 2022-06-20 PROCEDURE — 65270000029 HC RM PRIVATE

## 2022-06-20 PROCEDURE — 97535 SELF CARE MNGMENT TRAINING: CPT

## 2022-06-20 RX ORDER — LABETALOL HYDROCHLORIDE 5 MG/ML
10 INJECTION, SOLUTION INTRAVENOUS
Status: DISCONTINUED | OUTPATIENT
Start: 2022-06-20 | End: 2022-06-27 | Stop reason: HOSPADM

## 2022-06-20 RX ORDER — HYDRALAZINE HYDROCHLORIDE 20 MG/ML
10 INJECTION INTRAMUSCULAR; INTRAVENOUS
Status: DISCONTINUED | OUTPATIENT
Start: 2022-06-20 | End: 2022-06-27 | Stop reason: HOSPADM

## 2022-06-20 RX ADMIN — APIXABAN 10 MG: 5 TABLET, FILM COATED ORAL at 21:33

## 2022-06-20 RX ADMIN — AMIODARONE HYDROCHLORIDE 200 MG: 200 TABLET ORAL at 10:11

## 2022-06-20 RX ADMIN — Medication: at 18:12

## 2022-06-20 RX ADMIN — Medication: at 09:00

## 2022-06-20 RX ADMIN — CYANOCOBALAMIN TAB 500 MCG 1000 MCG: 500 TAB at 10:13

## 2022-06-20 RX ADMIN — APIXABAN 10 MG: 5 TABLET, FILM COATED ORAL at 10:11

## 2022-06-20 RX ADMIN — ACETAMINOPHEN 650 MG: 325 TABLET ORAL at 21:34

## 2022-06-20 RX ADMIN — Medication 1000 UNITS: at 10:13

## 2022-06-20 RX ADMIN — Medication: at 18:10

## 2022-06-20 RX ADMIN — AMLODIPINE BESYLATE 5 MG: 5 TABLET ORAL at 10:12

## 2022-06-20 RX ADMIN — MEGESTROL ACETATE 20 MG: 40 SUSPENSION ORAL at 10:09

## 2022-06-20 RX ADMIN — IOPAMIDOL 100 ML: 755 INJECTION, SOLUTION INTRAVENOUS at 19:10

## 2022-06-20 NOTE — CONSULTS
[address]    Name:  Sheila Davis  MR#:  [id number]  :  1950  ACCOUNT #:  [de-identified]  DATE OF SERVICE:  2022    HISTORY OF PRESENT ILLNESS:  The patient is a 70-year-old woman with history of cerebrovascular accident, presenting with left-sided weakness who was seen after admission to the hospital for same. This patient has a history of chronic AFib and uses Eliquis for this. She states that she has been off of the drug at least 7 days in an attempt to [facility] it stretch\". She had a CT angiogram of the neck and brain when she came into the hospital which showed moderate stenosis with the right vertebral artery and no acute abnormality on brain perfusion. An incidental left tonsillar mass was identified as well as a large centrally necrotic left cervical lymph node measuring 2.1 x 3.4 cm was identified. The patient has no problems with recent swallowing,  no prior knowledge of head and neck issues. PAST MEDICAL HISTORY:  Includes,  1. Hypertension. 2.  AFib. PAST SURGICAL HISTORY:  Includes hysterectomy, EP study and ablation for AFib, cardiac cath. ALLERGIES:  CODEINE. SOCIAL HISTORY:  She is a nonsmoker. She drinks wine occasionally. She is retired. FAMILY HISTORY:  Noncontributory. REVIEW OF SYSTEMS:  She has had diminished appetite and some weight loss in the past three months which was not quantified. Review of systems are otherwise noncontributory. PHYSICAL EXAMINATION:  GENERAL:  She is a pleasant, well-nourished-appearing woman, in no apparent distress. VITAL SIGNS:  Temperature 97, pulse 73, /64, respirations 17, O2 sat 95%. HEENT:  EOMI, nonicteric sclerae. NECK:  Supple. She has a palpable left cervical lymph node which is nontender. LUNGS:  Clear. CARDIAC:  Irregular rate, no murmur. ABDOMEN:  Scaphoid, nontender. No organomegaly.   EXTREMITIES:  She has chronic venostasis changes in both lower extremities. NEUROLOGIC:  She has weakness in left lower extremity. Cranial nerves II through XII are intact. LABORATORY DATA:  Her CBC is normal.  Her BUN and creatinine are normal.  Her albumin is 2.7 today. IMPRESSION AND PLAN:  1. Cerebrovascular accident in a woman who took Eliquis as an outpatient for atrial fibrillation. The patient tells me that she has been off of the drug at least one week prior to admission, however, this is sufficient amount of time to allow for clotting to occur and embolization. I would not recommend a change in anticoagulation therapy unless financial hardship is the cause for this patient to not take her mediations. 2.  Left tonsillar mass with left cervical lymph node enlargement. 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 ENT has been consulted. A biopsy will be necessary. This patient will also need to be staged before the tentative treatment plan can be assembled. I will order a CT of the chest, abdomen and pelvis. ENT has noted but we will see her hopefully today or tomorrow, and we will follow up with you on behalf of [employer].       Justin Tripathi MD      [email or web address]  D:  06/20/2022 10:41  T:  06/20/2022 13:46  JOB #:  [id number]

## 2022-06-20 NOTE — PROGRESS NOTES
[address] [facility] Adult  Hospitalist Group                                                                                          Hospitalist Progress Note  Tiff Luna MD  Answering service: [phone] from in house phone        Date of Service:  2022  NAME:  Eric Magana  :  1950  MRN:  [id number]      Admission Summary:   Leena Teixeira a 67 y. o. female who presents to [facility] [address] with complaints of increasing swelling and weakness on her left side since yesterday evening. More weaker in her left leg.  History is rather limited though patient says she was in her usual state of health when she slipped yesterday noted having more trouble getting up.  This morning says she really can get up and using her left leg where she notes more prominent swelling.  She then came into the ER as a stroke alert CT CTA were obtained without any significant findings other than concern for malignancy as well as a possible pulmonary embolism.  Neurology consultation was then requested. Ash Zamora is on Eliquis for A. fib says she is compliant with her meds.  Does not really endorse much weakness.  Seems indicate that it similar to when she was in the hospital in January with weakness for 2 weeks with a negative MRI.  Presumably went back to normal in the interval.    Normally she can get up and walk around but was unable to do so this morning because of this weakness.  She states that she still weak.  There is no headache, no history of any fever or chill, no nausea or vomiting and no other acute symptomatology. Has chronically swollen lower extremities.  She states her skin is gotten extremely dry recently. Mary Polialfonzo has no shortness of breath, nausea or vomiting and no other GI or  symptoms.        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  Pt was previously unaware of this mass, has not been to a dentist recently  Pending ENT consultation    CTA finding: Partially visualized small acute PE in the distal right main pulmonary artery  Increased Eliquis to VTE dosing for now  Appreciate heme/onc assistance and recs  Not a failure of Eliquis since pt admits to being off Eliquis for at least a week PTA to \"stretch it out\"  Will need to switch to heparin drip if ENT requires any surgical intervention or biopsy    Multinodular thyroid gland  TSH wnl  F/u OP    Chronic a fib: continue amiodarone, Eliquis    Chronic leg lymphedema/skin changes: wound care    HTN  Cont norvasc  IV PRNs  monitor      Code status: Full Code  Prophylaxis: Eliquis high dose  Care Plan discussed with: Patient/Family  Anticipated Disposition: SNF   Anticipated Discharge: Greater than 48 hours pending ENT and heme/onc workup     Hospital Problems  Date Reviewed: 5/20/2022          Codes Class Noted POA    CVA (cerebral vascular accident) [facility]) ICD-10-CM: I63.9  ICD-9-CM: 434.91  6/17/2022 Unknown              Review of Systems:   Pertinent items are noted in HPI    Vital Signs:    Last 24hrs VS reviewed since prior progress note.  Most recent are:  Visit Vitals  BP (!) 160/68 (BP 1 Location: Right upper arm, BP Patient Position: At rest)   Pulse 65   Temp 97.8 °F (36.6 °C)   Resp 13   Ht 5' 7\" (1.702 m)   Wt 68.9 kg (151 lb 14.4 oz)   SpO2 95%   BMI 23.79 kg/m²         Intake/Output Summary (Last 24 hours) at 6/20/2022 1919  Last data filed at 6/20/2022 0600  Gross per 24 hour   Intake --   Output 2450 ml   Net -2450 ml        Physical Examination:   I had a face to face encounter with this patient and independently examined them on 6/20/2022 as outlined below:    General: Alert, cooperative, no acute distress    Resp:  No resp distress, No accessory muscle use  CV:  Irregular rhythm, normal rate  GI:  Soft, Non distended, Non tender  Neurologic:  Alert and oriented, normal speech, PATRICIO  Extremities: Chronic lymphedema LLE>RLE, no cyanosis  Psych:   Not anxious or agitated  Skin:  Lymphedema b/l, chronic skin changes, no acute cellulitis, No jaundice       Data Review:   I personally reviewed: vitals, labs, imaging results, notes    Labs:     Recent Labs     06/20/22 0203 06/19/22 0452   WBC 5.6 5.7   HGB 11.4* 12.2   HCT 34.3* 37.0    146*     Recent Labs     06/20/22 0203 06/19/22 0452    143   K 3.8 3.5    109*   CO2 27 28   BUN 14 20   CREA 0.85 0.92   * 98   CA 8.3* 8.1*   MG 2.1  --      Recent Labs     06/20/22 0203   ALB 2.7*     No results for input(s): INR, PTP, APTT, INREXT, INREXT in the last 72 hours. No results for input(s): FE, TIBC, PSAT, FERR in the last 72 hours. Lab Results   Component Value Date/Time    Folate 6.9 01/12/2022 02:21 AM      No results for input(s): PH, PCO2, PO2 in the last 72 hours. No results for input(s): CPK, CKNDX, TROIQ in the last 72 hours.     No lab exists for component: CPKMB  Lab Results   Component Value Date/Time    Cholesterol, total 164 06/18/2022 02:20 AM    HDL Cholesterol 54 06/18/2022 02:20 AM    LDL, calculated 96 06/18/2022 02:20 AM    Triglyceride 70 06/18/2022 02:20 AM    CHOL/HDL Ratio 3.0 06/18/2022 02:20 AM     No results found for: [facility]  Lab Results   Component Value Date/Time    Color YELLOW/STRAW 06/17/2022 02:16 PM    Appearance CLEAR 06/17/2022 02:16 PM    Specific gravity 1.020 06/17/2022 02:16 PM    Specific gravity 1.019 01/11/2022 04:24 PM    pH (UA) 5.5 06/17/2022 02:16 PM    Protein 30 (A) 06/17/2022 02:16 PM    Glucose Negative 06/17/2022 02:16 PM    Ketone 15 (A) 06/17/2022 02:16 PM    Bilirubin Negative 06/17/2022 02:16 PM    Urobilinogen 0.2 06/17/2022 02:16 PM    Nitrites Negative 06/17/2022 02:16 PM    Leukocyte Esterase Negative 06/17/2022 02:16 PM    Epithelial cells FEW 06/17/2022 02:16 PM    Bacteria Negative 06/17/2022 02:16 PM    WBC 0-4 06/17/2022 02:16 PM    RBC 0-5 06/17/2022 02:16 PM       Medications Reviewed:     Current Facility-Administered Medications   Medication Dose Route Frequency    apixaban (ELIQUIS) tablet 10 mg  10 mg Oral Q12H    acetaminophen (TYLENOL) tablet 650 mg  650 mg Oral Q4H PRN    Or    acetaminophen (TYLENOL) solution 650 mg  650 mg Per NG tube Q4H PRN    Or    acetaminophen (TYLENOL) suppository 650 mg  650 mg Rectal Q4H PRN    labetaloL (NORMODYNE;TRANDATE) injection 5 mg  5 mg IntraVENous Q10MIN PRN    amiodarone (CORDARONE) tablet 200 mg  200 mg Oral DAILY    amLODIPine (NORVASC) tablet 5 mg  5 mg Oral DAILY    cholecalciferol (VITAMIN D3) (1000 Units /25 mcg) tablet 1,000 Units  1,000 Units Oral DAILY    cyanocobalamin (VITAMIN B12) tablet 1,000 mcg  1,000 mcg Oral DAILY    traMADoL (ULTRAM) tablet 50 mg  50 mg Oral Q6H PRN    megestroL (MEGACE) 400 mg/10 mL (10 mL) oral suspension 20 mg  20 mg Oral DAILY    balsam peru-castor oiL (VENELEX) ointment   Topical BID    ammonium lactate (LAC-HYDRIN) 12 % lotion   Topical BID     ______________________________________________________________________  EXPECTED LENGTH OF STAY: 3d 12h  ACTUAL LENGTH OF STAY:          3                 Katelyn Erazo MD

## 2022-06-20 NOTE — CONSULTS
[address]    Name:  González Urbano  MR#:  [id number]  :  1950  ACCOUNT #:  [de-identified]  DATE OF SERVICE:  2022    HISTORY OF PRESENT ILLNESS:  The patient is a 55-year-old woman with history of cerebrovascular accident, presenting with left-sided weakness who was seen after admission to the hospital for same. This patient has a history of chronic AFib and uses Eliquis for this. She states that she has been off of the drug at least 7 days in an attempt to [facility] it stretch\". She had a CT angiogram of the neck and brain when she came into the hospital which showed moderate stenosis with the right vertebral artery and no acute abnormality on brain perfusion. An incidental left tonsillar mass was identified as well as a large centrally necrotic left cervical lymph node measuring 2.1 x 3.4 cm was identified. The patient has no problems with recent swallowing,  no prior knowledge of head and neck issues. PAST MEDICAL HISTORY:  Includes,  1. Hypertension. 2.  AFib. PAST SURGICAL HISTORY:  Includes hysterectomy, EP study and ablation for AFib, cardiac cath. ALLERGIES:  CODEINE. SOCIAL HISTORY:  She is a nonsmoker. She drinks wine occasionally. She is retired. FAMILY HISTORY:  Noncontributory. REVIEW OF SYSTEMS:  She has had diminished appetite and some weight loss in the past three months which was not quantified. Review of systems are otherwise noncontributory. PHYSICAL EXAMINATION:  GENERAL:  She is a pleasant, well-nourished-appearing woman, in no apparent distress. VITAL SIGNS:  Temperature 97, pulse 73, /64, respirations 17, O2 sat 95%. HEENT:  EOMI, nonicteric sclerae. NECK:  Supple. She has a palpable left cervical lymph node which is nontender. LUNGS:  Clear. CARDIAC:  Irregular rate, no murmur. ABDOMEN:  Scaphoid, nontender. No organomegaly.   EXTREMITIES:  She has chronic venostasis changes in both lower extremities. NEUROLOGIC:  She has weakness in left lower extremity. Cranial nerves II through XII are intact. LABORATORY DATA:  Her CBC is normal.  Her BUN and creatinine are normal.  Her albumin is 2.7 today. IMPRESSION AND PLAN:  1. Cerebrovascular accident in a woman who took Eliquis as an outpatient for atrial fibrillation. The patient tells me that she has been off of the drug at least one week prior to admission, however, this is sufficient amount of time to allow for clotting to occur and embolization. I would not recommend a change in anticoagulation therapy unless financial hardship is the cause for this patient to not take her mediations. 2.  Left tonsillar mass with left cervical lymph node enlargement. 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 ENT has been consulted. A biopsy will be necessary. This patient will also need to be staged before the tentative treatment plan can be assembled. I will order a CT of the chest, abdomen and pelvis. ENT has noted but we will see her hopefully today or tomorrow, and we will follow up with you on behalf of [employer].       Ari Armas MD      [id number]  D:  06/20/2022 10:41  T:  06/20/2022 13:46  JOB #:  [id number]

## 2022-06-20 NOTE — PROGRESS NOTES
Transition of Care Plan   RUR- Low 12%   DISPOSITION: SNF - [facility] - pending medical stability    F/U with PCP/Specialist     Transport: [facility]    Emergency contact: Primary Decision Maker: Shukri Michel - [phone]    MICHELLE barriers to discharge: None    Transition of Care Plan: PT/OT and Dr. Denilson Hernandez recommending SNF at discharge. CM met with patient regarding choice, she is open to [facility]. Referral sent in [facility]. Will need insurance auth through [facility]. The Plan for Transition of Care is related to the following treatment goals: SNF    The Patient  was provided with a choice of provider and agrees  with the discharge plan. Yes [x] No []    A Freedom of choice list was provided with basic dialogue that supports the patient's individualized plan of care/goals and shares the quality data associated with the providers. Yes [x] No []    2:20pm: CM initiated insurance auth through [facility] #[phone]. Reference #[id number]. CM faxed clinicals for auth to #[phone].    4:00pm: Patient has been approved by [facility] starting 6/21-6/23. Quinton Mckeon #[id number]. Care coordinator: Morales Fisher. DAMON Bowden

## 2022-06-20 NOTE — PROGRESS NOTES
Comprehensive Nutrition Assessment    Type and Reason for Visit: Initial,Positive nutrition screen    Nutrition Recommendations/Plan:   1. Continue Regular diet, encourage intakes of PO and ONS. 2. Obtain standing scale wt as able. Malnutrition Assessment:  Malnutrition Status:  Mild malnutrition (06/20/22 1052)    Context:  Chronic illness     Findings of the 6 clinical characteristics of malnutrition:   Energy Intake:  Mild decrease in energy intake (specify)  Weight Loss:  10% over 6 months     Body Fat Loss:  Mild body fat loss, Buccal region   Muscle Mass Loss:  Mild muscle mass loss, Hand (interosseous),Temples (temporalis),Clavicles (pectoralis &deltoids)  Fluid Accumulation:  Severe, Extremities   Strength:  Not performed          Nutrition Assessment:    PMH includes hypotension, admitted for L-sided weakness. CT head without evidence of acute CVA, CT of neck incidental finding of tonsillar mass. Nutrition review completed for pressure ulcer screening. Pt with POA (not staged) L ischial DTI and mixture of pressure/moisture associated POA gluteal cleft and sacrum wounds. Pt reporting to me verbally that she feels she is eating well, though it is noted that she has had a 30# wt loss over the last few years via chart review. Indicates PTA there were some fluctuations in appetite with medications changes, etc.. Reviewed importance of protein intakes for wounds, pt voiced understanding. She drinks Ensure shakes (chocolate flavor) and is noted to be on Megace. Additional work-up for tonsillar mass ongoing, tolerates all food/liquid textures. Given bilateral LE edema, suspect fluid status masking additional loss of body wt/LBM.     Nutritionally Significant Medications:  Vitamin D3, Vitamin B12, Megace      Estimated Daily Nutrient Needs:  Energy Requirements Based On: Kcal/kg  Weight Used for Energy Requirements: Current  Energy (kcal/day): 3404-5582  Weight Used for Protein Requirements: Current  Protein (g/day): 90 (1.3 g/kg)  Method Used for Fluid Requirements: 1 ml/kcal  Fluid (ml/day): ~1900    Nutrition Related Findings:   Edema: LLE: 3+ (6/19/2022  8:00 PM)  RLE: 3+ (6/19/2022  8:00 PM)      Last BM: 06/19/22, Formed    Wounds:  (POA L ischial DTI, POA gluteal cleft and sacrum)      Current Nutrition Therapies:  Diet: Regular  Supplements: Enlive TID  Meal intake: No data found. Supplement intake: No data found. Nutrition Support: N/A      Anthropometric Measures:  Height: 5' 7\" (170.2 cm)  Ideal Body Weight (IBW): 135 lbs (61 kg)  Admission Body Weight: 151 lb  Current Body Wt:  68.5 kg (151 lb), 111.9 % IBW.  Bed scale  Current BMI (kg/m2): 23.6  Usual Body Weight: 81.6 kg (180 lb)  % Weight Change (Calculated): -16.1  Weight Adjustment: No adjustment                 BMI Category: Normal weight (BMI 22.0-24.9) age over 72    Wt Readings from Last 10 Encounters:   06/19/22 68.9 kg (151 lb 14.4 oz)   01/28/22 75.8 kg (167 lb)   01/12/22 80.7 kg (178 lb)   01/09/22 80.7 kg (178 lb)   11/03/21 80.7 kg (178 lb)   07/16/21 83.6 kg (184 lb 6.4 oz)   02/17/21 85.3 kg (188 lb)   11/30/20 82.6 kg (182 lb 1.6 oz)   10/05/20 83.9 kg (185 lb)   09/11/20 82.5 kg (181 lb 14.1 oz)           Nutrition Diagnosis:   · Unintended weight loss related to increased demand for energy/nutrients,altered taste perception as evidenced by weight loss      Nutrition Interventions:   Food and/or Nutrient Delivery: Continue current diet,Modify oral nutrition supplement  Nutrition Education/Counseling: No recommendations at this time  Coordination of Nutrition Care: Continue to monitor while inpatient       Goals:     Goals:  (PO intakes 65% of greater within next 5-7 days)       Nutrition Monitoring and Evaluation:   Behavioral-Environmental Outcomes: None identified  Food/Nutrient Intake Outcomes: Food and nutrient intake,Supplement intake  Physical Signs/Symptoms Outcomes: Biochemical data,GI status,Skin    Discharge Planning:    Continue oral nutrition supplement    Derrell Colin, CELENA  Available via [employer] or ext [phone]

## 2022-06-20 NOTE — PROGRESS NOTES
Comprehensive Nutrition Assessment    Type and Reason for Visit: Initial,Positive nutrition screen    Nutrition Recommendations/Plan:   1. Continue Regular diet, encourage intakes of PO and ONS. 2. Obtain standing scale wt as able. Malnutrition Assessment:  Malnutrition Status:  Mild malnutrition (06/20/22 1052)    Context:  Chronic illness     Findings of the 6 clinical characteristics of malnutrition:   Energy Intake:  Mild decrease in energy intake (specify)  Weight Loss:  10% over 6 months     Body Fat Loss:  Mild body fat loss, Buccal region   Muscle Mass Loss:  Mild muscle mass loss, Hand (interosseous),Temples (temporalis),Clavicles (pectoralis &deltoids)  Fluid Accumulation:  Severe, Extremities   Strength:  Not performed          Nutrition Assessment:    PMH includes hypotension, admitted for L-sided weakness. CT head without evidence of acute CVA, CT of neck incidental finding of tonsillar mass. Nutrition review completed for pressure ulcer screening. Pt with POA (not staged) L ischial DTI and mixture of pressure/moisture associated POA gluteal cleft and sacrum wounds. Pt reporting to me verbally that she feels she is eating well, though it is noted that she has had a 30# wt loss over the last few years via chart review. Indicates PTA there were some fluctuations in appetite with medications changes, etc.. Reviewed importance of protein intakes for wounds, pt voiced understanding. She drinks Ensure shakes (chocolate flavor) and is noted to be on Megace. Additional work-up for tonsillar mass ongoing, tolerates all food/liquid textures. Given bilateral LE edema, suspect fluid status masking additional loss of body wt/LBM.     Nutritionally Significant Medications:  Vitamin D3, Vitamin B12, Megace      Estimated Daily Nutrient Needs:  Energy Requirements Based On: Kcal/kg  Weight Used for Energy Requirements: Current  Energy (kcal/day): 4188-9779  Weight Used for Protein Requirements: Current  Protein (g/day): 90 (1.3 g/kg)  Method Used for Fluid Requirements: 1 ml/kcal  Fluid (ml/day): ~1900    Nutrition Related Findings:   Edema: LLE: 3+ (6/19/2022  8:00 PM)  RLE: 3+ (6/19/2022  8:00 PM)      Last BM: 06/19/22, Formed    Wounds:  (POA L ischial DTI, POA gluteal cleft and sacrum)      Current Nutrition Therapies:  Diet: Regular  Supplements: Enlive TID  Meal intake: No data found. Supplement intake: No data found. Nutrition Support: N/A      Anthropometric Measures:  Height: 5' 7\" (170.2 cm)  Ideal Body Weight (IBW): 135 lbs (61 kg)  Admission Body Weight: 151 lb  Current Body Wt:  68.5 kg (151 lb), 111.9 % IBW.  Bed scale  Current BMI (kg/m2): 23.6  Usual Body Weight: 81.6 kg (180 lb)  % Weight Change (Calculated): -16.1  Weight Adjustment: No adjustment                 BMI Category: Normal weight (BMI 22.0-24.9) age over 72    Wt Readings from Last 10 Encounters:   06/19/22 68.9 kg (151 lb 14.4 oz)   01/28/22 75.8 kg (167 lb)   01/12/22 80.7 kg (178 lb)   01/09/22 80.7 kg (178 lb)   11/03/21 80.7 kg (178 lb)   07/16/21 83.6 kg (184 lb 6.4 oz)   02/17/21 85.3 kg (188 lb)   11/30/20 82.6 kg (182 lb 1.6 oz)   10/05/20 83.9 kg (185 lb)   09/11/20 82.5 kg (181 lb 14.1 oz)           Nutrition Diagnosis:   · Unintended weight loss related to increased demand for energy/nutrients,altered taste perception as evidenced by weight loss      Nutrition Interventions:   Food and/or Nutrient Delivery: Continue current diet,Modify oral nutrition supplement  Nutrition Education/Counseling: No recommendations at this time  Coordination of Nutrition Care: Continue to monitor while inpatient       Goals:     Goals:  (PO intakes 65% of greater within next 5-7 days)       Nutrition Monitoring and Evaluation:   Behavioral-Environmental Outcomes: None identified  Food/Nutrient Intake Outcomes: Food and nutrient intake,Supplement intake  Physical Signs/Symptoms Outcomes: Biochemical data,GI status,Skin    Discharge Planning:    Continue oral nutrition supplement    Micha Pa RD  Available via [employer] or ext [phone]

## 2022-06-20 NOTE — PROGRESS NOTES
Transition of Care Plan   RUR- Low 12%   DISPOSITION: SNF - [facility] - pending medical stability    F/U with PCP/Specialist     Transport: [facility]    Emergency contact: Primary Decision Maker: Gino Logan - [phone]    MICHELLE barriers to discharge: None    Transition of Care Plan: PT/OT and Dr. Rossy Watts recommending SNF at discharge. CM met with patient regarding choice, she is open to [facility]. Referral sent in [facility]. Will need insurance auth through [facility]. The Plan for Transition of Care is related to the following treatment goals: SNF    The Patient  was provided with a choice of provider and agrees  with the discharge plan. Yes [x] No []    A Freedom of choice list was provided with basic dialogue that supports the patient's individualized plan of care/goals and shares the quality data associated with the providers. Yes [x] No []    2:20pm: CM initiated insurance auth through [facility] #[phone]. Reference #[id number]. CM faxed clinicals for auth to #[phone].    4:00pm: Patient has been approved by [facility] starting 6/21-6/23. Ashleigh MCHUGH#[id number]. Care coordinator: Mateo Freed. Deanie Collet) Drue Bowens, M.SCHARLA.

## 2022-06-20 NOTE — ROUTINE PROCESS
Bedside and Verbal shift change report given to deirdre ortega (oncoming nurse) by Daisy Tao (offgoing nurse).  Report included the following information SBAR, Kardex, Intake/Output, Recent Results and Cardiac Rhythm SR.

## 2022-06-20 NOTE — PROGRESS NOTES
Problem: Self Care Deficits Care Plan (Adult)  Goal: *Acute Goals and Plan of Care (Insert Text)  Description: FUNCTIONAL STATUS PRIOR TO ADMISSION: Patient was modified independent using a RW/cane for functional mobility on most days, used a wheelchair on other days. Was primarily completing sponge baths instead of showers. Independent in dressing/grooming ADL, used an roro for grocery delivery    HOME SUPPORT: Patient lives in communal living with separate bedrooms and shared kitchen/bathroom    Occupational Therapy Goals  Initiated 6/19/2022   1. Patient will perform grooming with modified independence within 7 day(s). 2.  Patient will perform toileting with minimal assistance/contact guard assist within 7 day(s). 3.  Patient will perform lower body dressing with minimal assistance/contact guard assist within 7 day(s). 4.  Patient will perform toilet transfers with moderate assistance  within 7 day(s). 5.  Patient will perform upper body dressing with moderate assistance  within 7 day(s). 6.  Patient will participate in upper extremity therapeutic exercise/activities with minimal assistance/contact guard assist within 7 day(s). 7.  Patient will utilize energy conservation techniques during functional activities with verbal cues within 7 day(s). 8.  Patient will improve their Fugl Khanna score by 5 points in prep for ADLs within 7 days. Outcome: Progressing Towards Goal   OCCUPATIONAL THERAPY TREATMENT  Patient: Wenceslao Burn (67 y.o. female)  Date: 6/20/2022  Diagnosis: CVA (cerebral vascular accident) [facility]) [I63.9] <principal problem not specified>       Precautions: Fall  Chart, occupational therapy assessment, plan of care, and goals were reviewed. ASSESSMENT  Patient continues with skilled OT services and is slowly progressing towards goals. Nursing cleared for therapy. Patient received in bed, agreeable to therapy however resistant to use of BSC.   Education on need for time OOB to return to [facility]. She refused BSC, requesting to void in PureWick. She needed extended time through out session for all mobility and ADL tasks. Supine > sit with mod A with good static sitting balance to participate in UB dressing task with min A. Sit > stand with initial min A that improved to CGA at RW level with verbal cues for RW placement, slow pace and shuffle steps to chair. Patient left up in chair and encouraged to stay up for lunch and use of BSC for toileting. She continues to be limited with functional mobility, activity tolerance, standing balance and generalized strength necessary in ADL tasks. Recommend SNF for additional rehab services. Current Level of Function Impacting Discharge (ADLs): UB care min A, self care transfer min A at RW level    Other factors to consider for discharge: Patient self limiting regarding completing toileting task at baseline level. She needed additional vc for encouragement and for continuity of task. Recommend SNF, if unable to go to SNF then recommend 24 hour assistance with ADL tasks. PLAN :  Patient continues to benefit from skilled intervention to address the above impairments. Continue treatment per established plan of care to address goals. Recommend with staff: BSC with min A with RW, up in chair for all meals    Recommend next OT session: per POC    Recommendation for discharge: (in order for the patient to meet his/her long term goals)  Therapy up to 5 days/week in SNF setting    This discharge recommendation:  Has been made in collaboration with the attending provider and/or case management    IF patient discharges home will need the following DME: bedside commode, shower chair, and walker: rolling       SUBJECTIVE:   Patient stated I need to use the PureWick to pee.     OBJECTIVE DATA SUMMARY:   Cognitive/Behavioral Status:  Neurologic State: Alert                   Functional Mobility and Transfers for ADLs:  Bed Mobility:  Supine to Sit: Moderate assistance; Additional time  Scooting: Stand-by assistance; Additional time    Transfers:  Sit to Stand: Moderate assistance;Assist x1 (from elevated bed height)     Bed to Chair: Minimum assistance    Balance:  Sitting: Impaired  Sitting - Static: Good (unsupported)  Sitting - Dynamic: Fair (occasional)  Standing: Impaired; With support  Standing - Static: Constant support;Good  Standing - Dynamic : Good;Fair    ADL Intervention:     Patient instructed and indicated understanding the benefits of maintaining activity tolerance, functional mobility, and independence with self care tasks during acute stay  to ensure safe return home and to baseline. Encouraged patient to increase frequency and duration OOB, be out of bed for all meals, perform daily ADLs (as approved by RN/MD regarding bathing etc), and performing functional mobility to/from bathroom. Provided education with patient on fall prevention for hospital and at home. This includes not getting OOB/chair/toilet without staff assistance, good lighting, good footwear, and recommended AD use. Patient with good understanding. Activated chair alarm. Upper Body [address]: Minimum  assistance         Toileting  Bladder Hygiene:  (refused for to go to [facility], requested use of Purewick)      Pain:  BLE distal sensitive to touch, OA pain in shoulders    Activity Tolerance:   Fair    After treatment patient left in no apparent distress:   Sitting in chair, Call bell within reach, and Bed / chair alarm activated    COMMUNICATION/COLLABORATION:   The patients plan of care was discussed with: Physical therapist and Registered nurse and case mgmt.      Bora Ross OT  Time Calculation: 30 mins

## 2022-06-20 NOTE — PROGRESS NOTES
Problem: Mobility Impaired (Adult and Pediatric)  Goal: *Acute Goals and Plan of Care (Insert Text)  Description:   FUNCTIONAL STATUS PRIOR TO ADMISSION: Pt resides in a communal-living situation in a duplex with shared kitchen and bathroom. Pt reports completing her own ADLs and functional mobility; however, pt with LE skin and foot concerns suggesting self-care is challenging and inefficient. She reports receiving groceries via an roro and HHPT/OT. Pt also reports of being weary of using DME for safety in her bathroom as she shares the space and is not comfortable putting an elevated toilet seat in the BR (she does use a shower chair). HOME SUPPORT PRIOR TO ADMISSION: The patient lived alone with no local support mentioned. Physical Therapy Goals  Initiated 6/18/2022  1. Patient will move from supine to sit and sit to supine  in bed with supervision/set-up within 7 day(s). 2.  Patient will transfer from bed to chair and chair to bed with minimal assistance/contact guard assist using the least restrictive device within 7 day(s). 3.  Patient will perform sit to stand with minimal assistance/contact guard assist within 7 day(s). 4.  Patient will ambulate with minimal assistance/contact guard assist for 10 feet with the least restrictive device within 7 day(s). 5.  Patient will improve Argueta Balance score by 7 points within 7 days. Outcome: Progressing Towards Goal   PHYSICAL THERAPY TREATMENT  Patient: Portia Gonzales (67 y.o. female)  Date: 6/20/2022  Diagnosis: CVA (cerebral vascular accident) [facility]) [I63.9] <principal problem not specified>       Precautions: Fall  Chart, physical therapy assessment, plan of care and goals were reviewed. ASSESSMENT  Patient continues with skilled PT services and is slowly progressing towards goals. Pt received supine in bed and agreeable to therapy.  Pt tolerated session fairly well, but continues to be limited by generalized weakness, decreased functional mobility, impaired balance and gait, decreased tolerance to activity, self limiting behaviors and decreased mobility from reported baseline. Pt required additional time to complete all mobility tasks and required max encouragement to continue to keep moving. Pt mod A for bed mobility and transfers with RW, but with fair balance with RW requiring CGA for functional transfers. Pt demonstrated short, shuffle steps for bed to chair with RW with CGA-min A. Pt remained seated in the chair. Pt educated on importance of remaining OOB to prevent further deconditioning and improve overall tolerance to activity. Recommend SNF placement upon discharge. Current Level of Function Impacting Discharge (mobility/balance): mod A supine to sit, mod A x sit<>stand with RW, 2 ft with RW with min A bed to chair    Other factors to consider for discharge: previously living alone         PLAN :  Patient continues to benefit from skilled intervention to address the above impairments. Continue treatment per established plan of care. to address goals. Recommendation for discharge: (in order for the patient to meet his/her long term goals)  Therapy up to 5 days/week in SNF setting    This discharge recommendation:  Has been made in collaboration with the attending provider and/or case management    IF patient discharges home will need the following DME: to be determined (TBD)       SUBJECTIVE:   Patient stated I don't want to get up to the [facility]. I am too weak for all of that and getting to the chair.     OBJECTIVE DATA SUMMARY:   Critical Behavior:  Neurologic State: Alert  Orientation Level: Oriented X4  Cognition: Appropriate decision making,Follows commands  Safety/Judgement: Awareness of environment  Functional Mobility Training:  Bed Mobility:     Supine to Sit: Moderate assistance; Additional time     Scooting: Stand-by assistance; Additional time        Transfers:  Sit to Stand:  Moderate assistance;Assist x1 (from elevated bed height)  Stand to Sit: Contact guard assistance        Bed to Chair: Minimum assistance                    Balance:  Sitting: Impaired  Sitting - Static: Good (unsupported)  Sitting - Dynamic: Fair (occasional)  Standing: Impaired; With support  Standing - Static: Constant support;Good  Standing - Dynamic : Good;Fair  Ambulation/Gait Training:  Distance (ft): 2 Feet (ft)  Assistive Device: Gait belt;Walker, rolling  Ambulation - Level of Assistance: Minimal assistance        Gait Abnormalities: Decreased step clearance;Shuffling gait        Base of Support: Widened     Speed/Kalie: Pace decreased (<100 feet/min)  Step Length: Right shortened;Left shortened                    Therapeutic Exercises:     Pain Rating:  Pt reported minimal LE pain    Activity Tolerance:   Good and Fair    After treatment patient left in no apparent distress:   Sitting in chair, Call bell within reach, Bed / chair alarm activated, and Side rails x 3    COMMUNICATION/COLLABORATION:   The patients plan of care was discussed with: Occupational therapist and Registered nurse.      Benny Browning, PT, DPT   Time Calculation: 30 mins

## 2022-06-20 NOTE — CONSULTS
ATSP regarding AC mgmt and new dx of tonsillar mass  Pt examined, chart reviewed  Consult to follow  Carolyn Espinal MD

## 2022-06-20 NOTE — PROGRESS NOTES
Problem: Self Care Deficits Care Plan (Adult)  Goal: *Acute Goals and Plan of Care (Insert Text)  Description: FUNCTIONAL STATUS PRIOR TO ADMISSION: Patient was modified independent using a RW/cane for functional mobility on most days, used a wheelchair on other days. Was primarily completing sponge baths instead of showers. Independent in dressing/grooming ADL, used an roro for grocery delivery    HOME SUPPORT: Patient lives in communal living with separate bedrooms and shared kitchen/bathroom    Occupational Therapy Goals  Initiated 6/19/2022   1. Patient will perform grooming with modified independence within 7 day(s). 2.  Patient will perform toileting with minimal assistance/contact guard assist within 7 day(s). 3.  Patient will perform lower body dressing with minimal assistance/contact guard assist within 7 day(s). 4.  Patient will perform toilet transfers with moderate assistance  within 7 day(s). 5.  Patient will perform upper body dressing with moderate assistance  within 7 day(s). 6.  Patient will participate in upper extremity therapeutic exercise/activities with minimal assistance/contact guard assist within 7 day(s). 7.  Patient will utilize energy conservation techniques during functional activities with verbal cues within 7 day(s). 8.  Patient will improve their Fugl Khanna score by 5 points in prep for ADLs within 7 days. Outcome: Progressing Towards Goal   OCCUPATIONAL THERAPY TREATMENT  Patient: Gurdeep Adkins (67 y.o. female)  Date: 6/20/2022  Diagnosis: CVA (cerebral vascular accident) [facility]) [I63.9] <principal problem not specified>       Precautions: Fall  Chart, occupational therapy assessment, plan of care, and goals were reviewed. ASSESSMENT  Patient continues with skilled OT services and is slowly progressing towards goals. Nursing cleared for therapy. Patient received in bed, agreeable to therapy however resistant to use of BSC.   Education on need for time OOB to return to [facility]. She refused BSC, requesting to void in PureWick. She needed extended time through out session for all mobility and ADL tasks. Supine > sit with mod A with good static sitting balance to participate in UB dressing task with min A. Sit > stand with initial min A that improved to CGA at RW level with verbal cues for RW placement, slow pace and shuffle steps to chair. Patient left up in chair and encouraged to stay up for lunch and use of BSC for toileting. She continues to be limited with functional mobility, activity tolerance, standing balance and generalized strength necessary in ADL tasks. Recommend SNF for additional rehab services. Current Level of Function Impacting Discharge (ADLs): UB care min A, self care transfer min A at RW level    Other factors to consider for discharge: Patient self limiting regarding completing toileting task at baseline level. She needed additional vc for encouragement and for continuity of task. Recommend SNF, if unable to go to SNF then recommend 24 hour assistance with ADL tasks. PLAN :  Patient continues to benefit from skilled intervention to address the above impairments. Continue treatment per established plan of care to address goals. Recommend with staff: BSC with min A with RW, up in chair for all meals    Recommend next OT session: per POC    Recommendation for discharge: (in order for the patient to meet his/her long term goals)  Therapy up to 5 days/week in SNF setting    This discharge recommendation:  Has been made in collaboration with the attending provider and/or case management    IF patient discharges home will need the following DME: bedside commode, shower chair, and walker: rolling       SUBJECTIVE:   Patient stated I need to use the PureWick to pee.     OBJECTIVE DATA SUMMARY:   Cognitive/Behavioral Status:  Neurologic State: Alert                   Functional Mobility and Transfers for ADLs:  Bed Mobility:  Supine to Sit: Moderate assistance; Additional time  Scooting: Stand-by assistance; Additional time    Transfers:  Sit to Stand: Moderate assistance;Assist x1 (from elevated bed height)     Bed to Chair: Minimum assistance    Balance:  Sitting: Impaired  Sitting - Static: Good (unsupported)  Sitting - Dynamic: Fair (occasional)  Standing: Impaired; With support  Standing - Static: Constant support;Good  Standing - Dynamic : Good;Fair    ADL Intervention:     Patient instructed and indicated understanding the benefits of maintaining activity tolerance, functional mobility, and independence with self care tasks during acute stay  to ensure safe return home and to baseline. Encouraged patient to increase frequency and duration OOB, be out of bed for all meals, perform daily ADLs (as approved by RN/MD regarding bathing etc), and performing functional mobility to/from bathroom. Provided education with patient on fall prevention for hospital and at home. This includes not getting OOB/chair/toilet without staff assistance, good lighting, good footwear, and recommended AD use. Patient with good understanding. Activated chair alarm. Upper Body [address]: Minimum  assistance         Toileting  Bladder Hygiene:  (refused for to go to [facility], requested use of Purewick)      Pain:  BLE distal sensitive to touch, OA pain in shoulders    Activity Tolerance:   Fair    After treatment patient left in no apparent distress:   Sitting in chair, Call bell within reach, and Bed / chair alarm activated    COMMUNICATION/COLLABORATION:   The patients plan of care was discussed with: Physical therapist and Registered nurse and case mgmt.      Isabella Boast, OT  Time Calculation: 30 mins

## 2022-06-20 NOTE — CONSULTS
ATSP regarding AC mgmt and new dx of tonsillar mass  Pt examined, chart reviewed  Consult to follow  Delphine Hernandez MD

## 2022-06-20 NOTE — ROUTINE PROCESS
Bedside and Verbal shift change report given to deirdre ortega (oncoming nurse) by Jude Sanchez (offgoing nurse).  Report included the following information SBAR, Kardex, Intake/Output, Recent Results and Cardiac Rhythm SR.

## 2022-06-20 NOTE — PROGRESS NOTES
Problem: Mobility Impaired (Adult and Pediatric)  Goal: *Acute Goals and Plan of Care (Insert Text)  Description:   FUNCTIONAL STATUS PRIOR TO ADMISSION: Pt resides in a communal-living situation in a duplex with shared kitchen and bathroom. Pt reports completing her own ADLs and functional mobility; however, pt with LE skin and foot concerns suggesting self-care is challenging and inefficient. She reports receiving groceries via an roro and HHPT/OT. Pt also reports of being weary of using DME for safety in her bathroom as she shares the space and is not comfortable putting an elevated toilet seat in the BR (she does use a shower chair). HOME SUPPORT PRIOR TO ADMISSION: The patient lived alone with no local support mentioned. Physical Therapy Goals  Initiated 6/18/2022  1. Patient will move from supine to sit and sit to supine  in bed with supervision/set-up within 7 day(s). 2.  Patient will transfer from bed to chair and chair to bed with minimal assistance/contact guard assist using the least restrictive device within 7 day(s). 3.  Patient will perform sit to stand with minimal assistance/contact guard assist within 7 day(s). 4.  Patient will ambulate with minimal assistance/contact guard assist for 10 feet with the least restrictive device within 7 day(s). 5.  Patient will improve Argueta Balance score by 7 points within 7 days. Outcome: Progressing Towards Goal   PHYSICAL THERAPY TREATMENT  Patient: Neil Reilly (67 y.o. female)  Date: 6/20/2022  Diagnosis: CVA (cerebral vascular accident) [facility]) [I63.9] <principal problem not specified>       Precautions: Fall  Chart, physical therapy assessment, plan of care and goals were reviewed. ASSESSMENT  Patient continues with skilled PT services and is slowly progressing towards goals. Pt received supine in bed and agreeable to therapy.  Pt tolerated session fairly well, but continues to be limited by generalized weakness, decreased functional mobility, impaired balance and gait, decreased tolerance to activity, self limiting behaviors and decreased mobility from reported baseline. Pt required additional time to complete all mobility tasks and required max encouragement to continue to keep moving. Pt mod A for bed mobility and transfers with RW, but with fair balance with RW requiring CGA for functional transfers. Pt demonstrated short, shuffle steps for bed to chair with RW with CGA-min A. Pt remained seated in the chair. Pt educated on importance of remaining OOB to prevent further deconditioning and improve overall tolerance to activity. Recommend SNF placement upon discharge. Current Level of Function Impacting Discharge (mobility/balance): mod A supine to sit, mod A x sit<>stand with RW, 2 ft with RW with min A bed to chair    Other factors to consider for discharge: previously living alone         PLAN :  Patient continues to benefit from skilled intervention to address the above impairments. Continue treatment per established plan of care. to address goals. Recommendation for discharge: (in order for the patient to meet his/her long term goals)  Therapy up to 5 days/week in SNF setting    This discharge recommendation:  Has been made in collaboration with the attending provider and/or case management    IF patient discharges home will need the following DME: to be determined (TBD)       SUBJECTIVE:   Patient stated I don't want to get up to the [facility]. I am too weak for all of that and getting to the chair.     OBJECTIVE DATA SUMMARY:   Critical Behavior:  Neurologic State: Alert  Orientation Level: Oriented X4  Cognition: Appropriate decision making,Follows commands  Safety/Judgement: Awareness of environment  Functional Mobility Training:  Bed Mobility:     Supine to Sit: Moderate assistance; Additional time     Scooting: Stand-by assistance; Additional time        Transfers:  Sit to Stand:  Moderate assistance;Assist x1 (from elevated bed height)  Stand to Sit: Contact guard assistance        Bed to Chair: Minimum assistance                    Balance:  Sitting: Impaired  Sitting - Static: Good (unsupported)  Sitting - Dynamic: Fair (occasional)  Standing: Impaired; With support  Standing - Static: Constant support;Good  Standing - Dynamic : Good;Fair  Ambulation/Gait Training:  Distance (ft): 2 Feet (ft)  Assistive Device: Gait belt;Walker, rolling  Ambulation - Level of Assistance: Minimal assistance        Gait Abnormalities: Decreased step clearance;Shuffling gait        Base of Support: Widened     Speed/Kalie: Pace decreased (<100 feet/min)  Step Length: Right shortened;Left shortened                    Therapeutic Exercises:     Pain Rating:  Pt reported minimal LE pain    Activity Tolerance:   Good and Fair    After treatment patient left in no apparent distress:   Sitting in chair, Call bell within reach, Bed / chair alarm activated, and Side rails x 3    COMMUNICATION/COLLABORATION:   The patients plan of care was discussed with: Occupational therapist and Registered nurse.      Saulo Cantor PT, DPT   Time Calculation: 30 mins

## 2022-06-20 NOTE — PROGRESS NOTES
[address] [facility] Adult  Hospitalist Group                                                                                          Hospitalist Progress Note  Lucero Hartley MD  Answering service: [phone] from in house phone        Date of Service:  2022  NAME:  Karishma Frost  :  1950  MRN:  [id number]      Admission Summary:   Dayron Perkins a 67 y. o. female who presents to [facility] with complaints of increasing swelling and weakness on her left side since yesterday evening. More weaker in her left leg.  History is rather limited though patient says she was in her usual state of health when she slipped yesterday noted having more trouble getting up.  This morning says she really can get up and using her left leg where she notes more prominent swelling.  She then came into the ER as a stroke alert CT CTA were obtained without any significant findings other than concern for malignancy as well as a possible pulmonary embolism.  Neurology consultation was then requested. Wendi Day is on Eliquis for A. fib says she is compliant with her meds.  Does not really endorse much weakness.  Seems indicate that it similar to when she was in the hospital in January with weakness for 2 weeks with a negative MRI.  Presumably went back to normal in the interval.    Normally she can get up and walk around but was unable to do so this morning because of this weakness.  She states that she still weak.  There is no headache, no history of any fever or chill, no nausea or vomiting and no other acute symptomatology. Has chronically swollen lower extremities.  She states her skin is gotten extremely dry recently. Shiela Moffett has no shortness of breath, nausea or vomiting and no other GI or  symptoms.        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  Pt was previously unaware of this mass, has not been to a dentist recently  Pending ENT consultation    CTA finding: Partially visualized small acute PE in the distal right main pulmonary artery  Increased Eliquis to VTE dosing for now  Appreciate heme/onc assistance and recs  Not a failure of Eliquis since pt admits to being off Eliquis for at least a week PTA to \"stretch it out\"  Will need to switch to heparin drip if ENT requires any surgical intervention or biopsy    Multinodular thyroid gland  TSH wnl  F/u OP    Chronic a fib: continue amiodarone, Eliquis    Chronic leg lymphedema/skin changes: wound care    HTN  Cont norvasc  IV PRNs  monitor      Code status: Full Code  Prophylaxis: Eliquis high dose  Care Plan discussed with: Patient/Family  Anticipated Disposition: SNF   Anticipated Discharge: Greater than 48 hours pending ENT and heme/onc workup     Hospital Problems  Date Reviewed: 5/20/2022          Codes Class Noted POA    CVA (cerebral vascular accident) [facility]) ICD-10-CM: I63.9  ICD-9-CM: 434.91  6/17/2022 Unknown              Review of Systems:   Pertinent items are noted in HPI    Vital Signs:    Last 24hrs VS reviewed since prior progress note.  Most recent are:  Visit Vitals  BP (!) 160/68 (BP 1 Location: Right upper arm, BP Patient Position: At rest)   Pulse 65   Temp 97.8 °F (36.6 °C)   Resp 13   Ht 5' 7\" (1.702 m)   Wt 68.9 kg (151 lb 14.4 oz)   SpO2 95%   BMI 23.79 kg/m²         Intake/Output Summary (Last 24 hours) at 6/20/2022 1919  Last data filed at 6/20/2022 0600  Gross per 24 hour   Intake --   Output 2450 ml   Net -2450 ml        Physical Examination:   I had a face to face encounter with this patient and independently examined them on 6/20/2022 as outlined below:    General: Alert, cooperative, no acute distress    Resp:  No resp distress, No accessory muscle use  CV:  Irregular rhythm, normal rate  GI:  Soft, Non distended, Non tender  Neurologic:  Alert and oriented, normal speech, PATRICIO  Extremities: Chronic lymphedema LLE>RLE, no cyanosis  Psych:   Not anxious or agitated  Skin:  Lymphedema b/l, chronic skin changes, no acute cellulitis, No jaundice       Data Review:   I personally reviewed: vitals, labs, imaging results, notes    Labs:     Recent Labs     06/20/22 0203 06/19/22 0452   WBC 5.6 5.7   HGB 11.4* 12.2   HCT 34.3* 37.0    146*     Recent Labs     06/20/22 0203 06/19/22 0452    143   K 3.8 3.5    109*   CO2 27 28   BUN 14 20   CREA 0.85 0.92   * 98   CA 8.3* 8.1*   MG 2.1  --      Recent Labs     06/20/22 0203   ALB 2.7*     No results for input(s): INR, PTP, APTT, INREXT, INREXT in the last 72 hours. No results for input(s): FE, TIBC, PSAT, FERR in the last 72 hours. Lab Results   Component Value Date/Time    Folate 6.9 01/12/2022 02:21 AM      No results for input(s): PH, PCO2, PO2 in the last 72 hours. No results for input(s): CPK, CKNDX, TROIQ in the last 72 hours.     No lab exists for component: CPKMB  Lab Results   Component Value Date/Time    Cholesterol, total 164 06/18/2022 02:20 AM    HDL Cholesterol 54 06/18/2022 02:20 AM    LDL, calculated 96 06/18/2022 02:20 AM    Triglyceride 70 06/18/2022 02:20 AM    CHOL/HDL Ratio 3.0 06/18/2022 02:20 AM     No results found for: [facility]  Lab Results   Component Value Date/Time    Color YELLOW/STRAW 06/17/2022 02:16 PM    Appearance CLEAR 06/17/2022 02:16 PM    Specific gravity 1.020 06/17/2022 02:16 PM    Specific gravity 1.019 01/11/2022 04:24 PM    pH (UA) 5.5 06/17/2022 02:16 PM    Protein 30 (A) 06/17/2022 02:16 PM    Glucose Negative 06/17/2022 02:16 PM    Ketone 15 (A) 06/17/2022 02:16 PM    Bilirubin Negative 06/17/2022 02:16 PM    Urobilinogen 0.2 06/17/2022 02:16 PM    Nitrites Negative 06/17/2022 02:16 PM    Leukocyte Esterase Negative 06/17/2022 02:16 PM    Epithelial cells FEW 06/17/2022 02:16 PM    Bacteria Negative 06/17/2022 02:16 PM    WBC 0-4 06/17/2022 02:16 PM    RBC 0-5 06/17/2022 02:16 PM       Medications Reviewed:     Current Facility-Administered Medications   Medication Dose Route Frequency    apixaban (ELIQUIS) tablet 10 mg  10 mg Oral Q12H    acetaminophen (TYLENOL) tablet 650 mg  650 mg Oral Q4H PRN    Or    acetaminophen (TYLENOL) solution 650 mg  650 mg Per NG tube Q4H PRN    Or    acetaminophen (TYLENOL) suppository 650 mg  650 mg Rectal Q4H PRN    labetaloL (NORMODYNE;TRANDATE) injection 5 mg  5 mg IntraVENous Q10MIN PRN    amiodarone (CORDARONE) tablet 200 mg  200 mg Oral DAILY    amLODIPine (NORVASC) tablet 5 mg  5 mg Oral DAILY    cholecalciferol (VITAMIN D3) (1000 Units /25 mcg) tablet 1,000 Units  1,000 Units Oral DAILY    cyanocobalamin (VITAMIN B12) tablet 1,000 mcg  1,000 mcg Oral DAILY    traMADoL (ULTRAM) tablet 50 mg  50 mg Oral Q6H PRN    megestroL (MEGACE) 400 mg/10 mL (10 mL) oral suspension 20 mg  20 mg Oral DAILY    balsam peru-castor oiL (VENELEX) ointment   Topical BID    ammonium lactate (LAC-HYDRIN) 12 % lotion   Topical BID     ______________________________________________________________________  EXPECTED LENGTH OF STAY: 3d 12h  ACTUAL LENGTH OF STAY:          3                 Candy Jsohua MD

## 2022-06-21 LAB
ANION GAP SERPL CALC-SCNC: 3 MMOL/L (ref 5–15)
BUN SERPL-MCNC: 13 MG/DL (ref 6–20)
BUN/CREAT SERPL: 14 (ref 12–20)
CALCIUM SERPL-MCNC: 8.7 MG/DL (ref 8.5–10.1)
CEA SERPL-MCNC: 1.2 NG/ML
CHLORIDE SERPL-SCNC: 107 MMOL/L (ref 97–108)
CO2 SERPL-SCNC: 28 MMOL/L (ref 21–32)
CREAT SERPL-MCNC: 0.96 MG/DL (ref 0.55–1.02)
ERYTHROCYTE [DISTWIDTH] IN BLOOD BY AUTOMATED COUNT: 14.9 % (ref 11.5–14.5)
GLUCOSE SERPL-MCNC: 103 MG/DL (ref 65–100)
HCT VFR BLD AUTO: 35.2 % (ref 35–47)
HGB BLD-MCNC: 11.7 G/DL (ref 11.5–16)
MCH RBC QN AUTO: 28.3 PG (ref 26–34)
MCHC RBC AUTO-ENTMCNC: 33.2 G/DL (ref 30–36.5)
MCV RBC AUTO: 85 FL (ref 80–99)
NRBC # BLD: 0 K/UL (ref 0–0.01)
NRBC BLD-RTO: 0 PER 100 WBC
PLATELET # BLD AUTO: 192 K/UL (ref 150–400)
PMV BLD AUTO: 9.9 FL (ref 8.9–12.9)
POTASSIUM SERPL-SCNC: 3.6 MMOL/L (ref 3.5–5.1)
RBC # BLD AUTO: 4.14 M/UL (ref 3.8–5.2)
SODIUM SERPL-SCNC: 138 MMOL/L (ref 136–145)
WBC # BLD AUTO: 5 K/UL (ref 3.6–11)

## 2022-06-21 PROCEDURE — 85027 COMPLETE CBC AUTOMATED: CPT

## 2022-06-21 PROCEDURE — 80048 BASIC METABOLIC PNL TOTAL CA: CPT

## 2022-06-21 PROCEDURE — 77030038269 HC DRN EXT URIN PURWCK BARD -A

## 2022-06-21 PROCEDURE — 74011250637 HC RX REV CODE- 250/637: Performed by: INTERNAL MEDICINE

## 2022-06-21 PROCEDURE — 65270000029 HC RM PRIVATE

## 2022-06-21 PROCEDURE — 82378 CARCINOEMBRYONIC ANTIGEN: CPT

## 2022-06-21 PROCEDURE — 36415 COLL VENOUS BLD VENIPUNCTURE: CPT

## 2022-06-21 PROCEDURE — 74011250637 HC RX REV CODE- 250/637: Performed by: HOSPITALIST

## 2022-06-21 RX ADMIN — Medication 1000 UNITS: at 08:35

## 2022-06-21 RX ADMIN — APIXABAN 10 MG: 5 TABLET, FILM COATED ORAL at 08:34

## 2022-06-21 RX ADMIN — Medication: at 17:39

## 2022-06-21 RX ADMIN — CYANOCOBALAMIN TAB 500 MCG 1000 MCG: 500 TAB at 08:32

## 2022-06-21 RX ADMIN — ACETAMINOPHEN 650 MG: 325 TABLET ORAL at 22:41

## 2022-06-21 RX ADMIN — AMLODIPINE BESYLATE 5 MG: 5 TABLET ORAL at 08:34

## 2022-06-21 RX ADMIN — Medication: at 08:36

## 2022-06-21 RX ADMIN — APIXABAN 10 MG: 5 TABLET, FILM COATED ORAL at 22:03

## 2022-06-21 RX ADMIN — AMIODARONE HYDROCHLORIDE 200 MG: 200 TABLET ORAL at 08:32

## 2022-06-21 RX ADMIN — Medication: at 08:37

## 2022-06-21 RX ADMIN — MEGESTROL ACETATE 20 MG: 40 SUSPENSION ORAL at 08:40

## 2022-06-21 NOTE — PROGRESS NOTES
Occupational Therapy    Patient chart reviewed up to date. Attempted visit, with patient expressing particular concern regarding L shoulder arthritis and limited ROM. She politely declines participation at this time, stating she is in the middle of making phone calls / paying bills. Will defer per request and continue to follow as able and appropriate. Thank you.   Shauna Fernandez, OTR/L

## 2022-06-21 NOTE — PROGRESS NOTES
Transition of Care Plan   RUR- Low 12%   DISPOSITION: SNF - [facility] - pending medical stability    F/U with PCP/Specialist     Transport: [facility]    Emergency contact: Primary Decision Maker: Damon Bita - [phone]    CM barriers to discharge: Jaden Ramos is good through 6/23 - will need to re-start if patient is not stable for DC before 23rd    [facility] has [facility] by [facility] starting 6/21-6/23 for SNF at [facility]. Jaden Ramos AW#[id number]. Care coordinator: Jc Rinaldi. CM spoke with [address] [place] [address] Pierre Romero, she is able to accept patient when she is stable. Awaiting ENT consult. CM will continue to follow progress and assist as needed with ANNE-MARIE plan. Kiana Form) NBA Phillip.

## 2022-06-21 NOTE — PROGRESS NOTES
Transition of Care Plan   RUR- Low 12%   DISPOSITION: SNF - [facility] - pending medical stability    F/U with PCP/Specialist     Transport: [facility]    Emergency contact: Primary Decision Maker: Irasema Gonsalez - [phone]    MICHELLE barriers to discharge: Sarahyoshi Samaniegojs is good through 6/23 - will need to re-start if patient is not stable for DC before 23rd    [facility] starting [facility]21-6/23 for SNF at [facility]. Ann Guadarrama AD#[id number]. Care coordinator: Steven Mason. CM spoke with [address] [place], [place] [address]Beth Speaker, she is able to accept patient when she is stable. Awaiting ENT consult. CM will continue to follow progress and assist as needed with ANNE-MARIE plan. NBA Carlos.

## 2022-06-21 NOTE — PROGRESS NOTES
Problem: Pressure Injury - Risk of  Goal: *Prevention of pressure injury  Description: Document Bin Scale and appropriate interventions in the flowsheet. 6/21/2022 1417 by Maxi Lee RN  Outcome: Progressing Towards Goal  Note: Pressure Injury Interventions:  Sensory Interventions: Assess changes in LOC,Minimize linen layers,Turn and reposition approx. every two hours (pillows and wedges if needed)    Moisture Interventions: Absorbent underpads,Check for incontinence Q2 hours and as needed    Activity Interventions: PT/OT evaluation,Pressure redistribution bed/mattress(bed type)    Mobility Interventions: Pressure redistribution bed/mattress (bed type),Turn and reposition approx. every two hours(pillow and wedges)    Nutrition Interventions: Document food/fluid/supplement intake    Friction and Shear Interventions: Apply protective barrier, creams and emollients,Minimize layers             6/21/2022 1352 by Maxi Lee RN  Outcome: Progressing Towards Goal  Note: Pressure Injury Interventions:  Sensory Interventions: Assess changes in LOC,Minimize linen layers,Turn and reposition approx. every two hours (pillows and wedges if needed)    Moisture Interventions: Absorbent underpads,Check for incontinence Q2 hours and as needed    Activity Interventions: PT/OT evaluation,Pressure redistribution bed/mattress(bed type)    Mobility Interventions: Pressure redistribution bed/mattress (bed type),Turn and reposition approx.  every two hours(pillow and wedges)    Nutrition Interventions: Document food/fluid/supplement intake    Friction and Shear Interventions: Apply protective barrier, creams and emollients,Minimize layers                Problem: Patient Education: Go to Patient Education Activity  Goal: Patient/Family Education  6/21/2022 1417 by Maxi Lee RN  Outcome: Progressing Towards Goal  6/21/2022 1352 by Maxi Lee RN  Outcome: Progressing Towards Goal     Problem: TIA/CVA Stroke: Day 2 Until Discharge  Goal: Off Pathway (Use only if patient is Off Pathway)  6/21/2022 1417 by Karl Lawrence RN  Outcome: Progressing Towards Goal  6/21/2022 1352 by Karl Lawrence RN  Outcome: Progressing Towards Goal  Goal: Activity/Safety  Outcome: Progressing Towards Goal  Goal: Diagnostic Test/Procedures  Outcome: Progressing Towards Goal  Goal: Nutrition/Diet  Outcome: Progressing Towards Goal  Goal: Discharge Planning  Outcome: Progressing Towards Goal  Goal: Medications  Outcome: Progressing Towards Goal  Goal: Respiratory  Outcome: Progressing Towards Goal  Goal: Treatments/Interventions/Procedures  Outcome: Progressing Towards Goal     Problem: Falls - Risk of  Goal: *Absence of Falls  Description: Document Pam Fall Risk and appropriate interventions in the flowsheet.   Outcome: Progressing Towards Goal  Note: Fall Risk Interventions:  Mobility Interventions: Bed/chair exit alarm,Communicate number of staff needed for ambulation/transfer         Medication Interventions: Bed/chair exit alarm    Elimination Interventions: Bed/chair exit alarm,Call light in reach              Problem: Patient Education: Go to Patient Education Activity  Goal: Patient/Family Education  Outcome: Progressing Towards Goal     Problem: Nutrition Deficit  Goal: *Optimize nutritional status  Outcome: Progressing Towards Goal

## 2022-06-21 NOTE — ROUTINE PROCESS
Bedside and Verbal shift change report given to [facility] (oncoming nurse) by Lauren Latham (offgoing nurse). Report included the following information SBAR, Kardex, Intake/Output, Med Rec Status and Cardiac Rhythm sr.

## 2022-06-21 NOTE — PROGRESS NOTES
Patients RN Orquidea Stoll confirmed attending, Dr. Monica Francis, wants to wait for ENT to see patient before approving patient receiving Covid vaccine prior to discharge. Vaccine RN will follow patients progress and proceed accordingly.   Kimi Martines RN Covid Vaccine Team

## 2022-06-21 NOTE — PROGRESS NOTES
[address] [facility] Adult  Hospitalist Group                                                                                          Hospitalist Progress Note  Vicente Primrose, MD  Answering service: [phone] from in house phone        Date of Service:  2022  NAME:  Shoshana Perry  :  1950  MRN:  [id number]      Admission Summary:   Luwanna Goodpasture a 67 y. o. female who presents to [facility] with complaints of increasing swelling and weakness on her left side since yesterday evening. More weaker in her left leg.  History is rather limited though patient says she was in her usual state of health when she slipped yesterday noted having more trouble getting up.  This morning says she really can get up and using her left leg where she notes more prominent swelling.  She then came into the ER as a stroke alert CT CTA were obtained without any significant findings other than concern for malignancy as well as a possible pulmonary embolism.  Neurology consultation was then requested. Blu Chinchilla is on Eliquis for A. fib says she is compliant with her meds.  Does not really endorse much weakness.  Seems indicate that it similar to when she was in the hospital in January with weakness for 2 weeks with a negative MRI.  Presumably went back to normal in the interval.    Normally she can get up and walk around but was unable to do so this morning because of this weakness.  She states that she still weak.  There is no headache, no history of any fever or chill, no nausea or vomiting and no other acute symptomatology. Has chronically swollen lower extremities.  She states her skin is gotten extremely dry recently. Ayaz Gomez has no shortness of breath, nausea or vomiting and no other GI or  symptoms.        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  Pt was previously unaware of this mass, has not been to a dentist recently  Pending ENT consultation    CTA finding: Partially visualized small acute PE in the distal right main pulmonary artery  Increased Eliquis to VTE dosing for now  Appreciate heme/onc assistance and recs  Not a failure of Eliquis since pt admits to being off Eliquis for at least a week PTA to \"stretch it out\", no financial barrier, states that she did not eat much, so did not take meds  Explained the importance of compliance with atrial fib and PE  Will need to switch to heparin drip if ENT requires any surgical intervention or biopsy    Multinodular thyroid gland  TSH wnl  F/u OP    Chronic a fib: continue amiodarone, Eliquis    Chronic leg lymphedema/skin changes: wound care    HTN  Cont norvasc  IV PRNs  monitor      Code status: Full Code  Prophylaxis: Eliquis high dose  Care Plan discussed with: Patient/Family  Anticipated Disposition: SNF   Anticipated Discharge: Greater than 48 hours pending ENT and heme/onc workup     Hospital Problems  Date Reviewed: 5/20/2022          Codes Class Noted POA    CVA (cerebral vascular accident) [facility]) ICD-10-CM: I63.9  ICD-9-CM: 434.91  6/17/2022 Unknown              Review of Systems:   Pertinent items are noted in HPI    Vital Signs:    Last 24hrs VS reviewed since prior progress note.  Most recent are:  Visit Vitals  BP (!) 120/56 (BP 1 Location: Right upper arm, BP Patient Position: At rest)   Pulse 68   Temp 98.3 °F (36.8 °C)   Resp 13   Ht 5' 7\" (1.702 m)   Wt 68.9 kg (151 lb 14.4 oz)   SpO2 100%   BMI 23.79 kg/m²         Intake/Output Summary (Last 24 hours) at 6/21/2022 1236  Last data filed at 6/21/2022 0400  Gross per 24 hour   Intake --   Output 800 ml   Net -800 ml        Physical Examination:   I had a face to face encounter with this patient and independently examined them on 6/21/2022 as outlined below:    General: Alert, cooperative, no acute distress    Resp:  No resp distress, No accessory muscle use  CV:  Irregular rhythm, normal rate  GI:  Soft, Non distended, Non tender  Neurologic:  Alert and oriented, normal speech, PATRICIO  Extremities: Chronic lymphedema LLE>RLE, no cyanosis  Psych:   Not anxious or agitated  Skin:  Lymphedema b/l, chronic skin changes, no acute cellulitis, No jaundice       Data Review:   I personally reviewed: vitals, labs, imaging results, notes    Labs:     Recent Labs     06/21/22 0222 06/20/22  0203   WBC 5.0 5.6   HGB 11.7 11.4*   HCT 35.2 34.3*    168     Recent Labs     06/21/22 0222 06/20/22 0203 06/19/22  0452    140 143   K 3.6 3.8 3.5    108 109*   CO2 28 27 28   BUN 13 14 20   CREA 0.96 0.85 0.92   * 103* 98   CA 8.7 8.3* 8.1*   MG  --  2.1  --      Recent Labs     06/20/22 0203   ALB 2.7*     No results for input(s): INR, PTP, APTT, INREXT, INREXT in the last 72 hours. No results for input(s): FE, TIBC, PSAT, FERR in the last 72 hours. Lab Results   Component Value Date/Time    Folate 6.9 01/12/2022 02:21 AM      No results for input(s): PH, PCO2, PO2 in the last 72 hours. No results for input(s): CPK, CKNDX, TROIQ in the last 72 hours.     No lab exists for component: CPKMB  Lab Results   Component Value Date/Time    Cholesterol, total 164 06/18/2022 02:20 AM    HDL Cholesterol 54 06/18/2022 02:20 AM    LDL, calculated 96 06/18/2022 02:20 AM    Triglyceride 70 06/18/2022 02:20 AM    CHOL/HDL Ratio 3.0 06/18/2022 02:20 AM     No results found for: [facility]  Lab Results   Component Value Date/Time    Color YELLOW/STRAW 06/17/2022 02:16 PM    Appearance CLEAR 06/17/2022 02:16 PM    Specific gravity 1.020 06/17/2022 02:16 PM    Specific gravity 1.019 01/11/2022 04:24 PM    pH (UA) 5.5 06/17/2022 02:16 PM    Protein 30 (A) 06/17/2022 02:16 PM    Glucose Negative 06/17/2022 02:16 PM    Ketone 15 (A) 06/17/2022 02:16 PM    Bilirubin Negative 06/17/2022 02:16 PM    Urobilinogen 0.2 06/17/2022 02:16 PM    Nitrites Negative 06/17/2022 02:16 PM    Leukocyte Esterase Negative 06/17/2022 02:16 PM    Epithelial cells FEW 06/17/2022 02:16 PM    Bacteria Negative 06/17/2022 02:16 PM    WBC 0-4 06/17/2022 02:16 PM    RBC 0-5 06/17/2022 02:16 PM       Medications Reviewed:     Current Facility-Administered Medications   Medication Dose Route Frequency    [START ON 6/24/2022] apixaban (ELIQUIS) tablet 5 mg  5 mg Oral Q12H    hydrALAZINE (APRESOLINE) 20 mg/mL injection 10 mg  10 mg IntraVENous Q4H PRN    labetaloL (NORMODYNE;TRANDATE) injection 10 mg  10 mg IntraVENous Q4H PRN    apixaban (ELIQUIS) tablet 10 mg  10 mg Oral Q12H    acetaminophen (TYLENOL) tablet 650 mg  650 mg Oral Q4H PRN    Or    acetaminophen (TYLENOL) solution 650 mg  650 mg Per NG tube Q4H PRN    Or    acetaminophen (TYLENOL) suppository 650 mg  650 mg Rectal Q4H PRN    amiodarone (CORDARONE) tablet 200 mg  200 mg Oral DAILY    amLODIPine (NORVASC) tablet 5 mg  5 mg Oral DAILY    cholecalciferol (VITAMIN D3) (1000 Units /25 mcg) tablet 1,000 Units  1,000 Units Oral DAILY    cyanocobalamin (VITAMIN B12) tablet 1,000 mcg  1,000 mcg Oral DAILY    traMADoL (ULTRAM) tablet 50 mg  50 mg Oral Q6H PRN    megestroL (MEGACE) 400 mg/10 mL (10 mL) oral suspension 20 mg  20 mg Oral DAILY    balsam peru-castor oiL (VENELEX) ointment   Topical BID    ammonium lactate (LAC-HYDRIN) 12 % lotion   Topical BID     ______________________________________________________________________  EXPECTED LENGTH OF STAY: 3d 12h  ACTUAL LENGTH OF STAY:          4                 Kermit Triana MD

## 2022-06-21 NOTE — PROGRESS NOTES
Problem: Pressure Injury - Risk of  Goal: *Prevention of pressure injury  Description: Document Bin Scale and appropriate interventions in the flowsheet. 6/21/2022 1417 by Fito Irby RN  Outcome: Progressing Towards Goal  Note: Pressure Injury Interventions:  Sensory Interventions: Assess changes in LOC,Minimize linen layers,Turn and reposition approx. every two hours (pillows and wedges if needed)    Moisture Interventions: Absorbent underpads,Check for incontinence Q2 hours and as needed    Activity Interventions: PT/OT evaluation,Pressure redistribution bed/mattress(bed type)    Mobility Interventions: Pressure redistribution bed/mattress (bed type),Turn and reposition approx. every two hours(pillow and wedges)    Nutrition Interventions: Document food/fluid/supplement intake    Friction and Shear Interventions: Apply protective barrier, creams and emollients,Minimize layers             6/21/2022 1352 by Fito Irby RN  Outcome: Progressing Towards Goal  Note: Pressure Injury Interventions:  Sensory Interventions: Assess changes in LOC,Minimize linen layers,Turn and reposition approx. every two hours (pillows and wedges if needed)    Moisture Interventions: Absorbent underpads,Check for incontinence Q2 hours and as needed    Activity Interventions: PT/OT evaluation,Pressure redistribution bed/mattress(bed type)    Mobility Interventions: Pressure redistribution bed/mattress (bed type),Turn and reposition approx.  every two hours(pillow and wedges)    Nutrition Interventions: Document food/fluid/supplement intake    Friction and Shear Interventions: Apply protective barrier, creams and emollients,Minimize layers                Problem: Patient Education: Go to Patient Education Activity  Goal: Patient/Family Education  6/21/2022 1417 by Fito Irby RN  Outcome: Progressing Towards Goal  6/21/2022 1352 by Fito Irby RN  Outcome: Progressing Towards Goal     Problem: TIA/CVA Stroke: Day 2 Until Discharge  Goal: Off Pathway (Use only if patient is Off Pathway)  6/21/2022 1417 by Mariluz Ortiz RN  Outcome: Progressing Towards Goal  6/21/2022 1352 by Mariluz Ortiz RN  Outcome: Progressing Towards Goal  Goal: Activity/Safety  Outcome: Progressing Towards Goal  Goal: Diagnostic Test/Procedures  Outcome: Progressing Towards Goal  Goal: Nutrition/Diet  Outcome: Progressing Towards Goal  Goal: Discharge Planning  Outcome: Progressing Towards Goal  Goal: Medications  Outcome: Progressing Towards Goal  Goal: Respiratory  Outcome: Progressing Towards Goal  Goal: Treatments/Interventions/Procedures  Outcome: Progressing Towards Goal     Problem: Falls - Risk of  Goal: *Absence of Falls  Description: Document Pam Fall Risk and appropriate interventions in the flowsheet.   Outcome: Progressing Towards Goal  Note: Fall Risk Interventions:  Mobility Interventions: Bed/chair exit alarm,Communicate number of staff needed for ambulation/transfer         Medication Interventions: Bed/chair exit alarm    Elimination Interventions: Bed/chair exit alarm,Call light in reach              Problem: Patient Education: Go to Patient Education Activity  Goal: Patient/Family Education  Outcome: Progressing Towards Goal     Problem: Nutrition Deficit  Goal: *Optimize nutritional status  Outcome: Progressing Towards Goal

## 2022-06-21 NOTE — ROUTINE PROCESS
Bedside and Verbal shift change report given to [facility] (oncoming nurse) by Mayda Pratt (offgoing nurse). Report included the following information SBAR, Kardex, Intake/Output, Med Rec Status and Cardiac Rhythm sr.

## 2022-06-21 NOTE — PROGRESS NOTES
Bedside shift change report given to Amisha/RN (oncoming nurse) by Leonie/LPN (offgoing nurse). Report included the following information SBAR, Kardex, ED Summary, Procedure Summary, Intake/Output, MAR, Accordion, Recent Results, Alarm Parameters , Quality Measures and Dual Neuro Assessment.

## 2022-06-21 NOTE — PROGRESS NOTES
1030 called consult to EENT with [facility] was told they do not take new consults, and to refer to [facility] for on call MD today. 1045 attempted to call otolaryngology consult to Sacha Escalante with [facility]. Dr. Milton Dunn returned consult call and informed RN that she does not have privileges that this hospital.      1453:No doctor available to see patient at [facility] per Dr. Proctor Poster for Otolaryngology consult. Patient will need to be transferred to [facility] for Otolaryngology consult.

## 2022-06-21 NOTE — PROGRESS NOTES
Hematology-Oncology Progress Note    Evelyn Scherer  1950  [id number]  6/21/2022    Follow-up for: cva                            Tonsillar mass     [x]        Chart notes since last visit reviewed   [x]        Medications reviewed for allergies and interactions       Case discussed with the following:         []                            []        Nursing Staff                                                                         []        Pathologist                                                                        []        FAMILY      Subjective:     Spoke with patient who complains of: pt had a good night, no new c/o    Objective:   Patient Vitals for the past 24 hrs:   BP Temp Pulse Resp SpO2 Height   06/21/22 0834 (!) 146/74 -- 67 -- -- --   06/21/22 0832 (!) 146/74 -- 65 -- -- --   06/21/22 0615 (!) 162/82 98.5 °F (36.9 °C) 67 16 100 % --   06/21/22 0600 -- -- (!) 59 -- -- --   06/21/22 0400 -- -- 81 -- -- --   06/21/22 0200 -- -- (!) 58 -- -- --   06/21/22 0145 135/62 98.8 °F (37.1 °C) 60 12 99 % --   06/20/22 2215 (!) 124/56 98.7 °F (37.1 °C) 62 13 99 % --   06/20/22 2200 -- -- 71 -- -- --   06/20/22 2000 -- -- 71 -- 95 % --   06/20/22 1812 (!) 160/68 97.8 °F (36.6 °C) 65 -- -- --   06/20/22 1800 -- -- 67 -- -- --   06/20/22 1600 -- -- (!) 59 -- -- --   06/20/22 1438 (!) 151/76 97.7 °F (36.5 °C) 69 13 -- --   06/20/22 1400 -- -- 73 -- -- --   06/20/22 1147 -- -- 77 -- -- --   06/20/22 1046 -- -- -- -- -- 5' 7\" (1.702 m)   06/20/22 1011 -- -- 74 -- -- --   06/20/22 0959 -- -- 74 -- -- --   06/20/22 0949 135/64 97.9 °F (36.6 °C) 73 17 -- --       REVIEW OF SYSTEMS:    Constitutional: negative fever, negative chills, negative weight loss  Eyes:   negative visual changes  ENT:   negative sore throat, tongue or lip swelling  Respiratory:  negative cough, negative dyspnea  Cards:  negative for chest pain, palpitations, lower extremity edema  GI:   negative for nausea, vomiting, diarrhea, and abdominal pain  Neuro:  negative for headaches, dizziness, vertigo  []                        Full ROS o/w normal/non contributor    Constitutional:  Patient looks  []        Sick  []        Frail  [x]        Better                                                 []        Depressed    HEENT:  [x]   NC                         []   AT               []    ALOPECIA           Eyes: [x]   Normal               []    Icteric  Oropharynx: [x]    Normal                  []  Thrush               []   Dry  Mucositis: []    None                 Grade: []        I  []        II  []        III  []        IV  Neck:   [x]   Supple                  []  Rigid               JVD:    []   ABSENT       []   PRESENT  Lymphadenopathy:   [x]   None Noted            []   PRESENT    Chest:  [x]   Clear               []    Rhonchi                      Dec'd @     []  Right Base           []   Left Base    CV:             []   Regular              [x]  Irregular               []   Tachy                []   Murmur  Abdominal:   [x]    Soft              []   NON-tender               []   Tender      BS:    []   ABSENT                   []   PRESENT  Liver:     [x]  NON-palp                  []   EDGE- palp  Spleen: [x]   NON-palp                   []  EDGE - palp  Mass:   [x]   ABSENT                          []  PRESENT  Extr:    [x]  Lymphedema             []   Cyanosis      []  Clubbing  Edema:     [x]   NONE       []   PRESENT  Skin:  Intact []           Purpura []        Rash: [x]   ABSENT       []  PRESENT  Neuro:  [x]        Normal  []        Confused      Available labs reviewed:  Labs:    Recent Results (from the past 24 hour(s))   CEA    Collection Time: 06/21/22  2:22 AM   Result Value Ref Range    CEA 1.2 ng/mL   CBC W/O DIFF    Collection Time: 06/21/22  2:22 AM   Result Value Ref Range    WBC 5.0 3.6 - 11.0 K/uL    RBC 4.14 3.80 - 5.20 M/uL    HGB 11.7 11.5 - 16.0 g/dL    HCT 35.2 35.0 - 47.0 %    MCV 85.0 80.0 - 99.0 FL    MCH 28.3 26.0 - 34.0 PG    MCHC 33.2 30.0 - 36.5 g/dL    RDW 14.9 (H) 11.5 - 14.5 %    PLATELET 045 138 - 994 K/uL    MPV 9.9 8.9 - 12.9 FL    NRBC 0.0 0  WBC    ABSOLUTE NRBC 0.00 0.00 - 9.84 K/uL   METABOLIC PANEL, BASIC    Collection Time: 06/21/22  2:22 AM   Result Value Ref Range    Sodium 138 136 - 145 mmol/L    Potassium 3.6 3.5 - 5.1 mmol/L    Chloride 107 97 - 108 mmol/L    CO2 28 21 - 32 mmol/L    Anion gap 3 (L) 5 - 15 mmol/L    Glucose 103 (H) 65 - 100 mg/dL    BUN 13 6 - 20 MG/DL    Creatinine 0.96 0.55 - 1.02 MG/DL    BUN/Creatinine ratio 14 12 - 20      GFR est AA >60 >60 ml/min/1.73m2    GFR est non-AA 57 (L) >60 ml/min/1.73m2    Calcium 8.7 8.5 - 10.1 MG/DL       Available Xrays reviewed:    Chemotherapy monitored and toxicities assessed:    Assessment and Plan   1. 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 Pt was supposed to be taking eliquis for afib, but had been off it for at least a weak prior to this hospitalization. She should be able to go back on this at discharge. Other mgmt per primary team  2. Left tonsil mass and left cervical lymphadenopathy. . 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 . pt not seen by ENT yet. I will re-consult .     John Sánchez MD

## 2022-06-21 NOTE — PROGRESS NOTES
1030 called consult to EENT with [facility] was told they do not take new consults, and to refer to [facility] for on call MD today. 1045 attempted to call otolaryngology consult to Frances Toscano with [facility]. Dr. Freya Rodney returned consult call and informed RN that she does not have privileges that this hospital.      1453:No doctor available to see patient at [facility] per Dr. Yuko Cheatham for Otolaryngology consult. Patient will need to be transferred to [facility] for Otolaryngology consult.

## 2022-06-21 NOTE — PROGRESS NOTES
Patients RN Jan Carbajal confirmed attending, Dr. Ondina Harrison, wants to wait for ENT to see patient before approving patient receiving Covid vaccine prior to discharge. Vaccine RN will follow patients progress and proceed accordingly.   Jeannette Carmona RN Covid Vaccine Team

## 2022-06-21 NOTE — PROGRESS NOTES
[facility] Adult  Hospitalist Group                                                                                          Hospitalist Progress Note  India Mantilla MD  Answering service: [phone] from in house phone        Date of Service:  2022  NAME:  Lilly Valle  :  1950  MRN:  [id number]      Admission Summary:   Ashia Rosales a 67 y. o. female who presents to [address] with complaints of increasing swelling and weakness on her left side since yesterday evening. More weaker in her left leg.  History is rather limited though patient says she was in her usual state of health when she slipped yesterday noted having more trouble getting up.  This morning says she really can get up and using her left leg where she notes more prominent swelling.  She then came into the ER as a stroke alert CT CTA were obtained without any significant findings other than concern for malignancy as well as a possible pulmonary embolism.  Neurology consultation was then requested. Jenny Montes is on Eliquis for A. fib says she is compliant with her meds.  Does not really endorse much weakness.  Seems indicate that it similar to when she was in the hospital in January with weakness for 2 weeks with a negative MRI.  Presumably went back to normal in the interval.    Normally she can get up and walk around but was unable to do so this morning because of this weakness.  She states that she still weak.  There is no headache, no history of any fever or chill, no nausea or vomiting and no other acute symptomatology. Has chronically swollen lower extremities.  She states her skin is gotten extremely dry recently. [place] Jessica has no shortness of breath, nausea or vomiting and no other GI or  symptoms.        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  Pt was previously unaware of this mass, has not been to a dentist recently  Pending ENT consultation    CTA finding: Partially visualized small acute PE in the distal right main pulmonary artery  Increased Eliquis to VTE dosing for now  Appreciate heme/onc assistance and recs  Not a failure of Eliquis since pt admits to being off Eliquis for at least a week PTA to \"stretch it out\", no financial barrier, states that she did not eat much, so did not take meds  Explained the importance of compliance with atrial fib and PE  Will need to switch to heparin drip if ENT requires any surgical intervention or biopsy    Multinodular thyroid gland  TSH wnl  F/u OP    Chronic a fib: continue amiodarone, Eliquis    Chronic leg lymphedema/skin changes: wound care    HTN  Cont norvasc  IV PRNs  monitor      Code status: Full Code  Prophylaxis: Eliquis high dose  Care Plan discussed with: Patient/Family  Anticipated Disposition: SNF   Anticipated Discharge: Greater than 48 hours pending ENT and heme/onc workup     Hospital Problems  Date Reviewed: 5/20/2022          Codes Class Noted POA    CVA (cerebral vascular accident) [facility]) ICD-10-CM: I63.9  ICD-9-CM: 434.91  6/17/2022 Unknown              Review of Systems:   Pertinent items are noted in HPI    Vital Signs:    Last 24hrs VS reviewed since prior progress note.  Most recent are:  Visit Vitals  BP (!) 120/56 (BP 1 Location: Right upper arm, BP Patient Position: At rest)   Pulse 68   Temp 98.3 °F (36.8 °C)   Resp 13   Ht 5' 7\" (1.702 m)   Wt 68.9 kg (151 lb 14.4 oz)   SpO2 100%   BMI 23.79 kg/m²         Intake/Output Summary (Last 24 hours) at 6/21/2022 1236  Last data filed at 6/21/2022 0400  Gross per 24 hour   Intake --   Output 800 ml   Net -800 ml        Physical Examination:   I had a face to face encounter with this patient and independently examined them on 6/21/2022 as outlined below:    General: Alert, cooperative, no acute distress    Resp:  No resp distress, No accessory muscle use  CV:  Irregular rhythm, normal rate  GI:  Soft, Non distended, Non tender  Neurologic:  Alert and oriented, normal speech, PATRICIO  Extremities: Chronic lymphedema LLE>RLE, no cyanosis  Psych:   Not anxious or agitated  Skin:  Lymphedema b/l, chronic skin changes, no acute cellulitis, No jaundice       Data Review:   I personally reviewed: vitals, labs, imaging results, notes    Labs:     Recent Labs     06/21/22 0222 06/20/22  0203   WBC 5.0 5.6   HGB 11.7 11.4*   HCT 35.2 34.3*    168     Recent Labs     06/21/22 0222 06/20/22 0203 06/19/22  0452    140 143   K 3.6 3.8 3.5    108 109*   CO2 28 27 28   BUN 13 14 20   CREA 0.96 0.85 0.92   * 103* 98   CA 8.7 8.3* 8.1*   MG  --  2.1  --      Recent Labs     06/20/22 0203   ALB 2.7*     No results for input(s): INR, PTP, APTT, INREXT, INREXT in the last 72 hours. No results for input(s): FE, TIBC, PSAT, FERR in the last 72 hours. Lab Results   Component Value Date/Time    Folate 6.9 01/12/2022 02:21 AM      No results for input(s): PH, PCO2, PO2 in the last 72 hours. No results for input(s): CPK, CKNDX, TROIQ in the last 72 hours.     No lab exists for component: CPKMB  Lab Results   Component Value Date/Time    Cholesterol, total 164 06/18/2022 02:20 AM    HDL Cholesterol 54 06/18/2022 02:20 AM    LDL, calculated 96 06/18/2022 02:20 AM    Triglyceride 70 06/18/2022 02:20 AM    CHOL/HDL Ratio 3.0 06/18/2022 02:20 AM     No results found for: [facility]  Lab Results   Component Value Date/Time    Color YELLOW/STRAW 06/17/2022 02:16 PM    Appearance CLEAR 06/17/2022 02:16 PM    Specific gravity 1.020 06/17/2022 02:16 PM    Specific gravity 1.019 01/11/2022 04:24 PM    pH (UA) 5.5 06/17/2022 02:16 PM    Protein 30 (A) 06/17/2022 02:16 PM    Glucose Negative 06/17/2022 02:16 PM    Ketone 15 (A) 06/17/2022 02:16 PM    Bilirubin Negative 06/17/2022 02:16 PM    Urobilinogen 0.2 06/17/2022 02:16 PM    Nitrites Negative 06/17/2022 02:16 PM    Leukocyte Esterase Negative 06/17/2022 02:16 PM    Epithelial cells FEW 06/17/2022 02:16 PM    Bacteria Negative 06/17/2022 02:16 PM    WBC 0-4 06/17/2022 02:16 PM    RBC 0-5 06/17/2022 02:16 PM       Medications Reviewed:     Current Facility-Administered Medications   Medication Dose Route Frequency    [START ON 6/24/2022] apixaban (ELIQUIS) tablet 5 mg  5 mg Oral Q12H    hydrALAZINE (APRESOLINE) 20 mg/mL injection 10 mg  10 mg IntraVENous Q4H PRN    labetaloL (NORMODYNE;TRANDATE) injection 10 mg  10 mg IntraVENous Q4H PRN    apixaban (ELIQUIS) tablet 10 mg  10 mg Oral Q12H    acetaminophen (TYLENOL) tablet 650 mg  650 mg Oral Q4H PRN    Or    acetaminophen (TYLENOL) solution 650 mg  650 mg Per NG tube Q4H PRN    Or    acetaminophen (TYLENOL) suppository 650 mg  650 mg Rectal Q4H PRN    amiodarone (CORDARONE) tablet 200 mg  200 mg Oral DAILY    amLODIPine (NORVASC) tablet 5 mg  5 mg Oral DAILY    cholecalciferol (VITAMIN D3) (1000 Units /25 mcg) tablet 1,000 Units  1,000 Units Oral DAILY    cyanocobalamin (VITAMIN B12) tablet 1,000 mcg  1,000 mcg Oral DAILY    traMADoL (ULTRAM) tablet 50 mg  50 mg Oral Q6H PRN    megestroL (MEGACE) 400 mg/10 mL (10 mL) oral suspension 20 mg  20 mg Oral DAILY    balsam peru-castor oiL (VENELEX) ointment   Topical BID    ammonium lactate (LAC-HYDRIN) 12 % lotion   Topical BID     ______________________________________________________________________  EXPECTED LENGTH OF STAY: 3d 12h  ACTUAL LENGTH OF STAY:          4                 Muriel Nettles MD

## 2022-06-21 NOTE — PROGRESS NOTES
Hematology-Oncology Progress Note    Karishma Frost  1950  [id number]  6/21/2022    Follow-up for: cva                            Tonsillar mass     [x]        Chart notes since last visit reviewed   [x]        Medications reviewed for allergies and interactions       Case discussed with the following:         []                            []        Nursing Staff                                                                         []        Pathologist                                                                        []        FAMILY      Subjective:     Spoke with patient who complains of: pt had a good night, no new c/o    Objective:   Patient Vitals for the past 24 hrs:   BP Temp Pulse Resp SpO2 Height   06/21/22 0834 (!) 146/74 -- 67 -- -- --   06/21/22 0832 (!) 146/74 -- 65 -- -- --   06/21/22 0615 (!) 162/82 98.5 °F (36.9 °C) 67 16 100 % --   06/21/22 0600 -- -- (!) 59 -- -- --   06/21/22 0400 -- -- 81 -- -- --   06/21/22 0200 -- -- (!) 58 -- -- --   06/21/22 0145 135/62 98.8 °F (37.1 °C) 60 12 99 % --   06/20/22 2215 (!) 124/56 98.7 °F (37.1 °C) 62 13 99 % --   06/20/22 2200 -- -- 71 -- -- --   06/20/22 2000 -- -- 71 -- 95 % --   06/20/22 1812 (!) 160/68 97.8 °F (36.6 °C) 65 -- -- --   06/20/22 1800 -- -- 67 -- -- --   06/20/22 1600 -- -- (!) 59 -- -- --   06/20/22 1438 (!) 151/76 97.7 °F (36.5 °C) 69 13 -- --   06/20/22 1400 -- -- 73 -- -- --   06/20/22 1147 -- -- 77 -- -- --   06/20/22 1046 -- -- -- -- -- 5' 7\" (1.702 m)   06/20/22 1011 -- -- 74 -- -- --   06/20/22 0959 -- -- 74 -- -- --   06/20/22 0949 135/64 97.9 °F (36.6 °C) 73 17 -- --       REVIEW OF SYSTEMS:    Constitutional: negative fever, negative chills, negative weight loss  Eyes:   negative visual changes  ENT:   negative sore throat, tongue or lip swelling  Respiratory:  negative cough, negative dyspnea  Cards:  negative for chest pain, palpitations, lower extremity edema  GI:   negative for nausea, vomiting, diarrhea, and abdominal pain  Neuro:  negative for headaches, dizziness, vertigo  []                        Full ROS o/w normal/non contributor    Constitutional:  Patient looks  []        Sick  []        Frail  [x]        Better                                                 []        Depressed    HEENT:  [x]   NC                         []   AT               []    ALOPECIA           Eyes: [x]   Normal               []    Icteric  Oropharynx: [x]    Normal                  []  Thrush               []   Dry  Mucositis: []    None                 Grade: []        I  []        II  []        III  []        IV  Neck:   [x]   Supple                  []  Rigid               JVD:    []   ABSENT       []   PRESENT  Lymphadenopathy:   [x]   None Noted            []   PRESENT    Chest:  [x]   Clear               []    Rhonchi                      Dec'd @     []  Right Base           []   Left Base    CV:             []   Regular              [x]  Irregular               []   Tachy                []   Murmur  Abdominal:   [x]    Soft              []   NON-tender               []   Tender      BS:    []   ABSENT                   []   PRESENT  Liver:     [x]  NON-palp                  []   EDGE- palp  Spleen: [x]   NON-palp                   []  EDGE - palp  Mass:   [x]   ABSENT                          []  PRESENT  Extr:    [x]  Lymphedema             []   Cyanosis      []  Clubbing  Edema:     [x]   NONE       []   PRESENT  Skin:  Intact []           Purpura []        Rash: [x]   ABSENT       []  PRESENT  Neuro:  [x]        Normal  []        Confused      Available labs reviewed:  Labs:    Recent Results (from the past 24 hour(s))   CEA    Collection Time: 06/21/22  2:22 AM   Result Value Ref Range    CEA 1.2 ng/mL   CBC W/O DIFF    Collection Time: 06/21/22  2:22 AM   Result Value Ref Range    WBC 5.0 3.6 - 11.0 K/uL    RBC 4.14 3.80 - 5.20 M/uL    HGB 11.7 11.5 - 16.0 g/dL    HCT 35.2 35.0 - 47.0 %    MCV 85.0 80.0 - 99.0 FL    MCH 28.3 26.0 - 34.0 PG    MCHC 33.2 30.0 - 36.5 g/dL    RDW 14.9 (H) 11.5 - 14.5 %    PLATELET 273 849 - 838 K/uL    MPV 9.9 8.9 - 12.9 FL    NRBC 0.0 0  WBC    ABSOLUTE NRBC 0.00 0.00 - 8.78 K/uL   METABOLIC PANEL, BASIC    Collection Time: 06/21/22  2:22 AM   Result Value Ref Range    Sodium 138 136 - 145 mmol/L    Potassium 3.6 3.5 - 5.1 mmol/L    Chloride 107 97 - 108 mmol/L    CO2 28 21 - 32 mmol/L    Anion gap 3 (L) 5 - 15 mmol/L    Glucose 103 (H) 65 - 100 mg/dL    BUN 13 6 - 20 MG/DL    Creatinine 0.96 0.55 - 1.02 MG/DL    BUN/Creatinine ratio 14 12 - 20      GFR est AA >60 >60 ml/min/1.73m2    GFR est non-AA 57 (L) >60 ml/min/1.73m2    Calcium 8.7 8.5 - 10.1 MG/DL       Available Xrays reviewed:    Chemotherapy monitored and toxicities assessed:    Assessment and Plan   1. 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 Pt was supposed to be taking eliquis for afib, but had been off it for at least a weak prior to this hospitalization. She should be able to go back on this at discharge. Other mgmt per primary team  2. Left tonsil mass and left cervical lymphadenopathy. . 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 . pt not seen by ENT yet. I will re-consult .     Rishi Ellsworth MD

## 2022-06-22 PROCEDURE — 74011250637 HC RX REV CODE- 250/637: Performed by: INTERNAL MEDICINE

## 2022-06-22 PROCEDURE — 74011250637 HC RX REV CODE- 250/637: Performed by: HOSPITALIST

## 2022-06-22 PROCEDURE — 77030037877 HC DRSG MEPILEX >48IN BORD MOLN -A

## 2022-06-22 PROCEDURE — 77030038269 HC DRN EXT URIN PURWCK BARD -A

## 2022-06-22 PROCEDURE — 65270000029 HC RM PRIVATE

## 2022-06-22 RX ADMIN — CYANOCOBALAMIN TAB 500 MCG 1000 MCG: 500 TAB at 09:13

## 2022-06-22 RX ADMIN — MEGESTROL ACETATE 20 MG: 40 SUSPENSION ORAL at 09:14

## 2022-06-22 RX ADMIN — Medication 1000 UNITS: at 09:14

## 2022-06-22 RX ADMIN — APIXABAN 10 MG: 5 TABLET, FILM COATED ORAL at 09:13

## 2022-06-22 RX ADMIN — Medication: at 18:00

## 2022-06-22 RX ADMIN — Medication: at 19:20

## 2022-06-22 RX ADMIN — AMLODIPINE BESYLATE 5 MG: 5 TABLET ORAL at 09:14

## 2022-06-22 RX ADMIN — APIXABAN 10 MG: 5 TABLET, FILM COATED ORAL at 21:46

## 2022-06-22 RX ADMIN — AMIODARONE HYDROCHLORIDE 200 MG: 200 TABLET ORAL at 09:14

## 2022-06-22 RX ADMIN — Medication: at 09:00

## 2022-06-22 NOTE — PROGRESS NOTES
Occupational Therapy    Completed chart review and nursing cleared for therapy. Patient received in bed. She deferred therapy as she reports being upset regarding not having a plan for her medical care yet. She requested OT to return in PM in hope she has a plan and will be in a good mental place to participate. Provided ed on importance of OT in meantime of setting medical POC. She reports the mobility has been coming in to turn her and she got to the [facility] yesterday. Will continue to follow for OT as appropriate and able.      Thank you,    Oumar Guzman, OT

## 2022-06-22 NOTE — PROGRESS NOTES
Occupational Therapy    Completed chart review and nursing cleared for therapy. Patient received in bed. She deferred therapy as she reports being upset regarding not having a plan for her medical care yet. She requested OT to return in PM in hope she has a plan and will be in a good mental place to participate. Provided ed on importance of OT in meantime of setting medical POC. She reports the mobility has been coming in to turn her and she got to the [facility] yesterday. Will continue to follow for OT as appropriate and able.      Thank you,    Yvette Lyn, OT

## 2022-06-22 NOTE — PROGRESS NOTES
Transition of Care    RUR: 12%    Michelle at the [facility] to see about transfer. [phone]). She informed me that [facility] does not have an ENT surgeon. She is going to try [facility].  If they do not have an ENT surgeon, she will need to look outside the system. Ms. Aden Patton was informed. Will continue to follow for discharge planning.   Signed By: Jose Hamilton LCSW     June 22, 2022

## 2022-06-22 NOTE — ROUTINE PROCESS
TRANSFER - OUT REPORT:    Verbal report given to tasha Jerome(name) on Alise Wilson  being transferred to (unit) for routine progression of care       Report consisted of patients Situation, Background, Assessment and   Recommendations(SBAR). Information from the following report(s) SBAR, Kardex, Intake/Output, Recent Results and Cardiac Rhythm SR, bbb was reviewed with the receiving nurse. Lines:   Peripheral IV 06/17/22 Left Antecubital (Active)   Site Assessment Clean, dry, & intact 06/21/22 2000   Phlebitis Assessment 0 06/21/22 2000   Infiltration Assessment 0 06/21/22 2000   Dressing Status Clean, dry, & intact; Occlusive 06/21/22 2000   Dressing Type Tape;Transparent 06/21/22 2000   Hub Color/Line Status Pink;Capped 06/21/22 2000   Action Taken Open ports on tubing capped 06/21/22 2000   Alcohol Cap Used Yes 06/21/22 2000        Opportunity for questions and clarification was provided.       Patient transported with:   Registered Nurse  Tech

## 2022-06-22 NOTE — WOUND CARE
WOCN Note:     Follow-up visit for ishial and sacrum. Chart shows:  Admitted on 6/17/22 from home. Admitted for weakness & CVA.     Assessment:   Appropriately conversational and requires some assist in repositioning onto right side. Reports pain in left leg when lifted. Wearing briefs and has a Pure Wick. Surface: [employer] foam mattress - I assisted RN to  her to P500 air mattress     Bilateral heels intact with thickly encrusted skin plaques. Heels offloaded with pillows. Generalized edema to lower legs but more so in left. Thick, dry skin plaques to bilateral lower legs. No open wounds noted on legs. Lac-hydrin applied     1. POA left ischial evolving deep tissue injury  4 x 6 x 0.2 cm  Primarily red tissue with some epithelium remaining causing a segmented look   Tx: applied venelex & covered with foam dressing     2. POA gluteal cleft and sacrum  Mixed injury of pressure and moisture  Linear split in gluteal cleft with MASD and partial thickness tissue loss surrounding it  ~4 x 6 x 0.2cm  Applied venelex and sacral foam    Ruptured bullae to right lateral thigh   3 x 3 x 0.1 cm  100% blanching red  venelex and foam applied     Wound Recommendations:    Lower legs: lac-hydrin daily  Left ischial, right thigh, and sacrum: venelex twice daily and cover with foam dressing; change dressing as needed     PI Prevention:  Turn/reposition approximately every 2 hours  Offload heels with heels hanging off end of pillow at all times while in bed. Sacral Foam dressing: lift to assess regularly; change as needed. Discontinue if incontinence is frequently soiling dressing.      Low Air Loss mattress: Use only flat sheet and one incontinence pad.      Transition of Care: Plan to follow weekly and as needed while admitted to hospital.     Bharat Valera, JEFFREYN, RN, [facility] [employer]  Certified Wound, Ostomy, Continence Nurse  office [phone]  Available via [employer]

## 2022-06-22 NOTE — ROUTINE PROCESS
Pt being transferred to room 549. Offered to call family to notify family of room change. Pt states \"ill call my grandson in the morning\".

## 2022-06-22 NOTE — WOUND CARE
WOCN Note:     Follow-up visit for ishial and sacrum. Chart shows:  Admitted on 6/17/22 from home. Admitted for weakness & CVA.     Assessment:   Appropriately conversational and requires some assist in repositioning onto right side. Reports pain in left leg when lifted. Wearing briefs and has a Pure Wick. Surface: [employer]ac[employer] foam mattress - I assisted RN to  her to P500 air mattress     Bilateral heels intact with thickly encrusted skin plaques. Heels offloaded with pillows. Generalized edema to lower legs but more so in left. Thick, dry skin plaques to bilateral lower legs. No open wounds noted on legs. Lac-hydrin applied     1. POA left ischial evolving deep tissue injury  4 x 6 x 0.2 cm  Primarily red tissue with some epithelium remaining causing a segmented look   Tx: applied venelex & covered with foam dressing     2. POA gluteal cleft and sacrum  Mixed injury of pressure and moisture  Linear split in gluteal cleft with MASD and partial thickness tissue loss surrounding it  ~4 x 6 x 0.2cm  Applied venelex and sacral foam    Ruptured bullae to right lateral thigh   3 x 3 x 0.1 cm  100% blanching red  venelex and foam applied     Wound Recommendations:    Lower legs: lac-hydrin daily  Left ischial, right thigh, and sacrum: venelex twice daily and cover with foam dressing; change dressing as needed     PI Prevention:  Turn/reposition approximately every 2 hours  Offload heels with heels hanging off end of pillow at all times while in bed. Sacral Foam dressing: lift to assess regularly; change as needed. Discontinue if incontinence is frequently soiling dressing.      Low Air Loss mattress: Use only flat sheet and one incontinence pad.      Transition of Care: Plan to follow weekly and as needed while admitted to hospital.     MANPREET Polo, RN, [facility]  Certified Wound, Ostomy, Continence Nurse  office [phone]  Available via [employer]

## 2022-06-22 NOTE — ROUTINE PROCESS
TRANSFER - OUT REPORT:    Verbal report given to tasha Jerome(name) on Manuel Palma  being transferred to (unit) for routine progression of care       Report consisted of patients Situation, Background, Assessment and   Recommendations(SBAR). Information from the following report(s) SBAR, Kardex, Intake/Output, Recent Results and Cardiac Rhythm SR, bbb was reviewed with the receiving nurse. Lines:   Peripheral IV 06/17/22 Left Antecubital (Active)   Site Assessment Clean, dry, & intact 06/21/22 2000   Phlebitis Assessment 0 06/21/22 2000   Infiltration Assessment 0 06/21/22 2000   Dressing Status Clean, dry, & intact; Occlusive 06/21/22 2000   Dressing Type Tape;Transparent 06/21/22 2000   Hub Color/Line Status Pink;Capped 06/21/22 2000   Action Taken Open ports on tubing capped 06/21/22 2000   Alcohol Cap Used Yes 06/21/22 2000        Opportunity for questions and clarification was provided.       Patient transported with:   Registered Nurse  Tech

## 2022-06-22 NOTE — PROGRESS NOTES
Transition of Care    RUR: 12%    Michelle at the [facility] to see about transfer. [phone]). She informed me that [address] [place] [address] does not have an ENT surgeon. She is going to try [facility].  If they do not have an ENT surgeon, she will need to look outside the system. Ms. Stone Paresh was informed. Will continue to follow for discharge planning.   Signed By: Claudio Rader LCSW     June 22, 2022

## 2022-06-22 NOTE — PROGRESS NOTES
Problem: Pressure Injury - Risk of  Goal: *Prevention of pressure injury  Description: Document Bin Scale and appropriate interventions in the flowsheet.   Outcome: Progressing Towards Goal  Note: Pressure Injury Interventions:  Sensory Interventions: Assess changes in LOC,Keep linens dry and wrinkle-free,Minimize linen layers,Pressure redistribution bed/mattress (bed type)    Moisture Interventions: Absorbent underpads,Internal/External urinary devices,Minimize layers    Activity Interventions: Pressure redistribution bed/mattress(bed type),Increase time out of bed,PT/OT evaluation    Mobility Interventions: Float heels,Pressure redistribution bed/mattress (bed type),PT/OT evaluation    Nutrition Interventions: Offer support with meals,snacks and hydration    Friction and Shear Interventions: Apply protective barrier, creams and emollients,Lift sheet,Minimize layers

## 2022-06-23 PROCEDURE — 65270000029 HC RM PRIVATE

## 2022-06-23 PROCEDURE — 74011250637 HC RX REV CODE- 250/637: Performed by: HOSPITALIST

## 2022-06-23 PROCEDURE — 97535 SELF CARE MNGMENT TRAINING: CPT

## 2022-06-23 PROCEDURE — 77030038269 HC DRN EXT URIN PURWCK BARD -A

## 2022-06-23 RX ADMIN — Medication: at 18:58

## 2022-06-23 RX ADMIN — Medication 1000 UNITS: at 09:17

## 2022-06-23 RX ADMIN — AMLODIPINE BESYLATE 5 MG: 5 TABLET ORAL at 09:16

## 2022-06-23 RX ADMIN — MEGESTROL ACETATE 20 MG: 40 SUSPENSION ORAL at 09:00

## 2022-06-23 RX ADMIN — AMIODARONE HYDROCHLORIDE 200 MG: 200 TABLET ORAL at 09:16

## 2022-06-23 RX ADMIN — Medication: at 18:59

## 2022-06-23 RX ADMIN — CYANOCOBALAMIN TAB 500 MCG 1000 MCG: 500 TAB at 09:16

## 2022-06-23 NOTE — PROGRESS NOTES
[facility]  Hospitalist Group                                                                                          Hospitalist Progress Note  Alina Huggins MD  Answering service: [phone] from in house phone        Date of Service:  2022  NAME:  Della Varghese  :  1950  MRN:  [id number]      Admission Summary:   Ang Hong a 67 y. o. female who presents to [facility] [address] with complaints of increasing swelling and weakness on her left side since yesterday evening. More weaker in her left leg.  History is rather limited though patient says she was in her usual state of health when she slipped yesterday noted having more trouble getting up.  This morning says she really can get up and using her left leg where she notes more prominent swelling.  She then came into the ER as a stroke alert CT CTA were obtained without any significant findings other than concern for malignancy as well as a possible pulmonary embolism.  Neurology consultation was then requested. Shayla Romo is on Eliquis for A. fib says she is compliant with her meds.  Does not really endorse much weakness.  Seems indicate that it similar to when she was in the hospital in January with weakness for 2 weeks with a negative MRI.  Presumably went back to normal in the interval.    Normally she can get up and walk around but was unable to do so this morning because of this weakness.  She states that she still weak.  There is no headache, no history of any fever or chill, no nausea or vomiting and no other acute symptomatology. Has chronically swollen lower extremities.  She states her skin is gotten extremely dry recently. Kristine Acuna has no shortness of breath, nausea or vomiting and no other GI or  symptoms. Interval history / Subjective:     Denied any headache. nusea/vomiting. No ENT consult available at [facility].  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  Pt was previously unaware of this mass, has not been to a dentist recently        CTA finding: Partially visualized small acute PE in the distal right main pulmonary artery  Increased Eliquis to VTE/PE dosing for now  Appreciate heme/onc assistance and recs  Not a failure of Eliquis since pt admits to being off Eliquis for at least a week PTA to \"stretch it out\", no financial barrier, states that she did not eat much, so did not take meds  Explained the importance of compliance with atrial fib and PE  Will need to switch to heparin drip if ENT requires any surgical intervention or biopsy    Multinodular thyroid gland  TSH wnl  F/u OP    Chronic a fib: continue amiodarone, Eliquis    Chronic leg lymphedema/skin changes: wound care    HTN  Cont norvasc  IV PRNs  monitor      Code status: Full Code  Prophylaxis: Eliquis high dose  Care Plan discussed with: Patient/Family  Anticipated Disposition: SNF   Anticipated Discharge: Greater than 48 hours pending ENT and heme/onc workup     Hospital Problems  Date Reviewed: 5/20/2022          Codes Class Noted POA    CVA (cerebral vascular accident) [facility]) ICD-10-CM: I63.9  ICD-9-CM: 434.91  6/17/2022 Unknown              Review of Systems:   Pertinent items are noted in HPI    Vital Signs:    Last 24hrs VS reviewed since prior progress note.  Most recent are:  Visit Vitals  /63 (BP 1 Location: Left upper arm, BP Patient Position: At rest;Lying)   Pulse 75   Temp 98.5 °F (36.9 °C)   Resp 17   Ht 5' 7\" (1.702 m)   Wt 68.9 kg (151 lb 14.4 oz)   SpO2 100%   BMI 23.79 kg/m²       No intake or output data in the 24 hours ending 06/22/22 2151     Physical Examination:   I had a face to face encounter with this patient and independently examined them on 6/22/2022 as outlined below:    General: Alert, cooperative, no acute distress    Resp:  No resp distress, No accessory muscle use  CV:  Irregular rhythm, normal rate  GI:  Soft, Non distended, Non tender  Neurologic:  Alert and oriented, normal speech, PATRICIO  Extremities: Chronic lymphedema LLE>RLE, no cyanosis  Psych:   Not anxious or agitated  Skin:  Lymphedema b/l, chronic skin changes, no acute cellulitis, No jaundice       Data Review:   I personally reviewed: vitals, labs, imaging results, notes    Labs:     Recent Labs     06/21/22 0222 06/20/22 0203   WBC 5.0 5.6   HGB 11.7 11.4*   HCT 35.2 34.3*    168     Recent Labs     06/21/22 0222 06/20/22 0203    140   K 3.6 3.8    108   CO2 28 27   BUN 13 14   CREA 0.96 0.85   * 103*   CA 8.7 8.3*   MG  --  2.1     Recent Labs     06/20/22 0203   ALB 2.7*     No results for input(s): INR, PTP, APTT, INREXT, INREXT in the last 72 hours. No results for input(s): FE, TIBC, PSAT, FERR in the last 72 hours. Lab Results   Component Value Date/Time    Folate 6.9 01/12/2022 02:21 AM      No results for input(s): PH, PCO2, PO2 in the last 72 hours. No results for input(s): CPK, CKNDX, TROIQ in the last 72 hours.     No lab exists for component: CPKMB  Lab Results   Component Value Date/Time    Cholesterol, total 164 06/18/2022 02:20 AM    HDL Cholesterol 54 06/18/2022 02:20 AM    LDL, calculated 96 06/18/2022 02:20 AM    Triglyceride 70 06/18/2022 02:20 AM    CHOL/HDL Ratio 3.0 06/18/2022 02:20 AM     No results found for: [facility]  Lab Results   Component Value Date/Time    Color YELLOW/STRAW 06/17/2022 02:16 PM    Appearance CLEAR 06/17/2022 02:16 PM    Specific gravity 1.020 06/17/2022 02:16 PM Specific gravity 1.019 01/11/2022 04:24 PM    pH (UA) 5.5 06/17/2022 02:16 PM    Protein 30 (A) 06/17/2022 02:16 PM    Glucose Negative 06/17/2022 02:16 PM    Ketone 15 (A) 06/17/2022 02:16 PM    Bilirubin Negative 06/17/2022 02:16 PM    Urobilinogen 0.2 06/17/2022 02:16 PM    Nitrites Negative 06/17/2022 02:16 PM    Leukocyte Esterase Negative 06/17/2022 02:16 PM    Epithelial cells FEW 06/17/2022 02:16 PM    Bacteria Negative 06/17/2022 02:16 PM    WBC 0-4 06/17/2022 02:16 PM    RBC 0-5 06/17/2022 02:16 PM       Medications Reviewed:     Current Facility-Administered Medications   Medication Dose Route Frequency    [START ON 6/24/2022] apixaban (ELIQUIS) tablet 5 mg  5 mg Oral Q12H    hydrALAZINE (APRESOLINE) 20 mg/mL injection 10 mg  10 mg IntraVENous Q4H PRN    labetaloL (NORMODYNE;TRANDATE) injection 10 mg  10 mg IntraVENous Q4H PRN    apixaban (ELIQUIS) tablet 10 mg  10 mg Oral Q12H    acetaminophen (TYLENOL) tablet 650 mg  650 mg Oral Q4H PRN    Or    acetaminophen (TYLENOL) solution 650 mg  650 mg Per NG tube Q4H PRN    Or    acetaminophen (TYLENOL) suppository 650 mg  650 mg Rectal Q4H PRN    amiodarone (CORDARONE) tablet 200 mg  200 mg Oral DAILY    amLODIPine (NORVASC) tablet 5 mg  5 mg Oral DAILY    cholecalciferol (VITAMIN D3) (1000 Units /25 mcg) tablet 1,000 Units  1,000 Units Oral DAILY    cyanocobalamin (VITAMIN B12) tablet 1,000 mcg  1,000 mcg Oral DAILY    traMADoL (ULTRAM) tablet 50 mg  50 mg Oral Q6H PRN    megestroL (MEGACE) 400 mg/10 mL (10 mL) oral suspension 20 mg  20 mg Oral DAILY    balsam peru-castor oiL (VENELEX) ointment   Topical BID    ammonium lactate (LAC-HYDRIN) 12 % lotion   Topical BID     ______________________________________________________________________  EXPECTED LENGTH OF STAY: 3d 12h  ACTUAL LENGTH OF STAY:          5                 Carolyn Bravo MD

## 2022-06-23 NOTE — PROGRESS NOTES
Hematology-Oncology Progress Note    Blanca Andrews  1950  [id number]  6/23/2022    Follow-up for: cva                            Tonsillar mass     [x]        Chart notes since last visit reviewed   [x]        Medications reviewed for allergies and interactions       Case discussed with the following:         []                            []        Nursing Staff                                                                         []        Pathologist                                                                        []        FAMILY      Subjective:     Spoke with patient who complains of: pt had a good night, no new c/o    Objective:     Patient Vitals for the past 24 hrs:   BP Temp Pulse Resp SpO2   06/23/22 0858 136/67 98.5 °F (36.9 °C) 72 17 100 %   06/23/22 0458 (!) 144/78 98.8 °F (37.1 °C) 68 18 97 %   06/22/22 2012 111/63 98.5 °F (36.9 °C) 75 17 100 %   06/22/22 1417 121/69 97.3 °F (36.3 °C) 64 18 99 %       REVIEW OF SYSTEMS:    Constitutional: negative fever, negative chills, negative weight loss  Eyes:   negative visual changes  ENT:   negative sore throat, tongue or lip swelling  Respiratory:  negative cough, negative dyspnea  Cards:  negative for chest pain, palpitations, lower extremity edema  GI:   negative for nausea, vomiting, diarrhea, and abdominal pain  Neuro:  negative for headaches, dizziness, vertigo  []                        Full ROS o/w normal/non contributor    Constitutional:  Patient looks  []        Sick  []        Frail  [x]        Better                                                 []        Depressed    HEENT:  [x]   NC                         []   AT               []    ALOPECIA           Eyes: [x]   Normal               []    Icteric  Oropharynx: [x]    Normal                  []  Thrush               []   Dry  Mucositis: []    None                 Grade: []        I  []        II  []        III  []        IV  Neck:   [x]   Supple                  [] Rigid               JVD:    []   ABSENT       []   PRESENT  Lymphadenopathy:   [x]   None Noted            []   PRESENT    Chest:  [x]   Clear               []    Rhonchi                      Dec'd @     []  Right Base           []   Left Base    CV:             []   Regular              [x]  Irregular               []   Tachy                []   Murmur  Abdominal:   [x]    Soft              []   NON-tender               []   Tender      BS:    []   ABSENT                   []   PRESENT  Liver:     [x]  NON-palp                  []   EDGE- palp  Spleen: [x]   NON-palp                   []  EDGE - palp  Mass:   [x]   ABSENT                          []  PRESENT  Extr:    [x]  Lymphedema             []   Cyanosis      []  Clubbing  Edema:     [x]   NONE       []   PRESENT  Skin:  Intact []           Purpura []        Rash: [x]   ABSENT       []  PRESENT  Neuro:  [x]        Normal  []        Confused      Available labs reviewed:  Labs:    No results found for this or any previous visit (from the past 24 hour(s)). Available Xrays reviewed:    Chemotherapy monitored and toxicities assessed:    Assessment and Plan   1. 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 Pt was supposed to be taking eliquis for afib, but had been off it for at least a weak prior to this hospitalization. She should be able to go back on this at discharge. Other mgmt per primary team  2. Left tonsil mass and left cervical lymphadenopathy. . 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 . ent doesn't come to the hospital,  I will see if radiology can do a neck biopsy,  Hold eliquis this am. Will set up out-pt ent eval with Dr. Gustafson.     Mary Bernardo MD

## 2022-06-23 NOTE — PROGRESS NOTES
Problem: Self Care Deficits Care Plan (Adult)  Goal: *Acute Goals and Plan of Care (Insert Text)  Description: FUNCTIONAL STATUS PRIOR TO ADMISSION: Patient was modified independent using a RW/cane for functional mobility on most days, used a wheelchair on other days. Was primarily completing sponge baths instead of showers. Independent in dressing/grooming ADL, used an roro for grocery delivery    HOME SUPPORT: Patient lives in communal living with separate bedrooms and shared kitchen/bathroom    Occupational Therapy Goals  Initiated 6/19/2022   1. Patient will perform grooming with modified independence within 7 day(s). 2.  Patient will perform toileting with minimal assistance/contact guard assist within 7 day(s). 3.  Patient will perform lower body dressing with minimal assistance/contact guard assist within 7 day(s). 4.  Patient will perform toilet transfers with moderate assistance  within 7 day(s). 5.  Patient will perform upper body dressing with moderate assistance  within 7 day(s). 6.  Patient will participate in upper extremity therapeutic exercise/activities with minimal assistance/contact guard assist within 7 day(s). 7.  Patient will utilize energy conservation techniques during functional activities with verbal cues within 7 day(s). 8.  Patient will improve their Fugl Khanna score by 5 points in prep for ADLs within 7 days. Outcome: Not Progressing Towards Goal    OCCUPATIONAL THERAPY TREATMENT  Patient: Brain Carlyle (67 y.o. female)  Date: 6/23/2022  Diagnosis: CVA (cerebral vascular accident) [facility]) [I63.9] <principal problem not specified>       Precautions: Fall  Chart, occupational therapy assessment, plan of care, and goals were reviewed. ASSESSMENT  Patient continues with skilled OT services and is not progressing towards goals as expected. She is cooperative and pleasant and a little particular and takes max increased time for all tasks.  She required max A for OOB today with max A to scoot EOB, min-SBA for sitting balance with R list/lean and cues to correct to midline, mod-max A with LOB posterior for SPT from EOB to [facility] then declined to wash LE or UB due to needing to focus only on having a bowel movement on BSC (needing 10-15 minutes for task). She isD for toileting, using purwic and soaking bed with urination upon OT arrival as she was unable to control bladder management until transfer to [facility]. Linens, brief and becca soaked with urine. She will benefit from SNF for rehab as understand [employer] doesn't allow IP rehab. She contniues to benefit from OT at this time. Current Level of Function Impacting Discharge (ADLs): see below--D to Setup for ADL, min-max A for mobility for SPT only, not ambulatory now    Other factors to consider for discharge: needs rehab         PLAN :  Patient continues to benefit from skilled intervention to address the above impairments. Continue treatment per established plan of care to address goals. Recommend with staff: up to chair for meals and BSC for toileting schedule    Recommend next OT session: per POC    Recommendation for discharge: (in order for the patient to meet his/her long term goals)  Therapy 3 hours per day 5-7 days per week yet understand [facility] is insurance, so SNF    This discharge recommendation:  Has been made in collaboration with the attending provider and/or case management    IF patient discharges home will need the following DME: TBD       SUBJECTIVE:   Patient stated I can't do that now. I need 10 minute.     OBJECTIVE DATA SUMMARY:   Cognitive/Behavioral Status:                      Functional Mobility and Transfers for ADLs:  Bed Mobility:  Supine to Sit: Maximum assistance;Assist x1;Additional time; Adaptive equipment  Scooting: Maximum assistance (to scoot to EOB)    Transfers:  Sit to Stand: Moderate assistance; Adaptive equipment; Additional time;Assist x1  Functional Transfers  Toilet Transfer : Maximum assistance;Assist x1;Additional time; Adaptive equipment  Bed to Chair: Maximum assistance;Assist x1;Additional time; Adaptive equipment (LOB posterior with EOB to BSC tx)    Balance:  Sitting: Impaired  Sitting - Static: Fair (occasional)  Sitting - Dynamic: Fair (occasional)  Standing: Impaired; With support  Standing - Static: Constant support;Poor (posterior lean, mod A for standing balance intermittently)  Standing - Dynamic : Poor;Constant support    ADL Intervention:                           Lower Body Dressing Assistance  Socks: Total assistance (dependent)    Toileting  Toileting Assistance: Total assistance(dependent)  Bladder Hygiene: Total assistance (dependent)  Clothing Management: Total assistance (dependent)  Cues: Verbal cues provided;Physical assistance for pants up;Physical assistance for pants down  Adaptive Equipment: Walker [facility])         Therapeutic Exercises:       Pain:  No c/o    Activity Tolerance:   Fair, requires frequent rest breaks and max increased time    After treatment patient left in no apparent distress:   Sitting in chair, Call bell within reach and on [facility], nurse notified patient needs to have BM and will call when finished, aware NOT to get up alone    COMMUNICATION/COLLABORATION:   The patients plan of care was discussed with: Physical therapist and Registered nurse.      Сергей Bledsoe OTR/L  Time Calculation: 33 mins

## 2022-06-23 NOTE — PROGRESS NOTES
ANNE-MARIE: anticipate d/c to [facility] SNF when medically stable; Saleem Glynn [phone]  May need [employer] reauthorization, prior auth was only good until 6/23    Pt may need a Rapid Covid test for SNF placement    [facility]    Primary Decision Maker: Valeriy Rosas - [phone]    RUR: 12%  -tonsillar mass, PE  -ENT/Hematology consults, plan for biospsy    -1220-CM reviewed pt chart & discussed pt case in rounds. Consults pending. CM to follow. -1600-CM noted plan for fna biopsy of the neck node.   Jerrica Zamudio RN BSN CCM

## 2022-06-23 NOTE — PROGRESS NOTES
Hematology-Oncology Progress Note    Alise Wilson  1950  [id number]  6/23/2022    Follow-up for: cva                            Tonsillar mass     [x]        Chart notes since last visit reviewed   [x]        Medications reviewed for allergies and interactions       Case discussed with the following:         []                            []        Nursing Staff                                                                         []        Pathologist                                                                        []        FAMILY      Subjective:     Spoke with patient who complains of: pt had a good night, no new c/o    Objective:     Patient Vitals for the past 24 hrs:   BP Temp Pulse Resp SpO2   06/23/22 0858 136/67 98.5 °F (36.9 °C) 72 17 100 %   06/23/22 0458 (!) 144/78 98.8 °F (37.1 °C) 68 18 97 %   06/22/22 2012 111/63 98.5 °F (36.9 °C) 75 17 100 %   06/22/22 1417 121/69 97.3 °F (36.3 °C) 64 18 99 %       REVIEW OF SYSTEMS:    Constitutional: negative fever, negative chills, negative weight loss  Eyes:   negative visual changes  ENT:   negative sore throat, tongue or lip swelling  Respiratory:  negative cough, negative dyspnea  Cards:  negative for chest pain, palpitations, lower extremity edema  GI:   negative for nausea, vomiting, diarrhea, and abdominal pain  Neuro:  negative for headaches, dizziness, vertigo  []                        Full ROS o/w normal/non contributor    Constitutional:  Patient looks  []        Sick  []        Frail  [x]        Better                                                 []        Depressed    HEENT:  [x]   NC                         []   AT               []    ALOPECIA           Eyes: [x]   Normal               []    Icteric  Oropharynx: [x]    Normal                  []  Thrush               []   Dry  Mucositis: []    None                 Grade: []        I  []        II  []        III  []        IV  Neck:   [x]   Supple                  [] Rigid               JVD:    []   ABSENT       []   PRESENT  Lymphadenopathy:   [x]   None Noted            []   PRESENT    Chest:  [x]   Clear               []    Rhonchi                      Dec'd @     []  Right Base           []   Left Base    CV:             []   Regular              [x]  Irregular               []   Tachy                []   Murmur  Abdominal:   [x]    Soft              []   NON-tender               []   Tender      BS:    []   ABSENT                   []   PRESENT  Liver:     [x]  NON-palp                  []   EDGE- palp  Spleen: [x]   NON-palp                   []  EDGE - palp  Mass:   [x]   ABSENT                          []  PRESENT  Extr:    [x]  Lymphedema             []   Cyanosis      []  Clubbing  Edema:     [x]   NONE       []   PRESENT  Skin:  Intact []           Purpura []        Rash: [x]   ABSENT       []  PRESENT  Neuro:  [x]        Normal  []        Confused      Available labs reviewed:  Labs:    No results found for this or any previous visit (from the past 24 hour(s)). Available Xrays reviewed:    Chemotherapy monitored and toxicities assessed:    Assessment and Plan   1. 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 Pt was supposed to be taking eliquis for afib, but had been off it for at least a weak prior to this hospitalization. She should be able to go back on this at discharge. Other mgmt per primary team  2. Left tonsil mass and left cervical lymphadenopathy. . 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 . ent doesn't come to the hospital,  I will see if radiology can do a neck biopsy,  Hold eliquis this am. Will set up out-pt ent eval with Dr. Gustafson.     Chuck Nichols MD

## 2022-06-23 NOTE — PROGRESS NOTES
Problem: Self Care Deficits Care Plan (Adult)  Goal: *Acute Goals and Plan of Care (Insert Text)  Description: FUNCTIONAL STATUS PRIOR TO ADMISSION: Patient was modified independent using a RW/cane for functional mobility on most days, used a wheelchair on other days. Was primarily completing sponge baths instead of showers. Independent in dressing/grooming ADL, used an roro for grocery delivery    HOME SUPPORT: Patient lives in communal living with separate bedrooms and shared kitchen/bathroom    Occupational Therapy Goals  Initiated 6/19/2022   1. Patient will perform grooming with modified independence within 7 day(s). 2.  Patient will perform toileting with minimal assistance/contact guard assist within 7 day(s). 3.  Patient will perform lower body dressing with minimal assistance/contact guard assist within 7 day(s). 4.  Patient will perform toilet transfers with moderate assistance  within 7 day(s). 5.  Patient will perform upper body dressing with moderate assistance  within 7 day(s). 6.  Patient will participate in upper extremity therapeutic exercise/activities with minimal assistance/contact guard assist within 7 day(s). 7.  Patient will utilize energy conservation techniques during functional activities with verbal cues within 7 day(s). 8.  Patient will improve their Fugl Khanna score by 5 points in prep for ADLs within 7 days. Outcome: Not Progressing Towards Goal    OCCUPATIONAL THERAPY TREATMENT  Patient: Genna Sheldon (67 y.o. female)  Date: 6/23/2022  Diagnosis: CVA (cerebral vascular accident) [facility]) [I63.9] <principal problem not specified>       Precautions: Fall  Chart, occupational therapy assessment, plan of care, and goals were reviewed. ASSESSMENT  Patient continues with skilled OT services and is not progressing towards goals as expected. She is cooperative and pleasant and a little particular and takes max increased time for all tasks.  She required max A for OOB today with max A to scoot EOB, min-SBA for sitting balance with R list/lean and cues to correct to midline, mod-max A with LOB posterior for SPT from EOB to [facility] then declined to wash LE or UB due to needing to focus only on having a bowel movement on BSC (needing 10-15 minutes for task). She isD for toileting, using purwic and soaking bed with urination upon OT arrival as she was unable to control bladder management until transfer to [facility]. Linens, brief and becca soaked with urine. She will benefit from SNF for rehab as understand [employer] doesn't allow IP rehab. She contniues to benefit from OT at this time. Current Level of Function Impacting Discharge (ADLs): see below--D to Setup for ADL, min-max A for mobility for SPT only, not ambulatory now    Other factors to consider for discharge: needs rehab         PLAN :  Patient continues to benefit from skilled intervention to address the above impairments. Continue treatment per established plan of care to address goals. Recommend with staff: up to chair for meals and BSC for toileting schedule    Recommend next OT session: per POC    Recommendation for discharge: (in order for the patient to meet his/her long term goals)  Therapy 3 hours per day 5-7 days per week yet understand [facility] is insurance, so SNF    This discharge recommendation:  Has been made in collaboration with the attending provider and/or case management    IF patient discharges home will need the following DME: TBD       SUBJECTIVE:   Patient stated I can't do that now. I need 10 minute.     OBJECTIVE DATA SUMMARY:   Cognitive/Behavioral Status:                      Functional Mobility and Transfers for ADLs:  Bed Mobility:  Supine to Sit: Maximum assistance;Assist x1;Additional time; Adaptive equipment  Scooting: Maximum assistance (to scoot to EOB)    Transfers:  Sit to Stand: Moderate assistance; Adaptive equipment; Additional time;Assist x1  Functional Transfers  Toilet Transfer : Maximum assistance;Assist x1;Additional time; Adaptive equipment  Bed to Chair: Maximum assistance;Assist x1;Additional time; Adaptive equipment (LOB posterior with EOB to BSC tx)    Balance:  Sitting: Impaired  Sitting - Static: Fair (occasional)  Sitting - Dynamic: Fair (occasional)  Standing: Impaired; With support  Standing - Static: Constant support;Poor (posterior lean, mod A for standing balance intermittently)  Standing - Dynamic : Poor;Constant support    ADL Intervention:                           Lower Body Dressing Assistance  Socks: Total assistance (dependent)    Toileting  Toileting Assistance: Total assistance(dependent)  Bladder Hygiene: Total assistance (dependent)  Clothing Management: Total assistance (dependent)  Cues: Verbal cues provided;Physical assistance for pants up;Physical assistance for pants down  Adaptive Equipment: Walker [facility])         Therapeutic Exercises:       Pain:  No c/o    Activity Tolerance:   Fair, requires frequent rest breaks and max increased time    After treatment patient left in no apparent distress:   Sitting in chair, Call bell within reach and on [facility], nurse notified patient needs to have BM and will call when finished, aware NOT to get up alone    COMMUNICATION/COLLABORATION:   The patients plan of care was discussed with: Physical therapist and Registered nurse.      Judy Saint, OTR/L  Time Calculation: 33 mins

## 2022-06-23 NOTE — PROGRESS NOTES
[id number]  Hospitalist Group                                                                                          Hospitalist Progress Note  Cindy Christine MD  Answering service: [phone] from in house phone        Date of Service:  2022  NAME:  Belem Bates  :  1950  MRN:  [id number]      Admission Summary:   Devi Cowan a 67 y. o. female who presents to [facility] with complaints of increasing swelling and weakness on her left side since yesterday evening. More weaker in her left leg.  History is rather limited though patient says she was in her usual state of health when she slipped yesterday noted having more trouble getting up.  This morning says she really can get up and using her left leg where she notes more prominent swelling.  She then came into the ER as a stroke alert CT CTA were obtained without any significant findings other than concern for malignancy as well as a possible pulmonary embolism.  Neurology consultation was then requested. Majo Baxter is on Eliquis for A. fib says she is compliant with her meds.  Does not really endorse much weakness.  Seems indicate that it similar to when she was in the hospital in January with weakness for 2 weeks with a negative MRI.  Presumably went back to normal in the interval.    Normally she can get up and walk around but was unable to do so this morning because of this weakness.  She states that she still weak.  There is no headache, no history of any fever or chill, no nausea or vomiting and no other acute symptomatology. Has chronically swollen lower extremities.  She states her skin is gotten extremely dry recently. Zev Ingram has no shortness of breath, nausea or vomiting and no other GI or  symptoms. Interval history / Subjective:     No acute complaints at the time of my exam.  There has been some confusion about whether or not we will do his biopsy.   Biopsy scheduled for tomorrow. Patient is okay with this. 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  Pt was previously unaware of this mass, has not been to a dentist recently        CTA finding: Partially visualized small acute PE in the distal right main pulmonary artery  Increased Eliquis to VTE/PE dosing for now  Appreciate heme/onc assistance and recs  Not a failure of Eliquis since pt admits to being off Eliquis for at least a week PTA to \"stretch it out\", no financial barrier, states that she did not eat much, so did not take meds  Explained the importance of compliance with atrial fib and PE  Will need to switch to heparin drip if ENT requires any surgical intervention or biopsy    Multinodular thyroid gland  TSH wnl  F/u OP    Chronic a fib: continue amiodarone, Eliquis    Chronic leg lymphedema/skin changes: wound care    HTN  Cont norvasc  IV PRNs  Monitor    CT-guided biopsy scheduled for tomorrow. There was some confusion and apparently they canceled it initially as the patient was unsure about getting it done, but I have spoken to her and will likely be able to do it tomorrow morning. Biopsy has been reordered. Code status: Full Code  Prophylaxis: Eliquis high dose  Care Plan discussed with: Patient/Family  Anticipated Disposition: SNF   Anticipated Discharge: Greater than 48 hours pending ENT and heme/onc workup     Hospital Problems  Date Reviewed: 5/20/2022          Codes Class Noted POA    CVA (cerebral vascular accident) [facility]) ICD-10-CM: I63.9  ICD-9-CM: 434.91  6/17/2022 Unknown              Review of Systems:   Pertinent items are noted in HPI    Vital Signs:    Last 24hrs VS reviewed since prior progress note.  Most recent are:  Visit Vitals  /78 (BP 1 Location: Left upper arm, BP Patient Position: At rest;Lying)   Pulse 77   Temp 98.4 °F (36.9 °C)   Resp 16   Ht 5' 7\" (1.702 m)   Wt 68.9 kg (151 lb 14.4 oz)   SpO2 98%   BMI 23.79 kg/m²         Intake/Output Summary (Last 24 hours) at 6/23/2022 1903  Last data filed at 6/23/2022 1857  Gross per 24 hour   Intake --   Output 900 ml   Net -900 ml        Physical Examination:   I had a face to face encounter with this patient and independently examined them on 6/23/2022 as outlined below:    General: Alert, cooperative, no acute distress    Resp:  No resp distress, No accessory muscle use  CV:  Irregular rhythm, normal rate  GI:  Soft, Non distended, Non tender  Neurologic:  Alert and oriented, normal speech, PATRICIO  Extremities: Chronic lymphedema LLE>RLE, no cyanosis  Psych:   Not anxious or agitated  Skin:  Lymphedema b/l, chronic skin changes, no acute cellulitis, No jaundice       Data Review:   I personally reviewed: vitals, labs, imaging results, notes    Labs:     Recent Labs     06/21/22 0222   WBC 5.0   HGB 11.7   HCT 35.2        Recent Labs     06/21/22 0222      K 3.6      CO2 28   BUN 13   CREA 0.96   *   CA 8.7     No results for input(s): ALT, AP, TBIL, TBILI, TP, ALB, GLOB, GGT, AML, LPSE in the last 72 hours. No lab exists for component: SGOT, GPT, AMYP, HLPSE  No results for input(s): INR, PTP, APTT, INREXT, INREXT in the last 72 hours. No results for input(s): FE, TIBC, PSAT, FERR in the last 72 hours. Lab Results   Component Value Date/Time    Folate 6.9 01/12/2022 02:21 AM      No results for input(s): PH, PCO2, PO2 in the last 72 hours. No results for input(s): CPK, CKNDX, TROIQ in the last 72 hours.     No lab exists for component: CPKMB  Lab Results   Component Value Date/Time    Cholesterol, total 164 06/18/2022 02:20 AM    HDL Cholesterol 54 06/18/2022 02:20 AM    LDL, calculated 96 06/18/2022 02:20 AM    Triglyceride 70 06/18/2022 02:20 AM    CHOL/HDL Ratio 3.0 06/18/2022 02:20 AM     No results found for: [facility]  Lab Results   Component Value Date/Time    Color YELLOW/STRAW 06/17/2022 02:16 PM    Appearance CLEAR 06/17/2022 02:16 PM    Specific gravity 1.020 06/17/2022 02:16 PM    Specific gravity 1.019 01/11/2022 04:24 PM    pH (UA) 5.5 06/17/2022 02:16 PM    Protein 30 (A) 06/17/2022 02:16 PM    Glucose Negative 06/17/2022 02:16 PM    Ketone 15 (A) 06/17/2022 02:16 PM    Bilirubin Negative 06/17/2022 02:16 PM    Urobilinogen 0.2 06/17/2022 02:16 PM    Nitrites Negative 06/17/2022 02:16 PM    Leukocyte Esterase Negative 06/17/2022 02:16 PM    Epithelial cells FEW 06/17/2022 02:16 PM    Bacteria Negative 06/17/2022 02:16 PM    WBC 0-4 06/17/2022 02:16 PM    RBC 0-5 06/17/2022 02:16 PM       Medications Reviewed:     Current Facility-Administered Medications   Medication Dose Route Frequency    [START ON 6/24/2022] apixaban (ELIQUIS) tablet 5 mg  5 mg Oral Q12H    hydrALAZINE (APRESOLINE) 20 mg/mL injection 10 mg  10 mg IntraVENous Q4H PRN    labetaloL (NORMODYNE;TRANDATE) injection 10 mg  10 mg IntraVENous Q4H PRN    [Held by provider] apixaban (ELIQUIS) tablet 10 mg  10 mg Oral Q12H    acetaminophen (TYLENOL) tablet 650 mg  650 mg Oral Q4H PRN    Or    acetaminophen (TYLENOL) solution 650 mg  650 mg Per NG tube Q4H PRN    Or    acetaminophen (TYLENOL) suppository 650 mg  650 mg Rectal Q4H PRN    amiodarone (CORDARONE) tablet 200 mg  200 mg Oral DAILY    amLODIPine (NORVASC) tablet 5 mg  5 mg Oral DAILY    cholecalciferol (VITAMIN D3) (1000 Units /25 mcg) tablet 1,000 Units  1,000 Units Oral DAILY    cyanocobalamin (VITAMIN B12) tablet 1,000 mcg  1,000 mcg Oral DAILY    traMADoL (ULTRAM) tablet 50 mg  50 mg Oral Q6H PRN    megestroL (MEGACE) 400 mg/10 mL (10 mL) oral suspension 20 mg  20 mg Oral DAILY    balsam peru-castor oiL (VENELEX) ointment   Topical BID    ammonium lactate (LAC-HYDRIN) 12 % lotion   Topical BID ______________________________________________________________________  EXPECTED LENGTH OF STAY: 5d 2h  ACTUAL LENGTH OF STAY:          Jessi 50 Mandy Lord MD

## 2022-06-23 NOTE — PROGRESS NOTES
Problem: Pressure Injury - Risk of  Goal: *Prevention of pressure injury  Description: Document Bin Scale and appropriate interventions in the flowsheet. Outcome: Progressing Towards Goal  Note: Pressure Injury Interventions:  Sensory Interventions: Assess changes in LOC    Moisture Interventions: Absorbent underpads    Activity Interventions: PT/OT evaluation    Mobility Interventions: HOB 30 degrees or less    Nutrition Interventions: Document food/fluid/supplement intake    Friction and Shear Interventions: HOB 30 degrees or less                Problem: Falls - Risk of  Goal: *Absence of Falls  Description: Document Pam Fall Risk and appropriate interventions in the flowsheet.   Outcome: Progressing Towards Goal  Note: Fall Risk Interventions:  Mobility Interventions: Bed/chair exit alarm,Patient to call before getting OOB         Medication Interventions: Bed/chair exit alarm    Elimination Interventions: Call light in reach

## 2022-06-23 NOTE — PROGRESS NOTES
Addendum; I spoke with Dr. Johanna Gustafson  ( ENT) who would like the pt to have a fna bx of the neck node before seeing him as an out-pt. I ordered this earlier today.     Riya Avalos MD

## 2022-06-23 NOTE — PROGRESS NOTES
[facility]                                                                                          Hospitalist Progress Note  Monica Burt MD  Answering service: [phone] OR [phone] from in house phone        Date of Service:  2022  NAME:  Gilda Herrmann  :  1950  MRN:  [id number]      Admission Summary:   Phil Gonzalez a 67 y. o. female who presents to [facility] with complaints of increasing swelling and weakness on her left side since yesterday evening. More weaker in her left leg.  History is rather limited though patient says she was in her usual state of health when she slipped yesterday noted having more trouble getting up.  This morning says she really can get up and using her left leg where she notes more prominent swelling.  She then came into the ER as a stroke alert CT CTA were obtained without any significant findings other than concern for malignancy as well as a possible pulmonary embolism.  Neurology consultation was then requested. Carson Ugarte is on Eliquis for A. fib says she is compliant with her meds.  Does not really endorse much weakness.  Seems indicate that it similar to when she was in the hospital in January with weakness for 2 weeks with a negative MRI.  Presumably went back to normal in the interval.    Normally she can get up and walk around but was unable to do so this morning because of this weakness.  She states that she still weak.  There is no headache, no history of any fever or chill, no nausea or vomiting and no other acute symptomatology. Has chronically swollen lower extremities.  She states her skin is gotten extremely dry recently. Rigoberto Valdivia has no shortness of breath, nausea or vomiting and no other GI or  symptoms. Interval history / Subjective:     Denied any headache. nusea/vomiting. No ENT consult available at [facility] or [facility].  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  Pt was previously unaware of this mass, has not been to a dentist recently        CTA finding: Partially visualized small acute PE in the distal right main pulmonary artery  Increased Eliquis to VTE/PE dosing for now  Appreciate heme/onc assistance and recs  Not a failure of Eliquis since pt admits to being off Eliquis for at least a week PTA to \"stretch it out\", no financial barrier, states that she did not eat much, so did not take meds  Explained the importance of compliance with atrial fib and PE  Will need to switch to heparin drip if ENT requires any surgical intervention or biopsy    Multinodular thyroid gland  TSH wnl  F/u OP    Chronic a fib: continue amiodarone, Eliquis    Chronic leg lymphedema/skin changes: wound care    HTN  Cont norvasc  IV PRNs  monitor      Code status: Full Code  Prophylaxis: Eliquis high dose  Care Plan discussed with: Patient/Family  Anticipated Disposition: SNF   Anticipated Discharge: Greater than 48 hours pending ENT and heme/onc workup     Hospital Problems  Date Reviewed: 5/20/2022          Codes Class Noted POA    CVA (cerebral vascular accident) [facility]) ICD-10-CM: I63.9  ICD-9-CM: 434.91  6/17/2022 Unknown              Review of Systems:   Pertinent items are noted in HPI    Vital Signs:    Last 24hrs VS reviewed since prior progress note.  Most recent are:  Visit Vitals  /63 (BP 1 Location: Left upper arm, BP Patient Position: At rest;Lying)   Pulse 75   Temp 98.5 °F (36.9 °C)   Resp 17   Ht 5' 7\" (1.702 m)   Wt 68.9 kg (151 lb 14.4 oz)   SpO2 100%   BMI 23.79 kg/m²       No intake or output data in the 24 hours ending 06/22/22 2151     Physical Examination:   I had a face to face encounter with this patient and independently examined them on 6/22/2022 as outlined below:    General: Alert, cooperative, no acute distress    Resp:  No resp distress, No accessory muscle use  CV:  Irregular rhythm, normal rate  GI:  Soft, Non distended, Non tender  Neurologic:  Alert and oriented, normal speech, PATRICIO  Extremities: Chronic lymphedema LLE>RLE, no cyanosis  Psych:   Not anxious or agitated  Skin:  Lymphedema b/l, chronic skin changes, no acute cellulitis, No jaundice       Data Review:   I personally reviewed: vitals, labs, imaging results, notes    Labs:     Recent Labs     06/21/22 0222 06/20/22 0203   WBC 5.0 5.6   HGB 11.7 11.4*   HCT 35.2 34.3*    168     Recent Labs     06/21/22 0222 06/20/22 0203    140   K 3.6 3.8    108   CO2 28 27   BUN 13 14   CREA 0.96 0.85   * 103*   CA 8.7 8.3*   MG  --  2.1     Recent Labs     06/20/22 0203   ALB 2.7*     No results for input(s): INR, PTP, APTT, INREXT, INREXT in the last 72 hours. No results for input(s): FE, TIBC, PSAT, FERR in the last 72 hours. Lab Results   Component Value Date/Time    Folate 6.9 01/12/2022 02:21 AM      No results for input(s): PH, PCO2, PO2 in the last 72 hours. No results for input(s): CPK, CKNDX, TROIQ in the last 72 hours.     No lab exists for component: CPKMB  Lab Results   Component Value Date/Time    Cholesterol, total 164 06/18/2022 02:20 AM    HDL Cholesterol 54 06/18/2022 02:20 AM    LDL, calculated 96 06/18/2022 02:20 AM    Triglyceride 70 06/18/2022 02:20 AM    CHOL/HDL Ratio 3.0 06/18/2022 02:20 AM     No results found for: [facility]  Lab Results   Component Value Date/Time    Color YELLOW/STRAW 06/17/2022 02:16 PM    Appearance CLEAR 06/17/2022 02:16 PM    Specific gravity 1.020 06/17/2022 02:16 PM Specific gravity 1.019 01/11/2022 04:24 PM    pH (UA) 5.5 06/17/2022 02:16 PM    Protein 30 (A) 06/17/2022 02:16 PM    Glucose Negative 06/17/2022 02:16 PM    Ketone 15 (A) 06/17/2022 02:16 PM    Bilirubin Negative 06/17/2022 02:16 PM    Urobilinogen 0.2 06/17/2022 02:16 PM    Nitrites Negative 06/17/2022 02:16 PM    Leukocyte Esterase Negative 06/17/2022 02:16 PM    Epithelial cells FEW 06/17/2022 02:16 PM    Bacteria Negative 06/17/2022 02:16 PM    WBC 0-4 06/17/2022 02:16 PM    RBC 0-5 06/17/2022 02:16 PM       Medications Reviewed:     Current Facility-Administered Medications   Medication Dose Route Frequency    [START ON 6/24/2022] apixaban (ELIQUIS) tablet 5 mg  5 mg Oral Q12H    hydrALAZINE (APRESOLINE) 20 mg/mL injection 10 mg  10 mg IntraVENous Q4H PRN    labetaloL (NORMODYNE;TRANDATE) injection 10 mg  10 mg IntraVENous Q4H PRN    apixaban (ELIQUIS) tablet 10 mg  10 mg Oral Q12H    acetaminophen (TYLENOL) tablet 650 mg  650 mg Oral Q4H PRN    Or    acetaminophen (TYLENOL) solution 650 mg  650 mg Per NG tube Q4H PRN    Or    acetaminophen (TYLENOL) suppository 650 mg  650 mg Rectal Q4H PRN    amiodarone (CORDARONE) tablet 200 mg  200 mg Oral DAILY    amLODIPine (NORVASC) tablet 5 mg  5 mg Oral DAILY    cholecalciferol (VITAMIN D3) (1000 Units /25 mcg) tablet 1,000 Units  1,000 Units Oral DAILY    cyanocobalamin (VITAMIN B12) tablet 1,000 mcg  1,000 mcg Oral DAILY    traMADoL (ULTRAM) tablet 50 mg  50 mg Oral Q6H PRN    megestroL (MEGACE) 400 mg/10 mL (10 mL) oral suspension 20 mg  20 mg Oral DAILY    balsam peru-castor oiL (VENELEX) ointment   Topical BID    ammonium lactate (LAC-HYDRIN) 12 % lotion   Topical BID     ______________________________________________________________________  EXPECTED LENGTH OF STAY: 3d 12h  ACTUAL LENGTH OF STAY:          5                 Kit Red, MD

## 2022-06-23 NOTE — PROGRESS NOTES
Aminta from /S called the unit, discussed that it was Dr. Fish Lu and Dr. Gustafson who agreed for the biopsy to be done as outpatient. She was notified that the biopsy was re-ordered by Dr. Jameel Spann to be done tomorrow am. Joanne Tarango will discuss the procedure tomorrow with the providers.

## 2022-06-23 NOTE — PROGRESS NOTES
ANNE-MARIE: anticipate d/c to [facility] SNF when medically stable; Saleem Marsha Krause [phone]  May need [employer] reauthorization, prior auth was only good until 6/23    Pt may need a Rapid Covid test for SNF placement    [facility]    Primary Decision Maker: Kyle Alberto - [phone]    RUR: 12%  -tonsillar mass, PE  -ENT/Hematology consults, plan for biospsy    -1220-CM reviewed pt chart & discussed pt case in rounds. Consults pending. CM to follow. -1600-CM noted plan for fna biopsy of the neck node.   Gurmeet Simpson RN BSN CCM

## 2022-06-23 NOTE — PROGRESS NOTES
Addendum; I spoke with Dr. Mario Gustafson  ( ENT) who would like the pt to have a fna bx of the neck node before seeing him as an out-pt. I ordered this earlier today.     Rishi Ellsworth MD

## 2022-06-23 NOTE — PROGRESS NOTES
[facility]  Hospitalist Group                                                                                          Hospitalist Progress Note  Ximena Pan MD  Answering service: [phone] from in house phone        Date of Service:  2022  NAME:  Lilly Valle  :  1950  MRN:  [id number]      Admission Summary:   Ashia Rosales a 67 y. o. female who presents to [facility] [address] with complaints of increasing swelling and weakness on her left side since yesterday evening. More weaker in her left leg.  History is rather limited though patient says she was in her usual state of health when she slipped yesterday noted having more trouble getting up.  This morning says she really can get up and using her left leg where she notes more prominent swelling.  She then came into the ER as a stroke alert CT CTA were obtained without any significant findings other than concern for malignancy as well as a possible pulmonary embolism.  Neurology consultation was then requested. Jenny Montes is on Eliquis for A. fib says she is compliant with her meds.  Does not really endorse much weakness.  Seems indicate that it similar to when she was in the hospital in January with weakness for 2 weeks with a negative MRI.  Presumably went back to normal in the interval.    Normally she can get up and walk around but was unable to do so this morning because of this weakness.  She states that she still weak.  There is no headache, no history of any fever or chill, no nausea or vomiting and no other acute symptomatology. Has chronically swollen lower extremities.  She states her skin is gotten extremely dry recently. Tutu Lopez has no shortness of breath, nausea or vomiting and no other GI or  symptoms. Interval history / Subjective:     No acute complaints at the time of my exam.  There has been some confusion about whether or not we will do his biopsy.   Biopsy scheduled for tomorrow. Patient is okay with this. 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  Pt was previously unaware of this mass, has not been to a dentist recently        CTA finding: Partially visualized small acute PE in the distal right main pulmonary artery  Increased Eliquis to VTE/PE dosing for now  Appreciate heme/onc assistance and recs  Not a failure of Eliquis since pt admits to being off Eliquis for at least a week PTA to \"stretch it out\", no financial barrier, states that she did not eat much, so did not take meds  Explained the importance of compliance with atrial fib and PE  Will need to switch to heparin drip if ENT requires any surgical intervention or biopsy    Multinodular thyroid gland  TSH wnl  F/u OP    Chronic a fib: continue amiodarone, Eliquis    Chronic leg lymphedema/skin changes: wound care    HTN  Cont norvasc  IV PRNs  Monitor    CT-guided biopsy scheduled for tomorrow. There was some confusion and apparently they canceled it initially as the patient was unsure about getting it done, but I have spoken to her and will likely be able to do it tomorrow morning. Biopsy has been reordered. Code status: Full Code  Prophylaxis: Eliquis high dose  Care Plan discussed with: Patient/Family  Anticipated Disposition: SNF   Anticipated Discharge: Greater than 48 hours pending ENT and heme/onc workup     Hospital Problems  Date Reviewed: 5/20/2022          Codes Class Noted POA    CVA (cerebral vascular accident) [facility]) ICD-10-CM: I63.9  ICD-9-CM: 434.91  6/17/2022 Unknown              Review of Systems:   Pertinent items are noted in HPI    Vital Signs:    Last 24hrs VS reviewed since prior progress note.  Most recent are:  Visit Vitals  /78 (BP 1 Location: Left upper arm, BP Patient Position: At rest;Lying)   Pulse 77   Temp 98.4 °F (36.9 °C)   Resp 16   Ht 5' 7\" (1.702 m)   Wt 68.9 kg (151 lb 14.4 oz)   SpO2 98%   BMI 23.79 kg/m²         Intake/Output Summary (Last 24 hours) at 6/23/2022 1903  Last data filed at 6/23/2022 1857  Gross per 24 hour   Intake --   Output 900 ml   Net -900 ml        Physical Examination:   I had a face to face encounter with this patient and independently examined them on 6/23/2022 as outlined below:    General: Alert, cooperative, no acute distress    Resp:  No resp distress, No accessory muscle use  CV:  Irregular rhythm, normal rate  GI:  Soft, Non distended, Non tender  Neurologic:  Alert and oriented, normal speech, PATRICIO  Extremities: Chronic lymphedema LLE>RLE, no cyanosis  Psych:   Not anxious or agitated  Skin:  Lymphedema b/l, chronic skin changes, no acute cellulitis, No jaundice       Data Review:   I personally reviewed: vitals, labs, imaging results, notes    Labs:     Recent Labs     06/21/22 0222   WBC 5.0   HGB 11.7   HCT 35.2        Recent Labs     06/21/22 0222      K 3.6      CO2 28   BUN 13   CREA 0.96   *   CA 8.7     No results for input(s): ALT, AP, TBIL, TBILI, TP, ALB, GLOB, GGT, AML, LPSE in the last 72 hours. No lab exists for component: SGOT, GPT, AMYP, HLPSE  No results for input(s): INR, PTP, APTT, INREXT, INREXT in the last 72 hours. No results for input(s): FE, TIBC, PSAT, FERR in the last 72 hours. Lab Results   Component Value Date/Time    Folate 6.9 01/12/2022 02:21 AM      No results for input(s): PH, PCO2, PO2 in the last 72 hours. No results for input(s): CPK, CKNDX, TROIQ in the last 72 hours.     No lab exists for component: CPKMB  Lab Results   Component Value Date/Time    Cholesterol, total 164 06/18/2022 02:20 AM    HDL Cholesterol 54 06/18/2022 02:20 AM    LDL, calculated 96 06/18/2022 02:20 AM    Triglyceride 70 06/18/2022 02:20 AM    CHOL/HDL Ratio 3.0 06/18/2022 02:20 AM     No results found for: [facility]  Lab Results   Component Value Date/Time    Color YELLOW/STRAW 06/17/2022 02:16 PM    Appearance CLEAR 06/17/2022 02:16 PM    Specific gravity 1.020 06/17/2022 02:16 PM    Specific gravity 1.019 01/11/2022 04:24 PM    pH (UA) 5.5 06/17/2022 02:16 PM    Protein 30 (A) 06/17/2022 02:16 PM    Glucose Negative 06/17/2022 02:16 PM    Ketone 15 (A) 06/17/2022 02:16 PM    Bilirubin Negative 06/17/2022 02:16 PM    Urobilinogen 0.2 06/17/2022 02:16 PM    Nitrites Negative 06/17/2022 02:16 PM    Leukocyte Esterase Negative 06/17/2022 02:16 PM    Epithelial cells FEW 06/17/2022 02:16 PM    Bacteria Negative 06/17/2022 02:16 PM    WBC 0-4 06/17/2022 02:16 PM    RBC 0-5 06/17/2022 02:16 PM       Medications Reviewed:     Current Facility-Administered Medications   Medication Dose Route Frequency    [START ON 6/24/2022] apixaban (ELIQUIS) tablet 5 mg  5 mg Oral Q12H    hydrALAZINE (APRESOLINE) 20 mg/mL injection 10 mg  10 mg IntraVENous Q4H PRN    labetaloL (NORMODYNE;TRANDATE) injection 10 mg  10 mg IntraVENous Q4H PRN    [Held by provider] apixaban (ELIQUIS) tablet 10 mg  10 mg Oral Q12H    acetaminophen (TYLENOL) tablet 650 mg  650 mg Oral Q4H PRN    Or    acetaminophen (TYLENOL) solution 650 mg  650 mg Per NG tube Q4H PRN    Or    acetaminophen (TYLENOL) suppository 650 mg  650 mg Rectal Q4H PRN    amiodarone (CORDARONE) tablet 200 mg  200 mg Oral DAILY    amLODIPine (NORVASC) tablet 5 mg  5 mg Oral DAILY    cholecalciferol (VITAMIN D3) (1000 Units /25 mcg) tablet 1,000 Units  1,000 Units Oral DAILY    cyanocobalamin (VITAMIN B12) tablet 1,000 mcg  1,000 mcg Oral DAILY    traMADoL (ULTRAM) tablet 50 mg  50 mg Oral Q6H PRN    megestroL (MEGACE) 400 mg/10 mL (10 mL) oral suspension 20 mg  20 mg Oral DAILY    balsam peru-castor oiL (VENELEX) ointment   Topical BID    ammonium lactate (LAC-HYDRIN) 12 % lotion   Topical BID ______________________________________________________________________  EXPECTED LENGTH OF STAY: 5d 2h  ACTUAL LENGTH OF STAY:          [address] Nhi Martin MD

## 2022-06-23 NOTE — PROGRESS NOTES
Aminta from /S called the unit, discussed that it was Dr. Victor Hugo Jeff and Dr. Gustafson who agreed for the biopsy to be done as outpatient. She was notified that the biopsy was re-ordered by Dr. Flavio Johns to be done tomorrow am. Dennis Lozada will discuss the procedure tomorrow with the providers.

## 2022-06-24 ENCOUNTER — TRANSCRIBE ORDER (OUTPATIENT)
Dept: SCHEDULING | Age: 72
End: 2022-06-24

## 2022-06-24 DIAGNOSIS — R22.0 SUBLINGUAL GLAND SWELLING: Primary | ICD-10-CM

## 2022-06-24 LAB
ANION GAP SERPL CALC-SCNC: 4 MMOL/L (ref 5–15)
BASOPHILS # BLD: 0 K/UL (ref 0–0.1)
BASOPHILS NFR BLD: 1 % (ref 0–1)
BUN SERPL-MCNC: 22 MG/DL (ref 6–20)
BUN/CREAT SERPL: 20 (ref 12–20)
CALCIUM SERPL-MCNC: 8.4 MG/DL (ref 8.5–10.1)
CHLORIDE SERPL-SCNC: 110 MMOL/L (ref 97–108)
CO2 SERPL-SCNC: 27 MMOL/L (ref 21–32)
CREAT SERPL-MCNC: 1.1 MG/DL (ref 0.55–1.02)
DIFFERENTIAL METHOD BLD: ABNORMAL
EOSINOPHIL # BLD: 0.3 K/UL (ref 0–0.4)
EOSINOPHIL NFR BLD: 5 % (ref 0–7)
ERYTHROCYTE [DISTWIDTH] IN BLOOD BY AUTOMATED COUNT: 15.1 % (ref 11.5–14.5)
GLUCOSE SERPL-MCNC: 103 MG/DL (ref 65–100)
HCT VFR BLD AUTO: 34 % (ref 35–47)
HGB BLD-MCNC: 11.3 G/DL (ref 11.5–16)
IMM GRANULOCYTES # BLD AUTO: 0 K/UL (ref 0–0.04)
IMM GRANULOCYTES NFR BLD AUTO: 1 % (ref 0–0.5)
LYMPHOCYTES # BLD: 1.1 K/UL (ref 0.8–3.5)
LYMPHOCYTES NFR BLD: 21 % (ref 12–49)
MCH RBC QN AUTO: 28.7 PG (ref 26–34)
MCHC RBC AUTO-ENTMCNC: 33.2 G/DL (ref 30–36.5)
MCV RBC AUTO: 86.3 FL (ref 80–99)
MONOCYTES # BLD: 0.5 K/UL (ref 0–1)
MONOCYTES NFR BLD: 10 % (ref 5–13)
NEUTS SEG # BLD: 3.2 K/UL (ref 1.8–8)
NEUTS SEG NFR BLD: 62 % (ref 32–75)
NRBC # BLD: 0 K/UL (ref 0–0.01)
NRBC BLD-RTO: 0 PER 100 WBC
PLATELET # BLD AUTO: 210 K/UL (ref 150–400)
PMV BLD AUTO: 9.6 FL (ref 8.9–12.9)
POTASSIUM SERPL-SCNC: 3.8 MMOL/L (ref 3.5–5.1)
RBC # BLD AUTO: 3.94 M/UL (ref 3.8–5.2)
SODIUM SERPL-SCNC: 141 MMOL/L (ref 136–145)
WBC # BLD AUTO: 5 K/UL (ref 3.6–11)

## 2022-06-24 PROCEDURE — 74011250637 HC RX REV CODE- 250/637: Performed by: INTERNAL MEDICINE

## 2022-06-24 PROCEDURE — 97530 THERAPEUTIC ACTIVITIES: CPT

## 2022-06-24 PROCEDURE — 74011250637 HC RX REV CODE- 250/637: Performed by: HOSPITALIST

## 2022-06-24 PROCEDURE — 36415 COLL VENOUS BLD VENIPUNCTURE: CPT

## 2022-06-24 PROCEDURE — 85025 COMPLETE CBC W/AUTO DIFF WBC: CPT

## 2022-06-24 PROCEDURE — 74011250637 HC RX REV CODE- 250/637: Performed by: STUDENT IN AN ORGANIZED HEALTH CARE EDUCATION/TRAINING PROGRAM

## 2022-06-24 PROCEDURE — 80048 BASIC METABOLIC PNL TOTAL CA: CPT

## 2022-06-24 PROCEDURE — 65270000029 HC RM PRIVATE

## 2022-06-24 RX ADMIN — AMLODIPINE BESYLATE 5 MG: 5 TABLET ORAL at 09:11

## 2022-06-24 RX ADMIN — CYANOCOBALAMIN TAB 500 MCG 1000 MCG: 500 TAB at 09:11

## 2022-06-24 RX ADMIN — Medication 1000 UNITS: at 09:13

## 2022-06-24 RX ADMIN — MEGESTROL ACETATE 20 MG: 40 SUSPENSION ORAL at 09:17

## 2022-06-24 RX ADMIN — AMIODARONE HYDROCHLORIDE 200 MG: 200 TABLET ORAL at 09:13

## 2022-06-24 RX ADMIN — Medication: at 18:24

## 2022-06-24 RX ADMIN — APIXABAN 10 MG: 5 TABLET, FILM COATED ORAL at 11:34

## 2022-06-24 RX ADMIN — APIXABAN 5 MG: 5 TABLET, FILM COATED ORAL at 20:42

## 2022-06-24 RX ADMIN — Medication: at 09:21

## 2022-06-24 NOTE — PROGRESS NOTES
[address] [facility] Adult  Hospitalist Group                                                                                          Hospitalist Progress Note  Ayad Alicea MD  Answering service: [phone] from in house phone        Date of Service:  2022  NAME:  Dread Bhakta  :  1950  MRN:  [id number]      Admission Summary:   Emma Archer a 67 y. o. female who presents to [email or web address] [address] with complaints of increasing swelling and weakness on her left side since yesterday evening. More weaker in her left leg.  History is rather limited though patient says she was in her usual state of health when she slipped yesterday noted having more trouble getting up.  This morning says she really can get up and using her left leg where she notes more prominent swelling.  She then came into the ER as a stroke alert CT CTA were obtained without any significant findings other than concern for malignancy as well as a possible pulmonary embolism.  Neurology consultation was then requested. Shaw Price is on Eliquis for A. fib says she is compliant with her meds.  Does not really endorse much weakness.  Seems indicate that it similar to when she was in the hospital in January with weakness for 2 weeks with a negative MRI.  Presumably went back to normal in the interval.    Normally she can get up and walk around but was unable to do so this morning because of this weakness.  She states that she still weak.  There is no headache, no history of any fever or chill, no nausea or vomiting and no other acute symptomatology. Has chronically swollen lower extremities.  She states her skin is gotten extremely dry recently. Arvis  has no shortness of breath, nausea or vomiting and no other GI or  symptoms. Interval history / Subjective:     No acute complaints at the time of my exam.  There has been some confusion about whether or not we will do his biopsy.   Patient has no acute complaints at this time. 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  Pt was previously unaware of this mass, has not been to a dentist recently        CTA finding: Partially visualized small acute PE in the distal right main pulmonary artery  Increased Eliquis to VTE/PE dosing for now  Appreciate heme/onc assistance and recs  Not a failure of Eliquis since pt admits to being off Eliquis for at least a week PTA to \"stretch it out\", no financial barrier, states that she did not eat much, so did not take meds  Explained the importance of compliance with atrial fib and PE  Will need to switch to heparin drip if ENT requires any surgical intervention or biopsy    Multinodular thyroid gland  TSH wnl  F/u OP    Chronic a fib: continue amiodarone, Eliquis    Chronic leg lymphedema/skin changes: wound care    HTN  Cont norvasc  IV PRNs  Monitor    I spoke to the radiologist and was informed that CT-guided biopsy would not be able to be done today or until Monday. They have plan to schedule her outpatient on Monday. We will work on placement at [facility] and transportation for Monday for biopsy. Patient is in agreement to do biopsy on Monday as an outpatient. If were able to arrange for her authorization for [facility] then she will discharge tomorrow.       Code status: Full Code  Prophylaxis: Eliquis high dose  Care Plan discussed with: Patient/Family  Anticipated Disposition: SNF   Anticipated Discharge: Greater than 48 hours pending ENT and heme/onc workup     Hospital Problems  Date Reviewed: 5/20/2022          Codes Class Noted POA    CVA (cerebral vascular accident) [facility]) ICD-10-CM: I63.9  ICD-9-CM: 434.91  6/17/2022 Unknown              Review of Systems:   Pertinent items are noted in HPI    Vital Signs:    Last 24hrs VS reviewed since prior progress note. Most recent are:  Visit Vitals  /74 (BP 1 Location: Left upper arm, BP Patient Position: At rest)   Pulse 67   Temp 98.7 °F (37.1 °C)   Resp 17   Ht 5' 7\" (1.702 m)   Wt 68.9 kg (151 lb 14.4 oz)   SpO2 100%   BMI 23.79 kg/m²         Intake/Output Summary (Last 24 hours) at 6/24/2022 1911  Last data filed at 6/24/2022 1326  Gross per 24 hour   Intake --   Output 1100 ml   Net -1100 ml        Physical Examination:   I had a face to face encounter with this patient and independently examined them on 6/24/2022 as outlined below:    General: Alert, cooperative, no acute distress    Resp:  No resp distress, No accessory muscle use  CV:  Irregular rhythm, normal rate  GI:  Soft, Non distended, Non tender  Neurologic:  Alert and oriented, normal speech, PATRICIO  Extremities: Chronic lymphedema LLE>RLE, no cyanosis  Psych:   Not anxious or agitated  Skin:  Lymphedema b/l, chronic skin changes, no acute cellulitis, No jaundice       Data Review:   I personally reviewed: vitals, labs, imaging results, notes    Labs:     Recent Labs     06/24/22  0346   WBC 5.0   HGB 11.3*   HCT 34.0*        Recent Labs     06/24/22  0346      K 3.8   *   CO2 27   BUN 22*   CREA 1.10*   *   CA 8.4*     No results for input(s): ALT, AP, TBIL, TBILI, TP, ALB, GLOB, GGT, AML, LPSE in the last 72 hours. No lab exists for component: SGOT, GPT, AMYP, HLPSE  No results for input(s): INR, PTP, APTT, INREXT, INREXT in the last 72 hours. No results for input(s): FE, TIBC, PSAT, FERR in the last 72 hours. Lab Results   Component Value Date/Time    Folate 6.9 01/12/2022 02:21 AM      No results for input(s): PH, PCO2, PO2 in the last 72 hours. No results for input(s): CPK, CKNDX, TROIQ in the last 72 hours.     No lab exists for component: CPKMB  Lab Results   Component Value Date/Time    Cholesterol, total 164 06/18/2022 02:20 AM    HDL Cholesterol 54 06/18/2022 02:20 AM    LDL, calculated 96 06/18/2022 02:20 AM    Triglyceride 70 06/18/2022 02:20 AM    CHOL/HDL Ratio 3.0 06/18/2022 02:20 AM     No results found for: [facility]  Lab Results   Component Value Date/Time    Color YELLOW/STRAW 06/17/2022 02:16 PM    Appearance CLEAR 06/17/2022 02:16 PM    Specific gravity 1.020 06/17/2022 02:16 PM    Specific gravity 1.019 01/11/2022 04:24 PM    pH (UA) 5.5 06/17/2022 02:16 PM    Protein 30 (A) 06/17/2022 02:16 PM    Glucose Negative 06/17/2022 02:16 PM    Ketone 15 (A) 06/17/2022 02:16 PM    Bilirubin Negative 06/17/2022 02:16 PM    Urobilinogen 0.2 06/17/2022 02:16 PM    Nitrites Negative 06/17/2022 02:16 PM    Leukocyte Esterase Negative 06/17/2022 02:16 PM    Epithelial cells FEW 06/17/2022 02:16 PM    Bacteria Negative 06/17/2022 02:16 PM    WBC 0-4 06/17/2022 02:16 PM    RBC 0-5 06/17/2022 02:16 PM       Medications Reviewed:     Current Facility-Administered Medications   Medication Dose Route Frequency    apixaban (ELIQUIS) tablet 5 mg  5 mg Oral Q12H    hydrALAZINE (APRESOLINE) 20 mg/mL injection 10 mg  10 mg IntraVENous Q4H PRN    labetaloL (NORMODYNE;TRANDATE) injection 10 mg  10 mg IntraVENous Q4H PRN    acetaminophen (TYLENOL) tablet 650 mg  650 mg Oral Q4H PRN    Or    acetaminophen (TYLENOL) solution 650 mg  650 mg Per NG tube Q4H PRN    Or    acetaminophen (TYLENOL) suppository 650 mg  650 mg Rectal Q4H PRN    amiodarone (CORDARONE) tablet 200 mg  200 mg Oral DAILY    amLODIPine (NORVASC) tablet 5 mg  5 mg Oral DAILY    cholecalciferol (VITAMIN D3) (1000 Units /25 mcg) tablet 1,000 Units  1,000 Units Oral DAILY    cyanocobalamin (VITAMIN B12) tablet 1,000 mcg  1,000 mcg Oral DAILY    traMADoL (ULTRAM) tablet 50 mg  50 mg Oral Q6H PRN    megestroL (MEGACE) 400 mg/10 mL (10 mL) oral suspension 20 mg  20 mg Oral DAILY    balsam peru-castor oiL (VENELEX) ointment   Topical BID    ammonium lactate (LAC-HYDRIN) 12 % lotion Topical BID     ______________________________________________________________________  EXPECTED LENGTH OF STAY: 5d 2h  ACTUAL LENGTH OF STAY:          85 [facility] Noah Montgomery MD

## 2022-06-24 NOTE — PROGRESS NOTES
Bedside and Verbal shift change report given to Isidro Lemus RN (oncoming nurse) by Alexandria Luciano RN (offgoing nurse). Report included the following information SBAR, Kardex, Intake/Output, MAR and Recent Results.

## 2022-06-24 NOTE — PROGRESS NOTES
Transition of Care Plan   RUR- Low  12%    DISPOSITION: The disposition plan is SNF; pending medical progression  o SNF Accepted:  [facility]; Admissions Liaison: Nam Monteiro   - Per conversation with Elizabeth Ontiveros; she reported that she would check to determine if they have a bed available for the pt today-pending AUTH. o Liz Valles reinitiated; [address]; start date: 6/24  o Per conversation with Parker Lee [id number];The pt's Biopsy will be completed, Monday @11am arrival Time 10:30am; Outpatient registration [id number] [facility] pt will require transport to and from her Biopsy on Monday; this cm will arrange transport with Medicaid, once Chintan Antonio is received.        F/U with PCP/Specialist     Transport: [facility]    Barrier: Insurance AUTH Pending       CM: Servando Smith. [facility],   [phone]

## 2022-06-24 NOTE — PROGRESS NOTES
Transition of Care Plan   RUR- Low  12%    DISPOSITION: The disposition plan is SNF; pending medical progression  o SNF Accepted:  [facility]; Admissions Liaison: Sidney Solano   - Per conversation with Talita Peters; she reported that she would check to determine if they have a bed available for the pt today-pending AUTH. o Dave Sebastian reinitiated; [address]; start date: 6/24  o Per conversation with Gino Villa [id number];The pt's Biopsy will be completed, Monday @11am arrival Time 10:30am; Outpatient registration [id number] [facility] pt will require transport to and from her Biopsy on Monday; this cm will arrange transport with Medicaid, once Scarlett Pearl is received.        F/U with PCP/Specialist     Transport: [facility]    Barrier: Insurance AUTH Pending       CM: Vandana Medel. MSW,   [phone]

## 2022-06-24 NOTE — PROGRESS NOTES
Hematology-Oncology Progress Note    Du Higuera  1950  [id number]  6/24/2022    Follow-up for: cva                            Tonsillar mass     [x]        Chart notes since last visit reviewed   [x]        Medications reviewed for allergies and interactions       Case discussed with the following:         []                            []        Nursing Staff                                                                         []        Pathologist                                                                        []        FAMILY      Subjective:     Spoke with patient who complains of: pt had a good night, no new c/o, is npo and hoping to get the fna bx done soon    Objective:     Patient Vitals for the past 24 hrs:   BP Temp Pulse Resp SpO2   06/24/22 0836 121/62 98.1 °F (36.7 °C) 66 16 99 %   06/24/22 0243 115/66 98.8 °F (37.1 °C) 65 18 --   06/23/22 2000 134/65 98.9 °F (37.2 °C) 71 18 99 %   06/23/22 1346 138/78 98.4 °F (36.9 °C) 77 16 98 %       REVIEW OF SYSTEMS:    Constitutional: negative fever, negative chills, negative weight loss  Eyes:   negative visual changes  ENT:   negative sore throat, tongue or lip swelling  Respiratory:  negative cough, negative dyspnea  Cards:  negative for chest pain, palpitations, lower extremity edema  GI:   negative for nausea, vomiting, diarrhea, and abdominal pain  Neuro:  negative for headaches, dizziness, vertigo  []                        Full ROS o/w normal/non contributor    Constitutional:  Patient looks  []        Sick  []        Frail  [x]        Better                                                 []        Depressed    HEENT:  [x]   NC                         []   AT               []    ALOPECIA           Eyes: [x]   Normal               []    Icteric  Oropharynx: [x]    Normal                  []  Thrush               []   Dry  Mucositis: []    None                 Grade: []        I  []        II  []        III  [] IV  Neck:   [x]   Supple                  []  Rigid               JVD:    []   ABSENT       []   PRESENT  Lymphadenopathy:   [x]   None Noted            []   PRESENT    Chest:  [x]   Clear               []    Rhonchi                      Dec'd @     []  Right Base           []   Left Base    CV:             []   Regular              [x]  Irregular               []   Tachy                []   Murmur  Abdominal:   [x]    Soft              []   NON-tender               []   Tender      BS:    []   ABSENT                   []   PRESENT  Liver:     [x]  NON-palp                  []   EDGE- palp  Spleen: [x]   NON-palp                   []  EDGE - palp  Mass:   [x]   ABSENT                          []  PRESENT  Extr:    [x]  Lymphedema             []   Cyanosis      []  Clubbing  Edema:     [x]   NONE       []   PRESENT  Skin:  Intact []           Purpura []        Rash: [x]   ABSENT       []  PRESENT  Neuro:  [x]        Normal  []        Confused      Available labs reviewed:  Labs:    Recent Results (from the past 24 hour(s))   CBC WITH AUTOMATED DIFF    Collection Time: 06/24/22  3:46 AM   Result Value Ref Range    WBC 5.0 3.6 - 11.0 K/uL    RBC 3.94 3.80 - 5.20 M/uL    HGB 11.3 (L) 11.5 - 16.0 g/dL    HCT 34.0 (L) 35.0 - 47.0 %    MCV 86.3 80.0 - 99.0 FL    MCH 28.7 26.0 - 34.0 PG    MCHC 33.2 30.0 - 36.5 g/dL    RDW 15.1 (H) 11.5 - 14.5 %    PLATELET 966 346 - 157 K/uL    MPV 9.6 8.9 - 12.9 FL    NRBC 0.0 0  WBC    ABSOLUTE NRBC 0.00 0.00 - 0.01 K/uL    NEUTROPHILS 62 32 - 75 %    LYMPHOCYTES 21 12 - 49 %    MONOCYTES 10 5 - 13 %    EOSINOPHILS 5 0 - 7 %    BASOPHILS 1 0 - 1 %    IMMATURE GRANULOCYTES 1 (H) 0.0 - 0.5 %    ABS. NEUTROPHILS 3.2 1.8 - 8.0 K/UL    ABS. LYMPHOCYTES 1.1 0.8 - 3.5 K/UL    ABS. MONOCYTES 0.5 0.0 - 1.0 K/UL    ABS. EOSINOPHILS 0.3 0.0 - 0.4 K/UL    ABS. BASOPHILS 0.0 0.0 - 0.1 K/UL    ABS. IMM.  GRANS. 0.0 0.00 - 0.04 K/UL    DF AUTOMATED     METABOLIC PANEL, BASIC    Collection Time: 06/24/22  3:46 AM   Result Value Ref Range    Sodium 141 136 - 145 mmol/L    Potassium 3.8 3.5 - 5.1 mmol/L    Chloride 110 (H) 97 - 108 mmol/L    CO2 27 21 - 32 mmol/L    Anion gap 4 (L) 5 - 15 mmol/L    Glucose 103 (H) 65 - 100 mg/dL    BUN 22 (H) 6 - 20 MG/DL    Creatinine 1.10 (H) 0.55 - 1.02 MG/DL    BUN/Creatinine ratio 20 12 - 20      GFR est AA 59 (L) >60 ml/min/1.73m2    GFR est non-AA 49 (L) >60 ml/min/1.73m2    Calcium 8.4 (L) 8.5 - 10.1 MG/DL       Available Xrays reviewed:    Chemotherapy monitored and toxicities assessed:    Assessment and Plan   1. 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 Pt was supposed to be taking eliquis for afib, but had been off it for at least a weak prior to this hospitalization. She should be able to go back on this at discharge. Other mgmt per primary team    2. Left tonsil mass and left cervical lymphadenopathy. . 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 . ent doesn't come to the hospital,  I spoke with DR Gustafson yesterday and he rec. fna bx of left neck node than f/u with him as out-pt. There is a chance she will not be elliqible for surgery and will end up back with me for chemo/xrt depending upon path and circumstances. . she is aware.     Kj De Santiago MD

## 2022-06-24 NOTE — PROGRESS NOTES
[address] [facility] Adult  Hospitalist Group                                                                                          Hospitalist Progress Note  Robert Reed MD  Answering service: [phone] from in house phone        Date of Service:  2022  NAME:  Ryan Allen  :  1950  MRN:  [id number]      Admission Summary:   Hui Cameron a 67 y. o. female who presents to [facility] with complaints of increasing swelling and weakness on her left side since yesterday evening. More weaker in her left leg.  History is rather limited though patient says she was in her usual state of health when she slipped yesterday noted having more trouble getting up.  This morning says she really can get up and using her left leg where she notes more prominent swelling.  She then came into the ER as a stroke alert CT CTA were obtained without any significant findings other than concern for malignancy as well as a possible pulmonary embolism.  Neurology consultation was then requested. Obinna Ponce is on Eliquis for A. fib says she is compliant with her meds.  Does not really endorse much weakness.  Seems indicate that it similar to when she was in the hospital in January with weakness for 2 weeks with a negative MRI.  Presumably went back to normal in the interval.    Normally she can get up and walk around but was unable to do so this morning because of this weakness.  She states that she still weak.  There is no headache, no history of any fever or chill, no nausea or vomiting and no other acute symptomatology. Has chronically swollen lower extremities.  She states her skin is gotten extremely dry recently. Hakeem Baires has no shortness of breath, nausea or vomiting and no other GI or  symptoms. Interval history / Subjective:     No acute complaints at the time of my exam.  There has been some confusion about whether or not we will do his biopsy.   Patient has no acute complaints at this time. 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  Pt was previously unaware of this mass, has not been to a dentist recently        CTA finding: Partially visualized small acute PE in the distal right main pulmonary artery  Increased Eliquis to VTE/PE dosing for now  Appreciate heme/onc assistance and recs  Not a failure of Eliquis since pt admits to being off Eliquis for at least a week PTA to \"stretch it out\", no financial barrier, states that she did not eat much, so did not take meds  Explained the importance of compliance with atrial fib and PE  Will need to switch to heparin drip if ENT requires any surgical intervention or biopsy    Multinodular thyroid gland  TSH wnl  F/u OP    Chronic a fib: continue amiodarone, Eliquis    Chronic leg lymphedema/skin changes: wound care    HTN  Cont norvasc  IV PRNs  Monitor    I spoke to the radiologist and was informed that CT-guided biopsy would not be able to be done today or until Monday. They have plan to schedule her outpatient on Monday. We will work on placement at [email or web address] and transportation for Monday for biopsy. Patient is in agreement to do biopsy on Monday as an outpatient. If were able to arrange for her authorization for [facility] [[email or web address]] then she will discharge tomorrow.       Code status: Full Code  Prophylaxis: Eliquis high dose  Care Plan discussed with: Patient/Family  Anticipated Disposition: SNF   Anticipated Discharge: Greater than 48 hours pending ENT and heme/onc workup     Hospital Problems  Date Reviewed: 5/20/2022          Codes Class Noted POA    CVA (cerebral vascular accident) [facility]) ICD-10-CM: I63.9  ICD-9-CM: 434.91  6/17/2022 Unknown              Review of Systems:   Pertinent items are noted in HPI    Vital Signs:    Last 24hrs VS reviewed since prior progress note. Most recent are:  Visit Vitals  /74 (BP 1 Location: Left upper arm, BP Patient Position: At rest)   Pulse 67   Temp 98.7 °F (37.1 °C)   Resp 17   Ht 5' 7\" (1.702 m)   Wt 68.9 kg (151 lb 14.4 oz)   SpO2 100%   BMI 23.79 kg/m²         Intake/Output Summary (Last 24 hours) at 6/24/2022 1911  Last data filed at 6/24/2022 1326  Gross per 24 hour   Intake --   Output 1100 ml   Net -1100 ml        Physical Examination:   I had a face to face encounter with this patient and independently examined them on 6/24/2022 as outlined below:    General: Alert, cooperative, no acute distress    Resp:  No resp distress, No accessory muscle use  CV:  Irregular rhythm, normal rate  GI:  Soft, Non distended, Non tender  Neurologic:  Alert and oriented, normal speech, PATRICIO  Extremities: Chronic lymphedema LLE>RLE, no cyanosis  Psych:   Not anxious or agitated  Skin:  Lymphedema b/l, chronic skin changes, no acute cellulitis, No jaundice       Data Review:   I personally reviewed: vitals, labs, imaging results, notes    Labs:     Recent Labs     06/24/22  0346   WBC 5.0   HGB 11.3*   HCT 34.0*        Recent Labs     06/24/22  0346      K 3.8   *   CO2 27   BUN 22*   CREA 1.10*   *   CA 8.4*     No results for input(s): ALT, AP, TBIL, TBILI, TP, ALB, GLOB, GGT, AML, LPSE in the last 72 hours. No lab exists for component: SGOT, GPT, AMYP, HLPSE  No results for input(s): INR, PTP, APTT, INREXT, INREXT in the last 72 hours. No results for input(s): FE, TIBC, PSAT, FERR in the last 72 hours. Lab Results   Component Value Date/Time    Folate 6.9 01/12/2022 02:21 AM      No results for input(s): PH, PCO2, PO2 in the last 72 hours. No results for input(s): CPK, CKNDX, TROIQ in the last 72 hours.     No lab exists for component: CPKMB  Lab Results   Component Value Date/Time    Cholesterol, total 164 06/18/2022 02:20 AM    HDL Cholesterol 54 06/18/2022 02:20 AM    LDL, calculated 96 06/18/2022 02:20 AM    Triglyceride 70 06/18/2022 02:20 AM    CHOL/HDL Ratio 3.0 06/18/2022 02:20 AM     No results found for: [facility]  Lab Results   Component Value Date/Time    Color YELLOW/STRAW 06/17/2022 02:16 PM    Appearance CLEAR 06/17/2022 02:16 PM    Specific gravity 1.020 06/17/2022 02:16 PM    Specific gravity 1.019 01/11/2022 04:24 PM    pH (UA) 5.5 06/17/2022 02:16 PM    Protein 30 (A) 06/17/2022 02:16 PM    Glucose Negative 06/17/2022 02:16 PM    Ketone 15 (A) 06/17/2022 02:16 PM    Bilirubin Negative 06/17/2022 02:16 PM    Urobilinogen 0.2 06/17/2022 02:16 PM    Nitrites Negative 06/17/2022 02:16 PM    Leukocyte Esterase Negative 06/17/2022 02:16 PM    Epithelial cells FEW 06/17/2022 02:16 PM    Bacteria Negative 06/17/2022 02:16 PM    WBC 0-4 06/17/2022 02:16 PM    RBC 0-5 06/17/2022 02:16 PM       Medications Reviewed:     Current Facility-Administered Medications   Medication Dose Route Frequency    apixaban (ELIQUIS) tablet 5 mg  5 mg Oral Q12H    hydrALAZINE (APRESOLINE) 20 mg/mL injection 10 mg  10 mg IntraVENous Q4H PRN    labetaloL (NORMODYNE;TRANDATE) injection 10 mg  10 mg IntraVENous Q4H PRN    acetaminophen (TYLENOL) tablet 650 mg  650 mg Oral Q4H PRN    Or    acetaminophen (TYLENOL) solution 650 mg  650 mg Per NG tube Q4H PRN    Or    acetaminophen (TYLENOL) suppository 650 mg  650 mg Rectal Q4H PRN    amiodarone (CORDARONE) tablet 200 mg  200 mg Oral DAILY    amLODIPine (NORVASC) tablet 5 mg  5 mg Oral DAILY    cholecalciferol (VITAMIN D3) (1000 Units /25 mcg) tablet 1,000 Units  1,000 Units Oral DAILY    cyanocobalamin (VITAMIN B12) tablet 1,000 mcg  1,000 mcg Oral DAILY    traMADoL (ULTRAM) tablet 50 mg  50 mg Oral Q6H PRN    megestroL (MEGACE) 400 mg/10 mL (10 mL) oral suspension 20 mg  20 mg Oral DAILY    balsam peru-castor oiL (VENELEX) ointment   Topical BID    ammonium lactate (LAC-HYDRIN) 12 % lotion Topical BID     ______________________________________________________________________  EXPECTED LENGTH OF STAY: 5d 2h  ACTUAL LENGTH OF STAY:          85 [facility] Jean Jeter MD

## 2022-06-24 NOTE — PROGRESS NOTES
Bedside shift change report given to Sade Gregory (oncoming nurse) by Aung Dean (offgoing nurse). Report included the following information SBAR, Kardex, Intake/Output, MAR and Recent Results.

## 2022-06-24 NOTE — PROGRESS NOTES
Hematology-Oncology Progress Note    Viv Shows  1950  [id number]  6/24/2022    Follow-up for: cva                            Tonsillar mass     [x]        Chart notes since last visit reviewed   [x]        Medications reviewed for allergies and interactions       Case discussed with the following:         []                            []        Nursing Staff                                                                         []        Pathologist                                                                        []        FAMILY      Subjective:     Spoke with patient who complains of: pt had a good night, no new c/o, is npo and hoping to get the fna bx done soon    Objective:     Patient Vitals for the past 24 hrs:   BP Temp Pulse Resp SpO2   06/24/22 0836 121/62 98.1 °F (36.7 °C) 66 16 99 %   06/24/22 0243 115/66 98.8 °F (37.1 °C) 65 18 --   06/23/22 2000 134/65 98.9 °F (37.2 °C) 71 18 99 %   06/23/22 1346 138/78 98.4 °F (36.9 °C) 77 16 98 %       REVIEW OF SYSTEMS:    Constitutional: negative fever, negative chills, negative weight loss  Eyes:   negative visual changes  ENT:   negative sore throat, tongue or lip swelling  Respiratory:  negative cough, negative dyspnea  Cards:  negative for chest pain, palpitations, lower extremity edema  GI:   negative for nausea, vomiting, diarrhea, and abdominal pain  Neuro:  negative for headaches, dizziness, vertigo  []                        Full ROS o/w normal/non contributor    Constitutional:  Patient looks  []        Sick  []        Frail  [x]        Better                                                 []        Depressed    HEENT:  [x]   NC                         []   AT               []    ALOPECIA           Eyes: [x]   Normal               []    Icteric  Oropharynx: [x]    Normal                  []  Thrush               []   Dry  Mucositis: []    None                 Grade: []        I  []        II  []        III  [] IV  Neck:   [x]   Supple                  []  Rigid               JVD:    []   ABSENT       []   PRESENT  Lymphadenopathy:   [x]   None Noted            []   PRESENT    Chest:  [x]   Clear               []    Rhonchi                      Dec'd @     []  Right Base           []   Left Base    CV:             []   Regular              [x]  Irregular               []   Tachy                []   Murmur  Abdominal:   [x]    Soft              []   NON-tender               []   Tender      BS:    []   ABSENT                   []   PRESENT  Liver:     [x]  NON-palp                  []   EDGE- palp  Spleen: [x]   NON-palp                   []  EDGE - palp  Mass:   [x]   ABSENT                          []  PRESENT  Extr:    [x]  Lymphedema             []   Cyanosis      []  Clubbing  Edema:     [x]   NONE       []   PRESENT  Skin:  Intact []           Purpura []        Rash: [x]   ABSENT       []  PRESENT  Neuro:  [x]        Normal  []        Confused      Available labs reviewed:  Labs:    Recent Results (from the past 24 hour(s))   CBC WITH AUTOMATED DIFF    Collection Time: 06/24/22  3:46 AM   Result Value Ref Range    WBC 5.0 3.6 - 11.0 K/uL    RBC 3.94 3.80 - 5.20 M/uL    HGB 11.3 (L) 11.5 - 16.0 g/dL    HCT 34.0 (L) 35.0 - 47.0 %    MCV 86.3 80.0 - 99.0 FL    MCH 28.7 26.0 - 34.0 PG    MCHC 33.2 30.0 - 36.5 g/dL    RDW 15.1 (H) 11.5 - 14.5 %    PLATELET 058 421 - 543 K/uL    MPV 9.6 8.9 - 12.9 FL    NRBC 0.0 0  WBC    ABSOLUTE NRBC 0.00 0.00 - 0.01 K/uL    NEUTROPHILS 62 32 - 75 %    LYMPHOCYTES 21 12 - 49 %    MONOCYTES 10 5 - 13 %    EOSINOPHILS 5 0 - 7 %    BASOPHILS 1 0 - 1 %    IMMATURE GRANULOCYTES 1 (H) 0.0 - 0.5 %    ABS. NEUTROPHILS 3.2 1.8 - 8.0 K/UL    ABS. LYMPHOCYTES 1.1 0.8 - 3.5 K/UL    ABS. MONOCYTES 0.5 0.0 - 1.0 K/UL    ABS. EOSINOPHILS 0.3 0.0 - 0.4 K/UL    ABS. BASOPHILS 0.0 0.0 - 0.1 K/UL    ABS. IMM.  GRANS. 0.0 0.00 - 0.04 K/UL    DF AUTOMATED     METABOLIC PANEL, BASIC    Collection Time: 06/24/22  3:46 AM   Result Value Ref Range    Sodium 141 136 - 145 mmol/L    Potassium 3.8 3.5 - 5.1 mmol/L    Chloride 110 (H) 97 - 108 mmol/L    CO2 27 21 - 32 mmol/L    Anion gap 4 (L) 5 - 15 mmol/L    Glucose 103 (H) 65 - 100 mg/dL    BUN 22 (H) 6 - 20 MG/DL    Creatinine 1.10 (H) 0.55 - 1.02 MG/DL    BUN/Creatinine ratio 20 12 - 20      GFR est AA 59 (L) >60 ml/min/1.73m2    GFR est non-AA 49 (L) >60 ml/min/1.73m2    Calcium 8.4 (L) 8.5 - 10.1 MG/DL       Available Xrays reviewed:    Chemotherapy monitored and toxicities assessed:    Assessment and Plan   1. 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 Pt was supposed to be taking eliquis for afib, but had been off it for at least a weak prior to this hospitalization. She should be able to go back on this at discharge. Other mgmt per primary team    2. Left tonsil mass and left cervical lymphadenopathy. . 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 . ent doesn't come to the hospital,  I spoke with DR Gustafson yesterday and he rec. fna bx of left neck node than f/u with him as out-pt. There is a chance she will not be elliqible for surgery and will end up back with me for chemo/xrt depending upon path and circumstances. . she is aware.     Kadeem Randall MD

## 2022-06-24 NOTE — PROGRESS NOTES
Problem: Self Care Deficits Care Plan (Adult)  Goal: *Acute Goals and Plan of Care (Insert Text)  Description: FUNCTIONAL STATUS PRIOR TO ADMISSION: Patient was modified independent using a RW/cane for functional mobility on most days, used a wheelchair on other days. Was primarily completing sponge baths instead of showers. Independent in dressing/grooming ADL, used an roro for grocery delivery    HOME SUPPORT: Patient lives in communal living with separate bedrooms and shared kitchen/bathroom    Occupational Therapy Goals  Initiated 6/19/2022   1. Patient will perform grooming with modified independence within 7 day(s). 2.  Patient will perform toileting with minimal assistance/contact guard assist within 7 day(s). 3.  Patient will perform lower body dressing with minimal assistance/contact guard assist within 7 day(s). 4.  Patient will perform toilet transfers with moderate assistance  within 7 day(s). 5.  Patient will perform upper body dressing with moderate assistance  within 7 day(s). 6.  Patient will participate in upper extremity therapeutic exercise/activities with minimal assistance/contact guard assist within 7 day(s). 7.  Patient will utilize energy conservation techniques during functional activities with verbal cues within 7 day(s). 8.  Patient will improve their Fugl Khanna score by 5 points in prep for ADLs within 7 days. Outcome: Progressing Towards Goal    OCCUPATIONAL THERAPY TREATMENT  Patient: Lilly Valle (67 y.o. female)  Date: 6/24/2022  Diagnosis: CVA (cerebral vascular accident) [facility]) [I63.9] <principal problem not specified>       Precautions: Fall  Chart, occupational therapy assessment, plan of care, and goals were reviewed. ASSESSMENT  Patient continues with skilled OT services and is slowly progressing towards goals. Pt required encouragement for activity, and pt was educated extensively on [facility] [employer] use (see below).  She demonstrated good to fair static sitting balance EOB with UE support, completed sit to  prep for ADLs with mod to max A x 1 from elevated bed height. Pt can be self limiting at times and needs education on benefits of OOB activity. At this time, she is needing heavy assist of one person for transfers and assist with all ADLs. Recommend SNF at this time. Current Level of Function Impacting Discharge (ADLs): mod to max A x 1 to stand, setup to max A ADLs    Other factors to consider for discharge:          PLAN :  Patient continues to benefit from skilled intervention to address the above impairments. Continue treatment per established plan of care to address goals. Recommend with staff: OOB to [facility] with A x 2    Recommend next OT session: ADLs    Recommendation for discharge: (in order for the patient to meet his/her long term goals)  Therapy up to 5 days/week in SNF setting    This discharge recommendation:  Has been made in collaboration with the attending provider and/or case management    IF patient discharges home will need the following DME: TBD       SUBJECTIVE:   Patient stated let me use the [address].     OBJECTIVE DATA SUMMARY:   Cognitive/Behavioral Status:  Neurologic State: Alert  Orientation Level: Oriented X4  Cognition: Follows commands             Functional Mobility and Transfers for ADLs:  Bed Mobility:  Supine to Sit: Moderate assistance;Assist x1  Sit to Supine: Moderate assistance;Assist x1  Scooting: Maximum assistance;Assist x1    Transfers:  Sit to Stand: Moderate assistance;Maximum assistance;Assist x1;Additional time (bed height elevated)          Balance:  Sitting: Impaired  Sitting - Static: Fair (occasional)  Sitting - Dynamic: Fair (occasional)  Standing: Impaired; With support  Standing - Static: Constant support;Poor    ADL Intervention:          Pt required verbal cues for body mechanics, bed mobility, scooting, hand placement in prep for functional mobility and ADL tasks.  Pt required mod A for supine to sit with fair static sitting balance with UE prop support. Completed sit to stand with unilateral support on RW with mod to max A x 1 and bed height elevated. Educated pt at length on importance of OOB activity, especially using BSC as pt has become reliant on [employer] use. Pain:  Pain at ankles    Activity Tolerance:   Good    After treatment patient left in no apparent distress:   Supine in bed, Heels elevated for pressure relief, Patient positioned in L sidelying for pressure relief, Call bell within reach, and Side rails x 3    COMMUNICATION/COLLABORATION:   The patients plan of care was discussed with: Physical therapist and Registered nurse.      Kin Carry, OT  Time Calculation: 35 mins

## 2022-06-24 NOTE — PROGRESS NOTES
Problem: Mobility Impaired (Adult and Pediatric)  Goal: *Acute Goals and Plan of Care (Insert Text)  Description:   FUNCTIONAL STATUS PRIOR TO ADMISSION: Pt resides in a communal-living situation in a duplex with shared kitchen and bathroom. Pt reports completing her own ADLs and functional mobility; however, pt with LE skin and foot concerns suggesting self-care is challenging and inefficient. She reports receiving groceries via an roro and HHPT/OT. Pt also reports of being weary of using DME for safety in her bathroom as she shares the space and is not comfortable putting an elevated toilet seat in the BR (she does use a shower chair). HOME SUPPORT PRIOR TO ADMISSION: The patient lived alone with no local support mentioned. Physical Therapy Goals  Initiated 6/18/2022  1. Patient will move from supine to sit and sit to supine  in bed with supervision/set-up within 7 day(s). 2.  Patient will transfer from bed to chair and chair to bed with minimal assistance/contact guard assist using the least restrictive device within 7 day(s). 3.  Patient will perform sit to stand with minimal assistance/contact guard assist within 7 day(s). 4.  Patient will ambulate with minimal assistance/contact guard assist for 10 feet with the least restrictive device within 7 day(s). 5.  Patient will improve Argueta Balance score by 7 points within 7 days. Outcome: Progressing Towards Goal   PHYSICAL THERAPY TREATMENT: WEEKLY REASSESSMENT  Patient: Genna Sheldon (67 y.o. female)  Date: 6/24/2022  Primary Diagnosis: CVA (cerebral vascular accident) [facility]) [I63.9]        Precautions:   Fall      ASSESSMENT  Patient continues with skilled PT services and is progressing towards goals. Patient received in bed, initially deferring mobility 2/2 lack of sleep then agreeable to sit and reposition. Pt able to scoot hips laterally while sitting on side of bed, then returned to supine.  Increased time to reposition in bed for comfort. Continue to anticipate SNF upon discharge as pt presenting below baseline for mobility and requiring assistance. Patient's progression toward goals since last assessment: min A bed mobility     Current Level of Function Impacting Discharge (mobility/balance): deferred standing3/    Functional Outcome Measure: The patient scored 3/56 on the Argueta Balance outcome measure which is indicative of patient is a high fall risk. Other factors to consider for discharge: fall risk         PLAN :  Goals have been updated based on progression since last assessment. Patient continues to benefit from skilled intervention to address the above impairments. Recommendations and Planned Interventions: bed mobility training, transfer training, gait training, therapeutic exercises, neuromuscular re-education, patient and family training/education, and therapeutic activities      Frequency/Duration: Patient will be followed by physical therapy:  5 times a week to address goals.     Recommendation for discharge: (in order for the patient to meet his/her long term goals)  Therapy up to 5 days/week in SNF setting  If pt were to d/c home, would benefit from HHPT and require assistance/supervision for all mobility as pt is a fall risk      This discharge recommendation:  Has been made in collaboration with the attending provider and/or case management    IF patient discharges home will need the following DME: wheelchair         SUBJECTIVE:   Patient stated I have been waiting to hear if I am going to have a test.    OBJECTIVE DATA SUMMARY:   HISTORY:    Past Medical History:   Diagnosis Date    History of mammogram 2010    normal    Hypertension     Pap smear for cervical cancer screening 2011    normal     Past Surgical History:   Procedure Laterality Date    HX GYN      hystterectomy    [place] COMPRE EP EVAL ABLTJ ATR FIB PULM VEIN ISOLATION N/A 11/30/2020    ABLATION A-FIB  W COMPLETE EP STUDY performed by Danisha Caldwell Kalen Hoffman MD at [facility] CARDIAC CATH LAB    TX ICAR CATHETER ABLATION ARRHYTHMIA ADD ON N/A 2020    Ablation Svt/Vt Add On performed by Marylene Herder, MD at [facility] CARDIAC CATH LAB    TX INTRACARD ECHO, THER/DX INTERVENT N/A 2020    Intracardiac Echocardiogram performed by Marylene Herder, MD at [facility] CARDIAC CATH LAB    TX INTRACARDIAC ELECTROPHYSIOLOGIC 3D MAPPING N/A 2020    Ep 3d Mapping performed by Marylene Herder, MD at [facility] CARDIAC CATH LAB    TX STIM/PACING HEART POST IV DRUG INFU N/A 2020    Drug Stimulation performed by Marylene Herder, MD at [facility] CARDIAC CATH LAB       Personal factors and/or comorbidities impacting plan of care: PMH    Home Situation  Home Environment: Private residence  # Steps to Enter: 5  Rails to Enter: Yes  Hand Rails : Right  One/Two Story Residence: Two story  # of Interior Steps: 7 (split level)  [employer]: Right  Living Alone: Yes  Support Systems:  (other members in duplex)  Patient Expects to be Discharged to[de-identified] Skilled nursing facility  Current DME Used/Available at Home: Dia Solomon, rollator,Walker, rolling,Shower chair  Tub or Shower Type: Tub/Shower combination (mostly sponge bathes)    EXAMINATION/PRESENTATION/DECISION MAKING:   Critical Behavior:  Neurologic State: Alert  Orientation Level: Oriented X4  Cognition: Follows commands  Safety/Judgement: Awareness of environment  Hearing: Auditory  Auditory Impairment: None  Skin:  intact  Edema: none  Range Of Motion:  AROM: Within functional limits    Strength:    Strength:  Within functional limits       Coordination:  Coordination: Generally decreased, functional  Vision:      Functional Mobility:  Bed Mobility:     Supine to Sit: Minimum assistance  Sit to Supine: Minimum assistance       Balance:   Sitting: Impaired  Sitting - Static: Fair (occasional)  Sitting - Dynamic: Fair (occasional)    Functional Measure:  Argueta Balance Test:    Sitting to Standin  Standing Unsupported: 0  Sitting with Back Unsupported: 3  Standing to Sittin  Transfers: 0  Standing Unsupported with Eyes Closed: 0  Standing Unsupported with Feet Together: 0  Reach Forward with Outstretched Arm: 0   Object: 0  Turn to Look Over Shoulders: 0  Turn 360 Degrees: 0  Alternate Foot on Step/Stool: 0  Standing Unsupported One Foot in Front: 0  Stand on One Le  Total: 3/56         56=Maximum possible score;   0-20=High fall risk  21-40=Moderate fall risk   41-56=Low fall risk         Activity Tolerance:   Fair and requires rest breaks    After treatment patient left in no apparent distress:   Supine in bed, Call bell within reach, and Side rails x 3    COMMUNICATION/EDUCATION:   The patients plan of care was discussed with: Registered nurse and Case management. Fall prevention education was provided and the patient/caregiver indicated understanding., Patient/family have participated as able in goal setting and plan of care. , and Patient/family agree to work toward stated goals and plan of care.     Thank you for this referral.  China Peralta, PT   Time Calculation: 10 mins

## 2022-06-24 NOTE — PROGRESS NOTES
Problem: Mobility Impaired (Adult and Pediatric)  Goal: *Acute Goals and Plan of Care (Insert Text)  Description:   FUNCTIONAL STATUS PRIOR TO ADMISSION: Pt resides in a communal-living situation in a duplex with shared kitchen and bathroom. Pt reports completing her own ADLs and functional mobility; however, pt with LE skin and foot concerns suggesting self-care is challenging and inefficient. She reports receiving groceries via an roro and HHPT/OT. Pt also reports of being weary of using DME for safety in her bathroom as she shares the space and is not comfortable putting an elevated toilet seat in the BR (she does use a shower chair). HOME SUPPORT PRIOR TO ADMISSION: The patient lived alone with no local support mentioned. Physical Therapy Goals  Initiated 6/18/2022  1. Patient will move from supine to sit and sit to supine  in bed with supervision/set-up within 7 day(s). 2.  Patient will transfer from bed to chair and chair to bed with minimal assistance/contact guard assist using the least restrictive device within 7 day(s). 3.  Patient will perform sit to stand with minimal assistance/contact guard assist within 7 day(s). 4.  Patient will ambulate with minimal assistance/contact guard assist for 10 feet with the least restrictive device within 7 day(s). 5.  Patient will improve Argueta Balance score by 7 points within 7 days. Outcome: Progressing Towards Goal   PHYSICAL THERAPY TREATMENT: WEEKLY REASSESSMENT  Patient: Paulo Hernandez (67 y.o. female)  Date: 6/24/2022  Primary Diagnosis: CVA (cerebral vascular accident) [facility]) [I63.9]        Precautions:   Fall      ASSESSMENT  Patient continues with skilled PT services and is progressing towards goals. Patient received in bed, initially deferring mobility 2/2 lack of sleep then agreeable to sit and reposition. Pt able to scoot hips laterally while sitting on side of bed, then returned to supine.  Increased time to reposition in bed for comfort. Continue to anticipate SNF upon discharge as pt presenting below baseline for mobility and requiring assistance. Patient's progression toward goals since last assessment: min A bed mobility     Current Level of Function Impacting Discharge (mobility/balance): deferred standing3/    Functional Outcome Measure: The patient scored 3/56 on the Argueta Balance outcome measure which is indicative of patient is a high fall risk. Other factors to consider for discharge: fall risk         PLAN :  Goals have been updated based on progression since last assessment. Patient continues to benefit from skilled intervention to address the above impairments. Recommendations and Planned Interventions: bed mobility training, transfer training, gait training, therapeutic exercises, neuromuscular re-education, patient and family training/education, and therapeutic activities      Frequency/Duration: Patient will be followed by physical therapy:  5 times a week to address goals.     Recommendation for discharge: (in order for the patient to meet his/her long term goals)  Therapy up to 5 days/week in SNF setting  If pt were to d/c home, would benefit from HHPT and require assistance/supervision for all mobility as pt is a fall risk      This discharge recommendation:  Has been made in collaboration with the attending provider and/or case management    IF patient discharges home will need the following DME: wheelchair         SUBJECTIVE:   Patient stated I have been waiting to hear if I am going to have a test.    OBJECTIVE DATA SUMMARY:   HISTORY:    Past Medical History:   Diagnosis Date    History of mammogram 2010    normal    Hypertension     Pap smear for cervical cancer screening 2011    normal     Past Surgical History:   Procedure Laterality Date    HX GYN      hystterectomy    [place] COMPRE EP EVAL ABLTJ ATR FIB PULM VEIN ISOLATION N/A 11/30/2020    ABLATION A-FIB  W COMPLETE EP STUDY performed by Masoud Vergara Ronnie Christy MD at [facility] CARDIAC CATH LAB    TX ICAR CATHETER ABLATION ARRHYTHMIA ADD ON N/A 2020    Ablation Svt/Vt Add On performed by Ryann Rivera MD at [facility] CARDIAC CATH LAB    TX INTRACARD ECHO, THER/DX INTERVENT N/A 2020    Intracardiac Echocardiogram performed by Ryann Rivera MD at [facility] CARDIAC CATH LAB    TX INTRACARDIAC ELECTROPHYSIOLOGIC 3D MAPPING N/A 2020    Ep 3d Mapping performed by Ryann Rivera MD at [facility] CARDIAC CATH LAB    TX STIM/PACING HEART POST IV DRUG INFU N/A 2020    Drug Stimulation performed by Ryann Rivera MD at [facility] CARDIAC CATH LAB       Personal factors and/or comorbidities impacting plan of care: PMH    Home Situation  Home Environment: Private residence  # Steps to Enter: 5  Rails to Enter: Yes  Hand Rails : Right  One/Two Story Residence: Two story  # of Interior Steps: 7 (split level)  [employer]: Right  Living Alone: Yes  Support Systems:  (other members in duplex)  Patient Expects to be Discharged to[de-identified] Skilled nursing facility  Current DME Used/Available at Home: Sheila , rollator,Walker, rolling,Shower chair  Tub or Shower Type: Tub/Shower combination (mostly sponge bathes)    EXAMINATION/PRESENTATION/DECISION MAKING:   Critical Behavior:  Neurologic State: Alert  Orientation Level: Oriented X4  Cognition: Follows commands  Safety/Judgement: Awareness of environment  Hearing: Auditory  Auditory Impairment: None  Skin:  intact  Edema: none  Range Of Motion:  AROM: Within functional limits    Strength:    Strength:  Within functional limits       Coordination:  Coordination: Generally decreased, functional  Vision:      Functional Mobility:  Bed Mobility:     Supine to Sit: Minimum assistance  Sit to Supine: Minimum assistance       Balance:   Sitting: Impaired  Sitting - Static: Fair (occasional)  Sitting - Dynamic: Fair (occasional)    Functional Measure:  Argueta Balance Test:    Sitting to Standin  Standing Unsupported: 0  Sitting with Back Unsupported: 3  Standing to Sittin  Transfers: 0  Standing Unsupported with Eyes Closed: 0  Standing Unsupported with Feet Together: 0  Reach Forward with Outstretched Arm: 0   Object: 0  Turn to Look Over Shoulders: 0  Turn 360 Degrees: 0  Alternate Foot on Step/Stool: 0  Standing Unsupported One Foot in Front: 0  Stand on One Le  Total: 3/56         56=Maximum possible score;   0-20=High fall risk  21-40=Moderate fall risk   41-56=Low fall risk         Activity Tolerance:   Fair and requires rest breaks    After treatment patient left in no apparent distress:   Supine in bed, Call bell within reach, and Side rails x 3    COMMUNICATION/EDUCATION:   The patients plan of care was discussed with: Registered nurse and Case management. Fall prevention education was provided and the patient/caregiver indicated understanding., Patient/family have participated as able in goal setting and plan of care. , and Patient/family agree to work toward stated goals and plan of care.     Thank you for this referral.  Marizol Bray, PT   Time Calculation: 10 mins

## 2022-06-24 NOTE — PROGRESS NOTES
Problem: Pressure Injury - Risk of  Goal: *Prevention of pressure injury  Description: Document Bin Scale and appropriate interventions in the flowsheet.   Outcome: Progressing Towards Goal  Note: Pressure Injury Interventions:  Sensory Interventions: Assess changes in LOC    Moisture Interventions: Absorbent underpads    Activity Interventions: PT/OT evaluation    Mobility Interventions: HOB 30 degrees or less,PT/OT evaluation    Nutrition Interventions: Document food/fluid/supplement intake    Friction and Shear Interventions: HOB 30 degrees or less

## 2022-06-24 NOTE — PROGRESS NOTES
I was informed that the patient's biopsy will not be able to be done until Monday and that they will fit her in as an outpatient for the biopsy.

## 2022-06-24 NOTE — PROGRESS NOTES
Problem: Self Care Deficits Care Plan (Adult)  Goal: *Acute Goals and Plan of Care (Insert Text)  Description: FUNCTIONAL STATUS PRIOR TO ADMISSION: Patient was modified independent using a RW/cane for functional mobility on most days, used a wheelchair on other days. Was primarily completing sponge baths instead of showers. Independent in dressing/grooming ADL, used an roro for grocery delivery    HOME SUPPORT: Patient lives in communal living with separate bedrooms and shared kitchen/bathroom    Occupational Therapy Goals  Initiated 6/19/2022   1. Patient will perform grooming with modified independence within 7 day(s). 2.  Patient will perform toileting with minimal assistance/contact guard assist within 7 day(s). 3.  Patient will perform lower body dressing with minimal assistance/contact guard assist within 7 day(s). 4.  Patient will perform toilet transfers with moderate assistance  within 7 day(s). 5.  Patient will perform upper body dressing with moderate assistance  within 7 day(s). 6.  Patient will participate in upper extremity therapeutic exercise/activities with minimal assistance/contact guard assist within 7 day(s). 7.  Patient will utilize energy conservation techniques during functional activities with verbal cues within 7 day(s). 8.  Patient will improve their Fugl Khanna score by 5 points in prep for ADLs within 7 days. Outcome: Progressing Towards Goal    OCCUPATIONAL THERAPY TREATMENT  Patient: Natasha Herrera (67 y.o. female)  Date: 6/24/2022  Diagnosis: CVA (cerebral vascular accident) [facility]) [I63.9] <principal problem not specified>       Precautions: Fall  Chart, occupational therapy assessment, plan of care, and goals were reviewed. ASSESSMENT  Patient continues with skilled OT services and is slowly progressing towards goals. Pt required encouragement for activity, and pt was educated extensively on [facility] Pool[employer]ubes use (see below).  She demonstrated good to fair static sitting balance EOB with UE support, completed sit to  prep for ADLs with mod to max A x 1 from elevated bed height. Pt can be self limiting at times and needs education on benefits of OOB activity. At this time, she is needing heavy assist of one person for transfers and assist with all ADLs. Recommend SNF at this time. Current Level of Function Impacting Discharge (ADLs): mod to max A x 1 to stand, setup to max A ADLs    Other factors to consider for discharge:          PLAN :  Patient continues to benefit from skilled intervention to address the above impairments. Continue treatment per established plan of care to address goals. Recommend with staff: OOB to [facility] with A x 2    Recommend next OT session: ADLs    Recommendation for discharge: (in order for the patient to meet his/her long term goals)  Therapy up to 5 days/week in SNF setting    This discharge recommendation:  Has been made in collaboration with the attending provider and/or case management    IF patient discharges home will need the following DME: TBD       SUBJECTIVE:   Patient stated let me use the [address].     OBJECTIVE DATA SUMMARY:   Cognitive/Behavioral Status:  Neurologic State: Alert  Orientation Level: Oriented X4  Cognition: Follows commands             Functional Mobility and Transfers for ADLs:  Bed Mobility:  Supine to Sit: Moderate assistance;Assist x1  Sit to Supine: Moderate assistance;Assist x1  Scooting: Maximum assistance;Assist x1    Transfers:  Sit to Stand: Moderate assistance;Maximum assistance;Assist x1;Additional time (bed height elevated)          Balance:  Sitting: Impaired  Sitting - Static: Fair (occasional)  Sitting - Dynamic: Fair (occasional)  Standing: Impaired; With support  Standing - Static: Constant support;Poor    ADL Intervention:          Pt required verbal cues for body mechanics, bed mobility, scooting, hand placement in prep for functional mobility and ADL tasks.  Pt required mod A for supine to sit with fair static sitting balance with UE prop support. Completed sit to stand with unilateral support on RW with mod to max A x 1 and bed height elevated. Educated pt at length on importance of OOB activity, especially using BSC as pt has become reliant on [employer] use. Pain:  Pain at ankles    Activity Tolerance:   Good    After treatment patient left in no apparent distress:   Supine in bed, Heels elevated for pressure relief, Patient positioned in L sidelying for pressure relief, Call bell within reach, and Side rails x 3    COMMUNICATION/COLLABORATION:   The patients plan of care was discussed with: Physical therapist and Registered nurse.      Sudheer Castillo OT  Time Calculation: 35 mins

## 2022-06-24 NOTE — PROGRESS NOTES
Bedside and Verbal shift change report given to Dominga Plata RN (oncoming nurse) by Lashaun Jeter RN (offgoing nurse). Report included the following information SBAR, Kardex, Intake/Output, MAR and Recent Results.

## 2022-06-24 NOTE — PROGRESS NOTES
Bedside shift change report given to Jenette Phalen (oncoming nurse) by Jeet Rojas (offgoing nurse). Report included the following information SBAR, Kardex, Intake/Output, MAR and Recent Results.

## 2022-06-24 NOTE — PROGRESS NOTES
Problem: Pressure Injury - Risk of  Goal: *Prevention of pressure injury  Description: Document Bin Scale and appropriate interventions in the flowsheet.   Outcome: Progressing Towards Goal  Note: Pressure Injury Interventions:  Sensory Interventions: Assess changes in LOC    Moisture Interventions: Absorbent underpads    Activity Interventions: PT/OT evaluation,Increase time out of bed    Mobility Interventions: PT/OT evaluation    Nutrition Interventions: Document food/fluid/supplement intake    Friction and Shear Interventions: Minimize layers                Problem: Patient Education: Go to Patient Education Activity  Goal: Patient/Family Education  Outcome: Progressing Towards Goal

## 2022-06-25 PROCEDURE — 65270000029 HC RM PRIVATE

## 2022-06-25 PROCEDURE — 74011250637 HC RX REV CODE- 250/637: Performed by: HOSPITALIST

## 2022-06-25 RX ADMIN — Medication: at 18:06

## 2022-06-25 RX ADMIN — AMLODIPINE BESYLATE 5 MG: 5 TABLET ORAL at 08:54

## 2022-06-25 RX ADMIN — Medication: at 09:01

## 2022-06-25 RX ADMIN — Medication: at 18:05

## 2022-06-25 RX ADMIN — MEGESTROL ACETATE 20 MG: 40 SUSPENSION ORAL at 08:56

## 2022-06-25 RX ADMIN — Medication: at 09:00

## 2022-06-25 RX ADMIN — Medication 1000 UNITS: at 08:54

## 2022-06-25 RX ADMIN — AMIODARONE HYDROCHLORIDE 200 MG: 200 TABLET ORAL at 08:55

## 2022-06-25 RX ADMIN — CYANOCOBALAMIN TAB 500 MCG 1000 MCG: 500 TAB at 08:54

## 2022-06-25 NOTE — PROGRESS NOTES
[facility] Adult  Hospitalist Group                                                                                          Hospitalist Progress Note  Nasim Malin MD  Answering service: [phone] from in house phone        Date of Service:  2022  NAME:  Gray Talley  :  1950  MRN:  [id number]      Admission Summary:   Zac Busch a 67 y. o. female who presents to [facility] [address] with complaints of increasing swelling and weakness on her left side since yesterday evening. More weaker in her left leg.  History is rather limited though patient says she was in her usual state of health when she slipped yesterday noted having more trouble getting up.  This morning says she really can get up and using her left leg where she notes more prominent swelling.  She then came into the ER as a stroke alert CT CTA were obtained without any significant findings other than concern for malignancy as well as a possible pulmonary embolism.  Neurology consultation was then requested. Mee Kehr is on Eliquis for A. fib says she is compliant with her meds.  Does not really endorse much weakness.  Seems indicate that it similar to when she was in the hospital in January with weakness for 2 weeks with a negative MRI.  Presumably went back to normal in the interval.    Normally she can get up and walk around but was unable to do so this morning because of this weakness.  She states that she still weak.  There is no headache, no history of any fever or chill, no nausea or vomiting and no other acute symptomatology. Has chronically swollen lower extremities.  She states her skin is gotten extremely dry recently. Alfred Kitchen has no shortness of breath, nausea or vomiting and no other GI or  symptoms. Interval history / Subjective:     No acute complaints at the time of my exam.  Patient seen and examined earlier this morning by me. No acute complaints.   She had no acute events overnight. 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  Pt was previously unaware of this mass, has not been to a dentist recently        CTA finding: Partially visualized small acute PE in the distal right main pulmonary artery  Increased Eliquis to VTE/PE dosing for now  Appreciate heme/onc assistance and recs  Not a failure of Eliquis since pt admits to being off Eliquis for at least a week PTA to \"stretch it out\", no financial barrier, states that she did not eat much, so did not take meds  Explained the importance of compliance with atrial fib and PE  Will need to switch to heparin drip if ENT requires any surgical intervention or biopsy    Multinodular thyroid gland  TSH wnl  F/u OP    Chronic a fib: continue amiodarone, Eliquis    Chronic leg lymphedema/skin changes: wound care    HTN  Cont norvasc  IV PRNs  Monitor    Patient is [facility]. Patient is medically stable for discharge once bed is available. At this time I am holding the patient's Eliquis as she has an outpatient CT-guided biopsy of the neck scheduled for Monday. Per my conversation with the radiologist yesterday, patient needs to be off of blood thinners for 48 hours. Code status: Full Code  Prophylaxis: Eliquis high dose  Care Plan discussed with: Patient/Family  Anticipated Disposition: SNF   Anticipated Discharge: Greater than 48 hours pending ENT and heme/onc workup     Hospital Problems  Date Reviewed: 5/20/2022          Codes Class Noted POA    CVA (cerebral vascular accident) [facility]) ICD-10-CM: I63.9  ICD-9-CM: 434.91  6/17/2022 Unknown              Review of Systems:   Pertinent items are noted in HPI    Vital Signs:    Last 24hrs VS reviewed since prior progress note.  Most recent are:  Visit Vitals  /63 (BP 1 Location: Left upper arm, BP Patient Position: At rest)   Pulse 73   Temp 98.3 °F (36.8 °C)   Resp 18   Ht 5' 7\" (1.702 m)   Wt 68.9 kg (151 lb 14.4 oz)   SpO2 99%   BMI 23.79 kg/m²         Intake/Output Summary (Last 24 hours) at 6/25/2022 1810  Last data filed at 6/25/2022 9583  Gross per 24 hour   Intake 120 ml   Output --   Net 120 ml        Physical Examination:   I had a face to face encounter with this patient and independently examined them on 6/25/2022 as outlined below:    General: Alert, cooperative, no acute distress    Resp:  No resp distress, No accessory muscle use  CV:  Irregular rhythm, normal rate  GI:  Soft, Non distended, Non tender  Neurologic:  Alert and oriented, normal speech, PATRICIO  Extremities: Chronic lymphedema LLE>RLE, no cyanosis  Psych:   Not anxious or agitated  Skin:  Lymphedema b/l, chronic skin changes, no acute cellulitis, No jaundice       Data Review:   I personally reviewed: vitals, labs, imaging results, notes    Labs:     Recent Labs     06/24/22  0346   WBC 5.0   HGB 11.3*   HCT 34.0*        Recent Labs     06/24/22  0346      K 3.8   *   CO2 27   BUN 22*   CREA 1.10*   *   CA 8.4*     No results for input(s): ALT, AP, TBIL, TBILI, TP, ALB, GLOB, GGT, AML, LPSE in the last 72 hours. No lab exists for component: SGOT, GPT, AMYP, HLPSE  No results for input(s): INR, PTP, APTT, INREXT, INREXT in the last 72 hours. No results for input(s): FE, TIBC, PSAT, FERR in the last 72 hours. Lab Results   Component Value Date/Time    Folate 6.9 01/12/2022 02:21 AM      No results for input(s): PH, PCO2, PO2 in the last 72 hours. No results for input(s): CPK, CKNDX, TROIQ in the last 72 hours.     No lab exists for component: CPKMB  Lab Results   Component Value Date/Time    Cholesterol, total 164 06/18/2022 02:20 AM    HDL Cholesterol 54 06/18/2022 02:20 AM    LDL, calculated 96 06/18/2022 02:20 AM    Triglyceride 70 06/18/2022 02:20 AM    CHOL/HDL Ratio 3.0 06/18/2022 02:20 AM     No results found for: [facility]  Lab Results   Component Value Date/Time    Color YELLOW/STRAW 06/17/2022 02:16 PM    Appearance CLEAR 06/17/2022 02:16 PM    Specific gravity 1.020 06/17/2022 02:16 PM    Specific gravity 1.019 01/11/2022 04:24 PM    pH (UA) 5.5 06/17/2022 02:16 PM    Protein 30 (A) 06/17/2022 02:16 PM    Glucose Negative 06/17/2022 02:16 PM    Ketone 15 (A) 06/17/2022 02:16 PM    Bilirubin Negative 06/17/2022 02:16 PM    Urobilinogen 0.2 06/17/2022 02:16 PM    Nitrites Negative 06/17/2022 02:16 PM    Leukocyte Esterase Negative 06/17/2022 02:16 PM    Epithelial cells FEW 06/17/2022 02:16 PM    Bacteria Negative 06/17/2022 02:16 PM    WBC 0-4 06/17/2022 02:16 PM    RBC 0-5 06/17/2022 02:16 PM       Medications Reviewed:     Current Facility-Administered Medications   Medication Dose Route Frequency    [Held by provider] apixaban (ELIQUIS) tablet 5 mg  5 mg Oral Q12H    hydrALAZINE (APRESOLINE) 20 mg/mL injection 10 mg  10 mg IntraVENous Q4H PRN    labetaloL (NORMODYNE;TRANDATE) injection 10 mg  10 mg IntraVENous Q4H PRN    acetaminophen (TYLENOL) tablet 650 mg  650 mg Oral Q4H PRN    Or    acetaminophen (TYLENOL) solution 650 mg  650 mg Per NG tube Q4H PRN    Or    acetaminophen (TYLENOL) suppository 650 mg  650 mg Rectal Q4H PRN    amiodarone (CORDARONE) tablet 200 mg  200 mg Oral DAILY    amLODIPine (NORVASC) tablet 5 mg  5 mg Oral DAILY    cholecalciferol (VITAMIN D3) (1000 Units /25 mcg) tablet 1,000 Units  1,000 Units Oral DAILY    cyanocobalamin (VITAMIN B12) tablet 1,000 mcg  1,000 mcg Oral DAILY    traMADoL (ULTRAM) tablet 50 mg  50 mg Oral Q6H PRN    megestroL (MEGACE) 400 mg/10 mL (10 mL) oral suspension 20 mg  20 mg Oral DAILY    balsam peru-castor oiL (VENELEX) ointment   Topical BID    ammonium lactate (LAC-HYDRIN) 12 % lotion   Topical BID ______________________________________________________________________  EXPECTED LENGTH OF STAY: 5d 2h  ACTUAL LENGTH OF STAY:          [address] [facility] Guille Zamora MD

## 2022-06-25 NOTE — PROGRESS NOTES
Transition of Care Plan   RUR- Low 12%    DISPOSITION: The disposition plan is SNF  ? SNF Accepted: Awaiting Bed availability   Paola Teixeira; Admissions Liaison: Pola Jasso [phone]   § Per conversation with Pola Jasso; she reported that she would check to see if they have a bed available for 6/25.   ? Jessenia Shah;   ? Pre AUTH-Approved  ? Plan HDBT#[id number]/ Anali Siegel KLVG#[id number]/ Start date:6/24-6/28/Reviewer: Liliane Frankel   ? Per conversation with Gui Herrmann w/[facility] [address]; The pt's Biopsy will be completed, Monday @11am arrival Time 10:30am; Outpatient registration [facility] § The pt will require transport to and from her Biopsy on Monday; this cm will arrange transport with Medicaid, once Ramiro Srini is received.        F/U with PCP/Specialist     Transport: JT/JULIO       CM: Nathan JIMENES,   [phone]

## 2022-06-25 NOTE — PROGRESS NOTES
Bedside shift change report given to Will Cabezas, [address] [place] [address] (oncoming nurse) by Edil Welch LPN (offgoing nurse). Report included the following information SBAR and MAR.

## 2022-06-25 NOTE — PROGRESS NOTES
[facility] Adult  Hospitalist Group                                                                                          Hospitalist Progress Note  Leah Hoffmann MD  Answering service: [phone] from in house phone        Date of Service:  2022  NAME:  Doni Tadeo  :  1950  MRN:  [id number]      Admission Summary:   Myriam Ya a 67 y. o. female who presents to [facility] with complaints of increasing swelling and weakness on her left side since yesterday evening. More weaker in her left leg.  History is rather limited though patient says she was in her usual state of health when she slipped yesterday noted having more trouble getting up.  This morning says she really can get up and using her left leg where she notes more prominent swelling.  She then came into the ER as a stroke alert CT CTA were obtained without any significant findings other than concern for malignancy as well as a possible pulmonary embolism.  Neurology consultation was then requested. Bart Faiban is on Eliquis for A. fib says she is compliant with her meds.  Does not really endorse much weakness.  Seems indicate that it similar to when she was in the hospital in January with weakness for 2 weeks with a negative MRI.  Presumably went back to normal in the interval.    Normally she can get up and walk around but was unable to do so this morning because of this weakness.  She states that she still weak.  There is no headache, no history of any fever or chill, no nausea or vomiting and no other acute symptomatology. Has chronically swollen lower extremities.  She states her skin is gotten extremely dry recently. [place] has no shortness of breath, nausea or vomiting and no other GI or  symptoms. Interval history / Subjective:     No acute complaints at the time of my exam.  Patient seen and examined earlier this morning by me. No acute complaints.   She had no acute events overnight. 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  Pt was previously unaware of this mass, has not been to a dentist recently        CTA finding: Partially visualized small acute PE in the distal right main pulmonary artery  Increased Eliquis to VTE/PE dosing for now  Appreciate heme/onc assistance and recs  Not a failure of Eliquis since pt admits to being off Eliquis for at least a week PTA to \"stretch it out\", no financial barrier, states that she did not eat much, so did not take meds  Explained the importance of compliance with atrial fib and PE  Will need to switch to heparin drip if ENT requires any surgical intervention or biopsy    Multinodular thyroid gland  TSH wnl  F/u OP    Chronic a fib: continue amiodarone, Eliquis    Chronic leg lymphedema/skin changes: wound care    HTN  Cont norvasc  IV PRNs  Monitor    [facility]. Patient is medically stable for discharge once bed is available. At this time I am holding the patient's Eliquis as she has an outpatient CT-guided biopsy of the neck scheduled for Monday. Per my conversation with the radiologist yesterday, patient needs to be off of blood thinners for 48 hours. Code status: Full Code  Prophylaxis: Eliquis high dose  Care Plan discussed with: Patient/Family  Anticipated Disposition: SNF   Anticipated Discharge: Greater than 48 hours pending ENT and heme/onc workup     Hospital Problems  Date Reviewed: 5/20/2022          Codes Class Noted POA    CVA (cerebral vascular accident) [facility]) ICD-10-CM: I63.9  ICD-9-CM: 434.91  6/17/2022 Unknown              Review of Systems:   Pertinent items are noted in HPI    Vital Signs:    Last 24hrs VS reviewed since prior progress note.  Most recent are:  Visit Vitals  /63 (BP 1 Location: Left upper arm, BP Patient Position: At rest)   Pulse 73   Temp 98.3 °F (36.8 °C)   Resp 18   Ht 5' 7\" (1.702 m)   Wt 68.9 kg (151 lb 14.4 oz)   SpO2 99%   BMI 23.79 kg/m²         Intake/Output Summary (Last 24 hours) at 6/25/2022 1810  Last data filed at 6/25/2022 1266  Gross per 24 hour   Intake 120 ml   Output --   Net 120 ml        Physical Examination:   I had a face to face encounter with this patient and independently examined them on 6/25/2022 as outlined below:    General: Alert, cooperative, no acute distress    Resp:  No resp distress, No accessory muscle use  CV:  Irregular rhythm, normal rate  GI:  Soft, Non distended, Non tender  Neurologic:  Alert and oriented, normal speech, PATRICIO  Extremities: Chronic lymphedema LLE>RLE, no cyanosis  Psych:   Not anxious or agitated  Skin:  Lymphedema b/l, chronic skin changes, no acute cellulitis, No jaundice       Data Review:   I personally reviewed: vitals, labs, imaging results, notes    Labs:     Recent Labs     06/24/22  0346   WBC 5.0   HGB 11.3*   HCT 34.0*        Recent Labs     06/24/22  0346      K 3.8   *   CO2 27   BUN 22*   CREA 1.10*   *   CA 8.4*     No results for input(s): ALT, AP, TBIL, TBILI, TP, ALB, GLOB, GGT, AML, LPSE in the last 72 hours. No lab exists for component: SGOT, GPT, AMYP, HLPSE  No results for input(s): INR, PTP, APTT, INREXT, INREXT in the last 72 hours. No results for input(s): FE, TIBC, PSAT, FERR in the last 72 hours. Lab Results   Component Value Date/Time    Folate 6.9 01/12/2022 02:21 AM      No results for input(s): PH, PCO2, PO2 in the last 72 hours. No results for input(s): CPK, CKNDX, TROIQ in the last 72 hours.     No lab exists for component: CPKMB  Lab Results   Component Value Date/Time    Cholesterol, total 164 06/18/2022 02:20 AM    HDL Cholesterol 54 06/18/2022 02:20 AM    LDL, calculated 96 06/18/2022 02:20 AM    Triglyceride 70 06/18/2022 02:20 AM    CHOL/HDL Ratio 3.0 06/18/2022 02:20 AM     No results found for: [facility]  Lab Results   Component Value Date/Time    Color YELLOW/STRAW 06/17/2022 02:16 PM    Appearance CLEAR 06/17/2022 02:16 PM    Specific gravity 1.020 06/17/2022 02:16 PM    Specific gravity 1.019 01/11/2022 04:24 PM    pH (UA) 5.5 06/17/2022 02:16 PM    Protein 30 (A) 06/17/2022 02:16 PM    Glucose Negative 06/17/2022 02:16 PM    Ketone 15 (A) 06/17/2022 02:16 PM    Bilirubin Negative 06/17/2022 02:16 PM    Urobilinogen 0.2 06/17/2022 02:16 PM    Nitrites Negative 06/17/2022 02:16 PM    Leukocyte Esterase Negative 06/17/2022 02:16 PM    Epithelial cells FEW 06/17/2022 02:16 PM    Bacteria Negative 06/17/2022 02:16 PM    WBC 0-4 06/17/2022 02:16 PM    RBC 0-5 06/17/2022 02:16 PM       Medications Reviewed:     Current Facility-Administered Medications   Medication Dose Route Frequency    [Held by provider] apixaban (ELIQUIS) tablet 5 mg  5 mg Oral Q12H    hydrALAZINE (APRESOLINE) 20 mg/mL injection 10 mg  10 mg IntraVENous Q4H PRN    labetaloL (NORMODYNE;TRANDATE) injection 10 mg  10 mg IntraVENous Q4H PRN    acetaminophen (TYLENOL) tablet 650 mg  650 mg Oral Q4H PRN    Or    acetaminophen (TYLENOL) solution 650 mg  650 mg Per NG tube Q4H PRN    Or    acetaminophen (TYLENOL) suppository 650 mg  650 mg Rectal Q4H PRN    amiodarone (CORDARONE) tablet 200 mg  200 mg Oral DAILY    amLODIPine (NORVASC) tablet 5 mg  5 mg Oral DAILY    cholecalciferol (VITAMIN D3) (1000 Units /25 mcg) tablet 1,000 Units  1,000 Units Oral DAILY    cyanocobalamin (VITAMIN B12) tablet 1,000 mcg  1,000 mcg Oral DAILY    traMADoL (ULTRAM) tablet 50 mg  50 mg Oral Q6H PRN    megestroL (MEGACE) 400 mg/10 mL (10 mL) oral suspension 20 mg  20 mg Oral DAILY    balsam peru-castor oiL (VENELEX) ointment   Topical BID    ammonium lactate (LAC-HYDRIN) 12 % lotion   Topical BID ______________________________________________________________________  EXPECTED LENGTH OF STAY: 5d 2h  ACTUAL LENGTH OF STAY:          [address] [place], [place]

## 2022-06-25 NOTE — PROGRESS NOTES
Bedside shift change report given to Gerson Mclain, [address] [place] [address] (oncoming nurse) by Haresh Garcia LPN (offgoing nurse). Report included the following information SBAR and MAR.

## 2022-06-25 NOTE — PROGRESS NOTES
Transition of Care Plan   RUR- Low 12%    DISPOSITION: The disposition plan is SNF  ? SNF Accepted: Awaiting Bed availability   Laverda Bumpers; Admissions Liaison: Mateus Wong [phone]   § Per conversation with Mateus Wong; she reported that she would check to see if they have a bed available for 6/25.   ? [place];   ? Pre AUTH-Approved  ? Plan DZXZ#[id number]/ Caro Prazeres [facility]#[id number]/ Start date:6/24-6/28/Reviewer: Brittany Webb   ? Per conversation with Faisal Foley w/RAD [address]; The pt's Biopsy will be completed, Monday @11am arrival Time 10:30am; [facility] § The pt will require transport to and from her Biopsy on Monday; this cm will arrange transport with Medicaid, once Irena Herrera is received.        F/U with PCP/Specialist     Transport: JT/JULIO       CM: Gerardo JIMENES,   [phone]

## 2022-06-26 PROCEDURE — 74011250637 HC RX REV CODE- 250/637: Performed by: HOSPITALIST

## 2022-06-26 PROCEDURE — 65270000029 HC RM PRIVATE

## 2022-06-26 RX ADMIN — Medication: at 08:56

## 2022-06-26 RX ADMIN — AMLODIPINE BESYLATE 5 MG: 5 TABLET ORAL at 08:52

## 2022-06-26 RX ADMIN — Medication 1000 UNITS: at 08:50

## 2022-06-26 RX ADMIN — Medication: at 17:35

## 2022-06-26 RX ADMIN — MEGESTROL ACETATE 20 MG: 40 SUSPENSION ORAL at 08:52

## 2022-06-26 RX ADMIN — Medication: at 08:59

## 2022-06-26 RX ADMIN — AMIODARONE HYDROCHLORIDE 200 MG: 200 TABLET ORAL at 08:59

## 2022-06-26 RX ADMIN — Medication: at 17:36

## 2022-06-26 RX ADMIN — CYANOCOBALAMIN TAB 500 MCG 1000 MCG: 500 TAB at 08:50

## 2022-06-26 NOTE — PROGRESS NOTES
[facility] Adult  Hospitalist Group                                                                                          Hospitalist Progress Note  Kym Preciado MD  Answering service: [phone] from in house phone        Date of Service:  2022  NAME:  Tamie Montoya  :  1950  MRN:  [id number]      Admission Summary:   Nick Rogel a 67 y. o. female who presents to [facility] [address] with complaints of increasing swelling and weakness on her left side since yesterday evening. More weaker in her left leg.  History is rather limited though patient says she was in her usual state of health when she slipped yesterday noted having more trouble getting up.  This morning says she really can get up and using her left leg where she notes more prominent swelling.  She then came into the ER as a stroke alert CT CTA were obtained without any significant findings other than concern for malignancy as well as a possible pulmonary embolism.  Neurology consultation was then requested. Tosin Rainey is on Eliquis for A. fib says she is compliant with her meds.  Does not really endorse much weakness.  Seems indicate that it similar to when she was in the hospital in January with weakness for 2 weeks with a negative MRI.  Presumably went back to normal in the interval.    Normally she can get up and walk around but was unable to do so this morning because of this weakness.  She states that she still weak.  There is no headache, no history of any fever or chill, no nausea or vomiting and no other acute symptomatology. Has chronically swollen lower extremities.  She states her skin is gotten extremely dry recently. Kristy Kinney has no shortness of breath, nausea or vomiting and no other GI or  symptoms. Interval history / Subjective:     No acute complaints at the time of my exam.  Patient seen and examined earlier this morning by me. No acute complaints.   Patient reports feeling well. No acute complaints. 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  Pt was previously unaware of this mass, has not been to a dentist recently        CTA finding: Partially visualized small acute PE in the distal right main pulmonary artery  Increased Eliquis to VTE/PE dosing for now  Appreciate heme/onc assistance and recs  Not a failure of Eliquis since pt admits to being off Eliquis for at least a week PTA to \"stretch it out\", no financial barrier, states that she did not eat much, so did not take meds  Explained the importance of compliance with atrial fib and PE  Will need to switch to heparin drip if ENT requires any surgical intervention or biopsy    Multinodular thyroid gland  TSH wnl  F/u OP    Chronic a fib: continue amiodarone, Eliquis    Chronic leg lymphedema/skin changes: wound care    HTN  Cont norvasc  IV PRNs  Monitor    Patient is pending a bed at [facility]. Patient is medically stable for discharge once bed is available. Looks like we will have to send her to her biopsy from [facility]. Code status: Full Code  Prophylaxis: Eliquis high dose  Care Plan discussed with: Patient/Family  Anticipated Disposition: SNF   Anticipated Discharge: Greater than 48 hours pending ENT and heme/onc workup     Hospital Problems  Date Reviewed: 5/20/2022          Codes Class Noted POA    CVA (cerebral vascular accident) [facility]) ICD-10-CM: I63.9  ICD-9-CM: 434.91  6/17/2022 Unknown              Review of Systems:   Pertinent items are noted in HPI    Vital Signs:    Last 24hrs VS reviewed since prior progress note.  Most recent are:  Visit Vitals  /66 (BP 1 Location: Right upper arm, BP Patient Position: At rest;Lying)   Pulse 68   Temp 98.7 °F (37.1 °C)   Resp 18    5' 7\" (1.702 m)   Wt 68.9 kg (151 lb 14.4 oz)   SpO2 99%   BMI 23.79 kg/m²         Intake/Output Summary (Last 24 hours) at 6/26/2022 1901  Last data filed at 6/26/2022 1758  Gross per 24 hour   Intake --   Output 850 ml   Net -850 ml        Physical Examination:   I had a face to face encounter with this patient and independently examined them on 6/26/2022 as outlined below:    General: Alert, cooperative, no acute distress    Resp:  No resp distress, No accessory muscle use  CV:  Irregular rhythm, normal rate  GI:  Soft, Non distended, Non tender  Neurologic:  Alert and oriented, normal speech, PATRICIO  Extremities: Chronic lymphedema LLE>RLE, no cyanosis  Psych:   Not anxious or agitated  Skin:  Lymphedema b/l, chronic skin changes, no acute cellulitis, No jaundice       Data Review:   I personally reviewed: vitals, labs, imaging results, notes    Labs:     Recent Labs     06/24/22  0346   WBC 5.0   HGB 11.3*   HCT 34.0*        Recent Labs     06/24/22  0346      K 3.8   *   CO2 27   BUN 22*   CREA 1.10*   *   CA 8.4*     No results for input(s): ALT, AP, TBIL, TBILI, TP, ALB, GLOB, GGT, AML, LPSE in the last 72 hours. No lab exists for component: SGOT, GPT, AMYP, HLPSE  No results for input(s): INR, PTP, APTT, INREXT, INREXT in the last 72 hours. No results for input(s): FE, TIBC, PSAT, FERR in the last 72 hours. Lab Results   Component Value Date/Time    Folate 6.9 01/12/2022 02:21 AM      No results for input(s): PH, PCO2, PO2 in the last 72 hours. No results for input(s): CPK, CKNDX, TROIQ in the last 72 hours.     No lab exists for component: CPKMB  Lab Results   Component Value Date/Time    Cholesterol, total 164 06/18/2022 02:20 AM    HDL Cholesterol 54 06/18/2022 02:20 AM    LDL, calculated 96 06/18/2022 02:20 AM    Triglyceride 70 06/18/2022 02:20 AM    CHOL/HDL Ratio 3.0 06/18/2022 02:20 AM     No results found for: [facility]  Lab Results   Component Value Date/Time Color YELLOW/STRAW 06/17/2022 02:16 PM    Appearance CLEAR 06/17/2022 02:16 PM    Specific gravity 1.020 06/17/2022 02:16 PM    Specific gravity 1.019 01/11/2022 04:24 PM    pH (UA) 5.5 06/17/2022 02:16 PM    Protein 30 (A) 06/17/2022 02:16 PM    Glucose Negative 06/17/2022 02:16 PM    Ketone 15 (A) 06/17/2022 02:16 PM    Bilirubin Negative 06/17/2022 02:16 PM    Urobilinogen 0.2 06/17/2022 02:16 PM    Nitrites Negative 06/17/2022 02:16 PM    Leukocyte Esterase Negative 06/17/2022 02:16 PM    Epithelial cells FEW 06/17/2022 02:16 PM    Bacteria Negative 06/17/2022 02:16 PM    WBC 0-4 06/17/2022 02:16 PM    RBC 0-5 06/17/2022 02:16 PM       Medications Reviewed:     Current Facility-Administered Medications   Medication Dose Route Frequency    [Held by provider] apixaban (ELIQUIS) tablet 5 mg  5 mg Oral Q12H    hydrALAZINE (APRESOLINE) 20 mg/mL injection 10 mg  10 mg IntraVENous Q4H PRN    labetaloL (NORMODYNE;TRANDATE) injection 10 mg  10 mg IntraVENous Q4H PRN    acetaminophen (TYLENOL) tablet 650 mg  650 mg Oral Q4H PRN    Or    acetaminophen (TYLENOL) solution 650 mg  650 mg Per NG tube Q4H PRN    Or    acetaminophen (TYLENOL) suppository 650 mg  650 mg Rectal Q4H PRN    amiodarone (CORDARONE) tablet 200 mg  200 mg Oral DAILY    amLODIPine (NORVASC) tablet 5 mg  5 mg Oral DAILY    cholecalciferol (VITAMIN D3) (1000 Units /25 mcg) tablet 1,000 Units  1,000 Units Oral DAILY    cyanocobalamin (VITAMIN B12) tablet 1,000 mcg  1,000 mcg Oral DAILY    traMADoL (ULTRAM) tablet 50 mg  50 mg Oral Q6H PRN    megestroL (MEGACE) 400 mg/10 mL (10 mL) oral suspension 20 mg  20 mg Oral DAILY    balsam peru-castor oiL (VENELEX) ointment   Topical BID    ammonium lactate (LAC-HYDRIN) 12 % lotion   Topical BID     ______________________________________________________________________  EXPECTED LENGTH OF STAY: 5d 2h  ACTUAL LENGTH OF STAY:          9                 Kym Preciado MD

## 2022-06-26 NOTE — PROGRESS NOTES
Problem: Pressure Injury - Risk of  Goal: *Prevention of pressure injury  Description: Document Bin Scale and appropriate interventions in the flowsheet.   Outcome: Progressing Towards Goal  Note: Pressure Injury Interventions:  Sensory Interventions: Assess changes in LOC    Moisture Interventions: Absorbent underpads    Activity Interventions: Increase time out of bed    Mobility Interventions: HOB 30 degrees or less    Nutrition Interventions: Document food/fluid/supplement intake    Friction and Shear Interventions: HOB 30 degrees or less                Problem: Patient Education: Go to Patient Education Activity  Goal: Patient/Family Education  Outcome: Progressing Towards Goal

## 2022-06-26 NOTE — PROGRESS NOTES
[address] [facility] Adult  Hospitalist Group                                                                                          Hospitalist Progress Note  Kelly Stanley MD  Answering service: [phone] from in house phone        Date of Service:  2022  NAME:  Kt Wu  :  1950  MRN:  [id number]      Admission Summary:   Joe Maxwell a 67 y. o. female who presents to [facility] with complaints of increasing swelling and weakness on her left side since yesterday evening. More weaker in her left leg.  History is rather limited though patient says she was in her usual state of health when she slipped yesterday noted having more trouble getting up.  This morning says she really can get up and using her left leg where she notes more prominent swelling.  She then came into the ER as a stroke alert CT CTA were obtained without any significant findings other than concern for malignancy as well as a possible pulmonary embolism.  Neurology consultation was then requested. Juli Locke is on Eliquis for A. fib says she is compliant with her meds.  Does not really endorse much weakness.  Seems indicate that it similar to when she was in the hospital in January with weakness for 2 weeks with a negative MRI.  Presumably went back to normal in the interval.    Normally she can get up and walk around but was unable to do so this morning because of this weakness.  She states that she still weak.  There is no headache, no history of any fever or chill, no nausea or vomiting and no other acute symptomatology. Has chronically swollen lower extremities.  She states her skin is gotten extremely dry recently. Abe Lu has no shortness of breath, nausea or vomiting and no other GI or  symptoms. Interval history / Subjective:     No acute complaints at the time of my exam.  Patient seen and examined earlier this morning by me. No acute complaints.   Patient reports feeling well. No acute complaints. 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  Pt was previously unaware of this mass, has not been to a dentist recently        CTA finding: Partially visualized small acute PE in the distal right main pulmonary artery  Increased Eliquis to VTE/PE dosing for now  Appreciate heme/onc assistance and recs  Not a failure of Eliquis since pt admits to being off Eliquis for at least a week PTA to \"stretch it out\", no financial barrier, states that she did not eat much, so did not take meds  Explained the importance of compliance with atrial fib and PE  Will need to switch to heparin drip if ENT requires any surgical intervention or biopsy    Multinodular thyroid gland  TSH wnl  F/u OP    Chronic a fib: continue amiodarone, Eliquis    Chronic leg lymphedema/skin changes: wound care    HTN  Cont norvasc  IV PRNs  Monitor    Patient is pending a bed at [facility]. Patient is medically stable for discharge once bed is available. Looks like we will have to send her to her biopsy from [facility]. Code status: Full Code  Prophylaxis: Eliquis high dose  Care Plan discussed with: Patient/Family  Anticipated Disposition: SNF   Anticipated Discharge: Greater than 48 hours pending ENT and heme/onc workup     Hospital Problems  Date Reviewed: 5/20/2022          Codes Class Noted POA    CVA (cerebral vascular accident) [facility]) ICD-10-CM: I63.9  ICD-9-CM: 434.91  6/17/2022 Unknown              Review of Systems:   Pertinent items are noted in HPI    Vital Signs:    Last 24hrs VS reviewed since prior progress note.  Most recent are:  Visit Vitals  /66 (BP 1 Location: Right upper arm, BP Patient Position: At rest;Lying)   Pulse 68   Temp 98.7 °F (37.1 °C)   Resp 18    5' 7\" (1.702 m)   Wt 68.9 kg (151 lb 14.4 oz)   SpO2 99%   BMI 23.79 kg/m²         Intake/Output Summary (Last 24 hours) at 6/26/2022 1901  Last data filed at 6/26/2022 1758  Gross per 24 hour   Intake --   Output 850 ml   Net -850 ml        Physical Examination:   I had a face to face encounter with this patient and independently examined them on 6/26/2022 as outlined below:    General: Alert, cooperative, no acute distress    Resp:  No resp distress, No accessory muscle use  CV:  Irregular rhythm, normal rate  GI:  Soft, Non distended, Non tender  Neurologic:  Alert and oriented, normal speech, PATRICIO  Extremities: Chronic lymphedema LLE>RLE, no cyanosis  Psych:   Not anxious or agitated  Skin:  Lymphedema b/l, chronic skin changes, no acute cellulitis, No jaundice       Data Review:   I personally reviewed: vitals, labs, imaging results, notes    Labs:     Recent Labs     06/24/22  0346   WBC 5.0   HGB 11.3*   HCT 34.0*        Recent Labs     06/24/22  0346      K 3.8   *   CO2 27   BUN 22*   CREA 1.10*   *   CA 8.4*     No results for input(s): ALT, AP, TBIL, TBILI, TP, ALB, GLOB, GGT, AML, LPSE in the last 72 hours. No lab exists for component: SGOT, GPT, AMYP, HLPSE  No results for input(s): INR, PTP, APTT, INREXT, INREXT in the last 72 hours. No results for input(s): FE, TIBC, PSAT, FERR in the last 72 hours. Lab Results   Component Value Date/Time    Folate 6.9 01/12/2022 02:21 AM      No results for input(s): PH, PCO2, PO2 in the last 72 hours. No results for input(s): CPK, CKNDX, TROIQ in the last 72 hours.     No lab exists for component: CPKMB  Lab Results   Component Value Date/Time    Cholesterol, total 164 06/18/2022 02:20 AM    HDL Cholesterol 54 06/18/2022 02:20 AM    LDL, calculated 96 06/18/2022 02:20 AM    Triglyceride 70 06/18/2022 02:20 AM    CHOL/HDL Ratio 3.0 06/18/2022 02:20 AM     No results found for: [facility]  Lab Results   Component Value Date/Time Color YELLOW/STRAW 06/17/2022 02:16 PM    Appearance CLEAR 06/17/2022 02:16 PM    Specific gravity 1.020 06/17/2022 02:16 PM    Specific gravity 1.019 01/11/2022 04:24 PM    pH (UA) 5.5 06/17/2022 02:16 PM    Protein 30 (A) 06/17/2022 02:16 PM    Glucose Negative 06/17/2022 02:16 PM    Ketone 15 (A) 06/17/2022 02:16 PM    Bilirubin Negative 06/17/2022 02:16 PM    Urobilinogen 0.2 06/17/2022 02:16 PM    Nitrites Negative 06/17/2022 02:16 PM    Leukocyte Esterase Negative 06/17/2022 02:16 PM    Epithelial cells FEW 06/17/2022 02:16 PM    Bacteria Negative 06/17/2022 02:16 PM    WBC 0-4 06/17/2022 02:16 PM    RBC 0-5 06/17/2022 02:16 PM       Medications Reviewed:     Current Facility-Administered Medications   Medication Dose Route Frequency    [Held by provider] apixaban (ELIQUIS) tablet 5 mg  5 mg Oral Q12H    hydrALAZINE (APRESOLINE) 20 mg/mL injection 10 mg  10 mg IntraVENous Q4H PRN    labetaloL (NORMODYNE;TRANDATE) injection 10 mg  10 mg IntraVENous Q4H PRN    acetaminophen (TYLENOL) tablet 650 mg  650 mg Oral Q4H PRN    Or    acetaminophen (TYLENOL) solution 650 mg  650 mg Per NG tube Q4H PRN    Or    acetaminophen (TYLENOL) suppository 650 mg  650 mg Rectal Q4H PRN    amiodarone (CORDARONE) tablet 200 mg  200 mg Oral DAILY    amLODIPine (NORVASC) tablet 5 mg  5 mg Oral DAILY    cholecalciferol (VITAMIN D3) (1000 Units /25 mcg) tablet 1,000 Units  1,000 Units Oral DAILY    cyanocobalamin (VITAMIN B12) tablet 1,000 mcg  1,000 mcg Oral DAILY    traMADoL (ULTRAM) tablet 50 mg  50 mg Oral Q6H PRN    megestroL (MEGACE) 400 mg/10 mL (10 mL) oral suspension 20 mg  20 mg Oral DAILY    balsam peru-castor oiL (VENELEX) ointment   Topical BID    ammonium lactate (LAC-HYDRIN) 12 % lotion   Topical BID     ______________________________________________________________________  EXPECTED LENGTH OF STAY: 5d 2h  ACTUAL LENGTH OF STAY:          9                 Santa Liz MD

## 2022-06-26 NOTE — PROGRESS NOTES
Problem: Pressure Injury - Risk of  Goal: *Prevention of pressure injury  Description: Document Bin Scale and appropriate interventions in the flowsheet.   Outcome: Progressing Towards Goal  Note: Pressure Injury Interventions:  Sensory Interventions: Assess changes in LOC    Moisture Interventions: Absorbent underpads    Activity Interventions: Increase time out of bed    Mobility Interventions: HOB 30 degrees or less    Nutrition Interventions: Document food/fluid/supplement intake    Friction and Shear Interventions: Apply protective barrier, creams and emollients,HOB 30 degrees or less                Problem: Patient Education: Go to Patient Education Activity  Goal: Patient/Family Education  Outcome: Progressing Towards Goal     Problem: Patient Education: Go to Patient Education Activity  Goal: Patient/Family Education  Outcome: Progressing Towards Goal     Problem: TIA/CVA Stroke: 0-24 hours  Goal: Nutrition/Diet  Outcome: Progressing Towards Goal

## 2022-06-27 ENCOUNTER — HOSPITAL ENCOUNTER (OUTPATIENT)
Dept: ULTRASOUND IMAGING | Age: 72
Discharge: HOME | End: 2022-06-27
Attending: INTERNAL MEDICINE

## 2022-06-27 ENCOUNTER — DOCUMENTATION ONLY (OUTPATIENT)
Dept: ONCOLOGY | Age: 72
End: 2022-06-27

## 2022-06-27 ENCOUNTER — APPOINTMENT (OUTPATIENT)
Dept: ULTRASOUND IMAGING | Age: 72
DRG: 143 | End: 2022-06-27
Attending: INTERNAL MEDICINE
Payer: MEDICARE

## 2022-06-27 VITALS
HEIGHT: 67 IN | TEMPERATURE: 99.5 F | WEIGHT: 151.9 LBS | SYSTOLIC BLOOD PRESSURE: 159 MMHG | DIASTOLIC BLOOD PRESSURE: 72 MMHG | BODY MASS INDEX: 23.84 KG/M2 | HEART RATE: 69 BPM | RESPIRATION RATE: 18 BRPM | OXYGEN SATURATION: 98 %

## 2022-06-27 PROBLEM — I63.9 CVA (CEREBRAL VASCULAR ACCIDENT) (HCC): Status: RESOLVED | Noted: 2022-06-17 | Resolved: 2022-06-27

## 2022-06-27 LAB
BASOPHILS # BLD: 0 K/UL (ref 0–0.1)
BASOPHILS NFR BLD: 1 % (ref 0–1)
DIFFERENTIAL METHOD BLD: ABNORMAL
EOSINOPHIL # BLD: 0.2 K/UL (ref 0–0.4)
EOSINOPHIL NFR BLD: 5 % (ref 0–7)
ERYTHROCYTE [DISTWIDTH] IN BLOOD BY AUTOMATED COUNT: 15 % (ref 11.5–14.5)
HCT VFR BLD AUTO: 37 % (ref 35–47)
HGB BLD-MCNC: 12.1 G/DL (ref 11.5–16)
IMM GRANULOCYTES # BLD AUTO: 0 K/UL (ref 0–0.04)
IMM GRANULOCYTES NFR BLD AUTO: 0 % (ref 0–0.5)
LYMPHOCYTES # BLD: 1.5 K/UL (ref 0.8–3.5)
LYMPHOCYTES NFR BLD: 29 % (ref 12–49)
MCH RBC QN AUTO: 28 PG (ref 26–34)
MCHC RBC AUTO-ENTMCNC: 32.7 G/DL (ref 30–36.5)
MCV RBC AUTO: 85.6 FL (ref 80–99)
MONOCYTES # BLD: 0.4 K/UL (ref 0–1)
MONOCYTES NFR BLD: 8 % (ref 5–13)
NEUTS SEG # BLD: 3.1 K/UL (ref 1.8–8)
NEUTS SEG NFR BLD: 57 % (ref 32–75)
NRBC # BLD: 0 K/UL (ref 0–0.01)
NRBC BLD-RTO: 0 PER 100 WBC
PLATELET # BLD AUTO: 214 K/UL (ref 150–400)
PMV BLD AUTO: 9.2 FL (ref 8.9–12.9)
RBC # BLD AUTO: 4.32 M/UL (ref 3.8–5.2)
WBC # BLD AUTO: 5.4 K/UL (ref 3.6–11)

## 2022-06-27 PROCEDURE — 07B23ZX EXCISION OF LEFT NECK LYMPHATIC, PERCUTANEOUS APPROACH, DIAGNOSTIC: ICD-10-PCS | Performed by: STUDENT IN AN ORGANIZED HEALTH CARE EDUCATION/TRAINING PROGRAM

## 2022-06-27 PROCEDURE — 88305 TISSUE EXAM BY PATHOLOGIST: CPT

## 2022-06-27 PROCEDURE — 88342 IMHCHEM/IMCYTCHM 1ST ANTB: CPT

## 2022-06-27 PROCEDURE — 74011250637 HC RX REV CODE- 250/637: Performed by: HOSPITALIST

## 2022-06-27 PROCEDURE — 77030038269 HC DRN EXT URIN PURWCK BARD -A

## 2022-06-27 PROCEDURE — 99223 1ST HOSP IP/OBS HIGH 75: CPT | Performed by: INTERNAL MEDICINE

## 2022-06-27 PROCEDURE — 76942 ECHO GUIDE FOR BIOPSY: CPT

## 2022-06-27 PROCEDURE — 07D23ZX EXTRACTION OF LEFT NECK LYMPHATIC, PERCUTANEOUS APPROACH, DIAGNOSTIC: ICD-10-PCS | Performed by: STUDENT IN AN ORGANIZED HEALTH CARE EDUCATION/TRAINING PROGRAM

## 2022-06-27 PROCEDURE — 85025 COMPLETE CBC W/AUTO DIFF WBC: CPT

## 2022-06-27 PROCEDURE — 77030014115

## 2022-06-27 PROCEDURE — 88173 CYTOPATH EVAL FNA REPORT: CPT

## 2022-06-27 PROCEDURE — 36415 COLL VENOUS BLD VENIPUNCTURE: CPT

## 2022-06-27 PROCEDURE — 88172 CYTP DX EVAL FNA 1ST EA SITE: CPT

## 2022-06-27 RX ORDER — LIDOCAINE HYDROCHLORIDE 10 MG/ML
5 INJECTION, SOLUTION EPIDURAL; INFILTRATION; INTRACAUDAL; PERINEURAL
Status: DISCONTINUED | OUTPATIENT
Start: 2022-06-27 | End: 2022-06-27 | Stop reason: HOSPADM

## 2022-06-27 RX ORDER — SODIUM BICARBONATE 42 MG/ML
1 INJECTION, SOLUTION INTRAVENOUS
Status: DISCONTINUED | OUTPATIENT
Start: 2022-06-27 | End: 2022-06-27 | Stop reason: HOSPADM

## 2022-06-27 RX ORDER — LIDOCAINE HYDROCHLORIDE 10 MG/ML
10 INJECTION, SOLUTION EPIDURAL; INFILTRATION; INTRACAUDAL; PERINEURAL
Status: DISCONTINUED | OUTPATIENT
Start: 2022-06-27 | End: 2022-06-27 | Stop reason: HOSPADM

## 2022-06-27 RX ORDER — SODIUM BICARBONATE 42 MG/ML
2 INJECTION, SOLUTION INTRAVENOUS
Status: DISCONTINUED | OUTPATIENT
Start: 2022-06-27 | End: 2022-06-27 | Stop reason: HOSPADM

## 2022-06-27 RX ADMIN — AMLODIPINE BESYLATE 5 MG: 5 TABLET ORAL at 11:24

## 2022-06-27 RX ADMIN — MEGESTROL ACETATE 20 MG: 40 SUSPENSION ORAL at 11:22

## 2022-06-27 RX ADMIN — Medication 1000 UNITS: at 11:23

## 2022-06-27 RX ADMIN — AMIODARONE HYDROCHLORIDE 200 MG: 200 TABLET ORAL at 11:23

## 2022-06-27 RX ADMIN — Medication: at 11:24

## 2022-06-27 RX ADMIN — CYANOCOBALAMIN TAB 500 MCG 1000 MCG: 500 TAB at 11:24

## 2022-06-27 NOTE — PROGRESS NOTES
Transition of Care Plan   RUR- Low 12%    DISPOSITION: The disposition plan is SNF  ? SNF Accepted:   Karen Canales; CALL REPORT: [phone]/[id number]  ? Meghann Bollinger Plan [facility]#[id number]/ [facility]#[id number]/ Start: 6/24 end date: 6/28  Reviewer: Malka Loja    F/U with PCP/Specialist     Transport: [facility] 2:00pm   Pending procedures: Biopsy to be completed today; potential discharge following      Transition of Care Plan to SNF/Rehab    SNF/Rehab Transition:  Patient has been accepted to [facility] and meets criteria for admission. Patient will transported by [facility] and expected to leave at 2:00pm.    Communication to Patient/Family:  Met with patient and they are agreeable to the transition plan. Communication to SNF/Rehab:  Bedside RN, [facility], has been notified to update the transition plan to the facility and call report (phone number [phone]). Discharge information has been updated on the AVS.      Nursing Please include all hard scripts for controlled substances, med rec and dc summary, and AVS in packet. Reviewed and confirmed with facility,, can manage the patient care needs for the following:     SNF/Rehab Transition:  Patient to follow-up with Home Health:  [facility], other ,none)  PCP/Specialist:    Reviewed and confirmed with facility, they can manage the patient care needs for the following:     Yoseph Webb with (X) only those applicable:    Medication:  [x]  Medications will be available at the facility  []  IV Antibiotics   []  Controlled Substance - hard copy to be sent with patient   []  Weekly Labs   Documents:  [x] Hard RX  [x] MAR  [x] Kardex  [x] AVS  [x]Transfer Summary  [x]Discharge   Equipment:  []  CPAP/BiPAP  []  Wound Vacuum  []  Luo or Urinary Device  []  PICC/Central Line  []  Nebulizer  []  Ventilator   Treatment:  []Isolation (for MRSA, VRE, etc.)  []Surgical Drain Management  []Tracheostomy Care  []Dressing Changes  []Dialysis with transportation and chair time. []PEG Care  []Oxygen  []Daily Weights for Heart Failure   Dietary:  []Any diet limitations  []Tube Feedings   []Total Parenteral Management (TPN)   Eligible for Medicaid Long Term Services and Supports  Yes:  [] Eligible for medical assistance or will become eligible within 180 days and UAI completed. [] Provider/Patient and/or support system has requested screening. [] UAI copy provided to patient or responsible party,.  [] UAI unavailable at discharge will send once processed to SNF provider. [] UAI unavailable at discharged mailed to patient  No:   [x] Private pay and is not financially eligible for Medicaid within the next 180 days. [] Reside out-of-state. [] A residents of a state owned/operated facility that is licensed  by [address] [employer] File [facility] or [facility]  [] Enrollment in [facility] hospice services  []  [address]  [] Patient /Family declines to have screening completed or provide financial information for screening     Financial Resources:  Medicaid    [] Initiated and application pending   [] Full coverage     Advanced Care Plan:  []Surrogate Decision Maker of Care  []POA  [x]Communicated Code Status Full\")    Other     Medicare pt has received, reviewed, and signed 2nd IM letter informing them of their right to appeal the discharge. Signed copy has been placed on pt bedside chart.     CM: [address] JALIL,   [phone]

## 2022-06-27 NOTE — PROGRESS NOTES
Problem: Pressure Injury - Risk of  Goal: *Prevention of pressure injury  Description: Document Bin Scale and appropriate interventions in the flowsheet.   Outcome: Progressing Towards Goal  Note: Pressure Injury Interventions:  Sensory Interventions: Keep linens dry and wrinkle-free    Moisture Interventions: Absorbent underpads    Activity Interventions: PT/OT evaluation    Mobility Interventions: Pressure redistribution bed/mattress (bed type)    Nutrition Interventions: Document food/fluid/supplement intake    Friction and Shear Interventions: Minimize layers                Problem: Patient Education: Go to Patient Education Activity  Goal: Patient/Family Education  Outcome: Progressing Towards Goal

## 2022-06-27 NOTE — PROGRESS NOTES
Pt seen in hospital for presumed new H+N cancer  Tissue dx pending from neck biopsy today  Pt to see ENT then us as outpt once path back  Set up outpt fu in 2 weeks or so

## 2022-06-27 NOTE — DISCHARGE SUMMARY
Discharge Summary       PATIENT ID: Amy Hart  MRN: [id number]   YOB: 1950    DATE OF ADMISSION: 6/17/2022 11:49 AM    DATE OF DISCHARGE: 6/27/22   PRIMARY CARE PROVIDER: Jennie Molina MD     ATTENDING PHYSICIAN: Amina Coulter MD  DISCHARGING PROVIDER: Amina Coulter MD    To contact this individual call [phone] and ask the  to page. If unavailable ask to be transferred the Adult Hospitalist Department. CONSULTATIONS: IP CONSULT TO NEUROLOGY  IP CONSULT TO OTOLARYNGOLOGY  IP CONSULT TO HEMATOLOGY  IP CONSULT TO OTOLARYNGOLOGY  IP CONSULT TO ONCOLOGY    PROCEDURES/SURGERIES: * No surgery found *    ADMITTING DIAGNOSES & HOSPITAL COURSE:   Amy Hart is a 67 y.o. female who presents with left side weakness   Franklin Tanya a 67 y. o. female who presents to [facility] with complaints of increasing swelling and weakness on her left side since yesterday evening.  More weaker in her left leg.  History is rather limited though patient says she was in her usual state of health when she slipped yesterday noted having more trouble getting up.  This morning says she really can get up and using her left leg where she notes more prominent swelling.  She then came into the ER as a stroke alert CT CTA were obtained without any significant findings other than concern for malignancy as well as a possible pulmonary embolism.  Neurology consultation was then requested. Laquita Townsend is on Eliquis for A. fib says she is compliant with her meds.  Does not really endorse much weakness.  Seems indicate that it similar to when she was in the hospital in January with weakness for 2 weeks with a negative MRI.  Presumably went back to normal in the interval.    Normally she can get up and walk around but was unable to do so this morning because of this weakness.  She states that she still weak.  There is no headache, no history of any fever or chill, no nausea or vomiting and no other acute symptomatology. Has chronically swollen lower extremities.  She states her skin is gotten extremely dry recently. Toan Bryan has no shortness of breath, nausea or vomiting and no other GI or  symptoms. 1. Left side weakness: neuro consulted, stroke work up including MRI, echo. A1c, flp. Continue telemonitor  2. chornic a fib: continue amiodarone, eliquis   3. Chronic leg lymphedema/skin changes: wound care , check venous doppler   4. Left  left tonsillar mass: concerning malignancy. Consider ENT evaluation. 5. Acute PE: CTA of neck reported: Partially visualized small acute pulmonary embolus in the distal right main pulmonary artery. Continue eliquis. Follow echo   6. Multinodular thyroid gland: check [facility] course  Patient was seen by neurology who did not think that these were any signs of TIA or CVA. Imaging was negative. CTA of neck did not show an acute pulmonary embolus and they 1.7 x 0.9 cm heterogeneously enhancing lesion in the left tonsillar fossa which was favored to represent primary oropharyngeal malignancy with a apparent lymph node metastasis. Oncology and ENT consultations were recommended. Patient was seen by oncology, but we did not have ENT coverage in the hospital.  Patient had an ultrasound-guided biopsy scheduled and done. Patient was kept on anticoagulation for her pulmonary embolism. Patient was discharged to SNF with follow-up with ENT and oncology for biopsy results. DISCHARGE DIAGNOSES / PLAN:      1. Left-sided weakness. CVA/TIA ruled out. Improved/resolved. 2. Acute PE. Continue anticoagulation with Eliquis. 3. Apparent tonsillar fossa oropharyngeal malignancy. Pending biopsy results. Follow-up outpatient with ENT and oncology. 4. Multinodular thyroid gland. Stable. 5. Chronic A. fib. Continue amiodarone and Eliquis. 6. Chronic lymphedema. Stable. 7. Hypertension. Stable.        PENDING TEST RESULTS:   At the time of discharge the following test results are still pending: Results for biopsy of neck. FOLLOW UP APPOINTMENTS:    Follow-up Information     Follow up With Specialties Details Why Contact Info    [address] [place]    Dorene Lane MD Internal Medicine Physician In 1 week Hospital follow up Sonny  [phone]      May, Susan Nunez MD Otolaryngology  Call to confirm follow up appointment. [address]17 [facility] 1025 [facility], [address] [place] [address],  Hematology and Oncology, Internal Medicine Physician, Hematology Physician, Oncology  Call to confirm follow up.  Donna Hunt [address]  [phone]             ADDITIONAL CARE RECOMMENDATIONS: none    DIET: Regular Diet    ACTIVITY: Activity as tolerated    WOUND CARE: Chronic leg lymphedema, continue wound care. EQUIPMENT needed: none      DISCHARGE MEDICATIONS:  Discharge Medication List as of 6/27/2022 12:53 PM      CONTINUE these medications which have NOT CHANGED    Details   hydroCHLOROthiazide (HYDRODIURIL) 25 mg tablet Take 1 Tablet by mouth daily. , Normal, Disp-90 Tablet, R-3      amLODIPine (NORVASC) 5 mg tablet Take 1 Tablet by mouth daily. , Normal, Disp-90 Tablet, R-3      amiodarone (Pacerone) 200 mg tablet Take 1 Tablet by mouth daily. , Normal, Disp-90 Tablet, R-3      apixaban (Eliquis) 5 mg tablet Take 1 tablet by mouth twice daily, Normal, Disp-180 Tablet, R-3      megestroL (MEGACE) 20 mg tablet Take 1 Tablet by mouth daily. , Normal, Disp-30 Tablet, R-3      ammonium lactate (LAC-HYDRIN) 12 % lotion Historical Med      acetaminophen (Tylenol Arthritis Pain) 650 mg TbER Take 650 mg by mouth nightly., Historical Med      ZINC PO Take  by mouth daily. , Historical Med      cholecalciferol (VITAMIN D3) (1000 Units /25 mcg) tablet Take 1 Tab by mouth daily. , Print, Disp-30 Tab,R-0      cyanocobalamin 1,000 mcg tablet Take 1 Tab by mouth daily. , Print, Disp-30 Tab,R-0         STOP taking these medications       diclofenac EC (VOLTAREN) 75 mg EC tablet Comments:   Reason for Stopping:                 NOTIFY YOUR PHYSICIAN FOR ANY OF THE FOLLOWING:   Fever over 101 degrees for 24 hours. Chest pain, shortness of breath, fever, chills, nausea, vomiting, diarrhea, change in mentation, falling, weakness, bleeding. Severe pain or pain not relieved by medications. Or, any other signs or symptoms that you may have questions about.     DISPOSITION:    Home With:   OT  PT  HH  RN      X Long term SNF/Inpatient Rehab    Independent/assisted living    Hospice    Other:       PATIENT CONDITION AT DISCHARGE:     Functional status    Poor    X Deconditioned     Independent      Cognition    X Lucid     Forgetful     Dementia      Catheters/lines (plus indication)    Luo     PICC     PEG    X None      Code status    X Full code     DNR      PHYSICAL EXAMINATION AT DISCHARGE:  General:          Alert, cooperative, no acute distress    Resp:               No resp distress, No accessory muscle use  CV:                  Irregular rhythm, normal rate  GI:                   Soft, Non distended, Non tender  Neurologic:       Alert and oriented, normal speech, PATRICIO  Extremities:     Chronic lymphedema LLE>RLE, no cyanosis  Psych:   Not anxious or agitated  Skin:                Lymphedema b/l, chronic skin changes, no acute cellulitis, No jaundice    CHRONIC MEDICAL DIAGNOSES:  Problem List as of 6/27/2022 Date Reviewed: 5/20/2022          Codes Class Noted - Resolved    Arthritis ICD-10-CM: M19.90  ICD-9-CM: 716.90  1/13/2022 - Present        Paroxysmal atrial fibrillation (HCC) (Chronic) ICD-10-CM: I48.0  ICD-9-CM: 427.31  11/30/2020 - Present        SVT (supraventricular tachycardia) ([facility] [address] ICD-10-CM: I47.1  ICD-9-CM: 427.89  11/28/2020 - Present        Weakness generalized ICD-10-CM: R53.1  ICD-9-CM: 780.79  11/24/2020 - Present        Rhabdomyolysis ICD-10-CM: M62.82  ICD-9-CM: 728.88  11/24/2020 - Present        Volume depletion ICD-10-CM: E86.9  ICD-9-CM: 276.50  11/24/2020 - Present        DANIELLE (acute kidney injury) ([facility] ICD-10-CM: N17.9  ICD-9-CM: 584.9  11/24/2020 - Present        Hypokalemia ICD-10-CM: E87.6  ICD-9-CM: 276.8  9/6/2020 - Present        Fatigue ICD-10-CM: R53.83  ICD-9-CM: 780.79  9/5/2020 - Present        Fall ICD-10-CM: W19. Ele Toscano  ICD-9-CM: E888.9  9/5/2020 - Present        Elevated troponin ICD-10-CM: R77.8  ICD-9-CM: 790.6  9/5/2020 - Present        Hypertension ICD-10-CM: I10  ICD-9-CM: 401.9  10/27/2011 - Present        RESOLVED: CVA (cerebral vascular accident) ([facility] ICD-10-CM: I63.9  ICD-9-CM: 434.91  6/17/2022 - 6/27/2022        RESOLVED: Acute CVA (cerebrovascular accident) ([facility] ICD-10-CM: I63.9  ICD-9-CM: 434.91  1/11/2022 - 1/12/2022        RESOLVED: Screening cholesterol level ICD-10-CM: Z13.220  ICD-9-CM: V77.91  10/27/2011 - 10/1/2019        RESOLVED: Screening for thyroid disorder ICD-10-CM: Z13.29  ICD-9-CM: V77.0  10/27/2011 - 10/1/2019              Greater than 30 minutes were spent with the patient on counseling and coordination of care    Signed:   Sy Stone MD  6/27/2022  3:23 PM

## 2022-06-27 NOTE — DISCHARGE SUMMARY
Discharge Summary       PATIENT ID: Evelyn Scherer  MRN: [id number]   YOB: 1950    DATE OF ADMISSION: 6/17/2022 11:49 AM    DATE OF DISCHARGE: 6/27/22   PRIMARY CARE PROVIDER: Ally Pickens MD     ATTENDING PHYSICIAN: Gini Cabrera MD  DISCHARGING PROVIDER: Gini Cabrera MD    To contact this individual call [phone] and ask the  to page. If unavailable ask to be transferred the Adult Hospitalist Department. CONSULTATIONS: IP CONSULT TO NEUROLOGY  IP CONSULT TO OTOLARYNGOLOGY  IP CONSULT TO HEMATOLOGY  IP CONSULT TO OTOLARYNGOLOGY  IP CONSULT TO ONCOLOGY    PROCEDURES/SURGERIES: * No surgery found *    ADMITTING DIAGNOSES & HOSPITAL COURSE:   Evelyn Scherer is a 67 y.o. female who presents with left side weakness   Rosalind Wilkins a 67 y. o. female who presents to [facility] with complaints of increasing swelling and weakness on her left side since yesterday evening.  More weaker in her left leg.  History is rather limited though patient says she was in her usual state of health when she slipped yesterday noted having more trouble getting up.  This morning says she really can get up and using her left leg where she notes more prominent swelling.  She then came into the ER as a stroke alert CT CTA were obtained without any significant findings other than concern for malignancy as well as a possible pulmonary embolism.  Neurology consultation was then requested. Abdoulaye Ross is on Eliquis for A. fib says she is compliant with her meds.  Does not really endorse much weakness.  Seems indicate that it similar to when she was in the hospital in January with weakness for 2 weeks with a negative MRI.  Presumably went back to normal in the interval.    Normally she can get up and walk around but was unable to do so this morning because of this weakness.  She states that she still weak.  There is no headache, no history of any fever or chill, no nausea or vomiting and no other acute symptomatology. Has chronically swollen lower extremities.  She states her skin is gotten extremely dry recently. Fartun Navarrete has no shortness of breath, nausea or vomiting and no other GI or  symptoms. 1. Left side weakness: neuro consulted, stroke work up including MRI, echo. A1c, flp. Continue telemonitor  2. chornic a fib: continue amiodarone, eliquis   3. Chronic leg lymphedema/skin changes: wound care , check venous doppler   4. Left  left tonsillar mass: concerning malignancy. Consider ENT evaluation. 5. Acute PE: CTA of neck reported: Partially visualized small acute pulmonary embolus in the distal right main pulmonary artery. Continue eliquis. Follow echo   6. Multinodular thyroid gland: check [facility] course  Patient was seen by neurology who did not think that these were any signs of TIA or CVA. Imaging was negative. CTA of neck did not show an acute pulmonary embolus and they 1.7 x 0.9 cm heterogeneously enhancing lesion in the left tonsillar fossa which was favored to represent primary oropharyngeal malignancy with a apparent lymph node metastasis. Oncology and ENT consultations were recommended. Patient was seen by oncology, but we did not have ENT coverage in the hospital.  Patient had an ultrasound-guided biopsy scheduled and done. Patient was kept on anticoagulation for her pulmonary embolism. Patient was discharged to SNF with follow-up with ENT and oncology for biopsy results. DISCHARGE DIAGNOSES / PLAN:      1. Left-sided weakness. CVA/TIA ruled out. Improved/resolved. 2. Acute PE. Continue anticoagulation with Eliquis. 3. Apparent tonsillar fossa oropharyngeal malignancy. Pending biopsy results. Follow-up outpatient with ENT and oncology. 4. Multinodular thyroid gland. Stable. 5. Chronic A. fib. Continue amiodarone and Eliquis. 6. Chronic lymphedema. Stable. 7. Hypertension. Stable.        PENDING TEST RESULTS:   At the time of discharge the following test results are still pending: Results for biopsy of neck. FOLLOW UP APPOINTMENTS:    Follow-up Information     Follow up With Specialties Details Why Contact Info    [address] [place], [place] [address]    Freya Jiménez MD Internal Medicine Physician In 1 week Hospital follow up Sonny  [phone]      May, Amanda Pearce MD Otolaryngology  Call to confirm follow up appointment. 1317 [facility] 10[facility], [address] [place] [address],  Hematology and Oncology, Internal Medicine Physician, Hematology Physician, Oncology  Call to confirm follow up. [address] [facility] [id number] [place] [address]  [phone]             ADDITIONAL CARE RECOMMENDATIONS: none    DIET: Regular Diet    ACTIVITY: Activity as tolerated    WOUND CARE: Chronic leg lymphedema, continue wound care. EQUIPMENT needed: none      DISCHARGE MEDICATIONS:  Discharge Medication List as of 6/27/2022 12:53 PM      CONTINUE these medications which have NOT CHANGED    Details   hydroCHLOROthiazide (HYDRODIURIL) 25 mg tablet Take 1 Tablet by mouth daily. , Normal, Disp-90 Tablet, R-3      amLODIPine (NORVASC) 5 mg tablet Take 1 Tablet by mouth daily. , Normal, Disp-90 Tablet, R-3      amiodarone (Pacerone) 200 mg tablet Take 1 Tablet by mouth daily. , Normal, Disp-90 Tablet, R-3      apixaban (Eliquis) 5 mg tablet Take 1 tablet by mouth twice daily, Normal, Disp-180 Tablet, R-3      megestroL (MEGACE) 20 mg tablet Take 1 Tablet by mouth daily. , Normal, Disp-30 Tablet, R-3      ammonium lactate (LAC-HYDRIN) 12 % lotion Historical Med      acetaminophen (Tylenol Arthritis Pain) 650 mg TbER Take 650 mg by mouth nightly., Historical Med      ZINC PO Take  by mouth daily. , Historical Med      cholecalciferol (VITAMIN D3) (1000 Units /25 mcg) tablet Take 1 Tab by mouth daily. , Print, Disp-30 Tab,R-0      cyanocobalamin 1,000 mcg tablet Take 1 Tab by mouth daily. , Print, Disp-30 Tab,R-0         STOP taking these medications       diclofenac EC (VOLTAREN) 75 mg EC tablet Comments:   Reason for Stopping:                 NOTIFY YOUR PHYSICIAN FOR ANY OF THE FOLLOWING:   Fever over 101 degrees for 24 hours. Chest pain, shortness of breath, fever, chills, nausea, vomiting, diarrhea, change in mentation, falling, weakness, bleeding. Severe pain or pain not relieved by medications. Or, any other signs or symptoms that you may have questions about.     DISPOSITION:    Home With:   OT  PT  HH  RN      X Long term SNF/Inpatient Rehab    Independent/assisted living    Hospice    Other:       PATIENT CONDITION AT DISCHARGE:     Functional status    Poor    X Deconditioned     Independent      Cognition    X Lucid     Forgetful     Dementia      Catheters/lines (plus indication)    Luo     PICC     PEG    X None      Code status    X Full code     DNR      PHYSICAL EXAMINATION AT DISCHARGE:  General:          Alert, cooperative, no acute distress    Resp:               No resp distress, No accessory muscle use  CV:                  Irregular rhythm, normal rate  GI:                   Soft, Non distended, Non tender  Neurologic:       Alert and oriented, normal speech, PATRICIO  Extremities:     Chronic lymphedema LLE>RLE, no cyanosis  Psych:   Not anxious or agitated  Skin:                Lymphedema b/l, chronic skin changes, no acute cellulitis, No jaundice    CHRONIC MEDICAL DIAGNOSES:  Problem List as of 6/27/2022 Date Reviewed: 5/20/2022          Codes Class Noted - Resolved    Arthritis ICD-10-CM: M19.90  ICD-9-CM: 716.90  1/13/2022 - Present        Paroxysmal atrial fibrillation (HCC) (Chronic) ICD-10-CM: I48.0  ICD-9-CM: 427.31  11/30/2020 - Present        SVT (supraventricular tachycardia) ([facility] [address] ICD-10-CM: I47.1  ICD-9-CM: 427.89  11/28/2020 - Present        Weakness generalized ICD-10-CM: R53.1  ICD-9-CM: 780.79  11/24/2020 - Present        Rhabdomyolysis ICD-10-CM: M62.82  ICD-9-CM: 728.88  11/24/2020 - Present        Volume depletion ICD-10-CM: E86.9  ICD-9-CM: 276.50  11/24/2020 - Present        DANIELLE (acute kidney injury) ([facility] ICD-10-CM: N17.9  ICD-9-CM: 584.9  11/24/2020 - Present        Hypokalemia ICD-10-CM: E87.6  ICD-9-CM: 276.8  9/6/2020 - Present        Fatigue ICD-10-CM: R53.83  ICD-9-CM: 780.79  9/5/2020 - Present        Fall ICD-10-CM: W19. Mary Blakelyd  ICD-9-CM: E888.9  9/5/2020 - Present        Elevated troponin ICD-10-CM: R77.8  ICD-9-CM: 790.6  9/5/2020 - Present        Hypertension ICD-10-CM: I10  ICD-9-CM: 401.9  10/27/2011 - Present        RESOLVED: CVA (cerebral vascular accident) ([facility] ICD-10-CM: I63.9  ICD-9-CM: 434.91  6/17/2022 - 6/27/2022        RESOLVED: Acute CVA (cerebrovascular accident) ([facility] ICD-10-CM: I63.9  ICD-9-CM: 434.91  1/11/2022 - 1/12/2022        RESOLVED: Screening cholesterol level ICD-10-CM: Z13.220  ICD-9-CM: V77.91  10/27/2011 - 10/1/2019        RESOLVED: Screening for thyroid disorder ICD-10-CM: Z13.29  ICD-9-CM: V77.0  10/27/2011 - 10/1/2019              Greater than 30 minutes were spent with the patient on counseling and coordination of care    Signed:   Anushka Lopez MD  6/27/2022  3:23 PM

## 2022-06-27 NOTE — PROGRESS NOTES
Hematology-Oncology Progress Note    Amy Hart  1950  [id number]  6/27/2022    Follow-up for: cva                            Tonsillar mass     [x]        Chart notes since last visit reviewed   [x]        Medications reviewed for allergies and interactions       Case discussed with the following:         []                            []        Nursing Staff                                                                         []        Pathologist                                                                        []        FAMILY      Subjective:     Spoke with patient who complains of: pt seems depressed/annoyed this am.  No c/o swallowing difficulties    Objective:     Patient Vitals for the past 24 hrs:   BP Temp Pulse Resp SpO2   06/27/22 0531 (!) 148/74 98.3 °F (36.8 °C) 73 18 99 %   06/26/22 2134 (!) 152/76 98.7 °F (37.1 °C) 71 18 100 %   06/26/22 1528 126/66 98.7 °F (37.1 °C) 68 18 99 %   06/26/22 0851 (!) 144/71 98.3 °F (36.8 °C) 64 18 100 %       REVIEW OF SYSTEMS:    Constitutional: negative fever, negative chills, negative weight loss  Eyes:   negative visual changes  ENT:   negative sore throat, tongue or lip swelling  Respiratory:  negative cough, negative dyspnea  Cards:  negative for chest pain, palpitations, lower extremity edema  GI:   negative for nausea, vomiting, diarrhea, and abdominal pain  Neuro:  negative for headaches, dizziness, vertigo  []                        Full ROS o/w normal/non contributor    Constitutional:  Patient looks  []        Sick  []        Frail  [x]        Better                                                 []        Depressed    HEENT:  [x]   NC                         []   AT               []    ALOPECIA           Eyes: [x]   Normal               []    Icteric  Oropharynx: [x]    Normal                  []  Thrush               []   Dry  Mucositis: []    None                 Grade: []        I  []        II  []        III  [] IV  Neck:   [x]   Supple                  []  Rigid               JVD:    []   ABSENT       []   PRESENT  Lymphadenopathy:   [x]   None Noted            []   PRESENT    Chest:  [x]   Clear               []    Rhonchi                      Dec'd @     []  Right Base           []   Left Base    CV:             []   Regular              [x]  Irregular               []   Tachy                []   Murmur  Abdominal:   [x]    Soft              []   NON-tender               []   Tender      BS:    []   ABSENT                   []   PRESENT  Liver:     [x]  NON-palp                  []   EDGE- palp  Spleen: [x]   NON-palp                   []  EDGE - palp  Mass:   [x]   ABSENT                          []  PRESENT  Extr:    [x]  Lymphedema             []   Cyanosis      []  Clubbing  Edema:     [x]   NONE       []   PRESENT  Skin:  Intact []           Purpura []        Rash: [x]   ABSENT       []  PRESENT  Neuro:  [x]        Normal  []        Confused      Available labs reviewed:  Labs:    Recent Results (from the past 24 hour(s))   CBC WITH AUTOMATED DIFF    Collection Time: 06/27/22  5:51 AM   Result Value Ref Range    WBC 5.4 3.6 - 11.0 K/uL    RBC 4.32 3.80 - 5.20 M/uL    HGB 12.1 11.5 - 16.0 g/dL    HCT 37.0 35.0 - 47.0 %    MCV 85.6 80.0 - 99.0 FL    MCH 28.0 26.0 - 34.0 PG    MCHC 32.7 30.0 - 36.5 g/dL    RDW 15.0 (H) 11.5 - 14.5 %    PLATELET 524 224 - 534 K/uL    MPV 9.2 8.9 - 12.9 FL    NRBC 0.0 0  WBC    ABSOLUTE NRBC 0.00 0.00 - 0.01 K/uL    NEUTROPHILS 57 32 - 75 %    LYMPHOCYTES 29 12 - 49 %    MONOCYTES 8 5 - 13 %    EOSINOPHILS 5 0 - 7 %    BASOPHILS 1 0 - 1 %    IMMATURE GRANULOCYTES 0 0.0 - 0.5 %    ABS. NEUTROPHILS 3.1 1.8 - 8.0 K/UL    ABS. LYMPHOCYTES 1.5 0.8 - 3.5 K/UL    ABS. MONOCYTES 0.4 0.0 - 1.0 K/UL    ABS. EOSINOPHILS 0.2 0.0 - 0.4 K/UL    ABS. BASOPHILS 0.0 0.0 - 0.1 K/UL    ABS. IMM.  GRANS. 0.0 0.00 - 0.04 K/UL    DF AUTOMATED         Available Xrays reviewed:    Chemotherapy monitored and toxicities assessed:    Assessment and Plan       1. Left tonsil mass and left cervical lymphadenopathy. . 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 . ent doesn't come to the hospital,  I spoke with DR Gustafson on 6/23 and he rec. fna bx of left neck node than f/u with him as out-pt. There is a chance she will not be elliqible for surgery and will end up back with me for chemo/xrt depending upon path and circumstances. . she is aware.   Hopefully this gets done today    Rishi Ellsworth MD

## 2022-06-27 NOTE — PROGRESS NOTES
[facility] [place] at [address], [address], [address] Double: [phone]  F: [phone]    Reason for Visit:   Stefano Cross is a 67 y.o. female who is seen in hospital for second opinion at the request of Dr. Bhupinder Funes for evaluation of neck mass. Treatment History:   Neck mass biopsy done 6/27/22    History of Present Illness:     Seen today in hospital for second opinion for neck mass and LEFT tonsillar fossa lesion on CTA neck. CTA Neck:  1. No evidence of flow-limiting stenosis. 2. Moderate stenosis at the origin of the nondominant right vertebral artery. 3. Partially visualized small acute pulmonary embolus in the distal right main  pulmonary artery. 4. 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 Large centrally necrotic left level 2A lymph node  measuring 2.1 x 2.1 x 3.4 cm, consistent with jarrell metastasis. ENT consultation  is recommended. 5. Multinodular thyroid gland. Pt had a biopsy of neck mass today and is going home. Path pending. Pt is in bed with nursing at bedside.    Seen by [facility] and requesting another opinion  No fevers/ chills/ chest pain/ SOB/ nausea/ vomiting/diarrhea/         Past Medical History:   Diagnosis Date    History of mammogram 2010    normal    Hypertension     Pap smear for cervical cancer screening 2011    normal      Past Surgical History:   Procedure Laterality Date    HX GYN      hystterectomy    [place] COMPRE EP EVAL ABLTJ ATR FIB PULM VEIN ISOLATION N/A 11/30/2020    ABLATION A-FIB  W COMPLETE EP STUDY performed by Beth Vasquez MD at [facility] CARDI[facility] CATH LAB    WA ICAR CATHETER ABLATION ARRHYTHMIA ADD ON N/A 11/30/2020    Ablation Svt/Vt Add On performed by Beth Vasquez MD at [facility] CARDIAC CATH [facility]    [place] INTRACARD ECHO, THER/DX INTERVENT N/A 11/30/2020    Intracardiac Echocardiogram performed by Radha Mccallum MD at [facility] CARDIAC CATH LAB    AK INTRACARDIAC ELECTROPHYSIOLOGIC 3D MAPPING N/A 11/30/2020    Ep 3d Mapping performed by Radha Mccallum MD at [facility] CARDIAC CATH LAB    AK STIM/PACING HEART POST IV DRUG INFU N/A 11/30/2020    Drug Stimulation performed by Radha Mccallum MD at [facility] CARDIAC CATH LAB      Social History     Tobacco Use    Smoking status: Never Smoker    Smokeless tobacco: Never Used   Substance Use Topics    Alcohol use: Yes     Comment: occasional      No family history on file.   Current Facility-Administered Medications   Medication Dose Route Frequency    lidocaine (PF) (XYLOCAINE) 10 mg/mL (1 %) injection 5 mL  5 mL SubCUTAneous RAD ONCE    lidocaine (PF) (XYLOCAINE) 10 mg/mL (1 %) injection 5 mL  5 mL SubCUTAneous RAD ONCE    sodium bicarbonate (4.2%) injection 42 mg  1 mL SubCUTAneous RAD ONCE    sodium bicarbonate (4.2%) injection 84 mg  2 mL SubCUTAneous RAD ONCE    lidocaine (PF) (XYLOCAINE) 10 mg/mL (1 %) injection 10 mL  10 mL SubCUTAneous RAD ONCE    apixaban (ELIQUIS) tablet 5 mg  5 mg Oral Q12H    hydrALAZINE (APRESOLINE) 20 mg/mL injection 10 mg  10 mg IntraVENous Q4H PRN    labetaloL (NORMODYNE;TRANDATE) injection 10 mg  10 mg IntraVENous Q4H PRN    acetaminophen (TYLENOL) tablet 650 mg  650 mg Oral Q4H PRN    Or    acetaminophen (TYLENOL) solution 650 mg  650 mg Per NG tube Q4H PRN    Or    acetaminophen (TYLENOL) suppository 650 mg  650 mg Rectal Q4H PRN    amiodarone (CORDARONE) tablet 200 mg  200 mg Oral DAILY    amLODIPine (NORVASC) tablet 5 mg  5 mg Oral DAILY    cholecalciferol (VITAMIN D3) (1000 Units /25 mcg) tablet 1,000 Units  1,000 Units Oral DAILY    cyanocobalamin (VITAMIN B12) tablet 1,000 mcg  1,000 mcg Oral DAILY    traMADoL (ULTRAM) tablet 50 mg  50 mg Oral Q6H PRN    megestroL (MEGACE) 400 mg/10 mL (10 mL) oral suspension 20 mg  20 mg Oral DAILY    balsam peru-castor oiL (VENELEX) ointment Topical BID    ammonium lactate (LAC-HYDRIN) 12 % lotion   Topical BID     Current Outpatient Medications   Medication Sig    hydroCHLOROthiazide (HYDRODIURIL) 25 mg tablet Take 1 Tablet by mouth daily.  amLODIPine (NORVASC) 5 mg tablet Take 1 Tablet by mouth daily.  amiodarone (Pacerone) 200 mg tablet Take 1 Tablet by mouth daily.  apixaban (Eliquis) 5 mg tablet Take 1 tablet by mouth twice daily    megestroL (MEGACE) 20 mg tablet Take 1 Tablet by mouth daily.  ammonium lactate (LAC-HYDRIN) 12 % lotion     acetaminophen (Tylenol Arthritis Pain) 650 mg TbER Take 650 mg by mouth nightly.  ZINC PO Take  by mouth daily.  cholecalciferol (VITAMIN D3) (1000 Units /25 mcg) tablet Take 1 Tab by mouth daily.  cyanocobalamin 1,000 mcg tablet Take 1 Tab by mouth daily. Allergies   Allergen Reactions    Codeine Itching and Swelling        Review of Systems: A complete review of systems was obtained, negative except as described above. Physical Exam:     Visit Vitals  BP (!) 159/72 (BP 1 Location: Right upper arm, BP Patient Position: At rest)   Pulse 69   Temp 99.5 °F (37.5 °C)   Resp 18   Ht 5' 7\" (1.702 m)   Wt 151 lb 14.4 oz (68.9 kg)   SpO2 98%   BMI 23.79 kg/m²     ECOG PS: 1  General: No distress  Eyes:  anicteric sclerae  HENT: Atraumatic  Neck: Supple  Lymphatic: LEFT neck mass about 3-4 cm on exam  Respiratory: CTAB, normal respiratory effort  CV: Normal rate, regular rhythm  MS: in bed moving  Skin: No rashes, ecchymoses, or petechiae. Normal temperature, turgor, and texture. Psych: Alert, conversant    Results:     Lab Results   Component Value Date/Time    WBC 5.4 06/27/2022 05:51 AM    HGB 12.1 06/27/2022 05:51 AM    HCT 37.0 06/27/2022 05:51 AM    PLATELET 113 90/00/5456 05:51 AM    MCV 85.6 06/27/2022 05:51 AM    ABS.  NEUTROPHILS 3.1 06/27/2022 05:51 AM     Lab Results   Component Value Date/Time    Sodium 141 06/24/2022 03:46 AM    Potassium 3.8 06/24/2022 03:46 AM    Chloride 110 (H) 06/24/2022 03:46 AM    CO2 27 06/24/2022 03:46 AM    Glucose 103 (H) 06/24/2022 03:46 AM    BUN 22 (H) 06/24/2022 03:46 AM    Creatinine 1.10 (H) 06/24/2022 03:46 AM    GFR est AA 59 (L) 06/24/2022 03:46 AM    GFR est non-AA 49 (L) 06/24/2022 03:46 AM    Calcium 8.4 (L) 06/24/2022 03:46 AM     Lab Results   Component Value Date/Time    Bilirubin, total 0.9 06/17/2022 12:47 PM    ALT (SGPT) 28 06/17/2022 12:47 PM    Alk. phosphatase 68 06/17/2022 12:47 PM    Protein, total 7.5 06/17/2022 12:47 PM    Albumin 2.7 (L) 06/20/2022 02:03 AM    Globulin 4.0 06/17/2022 12:47 PM       CT Results (most recent):  Results from Hospital Encounter encounter on 06/17/22    CT CHEST ABD PELV W CONT    Narrative  EXAM: CT CHEST ABD PELV W CONT    INDICATION: head and neck cancer staging    COMPARISON: None    IV CONTRAST: 100 mL of Isovue-370. ORAL CONTRAST: given    TECHNIQUE:  Following the uneventful intravenous administration of contrast, thin axial  images were obtained through the chest, abdomen and pelvis. Coronal and sagittal  reformats were generated. CT dose reduction was achieved through use of a  standardized protocol tailored for this examination and automatic exposure  control for dose modulation. FINDINGS:    CHEST WALL: No mass or axillary lymphadenopathy. THYROID: Heterogeneous  MEDIASTINUM: No mass or lymphadenopathy. MIA: No mass or lymphadenopathy. THORACIC AORTA: No dissection or aneurysm. MAIN PULMONARY ARTERY: Normal in caliber. TRACHEA/BRONCHI: Patent. ESOPHAGUS: No wall thickening or dilatation. HEART: Normal in size. PLEURA: Trace left pleural effusion  LUNGS: Mild left basilar atelectasis. No suspicious pulmonary nodules or masses    LIVER: No mass. BILIARY TREE: Numerous small gallstones are present in the gallbladder. CBD is  not dilated. SPLEEN: within normal limits. PANCREAS: No mass or ductal dilatation. ADRENALS: Unremarkable.   KIDNEYS: Right pelviectasis without overt hydronephrosis. Benign left renal cyst  requires no follow-up. STOMACH: Unremarkable. SMALL BOWEL: Fecal stasis in the distal small bowel with mild proximal small  bowel dilatation, up to 3.2 cm. COLON: Diverticulosis of the colon and moderate constipation  APPENDIX: Normal  PERITONEUM: No ascites or pneumoperitoneum. RETROPERITONEUM: Diffuse calcific aortic atherosclerosis without aneurysm  REPRODUCTIVE ORGANS: Status post hysterectomy  URINARY BLADDER: No mass or calculus. BONES: No destructive bone lesion. ABDOMINAL WALL: No mass or hernia. ADDITIONAL COMMENTS: N/A    Impression  1. No evidence of metastatic disease within the chest, abdomen, or pelvis. 2. Moderate generalized constipation with mild associated small bowel  dilatation. 3. Diffuse calcific aortic atherosclerosis. 4. Cholelithiasis. 5. Trace left pleural effusion. CTA Neck:  1. No evidence of flow-limiting stenosis. 2. Moderate stenosis at the origin of the nondominant right vertebral artery. 3. Partially visualized small acute pulmonary embolus in the distal right main  pulmonary artery. 4. 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 Large centrally necrotic left level 2A lymph node  measuring 2.1 x 2.1 x 3.4 cm, consistent with jrarell metastasis. ENT consultation  is recommended. 5. Multinodular thyroid gland. Records reviewed and summarized above. Pathology report(s) reviewed above. Radiology report(s) reviewed above. 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 Large centrally necrotic left level 2A lymph node  measuring 2.1 x 2.1 x 3.4 cm, consistent with jarrell metastasis.   Found on CTA neckc 6/22 while in hospital.     Seen by [facility] and seen today for second opinion per pt/ hospitalist request.   D/w hospitalist today. Records and history reviewed with pt today. Had neck mass biopsy today and path is pending. CTs of body negative for distant disease. Pt to see ENT as outpt Dr Gustafson. Need path for tissue dx of cancer. For H+ N cancer treatment options include surgery/ chemo/ XRT. Surgery eval first then to us as outpt. Pt is in bed and going to a SNF today. Pt can fu with us as outpt. 2) PE on eliquis. 3) CVA/ TIA. Per neuro. 4) AF per cardio. We will follow up as outpt. D/w hospitalist today    I appreciate the opportunity to participate in Ms. Eddye Curling Forbes's care.     Signed By: Myron Mishra DO

## 2022-06-27 NOTE — PROGRESS NOTES
Problem: Pressure Injury - Risk of  Goal: *Prevention of pressure injury  Description: Document Bin Scale and appropriate interventions in the flowsheet.   6/27/2022 1411 by Jia Orozco RN  Outcome: Resolved/Met  6/27/2022 1000 by Jia Orozco RN  Outcome: Progressing Towards Goal  Note: Pressure Injury Interventions:  Sensory Interventions: Keep linens dry and wrinkle-free    Moisture Interventions: Absorbent underpads    Activity Interventions: PT/OT evaluation    Mobility Interventions: Pressure redistribution bed/mattress (bed type)    Nutrition Interventions: Document food/fluid/supplement intake    Friction and Shear Interventions: Minimize layers                Problem: Patient Education: Go to Patient Education Activity  Goal: Patient/Family Education  6/27/2022 1411 by Jia Orozco RN  Outcome: Resolved/Met  6/27/2022 1000 by Jai Orozco RN  Outcome: Progressing Towards Goal     Problem: Patient Education: Go to Patient Education Activity  Goal: Patient/Family Education  Outcome: Resolved/Met     Problem: TIA/CVA Stroke: 0-24 hours  Goal: Off Pathway (Use only if patient is Off Pathway)  Outcome: Resolved/Met  Goal: Activity/Safety  Outcome: Resolved/Met  Goal: Consults, if ordered  Outcome: Resolved/Met  Goal: Diagnostic Test/Procedures  Outcome: Resolved/Met  Goal: Nutrition/Diet  Outcome: Resolved/Met  Goal: Discharge Planning  Outcome: Resolved/Met  Goal: Medications  Outcome: Resolved/Met  Goal: Respiratory  Outcome: Resolved/Met  Goal: Treatments/Interventions/Procedures  Outcome: Resolved/Met  Goal: Minimize risk of bleeding post-thrombolytic infusion  Outcome: Resolved/Met  Goal: Monitor for complications post-thrombolytic infusion  Outcome: Resolved/Met  Goal: Psychosocial  Outcome: Resolved/Met  Goal: *Hemodynamically stable  Outcome: Resolved/Met  Goal: *Neurologically stable  Description: Absence of additional neurological deficits    Outcome: Resolved/Met  Goal: *Verbalizes anxiety and depression are reduced or absent  Outcome: Resolved/Met  Goal: *Absence of Signs of Aspiration on Current Diet  Outcome: Resolved/Met  Goal: *Absence of deep venous thrombosis signs and symptoms(Stroke Metric)  Outcome: Resolved/Met  Goal: *Ability to perform ADLs and demonstrates progressive mobility and function  Outcome: Resolved/Met  Goal: *Stroke education started(Stroke Metric)  Outcome: Resolved/Met  Goal: *Dysphagia screen performed(Stroke Metric)  Outcome: Resolved/Met  Goal: *Rehab consulted(Stroke Metric)  Outcome: Resolved/Met     Problem: TIA/CVA Stroke: Day 2 Until Discharge  Goal: Off Pathway (Use only if patient is Off Pathway)  Outcome: Resolved/Met  Goal: Activity/Safety  Outcome: Resolved/Met  Goal: Diagnostic Test/Procedures  Outcome: Resolved/Met  Goal: Nutrition/Diet  Outcome: Resolved/Met  Goal: Discharge Planning  Outcome: Resolved/Met  Goal: Medications  Outcome: Resolved/Met  Goal: Respiratory  Outcome: Resolved/Met  Goal: Treatments/Interventions/Procedures  Outcome: Resolved/Met  Goal: Psychosocial  Outcome: Resolved/Met  Goal: *Verbalizes anxiety and depression are reduced or absent  Outcome: Resolved/Met  Goal: *Absence of aspiration  Outcome: Resolved/Met  Goal: *Absence of deep venous thrombosis signs and symptoms(Stroke Metric)  Outcome: Resolved/Met  Goal: *Optimal pain control at patient's stated goal  Outcome: Resolved/Met  Goal: *Tolerating diet  Outcome: Resolved/Met  Goal: *Ability to perform ADLs and demonstrates progressive mobility and function  Outcome: Resolved/Met  Goal: *Stroke education continued(Stroke Metric)  Outcome: Resolved/Met     Problem: Ischemic Stroke: Discharge Outcomes  Goal: *Verbalizes anxiety and depression are reduced or absent  Outcome: Resolved/Met  Goal: *Verbalize understanding of risk factor modification(Stroke Metric)  Outcome: Resolved/Met  Goal: *Hemodynamically stable  Outcome: Resolved/Met  Goal: *Absence of aspiration pneumonia  Outcome: Resolved/Met  Goal: *Aware of needed dietary changes  Outcome: Resolved/Met  Goal: *Verbalize understanding of prescribed medications including anti-coagulants, anti-lipid, and/or anti-platelets(Stroke Metric)  Outcome: Resolved/Met  Goal: *Tolerating diet  Outcome: Resolved/Met  Goal: *Aware of follow-up diagnostics related to anticoagulants  Outcome: Resolved/Met  Goal: *Ability to perform ADLs and demonstrates progressive mobility and function  Outcome: Resolved/Met  Goal: *Absence of DVT(Stroke Metric)  Outcome: Resolved/Met  Goal: *Absence of aspiration  Outcome: Resolved/Met  Goal: *Optimal pain control at patient's stated goal  Outcome: Resolved/Met  Goal: *Home safety concerns addressed  Outcome: Resolved/Met  Goal: *Describes available resources and support systems  Outcome: Resolved/Met  Goal: *Verbalizes understanding of activation of EMS(911) for stroke symptoms(Stroke Metric)  Outcome: Resolved/Met  Goal: *Understands and describes signs and symptoms to report to providers(Stroke Metric)  Outcome: Resolved/Met  Goal: *Neurolgocially stable (absence of additional neurological deficits)  Outcome: Resolved/Met  Goal: *Verbalizes importance of follow-up with primary care physician(Stroke Metric)  Outcome: Resolved/Met  Goal: *Smoking cessation discussed,if applicable(Stroke Metric)  Outcome: Resolved/Met  Goal: *Depression screening completed(Stroke Metric)  Outcome: Resolved/Met     Problem: Falls - Risk of  Goal: *Absence of Falls  Description: Document Pam Fall Risk and appropriate interventions in the flowsheet.   Outcome: Resolved/Met     Problem: Patient Education: Go to Patient Education Activity  Goal: Patient/Family Education  Outcome: Resolved/Met     Problem: Patient Education: Go to Patient Education Activity  Goal: Patient/Family Education  Outcome: Resolved/Met     Problem: Patient Education: Go to Patient Education Activity  Goal: Patient/Family Education  Outcome: Resolved/Met     Problem: Nutrition Deficit  Goal: *Optimize nutritional status  Outcome: Resolved/Met

## 2022-06-27 NOTE — PROGRESS NOTES
Hematology-Oncology Progress Note    Dread Bhakta  1950  [id number]  6/27/2022    Follow-up for: cva                            Tonsillar mass     [x]        Chart notes since last visit reviewed   [x]        Medications reviewed for allergies and interactions       Case discussed with the following:         []                            []        Nursing Staff                                                                         []        Pathologist                                                                        []        FAMILY      Subjective:     Spoke with patient who complains of: pt seems depressed/annoyed this am.  No c/o swallowing difficulties    Objective:     Patient Vitals for the past 24 hrs:   BP Temp Pulse Resp SpO2   06/27/22 0531 (!) 148/74 98.3 °F (36.8 °C) 73 18 99 %   06/26/22 2134 (!) 152/76 98.7 °F (37.1 °C) 71 18 100 %   06/26/22 1528 126/66 98.7 °F (37.1 °C) 68 18 99 %   06/26/22 0851 (!) 144/71 98.3 °F (36.8 °C) 64 18 100 %       REVIEW OF SYSTEMS:    Constitutional: negative fever, negative chills, negative weight loss  Eyes:   negative visual changes  ENT:   negative sore throat, tongue or lip swelling  Respiratory:  negative cough, negative dyspnea  Cards:  negative for chest pain, palpitations, lower extremity edema  GI:   negative for nausea, vomiting, diarrhea, and abdominal pain  Neuro:  negative for headaches, dizziness, vertigo  []                        Full ROS o/w normal/non contributor    Constitutional:  Patient looks  []        Sick  []        Frail  [x]        Better                                                 []        Depressed    HEENT:  [x]   NC                         []   AT               []    ALOPECIA           Eyes: [x]   Normal               []    Icteric  Oropharynx: [x]    Normal                  []  Thrush               []   Dry  Mucositis: []    None                 Grade: []        I  []        II  []        III  [] IV  Neck:   [x]   Supple                  []  Rigid               JVD:    []   ABSENT       []   PRESENT  Lymphadenopathy:   [x]   None Noted            []   PRESENT    Chest:  [x]   Clear               []    Rhonchi                      Dec'd @     []  Right Base           []   Left Base    CV:             []   Regular              [x]  Irregular               []   Tachy                []   Murmur  Abdominal:   [x]    Soft              []   NON-tender               []   Tender      BS:    []   ABSENT                   []   PRESENT  Liver:     [x]  NON-palp                  []   EDGE- palp  Spleen: [x]   NON-palp                   []  EDGE - palp  Mass:   [x]   ABSENT                          []  PRESENT  Extr:    [x]  Lymphedema             []   Cyanosis      []  Clubbing  Edema:     [x]   NONE       []   PRESENT  Skin:  Intact []           Purpura []        Rash: [x]   ABSENT       []  PRESENT  Neuro:  [x]        Normal  []        Confused      Available labs reviewed:  Labs:    Recent Results (from the past 24 hour(s))   CBC WITH AUTOMATED DIFF    Collection Time: 06/27/22  5:51 AM   Result Value Ref Range    WBC 5.4 3.6 - 11.0 K/uL    RBC 4.32 3.80 - 5.20 M/uL    HGB 12.1 11.5 - 16.0 g/dL    HCT 37.0 35.0 - 47.0 %    MCV 85.6 80.0 - 99.0 FL    MCH 28.0 26.0 - 34.0 PG    MCHC 32.7 30.0 - 36.5 g/dL    RDW 15.0 (H) 11.5 - 14.5 %    PLATELET 907 949 - 625 K/uL    MPV 9.2 8.9 - 12.9 FL    NRBC 0.0 0  WBC    ABSOLUTE NRBC 0.00 0.00 - 0.01 K/uL    NEUTROPHILS 57 32 - 75 %    LYMPHOCYTES 29 12 - 49 %    MONOCYTES 8 5 - 13 %    EOSINOPHILS 5 0 - 7 %    BASOPHILS 1 0 - 1 %    IMMATURE GRANULOCYTES 0 0.0 - 0.5 %    ABS. NEUTROPHILS 3.1 1.8 - 8.0 K/UL    ABS. LYMPHOCYTES 1.5 0.8 - 3.5 K/UL    ABS. MONOCYTES 0.4 0.0 - 1.0 K/UL    ABS. EOSINOPHILS 0.2 0.0 - 0.4 K/UL    ABS. BASOPHILS 0.0 0.0 - 0.1 K/UL    ABS. IMM.  GRANS. 0.0 0.00 - 0.04 K/UL    DF AUTOMATED         Available Xrays reviewed:    Chemotherapy monitored and toxicities assessed:    Assessment and Plan       1. Left tonsil mass and left cervical lymphadenopathy. . 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 . ent doesn't come to the hospital,  I spoke with DR Gustafson on 6/23 and he rec. fna bx of left neck node than f/u with him as out-pt. There is a chance she will not be elliqible for surgery and will end up back with me for chemo/xrt depending upon path and circumstances. . she is aware.   Hopefully this gets done today    Ángela Santana MD

## 2022-06-27 NOTE — PROGRESS NOTES
Transition of Care Plan   RUR- Low 12%    DISPOSITION: The disposition plan is SNF  ? SNF Accepted:   Dori Mcdowell; CALL REPORT: [phone]/YS#[id number]  ? Madison Peter Plan HIGINIO#[id number]/ MMWR JQWV#[id number]/ Start: 6/24 end date: 6/28  Reviewer: Renetta Rodriguez    F/U with PCP/Specialist     Transport: [facility] 2:00pm   Pending procedures: Biopsy to be completed today; potential discharge following      Transition of Care Plan to SNF/Rehab    SNF/Rehab Transition:  Patient has been accepted to [facility] and meets criteria for admission. Patient will transported by [facility] and expected to leave at 2:00pm.    Communication to Patient/Family:  Met with patient and they are agreeable to the transition plan. Communication to SNF/Rehab:  Bedside RN, Wendi Smith, has been notified to update the transition plan to the facility and call report (phone number [phone]). Discharge information has been updated on the AVS.      Nursing Please include all hard scripts for controlled substances, med rec and dc summary, and AVS in packet. Reviewed and confirmed with facility,, can manage the patient care needs for the following:     SNF/Rehab Transition:  Patient to follow-up with Home Health:  [facility], other ,none)  PCP/Specialist:    Reviewed and confirmed with facility, they can manage the patient care needs for the following:     Phylicia Hess with (X) only those applicable:    Medication:  [x]  Medications will be available at the facility  []  IV Antibiotics   []  Controlled Substance - hard copy to be sent with patient   []  Weekly Labs   Documents:  [x] Hard RX  [x] MAR  [x] Kardex  [x] AVS  [x]Transfer Summary  [x]Discharge   Equipment:  []  CPAP/BiPAP  []  Wound Vacuum  []  Luo or Urinary Device  []  PICC/Central Line  []  Nebulizer  []  Ventilator   Treatment:  []Isolation (for MRSA, VRE, etc.)  []Surgical Drain Management  []Tracheostomy Care  []Dressing Changes  []Dialysis with transportation and chair time. []PEG Care  []Oxygen  []Daily Weights for Heart Failure   Dietary:  []Any diet limitations  []Tube Feedings   []Total Parenteral Management (TPN)   Eligible for Medicaid Long Term Services and Supports  Yes:  [] Eligible for medical assistance or will become eligible within 180 days and UAI completed. [] Provider/Patient and/or support system has requested screening. [] UAI copy provided to patient or responsible party,.  [] UAI unavailable at discharge will send once processed to SNF provider. [] UAI unavailable at discharged mailed to patient  No:   [x] Private pay and is not financially eligible for Medicaid within the next 180 days. [] Reside out-of-state. [] A residents of a state owned/operated facility that is licensed  by [address] [facility] or [facility]  [] Enrollment in [facility] hospice services  [] [address] [place] [address]  [] Patient /Family declines to have screening completed or provide financial information for screening     Financial Resources:  Medicaid    [] Initiated and application pending   [] Full coverage     Advanced Care Plan:  []Surrogate Decision Maker of Care  []POA  [x]Communicated Code Status Full\")    Other     Medicare pt has received, reviewed, and signed 2nd IM letter informing them of their right to appeal the discharge. Signed copy has been placed on pt bedside chart.     CM: [address] JALIL,   [phone]

## 2022-06-28 ENCOUNTER — PATIENT OUTREACH (OUTPATIENT)
Dept: CASE MANAGEMENT | Age: 72
End: 2022-06-28

## 2022-06-28 NOTE — PROGRESS NOTES
Per EMR patient discharged to [facility] and [address] [place] [address]. Transition of care outreach postponed for 14 days due to patient's discharge to SNF. Will continue to monitor patient progress.

## 2022-07-10 ENCOUNTER — DOCUMENTATION ONLY (OUTPATIENT)
Dept: ONCOLOGY | Age: 72
End: 2022-07-10

## 2022-07-12 ENCOUNTER — PATIENT OUTREACH (OUTPATIENT)
Dept: CASE MANAGEMENT | Age: 72
End: 2022-07-12

## 2022-07-12 NOTE — PROGRESS NOTES
Patient currently admitted to [facility]. Transition of care outreach postponed for 7 days due to patient's discharge to SNF. Will continue to monitor patient progress.

## 2022-07-20 ENCOUNTER — PATIENT OUTREACH (OUTPATIENT)
Dept: CASE MANAGEMENT | Age: 72
End: 2022-07-20

## 2022-07-21 NOTE — PROGRESS NOTES
Spoke with staff at [facility]. Patient discharged home 7/19/22. Outgoing call made to patient. Left message to return call. Will outreach again in 1-2 days.

## 2022-07-25 ENCOUNTER — PATIENT OUTREACH (OUTPATIENT)
Dept: CASE MANAGEMENT | Age: 72
End: 2022-07-25

## 2022-07-26 NOTE — PROGRESS NOTES
[address] [place] [address] Dr Discharge Follow-Up      Date/Time:  2022 8:43 AM    Patient was admitted to [facility] on 22 and discharged to [address] [place] [address] on 22. The patients discharged diagnosis was Left side weakness. Patient was discharge on 22 from [address] [place] [address]. The discharge summary from the post acute facilty was not available at the time of outreach. Patient was contacted within 4 business days of discharge from the post acute facility. LPN Care Coordinator contacted the patient by telephone to perform post hospital discharge follow up. Verified name and  with patient as identifiers. Provided introduction to self, and explanation of the LPN Care Coordinator role. Patient received post acute facility discharge instructions. LPN Care Coordinator reviewed discharge instructions and red flags with patient who verbalized understanding. Patient given an opportunity to ask questions and does not have any further questions or concerns at this time. The patient agrees to contact the PCP office for questions related to their healthcare. LPN Care Coordinator provided contact information for future reference. Advance Care Planning:   Does patient have an Advance Directive:  patient declined education     Home Health orders at discharge: [address]: Patient states [facility] setup at [address]. Date of initial visit: unknown-Will get new referral during follow up with Dr. Anson Evans. Durable Medical Equipment ordered at discharge: none  [address] received: n/a    Medication(s):   The post acute facility medication discharge list was not available for this call. Medication review was performed with parent, who verbalizes understanding of administration of home medications. There were no barriers to obtaining medications identified at this time.     [facility] follow up appointment(s): No future appointments. Patient will call today to schedule hospital follow up appointment.    Non-[facility] follow up appointment(s): n/a  [facility]  information provided as a resource

## 2022-08-03 ENCOUNTER — PATIENT OUTREACH (OUTPATIENT)
Dept: CASE MANAGEMENT | Age: 72
End: 2022-08-03

## 2022-08-04 NOTE — PROGRESS NOTES
Patient has graduated from the Transitions of Care Coordination  program on 08/04/22 . Patient/family has the ability to self-manage at this time Care management goals have been completed. Patient was not referred to the [facility][note 3] team for further management. Goals Addressed                   This Visit's Progress     Prevent complications post hospitalization. Attend follow up appointments per discharge instructions. Attend follow up with PCP, otolaryngology and hem-onc. Patient has Care Transition Nurse's contact information for any further questions, concerns, or needs. Patients upcoming visits:  No future appointments.

## 2022-08-08 NOTE — PROGRESS NOTES
Attempted to reach patient off mobile phone number listed 2x, no answer. Left a voicemail to give the office a call back to answer question for BENI Plata

## 2022-08-10 NOTE — PROGRESS NOTES
Attempted to reach patient again 2x off mobile phone number listed, no answer. Left a voicemail to give the office a call back!   Rob Pearson

## 2022-08-12 NOTE — PROGRESS NOTES
Follow up call to patient, [phone], left VM requesting callback to RN, contact info/hours supplied.

## 2022-08-30 ENCOUNTER — TELEPHONE (OUTPATIENT)
Dept: INTERNAL MEDICINE CLINIC | Age: 72
End: 2022-08-30

## 2022-09-23 NOTE — PROGRESS NOTES
UPDATE:    MA attempted to reach both the patient off mobile phone number listed 2x and her friend, Lizet Younger, off her home phone number 2x to try and get in touch with this patient to ask if she ever went to go see  with the ENT office. No answer, MA left a detailed voicemail stating we have been trying to reach the patient for the past 2 months and if she may call our office back to receive an answer to Providers question. Patient has now been attempted to be spoken with 3x now. She may now give our office a call if she would like to proceed.   Andre Triana

## 2022-11-07 ENCOUNTER — VIRTUAL VISIT (OUTPATIENT)
Dept: INTERNAL MEDICINE CLINIC | Age: 72
End: 2022-11-07
Payer: MEDICARE

## 2022-11-07 DIAGNOSIS — Z09 HOSPITAL DISCHARGE FOLLOW-UP: ICD-10-CM

## 2022-11-07 DIAGNOSIS — C09.0 CANCER OF TONSILLAR FOSSA (HCC): ICD-10-CM

## 2022-11-07 DIAGNOSIS — I10 ESSENTIAL HYPERTENSION: Primary | ICD-10-CM

## 2022-11-07 DIAGNOSIS — I48.0 PAF (PAROXYSMAL ATRIAL FIBRILLATION) (HCC): ICD-10-CM

## 2022-11-07 PROCEDURE — 1090F PRES/ABSN URINE INCON ASSESS: CPT | Performed by: INTERNAL MEDICINE

## 2022-11-07 PROCEDURE — G8432 DEP SCR NOT DOC, RNG: HCPCS | Performed by: INTERNAL MEDICINE

## 2022-11-07 PROCEDURE — 1111F DSCHRG MED/CURRENT MED MERGE: CPT | Performed by: INTERNAL MEDICINE

## 2022-11-07 PROCEDURE — 99213 OFFICE O/P EST LOW 20 MIN: CPT | Performed by: INTERNAL MEDICINE

## 2022-11-07 PROCEDURE — 3017F COLORECTAL CA SCREEN DOC REV: CPT | Performed by: INTERNAL MEDICINE

## 2022-11-07 PROCEDURE — 1101F PT FALLS ASSESS-DOCD LE1/YR: CPT | Performed by: INTERNAL MEDICINE

## 2022-11-07 PROCEDURE — G8756 NO BP MEASURE DOC: HCPCS | Performed by: INTERNAL MEDICINE

## 2022-11-07 PROCEDURE — G8400 PT W/DXA NO RESULTS DOC: HCPCS | Performed by: INTERNAL MEDICINE

## 2022-11-07 PROCEDURE — G8536 NO DOC ELDER MAL SCRN: HCPCS | Performed by: INTERNAL MEDICINE

## 2022-11-07 PROCEDURE — G9899 SCRN MAM PERF RSLTS DOC: HCPCS | Performed by: INTERNAL MEDICINE

## 2022-11-07 PROCEDURE — G8427 DOCREV CUR MEDS BY ELIG CLIN: HCPCS | Performed by: INTERNAL MEDICINE

## 2022-11-07 PROCEDURE — G8420 CALC BMI NORM PARAMETERS: HCPCS | Performed by: INTERNAL MEDICINE

## 2022-11-07 NOTE — PROGRESS NOTES
Donn Ralph is a 67 y.o. female being evaluated by a Virtual Visit (video visit) encounter to address concerns as mentioned above. A caregiver was present when appropriate. Due to this being a TeleHealth encounter (During [facility]-34 public health emergency), evaluation of the following organ systems was limited: Vitals/Constitutional/EENT/Resp/CV/GI//MS/Neuro/Skin/Heme-Lymph-Imm. Pursuant to the emergency declaration under the [address] [place] [address] and the [employer] Act, this Virtual Visit was conducted with patient's (and/or legal guardian's) consent, to reduce the risk of exposure to COVID-19 and provide necessary medical care. Services were provided through a video synchronous discussion virtually to substitute for in-person encounter. --Solange Fernández MD on 11/7/2022 at 1:46 PM    An electronic signature was used to authenticate this note. Donn Ralph is a 67 y.o. female and presents with Hospital Follow Up and Transitions Of Care  . Subjective:    She has reported lt.sided weakness  She also has been lost to follow up for a tonsillar lesion  revealing squamous cell carcinoma  She states she has been in rehab    Hypertension Review:  The patient has essential hypertension  Diet and Lifestyle: generally follows a  low sodium diet, exercises sporadically  Home BP Monitoring: is not measured at home. Pertinent ROS: taking medications as instructed, no medication side effects noted, no TIA's, no chest pain on exertion, no dyspnea on exertion, no swelling of ankles.      States she remains on Eliquis for a pulmonary embolism  She also has PAF history    She also has difficulty with her adls and feels she needs a CNA    Review of Systems  Constitutional:weight loss  Eyes:   negative for visual disturbance and irritation  ENT:   negative for tinnitus,sore throat,nasal congestion,ear pains.hoarseness  Respiratory:  negative for cough, hemoptysis, dyspnea,wheezing  CV:   negative for chest pain, palpitations, lower extremity edema  GI:   negative for nausea, vomiting, diarrhea, abdominal pain,melena  Endo:               negative for polyuria,polydipsia,polyphagia,heat intolerance  Genitourinary: negative for frequency, dysuria and hematuria  Integument:  negative for rash and pruritus  Hematologic:  negative for easy bruising and gum/nose bleeding  Musculoskel: negative for myalgias, arthralgias, back pain, muscle weakness, joint pain  Neurological:gait   Behavl/Psych: negative for feelings of anxiety, depression, mood changes    Past Medical History:   Diagnosis Date    History of mammogram 2010    normal    Hypertension     Pap smear for cervical cancer screening 2011    normal     Past Surgical History:   Procedure Laterality Date    HX GYN      hystterectomy    AZ COMPRE EP EVAL ABLTJ ATR FIB PULM VEIN ISOLATION N/A 11/30/2020    ABLATION A-FIB  W COMPLETE EP STUDY performed by Drew Ortiz MD at [facility] CARDIAC CATH LAB    AZ ICAR CATHETER ABLATION ARRHYTHMIA ADD ON N/A 11/30/2020    Ablation Svt/Vt Add On performed by Drew Ortiz MD at [address] CARDIAC CATH LAB    AZ INTRACARD ECHO, THER/DX INTERVENT N/A 11/30/2020    Intracardiac Echocardiogram performed by Drew Ortiz MD at [facility] CARDIAC CATH LAB    AZ INTRACARDIAC ELECTROPHYSIOLOGIC 3D MAPPING N/A 11/30/2020    Ep 3d Mapping performed by Drew Ortiz MD at [facility] CARDIAC CATH LAB    AZ STIM/PACING HEART POST IV DRUG INFU N/A 11/30/2020    Drug Stimulation performed by Drew Ortiz MD at [facility] CARDIAC CATH LAB     Social History     Socioeconomic History    Marital status:    Tobacco Use    Smoking status: Never    Smokeless tobacco: Never   Substance and Sexual Activity    Alcohol use: Yes     Comment: occasional    Drug use: No     No family history on file.   Current Outpatient Medications   Medication Sig Dispense Refill    hydroCHLOROthiazide (HYDRODIURIL) 25 mg tablet Take 1 Tablet by mouth daily. 90 Tablet 3    amLODIPine (NORVASC) 5 mg tablet Take 1 Tablet by mouth daily. 90 Tablet 3    amiodarone (Pacerone) 200 mg tablet Take 1 Tablet by mouth daily. 90 Tablet 3    apixaban (Eliquis) 5 mg tablet Take 1 tablet by mouth twice daily 180 Tablet 3    megestroL (MEGACE) 20 mg tablet Take 1 Tablet by mouth daily. 30 Tablet 3    ammonium lactate (LAC-HYDRIN) 12 % lotion       acetaminophen (TYLENOL) 650 mg TbER Take 650 mg by mouth nightly. ZINC PO Take  by mouth daily. cholecalciferol (VITAMIN D3) (1000 Units /25 mcg) tablet Take 1 Tab by mouth daily. 30 Tab 0    cyanocobalamin 1,000 mcg tablet Take 1 Tab by mouth daily. 30 Tab 0     Allergies   Allergen Reactions    Codeine Itching and Swelling       Objective: There were no vitals taken for this visit. Physical Exam:   General appearance - alert, well appearing, and in no distress  Mental status - alert, oriented to person, place, and time  EYE-SUSSY, EOMI, corneas normal, no foreign bodies  ENT-ENT exam normal, no neck nodes or sinus tenderness  Nose - normal and patent, no erythema, discharge or polyps  Mouth - mucous membranes moist, pharynx normal without lesions  Skin-Warm and dry. no hyperpigmentation, vitiligo, or suspicious lesions  Neuro -alert, oriented, normal speech, no focal findings or movement disorder noted  Neck-normal C-spine, no tenderness, full ROM without pain        Results for orders placed or performed during the hospital encounter of 06/17/22   URINE CULTURE HOLD SAMPLE    Specimen: Serum   Result Value Ref Range    Urine culture hold        Urine on hold in Microbiology dept for 2 days. If unpreserved urine is submitted, it cannot be used for addtional testing after 24 hours, recollection will be required.    METABOLIC PANEL, COMPREHENSIVE   Result Value Ref Range    Sodium 139 136 - 145 mmol/L    Potassium 4.1 3.5 - 5.1 mmol/L Chloride 107 97 - 108 mmol/L    CO2 26 21 - 32 mmol/L    Anion gap 6 5 - 15 mmol/L    Glucose 80 65 - 100 mg/dL    BUN 22 (H) 6 - 20 MG/DL    Creatinine 1.15 (H) 0.55 - 1.02 MG/DL    BUN/Creatinine ratio 19 12 - 20      GFR est AA 56 (L) >60 ml/min/1.73m2    GFR est non-AA 46 (L) >60 ml/min/1.73m2    Calcium 9.6 8.5 - 10.1 MG/DL    Bilirubin, total 0.9 0.2 - 1.0 MG/DL    ALT (SGPT) 28 12 - 78 U/L    AST (SGOT) 86 (H) 15 - 37 U/L    Alk. phosphatase 68 45 - 117 U/L    Protein, total 7.5 6.4 - 8.2 g/dL    Albumin 3.5 3.5 - 5.0 g/dL    Globulin 4.0 2.0 - 4.0 g/dL    A-G Ratio 0.9 (L) 1.1 - 2.2     CBC WITH AUTOMATED DIFF   Result Value Ref Range    WBC 7.1 3.6 - 11.0 K/uL    RBC 4.69 3.80 - 5.20 M/uL    HGB 13.4 11.5 - 16.0 g/dL    HCT 40.1 35.0 - 47.0 %    MCV 85.5 80.0 - 99.0 FL    MCH 28.6 26.0 - 34.0 PG    MCHC 33.4 30.0 - 36.5 g/dL    RDW 14.6 (H) 11.5 - 14.5 %    PLATELET 577 727 - 260 K/uL    MPV 9.5 8.9 - 12.9 FL    NRBC 0.0 0  WBC    ABSOLUTE NRBC 0.00 0.00 - 0.01 K/uL    NEUTROPHILS 77 (H) 32 - 75 %    LYMPHOCYTES 14 12 - 49 %    MONOCYTES 7 5 - 13 %    EOSINOPHILS 1 0 - 7 %    BASOPHILS 1 0 - 1 %    IMMATURE GRANULOCYTES 0 0.0 - 0.5 %    ABS. NEUTROPHILS 5.5 1.8 - 8.0 K/UL    ABS. LYMPHOCYTES 1.0 0.8 - 3.5 K/UL    ABS. MONOCYTES 0.5 0.0 - 1.0 K/UL    ABS. EOSINOPHILS 0.1 0.0 - 0.4 K/UL    ABS. BASOPHILS 0.0 0.0 - 0.1 K/UL    ABS. IMM. GRANS. 0.0 0.00 - 0.04 K/UL    DF AUTOMATED     SAMPLES BEING HELD   Result Value Ref Range    SAMPLES BEING HELD 1RED     COMMENT        Add-on orders for these samples will be processed based on acceptable specimen integrity and analyte stability, which may vary by analyte.    URINALYSIS W/ RFLX MICROSCOPIC   Result Value Ref Range    Color YELLOW/STRAW      Appearance CLEAR CLEAR      Specific gravity 1.020 1.003 - 1.030      pH (UA) 5.5 5.0 - 8.0      Protein 30 (A) NEG mg/dL    Glucose Negative NEG mg/dL    Ketone 15 (A) NEG mg/dL    Bilirubin Negative NEG Blood MODERATE (A) NEG      Urobilinogen 0.2 0.2 - 1.0 EU/dL    Nitrites Negative NEG      Leukocyte Esterase Negative NEG      WBC 0-4 0 - 4 /hpf    RBC 0-5 0 - 5 /hpf    Epithelial cells FEW FEW /lpf    Bacteria Negative NEG /hpf    Mucus 1+ (A) NEG /lpf   PTT   Result Value Ref Range    aPTT 27.1 22.1 - 31.0 sec    aPTT, therapeutic range     58.0 - 77.0 SECS   PROTHROMBIN TIME + INR   Result Value Ref Range    INR 1.1 0.9 - 1.1      Prothrombin time 11.7 (H) 9.0 - 11.1 sec   LIPID PANEL   Result Value Ref Range    Cholesterol, total 164 <200 MG/DL    Triglyceride 70 <150 MG/DL    HDL Cholesterol 54 MG/DL    LDL, calculated 96 0 - 100 MG/DL    VLDL, calculated 14 MG/DL    CHOL/HDL Ratio 3.0 0.0 - 5.0     HEMOGLOBIN A1C WITH EAG   Result Value Ref Range    Hemoglobin A1c 5.2 4.0 - 5.6 %    Est. average glucose 103 mg/dL   TSH 3RD GENERATION   Result Value Ref Range    TSH 0.51 0.36 - 3.74 uIU/mL   T3, FREE   Result Value Ref Range    Free Triiodothyronine (T3) 1.3 (L) 2.2 - 4.0 pg/mL   T4, FREE   Result Value Ref Range    T4, Free 1.6 (H) 0.8 - 1.5 NG/DL   METABOLIC PANEL, BASIC   Result Value Ref Range    Sodium 143 136 - 145 mmol/L    Potassium 3.5 3.5 - 5.1 mmol/L    Chloride 109 (H) 97 - 108 mmol/L    CO2 28 21 - 32 mmol/L    Anion gap 6 5 - 15 mmol/L    Glucose 98 65 - 100 mg/dL    BUN 20 6 - 20 MG/DL    Creatinine 0.92 0.55 - 1.02 MG/DL    BUN/Creatinine ratio 22 (H) 12 - 20      GFR est AA >60 >60 ml/min/1.73m2    GFR est non-AA >60 >60 ml/min/1.73m2    Calcium 8.1 (L) 8.5 - 10.1 MG/DL   CBC W/O DIFF   Result Value Ref Range    WBC 5.7 3.6 - 11.0 K/uL    RBC 4.27 3.80 - 5.20 M/uL    HGB 12.2 11.5 - 16.0 g/dL    HCT 37.0 35.0 - 47.0 %    MCV 86.7 80.0 - 99.0 FL    MCH 28.6 26.0 - 34.0 PG    MCHC 33.0 30.0 - 36.5 g/dL    RDW 14.8 (H) 11.5 - 14.5 %    PLATELET 269 (L) 909 - 400 K/uL    MPV 10.0 8.9 - 12.9 FL    NRBC 0.0 0  WBC    ABSOLUTE NRBC 0.00 0.00 - 5.02 K/uL   METABOLIC PANEL, BASIC   Result Value Ref Range    Sodium 140 136 - 145 mmol/L    Potassium 3.8 3.5 - 5.1 mmol/L    Chloride 108 97 - 108 mmol/L    CO2 27 21 - 32 mmol/L    Anion gap 5 5 - 15 mmol/L    Glucose 103 (H) 65 - 100 mg/dL    BUN 14 6 - 20 MG/DL    Creatinine 0.85 0.55 - 1.02 MG/DL    BUN/Creatinine ratio 16 12 - 20      GFR est AA >60 >60 ml/min/1.73m2    GFR est non-AA >60 >60 ml/min/1.73m2    Calcium 8.3 (L) 8.5 - 10.1 MG/DL   MAGNESIUM   Result Value Ref Range    Magnesium 2.1 1.6 - 2.4 mg/dL   ALBUMIN   Result Value Ref Range    Albumin 2.7 (L) 3.5 - 5.0 g/dL   CBC W/O DIFF   Result Value Ref Range    WBC 5.6 3.6 - 11.0 K/uL    RBC 3.96 3.80 - 5.20 M/uL    HGB 11.4 (L) 11.5 - 16.0 g/dL    HCT 34.3 (L) 35.0 - 47.0 %    MCV 86.6 80.0 - 99.0 FL    MCH 28.8 26.0 - 34.0 PG    MCHC 33.2 30.0 - 36.5 g/dL    RDW 14.9 (H) 11.5 - 14.5 %    PLATELET 147 958 - 726 K/uL    MPV 9.7 8.9 - 12.9 FL    NRBC 0.0 0  WBC    ABSOLUTE NRBC 0.00 0.00 - 0.01 K/uL   CEA   Result Value Ref Range    CEA 1.2 ng/mL   CBC W/O DIFF   Result Value Ref Range    WBC 5.0 3.6 - 11.0 K/uL    RBC 4.14 3.80 - 5.20 M/uL    HGB 11.7 11.5 - 16.0 g/dL    HCT 35.2 35.0 - 47.0 %    MCV 85.0 80.0 - 99.0 FL    MCH 28.3 26.0 - 34.0 PG    MCHC 33.2 30.0 - 36.5 g/dL    RDW 14.9 (H) 11.5 - 14.5 %    PLATELET 689 666 - 094 K/uL    MPV 9.9 8.9 - 12.9 FL    NRBC 0.0 0  WBC    ABSOLUTE NRBC 0.00 0.00 - 6.58 K/uL   METABOLIC PANEL, BASIC   Result Value Ref Range    Sodium 138 136 - 145 mmol/L    Potassium 3.6 3.5 - 5.1 mmol/L    Chloride 107 97 - 108 mmol/L    CO2 28 21 - 32 mmol/L    Anion gap 3 (L) 5 - 15 mmol/L    Glucose 103 (H) 65 - 100 mg/dL    BUN 13 6 - 20 MG/DL    Creatinine 0.96 0.55 - 1.02 MG/DL    BUN/Creatinine ratio 14 12 - 20      GFR est AA >60 >60 ml/min/1.73m2    GFR est non-AA 57 (L) >60 ml/min/1.73m2    Calcium 8.7 8.5 - 10.1 MG/DL   CBC WITH AUTOMATED DIFF   Result Value Ref Range    WBC 5.0 3.6 - 11.0 K/uL    RBC 3.94 3.80 - 5.20 M/uL    HGB 11.3 (L) 11.5 - 16.0 g/dL    HCT 34.0 (L) 35.0 - 47.0 %    MCV 86.3 80.0 - 99.0 FL    MCH 28.7 26.0 - 34.0 PG    MCHC 33.2 30.0 - 36.5 g/dL    RDW 15.1 (H) 11.5 - 14.5 %    PLATELET 124 429 - 606 K/uL    MPV 9.6 8.9 - 12.9 FL    NRBC 0.0 0  WBC    ABSOLUTE NRBC 0.00 0.00 - 0.01 K/uL    NEUTROPHILS 62 32 - 75 %    LYMPHOCYTES 21 12 - 49 %    MONOCYTES 10 5 - 13 %    EOSINOPHILS 5 0 - 7 %    BASOPHILS 1 0 - 1 %    IMMATURE GRANULOCYTES 1 (H) 0.0 - 0.5 %    ABS. NEUTROPHILS 3.2 1.8 - 8.0 K/UL    ABS. LYMPHOCYTES 1.1 0.8 - 3.5 K/UL    ABS. MONOCYTES 0.5 0.0 - 1.0 K/UL    ABS. EOSINOPHILS 0.3 0.0 - 0.4 K/UL    ABS. BASOPHILS 0.0 0.0 - 0.1 K/UL    ABS. IMM. GRANS. 0.0 0.00 - 0.04 K/UL    DF AUTOMATED     METABOLIC PANEL, BASIC   Result Value Ref Range    Sodium 141 136 - 145 mmol/L    Potassium 3.8 3.5 - 5.1 mmol/L    Chloride 110 (H) 97 - 108 mmol/L    CO2 27 21 - 32 mmol/L    Anion gap 4 (L) 5 - 15 mmol/L    Glucose 103 (H) 65 - 100 mg/dL    BUN 22 (H) 6 - 20 MG/DL    Creatinine 1.10 (H) 0.55 - 1.02 MG/DL    BUN/Creatinine ratio 20 12 - 20      GFR est AA 59 (L) >60 ml/min/1.73m2    GFR est non-AA 49 (L) >60 ml/min/1.73m2    Calcium 8.4 (L) 8.5 - 10.1 MG/DL   CBC WITH AUTOMATED DIFF   Result Value Ref Range    WBC 5.4 3.6 - 11.0 K/uL    RBC 4.32 3.80 - 5.20 M/uL    HGB 12.1 11.5 - 16.0 g/dL    HCT 37.0 35.0 - 47.0 %    MCV 85.6 80.0 - 99.0 FL    MCH 28.0 26.0 - 34.0 PG    MCHC 32.7 30.0 - 36.5 g/dL    RDW 15.0 (H) 11.5 - 14.5 %    PLATELET 653 175 - 259 K/uL    MPV 9.2 8.9 - 12.9 FL    NRBC 0.0 0  WBC    ABSOLUTE NRBC 0.00 0.00 - 0.01 K/uL    NEUTROPHILS 57 32 - 75 %    LYMPHOCYTES 29 12 - 49 %    MONOCYTES 8 5 - 13 %    EOSINOPHILS 5 0 - 7 %    BASOPHILS 1 0 - 1 %    IMMATURE GRANULOCYTES 0 0.0 - 0.5 %    ABS. NEUTROPHILS 3.1 1.8 - 8.0 K/UL    ABS. LYMPHOCYTES 1.5 0.8 - 3.5 K/UL    ABS. MONOCYTES 0.4 0.0 - 1.0 K/UL    ABS. EOSINOPHILS 0.2 0.0 - 0.4 K/UL    ABS. BASOPHILS 0.0 0.0 - 0.1 K/UL    ABS. IMM.  GRANS. 0.0 0.00 - 0.04 K/UL    DF AUTOMATED     EKG, 12 LEAD, INITIAL   Result Value Ref Range    Ventricular Rate 64 BPM    Atrial Rate 64 BPM    P-R Interval 138 ms    QRS Duration 150 ms    Q-T Interval 484 ms    QTC Calculation (Bezet) 499 ms    Calculated P Axis 18 degrees    Calculated R Axis -51 degrees    Calculated T Axis 20 degrees    Diagnosis       Normal sinus rhythm  Left axis deviation  Right bundle branch block  Abnormal ECG  No previous ECGs available  Confirmed by Margot Joshi ([id number]) on 2022 3:45:20 PM         Assessment/Plan:    ICD-10-CM ICD-9-CM    1. Essential hypertension  I10 401.9       2. PAF (paroxysmal atrial fibrillation) (HCC)  I48.0 427.31       3. Cancer of tonsillar fossa (HCC)  C09.0 146.1         No orders of the defined types were placed in this encounter. NEEDS FOLLOW UP WITH ENT  NEEDS HELP WITH ADLS  Patient Instructions   [employer] Activation    Thank you for requesting access to [employer] inTouch. Please follow the instructions below to securely access and download your online medical record. [employer] inTouch allows you to send messages to your doctor, view your test results, renew your prescriptions, schedule appointments, and more. How Do I Sign Up? In your internet browser, go to [email or web address]  Click on the First Time User? Click Here link in the Sign In box. You will be redirect to the New Member Sign Up page. Enter your [employer] inTouch Access Code exactly as it appears below. You will not need to use this code after youve completed the sign-up process. If you do not sign up before the expiration date, you must request a new code. [employer] inTouch Access Code: [email or web address]  Expires: 12/15/2022  1:23 PM (This is the date your [employer] inTouch access code will )    Enter the last four digits of your Social Security Number (xxxx) and Date of Birth (mm/dd/yyyy) as indicated and click Submit. You will be taken to the next sign-up page. Create a [employer] inTouch ID.  This will be your [employer] inTouch login ID and cannot be changed, so think of one that is secure and easy to remember. Create a ImageSpike password. You can change your password at any time. Enter your Password Reset Question and Answer. This can be used at a later time if you forget your password. Enter your e-mail address. You will receive e-mail notification when new information is available in [address] Click Sign Up. You can now view and download portions of your medical record. Click the MediaCore link to download a portable copy of your medical information. Additional Information    If you have questions, please visit the Frequently Asked Questions section of the ImageSpike website at [email or web address] Remember, ImageSpike is NOT to be used for urgent needs. For medical emergencies, dial 911. Follow-up and Dispositions    Return in about 4 weeks (around 12/5/2022), or if symptoms worsen or fail to improve. I have reviewed with the patient details of the assessment and plan and all questions were answered. Relevent patient education was performed. The most recent lab findings were reviewed with the patient. An After Visit Summary was printed and given to the patient.

## 2022-11-07 NOTE — PATIENT INSTRUCTIONS
ShopGo Activation    Thank you for requesting access to ShopGo. Please follow the instructions below to securely access and download your online medical record. ShopGo allows you to send messages to your doctor, view your test results, renew your prescriptions, schedule appointments, and more. How Do I Sign Up? In your internet browser, go to [email or web address]  Click on the First Time User? Click Here link in the Sign In box. You will be redirect to the New Member Sign Up page. Enter your ShopGo Access Code exactly as it appears below. You will not need to use this code after youve completed the sign-up process. If you do not sign up before the expiration date, you must request a new code. ShopGo Access Code: [email or web address]  Expires: 12/15/2022  1:23 PM (This is the date your ShopGo access code will )    Enter the last four digits of your Social Security Number (xxxx) and Date of Birth (mm/dd/yyyy) as indicated and click Submit. You will be taken to the next sign-up page. Create a ShopGo ID. This will be your ShopGo login ID and cannot be changed, so think of one that is secure and easy to remember. Create a ShopGo password. You can change your password at any time. Enter your Password Reset Question and Answer. This can be used at a later time if you forget your password. Enter your e-mail address. You will receive e-mail notification when new information is available in [address] Click Sign Up. You can now view and download portions of your medical record. Click the [place] link to download a portable copy of your medical information. Additional Information    If you have questions, please visit the Frequently Asked Questions section of the ShopGo website at [email or web address] Remember, ShopGo is NOT to be used for urgent needs. For medical emergencies, dial 911.

## 2022-11-29 NOTE — PROGRESS NOTES
1. Have you been to the ER, urgent care clinic since your last visit? Hospitalized since your last visit?n    2. Have you seen or consulted any other health care providers outside of the [address] [place] [address] since your last visit? Include any pap smears or colon screening.      Chief Complaint   Patient presents with    [address] [place] [address] Continue home eyedrops

## 2023-04-21 DIAGNOSIS — R60.0 SUBLINGUAL GLAND SWELLING: Primary | ICD-10-CM

## 2023-05-19 RX ORDER — AMLODIPINE BESYLATE 5 MG/1
5 TABLET ORAL DAILY
COMMUNITY
Start: 2022-05-20

## 2023-05-19 RX ORDER — MEGESTROL ACETATE 20 MG/1
20 TABLET ORAL DAILY
COMMUNITY
Start: 2022-05-20

## 2023-05-19 RX ORDER — HYDROCHLOROTHIAZIDE 25 MG/1
25 TABLET ORAL DAILY
COMMUNITY
Start: 2022-05-20

## 2023-05-19 RX ORDER — AMIODARONE HYDROCHLORIDE 200 MG/1
200 TABLET ORAL DAILY
COMMUNITY
Start: 2022-05-20

## 2023-05-19 RX ORDER — AMMONIUM LACTATE 12 G/100G
LOTION TOPICAL
COMMUNITY
Start: 2022-01-25

## 2023-05-19 RX ORDER — SENNOSIDES 8.6 MG
650 CAPSULE ORAL
COMMUNITY

## 2023-10-27 ENCOUNTER — APPOINTMENT (OUTPATIENT)
Facility: HOSPITAL | Age: 73
End: 2023-10-27
Payer: MEDICARE

## 2023-10-27 ENCOUNTER — HOSPITAL ENCOUNTER (OUTPATIENT)
Facility: HOSPITAL | Age: 73
Setting detail: OBSERVATION
Discharge: HOME | End: 2023-10-30
Attending: STUDENT IN AN ORGANIZED HEALTH CARE EDUCATION/TRAINING PROGRAM | Admitting: HOSPITALIST
Payer: MEDICARE

## 2023-10-27 DIAGNOSIS — I10 HYPERTENSION, UNSPECIFIED TYPE: ICD-10-CM

## 2023-10-27 DIAGNOSIS — N39.0 URINARY TRACT INFECTION WITHOUT HEMATURIA, SITE UNSPECIFIED: ICD-10-CM

## 2023-10-27 DIAGNOSIS — Z78.9 IMPAIRED MOBILITY AND ADLS: ICD-10-CM

## 2023-10-27 DIAGNOSIS — Z74.09 IMPAIRED MOBILITY AND ADLS: ICD-10-CM

## 2023-10-27 DIAGNOSIS — R79.89 ELEVATED TROPONIN: Primary | ICD-10-CM

## 2023-10-27 LAB
ALBUMIN SERPL-MCNC: 3.3 G/DL (ref 3.5–5)
ALBUMIN/GLOB SERPL: 0.8 (ref 1.1–2.2)
ALP SERPL-CCNC: 69 U/L (ref 45–117)
ALT SERPL-CCNC: 11 U/L (ref 12–78)
ANION GAP SERPL CALC-SCNC: 5 MMOL/L (ref 5–15)
AST SERPL-CCNC: 20 U/L (ref 15–37)
BASOPHILS # BLD: 0 K/UL (ref 0–0.1)
BASOPHILS NFR BLD: 1 % (ref 0–1)
BILIRUB SERPL-MCNC: 0.5 MG/DL (ref 0.2–1)
BUN SERPL-MCNC: 16 MG/DL (ref 6–20)
BUN/CREAT SERPL: 15 (ref 12–20)
CALCIUM SERPL-MCNC: 9.9 MG/DL (ref 8.5–10.1)
CHLORIDE SERPL-SCNC: 106 MMOL/L (ref 97–108)
CO2 SERPL-SCNC: 29 MMOL/L (ref 21–32)
COMMENT:: NORMAL
COMMENT:: NORMAL
CREAT SERPL-MCNC: 1.05 MG/DL (ref 0.55–1.02)
DIFFERENTIAL METHOD BLD: NORMAL
EOSINOPHIL # BLD: 0.1 K/UL (ref 0–0.4)
EOSINOPHIL NFR BLD: 1 % (ref 0–7)
ERYTHROCYTE [DISTWIDTH] IN BLOOD BY AUTOMATED COUNT: 14.2 % (ref 11.5–14.5)
GLOBULIN SER CALC-MCNC: 4.1 G/DL (ref 2–4)
GLUCOSE SERPL-MCNC: 88 MG/DL (ref 65–100)
HCT VFR BLD AUTO: 38.4 % (ref 35–47)
HGB BLD-MCNC: 12.5 G/DL (ref 11.5–16)
IMM GRANULOCYTES # BLD AUTO: 0 K/UL (ref 0–0.04)
IMM GRANULOCYTES NFR BLD AUTO: 0 % (ref 0–0.5)
LIPASE SERPL-CCNC: 13 U/L (ref 13–75)
LYMPHOCYTES # BLD: 1.3 K/UL (ref 0.8–3.5)
LYMPHOCYTES NFR BLD: 21 % (ref 12–49)
MAGNESIUM SERPL-MCNC: 2.1 MG/DL (ref 1.6–2.4)
MCH RBC QN AUTO: 28.5 PG (ref 26–34)
MCHC RBC AUTO-ENTMCNC: 32.6 G/DL (ref 30–36.5)
MCV RBC AUTO: 87.7 FL (ref 80–99)
MONOCYTES # BLD: 0.5 K/UL (ref 0–1)
MONOCYTES NFR BLD: 8 % (ref 5–13)
NEUTS SEG # BLD: 4.3 K/UL (ref 1.8–8)
NEUTS SEG NFR BLD: 69 % (ref 32–75)
NRBC # BLD: 0 K/UL (ref 0–0.01)
NRBC BLD-RTO: 0 PER 100 WBC
PLATELET # BLD AUTO: 195 K/UL (ref 150–400)
PMV BLD AUTO: 9.1 FL (ref 8.9–12.9)
POTASSIUM SERPL-SCNC: 3.4 MMOL/L (ref 3.5–5.1)
PROT SERPL-MCNC: 7.4 G/DL (ref 6.4–8.2)
RBC # BLD AUTO: 4.38 M/UL (ref 3.8–5.2)
SODIUM SERPL-SCNC: 140 MMOL/L (ref 136–145)
SPECIMEN HOLD: NORMAL
SPECIMEN HOLD: NORMAL
TROPONIN I SERPL HS-MCNC: 265 NG/L (ref 0–51)
WBC # BLD AUTO: 6.2 K/UL (ref 3.6–11)

## 2023-10-27 PROCEDURE — 85025 COMPLETE CBC W/AUTO DIFF WBC: CPT

## 2023-10-27 PROCEDURE — 36415 COLL VENOUS BLD VENIPUNCTURE: CPT

## 2023-10-27 PROCEDURE — 83735 ASSAY OF MAGNESIUM: CPT

## 2023-10-27 PROCEDURE — 84484 ASSAY OF TROPONIN QUANT: CPT

## 2023-10-27 PROCEDURE — 99285 EMERGENCY DEPT VISIT HI MDM: CPT

## 2023-10-27 PROCEDURE — 71045 X-RAY EXAM CHEST 1 VIEW: CPT

## 2023-10-27 PROCEDURE — 83690 ASSAY OF LIPASE: CPT

## 2023-10-27 PROCEDURE — 93005 ELECTROCARDIOGRAM TRACING: CPT | Performed by: STUDENT IN AN ORGANIZED HEALTH CARE EDUCATION/TRAINING PROGRAM

## 2023-10-27 PROCEDURE — 80053 COMPREHEN METABOLIC PANEL: CPT

## 2023-10-27 NOTE — ED TRIAGE NOTES
Pt arrives via EMS from home with cc of generalized weakness. EMS has been out to her house 2 times in the past 1.5 weeks for the same thing but has refused to be seen the other two times. The pt states shes too weak to care for herself at this point and wants to be checked out. Also endorses some sob. Hx of afib.  Denies chest pain

## 2023-10-27 NOTE — ED PROVIDER NOTES
EMERGENCY DEPARTMENT PHYSICIAN NOTE     Patient: Keyon Noyola     Time of Service: 10/27/2023  6:58 PM     Chief complaint:   Chief Complaint   Patient presents with    Generalized Weakness        HISTORY:  Patient is a 68 y.o. female who presents to the emergency department with complaints of generalized fatigue and weakness. Patient states she has not been able to go to bed today. EMS states this is a second call this week to her house. Last time they went family was concerned that she was unable to go to bed. Patient refused transfer. This time patient was willing to come in to get checked out as she is not able to get out of bed or walk. EMS had to do two-person lift to get her from the EMS stretcher to the hospital stretcher. Patient is wearing hospital socks but denies any recent hospitalizations. Patient has history of A-fib and reports taking Eliquis and amiodarone. Patient's vital signs are stable here with hypertension. Patient has no chest pain or shortness of breath but does endorse foot pain that sounds chronic in nature.       Past Medical History:   Diagnosis Date    History of mammogram 2010    normal    Hypertension     Pap smear for cervical cancer screening 2011    normal        Past Surgical History:   Procedure Laterality Date    EPHYS EVL TRNSPTL TX ATRIAL FIB ISOLAT PULM VEIN N/A 11/30/2020    ABLATION A-FIB  W COMPLETE EP STUDY performed by Eun Hills MD at [facility] CARDIAC CATH LAB    GYN      hystterectomy    [address] ADD ON N/A 11/30/2020    Ablation Svt/Vt Add On performed by Eun Hills MD at [facility] CARDIAC CATH LAB    INTRACARD ECHO, THER/DX INTERVENT N/A 11/30/2020    Intracardiac Echocardiogram performed by Eun Hills MD at [facility] CARDIAC CATH LAB    INTRACARDIAC ELECTROPHYSIOLOGIC 3D MAPPING N/A 11/30/2020    Ep 3d Mapping performed by Eun Hills MD at [facility] CARDIAC CATH LAB    STIM/PACING HEART POST IV DRUG INFU N/A

## 2023-10-28 PROBLEM — N39.0 UTI (URINARY TRACT INFECTION): Status: ACTIVE | Noted: 2023-10-28

## 2023-10-28 LAB
APPEARANCE UR: CLEAR
BACTERIA URNS QL MICRO: ABNORMAL /HPF
BILIRUB UR QL: NEGATIVE
COLOR UR: ABNORMAL
EKG ATRIAL RATE: 78 BPM
EKG DIAGNOSIS: NORMAL
EKG P AXIS: 62 DEGREES
EKG P-R INTERVAL: 144 MS
EKG Q-T INTERVAL: 410 MS
EKG QRS DURATION: 140 MS
EKG QTC CALCULATION (BAZETT): 467 MS
EKG R AXIS: -72 DEGREES
EKG T AXIS: -1 DEGREES
EKG VENTRICULAR RATE: 78 BPM
EPITH CASTS URNS QL MICRO: ABNORMAL /LPF
GLUCOSE UR STRIP.AUTO-MCNC: NEGATIVE MG/DL
HGB UR QL STRIP: ABNORMAL
KETONES UR QL STRIP.AUTO: ABNORMAL MG/DL
LEUKOCYTE ESTERASE UR QL STRIP.AUTO: ABNORMAL
NITRITE UR QL STRIP.AUTO: POSITIVE
PH UR STRIP: 5.5 (ref 5–8)
PROT UR STRIP-MCNC: ABNORMAL MG/DL
RBC #/AREA URNS HPF: ABNORMAL /HPF (ref 0–5)
SP GR UR REFRACTOMETRY: 1.03 (ref 1–1.03)
SPECIMEN HOLD: NORMAL
TROPONIN I SERPL HS-MCNC: 236 NG/L (ref 0–51)
UROBILINOGEN UR QL STRIP.AUTO: 1 EU/DL (ref 0.2–1)
WBC URNS QL MICRO: ABNORMAL /HPF (ref 0–4)

## 2023-10-28 PROCEDURE — 6370000000 HC RX 637 (ALT 250 FOR IP): Performed by: HOSPITALIST

## 2023-10-28 PROCEDURE — 2580000003 HC RX 258: Performed by: HOSPITALIST

## 2023-10-28 PROCEDURE — 87077 CULTURE AEROBIC IDENTIFY: CPT

## 2023-10-28 PROCEDURE — G0378 HOSPITAL OBSERVATION PER HR: HCPCS

## 2023-10-28 PROCEDURE — 81001 URINALYSIS AUTO W/SCOPE: CPT

## 2023-10-28 PROCEDURE — 87186 SC STD MICRODIL/AGAR DIL: CPT

## 2023-10-28 PROCEDURE — 87086 URINE CULTURE/COLONY COUNT: CPT

## 2023-10-28 PROCEDURE — 97165 OT EVAL LOW COMPLEX 30 MIN: CPT

## 2023-10-28 PROCEDURE — 6370000000 HC RX 637 (ALT 250 FOR IP): Performed by: EMERGENCY MEDICINE

## 2023-10-28 PROCEDURE — 97161 PT EVAL LOW COMPLEX 20 MIN: CPT

## 2023-10-28 PROCEDURE — 97535 SELF CARE MNGMENT TRAINING: CPT

## 2023-10-28 PROCEDURE — 93010 ELECTROCARDIOGRAM REPORT: CPT | Performed by: SPECIALIST

## 2023-10-28 PROCEDURE — 97530 THERAPEUTIC ACTIVITIES: CPT

## 2023-10-28 PROCEDURE — 6370000000 HC RX 637 (ALT 250 FOR IP): Performed by: STUDENT IN AN ORGANIZED HEALTH CARE EDUCATION/TRAINING PROGRAM

## 2023-10-28 RX ORDER — POTASSIUM CHLORIDE 750 MG/1
40 TABLET, FILM COATED, EXTENDED RELEASE ORAL ONCE
Status: COMPLETED | OUTPATIENT
Start: 2023-10-28 | End: 2023-10-28

## 2023-10-28 RX ORDER — ACETAMINOPHEN 325 MG/1
650 TABLET ORAL EVERY 6 HOURS PRN
Status: DISCONTINUED | OUTPATIENT
Start: 2023-10-28 | End: 2023-10-30 | Stop reason: HOSPADM

## 2023-10-28 RX ORDER — ONDANSETRON 4 MG/1
4 TABLET, ORALLY DISINTEGRATING ORAL EVERY 8 HOURS PRN
Status: DISCONTINUED | OUTPATIENT
Start: 2023-10-28 | End: 2023-10-30 | Stop reason: HOSPADM

## 2023-10-28 RX ORDER — ENOXAPARIN SODIUM 100 MG/ML
40 INJECTION SUBCUTANEOUS DAILY
Status: DISCONTINUED | OUTPATIENT
Start: 2023-10-28 | End: 2023-10-28

## 2023-10-28 RX ORDER — SODIUM CHLORIDE 9 MG/ML
INJECTION, SOLUTION INTRAVENOUS PRN
Status: DISCONTINUED | OUTPATIENT
Start: 2023-10-28 | End: 2023-10-30 | Stop reason: HOSPADM

## 2023-10-28 RX ORDER — HYDRALAZINE HYDROCHLORIDE 20 MG/ML
10 INJECTION INTRAMUSCULAR; INTRAVENOUS EVERY 6 HOURS PRN
Status: DISCONTINUED | OUTPATIENT
Start: 2023-10-28 | End: 2023-10-30 | Stop reason: HOSPADM

## 2023-10-28 RX ORDER — ACETAMINOPHEN 650 MG/1
650 SUPPOSITORY RECTAL EVERY 6 HOURS PRN
Status: DISCONTINUED | OUTPATIENT
Start: 2023-10-28 | End: 2023-10-30 | Stop reason: HOSPADM

## 2023-10-28 RX ORDER — ONDANSETRON 2 MG/ML
4 INJECTION INTRAMUSCULAR; INTRAVENOUS EVERY 6 HOURS PRN
Status: DISCONTINUED | OUTPATIENT
Start: 2023-10-28 | End: 2023-10-30 | Stop reason: HOSPADM

## 2023-10-28 RX ORDER — AMIODARONE HYDROCHLORIDE 200 MG/1
200 TABLET ORAL DAILY
Status: DISCONTINUED | OUTPATIENT
Start: 2023-10-28 | End: 2023-10-30 | Stop reason: HOSPADM

## 2023-10-28 RX ORDER — SODIUM CHLORIDE 0.9 % (FLUSH) 0.9 %
5-40 SYRINGE (ML) INJECTION PRN
Status: DISCONTINUED | OUTPATIENT
Start: 2023-10-28 | End: 2023-10-30 | Stop reason: HOSPADM

## 2023-10-28 RX ORDER — CEPHALEXIN 500 MG/1
500 CAPSULE ORAL EVERY 6 HOURS SCHEDULED
Status: DISCONTINUED | OUTPATIENT
Start: 2023-10-28 | End: 2023-10-30 | Stop reason: HOSPADM

## 2023-10-28 RX ORDER — CEPHALEXIN 250 MG/1
250 CAPSULE ORAL
Status: COMPLETED | OUTPATIENT
Start: 2023-10-28 | End: 2023-10-28

## 2023-10-28 RX ORDER — POLYETHYLENE GLYCOL 3350 17 G/17G
17 POWDER, FOR SOLUTION ORAL DAILY PRN
Status: DISCONTINUED | OUTPATIENT
Start: 2023-10-28 | End: 2023-10-30 | Stop reason: HOSPADM

## 2023-10-28 RX ORDER — SODIUM CHLORIDE 0.9 % (FLUSH) 0.9 %
5-40 SYRINGE (ML) INJECTION EVERY 12 HOURS SCHEDULED
Status: DISCONTINUED | OUTPATIENT
Start: 2023-10-28 | End: 2023-10-30 | Stop reason: HOSPADM

## 2023-10-28 RX ORDER — HYDROCHLOROTHIAZIDE 25 MG/1
25 TABLET ORAL DAILY
Status: DISCONTINUED | OUTPATIENT
Start: 2023-10-29 | End: 2023-10-30 | Stop reason: HOSPADM

## 2023-10-28 RX ORDER — AMLODIPINE BESYLATE 5 MG/1
5 TABLET ORAL DAILY
Status: DISCONTINUED | OUTPATIENT
Start: 2023-10-29 | End: 2023-10-30 | Stop reason: HOSPADM

## 2023-10-28 RX ADMIN — ACETAMINOPHEN 650 MG: 325 TABLET ORAL at 21:01

## 2023-10-28 RX ADMIN — POTASSIUM CHLORIDE 40 MEQ: 750 TABLET, FILM COATED, EXTENDED RELEASE ORAL at 19:17

## 2023-10-28 RX ADMIN — CEPHALEXIN 500 MG: 500 CAPSULE ORAL at 19:17

## 2023-10-28 RX ADMIN — AMIODARONE HYDROCHLORIDE 200 MG: 200 TABLET ORAL at 19:22

## 2023-10-28 RX ADMIN — CEPHALEXIN 250 MG: 250 CAPSULE ORAL at 01:27

## 2023-10-28 RX ADMIN — CEPHALEXIN 500 MG: 500 CAPSULE ORAL at 15:53

## 2023-10-28 RX ADMIN — Medication 10 ML: at 21:07

## 2023-10-28 RX ADMIN — APIXABAN 5 MG: 5 TABLET, FILM COATED ORAL at 20:06

## 2023-10-28 ASSESSMENT — PAIN SCALES - GENERAL: PAINLEVEL_OUTOF10: 8

## 2023-10-28 ASSESSMENT — PAIN DESCRIPTION - ORIENTATION: ORIENTATION: RIGHT

## 2023-10-28 ASSESSMENT — PAIN DESCRIPTION - LOCATION: LOCATION: LEG

## 2023-10-28 NOTE — ED NOTES
Report given to [facility], RN     Jory Miranda, [facility]  10/28/23 7928

## 2023-10-28 NOTE — ED NOTES
Pt repositioned to position of comfort, pure wick in place an working.  Call bell in reach      [facility], [address]  10/28/23 7363

## 2023-10-28 NOTE — ED NOTES
Pt laying on back in position of comfort sleeping at this time.  Call bell in reach      [place], [place]  10/28/23 5757

## 2023-10-28 NOTE — ED PROVIDER NOTES
Perfect Serve Consult for Admission  3:30 PM    ED Room Number: ER12/12  Patient Name and age:  Sangeetha Gregory 68 y.o.  female  Working Diagnosis:   1. Elevated troponin    2. Hypertension, unspecified type    3. Impaired mobility and ADLs    4. Urinary tract infection without hematuria, site unspecified        COVID-19 Suspicion: No  Sepsis present:  No    Code Status:  Full Code  Readmission: No  Isolation Requirements: no  Recommended Level of Care: telemetry  Department: [address] [place] [address],[address] Adult ED - 21     Other: Patient presenting with generalized weakness. Work-up shows an elevated troponin which looks chronic. Also does have a UTI. Evaluated by PT and OT. Unable to ambulate. Likely needs placement going forward.          Logan Hayes MD  10/28/23 9861

## 2023-10-28 NOTE — ED NOTES
Pt moved to room 12 at this time. Placed on cardiac monitor with cont pulse ox and bp cuff. Soiled brief removed, pt placed on BoxVentureswick. Call light within reach.       Shubham Han RN  10/27/23 4619

## 2023-10-28 NOTE — ED NOTES
Report received from [place], [facility], [address] [place] [address], [address] [place] [address]  10/28/23 5823

## 2023-10-28 NOTE — ED NOTES
Report received from [place], [facility], [address] [place] [address], [address] [place] [address]  10/28/23 5692

## 2023-10-29 PROCEDURE — 6370000000 HC RX 637 (ALT 250 FOR IP): Performed by: STUDENT IN AN ORGANIZED HEALTH CARE EDUCATION/TRAINING PROGRAM

## 2023-10-29 PROCEDURE — G0378 HOSPITAL OBSERVATION PER HR: HCPCS

## 2023-10-29 PROCEDURE — 6370000000 HC RX 637 (ALT 250 FOR IP): Performed by: HOSPITALIST

## 2023-10-29 PROCEDURE — 2580000003 HC RX 258: Performed by: HOSPITALIST

## 2023-10-29 RX ADMIN — CEPHALEXIN 500 MG: 500 CAPSULE ORAL at 12:43

## 2023-10-29 RX ADMIN — Medication 10 ML: at 20:57

## 2023-10-29 RX ADMIN — CEPHALEXIN 500 MG: 500 CAPSULE ORAL at 23:35

## 2023-10-29 RX ADMIN — APIXABAN 5 MG: 5 TABLET, FILM COATED ORAL at 08:56

## 2023-10-29 RX ADMIN — CEPHALEXIN 500 MG: 500 CAPSULE ORAL at 18:30

## 2023-10-29 RX ADMIN — Medication 10 ML: at 08:56

## 2023-10-29 RX ADMIN — CEPHALEXIN 500 MG: 500 CAPSULE ORAL at 00:05

## 2023-10-29 RX ADMIN — CEPHALEXIN 500 MG: 500 CAPSULE ORAL at 06:12

## 2023-10-29 RX ADMIN — APIXABAN 5 MG: 5 TABLET, FILM COATED ORAL at 20:57

## 2023-10-29 RX ADMIN — AMLODIPINE BESYLATE 5 MG: 5 TABLET ORAL at 08:55

## 2023-10-29 RX ADMIN — HYDROCHLOROTHIAZIDE 25 MG: 25 TABLET ORAL at 08:55

## 2023-10-29 RX ADMIN — AMIODARONE HYDROCHLORIDE 200 MG: 200 TABLET ORAL at 08:55

## 2023-10-30 VITALS
DIASTOLIC BLOOD PRESSURE: 69 MMHG | TEMPERATURE: 97.9 F | RESPIRATION RATE: 16 BRPM | OXYGEN SATURATION: 98 % | SYSTOLIC BLOOD PRESSURE: 155 MMHG | HEART RATE: 73 BPM

## 2023-10-30 PROBLEM — N39.0 UTI (URINARY TRACT INFECTION): Status: RESOLVED | Noted: 2023-10-28 | Resolved: 2023-10-30

## 2023-10-30 LAB
BACTERIA SPEC CULT: ABNORMAL
CC UR VC: ABNORMAL
SERVICE CMNT-IMP: ABNORMAL

## 2023-10-30 PROCEDURE — 6370000000 HC RX 637 (ALT 250 FOR IP): Performed by: STUDENT IN AN ORGANIZED HEALTH CARE EDUCATION/TRAINING PROGRAM

## 2023-10-30 PROCEDURE — G0378 HOSPITAL OBSERVATION PER HR: HCPCS

## 2023-10-30 PROCEDURE — 6370000000 HC RX 637 (ALT 250 FOR IP): Performed by: HOSPITALIST

## 2023-10-30 PROCEDURE — 51798 US URINE CAPACITY MEASURE: CPT

## 2023-10-30 PROCEDURE — 2580000003 HC RX 258: Performed by: HOSPITALIST

## 2023-10-30 RX ORDER — CEPHALEXIN 500 MG/1
500 CAPSULE ORAL 4 TIMES DAILY
Qty: 12 CAPSULE | Refills: 0 | Status: SHIPPED
Start: 2023-10-30 | End: 2023-11-02

## 2023-10-30 RX ADMIN — CEPHALEXIN 500 MG: 500 CAPSULE ORAL at 12:21

## 2023-10-30 RX ADMIN — APIXABAN 5 MG: 5 TABLET, FILM COATED ORAL at 09:14

## 2023-10-30 RX ADMIN — CEPHALEXIN 500 MG: 500 CAPSULE ORAL at 06:37

## 2023-10-30 RX ADMIN — HYDROCHLOROTHIAZIDE 25 MG: 25 TABLET ORAL at 09:14

## 2023-10-30 RX ADMIN — AMLODIPINE BESYLATE 5 MG: 5 TABLET ORAL at 09:14

## 2023-10-30 RX ADMIN — AMIODARONE HYDROCHLORIDE 200 MG: 200 TABLET ORAL at 09:14

## 2023-10-30 RX ADMIN — Medication 10 ML: at 09:15

## 2023-10-30 NOTE — PLAN OF CARE
Problem: Discharge Planning  Goal: Discharge to home or other facility with appropriate resources  Outcome: [address] [address] Resolved Met     Problem: Chronic Conditions and Co-morbidities  Goal: Patient's chronic conditions and co-morbidity symptoms are monitored and maintained or improved  Outcome: [address] [address] Resolved Met     Problem: Safety - Adult  Goal: Free from fall injury  Outcome: [address] [address] Resolved Met     Problem: Skin/Tissue Integrity  Goal: Absence of new skin breakdown  Description: 1. Monitor for areas of redness and/or skin breakdown  2. Assess vascular access sites hourly  3. Every 4-6 hours minimum:  Change oxygen saturation probe site  4. Every 4-6 hours:  If on nasal continuous positive airway pressure, respiratory therapy assess nares and determine need for appliance change or resting period.   Outcome: [address] [address] Resolved Met
hospitals CARDIAC CATH LAB    INTRACARD ECHO, THER/DX INTERVENT N/A 11/30/2020    Intracardiac Echocardiogram performed by Kristine Hicks MD at hospitals CARDIAC CATH LAB    INTRACARDIAC ELECTROPHYSIOLOGIC 3D MAPPING N/A 11/30/2020    Ep 3d Mapping performed by Kristine Hicks MD at hospitals CARDIAC CATH LAB    STIM/PACING HEART POST IV DRUG INFU N/A 11/30/2020    Drug Stimulation performed by Kristine Hicks MD at [address]  6/27/2022    US LYMPH NODE BIOPSY 6/27/2022 [facility]       Home Situation:  Social/Functional History  Lives With: Alone (aide M-F 4 hours; helps with cooking, cleaning, driving)  Type of Home: Apartment  Home Layout: One level  Home Access: Stairs to enter with rails  Entrance Stairs - Number of Steps: 15  Bathroom Shower/Tub: None (sponge bath)  Home Equipment: Bulger peyton Bonner  Has the patient had two or more falls in the past year or any fall with injury in the past year?: Yes (slid off bed)    Skin: flaking dry skin LE's   Strength:    Strength: Generally decreased, functional  Tone & Sensation:   Tone: Normal  Sensation: Intact  Coordination:  Coordination: Generally decreased, functional  Range Of Motion:  AROM: Generally decreased, functional  Functional Mobility:  Bed Mobility:  Bed Mobility Training  Bed Mobility Training: Yes  Rolling: Moderate assistance;Assist X1  Supine to Sit: Moderate assistance;Maximum assistance;Assist X2  Sit to Supine: Maximum assistance;Assist X2  Scooting: Maximum assistance;Assist X2  Transfers:  Transfer Training  Transfer Training: Yes  Sit to Stand: Maximum assistance;Assist X1  Stand to Sit: Moderate assistance;Assist X1  Balance:   Balance  Sitting: Impaired  Sitting - Static: Poor (constant support); Fair (occasional)  Sitting - Dynamic: Poor (constant support)  Standing: Impaired  Standing - Static: Poor  Standing - Dynamic: Poor
to Stand: Maximum assistance;Assist X1  Stand to Sit: Moderate assistance;Assist X1            Balance:  Standing: Impaired  Balance  Sitting: Impaired  Sitting - Static: Poor (constant support); Fair (occasional)  Sitting - Dynamic: Poor (constant support)  Standing: Impaired  Standing - Static: Poor  Standing - Dynamic: Poor        ADL Assessment:          Feeding: Setup  Feeding Skilled Clinical Factors: opening of containers/lids    Grooming: Setup  Grooming Skilled Clinical Factors: supported sitting    UE Bathing: Maximum assistance  UE Bathing Skilled Clinical Factors: wash underarms    LE Bathing: Maximum assistance       UE Dressing: Maximum assistance  UE Dressing Skilled Clinical Factors: poor ROM R UE    LE Dressing: Maximum assistance       Toileting: Dependent/Total              ADL Intervention and task modifications:                                                                                                                                                                                                                                             Barthel Index:    Barthel Index Scale  Feeding: Needs help, i.e. for cutting  Bathing: Cannot perform activity  Grooming: Washes face, cowan hair, brushes teeth, shaves (manages plug if electric razor)  Dressing: Cannot perform activity  Bowel Control: Occasional accidents or needs help with device  Bladder Control: Occasional accidents or needs help with device  Toilet Transfers: Cannot perform activity  Chair/Bed Trannsfers: Able to sit, but needs maximum assistance to transfer  Ambulation: Cannot perform activity  Stairs: Cannot perform activity  Total Barthel Index Score: 25       The Barthel ADL Index: Guidelines  1. The index should be used as a record of what a patient does, not as a record of what a patient could do. 2. The main aim is to establish degree of independence from any help, physical or verbal, however minor and for whatever reason.   3.

## 2023-10-30 NOTE — CARE COORDINATION
Transition of Care Plan:    RUR: Observation    Prior Level of Functioning: Independent    Disposition: SNF   If SNF or IPR: Date FOC offered: 10/28   Date [facility] received: 10/28   Accepting facility: [facility]  REPORT: [phone] [address]   Per conversation with liaison, Yanique Woodruff [phone]; she reported that the facility could receive the pt today. Date authorization started with reference number: 10/28   Date authorization received and expires: 10/28-10/31 PLAN Swapnil Swenson #[id number] [facility]#[id number]  Follow up appointments: PCP   DME needed: NONE   Transportation at discharge: [facility] JOSE#[id number] REQUESTED 12:30PM Phuong Fakkeith: [phone]  IM/IMM Medicare/ letter given: N/A   Caregiver Contact: Jani Rachel (Other) [phone]   Discharge Caregiver contacted prior to discharge? YES   Care Conference needed? NO       Transition of Care Plan to SNF/Rehab    Communication to Patient/Family:  Met with patient and family and they are agreeable to the transition plan. The Plan for Transition of Care is related to the following treatment goals: SNF    The Patient and/or patient representative was provided with a choice of provider and agrees  with the discharge plan. Yes [x] No []    A Freedom of choice list was provided with basic dialogue that supports the patient's individualized plan of care/goals and shares the quality data associated with the providers. Yes [x] No []    SNF/Rehab Transition:  Patient has been accepted to [facility] SNF/Rehab and meets criteria for admission. Patient will transported by [facility] and expected to leave at 12:30pm.    Communication to SNF/Rehab:  Bedside RN, Kenny Garcia, has been notified to update the transition plan to the facility and call report (phone number). Discharge information has been updated on the AVS. And communicated to facility via [employer], or CC link.      Discharge instructions to be fax'd to facility at
arise.    3:33 PM  CM received notification that [facility] is able to accept and accommodate patient for services. CM confirmed with liaison, Donaldo Segundo [phone]. Patient and family in agreement with facility. Liaison initiated Moustapha Rodriguez. Currently awaiting insurance authorization at this time. CM will continue to follow and assist with MAGDA needs as they arise.     Susy Diamond, MSYEISON/GADIEL    [phone]

## 2023-10-30 NOTE — DISCHARGE SUMMARY
Discharge Summary       PATIENT ID: Solomon Santana  MRN: [id number]   YOB: 1950    DATE OF ADMISSION: 10/27/2023  6:58 PM    DATE OF DISCHARGE: 10/30/2023   PRIMARY CARE PROVIDER: Di Beck MD     ATTENDING PHYSICIAN: Evy  DISCHARGING PROVIDER: Wicho Quick MD    To contact this individual call [phone] and ask the  to page. If unavailable ask to be transferred the Adult Hospitalist Department. CONSULTATIONS: IP CONSULT TO CASE MANAGEMENT  IP WOUND CARE NURSE CONSULT TO EVAL    PROCEDURES/SURGERIES: * No surgery found *     ADMITTING DIAGNOSES & HOSPITAL COURSE:   68 y.o. female who presents with weakness. This has been going on for about a week and getting worse. No focal weakness or paresthesias. Found to have a possible UTI in the ED based on UA.     E coli UTI: complete cephalexin  Paroxysmal afib, stable  Hypokalemia: replenished  HTN      DISCHARGE DIAGNOSES / PLAN:        FOLLOW UP APPOINTMENTS:    Follow-up Information       Follow up With Specialties Details Why Contact Elisa Whitlock [facility] [place]    Di Beck MD Internal Medicine Follow up  [address] [place] [address]  [phone]              DISCHARGE MEDICATIONS:     Medication List        START taking these medications      cephALEXin 500 MG capsule  Commonly known as: KEFLEX  Take 1 capsule by mouth 4 times daily for 3 days            CONTINUE taking these medications      acetaminophen 650 MG extended release tablet  Commonly known as: TYLENOL     amiodarone 200 MG tablet  Commonly known as: CORDARONE     amLODIPine 5 MG tablet  Commonly known as: NORVASC     ammonium lactate 12 % lotion  Commonly known as: LAC-HYDRIN     apixaban 5 MG Tabs tablet  Commonly known as: ELIQUIS     cyanocobalamin 1000 MCG tablet     hydroCHLOROthiazide 25 MG tablet  Commonly known as:

## 2023-10-30 NOTE — PROGRESS NOTES
Education was provided to the patient, patient verbalized understanding. All questions were answered. Patient had no further questions. AVS was signed and a copy of the signature was placed in patient chart. Report was given to RN at receiving facility all questions were answered.

## 2023-10-30 NOTE — PROGRESS NOTES
Pt. Given AVS. Rn goes over paperwork ans answers all questions. Pt. No Varner understanding. Pt. Signs AVS via e-signature pad. Pt.'s family will drive pt. Home. will continue to monitor. Report given to [facility] RN at the [facility]. All questions were answered RN had no further questions. Number was provided if any further questions come up.

## 2023-12-26 ENCOUNTER — APPOINTMENT (OUTPATIENT)
Facility: HOSPITAL | Age: 73
End: 2023-12-26
Payer: MEDICARE

## 2023-12-26 ENCOUNTER — HOSPITAL ENCOUNTER (EMERGENCY)
Facility: HOSPITAL | Age: 73
Discharge: HOME | End: 2023-12-26
Attending: EMERGENCY MEDICINE
Payer: MEDICARE

## 2023-12-26 VITALS
SYSTOLIC BLOOD PRESSURE: 143 MMHG | OXYGEN SATURATION: 100 % | TEMPERATURE: 98 F | HEART RATE: 71 BPM | RESPIRATION RATE: 16 BRPM | DIASTOLIC BLOOD PRESSURE: 76 MMHG

## 2023-12-26 DIAGNOSIS — N30.00 ACUTE CYSTITIS WITHOUT HEMATURIA: ICD-10-CM

## 2023-12-26 DIAGNOSIS — R53.1 GENERALIZED WEAKNESS: Primary | ICD-10-CM

## 2023-12-26 LAB
ALBUMIN SERPL-MCNC: 3.4 G/DL (ref 3.5–5)
ALBUMIN/GLOB SERPL: 0.8 (ref 1.1–2.2)
ALP SERPL-CCNC: 78 U/L (ref 45–117)
ALT SERPL-CCNC: 16 U/L (ref 12–78)
ANION GAP SERPL CALC-SCNC: 2 MMOL/L (ref 5–15)
APPEARANCE UR: ABNORMAL
AST SERPL-CCNC: 43 U/L (ref 15–37)
BACTERIA URNS QL MICRO: ABNORMAL /HPF
BASOPHILS # BLD: 0.1 K/UL (ref 0–0.1)
BASOPHILS NFR BLD: 1 % (ref 0–1)
BILIRUB SERPL-MCNC: 0.7 MG/DL (ref 0.2–1)
BILIRUB UR QL: NEGATIVE
BUN SERPL-MCNC: 17 MG/DL (ref 6–20)
BUN/CREAT SERPL: 15 (ref 12–20)
CALCIUM SERPL-MCNC: 9 MG/DL (ref 8.5–10.1)
CHLORIDE SERPL-SCNC: 109 MMOL/L (ref 97–108)
CO2 SERPL-SCNC: 29 MMOL/L (ref 21–32)
COLOR UR: ABNORMAL
COMMENT:: NORMAL
CREAT SERPL-MCNC: 1.14 MG/DL (ref 0.55–1.02)
DIFFERENTIAL METHOD BLD: ABNORMAL
EKG ATRIAL RATE: 71 BPM
EKG ATRIAL RATE: 73 BPM
EKG DIAGNOSIS: NORMAL
EKG DIAGNOSIS: NORMAL
EKG P AXIS: 53 DEGREES
EKG P AXIS: 86 DEGREES
EKG P-R INTERVAL: 168 MS
EKG P-R INTERVAL: 170 MS
EKG Q-T INTERVAL: 456 MS
EKG Q-T INTERVAL: 458 MS
EKG QRS DURATION: 136 MS
EKG QRS DURATION: 144 MS
EKG QTC CALCULATION (BAZETT): 497 MS
EKG QTC CALCULATION (BAZETT): 502 MS
EKG R AXIS: -86 DEGREES
EKG R AXIS: 269 DEGREES
EKG T AXIS: 49 DEGREES
EKG T AXIS: 49 DEGREES
EKG VENTRICULAR RATE: 71 BPM
EKG VENTRICULAR RATE: 73 BPM
EOSINOPHIL # BLD: 0.1 K/UL (ref 0–0.4)
EOSINOPHIL NFR BLD: 1 % (ref 0–7)
EPITH CASTS URNS QL MICRO: ABNORMAL /LPF
ERYTHROCYTE [DISTWIDTH] IN BLOOD BY AUTOMATED COUNT: 14.4 % (ref 11.5–14.5)
GLOBULIN SER CALC-MCNC: 4.3 G/DL (ref 2–4)
GLUCOSE SERPL-MCNC: 100 MG/DL (ref 65–100)
GLUCOSE UR STRIP.AUTO-MCNC: NEGATIVE MG/DL
HCT VFR BLD AUTO: 38.2 % (ref 35–47)
HGB BLD-MCNC: 12.5 G/DL (ref 11.5–16)
HGB UR QL STRIP: ABNORMAL
IMM GRANULOCYTES # BLD AUTO: 0 K/UL (ref 0–0.04)
IMM GRANULOCYTES NFR BLD AUTO: 0 % (ref 0–0.5)
KETONES UR QL STRIP.AUTO: 15 MG/DL
LEUKOCYTE ESTERASE UR QL STRIP.AUTO: ABNORMAL
LYMPHOCYTES # BLD: 0.6 K/UL (ref 0.8–3.5)
LYMPHOCYTES NFR BLD: 11 % (ref 12–49)
MCH RBC QN AUTO: 27.9 PG (ref 26–34)
MCHC RBC AUTO-ENTMCNC: 32.7 G/DL (ref 30–36.5)
MCV RBC AUTO: 85.3 FL (ref 80–99)
MONOCYTES # BLD: 0.3 K/UL (ref 0–1)
MONOCYTES NFR BLD: 6 % (ref 5–13)
NEUTS SEG # BLD: 4.6 K/UL (ref 1.8–8)
NEUTS SEG NFR BLD: 81 % (ref 32–75)
NITRITE UR QL STRIP.AUTO: POSITIVE
NRBC # BLD: 0 K/UL (ref 0–0.01)
NRBC BLD-RTO: 0 PER 100 WBC
PH UR STRIP: 5.5 (ref 5–8)
PLATELET # BLD AUTO: 183 K/UL (ref 150–400)
PMV BLD AUTO: 9.7 FL (ref 8.9–12.9)
POTASSIUM SERPL-SCNC: 4.4 MMOL/L (ref 3.5–5.1)
PROT SERPL-MCNC: 7.7 G/DL (ref 6.4–8.2)
PROT UR STRIP-MCNC: 30 MG/DL
RBC # BLD AUTO: 4.48 M/UL (ref 3.8–5.2)
RBC #/AREA URNS HPF: ABNORMAL /HPF (ref 0–5)
RBC MORPH BLD: ABNORMAL
SODIUM SERPL-SCNC: 140 MMOL/L (ref 136–145)
SP GR UR REFRACTOMETRY: 1.03 (ref 1–1.03)
SPECIMEN HOLD: NORMAL
SPECIMEN HOLD: NORMAL
TROPONIN I SERPL HS-MCNC: 284 NG/L (ref 0–51)
UROBILINOGEN UR QL STRIP.AUTO: 0.2 EU/DL (ref 0.2–1)
WBC # BLD AUTO: 5.7 K/UL (ref 3.6–11)
WBC URNS QL MICRO: ABNORMAL /HPF (ref 0–4)

## 2023-12-26 PROCEDURE — 71046 X-RAY EXAM CHEST 2 VIEWS: CPT

## 2023-12-26 PROCEDURE — 6360000002 HC RX W HCPCS: Performed by: EMERGENCY MEDICINE

## 2023-12-26 PROCEDURE — 93005 ELECTROCARDIOGRAM TRACING: CPT | Performed by: EMERGENCY MEDICINE

## 2023-12-26 PROCEDURE — 93010 ELECTROCARDIOGRAM REPORT: CPT | Performed by: INTERNAL MEDICINE

## 2023-12-26 PROCEDURE — 96374 THER/PROPH/DIAG INJ IV PUSH: CPT

## 2023-12-26 PROCEDURE — 6370000000 HC RX 637 (ALT 250 FOR IP): Performed by: EMERGENCY MEDICINE

## 2023-12-26 PROCEDURE — 81001 URINALYSIS AUTO W/SCOPE: CPT

## 2023-12-26 PROCEDURE — 36415 COLL VENOUS BLD VENIPUNCTURE: CPT

## 2023-12-26 PROCEDURE — 85025 COMPLETE CBC W/AUTO DIFF WBC: CPT

## 2023-12-26 PROCEDURE — 99285 EMERGENCY DEPT VISIT HI MDM: CPT

## 2023-12-26 PROCEDURE — 2580000003 HC RX 258: Performed by: EMERGENCY MEDICINE

## 2023-12-26 PROCEDURE — 84484 ASSAY OF TROPONIN QUANT: CPT

## 2023-12-26 PROCEDURE — 80053 COMPREHEN METABOLIC PANEL: CPT

## 2023-12-26 RX ORDER — 0.9 % SODIUM CHLORIDE 0.9 %
1000 INTRAVENOUS SOLUTION INTRAVENOUS ONCE
Status: COMPLETED | OUTPATIENT
Start: 2023-12-26 | End: 2023-12-26

## 2023-12-26 RX ORDER — AMLODIPINE BESYLATE 5 MG/1
5 TABLET ORAL
Status: COMPLETED | OUTPATIENT
Start: 2023-12-26 | End: 2023-12-26

## 2023-12-26 RX ORDER — CEPHALEXIN 500 MG/1
500 CAPSULE ORAL 2 TIMES DAILY
Qty: 14 CAPSULE | Refills: 0 | Status: SHIPPED | OUTPATIENT
Start: 2023-12-26 | End: 2024-01-02

## 2023-12-26 RX ORDER — AMIODARONE HYDROCHLORIDE 200 MG/1
200 TABLET ORAL
Status: COMPLETED | OUTPATIENT
Start: 2023-12-26 | End: 2023-12-26

## 2023-12-26 RX ADMIN — AMLODIPINE BESYLATE 5 MG: 5 TABLET ORAL at 09:11

## 2023-12-26 RX ADMIN — WATER 1000 MG: 1 INJECTION INTRAMUSCULAR; INTRAVENOUS; SUBCUTANEOUS at 12:38

## 2023-12-26 RX ADMIN — APIXABAN 5 MG: 5 TABLET, FILM COATED ORAL at 09:11

## 2023-12-26 RX ADMIN — AMIODARONE HYDROCHLORIDE 200 MG: 200 TABLET ORAL at 09:11

## 2023-12-26 RX ADMIN — SODIUM CHLORIDE 1000 ML: 9 INJECTION, SOLUTION INTRAVENOUS at 09:02

## 2023-12-26 NOTE — ED TRIAGE NOTES
Patient arrives via EMS from home with a CC of generalized weakness and fatigue that started yesterday. Stated she had a L eye vision change yesterday that she stated was similar to her hx of vertigo. Endorses increased frequency.      PMH of afib and HTN- takes eliquis and lasix

## 2023-12-26 NOTE — DISCHARGE INSTRUCTIONS
Return to the emergency department for any new or worsening symptoms.   In the meantime please take antibiotics as directed and follow-up with your primary care doctor

## 2023-12-26 NOTE — CARE COORDINATION
2:36 PM    CM consulted to assist with patient transport home. Address verified. Informed patient able to transport via W/C. [facility] to [facility] [phone]. They are able to accept and accommodate patient for transport at 3:15 pm.  RN notified of updates. No other CM needs at this time.     ANA CRISTINA Lester/CM

## 2025-03-21 NOTE — PROGRESS NOTES
[facility] at [address], [address], [address]: [phone]  F: [phone]    Reason for Visit:   Jena Jefferson is a 67 y.o. female who is seen in hospital for second opinion at the request of Dr. Carlos A Figueroa for evaluation of neck mass. Treatment History:   Neck mass biopsy done 6/27/22    History of Present Illness:     Seen today in hospital for second opinion for neck mass and LEFT tonsillar fossa lesion on CTA neck. CTA Neck:  1. No evidence of flow-limiting stenosis. 2. Moderate stenosis at the origin of the nondominant right vertebral artery. 3. Partially visualized small acute pulmonary embolus in the distal right main  pulmonary artery. 4. 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 Large centrally necrotic left level 2A lymph node  measuring 2.1 x 2.1 x 3.4 cm, consistent with jarrell metastasis. ENT consultation  is recommended. 5. Multinodular thyroid gland. Pt had a biopsy of neck mass today and is going home. Path pending. Pt is in bed with nursing at bedside.    Seen by [facility] and requesting another opinion  No fevers/ chills/ chest pain/ SOB/ nausea/ vomiting/diarrhea/         Past Medical History:   Diagnosis Date    History of mammogram 2010    normal    Hypertension     Pap smear for cervical cancer screening 2011    normal      Past Surgical History:   Procedure Laterality Date    HX GYN      hystterectomy    [place] COMPRE EP EVAL ABLTJ ATR FIB PULM VEIN ISOLATION N/A 11/30/2020    ABLATION A-FIB  W COMPLETE EP STUDY performed by Yousif Leigh MD at [facility] CARDIAC CATH LAB    NJ ICAR CATHETER ABLATION ARRHYTHMIA ADD ON N/A 11/30/2020    Ablation Svt/Vt Add On performed by Yousif Leigh MD at [facility] CARDIAC CATH LAB    [place] INTRACARD ECHO, THER/DX INTERVENT N/A 11/30/2020    Intracardiac Echocardiogram performed by Ryann Rivera MD at [facility] CARDIAC CATH LAB    AL INTRACARDIAC ELECTROPHYSIOLOGIC 3D MAPPING N/A 11/30/2020    Ep 3d Mapping performed by Ryann Rivera MD at [facility] CARDIAC CATH LAB    AL STIM/PACING HEART POST IV DRUG INFU N/A 11/30/2020    Drug Stimulation performed by Ryann Rivera MD at [facility] CARDIAC CATH LAB      Social History     Tobacco Use    Smoking status: Never Smoker    Smokeless tobacco: Never Used   Substance Use Topics    Alcohol use: Yes     Comment: occasional      No family history on file.   Current Facility-Administered Medications   Medication Dose Route Frequency    lidocaine (PF) (XYLOCAINE) 10 mg/mL (1 %) injection 5 mL  5 mL SubCUTAneous RAD ONCE    lidocaine (PF) (XYLOCAINE) 10 mg/mL (1 %) injection 5 mL  5 mL SubCUTAneous RAD ONCE    sodium bicarbonate (4.2%) injection 42 mg  1 mL SubCUTAneous RAD ONCE    sodium bicarbonate (4.2%) injection 84 mg  2 mL SubCUTAneous RAD ONCE    lidocaine (PF) (XYLOCAINE) 10 mg/mL (1 %) injection 10 mL  10 mL SubCUTAneous RAD ONCE    apixaban (ELIQUIS) tablet 5 mg  5 mg Oral Q12H    hydrALAZINE (APRESOLINE) 20 mg/mL injection 10 mg  10 mg IntraVENous Q4H PRN    labetaloL (NORMODYNE;TRANDATE) injection 10 mg  10 mg IntraVENous Q4H PRN    acetaminophen (TYLENOL) tablet 650 mg  650 mg Oral Q4H PRN    Or    acetaminophen (TYLENOL) solution 650 mg  650 mg Per NG tube Q4H PRN    Or    acetaminophen (TYLENOL) suppository 650 mg  650 mg Rectal Q4H PRN    amiodarone (CORDARONE) tablet 200 mg  200 mg Oral DAILY    amLODIPine (NORVASC) tablet 5 mg  5 mg Oral DAILY    cholecalciferol (VITAMIN D3) (1000 Units /25 mcg) tablet 1,000 Units  1,000 Units Oral DAILY    cyanocobalamin (VITAMIN B12) tablet 1,000 mcg  1,000 mcg Oral DAILY    traMADoL (ULTRAM) tablet 50 mg  50 mg Oral Q6H PRN    megestroL (MEGACE) 400 mg/10 mL (10 mL) oral suspension 20 mg  20 mg Oral DAILY    balsam peru-castor oiL (VENELEX) ointment Topical BID    ammonium lactate (LAC-HYDRIN) 12 % lotion   Topical BID     Current Outpatient Medications   Medication Sig    hydroCHLOROthiazide (HYDRODIURIL) 25 mg tablet Take 1 Tablet by mouth daily.  amLODIPine (NORVASC) 5 mg tablet Take 1 Tablet by mouth daily.  amiodarone (Pacerone) 200 mg tablet Take 1 Tablet by mouth daily.  apixaban (Eliquis) 5 mg tablet Take 1 tablet by mouth twice daily    megestroL (MEGACE) 20 mg tablet Take 1 Tablet by mouth daily.  ammonium lactate (LAC-HYDRIN) 12 % lotion     acetaminophen (Tylenol Arthritis Pain) 650 mg TbER Take 650 mg by mouth nightly.  ZINC PO Take  by mouth daily.  cholecalciferol (VITAMIN D3) (1000 Units /25 mcg) tablet Take 1 Tab by mouth daily.  cyanocobalamin 1,000 mcg tablet Take 1 Tab by mouth daily. Allergies   Allergen Reactions    Codeine Itching and Swelling        Review of Systems: A complete review of systems was obtained, negative except as described above. Physical Exam:     Visit Vitals  BP (!) 159/72 (BP 1 Location: Right upper arm, BP Patient Position: At rest)   Pulse 69   Temp 99.5 °F (37.5 °C)   Resp 18   Ht 5' 7\" (1.702 m)   Wt 151 lb 14.4 oz (68.9 kg)   SpO2 98%   BMI 23.79 kg/m²     ECOG PS: 1  General: No distress  Eyes:  anicteric sclerae  HENT: Atraumatic  Neck: Supple  Lymphatic: LEFT neck mass about 3-4 cm on exam  Respiratory: CTAB, normal respiratory effort  CV: Normal rate, regular rhythm  MS: in bed moving  Skin: No rashes, ecchymoses, or petechiae. Normal temperature, turgor, and texture. Psych: Alert, conversant    Results:     Lab Results   Component Value Date/Time    WBC 5.4 06/27/2022 05:51 AM    HGB 12.1 06/27/2022 05:51 AM    HCT 37.0 06/27/2022 05:51 AM    PLATELET 673 51/17/9882 05:51 AM    MCV 85.6 06/27/2022 05:51 AM    ABS.  NEUTROPHILS 3.1 06/27/2022 05:51 AM     Lab Results   Component Value Date/Time    Sodium 141 06/24/2022 03:46 AM    Potassium 3.8 06/24/2022 03:46 AM    Chloride 110 (H) 06/24/2022 03:46 AM    CO2 27 06/24/2022 03:46 AM    Glucose 103 (H) 06/24/2022 03:46 AM    BUN 22 (H) 06/24/2022 03:46 AM    Creatinine 1.10 (H) 06/24/2022 03:46 AM    GFR est AA 59 (L) 06/24/2022 03:46 AM    GFR est non-AA 49 (L) 06/24/2022 03:46 AM    Calcium 8.4 (L) 06/24/2022 03:46 AM     Lab Results   Component Value Date/Time    Bilirubin, total 0.9 06/17/2022 12:47 PM    ALT (SGPT) 28 06/17/2022 12:47 PM    Alk. phosphatase 68 06/17/2022 12:47 PM    Protein, total 7.5 06/17/2022 12:47 PM    Albumin 2.7 (L) 06/20/2022 02:03 AM    Globulin 4.0 06/17/2022 12:47 PM       CT Results (most recent):  Results from Hospital Encounter encounter on 06/17/22    CT CHEST ABD PELV W CONT    Narrative  EXAM: CT CHEST ABD PELV W CONT    INDICATION: head and neck cancer staging    COMPARISON: None    IV CONTRAST: 100 mL of Isovue-370. ORAL CONTRAST: given    TECHNIQUE:  Following the uneventful intravenous administration of contrast, thin axial  images were obtained through the chest, abdomen and pelvis. Coronal and sagittal  reformats were generated. CT dose reduction was achieved through use of a  standardized protocol tailored for this examination and automatic exposure  control for dose modulation. FINDINGS:    CHEST WALL: No mass or axillary lymphadenopathy. THYROID: Heterogeneous  MEDIASTINUM: No mass or lymphadenopathy. MIA: No mass or lymphadenopathy. THORACIC AORTA: No dissection or aneurysm. MAIN PULMONARY ARTERY: Normal in caliber. TRACHEA/BRONCHI: Patent. ESOPHAGUS: No wall thickening or dilatation. HEART: Normal in size. PLEURA: Trace left pleural effusion  LUNGS: Mild left basilar atelectasis. No suspicious pulmonary nodules or masses    LIVER: No mass. BILIARY TREE: Numerous small gallstones are present in the gallbladder. CBD is  not dilated. SPLEEN: within normal limits. PANCREAS: No mass or ductal dilatation. ADRENALS: Unremarkable.   KIDNEYS: Right pelviectasis without overt hydronephrosis. Benign left renal cyst  requires no follow-up. STOMACH: Unremarkable. SMALL BOWEL: Fecal stasis in the distal small bowel with mild proximal small  bowel dilatation, up to 3.2 cm. COLON: Diverticulosis of the colon and moderate constipation  APPENDIX: Normal  PERITONEUM: No ascites or pneumoperitoneum. RETROPERITONEUM: Diffuse calcific aortic atherosclerosis without aneurysm  REPRODUCTIVE ORGANS: Status post hysterectomy  URINARY BLADDER: No mass or calculus. BONES: No destructive bone lesion. ABDOMINAL WALL: No mass or hernia. ADDITIONAL COMMENTS: N/A    Impression  1. No evidence of metastatic disease within the chest, abdomen, or pelvis. 2. Moderate generalized constipation with mild associated small bowel  dilatation. 3. Diffuse calcific aortic atherosclerosis. 4. Cholelithiasis. 5. Trace left pleural effusion. CTA Neck:  1. No evidence of flow-limiting stenosis. 2. Moderate stenosis at the origin of the nondominant right vertebral artery. 3. Partially visualized small acute pulmonary embolus in the distal right main  pulmonary artery. 4. 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 Large centrally necrotic left level 2A lymph node  measuring 2.1 x 2.1 x 3.4 cm, consistent with jarrell metastasis. ENT consultation  is recommended. 5. Multinodular thyroid gland. Records reviewed and summarized above. Pathology report(s) reviewed above. Radiology report(s) reviewed above. 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 Large centrally necrotic left level 2A lymph node  measuring 2.1 x 2.1 x 3.4 cm, consistent with jarrell metastasis.   Found on CTA neckc 6/22 while in hospital.     Seen by [facility] and seen today for second opinion per pt/ hospitalist request.   D/w hospitalist today. Records and history reviewed with pt today. Had neck mass biopsy today and path is pending. CTs of body negative for distant disease. Pt to see ENT as outpt Dr Gustafson. Need path for tissue dx of cancer. For H+ N cancer treatment options include surgery/ chemo/ XRT. Surgery eval first then to us as outpt. Pt is in bed and going to a SNF today. Pt can fu with us as outpt. 2) PE on eliquis. 3) CVA/ TIA. Per neuro. 4) AF per cardio. We will follow up as outpt. D/w hospitalist today    I appreciate the opportunity to participate in Ms. Madisyn Chance's care.     Signed By: Bernadine Kenyon DO Quality 226: Preventive Care And Screening: Tobacco Use: Screening And Cessation Intervention: Patient screened for tobacco use, is a smoker AND received Cessation Counseling within measurement period or in the six months prior to the measurement period Quality 431: Preventive Care And Screening: Unhealthy Alcohol Use - Screening: Patient not identified as an unhealthy alcohol user when screened for unhealthy alcohol use using a systematic screening method Detail Level: Detailed Quality 130: Documentation Of Current Medications In The Medical Record: Current Medications Documented

## (undated) DEVICE — PINNACLE INTRODUCER SHEATH: Brand: PINNACLE

## (undated) DEVICE — SHTH STEERABLE FLEXCATH 12 FR --

## (undated) DEVICE — HI-TORQUE VERSACORE FLOPPY GUIDE WIRE SYSTEM 145 CM: Brand: HI-TORQUE VERSACORE

## (undated) DEVICE — PROBE ES TEMP HOT AND CLD FAST ACCURATE SFT FLX CIRCA S CATH

## (undated) DEVICE — PRESSURE MONITORING SET: Brand: TRUWAVE

## (undated) DEVICE — CATH EP CRV 7F DUO 2/8 2M LG -- LIVEWIRE STRL

## (undated) DEVICE — Device: Brand: ACUNAV 10F ULTRASOUND CATHETER

## (undated) DEVICE — ANGIOGRAPHY KIT CUST [K0910930B] [MERIT MEDICAL SYSTEMS INC]

## (undated) DEVICE — INTRODUCER SHTH 8FR L63CM DIL 8FR L67CM 0.032IN TRANSSEPTAL

## (undated) DEVICE — KIT ELECTRICAL UMBILICAL --

## (undated) DEVICE — SOLID-TIP ABLATION CATHETER CABLE, STERILE CABLE: Brand: BLAZER™

## (undated) DEVICE — MEDI-TRACE CADENCE ADULT, DEFIBRILLATION ELECTRODE -RTS  (10 PR/PK) - PHILIPS: Brand: MEDI-TRACE CADENCE

## (undated) DEVICE — DRESSING HEMOSTATIC SFT INTVENT W/O SLT DBL WRP QUIKCLOT LF

## (undated) DEVICE — RADIFOCUS OPTITORQUE ANGIOGRAPHIC CATHETER: Brand: OPTITORQUE

## (undated) DEVICE — GLIDESHEATH SLENDER ACCESS KIT: Brand: GLIDESHEATH SLENDER

## (undated) DEVICE — CATH EP ACHV MPPNG 3.3FR 20MM -- ACHIEVE

## (undated) DEVICE — TR BAND RADIAL ARTERY COMPRESSION DEVICE: Brand: TR BAND

## (undated) DEVICE — CABLE CATH CONN 10 PIN DISP -- ACHIEVE ADVANCE

## (undated) DEVICE — TUBING PRSS MON L6IN PVC M FEM CONN

## (undated) DEVICE — 3M™ TEGADERM™ TRANSPARENT FILM DRESSING FRAME STYLE, 1626W, 4 IN X 4-3/4 IN (10 CM X 12 CM), 50/CT 4CT/CASE: Brand: 3M™ TEGADERM™

## (undated) DEVICE — KIT COAX UMBILICAL FOR N20 --

## (undated) DEVICE — Device: Brand: NRG TRANSSEPTAL NEEDLE

## (undated) DEVICE — CATH CRYOABLATN EP 28MM -- ARCTIC FRONT

## (undated) DEVICE — TEMPERATURE ABLATION CATHETER: Brand: BLAZER® II HTD

## (undated) DEVICE — AMPLATZ EXTRA STIFF WIRE GUIDE: Brand: AMPLATZ

## (undated) DEVICE — CABLE RMFG E SUPREME 150CM RED --

## (undated) DEVICE — SYRINGE ANGIO 10 CC BRL STD PRNT POLYCARB LT BLU MEDALLION

## (undated) DEVICE — CATHETER ETER ANGIO L110CM OD5FR ID046IN L75CM 038IN 145DEG CARD

## (undated) DEVICE — CABLE RMFG RSPONS ELEC EXT RED --

## (undated) DEVICE — PACK PROCEDURE SURG HRT CATH

## (undated) DEVICE — TTL1LYR 14FR10ML 100%SILI UMST TR: Brand: MEDLINE

## (undated) DEVICE — KIT ELECTRD SURF FOR DISPLAYING THE 3D POS OF EP CATH

## (undated) DEVICE — REM POLYHESIVE ADULT PATIENT RETURN ELECTRODE: Brand: VALLEYLAB

## (undated) DEVICE — GUIDEWIRE VASC L260CM 0.035IN J TIP L3MM PTFE FIX COR NAMIC

## (undated) DEVICE — CATH RMFG EP SUPREME 6FRX120CM --

## (undated) DEVICE — CABLE RMFG EXT CATH --

## (undated) DEVICE — DRESSING HEMSTAT W12XL12IN 3 PLY QUIKCLOT CONTROL+

## (undated) DEVICE — KIT ACCS INTRO 4FR L10CM NDL 21GA L7CM GWIRE L40CM

## (undated) DEVICE — SPLINT WR POS F/ARTERIAL ACC -- BX/10